# Patient Record
Sex: MALE | Race: WHITE | NOT HISPANIC OR LATINO | Employment: FULL TIME | URBAN - METROPOLITAN AREA
[De-identification: names, ages, dates, MRNs, and addresses within clinical notes are randomized per-mention and may not be internally consistent; named-entity substitution may affect disease eponyms.]

---

## 2021-08-05 ENCOUNTER — HOSPITAL ENCOUNTER (INPATIENT)
Facility: HOSPITAL | Age: 66
LOS: 1 days | Discharge: HOME | DRG: 177 | End: 2021-08-06
Attending: EMERGENCY MEDICINE | Admitting: INTERNAL MEDICINE
Payer: OTHER GOVERNMENT

## 2021-08-05 DIAGNOSIS — J12.82 PNEUMONIA DUE TO COVID-19 VIRUS: ICD-10-CM

## 2021-08-05 DIAGNOSIS — J96.01 ACUTE RESPIRATORY FAILURE WITH HYPOXIA: Primary | ICD-10-CM

## 2021-08-05 DIAGNOSIS — U07.1 PNEUMONIA DUE TO COVID-19 VIRUS: ICD-10-CM

## 2021-08-05 DIAGNOSIS — Z20.822 SUSPECTED COVID-19 VIRUS INFECTION: ICD-10-CM

## 2021-08-05 DIAGNOSIS — K92.2 GASTROINTESTINAL HEMORRHAGE, UNSPECIFIED GASTROINTESTINAL HEMORRHAGE TYPE: ICD-10-CM

## 2021-08-05 DIAGNOSIS — K52.9 COLITIS: ICD-10-CM

## 2021-08-05 PROBLEM — E66.9 OBESITY (BMI 30.0-34.9): Chronic | Status: ACTIVE | Noted: 2021-08-05

## 2021-08-05 PROBLEM — D64.9 ANEMIA: Status: ACTIVE | Noted: 2021-08-05

## 2021-08-05 LAB
25(OH)D3+25(OH)D2 SERPL-MCNC: 41 NG/ML (ref 30–96)
ALBUMIN SERPL BCP-MCNC: 2.6 G/DL (ref 3.5–5.2)
ALP SERPL-CCNC: 59 U/L (ref 55–135)
ALT SERPL W/O P-5'-P-CCNC: 94 U/L (ref 10–44)
ANION GAP SERPL CALC-SCNC: 13 MMOL/L (ref 8–16)
APTT BLDCRRT: 24.4 SEC (ref 21–32)
AST SERPL-CCNC: 112 U/L (ref 10–40)
BASOPHILS # BLD AUTO: 0.02 K/UL (ref 0–0.2)
BASOPHILS NFR BLD: 0.4 % (ref 0–1.9)
BILIRUB SERPL-MCNC: 0.5 MG/DL (ref 0.1–1)
BNP SERPL-MCNC: 159 PG/ML (ref 0–99)
BUN SERPL-MCNC: 11 MG/DL (ref 8–23)
CALCIUM SERPL-MCNC: 8.5 MG/DL (ref 8.7–10.5)
CHLORIDE SERPL-SCNC: 103 MMOL/L (ref 95–110)
CK SERPL-CCNC: 57 U/L (ref 20–200)
CO2 SERPL-SCNC: 22 MMOL/L (ref 23–29)
CREAT SERPL-MCNC: 0.7 MG/DL (ref 0.5–1.4)
CRP SERPL-MCNC: 118.4 MG/L (ref 0–8.2)
D DIMER PPP IA.FEU-MCNC: 1.32 MG/L FEU
DIFFERENTIAL METHOD: ABNORMAL
EOSINOPHIL # BLD AUTO: 0.1 K/UL (ref 0–0.5)
EOSINOPHIL NFR BLD: 1.4 % (ref 0–8)
ERYTHROCYTE [DISTWIDTH] IN BLOOD BY AUTOMATED COUNT: 17.6 % (ref 11.5–14.5)
ERYTHROCYTE [SEDIMENTATION RATE] IN BLOOD BY WESTERGREN METHOD: 58 MM/HR (ref 0–10)
EST. GFR  (AFRICAN AMERICAN): >60 ML/MIN/1.73 M^2
EST. GFR  (NON AFRICAN AMERICAN): >60 ML/MIN/1.73 M^2
FERRITIN SERPL-MCNC: 158 NG/ML (ref 20–300)
GLUCOSE SERPL-MCNC: 118 MG/DL (ref 70–110)
HCT VFR BLD AUTO: 36.4 % (ref 40–54)
HCT VFR BLD AUTO: 37.9 % (ref 40–54)
HCV AB SERPL QL IA: NEGATIVE
HEP C VIRUS HOLD SPECIMEN: NORMAL
HGB BLD-MCNC: 10.4 G/DL (ref 14–18)
HGB BLD-MCNC: 11 G/DL (ref 14–18)
IMM GRANULOCYTES # BLD AUTO: 0.03 K/UL (ref 0–0.04)
IMM GRANULOCYTES NFR BLD AUTO: 0.6 % (ref 0–0.5)
INR PPP: 0.9 (ref 0.8–1.2)
LACTATE SERPL-SCNC: 1.6 MMOL/L (ref 0.5–2.2)
LDH SERPL L TO P-CCNC: 355 U/L (ref 110–260)
LYMPHOCYTES # BLD AUTO: 0.8 K/UL (ref 1–4.8)
LYMPHOCYTES NFR BLD: 15.9 % (ref 18–48)
MCH RBC QN AUTO: 24.9 PG (ref 27–31)
MCHC RBC AUTO-ENTMCNC: 29 G/DL (ref 32–36)
MCV RBC AUTO: 86 FL (ref 82–98)
MONOCYTES # BLD AUTO: 0.3 K/UL (ref 0.3–1)
MONOCYTES NFR BLD: 5.6 % (ref 4–15)
NEUTROPHILS # BLD AUTO: 3.8 K/UL (ref 1.8–7.7)
NEUTROPHILS NFR BLD: 76.1 % (ref 38–73)
NRBC BLD-RTO: 0 /100 WBC
PLATELET # BLD AUTO: 259 K/UL (ref 150–450)
PMV BLD AUTO: 11 FL (ref 9.2–12.9)
POTASSIUM SERPL-SCNC: 4.1 MMOL/L (ref 3.5–5.1)
PROCALCITONIN SERPL IA-MCNC: 0.16 NG/ML
PROT SERPL-MCNC: 6.5 G/DL (ref 6–8.4)
PROTHROMBIN TIME: 10.6 SEC (ref 9–12.5)
RBC # BLD AUTO: 4.41 M/UL (ref 4.6–6.2)
SARS-COV-2 RDRP RESP QL NAA+PROBE: POSITIVE
SODIUM SERPL-SCNC: 138 MMOL/L (ref 136–145)
TROPONIN I SERPL DL<=0.01 NG/ML-MCNC: 0.03 NG/ML (ref 0–0.03)
TROPONIN I SERPL DL<=0.01 NG/ML-MCNC: 0.03 NG/ML (ref 0–0.03)
WBC # BLD AUTO: 4.98 K/UL (ref 3.9–12.7)

## 2021-08-05 PROCEDURE — 80053 COMPREHEN METABOLIC PANEL: CPT | Performed by: EMERGENCY MEDICINE

## 2021-08-05 PROCEDURE — 84145 PROCALCITONIN (PCT): CPT | Performed by: EMERGENCY MEDICINE

## 2021-08-05 PROCEDURE — 99900035 HC TECH TIME PER 15 MIN (STAT)

## 2021-08-05 PROCEDURE — 87209 SMEAR COMPLEX STAIN: CPT | Performed by: PHYSICIAN ASSISTANT

## 2021-08-05 PROCEDURE — 85018 HEMOGLOBIN: CPT | Performed by: PHYSICIAN ASSISTANT

## 2021-08-05 PROCEDURE — 94640 AIRWAY INHALATION TREATMENT: CPT

## 2021-08-05 PROCEDURE — 25000003 PHARM REV CODE 250: Performed by: PHYSICIAN ASSISTANT

## 2021-08-05 PROCEDURE — 21400001 HC TELEMETRY ROOM

## 2021-08-05 PROCEDURE — 93005 ELECTROCARDIOGRAM TRACING: CPT

## 2021-08-05 PROCEDURE — 96375 TX/PRO/DX INJ NEW DRUG ADDON: CPT

## 2021-08-05 PROCEDURE — 82728 ASSAY OF FERRITIN: CPT | Performed by: EMERGENCY MEDICINE

## 2021-08-05 PROCEDURE — 93010 ELECTROCARDIOGRAM REPORT: CPT | Mod: ,,, | Performed by: INTERNAL MEDICINE

## 2021-08-05 PROCEDURE — 36415 COLL VENOUS BLD VENIPUNCTURE: CPT | Performed by: PHYSICIAN ASSISTANT

## 2021-08-05 PROCEDURE — 87045 FECES CULTURE AEROBIC BACT: CPT | Performed by: PHYSICIAN ASSISTANT

## 2021-08-05 PROCEDURE — 25000242 PHARM REV CODE 250 ALT 637 W/ HCPCS: Performed by: PHYSICIAN ASSISTANT

## 2021-08-05 PROCEDURE — 85730 THROMBOPLASTIN TIME PARTIAL: CPT | Performed by: PHYSICIAN ASSISTANT

## 2021-08-05 PROCEDURE — 94761 N-INVAS EAR/PLS OXIMETRY MLT: CPT

## 2021-08-05 PROCEDURE — 83880 ASSAY OF NATRIURETIC PEPTIDE: CPT | Performed by: EMERGENCY MEDICINE

## 2021-08-05 PROCEDURE — 96361 HYDRATE IV INFUSION ADD-ON: CPT

## 2021-08-05 PROCEDURE — 63600175 PHARM REV CODE 636 W HCPCS: Performed by: PHYSICIAN ASSISTANT

## 2021-08-05 PROCEDURE — 63600175 PHARM REV CODE 636 W HCPCS: Performed by: EMERGENCY MEDICINE

## 2021-08-05 PROCEDURE — 83615 LACTATE (LD) (LDH) ENZYME: CPT | Performed by: EMERGENCY MEDICINE

## 2021-08-05 PROCEDURE — 85014 HEMATOCRIT: CPT | Performed by: PHYSICIAN ASSISTANT

## 2021-08-05 PROCEDURE — 93010 EKG 12-LEAD: ICD-10-PCS | Mod: ,,, | Performed by: INTERNAL MEDICINE

## 2021-08-05 PROCEDURE — 82550 ASSAY OF CK (CPK): CPT | Performed by: EMERGENCY MEDICINE

## 2021-08-05 PROCEDURE — 85025 COMPLETE CBC W/AUTO DIFF WBC: CPT | Performed by: EMERGENCY MEDICINE

## 2021-08-05 PROCEDURE — 85379 FIBRIN DEGRADATION QUANT: CPT | Performed by: PHYSICIAN ASSISTANT

## 2021-08-05 PROCEDURE — 87040 BLOOD CULTURE FOR BACTERIA: CPT | Performed by: EMERGENCY MEDICINE

## 2021-08-05 PROCEDURE — 84484 ASSAY OF TROPONIN QUANT: CPT | Performed by: PHYSICIAN ASSISTANT

## 2021-08-05 PROCEDURE — 87046 STOOL CULTR AEROBIC BACT EA: CPT | Performed by: PHYSICIAN ASSISTANT

## 2021-08-05 PROCEDURE — S0030 INJECTION, METRONIDAZOLE: HCPCS | Performed by: PHYSICIAN ASSISTANT

## 2021-08-05 PROCEDURE — 96365 THER/PROPH/DIAG IV INF INIT: CPT

## 2021-08-05 PROCEDURE — 87427 SHIGA-LIKE TOXIN AG IA: CPT | Mod: 59 | Performed by: PHYSICIAN ASSISTANT

## 2021-08-05 PROCEDURE — 82306 VITAMIN D 25 HYDROXY: CPT | Performed by: PHYSICIAN ASSISTANT

## 2021-08-05 PROCEDURE — 85651 RBC SED RATE NONAUTOMATED: CPT | Performed by: PHYSICIAN ASSISTANT

## 2021-08-05 PROCEDURE — 25000003 PHARM REV CODE 250: Performed by: EMERGENCY MEDICINE

## 2021-08-05 PROCEDURE — 85610 PROTHROMBIN TIME: CPT | Performed by: PHYSICIAN ASSISTANT

## 2021-08-05 PROCEDURE — 99291 CRITICAL CARE FIRST HOUR: CPT | Mod: 25

## 2021-08-05 PROCEDURE — 83605 ASSAY OF LACTIC ACID: CPT | Performed by: EMERGENCY MEDICINE

## 2021-08-05 PROCEDURE — 86803 HEPATITIS C AB TEST: CPT | Performed by: EMERGENCY MEDICINE

## 2021-08-05 PROCEDURE — 89055 LEUKOCYTE ASSESSMENT FECAL: CPT | Performed by: PHYSICIAN ASSISTANT

## 2021-08-05 PROCEDURE — U0002 COVID-19 LAB TEST NON-CDC: HCPCS | Performed by: EMERGENCY MEDICINE

## 2021-08-05 PROCEDURE — 86140 C-REACTIVE PROTEIN: CPT | Performed by: EMERGENCY MEDICINE

## 2021-08-05 PROCEDURE — 84484 ASSAY OF TROPONIN QUANT: CPT | Mod: 91 | Performed by: EMERGENCY MEDICINE

## 2021-08-05 RX ORDER — CIPROFLOXACIN 2 MG/ML
400 INJECTION, SOLUTION INTRAVENOUS
Status: DISCONTINUED | OUTPATIENT
Start: 2021-08-05 | End: 2021-08-06

## 2021-08-05 RX ORDER — IBUPROFEN 200 MG
16 TABLET ORAL
Status: DISCONTINUED | OUTPATIENT
Start: 2021-08-05 | End: 2021-08-06 | Stop reason: HOSPADM

## 2021-08-05 RX ORDER — ASCORBIC ACID 500 MG
500 TABLET ORAL 2 TIMES DAILY
Status: DISCONTINUED | OUTPATIENT
Start: 2021-08-05 | End: 2021-08-06

## 2021-08-05 RX ORDER — SODIUM CHLORIDE 0.9 % (FLUSH) 0.9 %
10 SYRINGE (ML) INJECTION
Status: DISCONTINUED | OUTPATIENT
Start: 2021-08-05 | End: 2021-08-06 | Stop reason: HOSPADM

## 2021-08-05 RX ORDER — ZINC SULFATE 50(220)MG
220 CAPSULE ORAL DAILY
Status: DISCONTINUED | OUTPATIENT
Start: 2021-08-05 | End: 2021-08-06

## 2021-08-05 RX ORDER — METRONIDAZOLE 500 MG/100ML
500 INJECTION, SOLUTION INTRAVENOUS
Status: DISCONTINUED | OUTPATIENT
Start: 2021-08-05 | End: 2021-08-06

## 2021-08-05 RX ORDER — ALBUTEROL SULFATE 90 UG/1
2 AEROSOL, METERED RESPIRATORY (INHALATION) EVERY 6 HOURS
Status: DISCONTINUED | OUTPATIENT
Start: 2021-08-05 | End: 2021-08-06 | Stop reason: HOSPADM

## 2021-08-05 RX ORDER — GLUCAGON 1 MG
1 KIT INJECTION
Status: DISCONTINUED | OUTPATIENT
Start: 2021-08-05 | End: 2021-08-06 | Stop reason: HOSPADM

## 2021-08-05 RX ORDER — IBUPROFEN 200 MG
24 TABLET ORAL
Status: DISCONTINUED | OUTPATIENT
Start: 2021-08-05 | End: 2021-08-06 | Stop reason: HOSPADM

## 2021-08-05 RX ADMIN — Medication 220 MG: at 02:08

## 2021-08-05 RX ADMIN — METRONIDAZOLE 500 MG: 500 INJECTION, SOLUTION INTRAVENOUS at 10:08

## 2021-08-05 RX ADMIN — PIPERACILLIN SODIUM AND TAZOBACTAM SODIUM 4.5 G: 4; .5 INJECTION, POWDER, FOR SOLUTION INTRAVENOUS at 12:08

## 2021-08-05 RX ADMIN — REMDESIVIR 200 MG: 100 INJECTION, POWDER, LYOPHILIZED, FOR SOLUTION INTRAVENOUS at 04:08

## 2021-08-05 RX ADMIN — THERA TABS 1 TABLET: TAB at 02:08

## 2021-08-05 RX ADMIN — METRONIDAZOLE 500 MG: 500 INJECTION, SOLUTION INTRAVENOUS at 02:08

## 2021-08-05 RX ADMIN — CIPROFLOXACIN 400 MG: 2 INJECTION, SOLUTION INTRAVENOUS at 02:08

## 2021-08-05 RX ADMIN — DEXAMETHASONE 6 MG: 4 TABLET ORAL at 02:08

## 2021-08-05 RX ADMIN — SODIUM CHLORIDE 500 ML: 0.9 INJECTION, SOLUTION INTRAVENOUS at 09:08

## 2021-08-05 RX ADMIN — OXYCODONE HYDROCHLORIDE AND ACETAMINOPHEN 500 MG: 500 TABLET ORAL at 10:08

## 2021-08-05 RX ADMIN — ALBUTEROL SULFATE 2 PUFF: 90 AEROSOL, METERED RESPIRATORY (INHALATION) at 09:08

## 2021-08-05 RX ADMIN — ALBUTEROL SULFATE 2 PUFF: 90 AEROSOL, METERED RESPIRATORY (INHALATION) at 01:08

## 2021-08-06 VITALS
RESPIRATION RATE: 22 BRPM | HEART RATE: 91 BPM | DIASTOLIC BLOOD PRESSURE: 79 MMHG | HEIGHT: 69 IN | OXYGEN SATURATION: 91 % | SYSTOLIC BLOOD PRESSURE: 158 MMHG | TEMPERATURE: 98 F | BODY MASS INDEX: 34.87 KG/M2 | WEIGHT: 235.44 LBS

## 2021-08-06 PROBLEM — R79.89 ELEVATED D-DIMER: Status: ACTIVE | Noted: 2021-08-06

## 2021-08-06 PROBLEM — R79.89 ELEVATED TROPONIN: Status: ACTIVE | Noted: 2021-08-06

## 2021-08-06 PROBLEM — R74.8 ELEVATED LIVER ENZYMES: Status: ACTIVE | Noted: 2021-08-06

## 2021-08-06 PROBLEM — E88.09 HYPOALBUMINEMIA: Status: ACTIVE | Noted: 2021-08-06

## 2021-08-06 LAB
ALBUMIN SERPL BCP-MCNC: 2.3 G/DL (ref 3.5–5.2)
ALP SERPL-CCNC: 78 U/L (ref 55–135)
ALT SERPL W/O P-5'-P-CCNC: 137 U/L (ref 10–44)
ANION GAP SERPL CALC-SCNC: 12 MMOL/L (ref 8–16)
ANISOCYTOSIS BLD QL SMEAR: SLIGHT
AST SERPL-CCNC: 159 U/L (ref 10–40)
BASOPHILS # BLD AUTO: 0 K/UL (ref 0–0.2)
BASOPHILS NFR BLD: 0 % (ref 0–1.9)
BILIRUB SERPL-MCNC: 0.4 MG/DL (ref 0.1–1)
BUN SERPL-MCNC: 15 MG/DL (ref 8–23)
BURR CELLS BLD QL SMEAR: ABNORMAL
CALCIUM SERPL-MCNC: 8.6 MG/DL (ref 8.7–10.5)
CHLORIDE SERPL-SCNC: 106 MMOL/L (ref 95–110)
CO2 SERPL-SCNC: 20 MMOL/L (ref 23–29)
CREAT SERPL-MCNC: 0.7 MG/DL (ref 0.5–1.4)
DACRYOCYTES BLD QL SMEAR: ABNORMAL
DIFFERENTIAL METHOD: ABNORMAL
EOSINOPHIL # BLD AUTO: 0 K/UL (ref 0–0.5)
EOSINOPHIL NFR BLD: 0 % (ref 0–8)
ERYTHROCYTE [DISTWIDTH] IN BLOOD BY AUTOMATED COUNT: 17.5 % (ref 11.5–14.5)
EST. GFR  (AFRICAN AMERICAN): >60 ML/MIN/1.73 M^2
EST. GFR  (NON AFRICAN AMERICAN): >60 ML/MIN/1.73 M^2
GLUCOSE SERPL-MCNC: 126 MG/DL (ref 70–110)
HCT VFR BLD AUTO: 34 % (ref 40–54)
HCT VFR BLD AUTO: 35.2 % (ref 40–54)
HCT VFR BLD AUTO: 35.2 % (ref 40–54)
HGB BLD-MCNC: 10.3 G/DL (ref 14–18)
HGB BLD-MCNC: 10.5 G/DL (ref 14–18)
HGB BLD-MCNC: 10.5 G/DL (ref 14–18)
HYPOCHROMIA BLD QL SMEAR: ABNORMAL
IMM GRANULOCYTES # BLD AUTO: 0.02 K/UL (ref 0–0.04)
IMM GRANULOCYTES NFR BLD AUTO: 0.5 % (ref 0–0.5)
LYMPHOCYTES # BLD AUTO: 0.6 K/UL (ref 1–4.8)
LYMPHOCYTES NFR BLD: 15 % (ref 18–48)
MAGNESIUM SERPL-MCNC: 2.1 MG/DL (ref 1.6–2.6)
MCH RBC QN AUTO: 25 PG (ref 27–31)
MCHC RBC AUTO-ENTMCNC: 29.8 G/DL (ref 32–36)
MCV RBC AUTO: 84 FL (ref 82–98)
MONOCYTES # BLD AUTO: 0.2 K/UL (ref 0.3–1)
MONOCYTES NFR BLD: 5.9 % (ref 4–15)
NEUTROPHILS # BLD AUTO: 2.9 K/UL (ref 1.8–7.7)
NEUTROPHILS NFR BLD: 78.6 % (ref 38–73)
NRBC BLD-RTO: 0 /100 WBC
OVALOCYTES BLD QL SMEAR: ABNORMAL
PHOSPHATE SERPL-MCNC: 4.1 MG/DL (ref 2.7–4.5)
PLATELET # BLD AUTO: 288 K/UL (ref 150–450)
PMV BLD AUTO: 10.8 FL (ref 9.2–12.9)
POTASSIUM SERPL-SCNC: 4.5 MMOL/L (ref 3.5–5.1)
PROT SERPL-MCNC: 6.2 G/DL (ref 6–8.4)
RBC # BLD AUTO: 4.2 M/UL (ref 4.6–6.2)
SCHISTOCYTES BLD QL SMEAR: PRESENT
SODIUM SERPL-SCNC: 138 MMOL/L (ref 136–145)
WBC # BLD AUTO: 3.74 K/UL (ref 3.9–12.7)
WBC #/AREA STL HPF: NORMAL /[HPF]

## 2021-08-06 PROCEDURE — 97110 THERAPEUTIC EXERCISES: CPT

## 2021-08-06 PROCEDURE — 85014 HEMATOCRIT: CPT | Performed by: PHYSICIAN ASSISTANT

## 2021-08-06 PROCEDURE — 83735 ASSAY OF MAGNESIUM: CPT | Performed by: PHYSICIAN ASSISTANT

## 2021-08-06 PROCEDURE — 80053 COMPREHEN METABOLIC PANEL: CPT | Performed by: PHYSICIAN ASSISTANT

## 2021-08-06 PROCEDURE — 36415 COLL VENOUS BLD VENIPUNCTURE: CPT | Performed by: NURSE PRACTITIONER

## 2021-08-06 PROCEDURE — 63600175 PHARM REV CODE 636 W HCPCS: Performed by: PHYSICIAN ASSISTANT

## 2021-08-06 PROCEDURE — 36415 COLL VENOUS BLD VENIPUNCTURE: CPT | Performed by: PHYSICIAN ASSISTANT

## 2021-08-06 PROCEDURE — 87040 BLOOD CULTURE FOR BACTERIA: CPT | Performed by: NURSE PRACTITIONER

## 2021-08-06 PROCEDURE — 97530 THERAPEUTIC ACTIVITIES: CPT

## 2021-08-06 PROCEDURE — 94761 N-INVAS EAR/PLS OXIMETRY MLT: CPT

## 2021-08-06 PROCEDURE — 97161 PT EVAL LOW COMPLEX 20 MIN: CPT

## 2021-08-06 PROCEDURE — 85018 HEMOGLOBIN: CPT | Performed by: PHYSICIAN ASSISTANT

## 2021-08-06 PROCEDURE — 94640 AIRWAY INHALATION TREATMENT: CPT

## 2021-08-06 PROCEDURE — 25000003 PHARM REV CODE 250: Performed by: INTERNAL MEDICINE

## 2021-08-06 PROCEDURE — 85025 COMPLETE CBC W/AUTO DIFF WBC: CPT | Performed by: PHYSICIAN ASSISTANT

## 2021-08-06 PROCEDURE — 84100 ASSAY OF PHOSPHORUS: CPT | Performed by: PHYSICIAN ASSISTANT

## 2021-08-06 PROCEDURE — 25000003 PHARM REV CODE 250: Performed by: PHYSICIAN ASSISTANT

## 2021-08-06 PROCEDURE — 25000003 PHARM REV CODE 250: Performed by: NURSE PRACTITIONER

## 2021-08-06 PROCEDURE — 99900035 HC TECH TIME PER 15 MIN (STAT)

## 2021-08-06 RX ORDER — ZINC SULFATE 50(220)MG
220 CAPSULE ORAL NIGHTLY
Status: DISCONTINUED | OUTPATIENT
Start: 2021-08-06 | End: 2021-08-06 | Stop reason: HOSPADM

## 2021-08-06 RX ORDER — CHOLECALCIFEROL (VITAMIN D3) 25 MCG
1000 TABLET ORAL DAILY
Start: 2021-08-07 | End: 2022-05-04

## 2021-08-06 RX ORDER — CIPROFLOXACIN 500 MG/1
500 TABLET ORAL EVERY 12 HOURS
Status: DISCONTINUED | OUTPATIENT
Start: 2021-08-06 | End: 2021-08-06 | Stop reason: DRUGHIGH

## 2021-08-06 RX ORDER — ZINC SULFATE 50(220)MG
220 CAPSULE ORAL NIGHTLY
Qty: 30 CAPSULE | Refills: 0
Start: 2021-08-06 | End: 2021-09-05

## 2021-08-06 RX ORDER — ASCORBIC ACID 500 MG
500 TABLET ORAL NIGHTLY
Qty: 30 TABLET | Refills: 0
Start: 2021-08-06 | End: 2021-09-05

## 2021-08-06 RX ORDER — METRONIDAZOLE 500 MG/1
500 TABLET ORAL EVERY 8 HOURS
Status: DISCONTINUED | OUTPATIENT
Start: 2021-08-06 | End: 2021-08-06 | Stop reason: HOSPADM

## 2021-08-06 RX ORDER — DEXAMETHASONE 6 MG/1
6 TABLET ORAL DAILY
Qty: 7 TABLET | Refills: 0 | Status: SHIPPED | OUTPATIENT
Start: 2021-08-07 | End: 2021-08-14

## 2021-08-06 RX ORDER — CHOLECALCIFEROL (VITAMIN D3) 25 MCG
1000 TABLET ORAL DAILY
Status: DISCONTINUED | OUTPATIENT
Start: 2021-08-06 | End: 2021-08-06 | Stop reason: HOSPADM

## 2021-08-06 RX ORDER — METRONIDAZOLE 500 MG/1
500 TABLET ORAL EVERY 8 HOURS
Qty: 21 TABLET | Refills: 0 | Status: SHIPPED | OUTPATIENT
Start: 2021-08-06 | End: 2021-08-13

## 2021-08-06 RX ORDER — CIPROFLOXACIN 750 MG/1
750 TABLET, FILM COATED ORAL EVERY 12 HOURS
Status: DISCONTINUED | OUTPATIENT
Start: 2021-08-06 | End: 2021-08-06 | Stop reason: HOSPADM

## 2021-08-06 RX ORDER — ASCORBIC ACID 500 MG
500 TABLET ORAL NIGHTLY
Status: DISCONTINUED | OUTPATIENT
Start: 2021-08-06 | End: 2021-08-06 | Stop reason: HOSPADM

## 2021-08-06 RX ORDER — PANTOPRAZOLE SODIUM 40 MG/1
40 TABLET, DELAYED RELEASE ORAL 2 TIMES DAILY
Status: DISCONTINUED | OUTPATIENT
Start: 2021-08-06 | End: 2021-08-06 | Stop reason: HOSPADM

## 2021-08-06 RX ORDER — CIPROFLOXACIN 750 MG/1
750 TABLET, FILM COATED ORAL EVERY 12 HOURS
Qty: 14 TABLET | Refills: 0 | Status: SHIPPED | OUTPATIENT
Start: 2021-08-06 | End: 2021-08-13

## 2021-08-06 RX ORDER — ALBUTEROL SULFATE 90 UG/1
2 AEROSOL, METERED RESPIRATORY (INHALATION) 4 TIMES DAILY
Qty: 18 G | Refills: 0 | Status: SHIPPED | OUTPATIENT
Start: 2021-08-06 | End: 2022-05-04

## 2021-08-06 RX ORDER — PANTOPRAZOLE SODIUM 40 MG/1
40 TABLET, DELAYED RELEASE ORAL 2 TIMES DAILY
Qty: 60 TABLET | Refills: 0 | Status: SHIPPED | OUTPATIENT
Start: 2021-08-06 | End: 2022-05-04

## 2021-08-06 RX ADMIN — Medication 1000 UNITS: at 11:08

## 2021-08-06 RX ADMIN — DEXAMETHASONE 6 MG: 4 TABLET ORAL at 08:08

## 2021-08-06 RX ADMIN — ALBUTEROL SULFATE 2 PUFF: 90 AEROSOL, METERED RESPIRATORY (INHALATION) at 01:08

## 2021-08-06 RX ADMIN — PANTOPRAZOLE SODIUM 40 MG: 40 TABLET, DELAYED RELEASE ORAL at 11:08

## 2021-08-06 RX ADMIN — THERA TABS 1 TABLET: TAB at 08:08

## 2021-08-06 RX ADMIN — ALBUTEROL SULFATE 2 PUFF: 90 AEROSOL, METERED RESPIRATORY (INHALATION) at 09:08

## 2021-08-06 RX ADMIN — CIPROFLOXACIN 750 MG: 750 TABLET, FILM COATED ORAL at 11:08

## 2021-08-06 RX ADMIN — OXYCODONE HYDROCHLORIDE AND ACETAMINOPHEN 500 MG: 500 TABLET ORAL at 08:08

## 2021-08-06 RX ADMIN — REMDESIVIR 100 MG: 100 INJECTION, POWDER, LYOPHILIZED, FOR SOLUTION INTRAVENOUS at 12:08

## 2021-08-06 RX ADMIN — CIPROFLOXACIN 400 MG: 2 INJECTION, SOLUTION INTRAVENOUS at 02:08

## 2021-08-06 RX ADMIN — Medication 220 MG: at 08:08

## 2021-08-06 RX ADMIN — METRONIDAZOLE 500 MG: 500 TABLET ORAL at 11:08

## 2021-08-07 ENCOUNTER — NURSE TRIAGE (OUTPATIENT)
Dept: ADMINISTRATIVE | Facility: CLINIC | Age: 66
End: 2021-08-07

## 2021-08-07 ENCOUNTER — TELEPHONE (OUTPATIENT)
Dept: ADMINISTRATIVE | Facility: CLINIC | Age: 66
End: 2021-08-07

## 2021-08-07 ENCOUNTER — PATIENT MESSAGE (OUTPATIENT)
Dept: ADMINISTRATIVE | Facility: OTHER | Age: 66
End: 2021-08-07

## 2021-08-07 ENCOUNTER — PATIENT MESSAGE (OUTPATIENT)
Dept: ADMINISTRATIVE | Facility: CLINIC | Age: 66
End: 2021-08-07

## 2021-08-08 ENCOUNTER — PATIENT MESSAGE (OUTPATIENT)
Dept: ADMINISTRATIVE | Facility: OTHER | Age: 66
End: 2021-08-08

## 2021-08-08 ENCOUNTER — NURSE TRIAGE (OUTPATIENT)
Dept: ADMINISTRATIVE | Facility: CLINIC | Age: 66
End: 2021-08-08

## 2021-08-08 ENCOUNTER — PATIENT MESSAGE (OUTPATIENT)
Dept: ADMINISTRATIVE | Facility: CLINIC | Age: 66
End: 2021-08-08

## 2021-08-08 ENCOUNTER — TELEPHONE (OUTPATIENT)
Dept: ADMINISTRATIVE | Facility: CLINIC | Age: 66
End: 2021-08-08

## 2021-08-08 LAB
BACTERIA BLD CULT: ABNORMAL
E COLI SXT1 STL QL IA: NEGATIVE
E COLI SXT2 STL QL IA: NEGATIVE

## 2021-08-09 ENCOUNTER — NURSE TRIAGE (OUTPATIENT)
Dept: ADMINISTRATIVE | Facility: CLINIC | Age: 66
End: 2021-08-09

## 2021-08-09 ENCOUNTER — PATIENT MESSAGE (OUTPATIENT)
Dept: ADMINISTRATIVE | Facility: OTHER | Age: 66
End: 2021-08-09

## 2021-08-09 LAB
BACTERIA STL CULT: NORMAL
O+P STL MICRO: NORMAL

## 2021-08-10 ENCOUNTER — PATIENT MESSAGE (OUTPATIENT)
Dept: ADMINISTRATIVE | Facility: OTHER | Age: 66
End: 2021-08-10

## 2021-08-10 ENCOUNTER — NURSE TRIAGE (OUTPATIENT)
Dept: ADMINISTRATIVE | Facility: CLINIC | Age: 66
End: 2021-08-10

## 2021-08-10 ENCOUNTER — TELEPHONE (OUTPATIENT)
Dept: ADMINISTRATIVE | Facility: CLINIC | Age: 66
End: 2021-08-10

## 2021-08-10 LAB — BACTERIA BLD CULT: NORMAL

## 2021-08-11 ENCOUNTER — PATIENT MESSAGE (OUTPATIENT)
Dept: ADMINISTRATIVE | Facility: OTHER | Age: 66
End: 2021-08-11

## 2021-08-11 ENCOUNTER — NURSE TRIAGE (OUTPATIENT)
Dept: ADMINISTRATIVE | Facility: CLINIC | Age: 66
End: 2021-08-11

## 2021-08-11 LAB
BACTERIA BLD CULT: NORMAL
BACTERIA BLD CULT: NORMAL

## 2021-08-12 ENCOUNTER — PATIENT MESSAGE (OUTPATIENT)
Dept: ADMINISTRATIVE | Facility: OTHER | Age: 66
End: 2021-08-12

## 2021-08-13 ENCOUNTER — PATIENT MESSAGE (OUTPATIENT)
Dept: ADMINISTRATIVE | Facility: OTHER | Age: 66
End: 2021-08-13

## 2021-08-14 ENCOUNTER — PATIENT MESSAGE (OUTPATIENT)
Dept: ADMINISTRATIVE | Facility: OTHER | Age: 66
End: 2021-08-14

## 2021-08-15 ENCOUNTER — PATIENT MESSAGE (OUTPATIENT)
Dept: ADMINISTRATIVE | Facility: OTHER | Age: 66
End: 2021-08-15

## 2021-08-16 ENCOUNTER — PATIENT MESSAGE (OUTPATIENT)
Dept: ADMINISTRATIVE | Facility: OTHER | Age: 66
End: 2021-08-16

## 2021-08-16 ENCOUNTER — NURSE TRIAGE (OUTPATIENT)
Dept: ADMINISTRATIVE | Facility: CLINIC | Age: 66
End: 2021-08-16

## 2021-08-17 ENCOUNTER — PATIENT MESSAGE (OUTPATIENT)
Dept: ADMINISTRATIVE | Facility: OTHER | Age: 66
End: 2021-08-17

## 2021-08-17 ENCOUNTER — NURSE TRIAGE (OUTPATIENT)
Dept: ADMINISTRATIVE | Facility: CLINIC | Age: 66
End: 2021-08-17

## 2021-08-17 ENCOUNTER — TELEPHONE (OUTPATIENT)
Dept: ADMINISTRATIVE | Facility: CLINIC | Age: 66
End: 2021-08-17

## 2021-08-18 ENCOUNTER — PATIENT MESSAGE (OUTPATIENT)
Dept: ADMINISTRATIVE | Facility: OTHER | Age: 66
End: 2021-08-18

## 2021-08-19 ENCOUNTER — NURSE TRIAGE (OUTPATIENT)
Dept: ADMINISTRATIVE | Facility: CLINIC | Age: 66
End: 2021-08-19

## 2021-08-19 ENCOUNTER — PATIENT MESSAGE (OUTPATIENT)
Dept: ADMINISTRATIVE | Facility: OTHER | Age: 66
End: 2021-08-19

## 2021-08-20 ENCOUNTER — PATIENT MESSAGE (OUTPATIENT)
Dept: ADMINISTRATIVE | Facility: CLINIC | Age: 66
End: 2021-08-20

## 2021-08-20 ENCOUNTER — PATIENT MESSAGE (OUTPATIENT)
Dept: ADMINISTRATIVE | Facility: OTHER | Age: 66
End: 2021-08-20

## 2022-02-25 ENCOUNTER — PATIENT MESSAGE (OUTPATIENT)
Dept: RESEARCH | Facility: HOSPITAL | Age: 67
End: 2022-02-25

## 2022-05-04 ENCOUNTER — HOSPITAL ENCOUNTER (INPATIENT)
Facility: HOSPITAL | Age: 67
LOS: 22 days | Discharge: HOME | DRG: 853 | End: 2022-05-26
Attending: EMERGENCY MEDICINE | Admitting: SURGERY
Payer: MEDICARE

## 2022-05-04 ENCOUNTER — ANESTHESIA EVENT (OUTPATIENT)
Dept: SURGERY | Facility: HOSPITAL | Age: 67
DRG: 853 | End: 2022-05-04
Payer: MEDICARE

## 2022-05-04 ENCOUNTER — ANESTHESIA (OUTPATIENT)
Dept: SURGERY | Facility: HOSPITAL | Age: 67
DRG: 853 | End: 2022-05-04
Payer: MEDICARE

## 2022-05-04 DIAGNOSIS — R65.21 SEPTIC SHOCK: Primary | ICD-10-CM

## 2022-05-04 DIAGNOSIS — R10.84 ABDOMINAL PAIN, ACUTE, GENERALIZED: ICD-10-CM

## 2022-05-04 DIAGNOSIS — K63.1 SMALL BOWEL PERFORATION: ICD-10-CM

## 2022-05-04 DIAGNOSIS — J96.01 ACUTE HYPOXEMIC RESPIRATORY FAILURE: ICD-10-CM

## 2022-05-04 DIAGNOSIS — D64.9 ACUTE ON CHRONIC ANEMIA: ICD-10-CM

## 2022-05-04 DIAGNOSIS — R57.9 SHOCK: ICD-10-CM

## 2022-05-04 DIAGNOSIS — A41.9 SEPTIC SHOCK: Primary | ICD-10-CM

## 2022-05-04 DIAGNOSIS — K63.89 MASS OF COLON: ICD-10-CM

## 2022-05-04 DIAGNOSIS — R65.20 SEVERE SEPSIS: ICD-10-CM

## 2022-05-04 DIAGNOSIS — R16.0 LIVER MASS: ICD-10-CM

## 2022-05-04 DIAGNOSIS — J18.9 PNEUMONIA OF LEFT LOWER LOBE DUE TO INFECTIOUS ORGANISM: ICD-10-CM

## 2022-05-04 DIAGNOSIS — R10.9 ABDOMINAL PAIN: ICD-10-CM

## 2022-05-04 DIAGNOSIS — I48.91 ATRIAL FIBRILLATION WITH RVR: ICD-10-CM

## 2022-05-04 DIAGNOSIS — E87.20 LACTIC ACIDOSIS: ICD-10-CM

## 2022-05-04 DIAGNOSIS — R11.2 INTRACTABLE VOMITING WITH NAUSEA, UNSPECIFIED VOMITING TYPE: ICD-10-CM

## 2022-05-04 DIAGNOSIS — A41.9 SEPTIC SHOCK: ICD-10-CM

## 2022-05-04 DIAGNOSIS — K63.1 BOWEL PERFORATION: ICD-10-CM

## 2022-05-04 DIAGNOSIS — A41.9 SEVERE SEPSIS: ICD-10-CM

## 2022-05-04 DIAGNOSIS — R65.21 SEPTIC SHOCK: ICD-10-CM

## 2022-05-04 PROBLEM — R73.9 HYPERGLYCEMIA, UNSPECIFIED: Status: ACTIVE | Noted: 2022-05-04

## 2022-05-04 LAB
ABO + RH BLD: NORMAL
ACANTHOCYTES BLD QL SMEAR: PRESENT
ACANTHOCYTES BLD QL SMEAR: PRESENT
ALBUMIN SERPL BCP-MCNC: 3.2 G/DL (ref 3.5–5.2)
ALLENS TEST: ABNORMAL
ALP SERPL-CCNC: 56 U/L (ref 55–135)
ALT SERPL W/O P-5'-P-CCNC: 15 U/L (ref 10–44)
ANION GAP SERPL CALC-SCNC: 12 MMOL/L (ref 8–16)
ANION GAP SERPL CALC-SCNC: 12 MMOL/L (ref 8–16)
ANION GAP SERPL CALC-SCNC: 16 MMOL/L (ref 8–16)
ANION GAP SERPL CALC-SCNC: 8 MMOL/L (ref 8–16)
ANISOCYTOSIS BLD QL SMEAR: SLIGHT
ANISOCYTOSIS BLD QL SMEAR: SLIGHT
APTT BLDCRRT: 33.2 SEC (ref 21–32)
AST SERPL-CCNC: 24 U/L (ref 10–40)
BASOPHILS # BLD AUTO: 0.02 K/UL (ref 0–0.2)
BASOPHILS NFR BLD: 0 % (ref 0–1.9)
BASOPHILS NFR BLD: 0.8 % (ref 0–1.9)
BILIRUB SERPL-MCNC: 0.7 MG/DL (ref 0.1–1)
BLD GP AB SCN CELLS X3 SERPL QL: NORMAL
BLD PROD TYP BPU: NORMAL
BLOOD UNIT EXPIRATION DATE: NORMAL
BLOOD UNIT TYPE CODE: 6200
BLOOD UNIT TYPE CODE: 6200
BLOOD UNIT TYPE CODE: 9500
BLOOD UNIT TYPE CODE: 9500
BLOOD UNIT TYPE: NORMAL
BUN SERPL-MCNC: 20 MG/DL (ref 8–23)
BUN SERPL-MCNC: 23 MG/DL (ref 8–23)
BUN SERPL-MCNC: 24 MG/DL (ref 8–23)
BUN SERPL-MCNC: 25 MG/DL (ref 8–23)
BURR CELLS BLD QL SMEAR: ABNORMAL
BURR CELLS BLD QL SMEAR: ABNORMAL
CALCIUM SERPL-MCNC: 6.5 MG/DL (ref 8.7–10.5)
CALCIUM SERPL-MCNC: 6.5 MG/DL (ref 8.7–10.5)
CALCIUM SERPL-MCNC: 6.7 MG/DL (ref 8.7–10.5)
CALCIUM SERPL-MCNC: 7.8 MG/DL (ref 8.7–10.5)
CHLORIDE SERPL-SCNC: 107 MMOL/L (ref 95–110)
CHLORIDE SERPL-SCNC: 112 MMOL/L (ref 95–110)
CHLORIDE SERPL-SCNC: 114 MMOL/L (ref 95–110)
CHLORIDE SERPL-SCNC: 115 MMOL/L (ref 95–110)
CO2 SERPL-SCNC: 15 MMOL/L (ref 23–29)
CO2 SERPL-SCNC: 17 MMOL/L (ref 23–29)
CO2 SERPL-SCNC: 18 MMOL/L (ref 23–29)
CO2 SERPL-SCNC: 19 MMOL/L (ref 23–29)
CODING SYSTEM: NORMAL
CREAT SERPL-MCNC: 0.9 MG/DL (ref 0.5–1.4)
CREAT SERPL-MCNC: 0.9 MG/DL (ref 0.5–1.4)
CREAT SERPL-MCNC: 1.2 MG/DL (ref 0.5–1.4)
CREAT SERPL-MCNC: 1.4 MG/DL (ref 0.5–1.4)
CTP QC/QA: YES
DACRYOCYTES BLD QL SMEAR: ABNORMAL
DACRYOCYTES BLD QL SMEAR: ABNORMAL
DELSYS: ABNORMAL
DIFFERENTIAL METHOD: ABNORMAL
DIFFERENTIAL METHOD: ABNORMAL
DISPENSE STATUS: NORMAL
EOSINOPHIL # BLD AUTO: 0 K/UL (ref 0–0.5)
EOSINOPHIL NFR BLD: 0 % (ref 0–8)
EOSINOPHIL NFR BLD: 0.8 % (ref 0–8)
ERYTHROCYTE [DISTWIDTH] IN BLOOD BY AUTOMATED COUNT: 20.9 % (ref 11.5–14.5)
ERYTHROCYTE [DISTWIDTH] IN BLOOD BY AUTOMATED COUNT: 22.3 % (ref 11.5–14.5)
ERYTHROCYTE [SEDIMENTATION RATE] IN BLOOD BY WESTERGREN METHOD: 26 MM/H
ERYTHROCYTE [SEDIMENTATION RATE] IN BLOOD BY WESTERGREN METHOD: 30 MM/H
ERYTHROCYTE [SEDIMENTATION RATE] IN BLOOD BY WESTERGREN METHOD: 30 MM/H
EST. GFR  (AFRICAN AMERICAN): 60 ML/MIN/1.73 M^2
EST. GFR  (AFRICAN AMERICAN): >60 ML/MIN/1.73 M^2
EST. GFR  (NON AFRICAN AMERICAN): 52 ML/MIN/1.73 M^2
EST. GFR  (NON AFRICAN AMERICAN): >60 ML/MIN/1.73 M^2
FIO2: 100
FIO2: 80
FIO2: 85
GLUCOSE SERPL-MCNC: 163 MG/DL (ref 70–110)
GLUCOSE SERPL-MCNC: 165 MG/DL (ref 70–110)
GLUCOSE SERPL-MCNC: 166 MG/DL (ref 70–110)
GLUCOSE SERPL-MCNC: 167 MG/DL (ref 70–110)
GLUCOSE SERPL-MCNC: 169 MG/DL (ref 70–110)
GLUCOSE SERPL-MCNC: 180 MG/DL (ref 70–110)
GLUCOSE SERPL-MCNC: 184 MG/DL (ref 70–110)
HCO3 UR-SCNC: 16.7 MMOL/L (ref 24–28)
HCO3 UR-SCNC: 19.1 MMOL/L (ref 24–28)
HCO3 UR-SCNC: 19.4 MMOL/L (ref 24–28)
HCT VFR BLD AUTO: 28.9 % (ref 40–54)
HCT VFR BLD AUTO: 36.6 % (ref 40–54)
HCT VFR BLD CALC: 32 %PCV (ref 36–54)
HCT VFR BLD CALC: 33 %PCV (ref 36–54)
HCT VFR BLD CALC: 34 %PCV (ref 36–54)
HGB BLD-MCNC: 10.3 G/DL (ref 14–18)
HGB BLD-MCNC: 7.2 G/DL (ref 14–18)
HYPOCHROMIA BLD QL SMEAR: ABNORMAL
IMM GRANULOCYTES # BLD AUTO: 0.02 K/UL (ref 0–0.04)
IMM GRANULOCYTES # BLD AUTO: ABNORMAL K/UL (ref 0–0.04)
IMM GRANULOCYTES NFR BLD AUTO: 0.8 % (ref 0–0.5)
IMM GRANULOCYTES NFR BLD AUTO: ABNORMAL % (ref 0–0.5)
INR PPP: 1.6 (ref 0.8–1.2)
LACTATE SERPL-SCNC: 3.1 MMOL/L (ref 0.5–2.2)
LACTATE SERPL-SCNC: 8 MMOL/L (ref 0.5–2.2)
LIPASE SERPL-CCNC: 116 U/L (ref 4–60)
LYMPHOCYTES # BLD AUTO: 0.8 K/UL (ref 1–4.8)
LYMPHOCYTES NFR BLD: 30.2 % (ref 18–48)
LYMPHOCYTES NFR BLD: 42 % (ref 18–48)
MAGNESIUM SERPL-MCNC: 1.5 MG/DL (ref 1.6–2.6)
MAGNESIUM SERPL-MCNC: 1.7 MG/DL (ref 1.6–2.6)
MCH RBC QN AUTO: 18.2 PG (ref 27–31)
MCH RBC QN AUTO: 21.8 PG (ref 27–31)
MCHC RBC AUTO-ENTMCNC: 24.9 G/DL (ref 32–36)
MCHC RBC AUTO-ENTMCNC: 28.1 G/DL (ref 32–36)
MCV RBC AUTO: 73 FL (ref 82–98)
MCV RBC AUTO: 78 FL (ref 82–98)
MODE: ABNORMAL
MONOCYTES # BLD AUTO: 0 K/UL (ref 0.3–1)
MONOCYTES NFR BLD: 1.6 % (ref 4–15)
MONOCYTES NFR BLD: 12 % (ref 4–15)
NEUTROPHILS # BLD AUTO: 1.6 K/UL (ref 1.8–7.7)
NEUTROPHILS NFR BLD: 46 % (ref 38–73)
NEUTROPHILS NFR BLD: 65.8 % (ref 38–73)
NRBC BLD-RTO: 0 /100 WBC
NRBC BLD-RTO: 2 /100 WBC
NUM UNITS TRANS PACKED RBC: NORMAL
OVALOCYTES BLD QL SMEAR: ABNORMAL
OVALOCYTES BLD QL SMEAR: ABNORMAL
PCO2 BLDA: 31.9 MMHG (ref 35–45)
PCO2 BLDA: 39.1 MMHG (ref 35–45)
PCO2 BLDA: 55.5 MMHG (ref 35–45)
PEEP: 12
PEEP: 12
PEEP: 15
PH SMN: 7.15 [PH] (ref 7.35–7.45)
PH SMN: 7.24 [PH] (ref 7.35–7.45)
PH SMN: 7.39 [PH] (ref 7.35–7.45)
PLATELET # BLD AUTO: 191 K/UL (ref 150–450)
PLATELET # BLD AUTO: 385 K/UL (ref 150–450)
PLATELET BLD QL SMEAR: ABNORMAL
PLATELET BLD QL SMEAR: ABNORMAL
PMV BLD AUTO: 9.5 FL (ref 9.2–12.9)
PMV BLD AUTO: ABNORMAL FL (ref 9.2–12.9)
PO2 BLDA: 125 MMHG (ref 80–100)
PO2 BLDA: 180 MMHG (ref 80–100)
PO2 BLDA: 186 MMHG (ref 80–100)
POC BE: -10 MMOL/L
POC BE: -11 MMOL/L
POC BE: -6 MMOL/L
POC IONIZED CALCIUM: 1 MMOL/L (ref 1.06–1.42)
POC IONIZED CALCIUM: 1 MMOL/L (ref 1.06–1.42)
POC IONIZED CALCIUM: 1.06 MMOL/L (ref 1.06–1.42)
POC SATURATED O2: 100 % (ref 95–100)
POC SATURATED O2: 98 % (ref 95–100)
POC SATURATED O2: 99 % (ref 95–100)
POCT GLUCOSE: 153 MG/DL (ref 70–110)
POCT GLUCOSE: 162 MG/DL (ref 70–110)
POIKILOCYTOSIS BLD QL SMEAR: SLIGHT
POIKILOCYTOSIS BLD QL SMEAR: SLIGHT
POLYCHROMASIA BLD QL SMEAR: ABNORMAL
POLYCHROMASIA BLD QL SMEAR: ABNORMAL
POTASSIUM BLD-SCNC: 3.7 MMOL/L (ref 3.5–5.1)
POTASSIUM BLD-SCNC: 3.9 MMOL/L (ref 3.5–5.1)
POTASSIUM BLD-SCNC: 4.2 MMOL/L (ref 3.5–5.1)
POTASSIUM SERPL-SCNC: 3.7 MMOL/L (ref 3.5–5.1)
POTASSIUM SERPL-SCNC: 3.9 MMOL/L (ref 3.5–5.1)
POTASSIUM SERPL-SCNC: 4.2 MMOL/L (ref 3.5–5.1)
POTASSIUM SERPL-SCNC: 4.4 MMOL/L (ref 3.5–5.1)
PROT SERPL-MCNC: 5.4 G/DL (ref 6–8.4)
PROTHROMBIN TIME: 16.5 SEC (ref 9–12.5)
RBC # BLD AUTO: 3.95 M/UL (ref 4.6–6.2)
RBC # BLD AUTO: 4.72 M/UL (ref 4.6–6.2)
SAMPLE: ABNORMAL
SARS-COV-2 RDRP RESP QL NAA+PROBE: NEGATIVE
SCHISTOCYTES BLD QL SMEAR: PRESENT
SCHISTOCYTES BLD QL SMEAR: PRESENT
SICKLE CELLS BLD QL SMEAR: ABNORMAL
SICKLE CELLS BLD QL SMEAR: ABNORMAL
SITE: ABNORMAL
SODIUM BLD-SCNC: 141 MMOL/L (ref 136–145)
SODIUM BLD-SCNC: 143 MMOL/L (ref 136–145)
SODIUM BLD-SCNC: 144 MMOL/L (ref 136–145)
SODIUM SERPL-SCNC: 140 MMOL/L (ref 136–145)
SODIUM SERPL-SCNC: 141 MMOL/L (ref 136–145)
SODIUM SERPL-SCNC: 142 MMOL/L (ref 136–145)
SODIUM SERPL-SCNC: 142 MMOL/L (ref 136–145)
VT: 400
VT: 450
VT: 450
WBC # BLD AUTO: 1.05 K/UL (ref 3.9–12.7)
WBC # BLD AUTO: 2.48 K/UL (ref 3.9–12.7)

## 2022-05-04 PROCEDURE — 85025 COMPLETE CBC W/AUTO DIFF WBC: CPT | Performed by: EMERGENCY MEDICINE

## 2022-05-04 PROCEDURE — 36000708 HC OR TIME LEV III 1ST 15 MIN: Performed by: SURGERY

## 2022-05-04 PROCEDURE — 97605 NEG PRS WND THER DME<=50SQCM: CPT | Mod: ,,, | Performed by: SURGERY

## 2022-05-04 PROCEDURE — 63600175 PHARM REV CODE 636 W HCPCS: Performed by: INTERNAL MEDICINE

## 2022-05-04 PROCEDURE — 25000003 PHARM REV CODE 250: Performed by: NURSE PRACTITIONER

## 2022-05-04 PROCEDURE — 82803 BLOOD GASES ANY COMBINATION: CPT

## 2022-05-04 PROCEDURE — 82330 ASSAY OF CALCIUM: CPT

## 2022-05-04 PROCEDURE — 44120 REMOVAL OF SMALL INTESTINE: CPT | Mod: 80,52,, | Performed by: SURGERY

## 2022-05-04 PROCEDURE — 27201423 OPTIME MED/SURG SUP & DEVICES STERILE SUPPLY: Performed by: SURGERY

## 2022-05-04 PROCEDURE — 93005 ELECTROCARDIOGRAM TRACING: CPT

## 2022-05-04 PROCEDURE — 99900026 HC AIRWAY MAINTENANCE (STAT)

## 2022-05-04 PROCEDURE — 37000009 HC ANESTHESIA EA ADD 15 MINS: Performed by: SURGERY

## 2022-05-04 PROCEDURE — 99900035 HC TECH TIME PER 15 MIN (STAT)

## 2022-05-04 PROCEDURE — 84132 ASSAY OF SERUM POTASSIUM: CPT

## 2022-05-04 PROCEDURE — 25000003 PHARM REV CODE 250: Performed by: ANESTHESIOLOGY

## 2022-05-04 PROCEDURE — 85027 COMPLETE CBC AUTOMATED: CPT | Performed by: NURSE PRACTITIONER

## 2022-05-04 PROCEDURE — 86920 COMPATIBILITY TEST SPIN: CPT | Performed by: EMERGENCY MEDICINE

## 2022-05-04 PROCEDURE — 83735 ASSAY OF MAGNESIUM: CPT | Mod: 91 | Performed by: NURSE PRACTITIONER

## 2022-05-04 PROCEDURE — 27000221 HC OXYGEN, UP TO 24 HOURS

## 2022-05-04 PROCEDURE — 85007 BL SMEAR W/DIFF WBC COUNT: CPT | Performed by: NURSE PRACTITIONER

## 2022-05-04 PROCEDURE — 63600175 PHARM REV CODE 636 W HCPCS: Performed by: SURGERY

## 2022-05-04 PROCEDURE — 96367 TX/PROPH/DG ADDL SEQ IV INF: CPT

## 2022-05-04 PROCEDURE — 99291 CRITICAL CARE FIRST HOUR: CPT | Mod: 25

## 2022-05-04 PROCEDURE — 97605 PR NEG PRESS WOUND THERAPY (NPWT) W/NON-DISPOSABLE WOUND VAC DEVICE (DME), <=50 CM: ICD-10-PCS | Mod: ,,, | Performed by: SURGERY

## 2022-05-04 PROCEDURE — P9016 RBC LEUKOCYTES REDUCED: HCPCS | Performed by: SURGERY

## 2022-05-04 PROCEDURE — 88307 TISSUE EXAM BY PATHOLOGIST: CPT | Mod: 26,,, | Performed by: PATHOLOGY

## 2022-05-04 PROCEDURE — 93010 ELECTROCARDIOGRAM REPORT: CPT | Mod: ,,, | Performed by: STUDENT IN AN ORGANIZED HEALTH CARE EDUCATION/TRAINING PROGRAM

## 2022-05-04 PROCEDURE — 93010 EKG 12-LEAD: ICD-10-PCS | Mod: ,,, | Performed by: STUDENT IN AN ORGANIZED HEALTH CARE EDUCATION/TRAINING PROGRAM

## 2022-05-04 PROCEDURE — 99291 PR CRITICAL CARE, E/M 30-74 MINUTES: ICD-10-PCS | Mod: ,,, | Performed by: NURSE PRACTITIONER

## 2022-05-04 PROCEDURE — 99223 1ST HOSP IP/OBS HIGH 75: CPT | Mod: NSCH,,, | Performed by: INTERNAL MEDICINE

## 2022-05-04 PROCEDURE — 83690 ASSAY OF LIPASE: CPT | Performed by: EMERGENCY MEDICINE

## 2022-05-04 PROCEDURE — 80053 COMPREHEN METABOLIC PANEL: CPT | Performed by: EMERGENCY MEDICINE

## 2022-05-04 PROCEDURE — U0002 COVID-19 LAB TEST NON-CDC: HCPCS | Performed by: EMERGENCY MEDICINE

## 2022-05-04 PROCEDURE — 82565 ASSAY OF CREATININE: CPT

## 2022-05-04 PROCEDURE — 85014 HEMATOCRIT: CPT

## 2022-05-04 PROCEDURE — 36000709 HC OR TIME LEV III EA ADD 15 MIN: Performed by: SURGERY

## 2022-05-04 PROCEDURE — 25000003 PHARM REV CODE 250: Performed by: EMERGENCY MEDICINE

## 2022-05-04 PROCEDURE — 83605 ASSAY OF LACTIC ACID: CPT | Mod: 91 | Performed by: NURSE PRACTITIONER

## 2022-05-04 PROCEDURE — 82800 BLOOD PH: CPT

## 2022-05-04 PROCEDURE — 63600175 PHARM REV CODE 636 W HCPCS: Performed by: ANESTHESIOLOGY

## 2022-05-04 PROCEDURE — 37000008 HC ANESTHESIA 1ST 15 MINUTES: Performed by: SURGERY

## 2022-05-04 PROCEDURE — 83605 ASSAY OF LACTIC ACID: CPT | Performed by: EMERGENCY MEDICINE

## 2022-05-04 PROCEDURE — 85610 PROTHROMBIN TIME: CPT | Performed by: EMERGENCY MEDICINE

## 2022-05-04 PROCEDURE — 36430 TRANSFUSION BLD/BLD COMPNT: CPT

## 2022-05-04 PROCEDURE — 87040 BLOOD CULTURE FOR BACTERIA: CPT | Performed by: NURSE PRACTITIONER

## 2022-05-04 PROCEDURE — 80048 BASIC METABOLIC PNL TOTAL CA: CPT | Mod: 91,XB | Performed by: INTERNAL MEDICINE

## 2022-05-04 PROCEDURE — 84295 ASSAY OF SERUM SODIUM: CPT

## 2022-05-04 PROCEDURE — 87205 SMEAR GRAM STAIN: CPT | Performed by: NURSE PRACTITIONER

## 2022-05-04 PROCEDURE — 44120 PR RESECT SMALL INTEST,SINGL RESEC/ANAS: ICD-10-PCS | Mod: 80,52,, | Performed by: SURGERY

## 2022-05-04 PROCEDURE — 88307 PR  SURG PATH,LEVEL V: ICD-10-PCS | Mod: 26,,, | Performed by: PATHOLOGY

## 2022-05-04 PROCEDURE — 85730 THROMBOPLASTIN TIME PARTIAL: CPT | Performed by: EMERGENCY MEDICINE

## 2022-05-04 PROCEDURE — 25000003 PHARM REV CODE 250: Performed by: INTERNAL MEDICINE

## 2022-05-04 PROCEDURE — 80048 BASIC METABOLIC PNL TOTAL CA: CPT | Mod: XB | Performed by: NURSE PRACTITIONER

## 2022-05-04 PROCEDURE — 44120 REMOVAL OF SMALL INTESTINE: CPT | Mod: 52,,, | Performed by: SURGERY

## 2022-05-04 PROCEDURE — 96361 HYDRATE IV INFUSION ADD-ON: CPT

## 2022-05-04 PROCEDURE — 63600175 PHARM REV CODE 636 W HCPCS: Performed by: EMERGENCY MEDICINE

## 2022-05-04 PROCEDURE — 20000000 HC ICU ROOM

## 2022-05-04 PROCEDURE — 25000003 PHARM REV CODE 250: Performed by: SURGERY

## 2022-05-04 PROCEDURE — 63600175 PHARM REV CODE 636 W HCPCS: Mod: JG | Performed by: INTERNAL MEDICINE

## 2022-05-04 PROCEDURE — 96365 THER/PROPH/DIAG IV INF INIT: CPT

## 2022-05-04 PROCEDURE — 86920 COMPATIBILITY TEST SPIN: CPT | Performed by: SURGERY

## 2022-05-04 PROCEDURE — 99223 PR INITIAL HOSPITAL CARE,LEVL III: ICD-10-PCS | Mod: NSCH,,, | Performed by: INTERNAL MEDICINE

## 2022-05-04 PROCEDURE — 86850 RBC ANTIBODY SCREEN: CPT | Performed by: EMERGENCY MEDICINE

## 2022-05-04 PROCEDURE — 36415 COLL VENOUS BLD VENIPUNCTURE: CPT | Performed by: EMERGENCY MEDICINE

## 2022-05-04 PROCEDURE — 87070 CULTURE OTHR SPECIMN AEROBIC: CPT | Performed by: NURSE PRACTITIONER

## 2022-05-04 PROCEDURE — 63600175 PHARM REV CODE 636 W HCPCS: Performed by: NURSE PRACTITIONER

## 2022-05-04 PROCEDURE — 88307 TISSUE EXAM BY PATHOLOGIST: CPT | Performed by: PATHOLOGY

## 2022-05-04 PROCEDURE — 94761 N-INVAS EAR/PLS OXIMETRY MLT: CPT

## 2022-05-04 PROCEDURE — 99291 CRITICAL CARE FIRST HOUR: CPT | Mod: ,,, | Performed by: NURSE PRACTITIONER

## 2022-05-04 PROCEDURE — 37799 UNLISTED PX VASCULAR SURGERY: CPT

## 2022-05-04 PROCEDURE — 94002 VENT MGMT INPAT INIT DAY: CPT

## 2022-05-04 PROCEDURE — P9016 RBC LEUKOCYTES REDUCED: HCPCS | Performed by: EMERGENCY MEDICINE

## 2022-05-04 PROCEDURE — 96375 TX/PRO/DX INJ NEW DRUG ADDON: CPT

## 2022-05-04 PROCEDURE — 44120 PR RESECT SMALL INTEST,SINGL RESEC/ANAS: ICD-10-PCS | Mod: 52,,, | Performed by: SURGERY

## 2022-05-04 RX ORDER — INSULIN ASPART 100 [IU]/ML
1-10 INJECTION, SOLUTION INTRAVENOUS; SUBCUTANEOUS EVERY 6 HOURS PRN
Status: DISCONTINUED | OUTPATIENT
Start: 2022-05-04 | End: 2022-05-10

## 2022-05-04 RX ORDER — ACETAMINOPHEN 650 MG/1
650 SUPPOSITORY RECTAL EVERY 6 HOURS PRN
Status: DISCONTINUED | OUTPATIENT
Start: 2022-05-04 | End: 2022-05-24

## 2022-05-04 RX ORDER — MUPIROCIN 20 MG/G
OINTMENT TOPICAL 2 TIMES DAILY
Status: COMPLETED | OUTPATIENT
Start: 2022-05-04 | End: 2022-05-08

## 2022-05-04 RX ORDER — MIDAZOLAM HYDROCHLORIDE 1 MG/ML
INJECTION INTRAMUSCULAR; INTRAVENOUS
Status: DISCONTINUED | OUTPATIENT
Start: 2022-05-04 | End: 2022-05-04

## 2022-05-04 RX ORDER — HYDROMORPHONE HYDROCHLORIDE 2 MG/ML
1 INJECTION, SOLUTION INTRAMUSCULAR; INTRAVENOUS; SUBCUTANEOUS
Status: COMPLETED | OUTPATIENT
Start: 2022-05-04 | End: 2022-05-04

## 2022-05-04 RX ORDER — NOREPINEPHRINE BITARTRATE/D5W 4MG/250ML
0-3 PLASTIC BAG, INJECTION (ML) INTRAVENOUS CONTINUOUS
Status: DISCONTINUED | OUTPATIENT
Start: 2022-05-04 | End: 2022-05-04

## 2022-05-04 RX ORDER — INDOMETHACIN 25 MG/1
100 CAPSULE ORAL ONCE
Status: COMPLETED | OUTPATIENT
Start: 2022-05-04 | End: 2022-05-04

## 2022-05-04 RX ORDER — LIDOCAINE HYDROCHLORIDE 10 MG/ML
INJECTION, SOLUTION EPIDURAL; INFILTRATION; INTRACAUDAL; PERINEURAL
Status: DISCONTINUED | OUTPATIENT
Start: 2022-05-04 | End: 2022-05-04

## 2022-05-04 RX ORDER — SODIUM CHLORIDE, SODIUM LACTATE, POTASSIUM CHLORIDE, CALCIUM CHLORIDE 600; 310; 30; 20 MG/100ML; MG/100ML; MG/100ML; MG/100ML
INJECTION, SOLUTION INTRAVENOUS CONTINUOUS PRN
Status: DISCONTINUED | OUTPATIENT
Start: 2022-05-04 | End: 2022-05-04

## 2022-05-04 RX ORDER — PROPOFOL 10 MG/ML
VIAL (ML) INTRAVENOUS
Status: DISCONTINUED | OUTPATIENT
Start: 2022-05-04 | End: 2022-05-04

## 2022-05-04 RX ORDER — FAMOTIDINE 10 MG/ML
20 INJECTION INTRAVENOUS 2 TIMES DAILY
Status: DISCONTINUED | OUTPATIENT
Start: 2022-05-04 | End: 2022-05-05

## 2022-05-04 RX ORDER — FENTANYL CITRATE-0.9 % NACL/PF 10 MCG/ML
0-250 PLASTIC BAG, INJECTION (ML) INTRAVENOUS CONTINUOUS
Status: DISCONTINUED | OUTPATIENT
Start: 2022-05-04 | End: 2022-05-12

## 2022-05-04 RX ORDER — SUCCINYLCHOLINE CHLORIDE 20 MG/ML
INJECTION INTRAMUSCULAR; INTRAVENOUS
Status: DISCONTINUED | OUTPATIENT
Start: 2022-05-04 | End: 2022-05-04

## 2022-05-04 RX ORDER — SODIUM CHLORIDE 9 MG/ML
INJECTION, SOLUTION INTRAVENOUS CONTINUOUS
Status: DISCONTINUED | OUTPATIENT
Start: 2022-05-04 | End: 2022-05-04

## 2022-05-04 RX ORDER — ONDANSETRON 2 MG/ML
4 INJECTION INTRAMUSCULAR; INTRAVENOUS
Status: COMPLETED | OUTPATIENT
Start: 2022-05-04 | End: 2022-05-04

## 2022-05-04 RX ORDER — ROCURONIUM BROMIDE 10 MG/ML
INJECTION, SOLUTION INTRAVENOUS
Status: DISCONTINUED | OUTPATIENT
Start: 2022-05-04 | End: 2022-05-04

## 2022-05-04 RX ORDER — MORPHINE SULFATE 4 MG/ML
4 INJECTION, SOLUTION INTRAMUSCULAR; INTRAVENOUS
Status: COMPLETED | OUTPATIENT
Start: 2022-05-04 | End: 2022-05-04

## 2022-05-04 RX ORDER — HEPARIN SODIUM 5000 [USP'U]/ML
5000 INJECTION, SOLUTION INTRAVENOUS; SUBCUTANEOUS EVERY 8 HOURS
Status: DISCONTINUED | OUTPATIENT
Start: 2022-05-05 | End: 2022-05-12

## 2022-05-04 RX ORDER — CALCIUM GLUCONATE 20 MG/ML
1 INJECTION, SOLUTION INTRAVENOUS ONCE
Status: COMPLETED | OUTPATIENT
Start: 2022-05-04 | End: 2022-05-04

## 2022-05-04 RX ORDER — ONDANSETRON 2 MG/ML
4 INJECTION INTRAMUSCULAR; INTRAVENOUS EVERY 8 HOURS PRN
Status: DISCONTINUED | OUTPATIENT
Start: 2022-05-04 | End: 2022-05-26 | Stop reason: HOSPADM

## 2022-05-04 RX ORDER — FENTANYL CITRATE 50 UG/ML
100 INJECTION, SOLUTION INTRAMUSCULAR; INTRAVENOUS
Status: COMPLETED | OUTPATIENT
Start: 2022-05-04 | End: 2022-05-04

## 2022-05-04 RX ORDER — VASOPRESSIN IN DEXTROSE 5 % 25/250 ML
0.04 PLASTIC BAG, INJECTION (ML) INTRAVENOUS CONTINUOUS
Status: DISCONTINUED | OUTPATIENT
Start: 2022-05-04 | End: 2022-05-10

## 2022-05-04 RX ORDER — CHLORHEXIDINE GLUCONATE ORAL RINSE 1.2 MG/ML
15 SOLUTION DENTAL 2 TIMES DAILY
Status: DISCONTINUED | OUTPATIENT
Start: 2022-05-04 | End: 2022-05-18

## 2022-05-04 RX ORDER — HYDROCODONE BITARTRATE AND ACETAMINOPHEN 500; 5 MG/1; MG/1
TABLET ORAL
Status: DISCONTINUED | OUTPATIENT
Start: 2022-05-04 | End: 2022-05-16

## 2022-05-04 RX ORDER — CALCIUM GLUCONATE 98 MG/ML
1 INJECTION, SOLUTION INTRAVENOUS ONCE
Status: COMPLETED | OUTPATIENT
Start: 2022-05-04 | End: 2022-05-04

## 2022-05-04 RX ORDER — GLUCAGON 1 MG
1 KIT INJECTION
Status: DISCONTINUED | OUTPATIENT
Start: 2022-05-04 | End: 2022-05-10

## 2022-05-04 RX ORDER — FENTANYL CITRATE 50 UG/ML
50 INJECTION, SOLUTION INTRAMUSCULAR; INTRAVENOUS
Status: DISCONTINUED | OUTPATIENT
Start: 2022-05-04 | End: 2022-05-04

## 2022-05-04 RX ORDER — LORAZEPAM 2 MG/ML
2 INJECTION INTRAMUSCULAR EVERY 4 HOURS PRN
Status: DISCONTINUED | OUTPATIENT
Start: 2022-05-04 | End: 2022-05-17

## 2022-05-04 RX ORDER — HEPARIN SODIUM 5000 [USP'U]/ML
5000 INJECTION, SOLUTION INTRAVENOUS; SUBCUTANEOUS EVERY 8 HOURS
Status: DISCONTINUED | OUTPATIENT
Start: 2022-05-04 | End: 2022-05-04

## 2022-05-04 RX ADMIN — MUPIROCIN: 20 OINTMENT TOPICAL at 01:05

## 2022-05-04 RX ADMIN — VASOPRESSIN 0.08 UNITS/MIN: 20 INJECTION INTRAVENOUS at 11:05

## 2022-05-04 RX ADMIN — HYDROMORPHONE HYDROCHLORIDE 1 MG: 2 INJECTION INTRAMUSCULAR; INTRAVENOUS; SUBCUTANEOUS at 09:05

## 2022-05-04 RX ADMIN — MICAFUNGIN SODIUM 100 MG: 100 INJECTION, POWDER, LYOPHILIZED, FOR SOLUTION INTRAVENOUS at 02:05

## 2022-05-04 RX ADMIN — PIPERACILLIN SODIUM AND TAZOBACTAM SODIUM 4.5 G: 4; .5 INJECTION, POWDER, FOR SOLUTION INTRAVENOUS at 11:05

## 2022-05-04 RX ADMIN — NOREPINEPHRINE BITARTRATE 0.9 MCG/KG/MIN: 1 INJECTION, SOLUTION, CONCENTRATE INTRAVENOUS at 11:05

## 2022-05-04 RX ADMIN — ONDANSETRON 4 MG: 2 INJECTION INTRAMUSCULAR; INTRAVENOUS at 05:05

## 2022-05-04 RX ADMIN — FAMOTIDINE 20 MG: 10 INJECTION INTRAVENOUS at 09:05

## 2022-05-04 RX ADMIN — SODIUM CHLORIDE 500 ML: 0.9 INJECTION, SOLUTION INTRAVENOUS at 04:05

## 2022-05-04 RX ADMIN — PROMETHAZINE HYDROCHLORIDE 25 MG: 25 INJECTION INTRAMUSCULAR; INTRAVENOUS at 07:05

## 2022-05-04 RX ADMIN — Medication 100 MCG/HR: at 01:05

## 2022-05-04 RX ADMIN — MORPHINE SULFATE 4 MG: 4 INJECTION INTRAVENOUS at 05:05

## 2022-05-04 RX ADMIN — DEXTROSE 0.2 MCG/KG/MIN: 5 SOLUTION INTRAVENOUS at 11:05

## 2022-05-04 RX ADMIN — MIDAZOLAM HYDROCHLORIDE 2 MG: 1 INJECTION, SOLUTION INTRAMUSCULAR; INTRAVENOUS at 10:05

## 2022-05-04 RX ADMIN — ROCURONIUM BROMIDE 45 MG: 10 INJECTION, SOLUTION INTRAVENOUS at 10:05

## 2022-05-04 RX ADMIN — CALCIUM GLUCONATE 1 G: 20 INJECTION, SOLUTION INTRAVENOUS at 07:05

## 2022-05-04 RX ADMIN — SODIUM CHLORIDE: 0.9 INJECTION, SOLUTION INTRAVENOUS at 02:05

## 2022-05-04 RX ADMIN — MUPIROCIN: 20 OINTMENT TOPICAL at 09:05

## 2022-05-04 RX ADMIN — CALCIUM GLUCONATE 1 G: 20 INJECTION, SOLUTION INTRAVENOUS at 03:05

## 2022-05-04 RX ADMIN — Medication 0.04 UNITS/MIN: at 11:05

## 2022-05-04 RX ADMIN — PROPOFOL 50 MG: 10 INJECTION, EMULSION INTRAVENOUS at 10:05

## 2022-05-04 RX ADMIN — ROCURONIUM BROMIDE 5 MG: 10 INJECTION, SOLUTION INTRAVENOUS at 10:05

## 2022-05-04 RX ADMIN — SODIUM BICARBONATE: 84 INJECTION, SOLUTION INTRAVENOUS at 05:05

## 2022-05-04 RX ADMIN — PIPERACILLIN SODIUM AND TAZOBACTAM SODIUM 4.5 G: 4; .5 INJECTION, POWDER, FOR SOLUTION INTRAVENOUS at 04:05

## 2022-05-04 RX ADMIN — SODIUM CHLORIDE 1000 ML: 0.9 INJECTION, SOLUTION INTRAVENOUS at 05:05

## 2022-05-04 RX ADMIN — FENTANYL CITRATE 50 MCG: 50 INJECTION, SOLUTION INTRAMUSCULAR; INTRAVENOUS at 10:05

## 2022-05-04 RX ADMIN — Medication 0.02 MCG/KG/MIN: at 11:05

## 2022-05-04 RX ADMIN — SODIUM CHLORIDE: 9 INJECTION, SOLUTION INTRAVENOUS at 10:05

## 2022-05-04 RX ADMIN — LIDOCAINE HYDROCHLORIDE 100 MG: 10 INJECTION, SOLUTION EPIDURAL; INFILTRATION; INTRACAUDAL; PERINEURAL at 10:05

## 2022-05-04 RX ADMIN — SODIUM CHLORIDE 1000 ML: 0.9 INJECTION, SOLUTION INTRAVENOUS at 08:05

## 2022-05-04 RX ADMIN — SUCCINYLCHOLINE CHLORIDE 100 MG: 20 INJECTION, SOLUTION INTRAMUSCULAR; INTRAVENOUS at 10:05

## 2022-05-04 RX ADMIN — MIDAZOLAM HYDROCHLORIDE 2 MG: 1 INJECTION, SOLUTION INTRAMUSCULAR; INTRAVENOUS at 11:05

## 2022-05-04 RX ADMIN — HYDROMORPHONE HYDROCHLORIDE 1 MG: 2 INJECTION INTRAMUSCULAR; INTRAVENOUS; SUBCUTANEOUS at 07:05

## 2022-05-04 RX ADMIN — SODIUM CHLORIDE, SODIUM LACTATE, POTASSIUM CHLORIDE, AND CALCIUM CHLORIDE: .6; .31; .03; .02 INJECTION, SOLUTION INTRAVENOUS at 10:05

## 2022-05-04 RX ADMIN — SODIUM CHLORIDE: 0.9 INJECTION, SOLUTION INTRAVENOUS at 01:05

## 2022-05-04 RX ADMIN — SODIUM BICARBONATE 100 MEQ: 84 INJECTION, SOLUTION INTRAVENOUS at 05:05

## 2022-05-04 RX ADMIN — CALCIUM GLUCONATE 1 G: 98 INJECTION, SOLUTION INTRAVENOUS at 09:05

## 2022-05-04 RX ADMIN — Medication 0.04 UNITS/MIN: at 09:05

## 2022-05-04 RX ADMIN — SODIUM CHLORIDE, SODIUM LACTATE, POTASSIUM CHLORIDE, AND CALCIUM CHLORIDE 1000 ML: .6; .31; .03; .02 INJECTION, SOLUTION INTRAVENOUS at 06:05

## 2022-05-04 RX ADMIN — PIPERACILLIN SODIUM AND TAZOBACTAM SODIUM 4.5 G: 4; .5 INJECTION, POWDER, LYOPHILIZED, FOR SOLUTION INTRAVENOUS at 07:05

## 2022-05-04 RX ADMIN — SODIUM CHLORIDE 500 ML: 0.9 INJECTION, SOLUTION INTRAVENOUS at 01:05

## 2022-05-04 RX ADMIN — CHLORHEXIDINE GLUCONATE 0.12% ORAL RINSE 15 ML: 1.2 LIQUID ORAL at 09:05

## 2022-05-04 RX ADMIN — ROCURONIUM BROMIDE 50 MG: 10 INJECTION, SOLUTION INTRAVENOUS at 11:05

## 2022-05-04 RX ADMIN — LORAZEPAM 2 MG: 2 INJECTION INTRAMUSCULAR; INTRAVENOUS at 08:05

## 2022-05-04 RX ADMIN — Medication 250 MCG/HR: at 11:05

## 2022-05-04 RX ADMIN — NOREPINEPHRINE BITARTRATE 0.32 MCG/KG/MIN: 1 INJECTION, SOLUTION, CONCENTRATE INTRAVENOUS at 05:05

## 2022-05-04 NOTE — PHARMACY MED REC
"Admission Medication History     The home medication history was taken by Gilbert Haile.    You may go to "Admission" then "Reconcile Home Medications" tabs to review and/or act upon these items.      The home medication list has been updated by the Pharmacy department.    Please read ALL comments highlighted in yellow.    Please address this information as you see fit.     Feel free to contact us if you have any questions or require assistance.    Patient is not currently taking any prescription or over-the-counter medications.    The medications listed below were removed from the home medication list. Please reorder if appropriate:  Patient reports no longer taking the following medication(s):   ALBUTEROL HFA 90 MCG/ACTUATION INHALER   PANTOPRAZOLE 40 MG TABLET   PULSE OXIMETER DEVICE    Medications listed below were obtained from: Patient/family, Analytic software- [employer] and Medical records  (Not in a hospital admission)      Potential issues to be addressed PRIOR TO DISCHARGE: NONE      Gilbert Haile CPhT  Spectralgct [phone]      Current Outpatient Medications on File Prior to Encounter   Medication Sig Dispense Refill Last Dose    [DISCONTINUED] albuterol (PROVENTIL/VENTOLIN HFA) 90 mcg/actuation inhaler Inhale 2 puffs into the lungs 4 (four) times daily. 18 g 0     [DISCONTINUED] pantoprazole (PROTONIX) 40 MG tablet Take 1 tablet (40 mg total) by mouth 2 (two) times daily. 60 tablet 0     [DISCONTINUED] pulse oximeter (PULSE OXIMETER) device by Apply Externally route 2 (two) times a day. Use twice daily at 8 AM and 3 PM and record the value in [employer] as directed. 1 each 0     [DISCONTINUED] vitamin D (VITAMIN D3) 1000 units Tab Take 1 tablet (1,000 Units total) by mouth once daily.                                .          "

## 2022-05-04 NOTE — ASSESSMENT & PLAN NOTE
Patient with Hypoxic Respiratory failure which is Acute.  he is not on home oxygen. Supplemental oxygen was provided and noted- Vent Mode: A/C  Oxygen Concentration (%):  [] 80  Resp Rate Total:  [20 br/min-30 br/min] 30 br/min  Vt Set:  [400 mL-450 mL] 450 mL  PEEP/CPAP:  [5 cmH20-15 cmH20] 12 cmH20  Pressure Support:  [0 cmH20] 0 cmH20  Mean Airway Pressure:  [10 bfH43-32 cmH20] 17 cmH20.   Signs/symptoms of respiratory failure include- tachypnea. Contributing diagnoses includes - Obesity Hypoventilation Labs and images were reviewed. Patient Has recent ABG, which has been reviewed. Will treat underlying causes and adjust management of respiratory failure as indicated. Pulmonary CC on board

## 2022-05-04 NOTE — CONSULTS
[facility] - Intensive Care ([facility] Medicine  Consult Note    Patient Name: Franky aMsters  MRN: [id number]  Admission Date: 5/4/2022  Hospital Length of Stay: 0 days  Attending Physician: Elvie Parker DO   Primary Care Provider: HOLLY ROSEN           Patient information was obtained from past medical records and primary team.     Consults  Subjective:     Principal Problem: Septic shock    Chief Complaint:   Chief Complaint   Patient presents with    Abdominal Pain     Severe  abdominal pain w/ n+v         HPI: Mr Masters is a 65 yo male POD#0 s/p SB perforation with surgical intervention demonstrating a large cecal mass and 3l feculant fluid in the abdominal cavity. Additional findings of a perforated ileum and a liver mass. Patient tolerated the Exlap with Washout and small bowel excision. Patient had a wound vac placed, is currently intubated, sedated and recovering in the ICU. Patient received 4 units PRBC and remains on pressor support. Patient is a DNR and HM consulted with assistance with medical management. Daughter at bedside. Patient discussed with care team.          Past Medical History:   Diagnosis Date    Diverticulitis     Supplemental oxygen dependent        History reviewed. No pertinent surgical history.    Review of patient's allergies indicates:  No Known Allergies    No current facility-administered medications on file prior to encounter.     Current Outpatient Medications on File Prior to Encounter   Medication Sig    [DISCONTINUED] albuterol (PROVENTIL/VENTOLIN HFA) 90 mcg/actuation inhaler Inhale 2 puffs into the lungs 4 (four) times daily.    [DISCONTINUED] pantoprazole (PROTONIX) 40 MG tablet Take 1 tablet (40 mg total) by mouth 2 (two) times daily.    [DISCONTINUED] pulse oximeter (PULSE OXIMETER) device by Apply Externally route 2 (two) times a day. Use twice daily at 8 AM and 3 PM and record the value in [employer] as directed.    [DISCONTINUED] vitamin D (VITAMIN  D3) 1000 units Tab Take 1 tablet (1,000 Units total) by mouth once daily.     Family History    None       Tobacco Use    Smoking status: Never Smoker    Smokeless tobacco: Never Used   Substance and Sexual Activity    Alcohol use: Not Currently    Drug use: Never    Sexual activity: Not on file     Review of Systems   Unable to perform ROS: Intubated   Objective:     Vital Signs (Most Recent):  Temp: 98 °F (36.7 °C) (05/04/22 0945)  Pulse: 81 (05/04/22 1640)  Resp: (!) 30 (05/04/22 1640)  BP: (!) 118/59 (05/04/22 0945)  SpO2: 100 % (05/04/22 1640)   Vital Signs (24h Range):  Temp:  [97.7 °F (36.5 °C)-98 °F (36.7 °C)] 98 °F (36.7 °C)  Pulse:  [] 81  Resp:  [16-30] 30  SpO2:  [84 %-100 %] 100 %  BP: ()/(46-74) 118/59     Weight: 107.1 kg (236 lb 3.2 oz)  Body mass index is 34.88 kg/m².    Physical Exam  Vitals and nursing note reviewed.   Constitutional:       General: He is not in acute distress.     Appearance: He is well-developed. He is obese. He is ill-appearing. He is not toxic-appearing or diaphoretic.      Interventions: He is sedated, intubated and restrained.   HENT:      Head: Normocephalic and atraumatic.      Nose:      Left Nostril: Epistaxis (post NG attempt) present.      Mouth/Throat:      Mouth: Mucous membranes are moist.   Eyes:      General: Lids are normal.      Pupils: Pupils are equal, round, and reactive to light.   Neck:      Trachea: Trachea normal.     Cardiovascular:      Rate and Rhythm: Normal rate and regular rhythm. Extrasystoles are present.     Pulses: Normal pulses.           Radial pulses are 2+ on the right side and 2+ on the left side.        Dorsalis pedis pulses are 2+ on the right side and 2+ on the left side.      Heart sounds: Heart sounds are distant.   Pulmonary:      Effort: Pulmonary effort is normal. No tachypnea, accessory muscle usage or respiratory distress. He is intubated.      Breath sounds: Examination of the left-lower field reveals decreased  breath sounds. Decreased breath sounds and rales (coarse) present.   Chest:      Chest wall: No deformity or tenderness.   Abdominal:      General: Bowel sounds are absent. There is no distension.      Palpations: Abdomen is soft.       Genitourinary:     Penis: Normal and uncircumcised.       Comments: Doherty in place  Musculoskeletal:         General: Normal range of motion.      Cervical back: Neck supple.      Right lower leg: No edema.      Left lower leg: No edema.      Right foot: No deformity.      Left foot: No deformity.   Lymphadenopathy:      Cervical: No cervical adenopathy.   Skin:     General: Skin is warm and dry.      Capillary Refill: Capillary refill takes 2 to 3 seconds.      Findings: No rash.          Neurological:      Comments: Sedated post op       Significant Labs: All pertinent labs within the past 24 hours have been reviewed.  ABGs:   Recent Labs   Lab 05/04/22  1442 05/04/22  1637   PH 7.146* 7.239*   PCO2 55.5* 39.1   HCO3 19.1* 16.7*   POCSATURATED 99 98   BE -10 -11   PO2 186* 125*     Blood Culture: No results for input(s): LABBLOO in the last 48 hours.  BMP:   Recent Labs   Lab 05/04/22  1330   *      K 3.9   *   CO2 15*   BUN 23   CREATININE 0.9   CALCIUM 6.5*   MG 1.7     CBC:   Recent Labs   Lab 05/04/22  0657 05/04/22  1330 05/04/22  1442 05/04/22  1637   WBC 2.48* 1.05*  --   --    HGB 7.2* 10.3*  --   --    HCT 28.9* 36.6* 33* 32*    385  --   --      CMP:   Recent Labs   Lab 05/04/22  0657 05/04/22  1330    141   K 3.7 3.9    114*   CO2 19* 15*   * 167*   BUN 20 23   CREATININE 0.9 0.9   CALCIUM 7.8* 6.5*   PROT 5.4*  --    ALBUMIN 3.2*  --    BILITOT 0.7  --    ALKPHOS 56  --    AST 24  --    ALT 15  --    ANIONGAP 16 12   EGFRNONAA >60 >60     Coagulation:   Recent Labs   Lab 05/04/22  0805   INR 1.6*   APTT 33.2*     Lactic Acid:   Recent Labs   Lab 05/04/22  0657 05/04/22  1330   LACTATE 8.0* 3.1*     Lipase:   Recent Labs   Lab  05/04/22  0657   LIPASE 116*     Imaging Results              X-Ray Chest AP Portable (Final result)  Result time 05/04/22 08:20:53      Final result by Philippe Horton MD (05/04/22 08:20:53)                   Impression:      Moderate free air seen beneath the right hemidiaphragm which was reported on prior CT abdomen.      Electronically signed by: Philippe Horton MD  Date:    05/04/2022  Time:    08:20               Narrative:    EXAMINATION:  XR CHEST AP PORTABLE    CLINICAL HISTORY:  Unspecified abdominal pain    FINDINGS:  Single view of the chest.  Comparison CT 05/04/2022.    Cardiac silhouette is enlarged.  Aorta demonstrates atherosclerotic disease.  Patchy infiltrate suspected left lower lobe.  Trace left pleural effusion..  No pneumothorax.  Bones appear intact.  Moderate free air seen beneath the right hemidiaphragm which was reported on prior CT abdomen.                                       CT Abdomen Pelvis  Without Contrast (Edited Result - FINAL)  Result time 05/04/22 09:40:28      Addendum 1 of 1 by Philippe Horton MD (05/04/22 09:40:28)      Appendix appears thickened measuring 12 mm which may be secondary inflamed.  This was discussed with Dr. Mayen at 940 am on 5/4/22      Electronically signed by: Philippe Horton MD  Date:    05/04/2022  Time:    09:40                   Final result by Philippe Horton MD (05/04/22 07:12:07)                   Impression:      Moderate ascites and large amount of free air consistent with perforated viscus.  Exact site of perforation not identified however bowel thickening most conspicuous within the distal small bowel.  Recommend surgery consult.  Findings were read at 700 hours and reported directly by myself to Dr. Watkins at 708 hours on 05/04/2022    Diffuse infectious or inflammatory enterocolitis.  Extensive diverticulosis.    All CT scans at this facility use dose modulation, iterative reconstruction, and/or weight based dosing when appropriate to  reduce radiation dose to as low as reasonable achievable.      Electronically signed by: Philippe Horton MD  Date:    05/04/2022  Time:    07:12               Narrative:    EXAMINATION:  CT ABDOMEN PELVIS WITHOUT CONTRAST    CLINICAL HISTORY:  Abdominal pain, acute, nonlocalized;    TECHNIQUE:  Low dose axial images, sagittal and coronal reformations were obtained from the lung bases to the pubic symphysis.  Oral contrast was not administered.    COMPARISON:  08/05/2021    FINDINGS:  Heart: Cardiomegaly.  No effusion.  Moderate coronary disease.    Lung Bases: Patchy infiltrate left lower lobe.    Liver: Normal size and attenuation.  Indeterminate 3.2 cm lesion within segment 8 of the right hepatic lobe indeterminate 2.6 cm lesion within segment 6 right hepatic lobe.  Indeterminate 2.9 cm lesion segment 4 adjacent gallbladder.  Findings are concerning for secondary malignancy.    Gallbladder: No calcified gallstones.    Bile Ducts: No dilatation.    Pancreas: No obvious mass. No peripancreatic fat stranding.    Spleen: Normal.    Adrenals: Normal.    Kidneys/Ureters: No mass, hydroureteronephrosis, or nephroureterolithiasis.    Bladder: No wall thickening.    Reproductive organs: Mild prostatomegaly.    GI Tract/Mesentery: Small hiatal hernia.  No evidence of bowel obstruction.  Thickened small bowel loops within the mid abdomen concerning for infectious or inflammatory enteritis.  Colonic mucosal thickening seen within the ascending and transverse colon.  Extensive diverticulosis throughout the large bowel greatest within the descending sigmoid colon.  Shotty nodes are seen throughout the mesentery.  No definite colonic mass although evaluation is severely limited due to lack of bowel prep.    Peritoneal Space: Moderate ascites and large amount of free air consistent with perforated viscus.    Retroperitoneum: Shotty adenopathy.    Abdominal wall: Small fat containing umbilical hernia.  Small right inguinal hernia.   Shotty inguinal adenopathy.    Vasculature: No aneurysm.  Moderate atherosclerotic disease.    Bones: No acute fracture. No suspicious lytic or sclerotic lesions.                                       X-Ray Abdomen Flat And Erect (Final result)  Result time 05/04/22 07:45:02      Final result by Philippe Horton MD (05/04/22 07:45:02)                   Impression:      Moderate amount of free air seen within the upper abdomen.  Recommend correlation with CT abdomen same date of service.  Findings were reported as part of CT abdomen at 708 hours on 05/04/2022 to Dr. Watkins.      Electronically signed by: Philippe Horton MD  Date:    05/04/2022  Time:    07:45               Narrative:    EXAMINATION:  XR ABDOMEN FLAT AND ERECT    CLINICAL HISTORY:  Abdominal Pain;    COMPARISON:  05/04/2022    FINDINGS:  Moderate amount of free air seen within the upper abdomen.  Prominent mucosal thickening within small bowel loops within the mid abdomen.  Moderate constipation left colon.  Nonobstructive bowel gas pattern is noted. No radiopaque kidney calculus is identified.  No evidence of organomegaly.  The bones demonstrate moderate degenerative changes within the lower lumbar region.  The bones are otherwise intact.  Lung bases demonstrate mild edema and dependent changes.                                        Significant Imaging: I have reviewed all pertinent imaging results/findings within the past 24 hours.    Assessment/Plan:     * Septic shock  Pressors  Abx - Zosyn / Micafungin  ID on consult      Pneumonia of left lower lobe due to infectious organism  Abx  ID on Consult      Mass of colon  Based on Surgery  Potential interventions  Goals of care  Biopsy as indicated  Likely Oncologic process with mets  Heme Onc as indicated      Hyperglycemia, unspecified  NISS  Accuchecks  Monitor      Small bowel perforation with large Cecal mass   Patient stable s/p sx POD#0  Plan for washout, etc per primary service  Wound  vac  Goals of care assessment  DNR      Obesity (BMI 30.0-34.9)  Diet  Nutrition on recovery      Acute on chronic anemia  Hgb 10.3 s/p Transfusion 4 units Prbc  Transfuse for Hgb <7  Monitor      Acute hypoxemic respiratory failure  Patient with Hypoxic Respiratory failure which is Acute.  he is not on home oxygen. Supplemental oxygen was provided and noted- Vent Mode: A/C  Oxygen Concentration (%):  [] 80  Resp Rate Total:  [20 br/min-30 br/min] 30 br/min  Vt Set:  [400 mL-450 mL] 450 mL  PEEP/CPAP:  [5 cmH20-15 cmH20] 12 cmH20  Pressure Support:  [0 cmH20] 0 cmH20  Mean Airway Pressure:  [10 rrL20-05 cmH20] 17 cmH20.   Signs/symptoms of respiratory failure include- tachypnea. Contributing diagnoses includes - Obesity Hypoventilation Labs and images were reviewed. Patient Has recent ABG, which has been reviewed. Will treat underlying causes and adjust management of respiratory failure as indicated. Pulmonary CC on board      VTE Risk Mitigation (From admission, onward)         Ordered     heparin (porcine) injection 5,000 Units  Every 8 hours         05/04/22 1255     IP VTE HIGH RISK PATIENT  Once         05/04/22 1253     Place sequential compression device  Until discontinued         05/04/22 1253                Critical care time spent on the evaluation and treatment of severe organ dysfunction, review of pertinent labs and imaging studies, discussions with consulting providers and discussions with patient/family: 37 minutes.    Thank you for your consult. I will follow-up with patient. Please contact us if you have any additional questions.    Paras Hutchinson MD  Department of Hospital Medicine   [facility] - Intensive Care ([facility])

## 2022-05-04 NOTE — CONSULTS
[facility] - Intensive Care ([facility])  Critical Care Medicine  Consult Note    Patient Name: Franky Masters  MRN: [id number]  Admission Date: 5/4/2022  Hospital Length of Stay: 0 days  Code Status: DNR  Attending Physician: Alicia Mayen MD   Primary Care Provider: HOLLY ROSEN   Principal Problem: Septic shock    [unfilled]  Subjective:     HPI:  Ms Masters is a 67 yo obese male with a PMH of diverticulosis and hx of hospitalization in Aug 2021 with COVID PNA and Hypoxic Resp Failure but did not require ICU or intubation.  She presented early this AM to [facility] ED about 0515 hr via EMS with complaint of abd pain X 2 hours that awakened her from sleep and had associated N/V/D.  In ED BP 86/46, RA SAT 92%, LA 8, Hgb 7.2 and CT Abd with suspected bowel perforation and free air.  General Surgery consulted and taken to OR this AM revealing SB perf with 3 L feculent fluid and food in cavity and large obstructing cecal mass with perf ileum and palpable liver mass.  Had Expl Lap with washout and excision of SB with wound vac placement admitted post op to ICU intubated on mech ventilation.  Received 2 units PRBCs in ED and another 2 units in OR also on Levophed and Vasopressin infusions.  Before surgery patient was insistent he be DNR post op but consented to surgery and invasive mech ventilation but no ACLS post op in event of cardiac arrest and no prolonged mech ventilation. Reportedly patient does not routinely follow with practitioner as outpt.       Hospital/ICU Course:  5/4 - Admitted to ICU sedated and intubated on Levophed and Vasopressin infusions in no distress      Past Medical History:   Diagnosis Date    Diverticulitis     Supplemental oxygen dependent        History reviewed. No pertinent surgical history.    Review of patient's allergies indicates:  No Known Allergies    Family History    None       Tobacco Use    Smoking status: Never Smoker    Smokeless tobacco: Never Used   Substance and Sexual  Activity    Alcohol use: Not Currently    Drug use: Never    Sexual activity: Not on file         Review of Systems   Unable to perform ROS: Intubated   Objective:     Vital Signs (Most Recent):  Temp: 98 °F (36.7 °C) (05/04/22 0945)  Pulse: 91 (05/04/22 1308)  Resp: (!) 26 (05/04/22 1308)  BP: (!) 118/59 (05/04/22 0945)  SpO2: (!) 94 % (05/04/22 1308)   Vital Signs (24h Range):  Temp:  [97.7 °F (36.5 °C)-98 °F (36.7 °C)] 98 °F (36.7 °C)  Pulse:  [] 91  Resp:  [16-26] 26  SpO2:  [84 %-94 %] 94 %  BP: ()/(46-74) 118/59     Weight: 107.1 kg (236 lb 3.2 oz)  Body mass index is 34.88 kg/m².      Intake/Output Summary (Last 24 hours) at 5/4/2022 1322  Last data filed at 5/4/2022 1250  Gross per 24 hour   Intake 5085 ml   Output --   Net 5085 ml       Physical Exam  Vitals and nursing note reviewed.   Constitutional:       General: He is not in acute distress.     Appearance: He is well-developed. He is obese. He is ill-appearing. He is not toxic-appearing or diaphoretic.      Interventions: He is sedated, intubated and restrained.   HENT:      Head: Normocephalic and atraumatic.      Nose:      Left Nostril: Epistaxis (post NG attempt) present.      Mouth/Throat:      Mouth: Mucous membranes are moist.   Eyes:      General: Lids are normal.      Pupils: Pupils are equal, round, and reactive to light.   Neck:      Trachea: Trachea normal.     Cardiovascular:      Rate and Rhythm: Normal rate and regular rhythm. Extrasystoles are present.     Pulses: Normal pulses.           Radial pulses are 2+ on the right side and 2+ on the left side.        Dorsalis pedis pulses are 2+ on the right side and 2+ on the left side.      Heart sounds: Heart sounds are distant.   Pulmonary:      Effort: Pulmonary effort is normal. No tachypnea, accessory muscle usage or respiratory distress. He is intubated.      Breath sounds: Examination of the left-lower field reveals decreased breath sounds. Decreased breath sounds and  rales (coarse) present.   Chest:      Chest wall: No deformity or tenderness.   Abdominal:      General: Bowel sounds are absent. There is no distension.      Palpations: Abdomen is soft.       Genitourinary:     Penis: Normal and uncircumcised.       Comments: Doherty in place  Musculoskeletal:         General: Normal range of motion.      Cervical back: Neck supple.      Right lower leg: No edema.      Left lower leg: No edema.      Right foot: No deformity.      Left foot: No deformity.   Lymphadenopathy:      Cervical: No cervical adenopathy.   Skin:     General: Skin is warm and dry.      Capillary Refill: Capillary refill takes 2 to 3 seconds.      Findings: No rash.          Neurological:      Comments: Sedated post op       Vents:  Vent Mode: A/C (05/04/22 1308)  Ventilator Initiated: Yes (05/04/22 1258)  Set Rate: 24 BPM (05/04/22 1308)  Vt Set: 400 mL (05/04/22 1308)  Pressure Support: 0 cmH20 (05/04/22 1308)  PEEP/CPAP: 5 cmH20 (05/04/22 1308)  Oxygen Concentration (%): 100 (05/04/22 1308)  Peak Airway Pressure: 19 cmH2O (05/04/22 1308)  Plateau Pressure: 0 cmH20 (05/04/22 1308)  Total Ve: 9.79 mL (05/04/22 1308)  F/VT Ratio<105 (RSBI): (!) 69.71 (05/04/22 1308)    Lines/Drains/Airways       Central Venous Catheter Line  Duration             Percutaneous Central Line Insertion/Assessment - Triple Lumen  05/04/22 1200 right internal jugular <1 day              Drain  Duration                  Urethral Catheter 05/04/22 1105 Straight-tip;Silicone 16 Fr. <1 day              Airway  Duration                  Airway - Non-Surgical 05/04/22 1051 Endotracheal Tube <1 day              Arterial Line  Duration             Arterial Line 05/04/22 1050 Right Radial <1 day              Peripheral Intravenous Line  Duration                  Peripheral IV - Single Lumen 05/04/22 0454 20 G Right Hand <1 day         Peripheral IV - Single Lumen 05/04/22 0839 20 G Left Forearm <1 day                    Significant  Labs:    CBC/Anemia Profile:  Recent Labs   Lab 05/04/22  0657   WBC 2.48*   HGB 7.2*   HCT 28.9*      MCV 73*   RDW 20.9*        Chemistries:  Recent Labs   Lab 05/04/22  0657      K 3.7      CO2 19*   BUN 20   CREATININE 0.9   CALCIUM 7.8*   ALBUMIN 3.2*   PROT 5.4*   BILITOT 0.7   ALKPHOS 56   ALT 15   AST 24       Coagulation:   Recent Labs   Lab 05/04/22  0805   INR 1.6*   APTT 33.2*     Lactic Acid:   Recent Labs   Lab 05/04/22  0657   LACTATE 8.0*     All pertinent labs within the past 24 hours have been reviewed.    Significant Imaging:   I have reviewed all pertinent imaging results/findings within the past 24 hours.  CT: I have reviewed all pertinent results/findings within the past 24 hours and my personal findings are:  Abd: Mod ascites with large amount FA suspect perf viscous  CXR: I have reviewed all pertinent results/findings within the past 24 hours and my personal findings are:  LLL infiltrate with mod FA under right HD      Assessment/Plan:     Pulmonary  Pneumonia of left lower lobe due to infectious organism  Blood and sputum cultures ordered and pending  Cont Zosyn  OET suctioning PRN  Repeat LA pending  CXR in AM    Acute hypoxemic respiratory failure  Patient with Hypoxic Respiratory failure which is Acute.  he is not on home oxygen. Supplemental oxygen was provided and noted- Vent Mode: A/C  Oxygen Concentration (%):  [100] 100  Resp Rate Total:  [20 br/min-24 br/min] 24 br/min  Vt Set:  [400 mL] 400 mL  PEEP/CPAP:  [5 cmH20] 5 cmH20  Pressure Support:  [0 cmH20] 0 cmH20  Mean Airway Pressure:  [10 cmH20] 10 cmH20.   Signs/symptoms of respiratory failure include- tachypnea and increased work of breathing. Contributing diagnoses includes - Aspiration, Pneumonia and severe sepsis Labs and images were reviewed. Patient Has not had a recent ABG. Will treat underlying causes and adjust management of respiratory failure as follows-     Vent settings reviewed and adjusted  VAP  prophylaxis  Follow up ABG then daily  SAT/SBT post return to surgery    ID  * Septic shock  Secondary to Peritonitis from perf SB  Obtain blood cultures as well as sputum cultures  Add Zosyn and Micafungin  IVF bolus and maintenance IVFs  Cont Vasopressin and titrate Levophed infusion for MAP > 65  ICU hemodynamic monitoring  Repeat LA    Oncology  Acute on chronic anemia  Received 2 units PRBCs in ED and 2 more in OR  CBC pending  Monitor wound vac output and CBC  Conservative transfusion protocol    Endocrine  Obesity (BMI 30.0-34.9)  Will encourage weight loss once/if survives and extubated and fully awake/alert    Hyperglycemia, unspecified  Add SSI  Suspect sepsis induced    GI  Mass of colon  Plan bx vs excision of cecal mass on return to OR in 48 hours and send for pathology  Has palpable liver mass  High concern for malignancy    Small bowel perforation with large Cecal mass   POD # 0 S/P Expl Lap and SB excision with washout and wound vac application  Has hx Diverticulosis  Plan return to OR in 48 hours per Surgery following  IVAB  Blood cultures pending  NPO and IVFs with NG to suction         Preventive Measures and Monitoring:   Stress Ulcer: Pepcid  Nutrition: NPO  Glucose control: SSI  Bowel prophylaxis: S/P bowel surgery  DVT prophylaxis: SQ Hep/SCDs  Hx CAD on B-Blocker: no hx CAD  Head of Bed/Reposition: Elevate HOB and turn Q1-2 hours   Early Mobility: bed rest  SAT/SBT: post return to OR  RASS goal: -3  Vent Day: #1  OG Day: #1  Central Line Right IJ Day: #1  Left Radial Arterial line Day: #1  Doherty Day: #1  IVAB Day: #1  Code Status: DNR    Counseling/Consultation:I have discussed the care of this patient in detail with the bedside nursing staff and Dr. Huynh and Dr. Mayen    Patient assessed.  Soft bilat wrist restraints ordered due to risk of pulling lines, tubes and/or climbing OOB.    Critical Care Time: 58 minutes  Critical secondary to Patient has a condition that poses threat to life  and bodily function: Septic Shock and intubated on [facility] ventilation post op  Patient is currently on drug therapy requiring intensive monitoring for toxicity: Vasopressin and Levophed infusions  Patient is currently receiving parenteral controlled substances: Fentanyl infusion     Critical care was time spent personally by me on the following activities: development of treatment plan with patient or surrogate and bedside caregivers, discussions with consultants, evaluation of patient's response to treatment, examination of patient, ordering and performing treatments and interventions, ordering and review of laboratory studies, ordering and review of radiographic studies, pulse oximetry, re-evaluation of patient's condition. This critical care time did not overlap with that of any other provider or involve time for any procedures.    Thank you for your consult. I will follow-up with patient. Please contact us if you have any additional questions.     Rai Hernandez NP  Critical Care Medicine  [facility] - Intensive Care ([facility])

## 2022-05-04 NOTE — ANESTHESIA PROCEDURE NOTES
Arterial    Diagnosis: OR    Patient location during procedure: done in OR  Procedure start time: 5/4/2022 11:01 AM  Timeout: 5/4/2022 11:00 AM  Procedure end time: 5/4/2022 11:07 AM    Staffing  Authorizing Provider: Jordy Douglass II, MD  Performing Provider: Jordy Douglass II, MD    Anesthesiologist was present at the time of the procedure.    Preanesthetic Checklist  Completed: patient identified, IV checked, site marked, risks and benefits discussed, surgical consent, monitors and equipment checked, pre-op evaluation, timeout performed and anesthesia consent givenArterial  Skin Prep: alcohol swabs  Local Infiltration: none  Orientation: left  Location: radial    Catheter Size: 20 G Insertion Attempts: 1  Assessment  Dressing: secured with tape and tegaderm  Patient: Tolerated well

## 2022-05-04 NOTE — HPI
Mr Masters is a 65 yo male POD#0 s/p SB perforation with surgical intervention demonstrating a large cecal mass and 3l feculant fluid in the abdominal cavity. Additional findings of a perforated ileum and a liver mass. Patient tolerated the Exlap with Washout and small bowel excision. Patient had a wound vac placed, is currently intubated, sedated and recovering in the ICU. Patient received 4 units PRBC and remains on pressor support. Patient is a DNR and HM consulted with assistance with medical management. Daughter at bedside. Patient discussed with care team.

## 2022-05-04 NOTE — ASSESSMENT & PLAN NOTE
POD # 0 S/P Expl Lap and SB excision with washout and wound vac application  Has hx Diverticulosis  Plan return to OR in 48 hours per Surgery following  IVAB  Blood cultures pending  NPO and IVFs with NG to suction

## 2022-05-04 NOTE — BRIEF OP NOTE
[facility] - Surgery (Hospital)  Brief Operative Note    SUMMARY     Surgery Date: 5/4/2022     Surgeon(s) and Role:     * Elvie Parker,  - Primary     * Mitzi Palmer MD - Assisting        Pre-op Diagnosis:  Bowel perforation [K63.1]    Post-op Diagnosis:  Post-Op Diagnosis Codes:     * Small bowel perforation [K63.1]     * Colonic mass [K63.89]     * Septic shock [A41.9, R65.21]  Liver mass    Procedure(s) (LRB):  LAPAROTOMY, EXPLORATORY (N/A)  WASHOUT (N/A)  EXCISION, SMALL INTESTINE (N/A)  APPLICATION, WOUND VAC (N/A)    Anesthesia: General    Operative Findings: Over 3 liters of feculent fluid mixed with food/vegetation freely in the peritoneal cavity. Large, obstructing cecal mass with dilated proximal small bowel. About 30 cm proximally to the mass was a perforation in the ileum. Palpable liver mass above gallbladder.    Segment of small bowel with the perforation resected and patient left in discontinuity due to hemodynamic instability. Abthera placed. Plan to return to OR in 48 hours if more stable.    Estimated Blood Loss: 100 mL         Specimens:   Specimen (24h ago, onward)             Start     Ordered    05/04/22 1229  Specimen to Pathology, Surgery General Surgery  Once        Comments: Pre-op Diagnosis: Bowel perforation [K63.1]Procedure(s):LAPAROTOMY, EXPLORATORYWASHOUTEXCISION, SMALL INTESTINEAPPLICATION, WOUND VAC Number of specimens: 1Name of specimens: 1) portion of small bowel PERM     References:    Click here for ordering Quick Tip   Question Answer Comment   Procedure Type: General Surgery    Specimen Class: Routine/Screening    Which provider would you like to cc? MITZI PALMER    Which provider would you like to cc? ELVIE PARKER    Release to patient Immediate        05/04/22 1224                [id number]

## 2022-05-04 NOTE — ANESTHESIA PROCEDURE NOTES
Central Line    Diagnosis: ex lap bowel perforation  Patient location during procedure: done in OR  Timeout: 5/4/2022 10:53 AM  Procedure end time: 5/4/2022 11:03 AM    Staffing  Authorizing Provider: Jordy Douglass II, MD  Performing Provider: Jordy Douglass II, MD    Staffing  Performed: anesthesiologist   Anesthesiologist: Jordy Douglass II, MD  Anesthesiologist was present at the time of the procedure.  Preanesthetic Checklist  Completed: patient identified, IV checked, site marked, risks and benefits discussed, surgical consent, monitors and equipment checked, pre-op evaluation, timeout performed and anesthesia consent given  Indication   Indication: vascular access, med administration     Anesthesia   general anesthesia    Central Line   Skin Prep: skin prepped with Betadine, skin prep agent completely dried prior to procedure  Sterile Barriers Followed: Yes    All five maximal barriers used- gloves, gown, cap, mask, and large sterile sheet    hand hygiene performed prior to central venous catheter insertion  Location: right internal jugular.   Catheter type: triple lumen  Catheter Size: 7 Fr  Inserted Catheter Length: 16 cm  Ultrasound: vascular probe with ultrasound   Vessel Caliber: small, patent  Needle advanced into vessel with real time Ultrasound guidance.  Guidewire confirmed in vessel.  Image recorded and saved.  sterile gel and probe cover used in ultrasound-guided central venous catheter insertion  Manometry: none  Insertion Attempts: 1   Securement:line sutured, chlorhexidine patch, sterile dressing applied and blood return through all ports    Post-Procedure    Adverse Events:none      Guidewire

## 2022-05-04 NOTE — ANESTHESIA PREPROCEDURE EVALUATION
05/04/2022  Franky Masters is a 66 y.o., male.    Patient Active Problem List   Diagnosis    Acute respiratory failure with hypoxia Secondary to covid pneumonia    Colitis    Pneumonia due to COVID-19 virus    Anemia    Obesity (BMI 30.0-34.9)    Elevated d-dimer    Hypoalbuminemia    Elevated liver enzymes    Elevated troponin     No past surgical history on file.    Pre-op Assessment    I have reviewed the Patient Summary Reports.     I have reviewed the Nursing Notes. I have reviewed the NPO Status.   I have reviewed the Medications.     Review of Systems  Anesthesia Hx:  No problems with previous Anesthesia    Social:  Non-Smoker    Hematology/Oncology:         -- Anemia:   Cardiovascular:  Cardiovascular Normal     Pulmonary:  Pulmonary Normal    Renal/:  Renal/ Normal     Hepatic/GI:  Hepatic/GI Normal Bowel perforation   Neurological:  Neurology Normal    Endocrine:  Endocrine Normal  Obesity / BMI > 30      Physical Exam  General: Well nourished    Airway:  Mallampati: II   Mouth Opening: Normal  TM Distance: Normal  Neck ROM: Normal ROM    Dental:  Intact, Dentures        Anesthesia Plan  Type of Anesthesia, risks & benefits discussed:    Anesthesia Type: Gen ETT  Intra-op Monitoring Plan: Standard ASA Monitors  Post Op Pain Control Plan: multimodal analgesia  Induction:  IV  Airway Plan: , Post-Induction  Informed Consent: Informed consent signed with the Patient and all parties understand the risks and agree with anesthesia plan.  All questions answered.   ASA Score: 3 Emergent    Ready For Surgery From Anesthesia Perspective.     .      Chemistry        Component Value Date/Time     05/04/2022 0657    K 3.7 05/04/2022 0657     05/04/2022 0657    CO2 19 (L) 05/04/2022 0657    BUN 20 05/04/2022 0657    CREATININE 0.9 05/04/2022 0657     (H) 05/04/2022 0657         Component Value Date/Time    CALCIUM 7.8 (L) 05/04/2022 0657    ALKPHOS 56 05/04/2022 0657    AST 24 05/04/2022 0657    ALT 15 05/04/2022 0657    BILITOT 0.7 05/04/2022 0657    ESTGFRAFRICA >60 05/04/2022 0657    EGFRNONAA >60 05/04/2022 0657        Lab Results   Component Value Date    WBC 2.48 (L) 05/04/2022    HGB 7.2 (L) 05/04/2022    HCT 28.9 (L) 05/04/2022    MCV 73 (L) 05/04/2022     05/04/2022     Sinus rhythm with Premature atrial complexes with Aberrant conduction   Left axis deviation   Low voltage QRS   Cannot rule out Anteroseptal infarct ,age undetermined   T wave abnormality, consider lateral ischemia   Abnormal ECG   No previous ECGs available   Confirmed by DAJUAN KHANNA, ALEJANDRO WRIGHT ([id number]) on 8/7/2021 4:46:13 AM

## 2022-05-04 NOTE — ASSESSMENT & PLAN NOTE
Based on Surgery  Potential interventions  Goals of care  Biopsy as indicated  Likely Oncologic process with mets  Heme Onc as indicated

## 2022-05-04 NOTE — HPI
Ms Masters is a 67 yo obese male with a PMH of diverticulosis and hx of hospitalization in Aug 2021 with COVID PNA and Hypoxic Resp Failure but did not require ICU or intubation.  She presented early this AM to [facility] ED about 0515 hr via EMS with complaint of abd pain X 2 hours that awakened her from sleep and had associated N/V/D.  In ED BP 86/46, RA SAT 92%, LA 8, Hgb 7.2 and CT Abd with suspected bowel perforation and free air.  General Surgery consulted and taken to OR this AM revealing SB perf with 3 L feculent fluid and food in cavity and large obstructing cecal mass with perf ileum and palpable liver mass.  Had Expl Lap with washout and excision of SB with wound vac placement admitted post op to ICU intubated on mech ventilation.  Received 2 units PRBCs in ED and another 2 units in OR also on Levophed and Vasopressin infusions.  Before surgery patient was insistent he be DNR post op but consented to surgery and invasive mech ventilation but no ACLS post op in event of cardiac arrest and no prolonged mech ventilation. Reportedly patient does not routinely follow with practitioner as outpt.

## 2022-05-04 NOTE — ASSESSMENT & PLAN NOTE
Patient with Hypoxic Respiratory failure which is Acute.  he is not on home oxygen. Supplemental oxygen was provided and noted- Vent Mode: A/C  Oxygen Concentration (%):  [100] 100  Resp Rate Total:  [20 br/min-24 br/min] 24 br/min  Vt Set:  [400 mL] 400 mL  PEEP/CPAP:  [5 cmH20] 5 cmH20  Pressure Support:  [0 cmH20] 0 cmH20  Mean Airway Pressure:  [10 cmH20] 10 cmH20.   Signs/symptoms of respiratory failure include- tachypnea and increased work of breathing. Contributing diagnoses includes - Aspiration, Pneumonia and severe sepsis Labs and images were reviewed. Patient Has not had a recent ABG. Will treat underlying causes and adjust management of respiratory failure as follows-     Vent settings reviewed and adjusted  VAP prophylaxis  Follow up ABG then daily  SAT/SBT post return to surgery

## 2022-05-04 NOTE — ASSESSMENT & PLAN NOTE
Secondary to Peritonitis from perf SB  Obtain blood cultures as well as sputum cultures  Add Zosyn and Micafungin  IVF bolus and maintenance IVFs  Cont Vasopressin and titrate Levophed infusion for MAP > 65  ICU hemodynamic monitoring  Repeat LA

## 2022-05-04 NOTE — ANESTHESIA PROCEDURE NOTES
Arterial    Diagnosis: ex lap    Patient location during procedure: done in OR  Procedure start time: 5/4/2022 10:50 AM  Timeout: 5/4/2022 10:50 AM  Procedure end time: 5/4/2022 10:58 AM    Staffing  Authorizing Provider: Jordy Douglass II, MD  Performing Provider: Jordy Douglass II, MD    Staffing  Other anesthesia staff: Jay Morse CRNA  Anesthesiologist was present at the time of the procedure.    Preanesthetic Checklist  Completed: patient identified, IV checked, site marked, risks and benefits discussed, surgical consent, monitors and equipment checked, pre-op evaluation, timeout performed and anesthesia consent givenArterial  Skin Prep: povidone-iodine 7.5% surgical scrub  Local Infiltration: none  Orientation: right  Location: radial   Insertion Attempts: 2  Assessment  Dressing: secured with tape and tegaderm

## 2022-05-04 NOTE — TRANSFER OF CARE
Anesthesia Transfer of Care Note    Patient: Franky Masters    Procedure(s) Performed: Procedure(s) (LRB):  LAPAROTOMY, EXPLORATORY (N/A)  WASHOUT (N/A)  EXCISION, SMALL INTESTINE (N/A)  APPLICATION, WOUND VAC (N/A)    Patient location: ICU    Anesthesia Type: general    Transport from OR: Transported from OR on room air with adequate spontaneous ventilation. Upon arrival to PACU/ICU, patient attached to ventilator and auscultated to confirm bilateral breath sounds and adequate TV. Transported from OR intubated on 100% O2 by AMBU with adequate controlled ventilation. Continuous ECG monitoring in transport. Continuous SpO2 monitoring in transport. Continuos invasive BP monitoring in transport    Post pain: adequate analgesia    Post assessment: no apparent anesthetic complications    Post vital signs: stable    Level of consciousness: sedated    Nausea/Vomiting: no nausea/vomiting    Complications: none    Transfer of care protocol was followed      Last vitals:   Visit Vitals  BP (!) 118/59   Pulse 95   Temp 36.7 °C (98 °F) (Oral)   Resp 20   Wt 107.1 kg (236 lb 3.2 oz)   SpO2 (!) 94%   BMI 34.88 kg/m²

## 2022-05-04 NOTE — ASSESSMENT & PLAN NOTE
Patient stable s/p sx POD#0  Plan for washout, etc per primary service  Wound vac  Goals of care assessment  DNR

## 2022-05-04 NOTE — H&P
Chief Complaint   Patient presents with    Abdominal Pain       Severe  abdominal pain w/ n+v       Review of patient's allergies indicates:  No Known Allergies       History of Present Illness      HPI       History obtained from the wife and patient                  History of Present Illness: Franky Masters is a 66 y.o. male patient who presents to the Emergency Department for evaluation of abdominal pain which onset suddenly 2 hours ago. The pain woke the patient up this morning. Symptoms are constant and moderate in severity. No mitigating or exacerbating factors reported. Associated sxs include N/V/D. Patient denies any fever, chills, CP, SOB, back pain, and all other sxs at this time. No further complaints or concerns at this time.         Arrival mode: Ambulance Service     PCP: HOLLY ROSEN         Past Medical History:       Past Medical History:   Diagnosis Date    Diverticulitis      Supplemental oxygen dependent           Past Surgical History:  History reviewed. No pertinent surgical history.       Family History:  History reviewed. No pertinent family history.     Social History:  Social History           Tobacco Use    Smoking status: Never Smoker    Smokeless tobacco: Never Used   Substance and Sexual Activity    Alcohol use: Not Currently    Drug use: Never    Sexual activity: Not on file          Review of Systems      Review of Systems   Constitutional: Negative for chills and fever.   HENT: Negative for sore throat.    Respiratory: Negative for shortness of breath.    Cardiovascular: Negative for chest pain.   Gastrointestinal: Positive for abdominal pain, diarrhea, nausea and vomiting.   Genitourinary: Negative for dysuria.   Musculoskeletal: Negative for back pain.   Skin: Negative for rash.   Neurological: Negative for weakness.   Hematological: Does not bruise/bleed easily.   All other systems reviewed and are negative.         Physical Exam             Initial Vitals   BP Pulse  Resp Temp SpO2   05/04/22 0457 05/04/22 0457 05/04/22 0457 05/04/22 0521 05/04/22 0457   (!) 86/46 90 18 97.7 °F (36.5 °C) (!) 92 %       MAP           --                          Physical Exam  Nursing Notes and Vital Signs Reviewed.  Constitutional: Patient is in moderate distress. Well-developed and well-nourished.  Head: Atraumatic. Normocephalic.  Eyes: PERRL. EOM intact. Conjunctivae are not pale. No scleral icterus.  ENT: Mucous membranes are moist. Oropharynx is clear and symmetric.    Neck: Supple. Full ROM. No lymphadenopathy.  Cardiovascular: Regular rate. Regular rhythm. No murmurs, rubs, or gallops. Distal pulses are 2+ and symmetric.  Pulmonary/Chest: No respiratory distress. Clear to auscultation bilaterally. No wheezing or rales.  Abdominal: Soft. Somewhat distended.Generalized tenderness.  No rebound, guarding, or rigidity. Did not hear any bowel sounds.   Musculoskeletal: Moves all extremities. No obvious deformities. No edema.   Skin: Warm and dry.  Neurological:  Alert, awake, and appropriate.  Normal speech.  No acute focal neurological deficits are appreciated.  Psychiatric: Normal affect. Good eye contact. Appropriate in content.      ED Course   Critical Care     Date/Time: 5/4/2022 8:15 AM  Performed by: Toya Watkins MD  Authorized by: Toya Watkins MD   Direct patient critical care time: 32 minutes  Additional history critical care time: 14 minutes  Ordering / reviewing critical care time: 4 minutes  Documentation critical care time: 5 minutes  Consulting other physicians critical care time: 4 minutes  Consult with family critical care time: 3 minutes  Total critical care time (exclusive of procedural time) : 62 minutes  Critical care time was exclusive of separately billable procedures and treating other patients and teaching time.  Critical care was necessary to treat or prevent imminent or life-threatening deterioration of the following conditions: bowel perforation and  symptomatic anemia   Critical care was time spent personally by me on the following activities: blood draw for specimens, development of treatment plan with patient or surrogate, discussions with consultants, evaluation of patient's response to treatment, interpretation of cardiac output measurements, examination of patient, obtaining history from patient or surrogate, ordering and performing treatments and interventions, ordering and review of laboratory studies, ordering and review of radiographic studies, pulse oximetry, re-evaluation of patient's condition and review of old charts.          ED Vital Signs:         Vitals:     05/04/22 0457 05/04/22 0515 05/04/22 0518 05/04/22 0521   BP: (!) 86/46 (!) 105/53 (!) 122/54     Pulse: 90 84 69     Resp: 18         Temp:       97.7 °F (36.5 °C)   TempSrc:       Oral   SpO2: (!) 92% (!) 84% (!) 88%     Weight:             05/04/22 0532 05/04/22 0621 05/04/22 0626 05/04/22 0701   BP:           Pulse:   72       Resp: 19     18   Temp:           TempSrc:           SpO2:           Weight:     107.1 kg (236 lb 3.2 oz)       05/04/22 0747 05/04/22 0759 05/04/22 0815   BP: 114/74 (!) 106/58 111/61   Pulse: 103 99 101   Resp: 20 20 20   Temp: 98 °F (36.7 °C) 98 °F (36.7 °C) 98 °F (36.7 °C)   TempSrc: Oral Oral Oral   SpO2: (!) 92% (!) 91% (!) 90%   Weight:               Abnormal Lab Results:        Labs Reviewed   CBC W/ AUTO DIFFERENTIAL - Abnormal; Notable for the following components:       Result Value      WBC 2.48 (*)       RBC 3.95 (*)       Hemoglobin 7.2 (*)       Hematocrit 28.9 (*)       MCV 73 (*)       MCH 18.2 (*)       MCHC 24.9 (*)       RDW 20.9 (*)       Immature Granulocytes 0.8 (*)       Gran # (ANC) 1.6 (*)       Lymph # 0.8 (*)       Mono # 0.0 (*)       Mono % 1.6 (*)       Sickle Cells Occasional (*)       All other components within normal limits   COMPREHENSIVE METABOLIC PANEL - Abnormal; Notable for the following components:     CO2 19 (*)        Glucose 184 (*)       Calcium 7.8 (*)       Total Protein 5.4 (*)       Albumin 3.2 (*)       All other components within normal limits   LIPASE - Abnormal; Notable for the following components:     Lipase 116 (*)       All other components within normal limits   LACTIC ACID, PLASMA - Abnormal; Notable for the following components:     Lactate (Lactic Acid) 8.0 (*)       All other components within normal limits     Narrative:       LA result(s) called and verbal readback obtained from MARLEN HENRY RN   0734. CW. by DONNA 05/04/2022 07:34   CBC W/ AUTO DIFFERENTIAL   COMPREHENSIVE METABOLIC PANEL   LIPASE   URINALYSIS, REFLEX TO URINE CULTURE   LACTIC ACID, PLASMA   PROTIME-INR   APTT   LACTIC ACID, PLASMA   SARS-COV-2 RDRP GENE     Narrative:      This test utilizes isothermal nucleic acid amplification   technology to detect the SARS-CoV-2 RdRp nucleic acid segment.   The analytical sensitivity (limit of detection) is 125 genome   equivalents/mL.   A POSITIVE result implies infection with the SARS-CoV-2 virus;   the patient is presumed to be contagious.     A NEGATIVE result means that SARS-CoV-2 nucleic acids are not   present above the limit of detection. A NEGATIVE result should be   treated as presumptive. It does not rule out the possibility of   COVID-19 and should not be the sole basis for treatment decisions.   If COVID-19 is strongly suspected based on clinical and exposure   history, re-testing using an alternate molecular assay should be   considered.   This test is only for use under the Food and Drug   Administration s Emergency Use Authorization (EUA).   Commercial kits are provided by [employer].   Performance characteristics of the EUA have been independently   verified by [facility] Department of   Pathology and Laboratory Medicine.   _________________________________________________________________   The authorized Fact Sheet for Healthcare Providers and the authorized Fact    Sheet for Patients of the ID NOW COVID-19 are available on the FDA   website:      https://www.fda.gov/media/081433/download  https://www.fda.gov/media/088185/download               TYPE & SCREEN   PREPARE RBC SOFT         All Lab Results:        Results for orders placed or performed during the hospital encounter of 05/04/22   CBC auto differential   Result Value Ref Range     WBC 2.48 (L) 3.90 - 12.70 K/uL     RBC 3.95 (L) 4.60 - 6.20 M/uL     Hemoglobin 7.2 (L) 14.0 - 18.0 g/dL     Hematocrit 28.9 (L) 40.0 - 54.0 %     MCV 73 (L) 82 - 98 fL     MCH 18.2 (L) 27.0 - 31.0 pg     MCHC 24.9 (L) 32.0 - 36.0 g/dL     RDW 20.9 (H) 11.5 - 14.5 %     Platelets 191 150 - 450 K/uL     MPV SEE COMMENT 9.2 - 12.9 fL     Immature Granulocytes 0.8 (H) 0.0 - 0.5 %     Gran # (ANC) 1.6 (L) 1.8 - 7.7 K/uL     Immature Grans (Abs) 0.02 0.00 - 0.04 K/uL     Lymph # 0.8 (L) 1.0 - 4.8 K/uL     Mono # 0.0 (L) 0.3 - 1.0 K/uL     Eos # 0.0 0.0 - 0.5 K/uL     Baso # 0.02 0.00 - 0.20 K/uL     nRBC 0 0 /100 WBC     Gran % 65.8 38.0 - 73.0 %     Lymph % 30.2 18.0 - 48.0 %     Mono % 1.6 (L) 4.0 - 15.0 %     Eosinophil % 0.8 0.0 - 8.0 %     Basophil % 0.8 0.0 - 1.9 %     Platelet Estimate Appears normal       Aniso Slight       Poik Slight       Poly Occasional       Hypo Occasional       Ovalocytes Occasional       Tear Drop Cells Occasional       Spivey Cells Occasional       Acanthocytes Present       Schistocytes Present       Sickle Cells Occasional (A)       Differential Method Automated     Comprehensive metabolic panel   Result Value Ref Range     Sodium 142 136 - 145 mmol/L     Potassium 3.7 3.5 - 5.1 mmol/L     Chloride 107 95 - 110 mmol/L     CO2 19 (L) 23 - 29 mmol/L     Glucose 184 (H) 70 - 110 mg/dL     BUN 20 8 - 23 mg/dL     Creatinine 0.9 0.5 - 1.4 mg/dL     Calcium 7.8 (L) 8.7 - 10.5 mg/dL     Total Protein 5.4 (L) 6.0 - 8.4 g/dL     Albumin 3.2 (L) 3.5 - 5.2 g/dL     Total Bilirubin 0.7 0.1 - 1.0 mg/dL     Alkaline  Phosphatase 56 55 - 135 U/L     AST 24 10 - 40 U/L     ALT 15 10 - 44 U/L     Anion Gap 16 8 - 16 mmol/L     eGFR if African American >60 >60 mL/min/1.73 m^2     eGFR if non African American >60 >60 mL/min/1.73 m^2   Lipase   Result Value Ref Range     Lipase 116 (H) 4 - 60 U/L   Lactic acid, plasma   Result Value Ref Range     Lactate (Lactic Acid) 8.0 (HH) 0.5 - 2.2 mmol/L   POCT COVID-19 Rapid Screening   Result Value Ref Range     POC Rapid COVID Negative Negative      Acceptable Yes     Type & Screen   Result Value Ref Range     Group & Rh A POS       Indirect Gini NEG     Prepare RBC 2 Units; Emergency   Result Value Ref Range     UNIT NUMBER [id number]       Product Code L4897C23       DISPENSE STATUS ISSUED       CODING SYSTEM [id number]       Unit Blood Type Code 9500       Unit Blood Type O NEG       Unit Expiration [id number]       UNIT NUMBER [id number]       Product Code L6665G89       DISPENSE STATUS ISSUED       CODING SYSTEM [id number]       Unit Blood Type Code 9500       Unit Blood Type O NEG       Unit Expiration [id number]           Imaging Results:      Imaging Results                   X-Ray Chest AP Portable (Final result)  Result time 05/04/22 08:20:53                Final result by Philippe Horton MD (05/04/22 08:20:53)                               Impression:        Moderate free air seen beneath the right hemidiaphragm which was reported on prior CT abdomen.        Electronically signed by:     Philippe Horton MD  Date:                                            05/04/2022  Time:                                            08:20                         Narrative:     EXAMINATION:  XR CHEST AP PORTABLE     CLINICAL HISTORY:  Unspecified abdominal pain     FINDINGS:  Single view of the chest.  Comparison CT 05/04/2022.     Cardiac silhouette is enlarged.  Aorta demonstrates atherosclerotic disease.  Patchy infiltrate suspected left lower lobe.  Trace left pleural  effusion..  No pneumothorax.  Bones appear intact.  Moderate free air seen beneath the right hemidiaphragm which was reported on prior CT abdomen.                                                  CT Abdomen Pelvis  Without Contrast (Final result)  Result time 05/04/22 07:12:07                Final result by Philippe Horton MD (05/04/22 07:12:07)                               Impression:        Moderate ascites and large amount of free air consistent with perforated viscus.  Exact site of perforation not identified however bowel thickening most conspicuous within the distal small bowel.  Recommend surgery consult.  Findings were read at 700 hours and reported directly by myself to Dr. Watkins at 708 hours on 05/04/2022     Diffuse infectious or inflammatory enterocolitis.  Extensive diverticulosis.     All CT scans at this facility use dose modulation, iterative reconstruction, and/or weight based dosing when appropriate to reduce radiation dose to as low as reasonable achievable.        Electronically signed by:     Philippe Horton MD  Date:                                            05/04/2022  Time:                                            07:12                         Narrative:     EXAMINATION:  CT ABDOMEN PELVIS WITHOUT CONTRAST     CLINICAL HISTORY:  Abdominal pain, acute, nonlocalized;     TECHNIQUE:  Low dose axial images, sagittal and coronal reformations were obtained from the lung bases to the pubic symphysis.  Oral contrast was not administered.     COMPARISON:  08/05/2021     FINDINGS:  Heart: Cardiomegaly.  No effusion.  Moderate coronary disease.     Lung Bases: Patchy infiltrate left lower lobe.     Liver: Normal size and attenuation.  Indeterminate 3.2 cm lesion within segment 8 of the right hepatic lobe indeterminate 2.6 cm lesion within segment 6 right hepatic lobe.  Indeterminate 2.9 cm lesion segment 4 adjacent gallbladder.  Findings are concerning for secondary malignancy.     Gallbladder: No  calcified gallstones.     Bile Ducts: No dilatation.     Pancreas: No obvious mass. No peripancreatic fat stranding.     Spleen: Normal.     Adrenals: Normal.     Kidneys/Ureters: No mass, hydroureteronephrosis, or nephroureterolithiasis.     Bladder: No wall thickening.     Reproductive organs: Mild prostatomegaly.     GI Tract/Mesentery: Small hiatal hernia.  No evidence of bowel obstruction.  Thickened small bowel loops within the mid abdomen concerning for infectious or inflammatory enteritis.  Colonic mucosal thickening seen within the ascending and transverse colon.  Extensive diverticulosis throughout the large bowel greatest within the descending sigmoid colon.  Shotty nodes are seen throughout the mesentery.  No definite colonic mass although evaluation is severely limited due to lack of bowel prep.     Peritoneal Space: Moderate ascites and large amount of free air consistent with perforated viscus.     Retroperitoneum: Shotty adenopathy.     Abdominal wall: Small fat containing umbilical hernia.  Small right inguinal hernia.  Shotty inguinal adenopathy.     Vasculature: No aneurysm.  Moderate atherosclerotic disease.     Bones: No acute fracture. No suspicious lytic or sclerotic lesions.                                                  X-Ray Abdomen Flat And Erect (Final result)  Result time 05/04/22 07:45:02                Final result by Philippe Horton MD (05/04/22 07:45:02)                               Impression:        Moderate amount of free air seen within the upper abdomen.  Recommend correlation with CT abdomen same date of service.  Findings were reported as part of CT abdomen at 708 hours on 05/04/2022 to Dr. Watkins.        Electronically signed by:     Philippe Horton MD  Date:                                            05/04/2022  Time:                                            07:45                         Narrative:     EXAMINATION:  XR ABDOMEN FLAT AND ERECT     CLINICAL  HISTORY:  Abdominal Pain;     COMPARISON:  05/04/2022     FINDINGS:  Moderate amount of free air seen within the upper abdomen.  Prominent mucosal thickening within small bowel loops within the mid abdomen.  Moderate constipation left colon.  Nonobstructive bowel gas pattern is noted. No radiopaque kidney calculus is identified.  No evidence of organomegaly.  The bones demonstrate moderate degenerative changes within the lower lumbar region.  The bones are otherwise intact.  Lung bases demonstrate mild edema and dependent changes.                                             The EKG was ordered, reviewed, and independently interpreted by the ED provider.  Interpretation time: 5:29  Rate: 92 BPM  Rhythm: Sinus rhythm with premature atrial complexes with aberrant conduction.   Interpretation: Low voltage QRS. Possible anterolateral infarct, age undetermined. No STEMI.      Assessment/plan:  Patient is a 66-year-old white male who presents to the emergency room with his daughter.  He states that he does not see doctors.  He was in the hospital for 1 day last year with COVID pneumonia.  He has been having anemia for at least the past year.  He has also had nausea vomiting and chronic diarrhea with abdominal pain on and off for at least a year but most likely longer.  He denies any surgeries or colonoscopies.  Does have some GERD symptoms.  Has not been on any aspirin or NSAIDs or steroids.  Does not like to take medications.  No tobacco or alcohol use.  No history of colon cancer in the family.  Anemia does run in the family.  Denies any bright red blood per rectum or melena.  This episode has been ongoing for a few days.  Signs and symptoms are consistent with a perforated viscus.  There is questionable masses within the liver.  He also has thickened colon as well as small bowel.  Source and etiology of the perforation is unclear at this time.  Patient has been aggressively resuscitated in the emergency room by the  emergency room physician receiving multiple fluid boluses, packed red blood cells and pain control.  All of his findings were discussed with him and his daughter.  I had a lengthy discussion with patient.  He wants to be DNR even during the procedure.  He has agreed to be intubated and leave the tube in after surgery if needed.  I explained to him that most likely he would keep the ETT with ventilation postop.  He stated though if his heart were to stop or he had a rhythm that required chest compressions he does not want to be revived.  He was very adamant about this.  He expressed similar request when he was here last year with COVID.  Risk and benefits of exploratory laparotomy were discussed extensively with him and his daughter.  These included but were not limited to general anesthetic risk:  MI, stroke, death, aspiration pneumonia, deep vein thrombosis, pulmonary embolus, apnea, arrhythmia, need for prolonged intubation and ICU care, multiple IV lines both arterial and venous.  Other risks included bleeding, hematoma, seroma, abscess, wound infection, wound dehiscence, need for further procedures such as second-look surgeries, need for AB Thera wound VAC if patient cannot be closed at this time for what ever reason, if central lines were placed there is the risk pneumothorax and need for chest tube, biopsies will be obtained as needed.  Patient may need bowel resections/ colon resections.  May need ostomies.  They understand the etiology is unknown.  This may be the result of a carcinoma within his abdomen.  This may not be able to be completely resected.  Patient may need further procedures or treatment for what is found today.  He stated he understood the risk and benefits.  He had no further questions at this time and agrees to proceed with exploratory laparotomy.  They understand I am a locum surgeon covering for today.  His care will be turned over to 1 of the local surgeons in a.m..

## 2022-05-04 NOTE — ASSESSMENT & PLAN NOTE
Plan bx vs excision of cecal mass on return to OR in 48 hours and send for pathology  Has palpable liver mass  High concern for malignancy

## 2022-05-04 NOTE — SUBJECTIVE & OBJECTIVE
Past Medical History:   Diagnosis Date    Diverticulitis     Supplemental oxygen dependent        History reviewed. No pertinent surgical history.    Review of patient's allergies indicates:  No Known Allergies    No current facility-administered medications on file prior to encounter.     Current Outpatient Medications on File Prior to Encounter   Medication Sig    [DISCONTINUED] albuterol (PROVENTIL/VENTOLIN HFA) 90 mcg/actuation inhaler Inhale 2 puffs into the lungs 4 (four) times daily.    [DISCONTINUED] pantoprazole (PROTONIX) 40 MG tablet Take 1 tablet (40 mg total) by mouth 2 (two) times daily.    [DISCONTINUED] pulse oximeter (PULSE OXIMETER) device by Apply Externally route 2 (two) times a day. Use twice daily at 8 AM and 3 PM and record the value in MyChart as directed.    [DISCONTINUED] vitamin D (VITAMIN D3) 1000 units Tab Take 1 tablet (1,000 Units total) by mouth once daily.     Family History    None       Tobacco Use    Smoking status: Never Smoker    Smokeless tobacco: Never Used   Substance and Sexual Activity    Alcohol use: Not Currently    Drug use: Never    Sexual activity: Not on file     Review of Systems   Unable to perform ROS: Intubated   Objective:     Vital Signs (Most Recent):  Temp: 98 °F (36.7 °C) (05/04/22 0945)  Pulse: 81 (05/04/22 1640)  Resp: (!) 30 (05/04/22 1640)  BP: (!) 118/59 (05/04/22 0945)  SpO2: 100 % (05/04/22 1640)   Vital Signs (24h Range):  Temp:  [97.7 °F (36.5 °C)-98 °F (36.7 °C)] 98 °F (36.7 °C)  Pulse:  [] 81  Resp:  [16-30] 30  SpO2:  [84 %-100 %] 100 %  BP: ()/(46-74) 118/59     Weight: 107.1 kg (236 lb 3.2 oz)  Body mass index is 34.88 kg/m².    Physical Exam  Vitals and nursing note reviewed.   Constitutional:       General: He is not in acute distress.     Appearance: He is well-developed. He is obese. He is ill-appearing. He is not toxic-appearing or diaphoretic.      Interventions: He is sedated, intubated and restrained.   HENT:      Head:  Normocephalic and atraumatic.      Nose:      Left Nostril: Epistaxis (post NG attempt) present.      Mouth/Throat:      Mouth: Mucous membranes are moist.   Eyes:      General: Lids are normal.      Pupils: Pupils are equal, round, and reactive to light.   Neck:      Trachea: Trachea normal.     Cardiovascular:      Rate and Rhythm: Normal rate and regular rhythm. Extrasystoles are present.     Pulses: Normal pulses.           Radial pulses are 2+ on the right side and 2+ on the left side.        Dorsalis pedis pulses are 2+ on the right side and 2+ on the left side.      Heart sounds: Heart sounds are distant.   Pulmonary:      Effort: Pulmonary effort is normal. No tachypnea, accessory muscle usage or respiratory distress. He is intubated.      Breath sounds: Examination of the left-lower field reveals decreased breath sounds. Decreased breath sounds and rales (coarse) present.   Chest:      Chest wall: No deformity or tenderness.   Abdominal:      General: Bowel sounds are absent. There is no distension.      Palpations: Abdomen is soft.       Genitourinary:     Penis: Normal and uncircumcised.       Comments: Doherty in place  Musculoskeletal:         General: Normal range of motion.      Cervical back: Neck supple.      Right lower leg: No edema.      Left lower leg: No edema.      Right foot: No deformity.      Left foot: No deformity.   Lymphadenopathy:      Cervical: No cervical adenopathy.   Skin:     General: Skin is warm and dry.      Capillary Refill: Capillary refill takes 2 to 3 seconds.      Findings: No rash.          Neurological:      Comments: Sedated post op       Significant Labs: All pertinent labs within the past 24 hours have been reviewed.  ABGs:   Recent Labs   Lab 05/04/22  1442 05/04/22  1637   PH 7.146* 7.239*   PCO2 55.5* 39.1   HCO3 19.1* 16.7*   POCSATURATED 99 98   BE -10 -11   PO2 186* 125*     Blood Culture: No results for input(s): LABBLOO in the last 48 hours.  BMP:   Recent Labs    Lab 05/04/22  1330   *      K 3.9   *   CO2 15*   BUN 23   CREATININE 0.9   CALCIUM 6.5*   MG 1.7     CBC:   Recent Labs   Lab 05/04/22  0657 05/04/22  1330 05/04/22  1442 05/04/22  1637   WBC 2.48* 1.05*  --   --    HGB 7.2* 10.3*  --   --    HCT 28.9* 36.6* 33* 32*    385  --   --      CMP:   Recent Labs   Lab 05/04/22  0657 05/04/22  1330    141   K 3.7 3.9    114*   CO2 19* 15*   * 167*   BUN 20 23   CREATININE 0.9 0.9   CALCIUM 7.8* 6.5*   PROT 5.4*  --    ALBUMIN 3.2*  --    BILITOT 0.7  --    ALKPHOS 56  --    AST 24  --    ALT 15  --    ANIONGAP 16 12   EGFRNONAA >60 >60     Coagulation:   Recent Labs   Lab 05/04/22  0805   INR 1.6*   APTT 33.2*     Lactic Acid:   Recent Labs   Lab 05/04/22  0657 05/04/22  1330   LACTATE 8.0* 3.1*     Lipase:   Recent Labs   Lab 05/04/22  0657   LIPASE 116*     Imaging Results              X-Ray Chest AP Portable (Final result)  Result time 05/04/22 08:20:53      Final result by Philippe Horton MD (05/04/22 08:20:53)                   Impression:      Moderate free air seen beneath the right hemidiaphragm which was reported on prior CT abdomen.      Electronically signed by: Philippe Horton MD  Date:    05/04/2022  Time:    08:20               Narrative:    EXAMINATION:  XR CHEST AP PORTABLE    CLINICAL HISTORY:  Unspecified abdominal pain    FINDINGS:  Single view of the chest.  Comparison CT 05/04/2022.    Cardiac silhouette is enlarged.  Aorta demonstrates atherosclerotic disease.  Patchy infiltrate suspected left lower lobe.  Trace left pleural effusion..  No pneumothorax.  Bones appear intact.  Moderate free air seen beneath the right hemidiaphragm which was reported on prior CT abdomen.                                       CT Abdomen Pelvis  Without Contrast (Edited Result - FINAL)  Result time 05/04/22 09:40:28      Addendum 1 of 1 by Philippe Horton MD (05/04/22 09:40:28)      Appendix appears thickened measuring 12 mm  which may be secondary inflamed.  This was discussed with Dr. Mayen at 940 am on 5/4/22      Electronically signed by: Philippe Horton MD  Date:    05/04/2022  Time:    09:40                   Final result by Philippe Horton MD (05/04/22 07:12:07)                   Impression:      Moderate ascites and large amount of free air consistent with perforated viscus.  Exact site of perforation not identified however bowel thickening most conspicuous within the distal small bowel.  Recommend surgery consult.  Findings were read at 700 hours and reported directly by myself to Dr. Watkins at 708 hours on 05/04/2022    Diffuse infectious or inflammatory enterocolitis.  Extensive diverticulosis.    All CT scans at this facility use dose modulation, iterative reconstruction, and/or weight based dosing when appropriate to reduce radiation dose to as low as reasonable achievable.      Electronically signed by: Philippe Horton MD  Date:    05/04/2022  Time:    07:12               Narrative:    EXAMINATION:  CT ABDOMEN PELVIS WITHOUT CONTRAST    CLINICAL HISTORY:  Abdominal pain, acute, nonlocalized;    TECHNIQUE:  Low dose axial images, sagittal and coronal reformations were obtained from the lung bases to the pubic symphysis.  Oral contrast was not administered.    COMPARISON:  08/05/2021    FINDINGS:  Heart: Cardiomegaly.  No effusion.  Moderate coronary disease.    Lung Bases: Patchy infiltrate left lower lobe.    Liver: Normal size and attenuation.  Indeterminate 3.2 cm lesion within segment 8 of the right hepatic lobe indeterminate 2.6 cm lesion within segment 6 right hepatic lobe.  Indeterminate 2.9 cm lesion segment 4 adjacent gallbladder.  Findings are concerning for secondary malignancy.    Gallbladder: No calcified gallstones.    Bile Ducts: No dilatation.    Pancreas: No obvious mass. No peripancreatic fat stranding.    Spleen: Normal.    Adrenals: Normal.    Kidneys/Ureters: No mass, hydroureteronephrosis, or  nephroureterolithiasis.    Bladder: No wall thickening.    Reproductive organs: Mild prostatomegaly.    GI Tract/Mesentery: Small hiatal hernia.  No evidence of bowel obstruction.  Thickened small bowel loops within the mid abdomen concerning for infectious or inflammatory enteritis.  Colonic mucosal thickening seen within the ascending and transverse colon.  Extensive diverticulosis throughout the large bowel greatest within the descending sigmoid colon.  Shotty nodes are seen throughout the mesentery.  No definite colonic mass although evaluation is severely limited due to lack of bowel prep.    Peritoneal Space: Moderate ascites and large amount of free air consistent with perforated viscus.    Retroperitoneum: Shotty adenopathy.    Abdominal wall: Small fat containing umbilical hernia.  Small right inguinal hernia.  Shotty inguinal adenopathy.    Vasculature: No aneurysm.  Moderate atherosclerotic disease.    Bones: No acute fracture. No suspicious lytic or sclerotic lesions.                                       X-Ray Abdomen Flat And Erect (Final result)  Result time 05/04/22 07:45:02      Final result by Philippe Horton MD (05/04/22 07:45:02)                   Impression:      Moderate amount of free air seen within the upper abdomen.  Recommend correlation with CT abdomen same date of service.  Findings were reported as part of CT abdomen at 708 hours on 05/04/2022 to Dr. Watkins.      Electronically signed by: Philippe Horton MD  Date:    05/04/2022  Time:    07:45               Narrative:    EXAMINATION:  XR ABDOMEN FLAT AND ERECT    CLINICAL HISTORY:  Abdominal Pain;    COMPARISON:  05/04/2022    FINDINGS:  Moderate amount of free air seen within the upper abdomen.  Prominent mucosal thickening within small bowel loops within the mid abdomen.  Moderate constipation left colon.  Nonobstructive bowel gas pattern is noted. No radiopaque kidney calculus is identified.  No evidence of organomegaly.  The bones  demonstrate moderate degenerative changes within the lower lumbar region.  The bones are otherwise intact.  Lung bases demonstrate mild edema and dependent changes.                                        Significant Imaging: I have reviewed all pertinent imaging results/findings within the past 24 hours.

## 2022-05-04 NOTE — HOSPITAL COURSE
5/4 - Admitted to ICU sedated and intubated on Levophed and Vasopressin infusions in no distress  5/5 - remains intubated and sedated on mechanical vent and pressor support; scant urine output overnight and creatinine rise to 2.2 with fluid balance +8.9L  5/6 - remains intubated and sedated on mechanical vent with decreasing pressor demand; still oliguric with creatinine up to 3.6, K 5.2; fluid balance +13L; now with bigeminy and cardiac rhythm changes, K stable on abg but iCa 0.78  5/7 - remains intubated and sedated with open abdomen to abthera wound vac and pressor support; overnight atrial fib with RVR, now on amio infusion with SR 68 on monitor; plan to OR for washout and vac replacement today  5/8 - remains intubated, sedated with ab thera wound vac to open abdomen, mechanical ventilation, and 2 pressor support. SR on monitor, on amiodarone infusion. Tmax 100.2, wbc down at 12.3k, creatinine rising 4.6,urine output scant, K 5.1  5/9 - remains intubated and sedated with ab thera wound vac to open abdomen, mech vent, and 2 pressor support. Remains SR on monitor with amio infusion. Tmax 98.9, wbc down 10.6k, creatinine holding at 4.7, K 4.6 after lasix trial with 1.2L urinary output  5/10 - remains intubated and sedated with ab thera wound vac to open abdomen, mech vent, on pressor support. Atrial fib on monitor, rate 90s with occasional non sustained elevation. Urine output adequate since lasix trial but creatinine, K, BUN unchanged and remains 22L positive fluid status since admit  5/11 - return from OR late this evening after ABD WASHOUT, RT HEMICOLECTOMY, ILEOSTOMY, LIVER BIOPSY, AND ABD WALL CLOSURE. Remains atrial fib 80-100s on monitor on levophed support.  5/12 - semi erect in bed intubated on mech ventilation in no distress still in A-fib rate 113 bpm sedated heavily still on Fentanyl infusion.  Also still on Amiodarone and Levophed infusions.  Receiving TPN.  S/P abd washout and closure yesterday.     5/13 - semi erect in bed intubated on mech ventilation and sedated in no distress on Precedex, Heparin, Amiodarone, Fentanyl and Levophed infusions.  Also on TPN.  BP labile on pressor.  Still in A-Fib rate 113 bpm.  5/14 - semi erect intubated on mech ventilation in no distress sedated on Precedex infusion.  Also still on Levophed, Amiodarone and Heparin infusions with TPN.  BP still labile and still in A-Fib w/ rate controlled.  Pulm edema pink froth today copious suctioned from OET.   5/15 - still lethargic off sedation and intubated on mech ventilation in no distress tolerating SBT but very weak unable to lift head off pillow.  Had N/V TF overnight and NG output to suction 320 ml overnight green bile.  Weaned off Levophed infusion at 0300 hr this AM.  Still on TPN as well as Amiodarone and Heparin infusions.   5/16 - Supine in bed off sedation awakens with stimuli and follows simple commands but slowly and still lethargic, intubated on mech ventilation.  Still on TPN, Heparin infusion and Amiodarone infusion.  Hgb drop to 6.9 with PRBC transfusion pending.  Tolerated press support ventilation overnight.  Had conversion to SB at 50 bpm overnight and Lopressor stopped now back in A-fib at 108 bpm.  350 ml NG output overnight and 1 liter bile dumped in colostomy overnight.   5/17 - Sleepy this AM on vent post receiving Fentanyl IVP overnight still tolerating SBT.  Still on Amiodarone, Heparin and TPN infusions.    5/18 - Extubated yesterday and tolerating fairly well. Still very weak but slightly more alert today also has very weak cough with poor secretion mobility.  Still on Amiodarone, Heparin and TPN infusions.  125 ml NG output overnight.  IR placed drain to Abd abscess yesterday with initial 480 ml removed and 30 ml output overnight.    5/19 - remains extubated on NC oxygen support. Cough very weak but per bedside RN, is improved from yesterday. Very weak phonation.  5/20 - awake and alert this am,  phonation and cough improving. Remains 2L NC oxygen support

## 2022-05-04 NOTE — ASSESSMENT & PLAN NOTE
Blood and sputum cultures ordered and pending  Cont Zosyn  OET suctioning PRN  Repeat LA pending  CXR in AM

## 2022-05-04 NOTE — ASSESSMENT & PLAN NOTE
Received 2 units PRBCs in ED and 2 more in OR  CBC pending  Monitor wound vac output and CBC  Conservative transfusion protocol

## 2022-05-04 NOTE — SUBJECTIVE & OBJECTIVE
Past Medical History:   Diagnosis Date    Diverticulitis     Supplemental oxygen dependent        History reviewed. No pertinent surgical history.    Review of patient's allergies indicates:  No Known Allergies    Family History    None       Tobacco Use    Smoking status: Never Smoker    Smokeless tobacco: Never Used   Substance and Sexual Activity    Alcohol use: Not Currently    Drug use: Never    Sexual activity: Not on file         Review of Systems   Unable to perform ROS: Intubated   Objective:     Vital Signs (Most Recent):  Temp: 98 °F (36.7 °C) (05/04/22 0945)  Pulse: 91 (05/04/22 1308)  Resp: (!) 26 (05/04/22 1308)  BP: (!) 118/59 (05/04/22 0945)  SpO2: (!) 94 % (05/04/22 1308)   Vital Signs (24h Range):  Temp:  [97.7 °F (36.5 °C)-98 °F (36.7 °C)] 98 °F (36.7 °C)  Pulse:  [] 91  Resp:  [16-26] 26  SpO2:  [84 %-94 %] 94 %  BP: ()/(46-74) 118/59     Weight: 107.1 kg (236 lb 3.2 oz)  Body mass index is 34.88 kg/m².      Intake/Output Summary (Last 24 hours) at 5/4/2022 1322  Last data filed at 5/4/2022 1250  Gross per 24 hour   Intake 5085 ml   Output --   Net 5085 ml       Physical Exam  Vitals and nursing note reviewed.   Constitutional:       General: He is not in acute distress.     Appearance: He is well-developed. He is obese. He is ill-appearing. He is not toxic-appearing or diaphoretic.      Interventions: He is sedated, intubated and restrained.   HENT:      Head: Normocephalic and atraumatic.      Nose:      Left Nostril: Epistaxis (post NG attempt) present.      Mouth/Throat:      Mouth: Mucous membranes are moist.   Eyes:      General: Lids are normal.      Pupils: Pupils are equal, round, and reactive to light.   Neck:      Trachea: Trachea normal.     Cardiovascular:      Rate and Rhythm: Normal rate and regular rhythm. Extrasystoles are present.     Pulses: Normal pulses.           Radial pulses are 2+ on the right side and 2+ on the left side.        Dorsalis pedis pulses are 2+  on the right side and 2+ on the left side.      Heart sounds: Heart sounds are distant.   Pulmonary:      Effort: Pulmonary effort is normal. No tachypnea, accessory muscle usage or respiratory distress. He is intubated.      Breath sounds: Examination of the left-lower field reveals decreased breath sounds. Decreased breath sounds and rales (coarse) present.   Chest:      Chest wall: No deformity or tenderness.   Abdominal:      General: Bowel sounds are absent. There is no distension.      Palpations: Abdomen is soft.       Genitourinary:     Penis: Normal and uncircumcised.       Comments: Doherty in place  Musculoskeletal:         General: Normal range of motion.      Cervical back: Neck supple.      Right lower leg: No edema.      Left lower leg: No edema.      Right foot: No deformity.      Left foot: No deformity.   Lymphadenopathy:      Cervical: No cervical adenopathy.   Skin:     General: Skin is warm and dry.      Capillary Refill: Capillary refill takes 2 to 3 seconds.      Findings: No rash.          Neurological:      Comments: Sedated post op       Vents:  Vent Mode: A/C (05/04/22 1308)  Ventilator Initiated: Yes (05/04/22 1258)  Set Rate: 24 BPM (05/04/22 1308)  Vt Set: 400 mL (05/04/22 1308)  Pressure Support: 0 cmH20 (05/04/22 1308)  PEEP/CPAP: 5 cmH20 (05/04/22 1308)  Oxygen Concentration (%): 100 (05/04/22 1308)  Peak Airway Pressure: 19 cmH2O (05/04/22 1308)  Plateau Pressure: 0 cmH20 (05/04/22 1308)  Total Ve: 9.79 mL (05/04/22 1308)  F/VT Ratio<105 (RSBI): (!) 69.71 (05/04/22 1308)    Lines/Drains/Airways       Central Venous Catheter Line  Duration             Percutaneous Central Line Insertion/Assessment - Triple Lumen  05/04/22 1200 right internal jugular <1 day              Drain  Duration                  Urethral Catheter 05/04/22 1105 Straight-tip;Silicone 16 Fr. <1 day              Airway  Duration                  Airway - Non-Surgical 05/04/22 1051 Endotracheal Tube <1 day               Arterial Line  Duration             Arterial Line 05/04/22 1050 Right Radial <1 day              Peripheral Intravenous Line  Duration                  Peripheral IV - Single Lumen 05/04/22 0454 20 G Right Hand <1 day         Peripheral IV - Single Lumen 05/04/22 0839 20 G Left Forearm <1 day                    Significant Labs:    CBC/Anemia Profile:  Recent Labs   Lab 05/04/22  0657   WBC 2.48*   HGB 7.2*   HCT 28.9*      MCV 73*   RDW 20.9*        Chemistries:  Recent Labs   Lab 05/04/22  0657      K 3.7      CO2 19*   BUN 20   CREATININE 0.9   CALCIUM 7.8*   ALBUMIN 3.2*   PROT 5.4*   BILITOT 0.7   ALKPHOS 56   ALT 15   AST 24       Coagulation:   Recent Labs   Lab 05/04/22  0805   INR 1.6*   APTT 33.2*     Lactic Acid:   Recent Labs   Lab 05/04/22  0657   LACTATE 8.0*     All pertinent labs within the past 24 hours have been reviewed.    Significant Imaging:   I have reviewed all pertinent imaging results/findings within the past 24 hours.  CT: I have reviewed all pertinent results/findings within the past 24 hours and my personal findings are:  Abd: Mod ascites with large amount FA suspect perf viscous  CXR: I have reviewed all pertinent results/findings within the past 24 hours and my personal findings are:  LLL infiltrate with mod FA under right HD

## 2022-05-04 NOTE — ED PROVIDER NOTES
SCRIBE #1 NOTE: I, Imer Barajas, am scribing for, and in the presence of, Mady Nunes MD. I have scribed the HPI, ROS, and PEx.     SCRIBE #2 NOTE: I, Teagan Del Real, am scribing for, and in the presence of,  Toya Watkins MD. I have scribed the remaining portions of the note not scribed by Scribe #1.      History     Chief Complaint   Patient presents with    Abdominal Pain     Severe  abdominal pain w/ n+v      Review of patient's allergies indicates:  No Known Allergies      History of Present Illness     HPI    5/4/2022, 5:16 AM  History obtained from the wife and patient      History of Present Illness: Franky Masters is a 66 y.o. male patient who presents to the Emergency Department for evaluation of abdominal pain which onset suddenly 2 hours ago. The pain woke the patient up this morning. Symptoms are constant and moderate in severity. No mitigating or exacerbating factors reported. Associated sxs include N/V/D. Patient denies any fever, chills, CP, SOB, back pain, and all other sxs at this time. No further complaints or concerns at this time.       Arrival mode: Ambulance Service    PCP: HOLLY ROSEN        Past Medical History:  Past Medical History:   Diagnosis Date    Diverticulitis     Supplemental oxygen dependent        Past Surgical History:  History reviewed. No pertinent surgical history.      Family History:  History reviewed. No pertinent family history.    Social History:  Social History     Tobacco Use    Smoking status: Never Smoker    Smokeless tobacco: Never Used   Substance and Sexual Activity    Alcohol use: Not Currently    Drug use: Never    Sexual activity: Not on file        Review of Systems     Review of Systems   Constitutional: Negative for chills and fever.   HENT: Negative for sore throat.    Respiratory: Negative for shortness of breath.    Cardiovascular: Negative for chest pain.   Gastrointestinal: Positive for abdominal pain, diarrhea, nausea and vomiting.    Genitourinary: Negative for dysuria.   Musculoskeletal: Negative for back pain.   Skin: Negative for rash.   Neurological: Negative for weakness.   Hematological: Does not bruise/bleed easily.   All other systems reviewed and are negative.       Physical Exam     Initial Vitals   BP Pulse Resp Temp SpO2   05/04/22 0457 05/04/22 0457 05/04/22 0457 05/04/22 0521 05/04/22 0457   (!) 86/46 90 18 97.7 °F (36.5 °C) (!) 92 %      MAP       --                 Physical Exam  Nursing Notes and Vital Signs Reviewed.  Constitutional: Patient is in moderate distress. Well-developed and well-nourished.  Head: Atraumatic. Normocephalic.  Eyes: PERRL. EOM intact. Conjunctivae are not pale. No scleral icterus.  ENT: Mucous membranes are moist. Oropharynx is clear and symmetric.    Neck: Supple. Full ROM. No lymphadenopathy.  Cardiovascular: Regular rate. Regular rhythm. No murmurs, rubs, or gallops. Distal pulses are 2+ and symmetric.  Pulmonary/Chest: No respiratory distress. Clear to auscultation bilaterally. No wheezing or rales.  Abdominal: Soft. Somewhat distended.Generalized tenderness.  No rebound, guarding, or rigidity. Did not hear any bowel sounds.   Musculoskeletal: Moves all extremities. No obvious deformities. No edema.   Skin: Warm and dry.  Neurological:  Alert, awake, and appropriate.  Normal speech.  No acute focal neurological deficits are appreciated.  Psychiatric: Normal affect. Good eye contact. Appropriate in content.     ED Course   Critical Care    Date/Time: 5/4/2022 8:15 AM  Performed by: Toya Watkins MD  Authorized by: Toya Watkins MD   Direct patient critical care time: 32 minutes  Additional history critical care time: 14 minutes  Ordering / reviewing critical care time: 4 minutes  Documentation critical care time: 5 minutes  Consulting other physicians critical care time: 4 minutes  Consult with family critical care time: 3 minutes  Total critical care time (exclusive of procedural time) : 62  minutes  Critical care time was exclusive of separately billable procedures and treating other patients and teaching time.  Critical care was necessary to treat or prevent imminent or life-threatening deterioration of the following conditions: bowel perforation and symptomatic anemia   Critical care was time spent personally by me on the following activities: blood draw for specimens, development of treatment plan with patient or surrogate, discussions with consultants, evaluation of patient's response to treatment, interpretation of cardiac output measurements, examination of patient, obtaining history from patient or surrogate, ordering and performing treatments and interventions, ordering and review of laboratory studies, ordering and review of radiographic studies, pulse oximetry, re-evaluation of patient's condition and review of old charts.        ED Vital Signs:  Vitals:    05/04/22 0457 05/04/22 0515 05/04/22 0518 05/04/22 0521   BP: (!) 86/46 (!) 105/53 (!) 122/54    Pulse: 90 84 69    Resp: 18      Temp:    97.7 °F (36.5 °C)   TempSrc:    Oral   SpO2: (!) 92% (!) 84% (!) 88%    Weight:        05/04/22 0532 05/04/22 0621 05/04/22 0626 05/04/22 0701   BP:       Pulse:  72     Resp: 19   18   Temp:       TempSrc:       SpO2:       Weight:   107.1 kg (236 lb 3.2 oz)     05/04/22 0747 05/04/22 0759 05/04/22 0815   BP: 114/74 (!) 106/58 111/61   Pulse: 103 99 101   Resp: 20 20 20   Temp: 98 °F (36.7 °C) 98 °F (36.7 °C) 98 °F (36.7 °C)   TempSrc: Oral Oral Oral   SpO2: (!) 92% (!) 91% (!) 90%   Weight:          Abnormal Lab Results:  Labs Reviewed   CBC W/ AUTO DIFFERENTIAL - Abnormal; Notable for the following components:       Result Value    WBC 2.48 (*)     RBC 3.95 (*)     Hemoglobin 7.2 (*)     Hematocrit 28.9 (*)     MCV 73 (*)     MCH 18.2 (*)     MCHC 24.9 (*)     RDW 20.9 (*)     Immature Granulocytes 0.8 (*)     Gran # (ANC) 1.6 (*)     Lymph # 0.8 (*)     Mono # 0.0 (*)     Mono % 1.6 (*)     Sickle  Cells Occasional (*)     All other components within normal limits   COMPREHENSIVE METABOLIC PANEL - Abnormal; Notable for the following components:    CO2 19 (*)     Glucose 184 (*)     Calcium 7.8 (*)     Total Protein 5.4 (*)     Albumin 3.2 (*)     All other components within normal limits   LIPASE - Abnormal; Notable for the following components:    Lipase 116 (*)     All other components within normal limits   LACTIC ACID, PLASMA - Abnormal; Notable for the following components:    Lactate (Lactic Acid) 8.0 (*)     All other components within normal limits    Narrative:      LA result(s) called and verbal readback obtained from MARLEN [facility] RN   0734. RALPHM. by DONNA 05/04/2022 07:34   CBC W/ AUTO DIFFERENTIAL   COMPREHENSIVE METABOLIC PANEL   LIPASE   URINALYSIS, REFLEX TO URINE CULTURE   LACTIC ACID, PLASMA   PROTIME-INR   APTT   LACTIC ACID, PLASMA   SARS-COV-2 RDRP GENE    Narrative:     This test utilizes isothermal nucleic acid amplification   technology to detect the SARS-CoV-2 RdRp nucleic acid segment.   The analytical sensitivity (limit of detection) is 125 genome   equivalents/mL.   A POSITIVE result implies infection with the SARS-CoV-2 virus;   the patient is presumed to be contagious.     A NEGATIVE result means that SARS-CoV-2 nucleic acids are not   present above the limit of detection. A NEGATIVE result should be   treated as presumptive. It does not rule out the possibility of   COVID-19 and should not be the sole basis for treatment decisions.   If COVID-19 is strongly suspected based on clinical and exposure   history, re-testing using an alternate molecular assay should be   considered.   This test is only for use under the Food and Drug   Administration s Emergency Use Authorization (EUA).   Commercial kits are provided by [employer].   Performance characteristics of the EUA have been independently   verified by [facility] Department of   Pathology and Laboratory Medicine.    _________________________________________________________________   The authorized Fact Sheet for Healthcare Providers and the authorized Fact   Sheet for Patients of the ID NOW COVID-19 are available on the FDA   website:     https://www.fda.gov/media/509369/download  https://www.fda.gov/media/260947/download           TYPE & SCREEN   PREPARE RBC SOFT        All Lab Results:  Results for orders placed or performed during the hospital encounter of 05/04/22   CBC auto differential   Result Value Ref Range    WBC 2.48 (L) 3.90 - 12.70 K/uL    RBC 3.95 (L) 4.60 - 6.20 M/uL    Hemoglobin 7.2 (L) 14.0 - 18.0 g/dL    Hematocrit 28.9 (L) 40.0 - 54.0 %    MCV 73 (L) 82 - 98 fL    MCH 18.2 (L) 27.0 - 31.0 pg    MCHC 24.9 (L) 32.0 - 36.0 g/dL    RDW 20.9 (H) 11.5 - 14.5 %    Platelets 191 150 - 450 K/uL    MPV SEE COMMENT 9.2 - 12.9 fL    Immature Granulocytes 0.8 (H) 0.0 - 0.5 %    Gran # (ANC) 1.6 (L) 1.8 - 7.7 K/uL    Immature Grans (Abs) 0.02 0.00 - 0.04 K/uL    Lymph # 0.8 (L) 1.0 - 4.8 K/uL    Mono # 0.0 (L) 0.3 - 1.0 K/uL    Eos # 0.0 0.0 - 0.5 K/uL    Baso # 0.02 0.00 - 0.20 K/uL    nRBC 0 0 /100 WBC    Gran % 65.8 38.0 - 73.0 %    Lymph % 30.2 18.0 - 48.0 %    Mono % 1.6 (L) 4.0 - 15.0 %    Eosinophil % 0.8 0.0 - 8.0 %    Basophil % 0.8 0.0 - 1.9 %    Platelet Estimate Appears normal     Aniso Slight     Poik Slight     Poly Occasional     Hypo Occasional     Ovalocytes Occasional     Tear Drop Cells Occasional     Trimble Cells Occasional     Acanthocytes Present     Schistocytes Present     Sickle Cells Occasional (A)     Differential Method Automated    Comprehensive metabolic panel   Result Value Ref Range    Sodium 142 136 - 145 mmol/L    Potassium 3.7 3.5 - 5.1 mmol/L    Chloride 107 95 - 110 mmol/L    CO2 19 (L) 23 - 29 mmol/L    Glucose 184 (H) 70 - 110 mg/dL    BUN 20 8 - 23 mg/dL    Creatinine 0.9 0.5 - 1.4 mg/dL    Calcium 7.8 (L) 8.7 - 10.5 mg/dL    Total Protein 5.4 (L) 6.0 - 8.4 g/dL    Albumin 3.2 (L)  3.5 - 5.2 g/dL    Total Bilirubin 0.7 0.1 - 1.0 mg/dL    Alkaline Phosphatase 56 55 - 135 U/L    AST 24 10 - 40 U/L    ALT 15 10 - 44 U/L    Anion Gap 16 8 - 16 mmol/L    eGFR if African American >60 >60 mL/min/1.73 m^2    eGFR if non African American >60 >60 mL/min/1.73 m^2   Lipase   Result Value Ref Range    Lipase 116 (H) 4 - 60 U/L   Lactic acid, plasma   Result Value Ref Range    Lactate (Lactic Acid) 8.0 (HH) 0.5 - 2.2 mmol/L   POCT COVID-19 Rapid Screening   Result Value Ref Range    POC Rapid COVID Negative Negative     Acceptable Yes    Type & Screen   Result Value Ref Range    Group & Rh A POS     Indirect Gini NEG    Prepare RBC 2 Units; Emergency   Result Value Ref Range    UNIT NUMBER [id number]     Product Code I3962V63     DISPENSE STATUS ISSUED     CODING SYSTEM [id number]     Unit Blood Type Code 9500     Unit Blood Type O NEG     Unit Expiration [id number]     UNIT NUMBER [id number]     Product Code V8237Y53     DISPENSE STATUS ISSUED     CODING SYSTEM MXIN609     Unit Blood Type Code 9500     Unit Blood Type O NEG     Unit Expiration [id number]        Imaging Results:  Imaging Results          X-Ray Chest AP Portable (Final result)  Result time 05/04/22 08:20:53    Final result by Philippe Horton MD (05/04/22 08:20:53)                 Impression:      Moderate free air seen beneath the right hemidiaphragm which was reported on prior CT abdomen.      Electronically signed by: Philippe Horton MD  Date:    05/04/2022  Time:    08:20             Narrative:    EXAMINATION:  XR CHEST AP PORTABLE    CLINICAL HISTORY:  Unspecified abdominal pain    FINDINGS:  Single view of the chest.  Comparison CT 05/04/2022.    Cardiac silhouette is enlarged.  Aorta demonstrates atherosclerotic disease.  Patchy infiltrate suspected left lower lobe.  Trace left pleural effusion..  No pneumothorax.  Bones appear intact.  Moderate free air seen beneath the right hemidiaphragm which was reported on  prior CT abdomen.                               CT Abdomen Pelvis  Without Contrast (Final result)  Result time 05/04/22 07:12:07    Final result by Philippe Horton MD (05/04/22 07:12:07)                 Impression:      Moderate ascites and large amount of free air consistent with perforated viscus.  Exact site of perforation not identified however bowel thickening most conspicuous within the distal small bowel.  Recommend surgery consult.  Findings were read at 700 hours and reported directly by myself to Dr. Watkins at 708 hours on 05/04/2022    Diffuse infectious or inflammatory enterocolitis.  Extensive diverticulosis.    All CT scans at this facility use dose modulation, iterative reconstruction, and/or weight based dosing when appropriate to reduce radiation dose to as low as reasonable achievable.      Electronically signed by: Philippe Horton MD  Date:    05/04/2022  Time:    07:12             Narrative:    EXAMINATION:  CT ABDOMEN PELVIS WITHOUT CONTRAST    CLINICAL HISTORY:  Abdominal pain, acute, nonlocalized;    TECHNIQUE:  Low dose axial images, sagittal and coronal reformations were obtained from the lung bases to the pubic symphysis.  Oral contrast was not administered.    COMPARISON:  08/05/2021    FINDINGS:  Heart: Cardiomegaly.  No effusion.  Moderate coronary disease.    Lung Bases: Patchy infiltrate left lower lobe.    Liver: Normal size and attenuation.  Indeterminate 3.2 cm lesion within segment 8 of the right hepatic lobe indeterminate 2.6 cm lesion within segment 6 right hepatic lobe.  Indeterminate 2.9 cm lesion segment 4 adjacent gallbladder.  Findings are concerning for secondary malignancy.    Gallbladder: No calcified gallstones.    Bile Ducts: No dilatation.    Pancreas: No obvious mass. No peripancreatic fat stranding.    Spleen: Normal.    Adrenals: Normal.    Kidneys/Ureters: No mass, hydroureteronephrosis, or nephroureterolithiasis.    Bladder: No wall thickening.    Reproductive  organs: Mild prostatomegaly.    GI Tract/Mesentery: Small hiatal hernia.  No evidence of bowel obstruction.  Thickened small bowel loops within the mid abdomen concerning for infectious or inflammatory enteritis.  Colonic mucosal thickening seen within the ascending and transverse colon.  Extensive diverticulosis throughout the large bowel greatest within the descending sigmoid colon.  Shotty nodes are seen throughout the mesentery.  No definite colonic mass although evaluation is severely limited due to lack of bowel prep.    Peritoneal Space: Moderate ascites and large amount of free air consistent with perforated viscus.    Retroperitoneum: Shotty adenopathy.    Abdominal wall: Small fat containing umbilical hernia.  Small right inguinal hernia.  Shotty inguinal adenopathy.    Vasculature: No aneurysm.  Moderate atherosclerotic disease.    Bones: No acute fracture. No suspicious lytic or sclerotic lesions.                               X-Ray Abdomen Flat And Erect (Final result)  Result time 05/04/22 07:45:02    Final result by Philippe Horton MD (05/04/22 07:45:02)                 Impression:      Moderate amount of free air seen within the upper abdomen.  Recommend correlation with CT abdomen same date of service.  Findings were reported as part of CT abdomen at 708 hours on 05/04/2022 to Dr. Watkins.      Electronically signed by: Philippe Horton MD  Date:    05/04/2022  Time:    07:45             Narrative:    EXAMINATION:  XR ABDOMEN FLAT AND ERECT    CLINICAL HISTORY:  Abdominal Pain;    COMPARISON:  05/04/2022    FINDINGS:  Moderate amount of free air seen within the upper abdomen.  Prominent mucosal thickening within small bowel loops within the mid abdomen.  Moderate constipation left colon.  Nonobstructive bowel gas pattern is noted. No radiopaque kidney calculus is identified.  No evidence of organomegaly.  The bones demonstrate moderate degenerative changes within the lower lumbar region.  The bones  "are otherwise intact.  Lung bases demonstrate mild edema and dependent changes.                                 The EKG was ordered, reviewed, and independently interpreted by the ED provider.  Interpretation time: 5:29  Rate: 92 BPM  Rhythm: Sinus rhythm with premature atrial complexes with aberrant conduction.   Interpretation: Low voltage QRS. Possible anterolateral infarct, age undetermined. No STEMI.             The Emergency Provider reviewed the vital signs and test results, which are outlined above.     ED Discussion     6:00 AM: Dr. Nunes transfers care of patient to Dr. Watkins pending lab and imaging results.    7:09 AM: Discussed pt's case with Dr. Horton (Radiology) who recommends emergent surgery consult.    7:12 AM: Re-evaluated patient. Patient is experiencing diffused tenderness and guarding.  Discussed CT findings with patient. Pt's family was at bedside. Informed pt that emergent surgery consult is needed.     7:26 AM: Discussed pt's case with Dr. Gonzalez (General surgery) who stated  "Let me know when all his labs are back" and suggest giving pt fluid as well as the blood.     8:19 AM: Discussed case with Alicia Mayen (General surgery). Dr. Mayen agrees with current care and management of pt and accepts admission.   Admitting Service: General surgery  Admitting Physician: Dr. Mayen  Admit to: ICU    8:19 AM: Re-evaluated pt. I have discussed test results, shared treatment plan, and the need for admission with patient and family at bedside. Pt and family express understanding at this time and agree with all information. All questions answered. Pt and family have no further questions or concerns at this time. Pt is ready for admit.    MDM:  Patient being taken emergently to the OR for diagnostic lap for bowel perforation of unclear etiology.  He has received 2 liters of fluids, emergent PRBCs, Zosyn at this time. He has remained otherwise hemodynamically stable.        Medical Decision " Making:   Clinical Tests:   Lab Tests: Ordered and Reviewed  Radiological Study: Ordered and Reviewed  Medical Tests: Ordered and Reviewed           ED Medication(s):  Medications   0.9%  NaCl infusion (for blood administration) (has no administration in time range)   lactated ringers bolus 1,000 mL (has no administration in time range)   sodium chloride 0.9% bolus 1,000 mL (1,000 mLs Intravenous New Bag 5/4/22 0533)   ondansetron injection 4 mg (4 mg Intravenous Given 5/4/22 0533)   morphine injection 4 mg (4 mg Intravenous Given 5/4/22 0532)   lactated ringers bolus 1,000 mL (0 mLs Intravenous Stopped 5/4/22 0817)   HYDROmorphone (PF) injection 1 mg (1 mg Intravenous Given 5/4/22 0701)   promethazine (PHENERGAN) 25 mg in dextrose 5 % 50 mL IVPB (0 mg Intravenous Stopped 5/4/22 0745)   piperacillin-tazobactam 4.5 g in dextrose 5 % 100 mL IVPB (ready to mix system) (4.5 g Intravenous New Bag 5/4/22 0742)       New Prescriptions    No medications on file               Scribe Attestation:   Scribe #1: I performed the above scribed service and the documentation accurately describes the services I performed. I attest to the accuracy of the note.     Attending:   Physician Attestation Statement for Scribe #1: I, Mady Nunes MD, personally performed the services described in this documentation, as scribed by Imer Barajas, in my presence, and it is both accurate and complete.       Scribe Attestation:   Scribe #2: I performed the above scribed service and the documentation accurately describes the services I performed. I attest to the accuracy of the note.    Attending Attestation:           Physician Attestation for Scribe:    Physician Attestation Statement for Scribe #2: I, Toya Watkins MD, reviewed documentation, as scribed by Teagan Del Real in my presence, and it is both accurate and complete. I also acknowledge and confirm the content of the note done by Scribe #1.           Clinical Impression       ICD-10-CM  ICD-9-CM   1. Bowel perforation  K63.1 569.83   2. Abdominal pain, acute, generalized  R10.84 789.07     338.19   3. Abdominal pain  R10.9 789.00   4. Lactic acidosis  E87.2 276.2   5. Acute on chronic anemia  D64.9 285.9   6. Intractable vomiting with nausea, unspecified vomiting type  R11.2 536.2       Disposition:   Disposition: Admitted  Condition: Serious         Toya Watkins MD  05/04/22 0832

## 2022-05-05 PROBLEM — Z99.11 ON MECHANICALLY ASSISTED VENTILATION: Status: ACTIVE | Noted: 2022-05-05

## 2022-05-05 PROBLEM — N17.9 AKI (ACUTE KIDNEY INJURY): Status: ACTIVE | Noted: 2022-05-05

## 2022-05-05 LAB
ACANTHOCYTES BLD QL SMEAR: PRESENT
ALBUMIN SERPL BCP-MCNC: 2.1 G/DL (ref 3.5–5.2)
ALLENS TEST: ABNORMAL
ALP SERPL-CCNC: 48 U/L (ref 55–135)
ALT SERPL W/O P-5'-P-CCNC: 46 U/L (ref 10–44)
ANION GAP SERPL CALC-SCNC: 15 MMOL/L (ref 8–16)
ANION GAP SERPL CALC-SCNC: 17 MMOL/L (ref 8–16)
ANISOCYTOSIS BLD QL SMEAR: SLIGHT
AORTIC ROOT ANNULUS: 3.16 CM
ASCENDING AORTA: 3.22 CM
AST SERPL-CCNC: 101 U/L (ref 10–40)
AV INDEX (PROSTH): 0.54
AV MEAN GRADIENT: 5 MMHG
AV PEAK GRADIENT: 8 MMHG
AV VALVE AREA: 2.08 CM2
AV VELOCITY RATIO: 0.69
BASOPHILS # BLD AUTO: 0.02 K/UL (ref 0–0.2)
BASOPHILS NFR BLD: 0.3 % (ref 0–1.9)
BILIRUB SERPL-MCNC: 0.8 MG/DL (ref 0.1–1)
BSA FOR ECHO PROCEDURE: 2.33 M2
BUN SERPL-MCNC: 28 MG/DL (ref 8–23)
BUN SERPL-MCNC: 31 MG/DL (ref 8–23)
BURR CELLS BLD QL SMEAR: ABNORMAL
CALCIUM SERPL-MCNC: 7 MG/DL (ref 8.7–10.5)
CALCIUM SERPL-MCNC: 7.1 MG/DL (ref 8.7–10.5)
CEA SERPL-MCNC: 17.5 NG/ML (ref 0–5)
CHLORIDE SERPL-SCNC: 109 MMOL/L (ref 95–110)
CHLORIDE SERPL-SCNC: 110 MMOL/L (ref 95–110)
CHLORIDE UR-SCNC: 23 MMOL/L (ref 25–200)
CO2 SERPL-SCNC: 14 MMOL/L (ref 23–29)
CO2 SERPL-SCNC: 15 MMOL/L (ref 23–29)
CREAT SERPL-MCNC: 1.8 MG/DL (ref 0.5–1.4)
CREAT SERPL-MCNC: 2.2 MG/DL (ref 0.5–1.4)
CREAT UR-MCNC: 132.1 MG/DL (ref 23–375)
CV ECHO LV RWT: 0.61 CM
DACRYOCYTES BLD QL SMEAR: ABNORMAL
DELSYS: ABNORMAL
DIFFERENTIAL METHOD: ABNORMAL
DOP CALC AO PEAK VEL: 1.4 M/S
DOP CALC AO VTI: 24.3 CM
DOP CALC LVOT AREA: 3.8 CM2
DOP CALC LVOT DIAMETER: 2.21 CM
DOP CALC LVOT PEAK VEL: 0.96 M/S
DOP CALC LVOT STROKE VOLUME: 50.61 CM3
DOP CALC RVOT PEAK VEL: 0.6 M/S
DOP CALC RVOT VTI: 9 CM
DOP CALCLVOT PEAK VEL VTI: 13.2 CM
E WAVE DECELERATION TIME: 345.04 MSEC
E/A RATIO: 0.72
E/E' RATIO: 10.44 M/S
ECHO EF ESTIMATED: 32 %
ECHO LV POSTERIOR WALL: 1.52 CM (ref 0.6–1.1)
EJECTION FRACTION: 35 %
EOSINOPHIL # BLD AUTO: 0 K/UL (ref 0–0.5)
EOSINOPHIL NFR BLD: 0 % (ref 0–8)
ERYTHROCYTE [DISTWIDTH] IN BLOOD BY AUTOMATED COUNT: 22.1 % (ref 11.5–14.5)
ERYTHROCYTE [SEDIMENTATION RATE] IN BLOOD BY WESTERGREN METHOD: 30 MM/H
EST. GFR  (AFRICAN AMERICAN): 35 ML/MIN/1.73 M^2
EST. GFR  (AFRICAN AMERICAN): 44 ML/MIN/1.73 M^2
EST. GFR  (NON AFRICAN AMERICAN): 30 ML/MIN/1.73 M^2
EST. GFR  (NON AFRICAN AMERICAN): 38 ML/MIN/1.73 M^2
FIO2: 70
FRACTIONAL SHORTENING: 15 % (ref 28–44)
GLUCOSE SERPL-MCNC: 125 MG/DL (ref 70–110)
GLUCOSE SERPL-MCNC: 144 MG/DL (ref 70–110)
HCO3 UR-SCNC: 19.8 MMOL/L (ref 24–28)
HCT VFR BLD AUTO: 39.6 % (ref 40–54)
HGB BLD-MCNC: 11.4 G/DL (ref 14–18)
HYPOCHROMIA BLD QL SMEAR: ABNORMAL
IMM GRANULOCYTES # BLD AUTO: 0.09 K/UL (ref 0–0.04)
IMM GRANULOCYTES NFR BLD AUTO: 1.5 % (ref 0–0.5)
INR PPP: 1.1 (ref 0.8–1.2)
INTERVENTRICULAR SEPTUM: 1.54 CM (ref 0.6–1.1)
IVC DIAMETER: 1.81 CM
IVRT: 65.65 MSEC
LA MAJOR: 4.91 CM
LA MINOR: 2.75 CM
LACTATE SERPL-SCNC: 5 MMOL/L (ref 0.5–2.2)
LEFT ATRIUM SIZE: 3.5 CM
LEFT INTERNAL DIMENSION IN SYSTOLE: 4.23 CM (ref 2.1–4)
LEFT VENTRICLE DIASTOLIC VOLUME INDEX: 52.54 ML/M2
LEFT VENTRICLE DIASTOLIC VOLUME: 118.21 ML
LEFT VENTRICLE MASS INDEX: 148 G/M2
LEFT VENTRICLE SYSTOLIC VOLUME INDEX: 35.5 ML/M2
LEFT VENTRICLE SYSTOLIC VOLUME: 79.8 ML
LEFT VENTRICULAR INTERNAL DIMENSION IN DIASTOLE: 5 CM (ref 3.5–6)
LEFT VENTRICULAR MASS: 332.24 G
LV LATERAL E/E' RATIO: 9.4 M/S
LV SEPTAL E/E' RATIO: 11.75 M/S
LVOT MG: 2.15 MMHG
LVOT MV: 0.7 CM/S
LYMPHOCYTES # BLD AUTO: 0.6 K/UL (ref 1–4.8)
LYMPHOCYTES NFR BLD: 9.3 % (ref 18–48)
MAGNESIUM SERPL-MCNC: 1.4 MG/DL (ref 1.6–2.6)
MCH RBC QN AUTO: 22 PG (ref 27–31)
MCHC RBC AUTO-ENTMCNC: 28.8 G/DL (ref 32–36)
MCV RBC AUTO: 76 FL (ref 82–98)
MODE: ABNORMAL
MONOCYTES # BLD AUTO: 0.4 K/UL (ref 0.3–1)
MONOCYTES NFR BLD: 6.5 % (ref 4–15)
MV PEAK A VEL: 0.65 M/S
MV PEAK E VEL: 0.47 M/S
MV STENOSIS PRESSURE HALF TIME: 100.06 MS
MV VALVE AREA P 1/2 METHOD: 2.2 CM2
NEUTROPHILS # BLD AUTO: 5.1 K/UL (ref 1.8–7.7)
NEUTROPHILS NFR BLD: 82.4 % (ref 38–73)
NRBC BLD-RTO: 2 /100 WBC
OVALOCYTES BLD QL SMEAR: ABNORMAL
PCO2 BLDA: 38.5 MMHG (ref 35–45)
PEEP: 12
PH SMN: 7.32 [PH] (ref 7.35–7.45)
PISA MRMAX VEL: 5.17 M/S
PLATELET # BLD AUTO: 351 K/UL (ref 150–450)
PLATELET BLD QL SMEAR: ABNORMAL
PMV BLD AUTO: 10.2 FL (ref 9.2–12.9)
PO2 BLDA: 214 MMHG (ref 80–100)
POC BE: -6 MMOL/L
POC SATURATED O2: 100 % (ref 95–100)
POCT GLUCOSE: 100 MG/DL (ref 70–110)
POCT GLUCOSE: 109 MG/DL (ref 70–110)
POCT GLUCOSE: 111 MG/DL (ref 70–110)
POCT GLUCOSE: 127 MG/DL (ref 70–110)
POCT GLUCOSE: 133 MG/DL (ref 70–110)
POCT GLUCOSE: 90 MG/DL (ref 70–110)
POIKILOCYTOSIS BLD QL SMEAR: SLIGHT
POLYCHROMASIA BLD QL SMEAR: ABNORMAL
POTASSIUM SERPL-SCNC: 4.2 MMOL/L (ref 3.5–5.1)
POTASSIUM SERPL-SCNC: 4.4 MMOL/L (ref 3.5–5.1)
PROT SERPL-MCNC: 4.5 G/DL (ref 6–8.4)
PROTHROMBIN TIME: 12 SEC (ref 9–12.5)
PV MEAN GRADIENT: 0.95 MMHG
RA MAJOR: 3.95 CM
RA PRESSURE: 8 MMHG
RA WIDTH: 2.48 CM
RBC # BLD AUTO: 5.19 M/UL (ref 4.6–6.2)
SAMPLE: ABNORMAL
SCHISTOCYTES BLD QL SMEAR: PRESENT
SICKLE CELLS BLD QL SMEAR: ABNORMAL
SINUS: 3.19 CM
SITE: ABNORMAL
SODIUM SERPL-SCNC: 140 MMOL/L (ref 136–145)
SODIUM SERPL-SCNC: 140 MMOL/L (ref 136–145)
SODIUM UR-SCNC: 22 MMOL/L (ref 20–250)
STJ: 3.2 CM
TDI LATERAL: 0.05 M/S
TDI SEPTAL: 0.04 M/S
TDI: 0.05 M/S
TRICUSPID ANNULAR PLANE SYSTOLIC EXCURSION: 2.16 CM
UUN UR-MCNC: 89 MG/DL (ref 140–1050)
VT: 450
WBC # BLD AUTO: 6.16 K/UL (ref 3.9–12.7)

## 2022-05-05 PROCEDURE — 99291 CRITICAL CARE FIRST HOUR: CPT | Mod: ,,, | Performed by: NURSE PRACTITIONER

## 2022-05-05 PROCEDURE — 25000003 PHARM REV CODE 250: Performed by: NURSE PRACTITIONER

## 2022-05-05 PROCEDURE — 63600175 PHARM REV CODE 636 W HCPCS: Performed by: SURGERY

## 2022-05-05 PROCEDURE — 25000003 PHARM REV CODE 250: Performed by: SURGERY

## 2022-05-05 PROCEDURE — 84540 ASSAY OF URINE/UREA-N: CPT | Performed by: INTERNAL MEDICINE

## 2022-05-05 PROCEDURE — 99291 PR CRITICAL CARE, E/M 30-74 MINUTES: ICD-10-PCS | Mod: ,,, | Performed by: NURSE PRACTITIONER

## 2022-05-05 PROCEDURE — 80053 COMPREHEN METABOLIC PANEL: CPT | Performed by: NURSE PRACTITIONER

## 2022-05-05 PROCEDURE — 85025 COMPLETE CBC W/AUTO DIFF WBC: CPT | Performed by: NURSE PRACTITIONER

## 2022-05-05 PROCEDURE — 99900035 HC TECH TIME PER 15 MIN (STAT)

## 2022-05-05 PROCEDURE — 85610 PROTHROMBIN TIME: CPT | Performed by: NURSE PRACTITIONER

## 2022-05-05 PROCEDURE — 20000000 HC ICU ROOM

## 2022-05-05 PROCEDURE — 63600175 PHARM REV CODE 636 W HCPCS: Performed by: NURSE PRACTITIONER

## 2022-05-05 PROCEDURE — 83735 ASSAY OF MAGNESIUM: CPT | Performed by: NURSE PRACTITIONER

## 2022-05-05 PROCEDURE — 63600175 PHARM REV CODE 636 W HCPCS: Performed by: INTERNAL MEDICINE

## 2022-05-05 PROCEDURE — 25000003 PHARM REV CODE 250: Performed by: INTERNAL MEDICINE

## 2022-05-05 PROCEDURE — 63600175 PHARM REV CODE 636 W HCPCS: Mod: JG | Performed by: NURSE PRACTITIONER

## 2022-05-05 PROCEDURE — 94761 N-INVAS EAR/PLS OXIMETRY MLT: CPT

## 2022-05-05 PROCEDURE — 82803 BLOOD GASES ANY COMBINATION: CPT

## 2022-05-05 PROCEDURE — 99233 PR SUBSEQUENT HOSPITAL CARE,LEVL III: ICD-10-PCS | Mod: NSCH,,, | Performed by: INTERNAL MEDICINE

## 2022-05-05 PROCEDURE — 84300 ASSAY OF URINE SODIUM: CPT | Performed by: INTERNAL MEDICINE

## 2022-05-05 PROCEDURE — 82436 ASSAY OF URINE CHLORIDE: CPT | Performed by: INTERNAL MEDICINE

## 2022-05-05 PROCEDURE — 99233 SBSQ HOSP IP/OBS HIGH 50: CPT | Mod: NSCH,,, | Performed by: INTERNAL MEDICINE

## 2022-05-05 PROCEDURE — 99900026 HC AIRWAY MAINTENANCE (STAT)

## 2022-05-05 PROCEDURE — 27200966 HC CLOSED SUCTION SYSTEM

## 2022-05-05 PROCEDURE — 94003 VENT MGMT INPAT SUBQ DAY: CPT

## 2022-05-05 PROCEDURE — 83605 ASSAY OF LACTIC ACID: CPT | Performed by: NURSE PRACTITIONER

## 2022-05-05 PROCEDURE — 37799 UNLISTED PX VASCULAR SURGERY: CPT

## 2022-05-05 PROCEDURE — 82570 ASSAY OF URINE CREATININE: CPT | Performed by: INTERNAL MEDICINE

## 2022-05-05 PROCEDURE — 82378 CARCINOEMBRYONIC ANTIGEN: CPT | Performed by: SURGERY

## 2022-05-05 RX ORDER — FAMOTIDINE 10 MG/ML
20 INJECTION INTRAVENOUS DAILY
Status: DISCONTINUED | OUTPATIENT
Start: 2022-05-05 | End: 2022-05-19

## 2022-05-05 RX ORDER — LINEZOLID 2 MG/ML
600 INJECTION, SOLUTION INTRAVENOUS
Status: COMPLETED | OUTPATIENT
Start: 2022-05-05 | End: 2022-05-11

## 2022-05-05 RX ORDER — MAGNESIUM SULFATE HEPTAHYDRATE 40 MG/ML
2 INJECTION, SOLUTION INTRAVENOUS ONCE
Status: COMPLETED | OUTPATIENT
Start: 2022-05-05 | End: 2022-05-05

## 2022-05-05 RX ADMIN — CHLORHEXIDINE GLUCONATE 0.12% ORAL RINSE 15 ML: 1.2 LIQUID ORAL at 08:05

## 2022-05-05 RX ADMIN — LORAZEPAM 2 MG: 2 INJECTION INTRAMUSCULAR; INTRAVENOUS at 05:05

## 2022-05-05 RX ADMIN — NOREPINEPHRINE BITARTRATE 0.56 MCG/KG/MIN: 1 INJECTION, SOLUTION, CONCENTRATE INTRAVENOUS at 07:05

## 2022-05-05 RX ADMIN — Medication 0.04 UNITS/MIN: at 09:05

## 2022-05-05 RX ADMIN — SODIUM BICARBONATE: 84 INJECTION, SOLUTION INTRAVENOUS at 02:05

## 2022-05-05 RX ADMIN — SODIUM BICARBONATE: 84 INJECTION, SOLUTION INTRAVENOUS at 11:05

## 2022-05-05 RX ADMIN — Medication 250 MCG/HR: at 08:05

## 2022-05-05 RX ADMIN — SODIUM BICARBONATE: 84 INJECTION, SOLUTION INTRAVENOUS at 09:05

## 2022-05-05 RX ADMIN — PIPERACILLIN SODIUM AND TAZOBACTAM SODIUM 4.5 G: 4; .5 INJECTION, POWDER, FOR SOLUTION INTRAVENOUS at 08:05

## 2022-05-05 RX ADMIN — HEPARIN SODIUM 5000 UNITS: 5000 INJECTION INTRAVENOUS; SUBCUTANEOUS at 09:05

## 2022-05-05 RX ADMIN — ACETAMINOPHEN 650 MG: 650 SUPPOSITORY RECTAL at 03:05

## 2022-05-05 RX ADMIN — HEPARIN SODIUM 5000 UNITS: 5000 INJECTION INTRAVENOUS; SUBCUTANEOUS at 05:05

## 2022-05-05 RX ADMIN — MUPIROCIN: 20 OINTMENT TOPICAL at 09:05

## 2022-05-05 RX ADMIN — MAGNESIUM SULFATE HEPTAHYDRATE 2 G: 40 INJECTION, SOLUTION INTRAVENOUS at 11:05

## 2022-05-05 RX ADMIN — FAMOTIDINE 20 MG: 10 INJECTION INTRAVENOUS at 08:05

## 2022-05-05 RX ADMIN — Medication 0.04 UNITS/MIN: at 05:05

## 2022-05-05 RX ADMIN — Medication 0.04 UNITS/MIN: at 01:05

## 2022-05-05 RX ADMIN — MICAFUNGIN SODIUM 100 MG: 100 INJECTION, POWDER, LYOPHILIZED, FOR SOLUTION INTRAVENOUS at 05:05

## 2022-05-05 RX ADMIN — NOREPINEPHRINE BITARTRATE 0.82 MCG/KG/MIN: 1 INJECTION, SOLUTION, CONCENTRATE INTRAVENOUS at 11:05

## 2022-05-05 RX ADMIN — HEPARIN SODIUM 5000 UNITS: 5000 INJECTION INTRAVENOUS; SUBCUTANEOUS at 03:05

## 2022-05-05 RX ADMIN — CHLORHEXIDINE GLUCONATE 0.12% ORAL RINSE 15 ML: 1.2 LIQUID ORAL at 09:05

## 2022-05-05 RX ADMIN — LINEZOLID 600 MG: 600 INJECTION, SOLUTION INTRAVENOUS at 11:05

## 2022-05-05 RX ADMIN — NOREPINEPHRINE BITARTRATE 0.7 MCG/KG/MIN: 1 INJECTION, SOLUTION, CONCENTRATE INTRAVENOUS at 05:05

## 2022-05-05 RX ADMIN — ERTAPENEM 1 G: 1 INJECTION INTRAMUSCULAR; INTRAVENOUS at 09:05

## 2022-05-05 RX ADMIN — MUPIROCIN: 20 OINTMENT TOPICAL at 08:05

## 2022-05-05 RX ADMIN — PIPERACILLIN SODIUM AND TAZOBACTAM SODIUM 4.5 G: 4; .5 INJECTION, POWDER, FOR SOLUTION INTRAVENOUS at 03:05

## 2022-05-05 NOTE — PROGRESS NOTES
Pharmacist Renal Dose Adjustment Note    Franky Masters is a 66 y.o. male being treated with the medication Famotidine    Patient Data:    Vital Signs (Most Recent):  Temp: (!) 100.94 °F (38.3 °C) (05/05/22 0732)  Pulse: 100 (05/05/22 0732)  Resp: (!) 30 (05/05/22 0732)  BP: 109/70 (05/05/22 0600)  SpO2: 100 % (05/05/22 0732)   Vital Signs (72h Range):  Temp:  [97.6 °F (36.4 °C)-102.2 °F (39 °C)]   Pulse:  []   Resp:  [15-38]   BP: ()/(46-81)   SpO2:  [84 %-100 %]   Arterial Line BP: ()/()      Recent Labs   Lab 05/04/22 1955 05/04/22 2327 05/05/22 0325   CREATININE 1.4 1.8* 2.2*     Serum creatinine: 2.2 mg/dL (H) 05/05/22 0325  Estimated creatinine clearance: 40.1 mL/min (A)    Medication:Famotidine dose: 20mg frequency bid will be changed to medication:Famotidine dose:20mg frequency:daily    Pharmacist's Name: Emily Camacho  Pharmacist's Extension: [phone]

## 2022-05-05 NOTE — ASSESSMENT & PLAN NOTE
Patient with Hypoxic Respiratory failure which is Acute.  he is not on home oxygen. Supplemental oxygen was provided and noted- Vent Mode: A/C  Oxygen Concentration (%):  [] 60  Resp Rate Total:  [20 br/min-38 br/min] 30 br/min  Vt Set:  [400 mL-450 mL] 450 mL  PEEP/CPAP:  [5 cmH20-15 cmH20] 12 cmH20  Pressure Support:  [0 cmH20] 0 cmH20  Mean Airway Pressure:  [10 qzQ26-80 cmH20] 18 cmH20.   Signs/symptoms of respiratory failure include- tachypnea. Contributing diagnoses includes - Obesity Hypoventilation Labs and images were reviewed. Patient Has recent ABG, which has been reviewed. Will treat underlying causes and adjust management of respiratory failure as indicated. Pulmonary CC on board

## 2022-05-05 NOTE — PLAN OF CARE
Pt sedated to RASS -3 with fentanyl gtt. BSWR applied and remain. Follows commands upon arousal. No interval changes to abdomen since arrival to unit. Wound vac remains in place. Serousanguious drainage. Oliguric. Approx 5-20 mL/hr since arrival to unit. Required significant increase in levophed gtt, vasopressin gtt remains. Bicarb gtt initiated. Afebrile. Claudia, daughter of pt, updated to current condition.

## 2022-05-05 NOTE — PROGRESS NOTES
[facility] ([facility])  Critical Care Medicine  Progress Note    Patient Name: Franky Masters  MRN: [id number]  Admission Date: 5/4/2022  Hospital Length of Stay: 1 days  Code Status: DNR  Attending Provider: Elvie Parker DO  Primary Care Provider: HOLLY ROSEN   Principal Problem: Septic shock    Subjective:     HPI:  Ms Masters is a 67 yo obese male with a PMH of diverticulosis and hx of hospitalization in Aug 2021 with COVID PNA and Hypoxic Resp Failure but did not require ICU or intubation.  She presented early this AM to [facility] ED about 0515 hr via EMS with complaint of abd pain X 2 hours that awakened her from sleep and had associated N/V/D.  In ED BP 86/46, RA SAT 92%, LA 8, Hgb 7.2 and CT Abd with suspected bowel perforation and free air.  General Surgery consulted and taken to OR this AM revealing SB perf with 3 L feculent fluid and food in cavity and large obstructing cecal mass with perf ileum and palpable liver mass.  Had Expl Lap with washout and excision of SB with wound vac placement admitted post op to ICU intubated on mech ventilation.  Received 2 units PRBCs in ED and another 2 units in OR also on Levophed and Vasopressin infusions.  Before surgery patient was insistent he be DNR post op but consented to surgery and invasive mech ventilation but no ACLS post op in event of cardiac arrest and no prolonged mech ventilation. Reportedly patient does not routinely follow with practitioner as outpt.       Hospital/ICU Course:  5/4 - Admitted to ICU sedated and intubated on Levophed and Vasopressin infusions in no distress  5/5 - remains intubated and sedated on mechanical vent and pressor support; scant urine output overnight and creatinine rise to 2.2 with fluid balance +8.9L      Review of Systems   Unable to perform ROS: Intubated       Objective:     Vital Signs (Most Recent):  Temp: (!) 100.94 °F (38.3 °C) (05/05/22 0732)  Pulse: 100 (05/05/22 0732)  Resp: (!) 30 (05/05/22  0732)  BP: 109/70 (05/05/22 0600)  SpO2: 100 % (05/05/22 0732)   Vital Signs (24h Range):  Temp:  [97.6 °F (36.4 °C)-102.2 °F (39 °C)] 100.94 °F (38.3 °C)  Pulse:  [] 100  Resp:  [15-38] 30  SpO2:  [90 %-100 %] 100 %  BP: ()/(49-81) 109/70  Arterial Line BP: ()/() 104/65     Weight: 111.4 kg (245 lb 9.5 oz)  Body mass index is 36.27 kg/m².      Intake/Output Summary (Last 24 hours) at 5/5/2022 0828  Last data filed at 5/5/2022 0600  Gross per 24 hour   Intake 8340.55 ml   Output 1353 ml   Net 6987.55 ml      fentanyl 250 mcg/hr (05/05/22 0600)    NORepinephrine bitartrate-D5W 0.8 mcg/kg/min (05/05/22 0600)    sodium bicarbonate drip 125 mL/hr at 05/05/22 0600    vasopressin 0.04 Units/min (05/05/22 0600)       Physical Exam  Vitals and nursing note reviewed.   Constitutional:       General: He is not in acute distress.     Appearance: He is obese. He is ill-appearing.      Interventions: He is sedated, intubated and restrained.   HENT:      Head: Atraumatic.      Nose:      Comments: Small gauze from left nare without visible bleeding or drainage  Eyes:      General: No scleral icterus.     Conjunctiva/sclera: Conjunctivae normal.   Neck:      Vascular: No JVD.   Cardiovascular:      Rate and Rhythm: Normal rate and regular rhythm.      Pulses:           Radial pulses are 1+ on the right side and 1+ on the left side.        Dorsalis pedis pulses are 1+ on the right side and 1+ on the left side.      Comments: Generalized, non pitting  Pulmonary:      Effort: He is intubated.      Breath sounds: Decreased breath sounds present. No wheezing or rhonchi.   Abdominal:      General: Bowel sounds are absent.       Skin:     General: Skin is cool.      Capillary Refill: Capillary refill takes more than 3 seconds.       Vents:  Vent Mode: A/C (05/05/22 0732)  Ventilator Initiated: Yes (05/04/22 1258)  Set Rate: 30 BPM (05/05/22 0732)  Vt Set: 450 mL (05/05/22 0732)  Pressure Support: 0 cmH20  (05/05/22 0732)  PEEP/CPAP: 12 cmH20 (05/05/22 0732)  Oxygen Concentration (%): 60 (05/05/22 0732)  Peak Airway Pressure: 30 cmH2O (05/05/22 0732)  Plateau Pressure: 0 cmH20 (05/05/22 0732)  Total Ve: 14.1 mL (05/05/22 0732)  F/VT Ratio<105 (RSBI): (!) 63.03 (05/05/22 0732)    Lines/Drains/Airways       Central Venous Catheter Line  Duration             Percutaneous Central Line Insertion/Assessment - Triple Lumen  05/04/22 1200 right internal jugular <1 day              Drain  Duration                  NG/OG Tube 05/04/22 1600 <1 day         Urethral Catheter 05/04/22 1105 Straight-tip;Silicone 16 Fr. <1 day              Airway  Duration                  Airway - Non-Surgical 05/04/22 1051 Endotracheal Tube <1 day              Arterial Line  Duration             Arterial Line 05/04/22 1050 Right Radial <1 day              Peripheral Intravenous Line  Duration                  Peripheral IV - Single Lumen 05/04/22 0454 20 G Right Hand 1 day         Peripheral IV - Single Lumen 05/04/22 0839 20 G Left Forearm <1 day                    Significant Labs:    CBC/Anemia Profile:  Recent Labs   Lab 05/04/22  0657 05/04/22  1330 05/04/22  1442 05/04/22  1637 05/04/22  1950 05/05/22  0325   WBC 2.48* 1.05*  --   --   --  6.16   HGB 7.2* 10.3*  --   --   --  11.4*   HCT 28.9* 36.6*   < > 32* 34* 39.6*    385  --   --   --  351   MCV 73* 78*  --   --   --  76*   RDW 20.9* 22.3*  --   --   --  22.1*    < > = values in this interval not displayed.        Chemistries:  Recent Labs   Lab 05/04/22  0657 05/04/22  1330 05/04/22  1743 05/04/22  1955 05/04/22  2327 05/05/22  0325    141 140 142 140 140   K 3.7 3.9 4.2 4.4 4.2 4.4    114* 115* 112* 110 109   CO2 19* 15* 17* 18* 15* 14*   BUN 20 23 24* 25* 28* 31*   CREATININE 0.9 0.9 1.2 1.4 1.8* 2.2*   CALCIUM 7.8* 6.5* 6.5* 6.7* 7.1* 7.0*   ALBUMIN 3.2*  --   --   --   --  2.1*   PROT 5.4*  --   --   --   --  4.5*   BILITOT 0.7  --   --   --   --  0.8   ALKPHOS 56   --   --   --   --  48*   ALT 15  --   --   --   --  46*   AST 24  --   --   --   --  101*   MG  --  1.7 1.5*  --   --  1.4*       All pertinent labs within the past 24 hours have been reviewed.    Significant Imaging:  I have reviewed all pertinent imaging results/findings within the past 24 hours.      ABG  Recent Labs   Lab 05/05/22  0328   PH 7.319*   PO2 214*   PCO2 38.5   HCO3 19.8*   BE -6     Assessment/Plan:     Pulmonary  Pneumonia of left lower lobe due to infectious organism  Blood and sputum cultures ordered and pending  Cont Zosyn  OET suctioning PRN    Acute hypoxemic respiratory failure on mechanical vent  Vent settings reviewed and adjusted to optimize gas exchange  VAP prophylaxis  ABG daily and prn to assess response to therapy  SAT/SBT once gi tract in continuity    Renal/  JOSE (acute kidney injury)  S/t septic shock  Adequate volume resuscitation and abx in place  Avoid nephrotoxins, consider change zosyn to merrem  Strict I/O  IV bicarb infusion for acidosis  No acute indication for dialysis but high potential for continued decline, monitor chemistry    ID  * Septic shock  Secondary to Peritonitis from bowel perforation with major fecal contamination  Blood and sputum cultures pending  Continue Zosyn and Micafungin  Continue IV bicarb infusion  Cont Vasopressin and titrate Levophed infusion for MAP > 65  ICU hemodynamic monitoring  Repeat LA and check echo    Oncology  Acute on chronic anemia  Received 2 units PRBCs in ED and 2 more in OR on 5/4  CBC stable   Monitor wound vac output and daily CBC with Conservative transfusion protocol    Endocrine  Hyperglycemia, unspecified  SSI prn with monitoring for glucose control and prevention of insulin toxicity    Obesity (BMI 30.0-34.9)  Will encourage weight loss once/if survives and extubated and fully awake/alert    GI  Mass of colon  Found on exploration along with palpable liver mass  Plan bx vs excision of cecal mass on return to OR 5/6 to  obtain pathology  High concern for metastatic malignancy    Small bowel perforation with large Cecal mass   5/4 Expl Lap and SB excision with washout and AB thera wound vac for bowel left in discontinuity  hx Diverticulosis but cecal mass and suspected metastatic lesions found in exploration  Plan return to OR 5/6 per Surgery following  IVAB for peritonitis/sepsis  NPO and IVFs with NG to suction    Palliative Care  Pt is DNR, discussed at length with surgeon prior to surgery and he consented to intubation for surgery with understanding of likely need for prolonged vent support post op until bowel/abdomen closed. He was clear that he would not desire long term vent support past that necessary for immediate post op recovery.   Daughter Claudia is SDM and is aware and supportive of his wishes. IF at any point his survival chances become poor or prolonged life support is anticipated she would honor his wish and transition to comfort focused care.  Daily updates and discussions with daughter to optimize her ability to make care decisions as his surrogate.       Critical Care Daily Checklist:    A: Awake: RASS Goal/Actual Goal: RASS Goal: -3-->moderate sedation  Actual: German Agitation Sedation Scale (RASS): Drowsy   B: Spontaneous Breathing Trial Performed?     C: SAT & SBT Coordinated?  Not candidate today                      D: Delirium: CAM-ICU Overall CAM-ICU: Positive   E: Early Mobility Performed? Yes   F: Feeding Goal:    Status:     Current Diet Order   Procedures    Diet NPO      AS: Analgesia/Sedation Fentanyl infusion   T: Thromboembolic Prophylaxis heparin   H: HOB > 300 Yes   U: Stress Ulcer Prophylaxis (if needed) pepcid   G: Glucose Control As above   B: Bowel Function     I: Indwelling Catheter (Lines & Doherty) Necessity reviewed   D: De-escalation of Antimicrobials/Pharmacotherapies reviewed    Plan for the day/ETD Supportive care as above    Code Status:  Family/Goals of Care: DNR  Pending hospital  course; see palliative discussion above   I have discussed case and plan of care in detail with Dr Huynh and Dr Hutchinson; Status and plan of care were discussed with team on multidisciplinary rounds.    Critical Care Time: 70 minutes  Critical secondary to septic shock, JOSE, mechanical vent and pressor support  Critical care was time spent personally by me on the following activities: development of treatment plan with patient or surrogate and bedside caregivers, discussions with consultants, evaluation of patient's response to treatment, examination of patient, ordering and performing treatments and interventions, ordering and review of laboratory studies, ordering and review of radiographic studies, pulse oximetry, re-evaluation of patient's condition. This critical care time did not overlap with that of any other provider or involve time for any procedures.     KATHARINE Miranda-BC  Critical Care Medicine  [facility] ([facility])

## 2022-05-05 NOTE — SUBJECTIVE & OBJECTIVE
Interval History: Patient continues to require pressors, remains intubated, sedated. Patient urine o/p declining and concerning. Discussed POC in detail at bedside with daughter POA. She expressed her fathers wish to not undergo treatments  like perm HD, where he has a diminished quality of life. We spoke frankly about issues and concerns and she will be communicating with the family as his case evolves. Comfort care was discussed and the goals of care will develop consistent with the patients expressed wishes. Potential for another surgery likely Saturday with a washout and possible biopsy vs resection are on the horizon. This possible intervention will be covered in detail by surgery sharing the risks and benefits of that approach. Case discussed with surgery, and critical care team.    Review of Systems   Unable to perform ROS: Intubated   Objective:     Vital Signs (Most Recent):  Temp: (!) 100.58 °F (38.1 °C) (05/05/22 0924)  Pulse: 100 (05/05/22 0924)  Resp: (!) 30 (05/05/22 0924)  BP: 109/70 (05/05/22 0600)  SpO2: 100 % (05/05/22 0924)   Vital Signs (24h Range):  Temp:  [97.6 °F (36.4 °C)-102.2 °F (39 °C)] 100.58 °F (38.1 °C)  Pulse:  [] 100  Resp:  [15-38] 30  SpO2:  [90 %-100 %] 100 %  BP: ()/(49-81) 109/70  Arterial Line BP: ()/() 104/65     Weight: 111.1 kg (245 lb)  Body mass index is 36.18 kg/m².    Intake/Output Summary (Last 24 hours) at 5/5/2022 1121  Last data filed at 5/5/2022 0600  Gross per 24 hour   Intake 7041.55 ml   Output 1353 ml   Net 5688.55 ml      Physical Exam  Vitals and nursing note reviewed.   Constitutional:       General: He is not in acute distress.     Appearance: He is well-developed. He is obese. He is ill-appearing. He is not toxic-appearing or diaphoretic.      Interventions: He is sedated, intubated and restrained.   HENT:      Head: Normocephalic and atraumatic.      Nose:      Left Nostril: Epistaxis (post NG attempt) present.      Mouth/Throat:       Mouth: Mucous membranes are moist.   Eyes:      General: Lids are normal.      Pupils: Pupils are equal, round, and reactive to light.   Neck:      Trachea: Trachea normal.     Cardiovascular:      Rate and Rhythm: Normal rate and regular rhythm. Extrasystoles are present.     Pulses: Normal pulses.           Radial pulses are 2+ on the right side and 2+ on the left side.        Dorsalis pedis pulses are 2+ on the right side and 2+ on the left side.      Heart sounds: Heart sounds are distant.   Pulmonary:      Effort: Pulmonary effort is normal. No tachypnea, accessory muscle usage or respiratory distress. He is intubated.      Breath sounds: Examination of the left-lower field reveals decreased breath sounds. Decreased breath sounds and rales (coarse) present.   Chest:      Chest wall: No deformity or tenderness.   Abdominal:      General: Bowel sounds are absent. There is no distension.      Palpations: Abdomen is soft.       Genitourinary:     Penis: Normal and uncircumcised.       Comments: Doherty in place  Musculoskeletal:         General: Normal range of motion.      Cervical back: Neck supple.      Right lower leg: No edema.      Left lower leg: No edema.      Right foot: No deformity.      Left foot: No deformity.   Lymphadenopathy:      Cervical: No cervical adenopathy.   Skin:     General: Skin is warm and dry.      Capillary Refill: Capillary refill takes 2 to 3 seconds.      Findings: No rash.          Neurological:      Comments: Sedated post op       Significant Labs: All pertinent labs within the past 24 hours have been reviewed.  BMP:   Recent Labs   Lab 05/05/22  0325   *      K 4.4      CO2 14*   BUN 31*   CREATININE 2.2*   CALCIUM 7.0*   MG 1.4*     CBC:   Recent Labs   Lab 05/04/22  0657 05/04/22  1330 05/04/22  1442 05/04/22  1637 05/04/22  1950 05/05/22  0325   WBC 2.48* 1.05*  --   --   --  6.16   HGB 7.2* 10.3*  --   --   --  11.4*   HCT 28.9* 36.6*   < > 32* 34* 39.6*     385  --   --   --  351    < > = values in this interval not displayed.     CMP:   Recent Labs   Lab 05/04/22  0657 05/04/22  1330 05/04/22  1955 05/04/22  2327 05/05/22  0325      < > 142 140 140   K 3.7   < > 4.4 4.2 4.4      < > 112* 110 109   CO2 19*   < > 18* 15* 14*   *   < > 165* 144* 125*   BUN 20   < > 25* 28* 31*   CREATININE 0.9   < > 1.4 1.8* 2.2*   CALCIUM 7.8*   < > 6.7* 7.1* 7.0*   PROT 5.4*  --   --   --  4.5*   ALBUMIN 3.2*  --   --   --  2.1*   BILITOT 0.7  --   --   --  0.8   ALKPHOS 56  --   --   --  48*   AST 24  --   --   --  101*   ALT 15  --   --   --  46*   ANIONGAP 16   < > 12 15 17*   EGFRNONAA >60   < > 52* 38* 30*    < > = values in this interval not displayed.     Lactic Acid:   Recent Labs   Lab 05/04/22  0657 05/04/22  1330   LACTATE 8.0* 3.1*     Lipase:   Recent Labs   Lab 05/04/22  0657   LIPASE 116*     POCT Glucose:   Recent Labs   Lab 05/05/22  0258 05/05/22  0532 05/05/22  1107   POCTGLUCOSE 127* 111* 109         Significant Imaging: I have reviewed all pertinent imaging results/findings within the past 24 hours.

## 2022-05-05 NOTE — ASSESSMENT & PLAN NOTE
Patient stable s/p sx POD#1  Plan for washout, etc per primary service  Wound vac  Goals of care assessment  DNR

## 2022-05-05 NOTE — SUBJECTIVE & OBJECTIVE
Past Medical History:   Diagnosis Date    Diverticulitis     Supplemental oxygen dependent        History reviewed. No pertinent surgical history.    Review of patient's allergies indicates:  No Known Allergies    Medications:  No medications prior to admission.     Antibiotics (From admission, onward)                Start     Stop Route Frequency Ordered    05/04/22 1530  piperacillin-tazobactam 4.5 g in dextrose 5 % 100 mL IVPB (ready to mix system)         -- IV Every 8 hours (non-standard times) 05/04/22 1514    05/04/22 1300  mupirocin 2 % ointment  (DECOLONIZATION PROTOCOL ORDERS)         05/09 0859 Nasl 2 times daily 05/04/22 1253          Antifungals (From admission, onward)                Start     Stop Route Frequency Ordered    05/04/22 1430  micafungin 100 mg in sodium chloride 0.9 % 100 mL IVPB (ready to mix system)         05/09 1429 IV Every 24 hours (non-standard times) 05/04/22 1318          Antivirals (From admission, onward)      None               There is no immunization history on file for this patient.    Family History    None       Social History     Socioeconomic History    Marital status:    Tobacco Use    Smoking status: Never Smoker    Smokeless tobacco: Never Used   Substance and Sexual Activity    Alcohol use: Not Currently    Drug use: Never     Review of Systems   Unable to perform ROS: Intubated   Objective:     Vital Signs (Most Recent):  Temp: 97.6 °F (36.4 °C) (05/04/22 1900)  Pulse: 94 (05/04/22 2120)  Resp: (!) 32 (05/04/22 2120)  BP: 121/76 (05/04/22 2000)  SpO2: 99 % (05/04/22 2120)   Vital Signs (24h Range):  Temp:  [97.6 °F (36.4 °C)-99 °F (37.2 °C)] 97.6 °F (36.4 °C)  Pulse:  [] 94  Resp:  [15-38] 32  SpO2:  [84 %-100 %] 99 %  BP: ()/(46-76) 121/76  Arterial Line BP: ()/() 113/69     Weight: 109.1 kg (240 lb 8.4 oz)  Body mass index is 35.52 kg/m².    Estimated Creatinine Clearance: 62.4 mL/min (based on SCr of 1.4 mg/dL).    Physical  Exam  Vitals and nursing note reviewed.   Constitutional:       Appearance: He is well-developed.   HENT:      Head: Normocephalic and atraumatic.      Nose: Nose normal.   Eyes:      Pupils: Pupils are equal, round, and reactive to light.   Cardiovascular:      Rate and Rhythm: Normal rate and regular rhythm.      Heart sounds: Normal heart sounds.   Pulmonary:      Effort: Pulmonary effort is normal. No respiratory distress.      Breath sounds: Normal breath sounds. No wheezing or rales.   Abdominal:      General: There is no distension.      Tenderness: There is no abdominal tenderness.      Comments: Wound vac noted    Musculoskeletal:         General: Normal range of motion.      Cervical back: Normal range of motion and neck supple.   Skin:     General: Skin is dry.   Neurological:      Comments: Intubated,sedated   Psychiatric:         Mood and Affect: Mood normal.       Significant Labs: CBC:   Recent Labs   Lab 05/04/22  0657 05/04/22  1330 05/04/22  1442 05/04/22  1637 05/04/22  1950   WBC 2.48* 1.05*  --   --   --    HGB 7.2* 10.3*  --   --   --    HCT 28.9* 36.6* 33* 32* 34*    385  --   --   --      CMP:   Recent Labs   Lab 05/04/22  0657 05/04/22  1330 05/04/22  1743 05/04/22  1955    141 140 142   K 3.7 3.9 4.2 4.4    114* 115* 112*   CO2 19* 15* 17* 18*   * 167* 180* 165*   BUN 20 23 24* 25*   CREATININE 0.9 0.9 1.2 1.4   CALCIUM 7.8* 6.5* 6.5* 6.7*   PROT 5.4*  --   --   --    ALBUMIN 3.2*  --   --   --    BILITOT 0.7  --   --   --    ALKPHOS 56  --   --   --    AST 24  --   --   --    ALT 15  --   --   --    ANIONGAP 16 12 8 12   EGFRNONAA >60 >60 >60 52*       Significant Imaging: I have reviewed all pertinent imaging results/findings within the past 24 hours.

## 2022-05-05 NOTE — PROGRESS NOTES
Notified EICU Dr. Pineda of derceased urine output. Pt has been anuric/oliguric for last 3 hours. Cr 1.8 BUN 28. No new orders received, will need nephrology consult.

## 2022-05-05 NOTE — EICU
EICU FOLLOWUP NOTE:    Called for:  Calcium 6.7.    Telemetry was reviewed. Medical records including notes, labs and imaging were reviewed.      DISCUSSED with bedside nurse     ASSESSMENT AND PLAN:    Hypocalcemia.  Calcium was 6.5 earlier.  He has received 2 g of calcium gluconate already.  Albumin is 3.2.  Corrected calcium is 7.3.  I will order an additional 1 g of calcium gluconate.  I will continue to monitor his electrolytes closely.    Thank You for allowing EICU to participate in the care of the patient. Please call as needed    Karl Pineda MD  [facility]  [phone]    Addendum:   Called by the bedside nurse for no urine output with only 20-30 cc of urine output in the last 2-3 hours.  Currently patient is in septic shock and is on 2 pressors.  He does have acute kidney injury with worsening creatinine of 1.8.  He has received about 7-8 L of fluids already today.  He may likely need Nephrology consult for significant oliguria/acute kidney injury.  I will order ultrasound of the kidney ureter bladder to rule out any obstructive pathology.  I will also order urine lytes.

## 2022-05-05 NOTE — PLAN OF CARE
[facility] (Hospital)  Initial Discharge Assessment       Primary Care Provider: HOLLY ROSEN    Admission Diagnosis: Lactic acidosis [E87.2]  Bowel perforation [K63.1]  Abdominal pain [R10.9]  Abdominal pain, acute, generalized [R10.84]  Intractable vomiting with nausea, unspecified vomiting type [R11.2]  Acute on chronic anemia [D64.9]    Admission Date: 5/4/2022  Expected Discharge Date:     Discharge Barriers Identified: None    Payor: MEDICARE / Plan: MEDICARE A ONLY / Product Type: Government /     Extended Emergency Contact Information  Primary Emergency Contact: Holly Manley  Mobile Phone: [phone]  Relation: Daughter  Secondary Emergency Contact: KayaJose AntonioAnel  Mobile Phone: [phone]  Relation: Daughter    Discharge Plan A: Comfort care/withdrawal         [id number]  [place], [place] - [address] AT [facility] [address]  [address] [facility] [address]  Phone: [phone] Fax: [phone]      Initial Assessment (most recent)     Adult Discharge Assessment - 05/05/22 1021        Discharge Assessment    Assessment Type Discharge Planning Assessment     Confirmed/corrected address, phone number and insurance Yes     Confirmed Demographics Correct on Facesheet     Source of Information health record     When was your last doctors appointment? --   unknown    Reason For Admission septic shock     Lives With alone     Facility Arrived From: home     Do you expect to return to your current living situation? Yes     Do you have help at home or someone to help you manage your care at home? Yes     Who are your caregiver(s) and their phone number(s)? Holly Manley (Daughter)     Prior to hospitilization cognitive status: Alert/Oriented     Current cognitive status: Coma/Sedated/Intubated     Walking or Climbing Stairs Difficulty none     Dressing/Bathing Difficulty none     Home Accessibility wheelchair accessible     Home Layout Able  to live on 1st floor     Equipment Currently Used at Home none     Readmission within 30 days? No     Patient currently being followed by outpatient case management? No     Do you currently have service(s) that help you manage your care at home? No     Do you take prescription medications? Yes     Do you have prescription coverage? Yes     Do you have any problems affording any of your prescribed medications? No     Is the patient taking medications as prescribed? yes     How do you get to doctors appointments? car, drives self     Are you on dialysis? No     Do you take coumadin? No     Discharge Plan A Comfort care/withdrawal     DME Needed Upon Discharge  none     Discharge Barriers Identified None

## 2022-05-05 NOTE — HPI
65 yo male POD#0 s/p SB perforation with surgical intervention demonstrating a large cecal mass and 3l feculant fluid in the abdominal cavity.    He is presently intubated . Operative note reviewed- perforated ileum and a liver mass.  Labs and imaging test reviewed.  Component      Latest Ref Rng & Units 5/4/2022 5/4/2022           1:30 PM  6:57 AM   WBC      3.90 - 12.70 K/uL 1.05 (LL) 2.48 (L)

## 2022-05-05 NOTE — ANESTHESIA POSTPROCEDURE EVALUATION
Anesthesia Post Evaluation    Patient: Franky Masters    Procedure(s) Performed: Procedure(s) (LRB):  LAPAROTOMY, EXPLORATORY (N/A)  WASHOUT (N/A)  EXCISION, SMALL INTESTINE (N/A)  APPLICATION, WOUND VAC (N/A)    Final Anesthesia Type: general      Patient location during evaluation: ICU  Patient participation: No - Unable to Participate, Intubation  Level of consciousness: sedated  Post-procedure vital signs: reviewed and stable  Pain management: adequate  Airway patency: patent    PONV status at discharge: No PONV  Anesthetic complications: no      Cardiovascular status: blood pressure returned to baseline (on pressors as expected)  Respiratory status: intubated and ventilator  Hydration status: euvolemic  Follow-up not needed.          Vitals Value Taken Time   /70 05/05/22 0901   Temp 38.1 °C (100.58 °F) 05/05/22 0909   Pulse 100 05/05/22 0909   Resp 30 05/05/22 0909   SpO2 100 % 05/05/22 0909   Vitals shown include unvalidated device data.      No case tracking events are documented in the log.      Pain/Stefani Score: Pain Rating Prior to Med Admin: 0 (5/5/2022  8:55 AM)

## 2022-05-05 NOTE — PLAN OF CARE
Pt intubated and sedated on fentanyl @ 250mcg, x1 dose of ativan given early in shift d/t coughing. GCS 9. Levophed gtt @ 0.8mcg, Vasopressin @ 0.04, bicarb gtt @125.  Tmax 102.0, gave prn tylenol suppository, apply ice packs. Restrained BUE. Abdominal wound vac intact, canister changed x1 during shift with serosang drainage noted. Pt urine output decrease significantly over night with only 45cc out for 12 hours. Pt did get US doppler of kidneys to r/o obstruction pathology per EICU Dr. Pineda. Cr and BUN trending up.     Problem: Adult Inpatient Plan of Care  Goal: Plan of Care Review  Outcome: Ongoing, Progressing  Goal: Patient-Specific Goal (Individualized)  Outcome: Ongoing, Progressing  Goal: Absence of Hospital-Acquired Illness or Injury  Outcome: Ongoing, Progressing  Goal: Optimal Comfort and Wellbeing  Outcome: Ongoing, Progressing  Goal: Readiness for Transition of Care  Outcome: Ongoing, Progressing     Problem: Infection  Goal: Absence of Infection Signs and Symptoms  Outcome: Ongoing, Progressing     Problem: Adjustment to Illness (Sepsis/Septic Shock)  Goal: Optimal Coping  Outcome: Ongoing, Progressing     Problem: Bleeding (Sepsis/Septic Shock)  Goal: Absence of Bleeding  Outcome: Ongoing, Progressing     Problem: Glycemic Control Impaired (Sepsis/Septic Shock)  Goal: Blood Glucose Level Within Desired Range  Outcome: Ongoing, Progressing     Problem: Infection Progression (Sepsis/Septic Shock)  Goal: Absence of Infection Signs and Symptoms  Outcome: Ongoing, Progressing     Problem: Nutrition Impaired (Sepsis/Septic Shock)  Goal: Optimal Nutrition Intake  Outcome: Ongoing, Progressing     Problem: Fall Injury Risk  Goal: Absence of Fall and Fall-Related Injury  Outcome: Ongoing, Progressing     Problem: Restraint, Nonbehavioral (Nonviolent)  Goal: Absence of Harm or Injury  Outcome: Ongoing, Progressing     Problem: Communication Impairment (Mechanical Ventilation, Invasive)  Goal: Effective  Communication  Outcome: Ongoing, Progressing     Problem: Device-Related Complication Risk (Mechanical Ventilation, Invasive)  Goal: Optimal Device Function  Outcome: Ongoing, Progressing     Problem: Inability to Wean (Mechanical Ventilation, Invasive)  Goal: Mechanical Ventilation Liberation  Outcome: Ongoing, Progressing     Problem: Nutrition Impairment (Mechanical Ventilation, Invasive)  Goal: Optimal Nutrition Delivery  Outcome: Ongoing, Progressing     Problem: Skin and Tissue Injury (Mechanical Ventilation, Invasive)  Goal: Absence of Device-Related Skin and Tissue Injury  Outcome: Ongoing, Progressing     Problem: Ventilator-Induced Lung Injury (Mechanical Ventilation, Invasive)  Goal: Absence of Ventilator-Induced Lung Injury  Outcome: Ongoing, Progressing     Problem: Communication Impairment (Artificial Airway)  Goal: Effective Communication  Outcome: Ongoing, Progressing     Problem: Device-Related Complication Risk (Artificial Airway)  Goal: Optimal Device Function  Outcome: Ongoing, Progressing     Problem: Skin and Tissue Injury (Artificial Airway)  Goal: Absence of Device-Related Skin or Tissue Injury  Outcome: Ongoing, Progressing     Problem: Noninvasive Ventilation Acute  Goal: Effective Unassisted Ventilation and Oxygenation  Outcome: Ongoing, Progressing     Problem: Fluid Imbalance (Pneumonia)  Goal: Fluid Balance  Outcome: Ongoing, Progressing     Problem: Infection (Pneumonia)  Goal: Resolution of Infection Signs and Symptoms  Outcome: Ongoing, Progressing     Problem: Respiratory Compromise (Pneumonia)  Goal: Effective Oxygenation and Ventilation  Outcome: Ongoing, Progressing     Problem: Skin Injury Risk Increased  Goal: Skin Health and Integrity  Outcome: Ongoing, Progressing

## 2022-05-05 NOTE — PROGRESS NOTES
[facility] - Intensive Care ([facility] Medicine  Progress Note    Patient Name: Franky Masters  MRN: [id number]  Patient Class: IP- Inpatient   Admission Date: 5/4/2022  Length of Stay: 1 days  Attending Physician: Elvie Parker DO  Primary Care Provider: HOLLY ROSEN        Subjective:     Principal Problem:Septic shock        HPI:  Mr Masters is a 65 yo male POD#0 s/p SB perforation with surgical intervention demonstrating a large cecal mass and 3l feculant fluid in the abdominal cavity. Additional findings of a perforated ileum and a liver mass. Patient tolerated the Exlap with Washout and small bowel excision. Patient had a wound vac placed, is currently intubated, sedated and recovering in the ICU. Patient received 4 units PRBC and remains on pressor support. Patient is a DNR and HM consulted with assistance with medical management. Daughter at bedside. Patient discussed with care team.          Overview/Hospital Course:  Patient continues to require pressors, remains intubated, sedated. Patient urine o/p declining and concerning. Discussed POC in detail at bedside with daughter POA. She expressed her fathers wish to not undergo treatments  like perm HD, where he has a diminished quality of life. We spoke frankly about issues and concerns and she will be communicating with the family as his case evolves. Comfort care was discussed and the goals of care will develop consistent with the patients expressed wishes. Potential for another surgery likely Saturday with a washout and possible biopsy vs resection are on the horizon. This possible intervention will be covered in detail by surgery sharing the risks and benefits of that approach. Case discussed with the primary service - surgery, and critical care team.      Interval History: Patient continues to require pressors, remains intubated, sedated. Patient urine o/p declining and concerning. Discussed POC in detail at bedside with daughter POA. She  expressed her fathers wish to not undergo treatments  like perm HD, where he has a diminished quality of life. We spoke frankly about issues and concerns and she will be communicating with the family as his case evolves. Comfort care was discussed and the goals of care will develop consistent with the patients expressed wishes. Potential for another surgery likely Saturday with a washout and possible biopsy vs resection are on the horizon. This possible intervention will be covered in detail by surgery sharing the risks and benefits of that approach. Case discussed with surgery, and critical care team.    Review of Systems   Unable to perform ROS: Intubated   Objective:     Vital Signs (Most Recent):  Temp: (!) 100.58 °F (38.1 °C) (05/05/22 0924)  Pulse: 100 (05/05/22 0924)  Resp: (!) 30 (05/05/22 0924)  BP: 109/70 (05/05/22 0600)  SpO2: 100 % (05/05/22 0924)   Vital Signs (24h Range):  Temp:  [97.6 °F (36.4 °C)-102.2 °F (39 °C)] 100.58 °F (38.1 °C)  Pulse:  [] 100  Resp:  [15-38] 30  SpO2:  [90 %-100 %] 100 %  BP: ()/(49-81) 109/70  Arterial Line BP: ()/() 104/65     Weight: 111.1 kg (245 lb)  Body mass index is 36.18 kg/m².    Intake/Output Summary (Last 24 hours) at 5/5/2022 1121  Last data filed at 5/5/2022 0600  Gross per 24 hour   Intake 7041.55 ml   Output 1353 ml   Net 5688.55 ml      Physical Exam  Vitals and nursing note reviewed.   Constitutional:       General: He is not in acute distress.     Appearance: He is well-developed. He is obese. He is ill-appearing. He is not toxic-appearing or diaphoretic.      Interventions: He is sedated, intubated and restrained.   HENT:      Head: Normocephalic and atraumatic.      Nose:      Left Nostril: Epistaxis (post NG attempt) present.      Mouth/Throat:      Mouth: Mucous membranes are moist.   Eyes:      General: Lids are normal.      Pupils: Pupils are equal, round, and reactive to light.   Neck:      Trachea: Trachea normal.      Cardiovascular:      Rate and Rhythm: Normal rate and regular rhythm. Extrasystoles are present.     Pulses: Normal pulses.           Radial pulses are 2+ on the right side and 2+ on the left side.        Dorsalis pedis pulses are 2+ on the right side and 2+ on the left side.      Heart sounds: Heart sounds are distant.   Pulmonary:      Effort: Pulmonary effort is normal. No tachypnea, accessory muscle usage or respiratory distress. He is intubated.      Breath sounds: Examination of the left-lower field reveals decreased breath sounds. Decreased breath sounds and rales (coarse) present.   Chest:      Chest wall: No deformity or tenderness.   Abdominal:      General: Bowel sounds are absent. There is no distension.      Palpations: Abdomen is soft.       Genitourinary:     Penis: Normal and uncircumcised.       Comments: Doherty in place  Musculoskeletal:         General: Normal range of motion.      Cervical back: Neck supple.      Right lower leg: No edema.      Left lower leg: No edema.      Right foot: No deformity.      Left foot: No deformity.   Lymphadenopathy:      Cervical: No cervical adenopathy.   Skin:     General: Skin is warm and dry.      Capillary Refill: Capillary refill takes 2 to 3 seconds.      Findings: No rash.          Neurological:      Comments: Sedated post op       Significant Labs: All pertinent labs within the past 24 hours have been reviewed.  BMP:   Recent Labs   Lab 05/05/22  0325   *      K 4.4      CO2 14*   BUN 31*   CREATININE 2.2*   CALCIUM 7.0*   MG 1.4*     CBC:   Recent Labs   Lab 05/04/22  0657 05/04/22  1330 05/04/22  1442 05/04/22  1637 05/04/22  1950 05/05/22  0325   WBC 2.48* 1.05*  --   --   --  6.16   HGB 7.2* 10.3*  --   --   --  11.4*   HCT 28.9* 36.6*   < > 32* 34* 39.6*    385  --   --   --  351    < > = values in this interval not displayed.     CMP:   Recent Labs   Lab 05/04/22  0657 05/04/22  1330 05/04/22  1955 05/04/22  2327  05/05/22  0325      < > 142 140 140   K 3.7   < > 4.4 4.2 4.4      < > 112* 110 109   CO2 19*   < > 18* 15* 14*   *   < > 165* 144* 125*   BUN 20   < > 25* 28* 31*   CREATININE 0.9   < > 1.4 1.8* 2.2*   CALCIUM 7.8*   < > 6.7* 7.1* 7.0*   PROT 5.4*  --   --   --  4.5*   ALBUMIN 3.2*  --   --   --  2.1*   BILITOT 0.7  --   --   --  0.8   ALKPHOS 56  --   --   --  48*   AST 24  --   --   --  101*   ALT 15  --   --   --  46*   ANIONGAP 16   < > 12 15 17*   EGFRNONAA >60   < > 52* 38* 30*    < > = values in this interval not displayed.     Lactic Acid:   Recent Labs   Lab 05/04/22  0657 05/04/22  1330   LACTATE 8.0* 3.1*     Lipase:   Recent Labs   Lab 05/04/22  0657   LIPASE 116*     POCT Glucose:   Recent Labs   Lab 05/05/22  0258 05/05/22  0532 05/05/22  1107   POCTGLUCOSE 127* 111* 109         Significant Imaging: I have reviewed all pertinent imaging results/findings within the past 24 hours.      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  he is not on home oxygen. Supplemental oxygen was provided and noted- Vent Mode: A/C  Oxygen Concentration (%):  [] 60  Resp Rate Total:  [20 br/min-38 br/min] 30 br/min  Vt Set:  [400 mL-450 mL] 450 mL  PEEP/CPAP:  [5 cmH20-15 cmH20] 12 cmH20  Pressure Support:  [0 cmH20] 0 cmH20  Mean Airway Pressure:  [10 xpN06-23 cmH20] 18 cmH20.   Signs/symptoms of respiratory failure include- tachypnea. Contributing diagnoses includes - Obesity Hypoventilation Labs and images were reviewed. Patient Has recent ABG, which has been reviewed. Will treat underlying causes and adjust management of respiratory failure as indicated. Pulmonary CC on board      VTE Risk Mitigation (From admission, onward)         Ordered     heparin (porcine) injection 5,000 Units  Every 8 hours         05/04/22 1255     IP VTE HIGH RISK PATIENT  Once         05/04/22 1253     Place sequential compression device  Until discontinued         05/04/22 1253                Discharge Planning   ELLIOT:      Code Status: DNR   Is the patient medically ready for discharge?:     Reason for patient still in hospital (select all that apply): Patient trending condition, Treatment, Consult recommendations and Pending disposition  Discharge Plan A: Comfort care/withdrawal            Critical care time spent on the evaluation and treatment of severe organ dysfunction, review of pertinent labs and imaging studies, discussions with consulting providers and discussions with patient/family: 36 minutes.      Paras Hutchinson MD  Department of Hospital Medicine   [facility] - Intensive Care ([facility])

## 2022-05-05 NOTE — ASSESSMENT & PLAN NOTE
S/t septic shock  Adequate volume resuscitation and abx in place  Avoid nephrotoxins, consider change zosyn to merrem  Strict I/O  IV bicarb infusion for acidosis  No acute indication for dialysis but high potential for continued decline, monitor chemistry

## 2022-05-05 NOTE — ASSESSMENT & PLAN NOTE
Hgb 10.3 s/p Transfusion 4 units Prbc  Transfuse for Hgb <7  Monitor    5/5  Improved  Hgb 11.4 today  Transfuse as indicated

## 2022-05-05 NOTE — SUBJECTIVE & OBJECTIVE
Review of Systems   Unable to perform ROS: Intubated       Objective:     Vital Signs (Most Recent):  Temp: (!) 100.94 °F (38.3 °C) (05/05/22 0732)  Pulse: 100 (05/05/22 0732)  Resp: (!) 30 (05/05/22 0732)  BP: 109/70 (05/05/22 0600)  SpO2: 100 % (05/05/22 0732)   Vital Signs (24h Range):  Temp:  [97.6 °F (36.4 °C)-102.2 °F (39 °C)] 100.94 °F (38.3 °C)  Pulse:  [] 100  Resp:  [15-38] 30  SpO2:  [90 %-100 %] 100 %  BP: ()/(49-81) 109/70  Arterial Line BP: ()/() 104/65     Weight: 111.4 kg (245 lb 9.5 oz)  Body mass index is 36.27 kg/m².      Intake/Output Summary (Last 24 hours) at 5/5/2022 0828  Last data filed at 5/5/2022 0600  Gross per 24 hour   Intake 8340.55 ml   Output 1353 ml   Net 6987.55 ml      fentanyl 250 mcg/hr (05/05/22 0600)    NORepinephrine bitartrate-D5W 0.8 mcg/kg/min (05/05/22 0600)    sodium bicarbonate drip 125 mL/hr at 05/05/22 0600    vasopressin 0.04 Units/min (05/05/22 0600)       Physical Exam  Vitals and nursing note reviewed.   Constitutional:       General: He is not in acute distress.     Appearance: He is obese. He is ill-appearing.      Interventions: He is sedated, intubated and restrained.   HENT:      Head: Atraumatic.      Nose:      Comments: Small gauze from left nare without visible bleeding or drainage  Eyes:      General: No scleral icterus.     Conjunctiva/sclera: Conjunctivae normal.   Neck:      Vascular: No JVD.   Cardiovascular:      Rate and Rhythm: Normal rate and regular rhythm.      Pulses:           Radial pulses are 1+ on the right side and 1+ on the left side.        Dorsalis pedis pulses are 1+ on the right side and 1+ on the left side.      Comments: Generalized, non pitting  Pulmonary:      Effort: He is intubated.      Breath sounds: Decreased breath sounds present. No wheezing or rhonchi.   Abdominal:      General: Bowel sounds are absent.       Skin:     General: Skin is cool.      Capillary Refill: Capillary refill takes more than 3  seconds.       Vents:  Vent Mode: A/C (05/05/22 0732)  Ventilator Initiated: Yes (05/04/22 1258)  Set Rate: 30 BPM (05/05/22 0732)  Vt Set: 450 mL (05/05/22 0732)  Pressure Support: 0 cmH20 (05/05/22 0732)  PEEP/CPAP: 12 cmH20 (05/05/22 0732)  Oxygen Concentration (%): 60 (05/05/22 0732)  Peak Airway Pressure: 30 cmH2O (05/05/22 0732)  Plateau Pressure: 0 cmH20 (05/05/22 0732)  Total Ve: 14.1 mL (05/05/22 0732)  F/VT Ratio<105 (RSBI): (!) 63.03 (05/05/22 0732)    Lines/Drains/Airways       Central Venous Catheter Line  Duration             Percutaneous Central Line Insertion/Assessment - Triple Lumen  05/04/22 1200 right internal jugular <1 day              Drain  Duration                  NG/OG Tube 05/04/22 1600 <1 day         Urethral Catheter 05/04/22 1105 Straight-tip;Silicone 16 Fr. <1 day              Airway  Duration                  Airway - Non-Surgical 05/04/22 1051 Endotracheal Tube <1 day              Arterial Line  Duration             Arterial Line 05/04/22 1050 Right Radial <1 day              Peripheral Intravenous Line  Duration                  Peripheral IV - Single Lumen 05/04/22 0454 20 G Right Hand 1 day         Peripheral IV - Single Lumen 05/04/22 0839 20 G Left Forearm <1 day                    Significant Labs:    CBC/Anemia Profile:  Recent Labs   Lab 05/04/22  0657 05/04/22  1330 05/04/22  1442 05/04/22  1637 05/04/22  1950 05/05/22  0325   WBC 2.48* 1.05*  --   --   --  6.16   HGB 7.2* 10.3*  --   --   --  11.4*   HCT 28.9* 36.6*   < > 32* 34* 39.6*    385  --   --   --  351   MCV 73* 78*  --   --   --  76*   RDW 20.9* 22.3*  --   --   --  22.1*    < > = values in this interval not displayed.        Chemistries:  Recent Labs   Lab 05/04/22  0657 05/04/22  1330 05/04/22  1743 05/04/22  1955 05/04/22  2327 05/05/22  0325    141 140 142 140 140   K 3.7 3.9 4.2 4.4 4.2 4.4    114* 115* 112* 110 109   CO2 19* 15* 17* 18* 15* 14*   BUN 20 23 24* 25* 28* 31*   CREATININE 0.9  0.9 1.2 1.4 1.8* 2.2*   CALCIUM 7.8* 6.5* 6.5* 6.7* 7.1* 7.0*   ALBUMIN 3.2*  --   --   --   --  2.1*   PROT 5.4*  --   --   --   --  4.5*   BILITOT 0.7  --   --   --   --  0.8   ALKPHOS 56  --   --   --   --  48*   ALT 15  --   --   --   --  46*   AST 24  --   --   --   --  101*   MG  --  1.7 1.5*  --   --  1.4*       All pertinent labs within the past 24 hours have been reviewed.    Significant Imaging:  I have reviewed all pertinent imaging results/findings within the past 24 hours.

## 2022-05-05 NOTE — ASSESSMENT & PLAN NOTE
S/p exploratory laparotomy, abdominal washout, segmental small bowel resection (left in discontinuity), placement of Abthera vac 5/4/22    - Continue NG tube to LIS. NO meds or feeds (patient is in bowel discontinuity)  - Continue ICU management. Currently still requiring vasopressor support.  - Strict I/Os  - Medical and ICU management per hospital/ICU team    Plan to return to the OR on Saturday 5/7 if patient is more stable for right hemicolectomy, possible ileostomy, liver biopsy, abdominal wall closure.  Discussed with daughter at bedside.

## 2022-05-05 NOTE — ASSESSMENT & PLAN NOTE
Found on exploration along with palpable liver mass  Plan bx vs excision of cecal mass on return to OR 5/6 to obtain pathology  High concern for metastatic malignancy

## 2022-05-05 NOTE — PROGRESS NOTES
[facility] - Intensive Care ([facility])  General Surgery  Progress Note    Subjective:     History of Present Illness:  No notes on file    Post-Op Info:  Procedure(s) (LRB):  LAPAROTOMY, EXPLORATORY (N/A)  WASHOUT (N/A)  EXCISION, SMALL INTESTINE (N/A)  APPLICATION, WOUND VAC (N/A)   1 Day Post-Op     Interval History: Currently intubated in the ICU. Daughter at bedside.    Medications:  Continuous Infusions:   fentanyl 125 mcg/hr (05/05/22 1400)    NORepinephrine bitartrate-D5W 0.54 mcg/kg/min (05/05/22 1400)    sodium bicarbonate drip 125 mL/hr at 05/05/22 1400    vasopressin 0.04 Units/min (05/05/22 1400)     Scheduled Meds:   chlorhexidine  15 mL Mouth/Throat BID    famotidine (PF)  20 mg Intravenous Daily    heparin (porcine)  5,000 Units Subcutaneous Q8H    micafungin (MYCAMINE) IVPB  100 mg Intravenous Q24H    mupirocin   Nasal BID    piperacillin-tazobactam 4.5 g in dextrose 5 % 100 mL IVPB (ready to mix system)  4.5 g Intravenous Q8H     PRN Meds:sodium chloride, acetaminophen, dextrose 10%, dextrose 10%, glucagon (human recombinant), insulin aspart U-100, lorazepam, ondansetron     Review of patient's allergies indicates:  No Known Allergies  Objective:     Vital Signs (Most Recent):  Temp: 100.04 °F (37.8 °C) (05/05/22 1308)  Pulse: 94 (05/05/22 1308)  Resp: (!) 30 (05/05/22 1308)  BP: 106/64 (05/05/22 1300)  SpO2: 100 % (05/05/22 1308)   Vital Signs (24h Range):  Temp:  [97.6 °F (36.4 °C)-102.2 °F (39 °C)] 100.04 °F (37.8 °C)  Pulse:  [] 94  Resp:  [15-38] 30  SpO2:  [98 %-100 %] 100 %  BP: ()/(49-81) 106/64  Arterial Line BP: ()/() 122/68     Weight: 111.1 kg (245 lb)  Body mass index is 36.18 kg/m².    Intake/Output - Last 3 Shifts         05/03 0700  05/04 0659 05/04 0700  05/05 0659 05/05 0700  05/06 0659    I.V. (mL/kg)  4721.5 (42.5) 1626.4 (14.6)    Blood  1236     IV Piggyback  4269.1 104    Total Intake(mL/kg)  00104.6 (92) 1730.3 (15.6)    Urine (mL/kg/hr)  343  (0.1) 75 (0.1)    Drains  100     Other  910     Total Output  1353 75    Net  +8873.6 +1655.3                   Physical Exam  Vitals and nursing note reviewed.   Constitutional:       Appearance: He is obese. He is ill-appearing and toxic-appearing.   Cardiovascular:      Rate and Rhythm: Tachycardia present.   Pulmonary:      Comments: Mechanical breath sounds  Abdominal:      Palpations: Abdomen is soft.      Comments: Abthera vac in place holding suction, serosanguineous output   Genitourinary:     Comments: Doherty with dark michele urine  Musculoskeletal:      Right lower leg: Edema present.      Left lower leg: Edema present.   Neurological:      Comments: Sedated       Significant Labs:  I have reviewed all pertinent lab results within the past 24 hours.  CBC:   Recent Labs   Lab 05/05/22  0325   WBC 6.16   RBC 5.19   HGB 11.4*   HCT 39.6*      MCV 76*   MCH 22.0*   MCHC 28.8*     CMP:   Recent Labs   Lab 05/05/22  0325   *   CALCIUM 7.0*   ALBUMIN 2.1*   PROT 4.5*      K 4.4   CO2 14*      BUN 31*   CREATININE 2.2*   ALKPHOS 48*   ALT 46*   *   BILITOT 0.8     ABGs:   Recent Labs   Lab 05/05/22  0328   PH 7.319*   PCO2 38.5   PO2 214*   HCO3 19.8*   POCSATURATED 100   BE -6       Significant Diagnostics:  I have reviewed all pertinent imaging results/findings within the past 24 hours.    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 NO meds or feeds (patient is in bowel discontinuity)  - Continue ICU management. Currently still requiring vasopressor support.  - Strict I/Os  - Medical and ICU management per hospital/ICU team    Plan to return to the OR on Saturday 5/7 if patient is more stable for right hemicolectomy, possible ileostomy, liver biopsy, abdominal wall closure.  Discussed with  daughter at bedside.        Elvie Parker, DO  General Surgery  '[facility] - Intensive Care ([facility])

## 2022-05-05 NOTE — ASSESSMENT & PLAN NOTE
Vent settings reviewed and adjusted to optimize gas exchange  VAP prophylaxis  ABG daily and prn to assess response to therapy  SAT/SBT once gi tract in continuity

## 2022-05-05 NOTE — ASSESSMENT & PLAN NOTE
Pt is DNR, discussed at length with surgeon prior to surgery and he consented to intubation for surgery with understanding of likely need for prolonged vent support post op until bowel/abdomen closed. He was clear that he would not desire long term vent support past that necessary for immediate post op recovery.   Daughter Claudia is SDM and is aware and supportive of his wishes. IF at any point his survival chances become poor or prolonged life support is anticipated she would honor his wish and transition to comfort focused care.  Daily updates and discussions with daughter to optimize her ability to make care decisions as his surrogate.

## 2022-05-05 NOTE — ASSESSMENT & PLAN NOTE
5/4 Expl Lap and SB excision with washout and AB thera wound vac for bowel left in discontinuity  hx Diverticulosis but cecal mass and suspected metastatic lesions found in exploration  Plan return to OR 5/6 per Surgery following  IVAB for peritonitis/sepsis  NPO and IVFs with NG to suction

## 2022-05-05 NOTE — CONSULTS
[facility] - Intensive Care ([facility])  Infectious Disease  Consult Note    Patient Name: Franky Masters  MRN: [id number]  Admission Date: 5/4/2022  Hospital Length of Stay: 0 days  Attending Physician: Elvie Parker DO  Primary Care Provider: HOLLY ROSEN     Isolation Status: No active isolations    Patient information was obtained from past medical records and ER records.      Consults  Assessment/Plan:     * Septic shock  Vasopressor support as tolerated    Pneumonia of left lower lobe due to infectious organism  Continue Zosyn .    Mass of colon  Follow primary team/Oncology     Small bowel perforation with large Cecal mass   Will continue Zosyn , continue close monitoring     Acute on chronic anemia  Will transfuse as needed .  Closely monitor hemoglobin    Acute hypoxemic respiratory failure  Critical care follow up         Thank you for your consult. I will follow-up with patient. Please contact us if you have any additional questions.    Rai Hidalgo MD  Infectious Disease  [facility] - [facility] ([facility])    Subjective:     Principal Problem: Septic shock    HPI:   65 yo male POD#0 s/p SB perforation with surgical intervention demonstrating a large cecal mass and 3l feculant fluid in the abdominal cavity.    He is presently intubated . Operative note reviewed- perforated ileum and a liver mass.  Labs and imaging test reviewed.  Component      Latest Ref Rng & Units 5/4/2022 5/4/2022           1:30 PM  6:57 AM   WBC      3.90 - 12.70 K/uL 1.05 (LL) 2.48 (L)       Past Medical History:   Diagnosis Date    Diverticulitis     Supplemental oxygen dependent        History reviewed. No pertinent surgical history.    Review of patient's allergies indicates:  No Known Allergies    Medications:  No medications prior to admission.     Antibiotics (From admission, onward)                Start     Stop Route Frequency Ordered    05/04/22 1530  piperacillin-tazobactam 4.5 g in dextrose 5 % 100 mL IVPB (ready to  mix system)         -- IV Every 8 hours (non-standard times) 05/04/22 1514    05/04/22 1300  mupirocin 2 % ointment  (DECOLONIZATION PROTOCOL ORDERS)         05/09 0859 Nasl 2 times daily 05/04/22 1253          Antifungals (From admission, onward)                Start     Stop Route Frequency Ordered    05/04/22 1430  micafungin 100 mg in sodium chloride 0.9 % 100 mL IVPB (ready to mix system)         05/09 1429 IV Every 24 hours (non-standard times) 05/04/22 1318          Antivirals (From admission, onward)      None               There is no immunization history on file for this patient.    Family History    None       Social History     Socioeconomic History    Marital status:    Tobacco Use    Smoking status: Never Smoker    Smokeless tobacco: Never Used   Substance and Sexual Activity    Alcohol use: Not Currently    Drug use: Never     Review of Systems   Unable to perform ROS: Intubated   Objective:     Vital Signs (Most Recent):  Temp: 97.6 °F (36.4 °C) (05/04/22 1900)  Pulse: 94 (05/04/22 2120)  Resp: (!) 32 (05/04/22 2120)  BP: 121/76 (05/04/22 2000)  SpO2: 99 % (05/04/22 2120)   Vital Signs (24h Range):  Temp:  [97.6 °F (36.4 °C)-99 °F (37.2 °C)] 97.6 °F (36.4 °C)  Pulse:  [] 94  Resp:  [15-38] 32  SpO2:  [84 %-100 %] 99 %  BP: ()/(46-76) 121/76  Arterial Line BP: ()/() 113/69     Weight: 109.1 kg (240 lb 8.4 oz)  Body mass index is 35.52 kg/m².    Estimated Creatinine Clearance: 62.4 mL/min (based on SCr of 1.4 mg/dL).    Physical Exam  Vitals and nursing note reviewed.   Constitutional:       Appearance: He is well-developed.   HENT:      Head: Normocephalic and atraumatic.      Nose: Nose normal.   Eyes:      Pupils: Pupils are equal, round, and reactive to light.   Cardiovascular:      Rate and Rhythm: Normal rate and regular rhythm.      Heart sounds: Normal heart sounds.   Pulmonary:      Effort: Pulmonary effort is normal. No respiratory distress.      Breath  sounds: Normal breath sounds. No wheezing or rales.   Abdominal:      General: There is no distension.      Tenderness: There is no abdominal tenderness.      Comments: Wound vac noted    Musculoskeletal:         General: Normal range of motion.      Cervical back: Normal range of motion and neck supple.   Skin:     General: Skin is dry.   Neurological:      Comments: Intubated,sedated   Psychiatric:         Mood and Affect: Mood normal.       Significant Labs: CBC:   Recent Labs   Lab 05/04/22  0657 05/04/22  1330 05/04/22  1442 05/04/22  1637 05/04/22  1950   WBC 2.48* 1.05*  --   --   --    HGB 7.2* 10.3*  --   --   --    HCT 28.9* 36.6* 33* 32* 34*    385  --   --   --      CMP:   Recent Labs   Lab 05/04/22  0657 05/04/22  1330 05/04/22  1743 05/04/22  1955    141 140 142   K 3.7 3.9 4.2 4.4    114* 115* 112*   CO2 19* 15* 17* 18*   * 167* 180* 165*   BUN 20 23 24* 25*   CREATININE 0.9 0.9 1.2 1.4   CALCIUM 7.8* 6.5* 6.5* 6.7*   PROT 5.4*  --   --   --    ALBUMIN 3.2*  --   --   --    BILITOT 0.7  --   --   --    ALKPHOS 56  --   --   --    AST 24  --   --   --    ALT 15  --   --   --    ANIONGAP 16 12 8 12   EGFRNONAA >60 >60 >60 52*       Significant Imaging: I have reviewed all pertinent imaging results/findings within the past 24 hours.

## 2022-05-05 NOTE — ASSESSMENT & PLAN NOTE
Secondary to Peritonitis from bowel perforation with major fecal contamination  Blood and sputum cultures pending  Continue Zosyn and Micafungin  Continue IV bicarb infusion  Cont Vasopressin and titrate Levophed infusion for MAP > 65  ICU hemodynamic monitoring  Repeat LA and check echo

## 2022-05-05 NOTE — HOSPITAL COURSE
Patient continues to require pressors, remains intubated, sedated. Patient urine o/p declining and concerning. Discussed POC in detail at bedside with daughter POA. She expressed her fathers wish to not undergo treatments  like perm HD, where he has a diminished quality of life. We spoke frankly about issues and concerns and she will be communicating with the family as his case evolves. Comfort care was discussed and the goals of care will develop consistent with the patients expressed wishes. Potential for another surgery likely Saturday with a washout and possible biopsy vs resection are on the horizon. This possible intervention will be covered in detail by surgery sharing the risks and benefits of that approach. Case discussed with the primary service - surgery, and critical care team.    5/6- Pt seen and examined in the ICU plus discussed with ICU team and the pt's daughter/ POA: Remains critically ill, intubated, sedated on Vent, on Pressors- Levo plus Vaso- JOSE with oliguria getting worse- Cr rising to 3.6, becoming severely acidotic- requiring Ertapenem, Zyvox, Micafungin as well as on Bicarb and Fentanyl gtt. He has massive fluid overload and generalized anasarca.  Possible return to OR tomorrow 5/7 if patient is more stable for right hemicolectomy, possible ileostomy, liver biopsy, abdominal wall closure. Dw Pt's daughter.       5/7- remains Intubated, sedated on Vent- Went into Afib w RVR- hence added Amio to Levo and Vasopressin- back in NSR. Getting Invanz and Zyvox plus Micafungin. Dr. Parker took him back to OR for for abdominal washout ( 3-3.5 L feculent fluid with food particles suctioned out in the OR, small bowel appeared better) and replaced Abthera- still has bowel discontinuity. His WBC, Lactate and Cr have all increased. Family updated about his critical condition and poor prognosis and JOSE/ATN- they are considering Comfort measures.     5/8- Day 5 on Vent- family at bedside, remains  intubated on Vent with 2 Pressors- still on Amiodarone gtt for Afib, Still has Abthera wound vac to open Abdomen with small bowel discontinuity. Remains oliguric with generalized anasarca- almost 24 L fluid positive. Cr increased to 4.6. WBC down to 12, family does not want any HD/CRRT, so getting an IV Lasix trial to improve the urine output.     5/9- Day 6 on vent- remains the same, remains intubated, on vent with Abthera Wound vac and bowel discontinuity. Put out about 3 L urine since yesterday with IV Lasix, family still does not want any HD, WBC down to 10.2, H/H 7.8/24, still on 2 Pressors and Amio gtt. Family still deciding about comfort care.     5/10- Appreciate all- Day 7- remains same in septic shock on 2 vasopressors, intubated and sedated, still in afib. JOSE has remained stable at 4.7 but urine output improved with IV lasix. Abthera wound vac in place. No surgery today- put out about 2.5 L yesterday, given lasix again today.    5/11- Seen Pre op- looks better, less swollen, remains intubated, sedated on Vent, on 1 Pressor- on Levophed. Going for OR today for Laparotomy and washout and abd wound closure. Good response to Lasix. Still Afib on Amiodarone gtt. Bun/Cr 98/4.4, WBC down to 17, H/H 8.6/26.     5/12- Day 8 on Vent- looks much better, remains on Vent with Levophed and Amio gtts. Had extensive surgery yesterday and did very well post op- Reopening of recent Laparotomy, Abdominal washout, R Hemicolectomy with Liver Bx, TAP block, Abdominal wall closure, Creation, end Ileostomy. POD 1- looks better, still on Levophed and Amio gtt for Afib, started on TPN, ID stopped Zyvox and continued Merrem/Mycafungin.     5/17 - Successfully extubated.  Additional left side drain placed by Interventional Radiology.    5/18 - Purulent drainage from left side drains.  Remained stable.  5/20 - POD 9 s/p R hemicolectomy and Ileostomy- extubated 5/18 and also underwent large Abdominal abscess by IR on 5/18 with over  500 cc pus drained- all Cx remain NGTD. No fever or chills, no leukocytosis, AAOx2, remains on RA, very weak, malnourished. Getting TPN, Albumin 1.4. Hemodynamically stable- hence transferred out of ICU to floor, getting PT/OT.   5/21- looks lot better, more alert and responsive, following commands, moving all 4 ext well, generalized anasarca improving. Bun/Cr further down to 46/1.4, H/h stable at 7.7/23- ordered IV Venofer plis inj B12 IM. Tolerating TF well. Will continue PT/OT.   5/22- looks better and getting stronger, more responsive and moving all 4 ext well, tolerating TF well at 30 ml/hr via NGT. Wife and daughter at bedside. BUE swelling also coning down. Continue PT/OT.   5/23- looks better, getting stronger, got the Doherty out today, was able to urinate on his own. Also got up with PT today. Ostomy care performed. Will get MBSS again- continue TF and PT. Pt and family considering LTAC vs SNF.   5/24- looks much better, NGT out, tolerated oral feeding well, did some PT/OT as well, easily gets exhausted.. 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 Await Placement to Rehab vs LTAC. H/H still 7.5/22- he is big and tall and may give him 1-2 units blood tomorrow.   5/25- looks much better, stronger, sitting up in bed, daughter feeding him pasta which he eating and swallowing well. Got 2 units of blood yesterday. Still very weak but getting better with PT/OT. Will give more B12 plus Venofer and Procrit.   5/26- looks and feels much better, getting stronger, now beginning to feed himself too. No swallow issues, colostomy working, ADDIS drain also working well, Surgical midline incision also healing well. Daughter Claudia has again declined HH, stating that he has a rehab unit under his apartment and they can and will take good care of him. Discharge instructions given and wound care/colostomy supplies provided. Pt already cleared by Surgery, he was seen and examined and  deemed stable for discharge home today.

## 2022-05-05 NOTE — SUBJECTIVE & OBJECTIVE
Interval History: Currently intubated in the ICU. Daughter at bedside.    Medications:  Continuous Infusions:   fentanyl 125 mcg/hr (05/05/22 1400)    NORepinephrine bitartrate-D5W 0.54 mcg/kg/min (05/05/22 1400)    sodium bicarbonate drip 125 mL/hr at 05/05/22 1400    vasopressin 0.04 Units/min (05/05/22 1400)     Scheduled Meds:   chlorhexidine  15 mL Mouth/Throat BID    famotidine (PF)  20 mg Intravenous Daily    heparin (porcine)  5,000 Units Subcutaneous Q8H    micafungin (MYCAMINE) IVPB  100 mg Intravenous Q24H    mupirocin   Nasal BID    piperacillin-tazobactam 4.5 g in dextrose 5 % 100 mL IVPB (ready to mix system)  4.5 g Intravenous Q8H     PRN Meds:sodium chloride, acetaminophen, dextrose 10%, dextrose 10%, glucagon (human recombinant), insulin aspart U-100, lorazepam, ondansetron     Review of patient's allergies indicates:  No Known Allergies  Objective:     Vital Signs (Most Recent):  Temp: 100.04 °F (37.8 °C) (05/05/22 1308)  Pulse: 94 (05/05/22 1308)  Resp: (!) 30 (05/05/22 1308)  BP: 106/64 (05/05/22 1300)  SpO2: 100 % (05/05/22 1308)   Vital Signs (24h Range):  Temp:  [97.6 °F (36.4 °C)-102.2 °F (39 °C)] 100.04 °F (37.8 °C)  Pulse:  [] 94  Resp:  [15-38] 30  SpO2:  [98 %-100 %] 100 %  BP: ()/(49-81) 106/64  Arterial Line BP: ()/() 122/68     Weight: 111.1 kg (245 lb)  Body mass index is 36.18 kg/m².    Intake/Output - Last 3 Shifts         05/03 0700  05/04 0659 05/04 0700  05/05 0659 05/05 0700  05/06 0659    I.V. (mL/kg)  4721.5 (42.5) 1626.4 (14.6)    Blood  1236     IV Piggyback  4269.1 104    Total Intake(mL/kg)  67334.6 (92) 1730.3 (15.6)    Urine (mL/kg/hr)  343 (0.1) 75 (0.1)    Drains  100     Other  910     Total Output  1353 75    Net  +8873.6 +1655.3                   Physical Exam  Vitals and nursing note reviewed.   Constitutional:       Appearance: He is obese. He is ill-appearing and toxic-appearing.   Cardiovascular:      Rate and Rhythm: Tachycardia present.    Pulmonary:      Comments: Mechanical breath sounds  Abdominal:      Palpations: Abdomen is soft.      Comments: Abthera vac in place holding suction, serosanguineous output   Genitourinary:     Comments: Doherty with dark michele urine  Musculoskeletal:      Right lower leg: Edema present.      Left lower leg: Edema present.   Neurological:      Comments: Sedated       Significant Labs:  I have reviewed all pertinent lab results within the past 24 hours.  CBC:   Recent Labs   Lab 05/05/22 0325   WBC 6.16   RBC 5.19   HGB 11.4*   HCT 39.6*      MCV 76*   MCH 22.0*   MCHC 28.8*     CMP:   Recent Labs   Lab 05/05/22 0325   *   CALCIUM 7.0*   ALBUMIN 2.1*   PROT 4.5*      K 4.4   CO2 14*      BUN 31*   CREATININE 2.2*   ALKPHOS 48*   ALT 46*   *   BILITOT 0.8     ABGs:   Recent Labs   Lab 05/05/22 0328   PH 7.319*   PCO2 38.5   PO2 214*   HCO3 19.8*   POCSATURATED 100   BE -6       Significant Diagnostics:  I have reviewed all pertinent imaging results/findings within the past 24 hours.

## 2022-05-05 NOTE — ASSESSMENT & PLAN NOTE
Received 2 units PRBCs in ED and 2 more in OR on 5/4  CBC stable   Monitor wound vac output and daily CBC with Conservative transfusion protocol

## 2022-05-06 PROBLEM — I48.91 ATRIAL FIBRILLATION WITH RVR: Status: ACTIVE | Noted: 2022-05-06

## 2022-05-06 LAB
ALBUMIN SERPL BCP-MCNC: 1.7 G/DL (ref 3.5–5.2)
ALLENS TEST: ABNORMAL
ALP SERPL-CCNC: 62 U/L (ref 55–135)
ALT SERPL W/O P-5'-P-CCNC: 65 U/L (ref 10–44)
ANION GAP SERPL CALC-SCNC: 18 MMOL/L (ref 8–16)
ANION GAP SERPL CALC-SCNC: 23 MMOL/L (ref 8–16)
ANISOCYTOSIS BLD QL SMEAR: SLIGHT
AST SERPL-CCNC: 95 U/L (ref 10–40)
BASOPHILS NFR BLD: 0 % (ref 0–1.9)
BILIRUB SERPL-MCNC: 0.9 MG/DL (ref 0.1–1)
BUN SERPL-MCNC: 49 MG/DL (ref 8–23)
BUN SERPL-MCNC: 54 MG/DL (ref 8–23)
BURR CELLS BLD QL SMEAR: ABNORMAL
CALCIUM SERPL-MCNC: 5.9 MG/DL (ref 8.7–10.5)
CALCIUM SERPL-MCNC: 6.7 MG/DL (ref 8.7–10.5)
CHLORIDE SERPL-SCNC: 94 MMOL/L (ref 95–110)
CHLORIDE SERPL-SCNC: 98 MMOL/L (ref 95–110)
CO2 SERPL-SCNC: 17 MMOL/L (ref 23–29)
CO2 SERPL-SCNC: 25 MMOL/L (ref 23–29)
CREAT SERPL-MCNC: 3.6 MG/DL (ref 0.5–1.4)
CREAT SERPL-MCNC: 3.8 MG/DL (ref 0.5–1.4)
DACRYOCYTES BLD QL SMEAR: ABNORMAL
DELSYS: ABNORMAL
DIFFERENTIAL METHOD: ABNORMAL
EOSINOPHIL NFR BLD: 0 % (ref 0–8)
ERYTHROCYTE [DISTWIDTH] IN BLOOD BY AUTOMATED COUNT: 23 % (ref 11.5–14.5)
ERYTHROCYTE [SEDIMENTATION RATE] IN BLOOD BY WESTERGREN METHOD: 30 MM/H
EST. GFR  (AFRICAN AMERICAN): 18 ML/MIN/1.73 M^2
EST. GFR  (AFRICAN AMERICAN): 19 ML/MIN/1.73 M^2
EST. GFR  (NON AFRICAN AMERICAN): 15 ML/MIN/1.73 M^2
EST. GFR  (NON AFRICAN AMERICAN): 16 ML/MIN/1.73 M^2
FIO2: 50
FIO2: 50
FIO2: 60
FIO2: 60
GLUCOSE SERPL-MCNC: 103 MG/DL (ref 70–110)
GLUCOSE SERPL-MCNC: 105 MG/DL (ref 70–110)
GLUCOSE SERPL-MCNC: 108 MG/DL (ref 70–110)
GLUCOSE SERPL-MCNC: 109 MG/DL (ref 70–110)
GLUCOSE SERPL-MCNC: 98 MG/DL (ref 70–110)
HCO3 UR-SCNC: 23.8 MMOL/L (ref 24–28)
HCO3 UR-SCNC: 24.6 MMOL/L (ref 24–28)
HCO3 UR-SCNC: 25.9 MMOL/L (ref 24–28)
HCO3 UR-SCNC: 26.8 MMOL/L (ref 24–28)
HCT VFR BLD AUTO: 34.9 % (ref 40–54)
HCT VFR BLD CALC: 30 %PCV (ref 36–54)
HCT VFR BLD CALC: 30 %PCV (ref 36–54)
HCT VFR BLD CALC: 32 %PCV (ref 36–54)
HGB BLD-MCNC: 10.3 G/DL (ref 14–18)
HYPOCHROMIA BLD QL SMEAR: ABNORMAL
IMM GRANULOCYTES # BLD AUTO: ABNORMAL K/UL (ref 0–0.04)
IMM GRANULOCYTES NFR BLD AUTO: ABNORMAL % (ref 0–0.5)
LYMPHOCYTES NFR BLD: 5 % (ref 18–48)
MAGNESIUM SERPL-MCNC: 1.8 MG/DL (ref 1.6–2.6)
MCH RBC QN AUTO: 22.1 PG (ref 27–31)
MCHC RBC AUTO-ENTMCNC: 29.5 G/DL (ref 32–36)
MCV RBC AUTO: 75 FL (ref 82–98)
MODE: ABNORMAL
MONOCYTES NFR BLD: 3 % (ref 4–15)
NEUTROPHILS NFR BLD: 80 % (ref 38–73)
NEUTS BAND NFR BLD MANUAL: 12 %
NRBC BLD-RTO: 1 /100 WBC
OVALOCYTES BLD QL SMEAR: ABNORMAL
PCO2 BLDA: 39.2 MMHG (ref 35–45)
PCO2 BLDA: 40.3 MMHG (ref 35–45)
PCO2 BLDA: 40.6 MMHG (ref 35–45)
PCO2 BLDA: 42.4 MMHG (ref 35–45)
PEEP: 10
PEEP: 12
PH SMN: 7.38 [PH] (ref 7.35–7.45)
PH SMN: 7.39 [PH] (ref 7.35–7.45)
PH SMN: 7.41 [PH] (ref 7.35–7.45)
PH SMN: 7.43 [PH] (ref 7.35–7.45)
PLATELET # BLD AUTO: 201 K/UL (ref 150–450)
PLATELET BLD QL SMEAR: ABNORMAL
PMV BLD AUTO: 10.4 FL (ref 9.2–12.9)
PO2 BLDA: 123 MMHG (ref 80–100)
PO2 BLDA: 125 MMHG (ref 80–100)
PO2 BLDA: 129 MMHG (ref 80–100)
PO2 BLDA: 137 MMHG (ref 80–100)
POC BE: -1 MMOL/L
POC BE: 0 MMOL/L
POC BE: 2 MMOL/L
POC BE: 2 MMOL/L
POC IONIZED CALCIUM: 0.72 MMOL/L (ref 1.06–1.42)
POC IONIZED CALCIUM: 0.78 MMOL/L (ref 1.06–1.42)
POC IONIZED CALCIUM: 0.96 MMOL/L (ref 1.06–1.42)
POC SATURATED O2: 99 % (ref 95–100)
POCT GLUCOSE: 102 MG/DL (ref 70–110)
POCT GLUCOSE: 108 MG/DL (ref 70–110)
POCT GLUCOSE: 111 MG/DL (ref 70–110)
POCT GLUCOSE: 122 MG/DL (ref 70–110)
POCT GLUCOSE: 77 MG/DL (ref 70–110)
POCT GLUCOSE: 96 MG/DL (ref 70–110)
POIKILOCYTOSIS BLD QL SMEAR: ABNORMAL
POLYCHROMASIA BLD QL SMEAR: ABNORMAL
POTASSIUM BLD-SCNC: 4.6 MMOL/L (ref 3.5–5.1)
POTASSIUM BLD-SCNC: 4.7 MMOL/L (ref 3.5–5.1)
POTASSIUM BLD-SCNC: 4.9 MMOL/L (ref 3.5–5.1)
POTASSIUM SERPL-SCNC: 4.8 MMOL/L (ref 3.5–5.1)
POTASSIUM SERPL-SCNC: 5.2 MMOL/L (ref 3.5–5.1)
PROT SERPL-MCNC: 4.4 G/DL (ref 6–8.4)
RBC # BLD AUTO: 4.67 M/UL (ref 4.6–6.2)
SAMPLE: ABNORMAL
SITE: ABNORMAL
SODIUM BLD-SCNC: 134 MMOL/L (ref 136–145)
SODIUM BLD-SCNC: 134 MMOL/L (ref 136–145)
SODIUM BLD-SCNC: 135 MMOL/L (ref 136–145)
SODIUM SERPL-SCNC: 137 MMOL/L (ref 136–145)
SODIUM SERPL-SCNC: 138 MMOL/L (ref 136–145)
TARGETS BLD QL SMEAR: ABNORMAL
VT: 450
WBC # BLD AUTO: 18.7 K/UL (ref 3.9–12.7)

## 2022-05-06 PROCEDURE — 63600175 PHARM REV CODE 636 W HCPCS: Performed by: INTERNAL MEDICINE

## 2022-05-06 PROCEDURE — 84132 ASSAY OF SERUM POTASSIUM: CPT

## 2022-05-06 PROCEDURE — 80053 COMPREHEN METABOLIC PANEL: CPT | Performed by: NURSE PRACTITIONER

## 2022-05-06 PROCEDURE — 82330 ASSAY OF CALCIUM: CPT

## 2022-05-06 PROCEDURE — 93005 ELECTROCARDIOGRAM TRACING: CPT

## 2022-05-06 PROCEDURE — 80048 BASIC METABOLIC PNL TOTAL CA: CPT | Mod: XB | Performed by: NURSE PRACTITIONER

## 2022-05-06 PROCEDURE — 83735 ASSAY OF MAGNESIUM: CPT | Performed by: NURSE PRACTITIONER

## 2022-05-06 PROCEDURE — 25000003 PHARM REV CODE 250: Performed by: NURSE PRACTITIONER

## 2022-05-06 PROCEDURE — 25000003 PHARM REV CODE 250: Performed by: SURGERY

## 2022-05-06 PROCEDURE — 94761 N-INVAS EAR/PLS OXIMETRY MLT: CPT

## 2022-05-06 PROCEDURE — 94003 VENT MGMT INPAT SUBQ DAY: CPT

## 2022-05-06 PROCEDURE — 93010 ELECTROCARDIOGRAM REPORT: CPT | Mod: ,,, | Performed by: STUDENT IN AN ORGANIZED HEALTH CARE EDUCATION/TRAINING PROGRAM

## 2022-05-06 PROCEDURE — 85014 HEMATOCRIT: CPT

## 2022-05-06 PROCEDURE — 27000221 HC OXYGEN, UP TO 24 HOURS

## 2022-05-06 PROCEDURE — 93010 EKG 12-LEAD: ICD-10-PCS | Mod: ,,, | Performed by: STUDENT IN AN ORGANIZED HEALTH CARE EDUCATION/TRAINING PROGRAM

## 2022-05-06 PROCEDURE — 63600175 PHARM REV CODE 636 W HCPCS: Performed by: SURGERY

## 2022-05-06 PROCEDURE — 63600175 PHARM REV CODE 636 W HCPCS: Mod: JG | Performed by: NURSE PRACTITIONER

## 2022-05-06 PROCEDURE — 99233 PR SUBSEQUENT HOSPITAL CARE,LEVL III: ICD-10-PCS | Mod: NSCH,,, | Performed by: INTERNAL MEDICINE

## 2022-05-06 PROCEDURE — 99233 SBSQ HOSP IP/OBS HIGH 50: CPT | Mod: NSCH,,, | Performed by: INTERNAL MEDICINE

## 2022-05-06 PROCEDURE — 63600175 PHARM REV CODE 636 W HCPCS: Performed by: NURSE PRACTITIONER

## 2022-05-06 PROCEDURE — 85007 BL SMEAR W/DIFF WBC COUNT: CPT | Performed by: NURSE PRACTITIONER

## 2022-05-06 PROCEDURE — 82803 BLOOD GASES ANY COMBINATION: CPT

## 2022-05-06 PROCEDURE — 99291 PR CRITICAL CARE, E/M 30-74 MINUTES: ICD-10-PCS | Mod: ,,, | Performed by: NURSE PRACTITIONER

## 2022-05-06 PROCEDURE — 99900035 HC TECH TIME PER 15 MIN (STAT)

## 2022-05-06 PROCEDURE — 99291 CRITICAL CARE FIRST HOUR: CPT | Mod: ,,, | Performed by: NURSE PRACTITIONER

## 2022-05-06 PROCEDURE — 84295 ASSAY OF SERUM SODIUM: CPT

## 2022-05-06 PROCEDURE — 99292 PR CRITICAL CARE, ADDL 30 MIN: ICD-10-PCS | Mod: ,,, | Performed by: NURSE PRACTITIONER

## 2022-05-06 PROCEDURE — 85027 COMPLETE CBC AUTOMATED: CPT | Performed by: NURSE PRACTITIONER

## 2022-05-06 PROCEDURE — 20000000 HC ICU ROOM

## 2022-05-06 PROCEDURE — 37799 UNLISTED PX VASCULAR SURGERY: CPT

## 2022-05-06 PROCEDURE — 99292 CRITICAL CARE ADDL 30 MIN: CPT | Mod: ,,, | Performed by: NURSE PRACTITIONER

## 2022-05-06 RX ORDER — PHENYLEPHRINE HCL IN 0.9% NACL 20MG/250ML
0-5 PLASTIC BAG, INJECTION (ML) INTRAVENOUS CONTINUOUS
Status: DISCONTINUED | OUTPATIENT
Start: 2022-05-06 | End: 2022-05-06

## 2022-05-06 RX ORDER — SODIUM CHLORIDE 9 MG/ML
INJECTION, SOLUTION INTRAVENOUS
Status: DISCONTINUED | OUTPATIENT
Start: 2022-05-06 | End: 2022-05-26 | Stop reason: HOSPADM

## 2022-05-06 RX ORDER — CALCIUM GLUCONATE 98 MG/ML
1 INJECTION, SOLUTION INTRAVENOUS ONCE
Status: DISCONTINUED | OUTPATIENT
Start: 2022-05-06 | End: 2022-05-06

## 2022-05-06 RX ORDER — CALCIUM GLUCONATE 20 MG/ML
1 INJECTION, SOLUTION INTRAVENOUS ONCE
Status: COMPLETED | OUTPATIENT
Start: 2022-05-06 | End: 2022-05-06

## 2022-05-06 RX ORDER — SODIUM CHLORIDE 9 MG/ML
INJECTION, SOLUTION INTRAVENOUS CONTINUOUS
Status: DISCONTINUED | OUTPATIENT
Start: 2022-05-06 | End: 2022-05-07

## 2022-05-06 RX ADMIN — HEPARIN SODIUM 5000 UNITS: 5000 INJECTION INTRAVENOUS; SUBCUTANEOUS at 09:05

## 2022-05-06 RX ADMIN — Medication 175 MCG/HR: at 04:05

## 2022-05-06 RX ADMIN — Medication 1 MCG/KG/MIN: at 07:05

## 2022-05-06 RX ADMIN — Medication 0.04 UNITS/MIN: at 11:05

## 2022-05-06 RX ADMIN — LORAZEPAM 2 MG: 2 INJECTION INTRAMUSCULAR; INTRAVENOUS at 06:05

## 2022-05-06 RX ADMIN — HEPARIN SODIUM 5000 UNITS: 5000 INJECTION INTRAVENOUS; SUBCUTANEOUS at 06:05

## 2022-05-06 RX ADMIN — SODIUM CHLORIDE: 0.9 INJECTION, SOLUTION INTRAVENOUS at 11:05

## 2022-05-06 RX ADMIN — ONDANSETRON 2 G: 2 INJECTION INTRAMUSCULAR; INTRAVENOUS at 10:05

## 2022-05-06 RX ADMIN — SODIUM CHLORIDE: 0.9 INJECTION, SOLUTION INTRAVENOUS at 09:05

## 2022-05-06 RX ADMIN — AMIODARONE HYDROCHLORIDE 1 MG/MIN: 1.8 INJECTION, SOLUTION INTRAVENOUS at 07:05

## 2022-05-06 RX ADMIN — SODIUM BICARBONATE: 84 INJECTION, SOLUTION INTRAVENOUS at 06:05

## 2022-05-06 RX ADMIN — SODIUM CHLORIDE: 0.9 INJECTION, SOLUTION INTRAVENOUS at 07:05

## 2022-05-06 RX ADMIN — FAMOTIDINE 20 MG: 10 INJECTION INTRAVENOUS at 10:05

## 2022-05-06 RX ADMIN — Medication 0.04 UNITS/MIN: at 03:05

## 2022-05-06 RX ADMIN — Medication 150 MCG/HR: at 01:05

## 2022-05-06 RX ADMIN — MINERAL OIL, PETROLATUM: 425; 573 OINTMENT OPHTHALMIC at 09:05

## 2022-05-06 RX ADMIN — CALCIUM GLUCONATE 1 G: 20 INJECTION, SOLUTION INTRAVENOUS at 06:05

## 2022-05-06 RX ADMIN — ONDANSETRON 2 G: 2 INJECTION INTRAMUSCULAR; INTRAVENOUS at 05:05

## 2022-05-06 RX ADMIN — HEPARIN SODIUM 5000 UNITS: 5000 INJECTION INTRAVENOUS; SUBCUTANEOUS at 03:05

## 2022-05-06 RX ADMIN — MUPIROCIN: 20 OINTMENT TOPICAL at 09:05

## 2022-05-06 RX ADMIN — MUPIROCIN: 20 OINTMENT TOPICAL at 10:05

## 2022-05-06 RX ADMIN — LINEZOLID 600 MG: 600 INJECTION, SOLUTION INTRAVENOUS at 10:05

## 2022-05-06 RX ADMIN — Medication 0.04 UNITS/MIN: at 06:05

## 2022-05-06 RX ADMIN — CHLORHEXIDINE GLUCONATE 0.12% ORAL RINSE 15 ML: 1.2 LIQUID ORAL at 09:05

## 2022-05-06 RX ADMIN — ERTAPENEM 500 MG: 1 INJECTION INTRAMUSCULAR; INTRAVENOUS at 09:05

## 2022-05-06 RX ADMIN — MICAFUNGIN SODIUM 100 MG: 100 INJECTION, POWDER, LYOPHILIZED, FOR SOLUTION INTRAVENOUS at 04:05

## 2022-05-06 RX ADMIN — Medication 1 MCG/KG/MIN: at 10:05

## 2022-05-06 RX ADMIN — AMIODARONE HYDROCHLORIDE 150 MG: 1.5 INJECTION, SOLUTION INTRAVENOUS at 06:05

## 2022-05-06 RX ADMIN — CHLORHEXIDINE GLUCONATE 0.12% ORAL RINSE 15 ML: 1.2 LIQUID ORAL at 10:05

## 2022-05-06 RX ADMIN — SODIUM BICARBONATE: 84 INJECTION, SOLUTION INTRAVENOUS at 05:05

## 2022-05-06 NOTE — ASSESSMENT & PLAN NOTE
S/p exploratory laparotomy, abdominal washout, segmental small bowel resection (left in discontinuity), placement of Abthera vac 5/4/22    - Continue NG tube to LIS. NO meds or feeds (patient is in bowel discontinuity)  - Continue ICU management. Currently still requiring vasopressor support.  - Strict I/Os  - Medical and ICU management per hospital/ICU team    Tentative plan to return to the OR on Saturday 5/7 if more stable. Ideally, would perform right hemicolectomy, end ileostomy, liver biopsy, abdominal wall closure, however patient may only be able to tolerate a repeat abdominal washout with Abthera replacement.  Discussed with daughter at bedside.

## 2022-05-06 NOTE — SIGNIFICANT EVENT
1825 acute rhythm change to atrial fib with RVR 180s with some concurrent drop in arterial BP  Eyes open, some coughing; instructed bedside RN to escalate sedation  STAT abg shows  ABG  Recent Labs   Lab 05/06/22  1838   PH 7.408   PO2 129*   PCO2 42.4   HCO3 26.8   BE 2   with Na   134    K     4.6    iCa   0.96    Glucose   109    Hct     30    12 LEAD EKG a fib with RVR  Amiodarone bolus and infusion panel ordered  Remained at bedside to evaluate response to therapy  Worsening hypotension post amio bolus; added phenylephrine to avoid increasing levophed infusion rate  D/c bicarb infusion given abg findings. NS maintenance IVF @ 125    Additional 40 minute critical care time on the following activities: development of treatment plan with patient or surrogate and bedside caregivers, discussions with consultants, evaluation of patient's response to treatment, examination of patient, ordering and performing treatments and interventions, ordering and review of laboratory studies, ordering and review of radiographic studies, pulse oximetry, re-evaluation of patient's condition.  This critical care time did not overlap with that of any other provider.     Saba Crouch, MANINDERP-BC [facility]

## 2022-05-06 NOTE — PROGRESS NOTES
Formerly [facility], later [facility] - Intensive [facility] ([facility])  Infectious Disease  Progress Note    Patient Name: Franky Masters  MRN: [id number]  Admission Date: 5/4/2022  Length of Stay: 1 days  Attending Physician: Elvie Parker DO  Primary Care Provider: HOLLY ROSEN    Isolation Status: No active isolations  Assessment/Plan:      * Septic shock  Vasopressor support as tolerated  On Ertapenem, zyvox/micafungin  Follow blood culture    On mechanically assisted ventilation  Follow critical care team     Mass of colon  Follow primary team/Oncology     Small bowel perforation with large Cecal mass   Will continue Zosyn , continue close monitoring     05/05/22- due to persistent fever , will switch to Micafungin, Ertapenem and zyvox   Follow cultures     Acute hypoxemic respiratory failure on mechanical vent  Critical care follow up         Anticipated Disposition:     Thank you for your consult. I will follow-up with patient. Please contact us if you have any additional questions.    Rai Hidalgo MD  Infectious Disease  Formerly [facility], later [facility] - [facility] ([facility])    Subjective:     Principal Problem:Septic shock    HPI:   65 yo male POD#0 s/p SB perforation with surgical intervention demonstrating a large cecal mass and 3l feculant fluid in the abdominal cavity.    He is presently intubated . Operative note reviewed- perforated ileum and a liver mass.  Labs and imaging test reviewed.  Component      Latest Ref Rng & Units 5/4/2022 5/4/2022           1:30 PM  6:57 AM   WBC      3.90 - 12.70 K/uL 1.05 (LL) 2.48 (L)     Interval History:   66 year old man s/p I and D for  perforation demonstrating a large cecal mass and 3l feculant fluid in the abdominal cavity.  He is now febrile  T max 102.   Review of Systems   Unable to perform ROS: Intubated   Objective:     Vital Signs (Most Recent):  Temp: (!) 100.76 °F (38.2 °C) (05/05/22 1924)  Pulse: 90 (05/05/22 1924)  Resp: (!) 30 (05/05/22 1924)  BP: 116/63 (05/05/22 1900)  SpO2: 100 % (05/05/22  1924)   Vital Signs (24h Range):  Temp:  [98 °F (36.7 °C)-102.2 °F (39 °C)] 100.76 °F (38.2 °C)  Pulse:  [] 90  Resp:  [16-32] 30  SpO2:  [98 %-100 %] 100 %  BP: (105-141)/(60-81) 116/63  Arterial Line BP: (104-142)/(61-76) 132/65     Weight: 111.1 kg (245 lb)  Body mass index is 36.18 kg/m².    Estimated Creatinine Clearance: 40 mL/min (A) (based on SCr of 2.2 mg/dL (H)).    Physical Exam  Vitals and nursing note reviewed.   Constitutional:       Appearance: He is well-developed.   HENT:      Head: Normocephalic and atraumatic.      Nose: Nose normal.   Eyes:      Pupils: Pupils are equal, round, and reactive to light.   Cardiovascular:      Rate and Rhythm: Normal rate and regular rhythm.      Heart sounds: Normal heart sounds.   Pulmonary:      Effort: Pulmonary effort is normal. No respiratory distress.      Breath sounds: Normal breath sounds. No wheezing or rales.   Abdominal:      General: There is no distension.      Tenderness: There is no abdominal tenderness.      Comments: Wound vac noted    Musculoskeletal:         General: Normal range of motion.      Cervical back: Normal range of motion and neck supple.   Skin:     General: Skin is dry.   Neurological:      Comments: Intubated,sedated   Psychiatric:         Mood and Affect: Mood normal.       Significant Labs: Blood Culture:   Recent Labs   Lab 05/04/22  1428 05/04/22  1429   LABBLOO No Growth to date No Growth to date     BMP:   Recent Labs   Lab 05/05/22  0325   *      K 4.4      CO2 14*   BUN 31*   CREATININE 2.2*   CALCIUM 7.0*   MG 1.4*     CBC:   Recent Labs   Lab 05/04/22  0657 05/04/22  1330 05/04/22  1442 05/04/22  1637 05/04/22  1950 05/05/22  0325   WBC 2.48* 1.05*  --   --   --  6.16   HGB 7.2* 10.3*  --   --   --  11.4*   HCT 28.9* 36.6*   < > 32* 34* 39.6*    385  --   --   --  351    < > = values in this interval not displayed.     CMP:   Recent Labs   Lab 05/04/22  0657 05/04/22  1330 05/04/22 1955  05/04/22  2327 05/05/22  0325      < > 142 140 140   K 3.7   < > 4.4 4.2 4.4      < > 112* 110 109   CO2 19*   < > 18* 15* 14*   *   < > 165* 144* 125*   BUN 20   < > 25* 28* 31*   CREATININE 0.9   < > 1.4 1.8* 2.2*   CALCIUM 7.8*   < > 6.7* 7.1* 7.0*   PROT 5.4*  --   --   --  4.5*   ALBUMIN 3.2*  --   --   --  2.1*   BILITOT 0.7  --   --   --  0.8   ALKPHOS 56  --   --   --  48*   AST 24  --   --   --  101*   ALT 15  --   --   --  46*   ANIONGAP 16   < > 12 15 17*   EGFRNONAA >60   < > 52* 38* 30*    < > = values in this interval not displayed.       Significant Imaging: I have reviewed all pertinent imaging results/findings within the past 24 hours.

## 2022-05-06 NOTE — SUBJECTIVE & OBJECTIVE
Interval History:  Pt seen and examined in the ICU plus discussed with ICU team and the pt's daughter/ POA: Remains critically ill, intubated, sedated on Vent, on Pressors- Levo plus Vaso- JOSE with oliguria. She has massive fluid overload and generalized anasarca.Ertapenem, Zyvox, Micafungin as well as on Bicarb and Fentanyl gtt. Possible return to OR tomorrow 5/7 if patient is more stable for right hemicolectomy, possible ileostomy, liver biopsy, abdominal wall closure.Dw Pt's daughter.       Review of Systems   Unable to perform ROS: Intubated   Objective:     Vital Signs (Most Recent):  Temp: (!) 100.94 °F (38.3 °C) (05/06/22 1529)  Pulse: 83 (05/06/22 1529)  Resp: (!) 30 (05/06/22 1529)  BP: (!) 113/58 (05/06/22 1000)  SpO2: 100 % (05/06/22 1529)   Vital Signs (24h Range):  Temp:  [100.04 °F (37.8 °C)-101.3 °F (38.5 °C)] 100.94 °F (38.3 °C)  Pulse:  [47-97] 83  Resp:  [27-30] 30  SpO2:  [100 %] 100 %  BP: (104-123)/(57-71) 113/58  Arterial Line BP: (117-149)/(51-69) 127/51     Weight: 111.1 kg (245 lb)  Body mass index is 36.18 kg/m².    Intake/Output Summary (Last 24 hours) at 5/6/2022 1548  Last data filed at 5/6/2022 1500  Gross per 24 hour   Intake 3754.3 ml   Output 1065 ml   Net 2689.3 ml      Physical Exam  Vitals and nursing note reviewed.   Constitutional:       General: He is not in acute distress.     Appearance: He is obese. He is ill-appearing.      Interventions: He is sedated, intubated and restrained.   HENT:      Head: Atraumatic.      Nose:      Comments: Small gauze from left nare without visible bleeding or drainage  Eyes:      General: No scleral icterus.     Conjunctiva/sclera: Conjunctivae normal.   Neck:      Vascular: No JVD.   Cardiovascular:      Rate and Rhythm: Normal rate and regular rhythm.      Pulses:           Radial pulses are 1+ on the right side and 1+ on the left side.        Dorsalis pedis pulses are 1+ on the right side and 1+ on the left side.      Comments: Generalized,  non pitting  Pulmonary:      Effort: He is intubated.      Breath sounds: Decreased breath sounds present. No wheezing or rhonchi.   Abdominal:      General: Bowel sounds are absent.       Musculoskeletal:      Right lower le+ Pitting Edema present.      Left lower le+ Pitting Edema present.   Skin:     General: Skin is cool.      Capillary Refill: Capillary refill takes more than 3 seconds.     Flow (L/min): 4  Vent Mode: A/C  Oxygen Concentration (%):  [50-60] 50  Resp Rate Total:  [29 br/min-31 br/min] 30 br/min  Vt Set:  [450 mL] 450 mL  PEEP/CPAP:  [10 uvE57-96 cmH20] 10 cmH20  Pressure Support:  [0 cmH20] 0 cmH20  Mean Airway Pressure:  [16 ouC33-03 cmH20] 16 cmH20  Temp:  [100.04 °F (37.8 °C)-101.3 °F (38.5 °C)] 100.94 °F (38.3 °C)  Pulse:  [47-97] 83  Resp:  [27-30] 30  SpO2:  [100 %] 100 %  BP: (104-123)/(57-71) 113/58  Arterial Line BP: (117-149)/(51-69) 127/51   Art pH/pCO2/pO2/HCO3:  7.394/40.3/137/24.6 ( 1010)  Lab Results   Component Value Date    [id number] Negative 2022    [id number] Positive (A) 2021      Recent Labs   Lab 22  0657 22  0657 22  1330 22  1442 22  1743 22  1950 22  0325 22  1150 22  0410 22  1010 22  1305   LACTATE 8.0*  --  3.1*  --   --   --   --  5.0*  --   --   --      --  141  --  140   < > 140  --  138  --  137   WBC 2.48*  --  1.05*  --   --   --  6.16  --  18.70*  --   --    GRAN 65.8  1.6*  --  46.0  --   --   --  82.4*  5.1  --  80.0*  --   --    LYMPH 30.2  0.8*  --  42.0  --   --   --  9.3*  0.6*  --  5.0*  --   --    HGB 7.2*  --  10.3*  --   --   --  11.4*  --  10.3*  --   --    HCT 28.9*  --  36.6*   < >  --    < > 39.6*  --  34.9* 32*  --    BUN 20  --  23  --  24*   < > 31*  --  49*  --  54*   CREATININE 0.9  --  0.9  --  1.2   < > 2.2*  --  3.6*  --  3.8*   ESTGFRAFRICA >60  --  >60  --  >60   < > 35*  --  19*  --  18*   EGFRNONAA >60  --  >60  --  >60   < > 30*   --  16*  --  15*   K 3.7  --  3.9  --  4.2   < > 4.4  --  5.2*  --  4.8     --  114*  --  115*   < > 109  --  98  --  94*   CO2 19*  --  15*  --  17*   < > 14*  --  17*  --  25   MG  --    < > 1.7  --  1.5*  --  1.4*  --  1.8  --   --     < > = values in this interval not displayed.     Microbiology Results (last 7 days)       Procedure Component Value Units Date/Time    Culture, Respiratory with Gram Stain [[id number]] Collected: 05/04/22 1253    Order Status: Completed Specimen: Respiratory from Endotracheal Aspirate Updated: 05/06/22 0909     Respiratory Culture No Growth     Gram Stain (Respiratory) Few WBC's     Gram Stain (Respiratory) Rare Gram positive rods    Blood culture [[id number]] Collected: 05/04/22 1428    Order Status: Completed Specimen: Blood from Line, Arterial, Left Updated: 05/06/22 0613     Blood Culture, Routine No Growth to date      No Growth to date    Blood culture [[id number]] Collected: 05/04/22 1429    Order Status: Completed Specimen: Blood from Line, Jugular, Internal Right Updated: 05/06/22 0613     Blood Culture, Routine No Growth to date      No Growth to date          Antibiotics (From admission, onward)                Start     Stop Route Frequency Ordered    05/06/22 2100  ertapenem (INVANZ) 500 mg in sodium chloride 0.9% 100 mL IVPB         05/09 2059 IV Every 24 hours (non-standard times) 05/06/22 0742    05/05/22 2115  linezolid 600 mg/300 mL IVPB 600 mg         05/11 2114 IV Every 12 hours (non-standard times) 05/05/22 2000    05/04/22 1300  mupirocin 2 % ointment  (DECOLONIZATION PROTOCOL ORDERS)         05/09 0859 Nasl 2 times daily 05/04/22 1253          Anticoagulants       Ordered     Route Frequency Start Stop    05/04/22 1255  heparin (porcine)  (VTE Prophylaxis Orders - High Risk)         SubQ Every 8 hours 05/05/22 0600 --          Echo Saline Bubble? No  · The left ventricle is normal in size with concentric hypertrophy and   moderately decreased systolic  function.  · Grade I left ventricular diastolic dysfunction.  · Normal right ventricular size with low normal right ventricular systolic   function.  · The estimated ejection fraction is 35-40%.  · There is mild left ventricular global hypokinesis.  · Moderate mitral regurgitation.     US Retroperitoneal Complete  Narrative: EXAMINATION:  US RETROPERITONEAL COMPLETE    CLINICAL HISTORY:  JOSE with oliguria. r/o obstructive uropathy;    TECHNIQUE:  Ultrasound of the kidneys and urinary bladder was performed including color flow and Doppler evaluation of the kidneys.    COMPARISON:  None.    FINDINGS:  Right kidney: The right kidney measures 10.9 cm. No cortical thinning. No loss of corticomedullary distinction.  Normal perfusion.   No mass. No renal stone. No hydronephrosis.    Left kidney: The left kidney measures 13 cm. No cortical thinning. No loss of corticomedullary distinction.  Normal perfusion. No mass. No renal stone. No hydronephrosis.    The bladder is not distended limited evaluation with Doherty catheter noted  Impression: No significant abnormality.    Electronically signed by: Philippe Horton MD  Date:    05/05/2022  Time:    08:28  X-Ray Chest AP Portable  Narrative: EXAMINATION:  XR CHEST AP PORTABLE    CLINICAL HISTORY:  resp failure;    TECHNIQUE:  Single frontal view of the chest was performed.    COMPARISON:  05/04/2022.    FINDINGS:  Tubes and lines are satisfactory.  No pneumothorax. Patchy infiltrate suspected left lower lobe.  Cannot exclude right infrahilar infiltrate with prominence of right hilum; consider follow-up chest CT..  Small left pleural effusion. In comparison to the prior study, there is no adverse interval changes  Impression: In comparison to the prior study, there is no adverse interval changes    Electronically signed by: Philippe Horton MD  Date:    05/05/2022  Time:    08:21   Significant Labs: All pertinent labs within the past 24 hours have been reviewed.    Significant  Imaging: I have reviewed all pertinent imaging results/findings within the past 24 hours.

## 2022-05-06 NOTE — PROGRESS NOTES
Formerly [facility] - Intensive Care ([facility])  General Surgery  Progress Note    Subjective:     History of Present Illness:  No notes on file    Post-Op Info:  Procedure(s) (LRB):  LAPAROTOMY, EXPLORATORY (N/A)  WASHOUT (N/A)  EXCISION, SMALL INTESTINE (N/A)  APPLICATION, WOUND VAC (N/A)   2 Days Post-Op     Interval History: Remains intubated and sedated in the ICU. Daughter is at bedside.    Medications:  Continuous Infusions:   fentanyl 175 mcg/hr (05/06/22 1616)    NORepinephrine bitartrate-D5W 0.23 mcg/kg/min (05/06/22 0900)    sodium bicarbonate drip 125 mL/hr at 05/06/22 1743    vasopressin 0.04 Units/min (05/06/22 1506)     Scheduled Meds:   calcium chloride IVPB  2 g Intravenous Once    chlorhexidine  15 mL Mouth/Throat BID    ertapenem (INVANZ) IVPB  500 mg Intravenous Q24H    famotidine (PF)  20 mg Intravenous Daily    heparin (porcine)  5,000 Units Subcutaneous Q8H    linezolid  600 mg Intravenous Q12H    micafungin (MYCAMINE) IVPB  100 mg Intravenous Q24H    mupirocin   Nasal BID     PRN Meds:sodium chloride, acetaminophen, dextrose 10%, dextrose 10%, glucagon (human recombinant), insulin aspart U-100, lorazepam, ondansetron     Review of patient's allergies indicates:  No Known Allergies  Objective:     Vital Signs (Most Recent):  Temp: (!) 100.76 °F (38.2 °C) (05/06/22 1750)  Pulse: 84 (05/06/22 1750)  Resp: (!) 30 (05/06/22 1750)  BP: (!) 113/58 (05/06/22 1000)  SpO2: 100 % (05/06/22 1750)   Vital Signs (24h Range):  Temp:  [100.04 °F (37.8 °C)-101.3 °F (38.5 °C)] 100.76 °F (38.2 °C)  Pulse:  [47-97] 84  Resp:  [30] 30  SpO2:  [100 %] 100 %  BP: (104-123)/(57-71) 113/58  Arterial Line BP: (118-149)/(51-69) 127/51     Weight: 111.1 kg (245 lb)  Body mass index is 36.18 kg/m².    Intake/Output - Last 3 Shifts         05/04 0700 05/05 0659 05/05 0700 05/06 0659 05/06 0700 05/07 0659    I.V. (mL/kg) 4721.5 (42.5) 4424.3 (39.8) 487.5 (4.4)    Blood 1236      IV Piggyback 4269.1 701.5 49.4    Total  Intake(mL/kg) 54055.6 (92) 5125.8 (46.1) 536.9 (4.8)    Urine (mL/kg/hr) 343 (0.1) 325 (0.1) 170 (0.1)    Drains 100 100     Other 910 500 100    Total Output 1353 925 270    Net +8873.6 +4200.8 +266.9                   Physical Exam  Vitals and nursing note reviewed.   Constitutional:       Appearance: He is obese. He is ill-appearing and toxic-appearing.   Cardiovascular:      Rate and Rhythm: Tachycardia present.   Pulmonary:      Comments: Mechanical breath sounds  Abdominal:      Palpations: Abdomen is soft.      Comments: Abthera vac in place holding suction, serous output   Genitourinary:     Comments: Doherty with dark michele urine  Musculoskeletal:      Right lower leg: Edema present.      Left lower leg: Edema present.   Neurological:      Comments: Sedated       Significant Labs:  I have reviewed all pertinent lab results within the past 24 hours.  CBC:   Recent Labs   Lab 05/06/22  0410 05/06/22  1010 05/06/22  1547   WBC 18.70*  --   --    RBC 4.67  --   --    HGB 10.3*  --   --    HCT 34.9*   < > 30*     --   --    MCV 75*  --   --    MCH 22.1*  --   --    MCHC 29.5*  --   --     < > = values in this interval not displayed.     CMP:   Recent Labs   Lab 05/06/22  0410 05/06/22  1305    98   CALCIUM 5.9* 6.7*   ALBUMIN 1.7*  --    PROT 4.4*  --     137   K 5.2* 4.8   CO2 17* 25   CL 98 94*   BUN 49* 54*   CREATININE 3.6* 3.8*   ALKPHOS 62  --    ALT 65*  --    AST 95*  --    BILITOT 0.9  --        Significant Diagnostics:  I have reviewed all pertinent imaging results/findings within the past 24 hours.    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 NO meds or feeds (patient is in bowel discontinuity)  - Continue ICU management. Currently still requiring vasopressor support.  - Strict I/Os  - Medical  and ICU management per hospital/ICU team    Tentative plan to return to the OR on Saturday 5/7 if more stable. Ideally, would perform right hemicolectomy, end ileostomy, liver biopsy, abdominal wall closure, however patient may only be able to tolerate a repeat abdominal washout with Abthera replacement.  Discussed with daughter at bedside.        Elvie Parker, DO  General Surgery  [facility] - [facility]ensive [facility] ([facility])

## 2022-05-06 NOTE — ASSESSMENT & PLAN NOTE
S/t septic shock  Adequate volume resuscitation   Avoid nephrotoxins  Strict I/O  IV bicarb infusion for acidosis  No acute indication for dialysis but high potential for continued decline, monitor chemistry

## 2022-05-06 NOTE — ASSESSMENT & PLAN NOTE
Worsening  Trend  Cr 2.2 today    Cr now 3.8- Oliguric- heading towards Anuria and ATN/ May need HD vs CRRT- she has massive fluid Overload.

## 2022-05-06 NOTE — PROGRESS NOTES
Pharmacist Renal Dose Adjustment Note    Franky Masters is a 66 y.o. male being treated with the medication Ertapenem    Patient Data:    Vital Signs (Most Recent):  Temp: (!) 100.58 °F (38.1 °C) (05/06/22 0600)  Pulse: 85 (05/06/22 0600)  Resp: (!) 30 (05/06/22 0600)  BP: 113/62 (05/06/22 0600)  SpO2: 100 % (05/06/22 0600)   Vital Signs (72h Range):  Temp:  [97.6 °F (36.4 °C)-102.2 °F (39 °C)]   Pulse:  []   Resp:  [15-38]   BP: ()/(46-81)   SpO2:  [84 %-100 %]   Arterial Line BP: ()/()      Recent Labs   Lab 05/04/22  2327 05/05/22  0325 05/06/22  0410   CREATININE 1.8* 2.2* 3.6*     Serum creatinine: 3.6 mg/dL (H) 05/06/22 0410  Estimated creatinine clearance: 24.5 mL/min (A)    Medication:Ertapenem dose: 1g frequency q24h will be changed to medication:Ertapenem dose:500mg frequency:q24h    Pharmacist's Name: Emily Camacho  Pharmacist's Extension: [phone]

## 2022-05-06 NOTE — ASSESSMENT & PLAN NOTE
5/4 Expl Lap and SB excision with washout and AB thera wound vac for bowel left in discontinuity  hx Diverticulosis but cecal mass and suspected metastatic lesions found in exploration  Plan return to OR 5/7 per Surgery following  IVAB for peritonitis/sepsis  NPO and IVFs with NG to suction

## 2022-05-06 NOTE — ASSESSMENT & PLAN NOTE
Pressors  Abx - Zosyn / Micafungin  ID on consult    Remains in severe Septic Shock complicated by JOSE/ATN- Anuria and Resp Failure on Vent  ICU team has d/w daughter about HD if and when needed- she does not appear to be in favor of HD at present  Prognosis poor   90

## 2022-05-06 NOTE — PROGRESS NOTES
[facility] - Intensive Care ([facility] Medicine  Progress Note    Patient Name: Franky Masters  MRN: [id number]  Patient Class: IP- Inpatient   Admission Date: 5/4/2022  Length of Stay: 2 days  Attending Physician: Elvie Parker DO  Primary Care Provider: HOLLY ROSEN        Subjective:     Principal Problem:Septic shock        HPI:  Mr Masters is a 67 yo male POD#0 s/p SB perforation with surgical intervention demonstrating a large cecal mass and 3l feculant fluid in the abdominal cavity. Additional findings of a perforated ileum and a liver mass. Patient tolerated the Exlap with Washout and small bowel excision. Patient had a wound vac placed, is currently intubated, sedated and recovering in the ICU. Patient received 4 units PRBC and remains on pressor support. Patient is a DNR and HM consulted with assistance with medical management. Daughter at bedside. Patient discussed with care team.          Overview/Hospital Course:  Patient continues to require pressors, remains intubated, sedated. Patient urine o/p declining and concerning. Discussed POC in detail at bedside with daughter POA. She expressed her fathers wish to not undergo treatments  like perm HD, where he has a diminished quality of life. We spoke frankly about issues and concerns and she will be communicating with the family as his case evolves. Comfort care was discussed and the goals of care will develop consistent with the patients expressed wishes. Potential for another surgery likely Saturday with a washout and possible biopsy vs resection are on the horizon. This possible intervention will be covered in detail by surgery sharing the risks and benefits of that approach. Case discussed with the primary service - surgery, and critical care team.    5/7- Pt seen and examined in the ICU plus discussed with ICU team and the pt's daughter/ POA: Remains critically ill, intubated, sedated on Vent, on Pressors- Levo plus Vaso- JOSE with oliguria  getting worse- Cr rising to 3.6, becoming severely acidotic- requiring Ertapenem, Zyvox, Micafungin as well as on Bicarb and Fentanyl gtt. She has massive fluid overload and generalized anasarca.  Possible return to OR tomorrow 5/7 if patient is more stable for right hemicolectomy, possible ileostomy, liver biopsy, abdominal wall closure.Dw Pt's daughter.         Interval History:  Pt seen and examined in the ICU plus discussed with ICU team and the pt's daughter/ POA: Remains critically ill, intubated, sedated on Vent, on Pressors- Levo plus Vaso- JOSE with oliguria. She has massive fluid overload and generalized anasarca.Ertapenem, Zyvox, Micafungin as well as on Bicarb and Fentanyl gtt. Possible return to OR tomorrow 5/7 if patient is more stable for right hemicolectomy, possible ileostomy, liver biopsy, abdominal wall closure.Dw Pt's daughter.       Review of Systems   Unable to perform ROS: Intubated   Objective:     Vital Signs (Most Recent):  Temp: (!) 100.94 °F (38.3 °C) (05/06/22 1529)  Pulse: 83 (05/06/22 1529)  Resp: (!) 30 (05/06/22 1529)  BP: (!) 113/58 (05/06/22 1000)  SpO2: 100 % (05/06/22 1529)   Vital Signs (24h Range):  Temp:  [100.04 °F (37.8 °C)-101.3 °F (38.5 °C)] 100.94 °F (38.3 °C)  Pulse:  [47-97] 83  Resp:  [27-30] 30  SpO2:  [100 %] 100 %  BP: (104-123)/(57-71) 113/58  Arterial Line BP: (117-149)/(51-69) 127/51     Weight: 111.1 kg (245 lb)  Body mass index is 36.18 kg/m².    Intake/Output Summary (Last 24 hours) at 5/6/2022 1548  Last data filed at 5/6/2022 1500  Gross per 24 hour   Intake 3754.3 ml   Output 1065 ml   Net 2689.3 ml      Physical Exam  Vitals and nursing note reviewed.   Constitutional:       General: He is not in acute distress.     Appearance: He is obese. He is ill-appearing.      Interventions: He is sedated, intubated and restrained.   HENT:      Head: Atraumatic.      Nose:      Comments: Small gauze from left nare without visible bleeding or drainage  Eyes:       General: No scleral icterus.     Conjunctiva/sclera: Conjunctivae normal.   Neck:      Vascular: No JVD.   Cardiovascular:      Rate and Rhythm: Normal rate and regular rhythm.      Pulses:           Radial pulses are 1+ on the right side and 1+ on the left side.        Dorsalis pedis pulses are 1+ on the right side and 1+ on the left side.      Comments: Generalized, non pitting  Pulmonary:      Effort: He is intubated.      Breath sounds: Decreased breath sounds present. No wheezing or rhonchi.   Abdominal:      General: Bowel sounds are absent.       Musculoskeletal:      Right lower le+ Pitting Edema present.      Left lower le+ Pitting Edema present.   Skin:     General: Skin is cool.      Capillary Refill: Capillary refill takes more than 3 seconds.     Flow (L/min): 4  Vent Mode: A/C  Oxygen Concentration (%):  [50-60] 50  Resp Rate Total:  [29 br/min-31 br/min] 30 br/min  Vt Set:  [450 mL] 450 mL  PEEP/CPAP:  [10 fgE40-94 cmH20] 10 cmH20  Pressure Support:  [0 cmH20] 0 cmH20  Mean Airway Pressure:  [16 vfZ24-65 cmH20] 16 cmH20  Temp:  [100.04 °F (37.8 °C)-101.3 °F (38.5 °C)] 100.94 °F (38.3 °C)  Pulse:  [47-97] 83  Resp:  [27-30] 30  SpO2:  [100 %] 100 %  BP: (104-123)/(57-71) 113/58  Arterial Line BP: (117-149)/(51-69) 127/51   Art pH/pCO2/pO2/HCO3:  7.394/40.3/137/24.6 ( 1010)  Lab Results   Component Value Date    NQM96GKZFUID Negative 2022    [id number] Positive (A) 2021      Recent Labs   Lab 22  0657 22  0657 22  1330 22  1442 22  1743 22  1950 22  0325 22  1150 22  0410 22  1010 05/06/22  1305   LACTATE 8.0*  --  3.1*  --   --   --   --  5.0*  --   --   --      --  141  --  140   < > 140  --  138  --  137   WBC 2.48*  --  1.05*  --   --   --  6.16  --  18.70*  --   --    GRAN 65.8  1.6*  --  46.0  --   --   --  82.4*  5.1  --  80.0*  --   --    LYMPH 30.2  0.8*  --  42.0  --   --   --  9.3*  0.6*  --  5.0*   --   --    HGB 7.2*  --  10.3*  --   --   --  11.4*  --  10.3*  --   --    HCT 28.9*  --  36.6*   < >  --    < > 39.6*  --  34.9* 32*  --    BUN 20  --  23  --  24*   < > 31*  --  49*  --  54*   CREATININE 0.9  --  0.9  --  1.2   < > 2.2*  --  3.6*  --  3.8*   ESTGFRAFRICA >60  --  >60  --  >60   < > 35*  --  19*  --  18*   EGFRNONAA >60  --  >60  --  >60   < > 30*  --  16*  --  15*   K 3.7  --  3.9  --  4.2   < > 4.4  --  5.2*  --  4.8     --  114*  --  115*   < > 109  --  98  --  94*   CO2 19*  --  15*  --  17*   < > 14*  --  17*  --  25   MG  --    < > 1.7  --  1.5*  --  1.4*  --  1.8  --   --     < > = values in this interval not displayed.     Microbiology Results (last 7 days)       Procedure Component Value Units Date/Time    Culture, Respiratory with Gram Stain [[id number]] Collected: 05/04/22 1253    Order Status: Completed Specimen: Respiratory from Endotracheal Aspirate Updated: 05/06/22 0909     Respiratory Culture No Growth     Gram Stain (Respiratory) Few WBC's     Gram Stain (Respiratory) Rare Gram positive rods    Blood culture [[id number]] Collected: 05/04/22 1428    Order Status: Completed Specimen: Blood from Line, Arterial, Left Updated: 05/06/22 0613     Blood Culture, Routine No Growth to date      No Growth to date    Blood culture [[id number]] Collected: 05/04/22 1429    Order Status: Completed Specimen: Blood from Line, Jugular, Internal Right Updated: 05/06/22 0613     Blood Culture, Routine No Growth to date      No Growth to date          Antibiotics (From admission, onward)                Start     Stop Route Frequency Ordered    05/06/22 2100  ertapenem (INVANZ) 500 mg in sodium chloride 0.9% 100 mL IVPB         05/09 2059 IV Every 24 hours (non-standard times) 05/06/22 0742    05/05/22 2115  linezolid 600 mg/300 mL IVPB 600 mg         05/11 2114 IV Every 12 hours (non-standard times) 05/05/22 2000 05/04/22 1300  mupirocin 2 % ointment  (DECOLONIZATION PROTOCOL ORDERS)          05/09 0859 Nasl 2 times daily 05/04/22 1253          Anticoagulants       Ordered     Route Frequency Start Stop    05/04/22 1255  heparin (porcine)  (VTE Prophylaxis Orders - High Risk)         SubQ Every 8 hours 05/05/22 0600 --          Echo Saline Bubble? No  · The left ventricle is normal in size with concentric hypertrophy and   moderately decreased systolic function.  · Grade I left ventricular diastolic dysfunction.  · Normal right ventricular size with low normal right ventricular systolic   function.  · The estimated ejection fraction is 35-40%.  · There is mild left ventricular global hypokinesis.  · Moderate mitral regurgitation.     US Retroperitoneal Complete  Narrative: EXAMINATION:  US RETROPERITONEAL COMPLETE    CLINICAL HISTORY:  JOSE with oliguria. r/o obstructive uropathy;    TECHNIQUE:  Ultrasound of the kidneys and urinary bladder was performed including color flow and Doppler evaluation of the kidneys.    COMPARISON:  None.    FINDINGS:  Right kidney: The right kidney measures 10.9 cm. No cortical thinning. No loss of corticomedullary distinction.  Normal perfusion.   No mass. No renal stone. No hydronephrosis.    Left kidney: The left kidney measures 13 cm. No cortical thinning. No loss of corticomedullary distinction.  Normal perfusion. No mass. No renal stone. No hydronephrosis.    The bladder is not distended limited evaluation with Doherty catheter noted  Impression: No significant abnormality.    Electronically signed by: Philippe Horton MD  Date:    05/05/2022  Time:    08:28  X-Ray Chest AP Portable  Narrative: EXAMINATION:  XR CHEST AP PORTABLE    CLINICAL HISTORY:  resp failure;    TECHNIQUE:  Single frontal view of the chest was performed.    COMPARISON:  05/04/2022.    FINDINGS:  Tubes and lines are satisfactory.  No pneumothorax. Patchy infiltrate suspected left lower lobe.  Cannot exclude right infrahilar infiltrate with prominence of right hilum; consider follow-up chest CT..  Small  left pleural effusion. In comparison to the prior study, there is no adverse interval changes  Impression: In comparison to the prior study, there is no adverse interval changes    Electronically signed by: Philippe Horton MD  Date:    05/05/2022  Time:    08:21   Significant Labs: All pertinent labs within the past 24 hours have been reviewed.    Significant Imaging: I have reviewed all pertinent imaging results/findings within the past 24 hours.      Assessment/Plan:      * Septic shock  Pressors  Abx - Zosyn / Micafungin  ID on consult    Remains in severe Septic Shock complicated by JOSE/ATN- Anuria and Resp Failure on Vent  ICU team has d/w daughter about HD if and when needed- she does not appear to be in favor of HD at present  Prognosis poor    Acute hypoxemic respiratory failure on mechanical vent  Patient with Hypoxic Respiratory failure which is Acute.  he is not on home oxygen. Supplemental oxygen was provided and noted- Vent Mode: A/C  Oxygen Concentration (%):  [50-60] 50  Resp Rate Total:  [29 br/min-31 br/min] 30 br/min  Vt Set:  [450 mL] 450 mL  PEEP/CPAP:  [10 bnL36-42 cmH20] 10 cmH20  Pressure Support:  [0 cmH20] 0 cmH20  Mean Airway Pressure:  [16 hnM54-38 cmH20] 16 cmH20.   Signs/symptoms of respiratory failure include- tachypnea. Contributing diagnoses includes - Obesity Hypoventilation Labs and images were reviewed. Patient Has recent ABG, which has been reviewed. Will treat underlying causes and adjust management of respiratory failure as indicated. 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   Patient stable s/p sx POD#1  Plan for washout, etc per primary service  Wound vac  Goals of care assessment  DNR    S/p SB resection- may go to surgery again tomorrow    Mass of colon  Based on Surgery  Potential interventions  Goals of care  Biopsy as indicated  Likely Oncologic process with mets  Heme Onc as  indicated    See above      Pneumonia of left lower lobe due to infectious organism  Abx  ID on Consult    ON IV Abx      On mechanically assisted ventilation  Wean as tolerated  CC Team  Pulmonary      JOSE (acute kidney injury)  Worsening  Trend  Cr 2.2 today    Cr now 3.8- Oliguric- heading towards Anuria and ATN/ May need HD vs CRRT- she has massive fluid Overload.     Hyperglycemia, unspecified  NISS  Accuchecks  Monitor  Controlled      Obesity (BMI 30.0-34.9)  Diet  Nutrition on recovery      Acute on chronic anemia  Hgb 10.3 s/p Transfusion 4 units Prbc  Transfuse for Hgb <7  Monitor    5/5  Improved  Hgb 11.4 today  Transfuse as indicated        VTE Risk Mitigation (From admission, onward)         Ordered     heparin (porcine) injection 5,000 Units  Every 8 hours         05/04/22 1255     IP VTE HIGH RISK PATIENT  Once         05/04/22 1253     Place sequential compression device  Until discontinued         05/04/22 1253                Discharge Planning   ELLIOT:      Code Status: DNR   Is the patient medically ready for discharge?:     Reason for patient still in hospital (select all that apply): Patient unstable, Patient new problem, Patient trending condition, Laboratory test, Treatment, Imaging and Consult recommendations  Discharge Plan A: Comfort care/withdrawal        Seen and discussed with Dr. Huynh and the ICU team  Condition: Critical  Prognosis: Guarded to poor      Critical care time spent on the evaluation and treatment of severe organ dysfunction, review of pertinent labs and imaging studies, discussions with consulting providers and discussions with patient/family: 45 minutes.      Megha Mejia MD  Department of Hospital Medicine   [facility] - Intensive Care ([facility])

## 2022-05-06 NOTE — PROGRESS NOTES
[facility] ([facility])  Critical Care Medicine  Progress Note    Patient Name: Franky Masters  MRN: [id number]  Admission Date: 5/4/2022  Hospital Length of Stay: 2 days  Code Status: DNR  Attending Provider: Elvie Parker DO  Primary Care Provider: HOLLY ROSEN   Principal Problem: Septic shock    Subjective:     HPI:  Ms Masters is a 67 yo obese male with a PMH of diverticulosis and hx of hospitalization in Aug 2021 with COVID PNA and Hypoxic Resp Failure but did not require ICU or intubation.  She presented early this AM to [facility] about 0515 hr via EMS with complaint of abd pain X 2 hours that awakened her from sleep and had associated N/V/D.  In ED BP 86/46, RA SAT 92%, LA 8, Hgb 7.2 and CT Abd with suspected bowel perforation and free air.  General Surgery consulted and taken to OR this AM revealing SB perf with 3 L feculent fluid and food in cavity and large obstructing cecal mass with perf ileum and palpable liver mass.  Had Expl Lap with washout and excision of SB with wound vac placement admitted post op to ICU intubated on mech ventilation.  Received 2 units PRBCs in ED and another 2 units in OR also on Levophed and Vasopressin infusions.  Before surgery patient was insistent he be DNR post op but consented to surgery and invasive mech ventilation but no ACLS post op in event of cardiac arrest and no prolonged mech ventilation. Reportedly patient does not routinely follow with practitioner as outpt.       Hospital/ICU Course:  5/4 - Admitted to ICU sedated and intubated on Levophed and Vasopressin infusions in no distress  5/5 - remains intubated and sedated on mechanical vent and pressor support; scant urine output overnight and creatinine rise to 2.2 with fluid balance +8.9L  5/6 - remains intubated and sedated on mechanical vent with decreasing pressor demand; still oliguric with creatinine up to 3.6, K 5.2; fluid balance +13L; now with bigeminy and cardiac rhythm changes, K  stable on abg but iCa 0.78      Review of Systems   Unable to perform ROS: Intubated     Objective:     Vital Signs (Most Recent):  Temp: (!) 100.58 °F (38.1 °C) (22)  Pulse: 85 (22)  Resp: (!) 30 (22)  BP: 113/62 (22)  SpO2: 100 % (22)   Vital Signs (24h Range):  Temp:  [100.04 °F (37.8 °C)-100.94 °F (38.3 °C)] 100.58 °F (38.1 °C)  Pulse:  [] 85  Resp:  [27-30] 30  SpO2:  [100 %] 100 %  BP: (104-123)/(57-75) 113/62  Arterial Line BP: (107-149)/(60-74) 127/63     Weight: 111.1 kg (245 lb)  Body mass index is 36.18 kg/m².      Intake/Output Summary (Last 24 hours) at 2022 0752  Last data filed at 2022 0624  Gross per 24 hour   Intake 4916.97 ml   Output 925 ml   Net 3991.97 ml        fentanyl 150 mcg/hr (22)    NORepinephrine bitartrate-D5W 0.25 mcg/kg/min (22)    sodium bicarbonate drip 125 mL/hr at 22    vasopressin 0.04 Units/min (22)       Physical Exam  Vitals and nursing note reviewed.   Constitutional:       General: He is not in acute distress.     Appearance: He is obese. He is ill-appearing.      Interventions: He is sedated, intubated and restrained.   HENT:      Head: Atraumatic.      Nose:      Comments: Small gauze from left nare without visible bleeding or drainage  Eyes:      General: No scleral icterus.     Conjunctiva/sclera: Conjunctivae normal.   Neck:      Vascular: No JVD.   Cardiovascular:      Rate and Rhythm: Normal rate and regular rhythm.      Pulses:           Radial pulses are 1+ on the right side and 1+ on the left side.        Dorsalis pedis pulses are 1+ on the right side and 1+ on the left side.      Comments: Generalized, non pitting  Pulmonary:      Effort: He is intubated.      Breath sounds: Decreased breath sounds present. No wheezing or rhonchi.   Abdominal:      General: Bowel sounds are absent.       Musculoskeletal:      Right lower le+ Pitting Edema  present.      Left lower le+ Pitting Edema present.   Skin:     General: Skin is cool.      Capillary Refill: Capillary refill takes more than 3 seconds.       Vents:  Vent Mode: A/C (22)  Ventilator Initiated: Yes (22 1258)  Set Rate: 30 BPM (22)  Vt Set: 450 mL (22)  Pressure Support: 0 cmH20 (22)  PEEP/CPAP: 12 cmH20 (22)  Oxygen Concentration (%): 60 (22)  Peak Airway Pressure: 30 cmH2O (22)  Plateau Pressure: 0 cmH20 (22)  Total Ve: 13.2 mL (22)  F/VT Ratio<105 (RSBI): (!) 69.77 (22)    Lines/Drains/Airways       Central Venous Catheter Line  Duration             Percutaneous Central Line Insertion/Assessment - Triple Lumen  22 1200 right internal jugular 1 day              Drain  Duration                  NG/OG Tube 22 1600 1 day         Urethral Catheter 22 1105 Straight-tip;Silicone 16 Fr. 1 day              Airway  Duration                  Airway - Non-Surgical 22 1051 Endotracheal Tube 1 day              Arterial Line  Duration             Arterial Line 22 1050 Right Radial 1 day              Peripheral Intravenous Line  Duration                  Peripheral IV - Single Lumen 22 0454 20 G Right Hand 2 days         Peripheral IV - Single Lumen 22 0839 20 G Left Forearm 1 day                    Significant Labs:    CBC/Anemia Profile:  Recent Labs   Lab 22  1330 22  1442 22  1950 22  0325 22  0410   WBC 1.05*  --   --  6.16 18.70*   HGB 10.3*  --   --  11.4* 10.3*   HCT 36.6*   < > 34* 39.6* 34.9*     --   --  351 201   MCV 78*  --   --  76* 75*   RDW 22.3*  --   --  22.1* 23.0*    < > = values in this interval not displayed.          Chemistries:  Recent Labs   Lab 22  1743 22  1955 22  2327 22  0325 22  0410      < > 140 140 138   K 4.2   < > 4.2 4.4 5.2*   *   < > 110  109 98   CO2 17*   < > 15* 14* 17*   BUN 24*   < > 28* 31* 49*   CREATININE 1.2   < > 1.8* 2.2* 3.6*   CALCIUM 6.5*   < > 7.1* 7.0* 5.9*   ALBUMIN  --   --   --  2.1* 1.7*   PROT  --   --   --  4.5* 4.4*   BILITOT  --   --   --  0.8 0.9   ALKPHOS  --   --   --  48* 62   ALT  --   --   --  46* 65*   AST  --   --   --  101* 95*   MG 1.5*  --   --  1.4* 1.8    < > = values in this interval not displayed.         All pertinent labs within the past 24 hours have been reviewed.    Significant Imaging:  I have reviewed all pertinent imaging results/findings within the past 24 hours.      ABG  Recent Labs   Lab 05/06/22  1010   PH 7.394   PO2 137*   PCO2 40.3   HCO3 24.6   BE 0     Assessment/Plan:     Pulmonary  Pneumonia of left lower lobe due to infectious organism  Blood and sputum cultures ordered and pending  abx adjusted by ID, now invanz, zyvox, mycafunging  OET suctioning PRN    Acute hypoxemic respiratory failure on mechanical vent  Vent settings reviewed and adjusted to optimize gas exchange  VAP prophylaxis  ABG daily and prn to assess response to therapy  SAT/SBT once gi tract in continuity    Renal/  JOSE (acute kidney injury)  S/t septic shock  Adequate volume resuscitation   Avoid nephrotoxins  Strict I/O  IV bicarb infusion for acidosis  No acute indication for dialysis but high potential for continued decline, monitor chemistry    ID  * Septic shock  Secondary to Peritonitis from bowel perforation with major fecal contamination  Blood and sputum cultures pending  Continue Zosyn and Micafungin  Continue IV bicarb infusion  Cont Vasopressin and titrate Levophed infusion for MAP > 65  ICU hemodynamic monitoring  Repeat LA and check echo    Oncology  Acute on chronic anemia  Received 2 units PRBCs in ED and 2 more in OR on 5/4  CBC stable   Monitor wound vac output and daily CBC with Conservative transfusion protocol    Endocrine  Hyperglycemia, unspecified  SSI prn with monitoring for glucose control and  prevention of insulin toxicity    Obesity (BMI 30.0-34.9)  Will encourage weight loss once/if survives and extubated and fully awake/alert    GI  Mass of colon  Found on exploration along with palpable liver mass  Plan bx vs excision of cecal mass on return to OR 5/7 to obtain pathology  High concern for metastatic malignancy    Small bowel perforation with large Cecal mass   5/4 Expl Lap and SB excision with washout and AB thera wound vac for bowel left in discontinuity  hx Diverticulosis but cecal mass and suspected metastatic lesions found in exploration  Plan return to OR 5/7 per Surgery following  IVAB for peritonitis/sepsis  NPO and IVFs with NG to suction    Palliative Care  Pt is DNR, discussed at length with surgeon prior to surgery and he consented to intubation for surgery with understanding of likely need for prolonged vent support post op until bowel/abdomen closed. He was clear that he would not desire long term vent support past that necessary for immediate post op recovery.   Daughter Claudia is SDM and is aware and supportive of his wishes. IF at any point his survival chances become poor or prolonged life support is anticipated she would honor his wish and transition to comfort focused care.  5/6 - discussed status with daughter. She expressed again understanding of her father's wishes for very limited life support and further stated today that after time to reflect on current status, in the event of continued decline she would not want to pursue potential dialysis but if kidneys fail and indications for RRT exist she would at that time desire transition to a full comfort care focus. She would hope that he could be extubated to comfort focus and have opportunity to interact with family but verbalizes understanding that this may not be possible given open abdomen and comfort goal.  Continue daily and prn updates     Critical Care Daily Checklist:    A: Awake: RASS Goal/Actual Goal: RASS Goal:  -3-->moderate sedation  Actual: German Agitation Sedation Scale (RASS): Drowsy   B: Spontaneous Breathing Trial Performed?     C: SAT & SBT Coordinated?  Not candidate today                      D: Delirium: CAM-ICU Overall CAM-ICU: Positive   E: Early Mobility Performed? Yes   F: Feeding Goal:    Status:     Current Diet Order   Procedures    Diet NPO      AS: Analgesia/Sedation Fentanyl infusion   T: Thromboembolic Prophylaxis heparin   H: HOB > 300 Yes   U: Stress Ulcer Prophylaxis (if needed) pepcid   G: Glucose Control As above   B: Bowel Function     I: Indwelling Catheter (Lines & Doherty) Necessity reviewed   D: De-escalation of Antimicrobials/Pharmacotherapies reviewed    Plan for the day/ETD Supportive care as above    Code Status:  Family/Goals of Care: DNR  Pending hospital course; see palliative discussion above   I have discussed case and plan of care in detail with Dr Huynh; Status and plan of care were discussed with team on multidisciplinary rounds.    Critical Care Time: 73 minutes  Critical secondary to septic shock, JOSE, hypocalcemia, mechanical vent and pressor support  Critical care was time spent personally by me on the following activities: development of treatment plan with patient or surrogate and bedside caregivers, discussions with consultants, evaluation of patient's response to treatment, examination of patient, ordering and performing treatments and interventions, ordering and review of laboratory studies, ordering and review of radiographic studies, pulse oximetry, re-evaluation of patient's condition. This critical care time did not overlap with that of any other provider or involve time for any procedures.     KATHARINE Miranda-BC  Critical Care Medicine  [facility] ([facility])

## 2022-05-06 NOTE — SUBJECTIVE & OBJECTIVE
Interval History:   66 year old man s/p I and D for  perforation demonstrating a large cecal mass and 3l feculant fluid in the abdominal cavity.  He is now febrile  T max 102.   Review of Systems   Unable to perform ROS: Intubated   Objective:     Vital Signs (Most Recent):  Temp: (!) 100.76 °F (38.2 °C) (05/05/22 1924)  Pulse: 90 (05/05/22 1924)  Resp: (!) 30 (05/05/22 1924)  BP: 116/63 (05/05/22 1900)  SpO2: 100 % (05/05/22 1924)   Vital Signs (24h Range):  Temp:  [98 °F (36.7 °C)-102.2 °F (39 °C)] 100.76 °F (38.2 °C)  Pulse:  [] 90  Resp:  [16-32] 30  SpO2:  [98 %-100 %] 100 %  BP: (105-141)/(60-81) 116/63  Arterial Line BP: (104-142)/(61-76) 132/65     Weight: 111.1 kg (245 lb)  Body mass index is 36.18 kg/m².    Estimated Creatinine Clearance: 40 mL/min (A) (based on SCr of 2.2 mg/dL (H)).    Physical Exam  Vitals and nursing note reviewed.   Constitutional:       Appearance: He is well-developed.   HENT:      Head: Normocephalic and atraumatic.      Nose: Nose normal.   Eyes:      Pupils: Pupils are equal, round, and reactive to light.   Cardiovascular:      Rate and Rhythm: Normal rate and regular rhythm.      Heart sounds: Normal heart sounds.   Pulmonary:      Effort: Pulmonary effort is normal. No respiratory distress.      Breath sounds: Normal breath sounds. No wheezing or rales.   Abdominal:      General: There is no distension.      Tenderness: There is no abdominal tenderness.      Comments: Wound vac noted    Musculoskeletal:         General: Normal range of motion.      Cervical back: Normal range of motion and neck supple.   Skin:     General: Skin is dry.   Neurological:      Comments: Intubated,sedated   Psychiatric:         Mood and Affect: Mood normal.       Significant Labs: Blood Culture:   Recent Labs   Lab 05/04/22  1428 05/04/22  1429   LABBLOO No Growth to date No Growth to date     BMP:   Recent Labs   Lab 05/05/22  0325   *      K 4.4      CO2 14*   BUN 31*    CREATININE 2.2*   CALCIUM 7.0*   MG 1.4*     CBC:   Recent Labs   Lab 05/04/22  0657 05/04/22  1330 05/04/22  1442 05/04/22  1637 05/04/22  1950 05/05/22  0325   WBC 2.48* 1.05*  --   --   --  6.16   HGB 7.2* 10.3*  --   --   --  11.4*   HCT 28.9* 36.6*   < > 32* 34* 39.6*    385  --   --   --  351    < > = values in this interval not displayed.     CMP:   Recent Labs   Lab 05/04/22  0657 05/04/22  1330 05/04/22  1955 05/04/22  2327 05/05/22  0325      < > 142 140 140   K 3.7   < > 4.4 4.2 4.4      < > 112* 110 109   CO2 19*   < > 18* 15* 14*   *   < > 165* 144* 125*   BUN 20   < > 25* 28* 31*   CREATININE 0.9   < > 1.4 1.8* 2.2*   CALCIUM 7.8*   < > 6.7* 7.1* 7.0*   PROT 5.4*  --   --   --  4.5*   ALBUMIN 3.2*  --   --   --  2.1*   BILITOT 0.7  --   --   --  0.8   ALKPHOS 56  --   --   --  48*   AST 24  --   --   --  101*   ALT 15  --   --   --  46*   ANIONGAP 16   < > 12 15 17*   EGFRNONAA >60   < > 52* 38* 30*    < > = values in this interval not displayed.       Significant Imaging: I have reviewed all pertinent imaging results/findings within the past 24 hours.

## 2022-05-06 NOTE — SUBJECTIVE & OBJECTIVE
Interval History: Remains intubated and sedated in the ICU. Daughter is at bedside.    Medications:  Continuous Infusions:   fentanyl 175 mcg/hr (05/06/22 1616)    NORepinephrine bitartrate-D5W 0.23 mcg/kg/min (05/06/22 0900)    sodium bicarbonate drip 125 mL/hr at 05/06/22 1743    vasopressin 0.04 Units/min (05/06/22 1506)     Scheduled Meds:   calcium chloride IVPB  2 g Intravenous Once    chlorhexidine  15 mL Mouth/Throat BID    ertapenem (INVANZ) IVPB  500 mg Intravenous Q24H    famotidine (PF)  20 mg Intravenous Daily    heparin (porcine)  5,000 Units Subcutaneous Q8H    linezolid  600 mg Intravenous Q12H    micafungin (MYCAMINE) IVPB  100 mg Intravenous Q24H    mupirocin   Nasal BID     PRN Meds:sodium chloride, acetaminophen, dextrose 10%, dextrose 10%, glucagon (human recombinant), insulin aspart U-100, lorazepam, ondansetron     Review of patient's allergies indicates:  No Known Allergies  Objective:     Vital Signs (Most Recent):  Temp: (!) 100.76 °F (38.2 °C) (05/06/22 1750)  Pulse: 84 (05/06/22 1750)  Resp: (!) 30 (05/06/22 1750)  BP: (!) 113/58 (05/06/22 1000)  SpO2: 100 % (05/06/22 1750)   Vital Signs (24h Range):  Temp:  [100.04 °F (37.8 °C)-101.3 °F (38.5 °C)] 100.76 °F (38.2 °C)  Pulse:  [47-97] 84  Resp:  [30] 30  SpO2:  [100 %] 100 %  BP: (104-123)/(57-71) 113/58  Arterial Line BP: (118-149)/(51-69) 127/51     Weight: 111.1 kg (245 lb)  Body mass index is 36.18 kg/m².    Intake/Output - Last 3 Shifts         05/04 0700  05/05 0659 05/05 0700  05/06 0659 05/06 0700  05/07 0659    I.V. (mL/kg) 4721.5 (42.5) 4424.3 (39.8) 487.5 (4.4)    Blood 1236      IV Piggyback 4269.1 701.5 49.4    Total Intake(mL/kg) 22632.6 (92) 5125.8 (46.1) 536.9 (4.8)    Urine (mL/kg/hr) 343 (0.1) 325 (0.1) 170 (0.1)    Drains 100 100     Other 910 500 100    Total Output 1353 925 270    Net +8873.6 +4200.8 +266.9                   Physical Exam  Vitals and nursing note reviewed.   Constitutional:       Appearance: He is  obese. He is ill-appearing and toxic-appearing.   Cardiovascular:      Rate and Rhythm: Tachycardia present.   Pulmonary:      Comments: Mechanical breath sounds  Abdominal:      Palpations: Abdomen is soft.      Comments: Abthera vac in place holding suction, serous output   Genitourinary:     Comments: Doherty with dark michele urine  Musculoskeletal:      Right lower leg: Edema present.      Left lower leg: Edema present.   Neurological:      Comments: Sedated       Significant Labs:  I have reviewed all pertinent lab results within the past 24 hours.  CBC:   Recent Labs   Lab 05/06/22  0410 05/06/22  1010 05/06/22  1547   WBC 18.70*  --   --    RBC 4.67  --   --    HGB 10.3*  --   --    HCT 34.9*   < > 30*     --   --    MCV 75*  --   --    MCH 22.1*  --   --    MCHC 29.5*  --   --     < > = values in this interval not displayed.     CMP:   Recent Labs   Lab 05/06/22  0410 05/06/22  1305    98   CALCIUM 5.9* 6.7*   ALBUMIN 1.7*  --    PROT 4.4*  --     137   K 5.2* 4.8   CO2 17* 25   CL 98 94*   BUN 49* 54*   CREATININE 3.6* 3.8*   ALKPHOS 62  --    ALT 65*  --    AST 95*  --    BILITOT 0.9  --        Significant Diagnostics:  I have reviewed all pertinent imaging results/findings within the past 24 hours.

## 2022-05-06 NOTE — OP NOTE
Franky Masters  : 1955, MRN: [id number]  Date of procedure: 22      Procedure: Exploratory laparotomy, abdominal washout, segmental small bowel resection, placement of Abthera abdominal vac    Pre-procedure diagnosis: Bowel perforation, pneumoperitoneum, septic shock  Post-procedure diagnosis: Small bowel perforation, SBO, mass of the cecum, liver mass, septic shock, feculent peritonitis  Surgeons: Elvie Parker DO; Alicia Mayen MD  Anesthesia: General  EBL: 100 mL  Implants/Drains: Abthera vac  Specimen: Small bowel  Complications: None apparent    Significant findings: Over 3 liters of feculent fluid mixed with food/vegetation freely in the peritoneal cavity. Large, obstructing cecal mass with dilated proximal small bowel. About 30 cm proximally to the mass was a perforation in the ileum. Palpable liver mass above gallbladder.     Segment of small bowel with the perforation resected and patient left in discontinuity due to hemodynamic instability. Abthera placed. Plan to return to OR in 48-72 hours if more stable.    Indications for procedure: The patient presents with bowel perforation with pneumoperitoneum on CT scan and hemodynamic instability. After explaining the risks, benefits, and alternatives, the patient and his family verbalized understanding and informed written consent was obtained. All questions were answered to their satisfaction.    Description of procedure: The patient was brought to the OR and placed in the supine position. SCDs were applied. After general anesthesia was induced by the Anesthesia Department, the abdomen was prepped and draped in usual sterile fashion. A time out was taken to identify the correct patient, correct procedure, and correct anatomical site; all parties were in agreement.  A midline laparotomy incision was made and carried down to the fascia using electrocautery. The fascia was carefully incised and the peritoneal cavity was safely entered. Immediately,  a massive amount of feculent fluid was encountered with bits of food and vegetation. Over 3 liters of this fluid was suctioned. The small bowel was ran from the ligament of Treitz to the ileocecal valve. Approximately 30 cm proximal to the cecum was a large perforation in the ileum. There was no obvious mass or ischemia of this area. In the cecum was a palpable large mass, completely obstructing and causing a bowel obstruction. The remainder of the colon felt soft. There was some mild diverticulosis of the sigmoid. The anterior stomach appeared normal. The gallbladder was mildly distended but otherwise normal. Above the gallbladder was an obvious approx 3 cm liver mass. Due to the patient's hemodynamic instability, the decision was made to resect the segment of perforated small bowel, leave the patient in discontinuity, apply an Abthera, and return to the OR in 48-72 hours if the patient's condition improved.  A window was made in the mesentery on either side of the perforation in the small bowel and a blue 75 mm EMILY stapler was used to transect this segment of small bowel.  The mesentery was ligated with the LigaSure device and the specimen was removed from the field.  The abdomen was then copiously irrigated with at least 6 L of warm saline.  Good hemostasis was observed.  The ABThera intra-abdominal sponge was inserted into the abdominal cavity and placed over top of the intra-abdominal contents.  The overlying elliptical blue sponge was then applied on top and covered with the adhesive.  A small hole was made in the adhesive in the suction tubing was applied on top.  The tubing was connected to the ABThera device in place to -75 mm Hg.  Good seal was noted.    All sponge and instrument counts were deemed correct. The patient was transported to the ICU.    Elvie Parker DO  General Surgery  [facility]  5/6/2022

## 2022-05-06 NOTE — ASSESSMENT & PLAN NOTE
Patient with Hypoxic Respiratory failure which is Acute.  he is not on home oxygen. Supplemental oxygen was provided and noted- Vent Mode: A/C  Oxygen Concentration (%):  [50-60] 50  Resp Rate Total:  [29 br/min-31 br/min] 30 br/min  Vt Set:  [450 mL] 450 mL  PEEP/CPAP:  [10 bnH44-14 cmH20] 10 cmH20  Pressure Support:  [0 cmH20] 0 cmH20  Mean Airway Pressure:  [16 alI37-41 cmH20] 16 cmH20.   Signs/symptoms of respiratory failure include- tachypnea. Contributing diagnoses includes - Obesity Hypoventilation Labs and images were reviewed. Patient Has recent ABG, which has been reviewed. Will treat underlying causes and adjust management of respiratory failure as indicated. Pulmonary CC on board  Day 2 on Vent- remains intubated on vent  Cont vent support

## 2022-05-06 NOTE — ASSESSMENT & PLAN NOTE
Blood and sputum cultures ordered and pending  abx adjusted by ID, now invanz, zyvox, mycafunging  OET suctioning PRN

## 2022-05-06 NOTE — SUBJECTIVE & OBJECTIVE
Review of Systems   Unable to perform ROS: Intubated     Objective:     Vital Signs (Most Recent):  Temp: (!) 100.58 °F (38.1 °C) (22)  Pulse: 85 (22)  Resp: (!) 30 (22)  BP: 113/62 (22)  SpO2: 100 % (22)   Vital Signs (24h Range):  Temp:  [100.04 °F (37.8 °C)-100.94 °F (38.3 °C)] 100.58 °F (38.1 °C)  Pulse:  [] 85  Resp:  [27-30] 30  SpO2:  [100 %] 100 %  BP: (104-123)/(57-75) 113/62  Arterial Line BP: (107-149)/(60-74) 127/63     Weight: 111.1 kg (245 lb)  Body mass index is 36.18 kg/m².      Intake/Output Summary (Last 24 hours) at 2022 0752  Last data filed at 2022 0624  Gross per 24 hour   Intake 4916.97 ml   Output 925 ml   Net 3991.97 ml        fentanyl 150 mcg/hr (22)    NORepinephrine bitartrate-D5W 0.25 mcg/kg/min (22)    sodium bicarbonate drip 125 mL/hr at 22 0617    vasopressin 0.04 Units/min (22)       Physical Exam  Vitals and nursing note reviewed.   Constitutional:       General: He is not in acute distress.     Appearance: He is obese. He is ill-appearing.      Interventions: He is sedated, intubated and restrained.   HENT:      Head: Atraumatic.      Nose:      Comments: Small gauze from left nare without visible bleeding or drainage  Eyes:      General: No scleral icterus.     Conjunctiva/sclera: Conjunctivae normal.   Neck:      Vascular: No JVD.   Cardiovascular:      Rate and Rhythm: Normal rate and regular rhythm.      Pulses:           Radial pulses are 1+ on the right side and 1+ on the left side.        Dorsalis pedis pulses are 1+ on the right side and 1+ on the left side.      Comments: Generalized, non pitting  Pulmonary:      Effort: He is intubated.      Breath sounds: Decreased breath sounds present. No wheezing or rhonchi.   Abdominal:      General: Bowel sounds are absent.       Musculoskeletal:      Right lower le+ Pitting Edema present.      Left lower le+ Pitting  Edema present.   Skin:     General: Skin is cool.      Capillary Refill: Capillary refill takes more than 3 seconds.       Vents:  Vent Mode: A/C (05/06/22 0530)  Ventilator Initiated: Yes (05/04/22 1258)  Set Rate: 30 BPM (05/06/22 0530)  Vt Set: 450 mL (05/06/22 0530)  Pressure Support: 0 cmH20 (05/06/22 0530)  PEEP/CPAP: 12 cmH20 (05/06/22 0530)  Oxygen Concentration (%): 60 (05/06/22 0600)  Peak Airway Pressure: 30 cmH2O (05/06/22 0530)  Plateau Pressure: 0 cmH20 (05/06/22 0530)  Total Ve: 13.2 mL (05/06/22 0530)  F/VT Ratio<105 (RSBI): (!) 69.77 (05/06/22 0530)    Lines/Drains/Airways       Central Venous Catheter Line  Duration             Percutaneous Central Line Insertion/Assessment - Triple Lumen  05/04/22 1200 right internal jugular 1 day              Drain  Duration                  NG/OG Tube 05/04/22 1600 1 day         Urethral Catheter 05/04/22 1105 Straight-tip;Silicone 16 Fr. 1 day              Airway  Duration                  Airway - Non-Surgical 05/04/22 1051 Endotracheal Tube 1 day              Arterial Line  Duration             Arterial Line 05/04/22 1050 Right Radial 1 day              Peripheral Intravenous Line  Duration                  Peripheral IV - Single Lumen 05/04/22 0454 20 G Right Hand 2 days         Peripheral IV - Single Lumen 05/04/22 0839 20 G Left Forearm 1 day                    Significant Labs:    CBC/Anemia Profile:  Recent Labs   Lab 05/04/22  1330 05/04/22  1442 05/04/22  1950 05/05/22  0325 05/06/22  0410   WBC 1.05*  --   --  6.16 18.70*   HGB 10.3*  --   --  11.4* 10.3*   HCT 36.6*   < > 34* 39.6* 34.9*     --   --  351 201   MCV 78*  --   --  76* 75*   RDW 22.3*  --   --  22.1* 23.0*    < > = values in this interval not displayed.          Chemistries:  Recent Labs   Lab 05/04/22  1743 05/04/22 1955 05/04/22  2327 05/05/22 0325 05/06/22  0410      < > 140 140 138   K 4.2   < > 4.2 4.4 5.2*   *   < > 110 109 98   CO2 17*   < > 15* 14* 17*   BUN  24*   < > 28* 31* 49*   CREATININE 1.2   < > 1.8* 2.2* 3.6*   CALCIUM 6.5*   < > 7.1* 7.0* 5.9*   ALBUMIN  --   --   --  2.1* 1.7*   PROT  --   --   --  4.5* 4.4*   BILITOT  --   --   --  0.8 0.9   ALKPHOS  --   --   --  48* 62   ALT  --   --   --  46* 65*   AST  --   --   --  101* 95*   MG 1.5*  --   --  1.4* 1.8    < > = values in this interval not displayed.         All pertinent labs within the past 24 hours have been reviewed.    Significant Imaging:  I have reviewed all pertinent imaging results/findings within the past 24 hours.

## 2022-05-06 NOTE — ASSESSMENT & PLAN NOTE
Patient stable s/p sx POD#1  Plan for washout, etc per primary service  Wound vac  Goals of care assessment  DNR    S/p SB resection- may go to surgery again tomorrow

## 2022-05-06 NOTE — HOSPITAL COURSE
Patient presented in septic shock with acute onset of abdominal pain and pneumoperitoneum on CT scan. S/p exploratory laparotomy, abdominal washout, segmental small bowel resection (left in discontinuity), placement of Abthera vac 5/4/22 5/5/22 In the ICU intubated. Still requiring vasopressor support. Renal function declining. Daughter expressed that patient would not want hemodialysis or life-prolonging measures.    5/6/22 Remains in the ICU intubated. Still requiring vasopressor support. Renal function worsening.    5/7/22 Underwent re-opening of laparotomy, abdominal washout, and replacement of Abthera.    5/8/22 Remains in the ICU intubated and requiring vasopressor support. Renal function continues to worsen. Family does not think patient would want hemodialysis.    5/9/22 Remains intubated in ICU, still on vasopressor support. Urine output improved with diuresis.    5/10/22 Urine output and edema improving with diuresis. Afib.    5/11/22 Underwent reopening of laparotomy, right hemicolectomy, liver biopsy, and end ileostomy.    5/12/22 Remains intubated in ICU. Blood pressure labile. On levophed. Heparin gtt started for afib. Drains are serous and ileostomy is becoming productive. Spontaneously breathing on ventilator.    5/13/22 Remains intubated on levo, amio, and heparin. Drains are serous and ileostomy is productive. Spontaneously breathing on ventilator. Weaning per crit care. Begin trickle tube feeds today.    5/14/22 - 5/16/22 Remained intubated. On amio and heparin gtts. Left-sided drain became murky brown--CT scan demonstrated abscess collection. IR consulted for additional drain placement. Tube feeds held due to vomiting. NG tube placed to LIS.    5/17/22 Extubated. Off vasopressor support. Still on heparin and amio gtt. NG tube currently to LIS. Left-sided drain placed by IR today.    5/18/22 Remains extubated. Much more awake and alert.    5/19/22 Trickle tube feeds started.    5/20/22  Tolerating tube feeds. Transferred out of ICU.    5/21/2022 patient evaluated by speech therapy.  Recommend continuing NPO for now.  Patient tolerating tube feeds.  Liquid stool in bag.  Drains in place.    5/22/2022 patient much more alert today.  Able to produce a better cough and suction himself.  Interacting with his daughter.  Speech therapy still recommends NPO for now.  Continue tube feeds.  Patient with increased ileostomy output.  Will add Imodium 3 times a day for now.  Drains in place.    5/23/22 Sitting up in bed awake and alert. Doherty removed. Has been able to urinate on own. Denies pain. Seen by oncologist today. Speech recommends MBSS.    5/23/22 OK for dysphagia diet with honey thickened liquids per MBSS. NG tube removed. Ileostomy output slowing down with fiber and imodium. Worked with PT. Seen by oncology and palliative care.

## 2022-05-06 NOTE — ASSESSMENT & PLAN NOTE
Will continue Zosyn , continue close monitoring     05/05/22- due to persistent fever , will switch to Micafungin, Ertapenem and zyvox   Follow cultures

## 2022-05-06 NOTE — ASSESSMENT & PLAN NOTE
Pt is DNR, discussed at length with surgeon prior to surgery and he consented to intubation for surgery with understanding of likely need for prolonged vent support post op until bowel/abdomen closed. He was clear that he would not desire long term vent support past that necessary for immediate post op recovery.   Daughter Claudia is SDM and is aware and supportive of his wishes. IF at any point his survival chances become poor or prolonged life support is anticipated she would honor his wish and transition to comfort focused care.  5/6 - discussed status with daughter. She expressed again understanding of her father's wishes for very limited life support and further stated today that after time to reflect on current status, in the event of continued decline she would not want to pursue potential dialysis but if kidneys fail and indications for RRT exist she would at that time desire transition to a full comfort care focus. She would hope that he could be extubated to comfort focus and have opportunity to interact with family but verbalizes understanding that this may not be possible given open abdomen and comfort goal.  Continue daily and prn updates

## 2022-05-06 NOTE — ASSESSMENT & PLAN NOTE
Based on Surgery  Potential interventions  Goals of care  Biopsy as indicated  Likely Oncologic process with mets  Heme Onc as indicated    See above

## 2022-05-07 ENCOUNTER — ANESTHESIA EVENT (OUTPATIENT)
Dept: SURGERY | Facility: HOSPITAL | Age: 67
DRG: 853 | End: 2022-05-07
Payer: MEDICARE

## 2022-05-07 ENCOUNTER — ANESTHESIA (OUTPATIENT)
Dept: SURGERY | Facility: HOSPITAL | Age: 67
DRG: 853 | End: 2022-05-07
Payer: MEDICARE

## 2022-05-07 LAB
ACANTHOCYTES BLD QL SMEAR: PRESENT
ALBUMIN SERPL BCP-MCNC: 1.6 G/DL (ref 3.5–5.2)
ALLENS TEST: ABNORMAL
ALLENS TEST: ABNORMAL
ALP SERPL-CCNC: 106 U/L (ref 55–135)
ALT SERPL W/O P-5'-P-CCNC: 173 U/L (ref 10–44)
ANION GAP SERPL CALC-SCNC: 23 MMOL/L (ref 8–16)
ANISOCYTOSIS BLD QL SMEAR: SLIGHT
AST SERPL-CCNC: 304 U/L (ref 10–40)
BACTERIA SPEC AEROBE CULT: NO GROWTH
BASOPHILS # BLD AUTO: 0.01 K/UL (ref 0–0.2)
BASOPHILS NFR BLD: 0 % (ref 0–1.9)
BILIRUB SERPL-MCNC: 1.3 MG/DL (ref 0.1–1)
BUN SERPL-MCNC: 59 MG/DL (ref 8–23)
BURR CELLS BLD QL SMEAR: ABNORMAL
CALCIUM SERPL-MCNC: 6.4 MG/DL (ref 8.7–10.5)
CHLORIDE SERPL-SCNC: 97 MMOL/L (ref 95–110)
CO2 SERPL-SCNC: 16 MMOL/L (ref 23–29)
CREAT SERPL-MCNC: 4.5 MG/DL (ref 0.5–1.4)
DELSYS: ABNORMAL
DELSYS: ABNORMAL
DIFFERENTIAL METHOD: ABNORMAL
EOSINOPHIL # BLD AUTO: 0 K/UL (ref 0–0.5)
EOSINOPHIL NFR BLD: 0.1 % (ref 0–8)
ERYTHROCYTE [DISTWIDTH] IN BLOOD BY AUTOMATED COUNT: 23.1 % (ref 11.5–14.5)
ERYTHROCYTE [SEDIMENTATION RATE] IN BLOOD BY WESTERGREN METHOD: 30 MM/H
ERYTHROCYTE [SEDIMENTATION RATE] IN BLOOD BY WESTERGREN METHOD: 30 MM/H
EST. GFR  (AFRICAN AMERICAN): 15 ML/MIN/1.73 M^2
EST. GFR  (NON AFRICAN AMERICAN): 13 ML/MIN/1.73 M^2
FIO2: 50
FIO2: 50
GLUCOSE SERPL-MCNC: 77 MG/DL (ref 70–110)
GLUCOSE SERPL-MCNC: 78 MG/DL (ref 70–110)
GRAM STN SPEC: NORMAL
GRAM STN SPEC: NORMAL
HCO3 UR-SCNC: 17.5 MMOL/L (ref 24–28)
HCO3 UR-SCNC: 18.9 MMOL/L (ref 24–28)
HCT VFR BLD AUTO: 33.7 % (ref 40–54)
HCT VFR BLD CALC: 31 %PCV (ref 36–54)
HCV AB SERPL QL IA: NEGATIVE
HGB BLD-MCNC: 9.6 G/DL (ref 14–18)
HIV1+2 IGG SERPL QL IA.RAPID: NORMAL
HYPOCHROMIA BLD QL SMEAR: ABNORMAL
IMM GRANULOCYTES # BLD AUTO: 0.57 K/UL (ref 0–0.04)
IMM GRANULOCYTES NFR BLD AUTO: 2.4 % (ref 0–0.5)
LACTATE SERPL-SCNC: 6.7 MMOL/L (ref 0.5–2.2)
LYMPHOCYTES # BLD AUTO: 0.4 K/UL (ref 1–4.8)
LYMPHOCYTES NFR BLD: 1.7 % (ref 18–48)
MAGNESIUM SERPL-MCNC: 2 MG/DL (ref 1.6–2.6)
MCH RBC QN AUTO: 21.9 PG (ref 27–31)
MCHC RBC AUTO-ENTMCNC: 28.5 G/DL (ref 32–36)
MCV RBC AUTO: 77 FL (ref 82–98)
MODE: ABNORMAL
MODE: ABNORMAL
MONOCYTES # BLD AUTO: 0.8 K/UL (ref 0.3–1)
MONOCYTES NFR BLD: 3.3 % (ref 4–15)
NEUTROPHILS # BLD AUTO: 21.8 K/UL (ref 1.8–7.7)
NEUTROPHILS NFR BLD: 92.5 % (ref 38–73)
NRBC BLD-RTO: 0 /100 WBC
OVALOCYTES BLD QL SMEAR: ABNORMAL
PCO2 BLDA: 38.2 MMHG (ref 35–45)
PCO2 BLDA: 38.7 MMHG (ref 35–45)
PEEP: 10
PEEP: 10
PH SMN: 7.27 [PH] (ref 7.35–7.45)
PH SMN: 7.3 [PH] (ref 7.35–7.45)
PLATELET # BLD AUTO: 148 K/UL (ref 150–450)
PLATELET BLD QL SMEAR: ABNORMAL
PMV BLD AUTO: ABNORMAL FL (ref 9.2–12.9)
PO2 BLDA: 119 MMHG (ref 80–100)
PO2 BLDA: 213 MMHG (ref 80–100)
POC BE: -8 MMOL/L
POC BE: -9 MMOL/L
POC IONIZED CALCIUM: 0.76 MMOL/L (ref 1.06–1.42)
POC SATURATED O2: 100 % (ref 95–100)
POC SATURATED O2: 98 % (ref 95–100)
POCT GLUCOSE: 113 MG/DL (ref 70–110)
POCT GLUCOSE: 113 MG/DL (ref 70–110)
POCT GLUCOSE: 115 MG/DL (ref 70–110)
POCT GLUCOSE: 153 MG/DL (ref 70–110)
POCT GLUCOSE: 75 MG/DL (ref 70–110)
POCT GLUCOSE: 81 MG/DL (ref 70–110)
POIKILOCYTOSIS BLD QL SMEAR: ABNORMAL
POLYCHROMASIA BLD QL SMEAR: ABNORMAL
POTASSIUM BLD-SCNC: 4.8 MMOL/L (ref 3.5–5.1)
POTASSIUM SERPL-SCNC: 5 MMOL/L (ref 3.5–5.1)
PROT SERPL-MCNC: 3.7 G/DL (ref 6–8.4)
RBC # BLD AUTO: 4.38 M/UL (ref 4.6–6.2)
SAMPLE: ABNORMAL
SAMPLE: ABNORMAL
SCHISTOCYTES BLD QL SMEAR: PRESENT
SITE: ABNORMAL
SITE: ABNORMAL
SODIUM BLD-SCNC: 133 MMOL/L (ref 136–145)
SODIUM SERPL-SCNC: 136 MMOL/L (ref 136–145)
VT: 450
VT: 450
WBC # BLD AUTO: 23.6 K/UL (ref 3.9–12.7)

## 2022-05-07 PROCEDURE — 82800 BLOOD PH: CPT

## 2022-05-07 PROCEDURE — 80053 COMPREHEN METABOLIC PANEL: CPT | Performed by: NURSE PRACTITIONER

## 2022-05-07 PROCEDURE — 87340 HEPATITIS B SURFACE AG IA: CPT | Performed by: SURGERY

## 2022-05-07 PROCEDURE — 99233 PR SUBSEQUENT HOSPITAL CARE,LEVL III: ICD-10-PCS | Mod: NSCH,,, | Performed by: INTERNAL MEDICINE

## 2022-05-07 PROCEDURE — 85025 COMPLETE CBC W/AUTO DIFF WBC: CPT | Performed by: NURSE PRACTITIONER

## 2022-05-07 PROCEDURE — 83735 ASSAY OF MAGNESIUM: CPT | Performed by: NURSE PRACTITIONER

## 2022-05-07 PROCEDURE — 37000008 HC ANESTHESIA 1ST 15 MINUTES: Performed by: SURGERY

## 2022-05-07 PROCEDURE — 25000003 PHARM REV CODE 250: Performed by: SURGERY

## 2022-05-07 PROCEDURE — 63600175 PHARM REV CODE 636 W HCPCS: Performed by: SURGERY

## 2022-05-07 PROCEDURE — 63600175 PHARM REV CODE 636 W HCPCS: Performed by: NURSE PRACTITIONER

## 2022-05-07 PROCEDURE — 94003 VENT MGMT INPAT SUBQ DAY: CPT

## 2022-05-07 PROCEDURE — 86703 HIV-1/HIV-2 1 RESULT ANTBDY: CPT | Performed by: SURGERY

## 2022-05-07 PROCEDURE — 20000000 HC ICU ROOM

## 2022-05-07 PROCEDURE — 36620 INSERTION CATHETER ARTERY: CPT

## 2022-05-07 PROCEDURE — 99900026 HC AIRWAY MAINTENANCE (STAT)

## 2022-05-07 PROCEDURE — 83605 ASSAY OF LACTIC ACID: CPT | Performed by: NURSE PRACTITIONER

## 2022-05-07 PROCEDURE — 86803 HEPATITIS C AB TEST: CPT | Performed by: SURGERY

## 2022-05-07 PROCEDURE — 36000707: Performed by: SURGERY

## 2022-05-07 PROCEDURE — 25000003 PHARM REV CODE 250: Performed by: NURSE ANESTHETIST, CERTIFIED REGISTERED

## 2022-05-07 PROCEDURE — 49002 REOPENING OF ABDOMEN: CPT | Mod: 58,,, | Performed by: SURGERY

## 2022-05-07 PROCEDURE — 82803 BLOOD GASES ANY COMBINATION: CPT

## 2022-05-07 PROCEDURE — 97605 PR NEG PRESS WOUND THERAPY (NPWT) W/NON-DISPOSABLE WOUND VAC DEVICE (DME), <=50 CM: ICD-10-PCS | Mod: ,,, | Performed by: SURGERY

## 2022-05-07 PROCEDURE — 37799 UNLISTED PX VASCULAR SURGERY: CPT

## 2022-05-07 PROCEDURE — 27000221 HC OXYGEN, UP TO 24 HOURS

## 2022-05-07 PROCEDURE — 63600175 PHARM REV CODE 636 W HCPCS: Performed by: INTERNAL MEDICINE

## 2022-05-07 PROCEDURE — 99291 PR CRITICAL CARE, E/M 30-74 MINUTES: ICD-10-PCS | Mod: 25,,, | Performed by: NURSE PRACTITIONER

## 2022-05-07 PROCEDURE — 36000706: Performed by: SURGERY

## 2022-05-07 PROCEDURE — 99900035 HC TECH TIME PER 15 MIN (STAT)

## 2022-05-07 PROCEDURE — 36620 INSERTION CATHETER ARTERY: CPT | Mod: ,,, | Performed by: NURSE PRACTITIONER

## 2022-05-07 PROCEDURE — 63600175 PHARM REV CODE 636 W HCPCS: Mod: JG | Performed by: NURSE PRACTITIONER

## 2022-05-07 PROCEDURE — 25000003 PHARM REV CODE 250: Performed by: NURSE PRACTITIONER

## 2022-05-07 PROCEDURE — 36620 PR INSERT CATH,ART,PERCUT,SHORTTERM: ICD-10-PCS | Mod: ,,, | Performed by: NURSE PRACTITIONER

## 2022-05-07 PROCEDURE — 94761 N-INVAS EAR/PLS OXIMETRY MLT: CPT

## 2022-05-07 PROCEDURE — 37000009 HC ANESTHESIA EA ADD 15 MINS: Performed by: SURGERY

## 2022-05-07 PROCEDURE — 97605 NEG PRS WND THER DME<=50SQCM: CPT | Mod: ,,, | Performed by: SURGERY

## 2022-05-07 PROCEDURE — 99233 SBSQ HOSP IP/OBS HIGH 50: CPT | Mod: NSCH,,, | Performed by: INTERNAL MEDICINE

## 2022-05-07 PROCEDURE — 49002 PR REOPEN RECENT ABD EXPLORATORY: ICD-10-PCS | Mod: 58,,, | Performed by: SURGERY

## 2022-05-07 PROCEDURE — 99291 CRITICAL CARE FIRST HOUR: CPT | Mod: 25,,, | Performed by: NURSE PRACTITIONER

## 2022-05-07 RX ORDER — OXYCODONE AND ACETAMINOPHEN 5; 325 MG/1; MG/1
1 TABLET ORAL
Status: DISCONTINUED | OUTPATIENT
Start: 2022-05-07 | End: 2022-05-07

## 2022-05-07 RX ORDER — ONDANSETRON 2 MG/ML
4 INJECTION INTRAMUSCULAR; INTRAVENOUS DAILY PRN
Status: DISCONTINUED | OUTPATIENT
Start: 2022-05-07 | End: 2022-05-11 | Stop reason: HOSPADM

## 2022-05-07 RX ORDER — KETOROLAC TROMETHAMINE 30 MG/ML
15 INJECTION, SOLUTION INTRAMUSCULAR; INTRAVENOUS EVERY 8 HOURS PRN
Status: DISCONTINUED | OUTPATIENT
Start: 2022-05-07 | End: 2022-05-09

## 2022-05-07 RX ORDER — NOREPINEPHRINE BITARTRATE/D5W 4MG/250ML
0-3 PLASTIC BAG, INJECTION (ML) INTRAVENOUS CONTINUOUS
Status: DISCONTINUED | OUTPATIENT
Start: 2022-05-07 | End: 2022-05-07

## 2022-05-07 RX ORDER — HYDROMORPHONE HYDROCHLORIDE 2 MG/ML
0.2 INJECTION, SOLUTION INTRAMUSCULAR; INTRAVENOUS; SUBCUTANEOUS EVERY 5 MIN PRN
Status: DISCONTINUED | OUTPATIENT
Start: 2022-05-07 | End: 2022-05-11 | Stop reason: HOSPADM

## 2022-05-07 RX ORDER — ROCURONIUM BROMIDE 10 MG/ML
INJECTION, SOLUTION INTRAVENOUS
Status: DISCONTINUED | OUTPATIENT
Start: 2022-05-07 | End: 2022-05-07

## 2022-05-07 RX ADMIN — PHENYLEPHRINE HYDROCHLORIDE 3 MCG/KG/MIN: 10 INJECTION INTRAVENOUS at 04:05

## 2022-05-07 RX ADMIN — LORAZEPAM 2 MG: 2 INJECTION INTRAMUSCULAR; INTRAVENOUS at 01:05

## 2022-05-07 RX ADMIN — AMIODARONE HYDROCHLORIDE 0.5 MG/MIN: 1.8 INJECTION, SOLUTION INTRAVENOUS at 10:05

## 2022-05-07 RX ADMIN — PHENYLEPHRINE HYDROCHLORIDE 2 MCG/KG/MIN: 10 INJECTION INTRAVENOUS at 12:05

## 2022-05-07 RX ADMIN — ERTAPENEM 500 MG: 1 INJECTION INTRAMUSCULAR; INTRAVENOUS at 09:05

## 2022-05-07 RX ADMIN — Medication 250 MCG/HR: at 03:05

## 2022-05-07 RX ADMIN — AMIODARONE HYDROCHLORIDE 1 MG/MIN: 1.8 INJECTION, SOLUTION INTRAVENOUS at 12:05

## 2022-05-07 RX ADMIN — ONDANSETRON 2 G: 2 INJECTION INTRAMUSCULAR; INTRAVENOUS at 08:05

## 2022-05-07 RX ADMIN — NOREPINEPHRINE BITARTRATE 0.08 MCG/KG/MIN: 1 INJECTION, SOLUTION, CONCENTRATE INTRAVENOUS at 09:05

## 2022-05-07 RX ADMIN — FAMOTIDINE 20 MG: 10 INJECTION INTRAVENOUS at 09:05

## 2022-05-07 RX ADMIN — CHLORHEXIDINE GLUCONATE 0.12% ORAL RINSE 15 ML: 1.2 LIQUID ORAL at 09:05

## 2022-05-07 RX ADMIN — Medication 0.04 UNITS/MIN: at 08:05

## 2022-05-07 RX ADMIN — Medication 0.04 UNITS/MIN: at 02:05

## 2022-05-07 RX ADMIN — Medication 0.04 UNITS/MIN: at 11:05

## 2022-05-07 RX ADMIN — MUPIROCIN: 20 OINTMENT TOPICAL at 09:05

## 2022-05-07 RX ADMIN — HEPARIN SODIUM 5000 UNITS: 5000 INJECTION INTRAVENOUS; SUBCUTANEOUS at 09:05

## 2022-05-07 RX ADMIN — MINERAL OIL, PETROLATUM: 425; 573 OINTMENT OPHTHALMIC at 09:05

## 2022-05-07 RX ADMIN — LINEZOLID 600 MG: 600 INJECTION, SOLUTION INTRAVENOUS at 09:05

## 2022-05-07 RX ADMIN — NOREPINEPHRINE BITARTRATE 0.14 MCG/KG/MIN: 1 INJECTION, SOLUTION, CONCENTRATE INTRAVENOUS at 11:05

## 2022-05-07 RX ADMIN — Medication 250 MCG/HR: at 11:05

## 2022-05-07 RX ADMIN — MICAFUNGIN SODIUM 100 MG: 100 INJECTION, POWDER, LYOPHILIZED, FOR SOLUTION INTRAVENOUS at 02:05

## 2022-05-07 RX ADMIN — HEPARIN SODIUM 5000 UNITS: 5000 INJECTION INTRAVENOUS; SUBCUTANEOUS at 01:05

## 2022-05-07 RX ADMIN — LINEZOLID 600 MG: 600 INJECTION, SOLUTION INTRAVENOUS at 10:05

## 2022-05-07 RX ADMIN — SODIUM BICARBONATE: 84 INJECTION, SOLUTION INTRAVENOUS at 06:05

## 2022-05-07 RX ADMIN — HEPARIN SODIUM 5000 UNITS: 5000 INJECTION INTRAVENOUS; SUBCUTANEOUS at 06:05

## 2022-05-07 RX ADMIN — ROCURONIUM BROMIDE 30 MG: 10 INJECTION, SOLUTION INTRAVENOUS at 11:05

## 2022-05-07 RX ADMIN — AMIODARONE HYDROCHLORIDE 0.5 MG/MIN: 1.8 INJECTION, SOLUTION INTRAVENOUS at 09:05

## 2022-05-07 RX ADMIN — NOREPINEPHRINE BITARTRATE 0.09 MCG/KG/MIN: 1 INJECTION, SOLUTION, CONCENTRATE INTRAVENOUS at 04:05

## 2022-05-07 RX ADMIN — INSULIN ASPART 1 UNITS: 100 INJECTION, SOLUTION INTRAVENOUS; SUBCUTANEOUS at 12:05

## 2022-05-07 RX ADMIN — LORAZEPAM 2 MG: 2 INJECTION INTRAMUSCULAR; INTRAVENOUS at 07:05

## 2022-05-07 RX ADMIN — Medication 250 MCG/HR: at 09:05

## 2022-05-07 RX ADMIN — SODIUM BICARBONATE: 84 INJECTION, SOLUTION INTRAVENOUS at 08:05

## 2022-05-07 NOTE — PROGRESS NOTES
[facility] - Intensive Care ([facility])  General Surgery  Progress Note    Subjective:     History of Present Illness:  No notes on file    Post-Op Info:  Procedure(s) (LRB):  LAPAROTOMY, EXPLORATORY (N/A)  WASHOUT (N/A)  EXCISION, SMALL INTESTINE (N/A)  APPLICATION, WOUND VAC (N/A)   3 Days Post-Op     Interval History: Intubated and sedated in the ICU. Daughter at bedside. Went into afib RVR last night. Urine output worsening.    Medications:  Continuous Infusions:   amiodarone in dextrose 5% 0.5 mg/min (05/07/22 0947)    fentanyl 250 mcg/hr (05/07/22 0600)    NORepinephrine bitartrate-D5W 0.08 mcg/kg/min (05/07/22 0925)    phenylephrine 3.5 mcg/kg/min (05/07/22 0600)    sodium bicarbonate drip 100 mL/hr at 05/07/22 0819    vasopressin 0.04 Units/min (05/07/22 0835)     Scheduled Meds:   chlorhexidine  15 mL Mouth/Throat BID    ertapenem (INVANZ) IVPB  500 mg Intravenous Q24H    famotidine (PF)  20 mg Intravenous Daily    heparin (porcine)  5,000 Units Subcutaneous Q8H    linezolid  600 mg Intravenous Q12H    micafungin (MYCAMINE) IVPB  100 mg Intravenous Q24H    mupirocin   Nasal BID    white petrolatum-mineral oil 57.3-42.5%   Both Eyes QHS     PRN Meds:sodium chloride, sodium chloride 0.9%, acetaminophen, dextrose 10%, dextrose 10%, glucagon (human recombinant), insulin aspart U-100, lorazepam, ondansetron     Review of patient's allergies indicates:  No Known Allergies  Objective:     Vital Signs (Most Recent):  Temp: 99.32 °F (37.4 °C) (05/07/22 1111)  Pulse: 62 (05/07/22 1111)  Resp: (!) 30 (05/07/22 1111)  BP: 125/71 (05/07/22 0500)  SpO2: 100 % (05/07/22 1111)   Vital Signs (24h Range):  Temp:  [98.24 °F (36.8 °C)-101.12 °F (38.4 °C)] 99.32 °F (37.4 °C)  Pulse:  [] 62  Resp:  [27-34] 30  SpO2:  [95 %-100 %] 100 %  BP: ()/(54-86) 125/71  Arterial Line BP: ()/(53-81) 145/73     Weight: 111.1 kg (245 lb)  Body mass index is 36.18 kg/m².    Intake/Output - Last 3 Shifts         05/05  0700  05/06 0659 05/06 0700  05/07 0659 05/07 0700  05/08 0659    I.V. (mL/kg) 4424.3 (39.8) 5094 (45.9)     Blood       IV Piggyback 701.5 1126.5     Total Intake(mL/kg) 5125.8 (46.1) 6220.5 (56)     Urine (mL/kg/hr) 325 (0.1) 330 (0.1)     Drains 100 300     Other 500 800     Total Output 925 1430     Net +4200.8 +4790.5                    Physical Exam  Vitals and nursing note reviewed.   Constitutional:       Appearance: He is obese. He is ill-appearing and toxic-appearing.   Cardiovascular:      Rate and Rhythm: Regular rhythm.   Pulmonary:      Comments: Mechanical breath sounds  Abdominal:      Palpations: Abdomen is soft.      Comments: Abthera vac in place holding suction, serous output   Genitourinary:     Comments: Doherty with dark michele urine  Musculoskeletal:      Right lower leg: Edema present.      Left lower leg: Edema present.   Neurological:      Comments: Sedated       Significant Labs:  I have reviewed all pertinent lab results within the past 24 hours.  CBC:   Recent Labs   Lab 05/07/22 0416 05/07/22 0719   WBC 23.60*  --    RBC 4.38*  --    HGB 9.6*  --    HCT 33.7* 31*   *  --    MCV 77*  --    MCH 21.9*  --    MCHC 28.5*  --      CMP:   Recent Labs   Lab 05/07/22 0416   GLU 77   CALCIUM 6.4*   ALBUMIN 1.6*   PROT 3.7*      K 5.0   CO2 16*   CL 97   BUN 59*   CREATININE 4.5*   ALKPHOS 106   *   *   BILITOT 1.3*     Coagulation:   Recent Labs   Lab 05/04/22  0805 05/05/22  0325   LABPROT 16.5* 12.0   INR 1.6* 1.1   APTT 33.2*  --      ABGs:   Recent Labs   Lab 05/07/22 0719   PH 7.296*   PCO2 38.7   PO2 213*   HCO3 18.9*   POCSATURATED 100   BE -8       Significant Diagnostics:  I have reviewed all pertinent imaging results/findings within the past 24 hours.    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 NO meds or feeds (patient is in bowel discontinuity)  - Continue ICU management. Currently still requiring vasopressor support.  - Strict I/Os  - Medical and ICU management per hospital/ICU team    Too unstable for right hemicolectomy, end ileostomy, liver biopsy today. Will plan for abdominal washout in the OR with Abthera replacement today. If necrotic bowel is found, will resect. Will remain in bowel discontinuity until able to return to OR again when more stable.  Discussed with daughter at bedside.        Elvie Parker, DO  General Surgery  [facility] - Intensive Care ([facility])

## 2022-05-07 NOTE — ASSESSMENT & PLAN NOTE
S/t septic shock  Adequate volume resuscitation   Avoid nephrotoxins  Strict I/O  IV bicarb infusion for acidosis; attempted to discontinue last night but resumed this am  No acute indication for dialysis but high potential for continued decline, monitor chemistry; daughter will not pursue dialysis if decline per her father's wishes

## 2022-05-07 NOTE — ASSESSMENT & PLAN NOTE
Secondary to Peritonitis from bowel perforation with major fecal contamination  Blood and sputum cultures ngtd  Wbc rising  Continue zyvox, invanz and Micafungin  Continue IV bicarb infusion  Cont Vasopressin and titrate pressor for MAP > 75  ICU hemodynamic monitoring  Echo reviewed

## 2022-05-07 NOTE — ASSESSMENT & PLAN NOTE
Pressors  Abx - Zosyn / Micafungin  ID on consult    Remains in severe Septic Shock complicated by JOES/ATN- Anuria and Resp Failure on Vent  ICU team has d/w daughter about HD if and when needed- she does not appear to be in favor of HD at present  Prognosis poor    Day 4 in Septic Shock and on vent  Continue Levo and Vaso plus IV Abx  Prognosis poor

## 2022-05-07 NOTE — TRANSFER OF CARE
"Anesthesia Transfer of Care Note    Patient: Franky Masters    Procedure(s) Performed: Procedure(s) (LRB):  LAPAROTOMY (N/A)  LAVAGE, PERITONEAL, THERAPEUTIC    Patient location: PACU    Anesthesia Type: general    Transport from OR: Transported from OR on room air with adequate spontaneous ventilation    Post pain: adequate analgesia    Post assessment: no apparent anesthetic complications    Post vital signs: stable    Level of consciousness: sedated    Nausea/Vomiting: no nausea/vomiting    Complications: none    Transfer of care protocol was followed      Last vitals:   Visit Vitals  /61   Pulse 62   Temp 37.4 °C (99.32 °F)   Resp (!) 30   Ht 5' 9" (1.753 m)   Wt 111.1 kg (245 lb)   SpO2 100%   BMI 36.18 kg/m²     "

## 2022-05-07 NOTE — PROGRESS NOTES
[facility] ([facility]  Progress Note    Patient Name: Franky Masters  MRN: [id number]  Patient Class: IP- Inpatient   Admission Date: 5/4/2022  Length of Stay: 3 days  Attending Physician: Elvie Parker DO  Primary Care Provider: HOLLY ROSEN        Subjective:     Principal Problem:Septic shock        HPI:  Mr Masters is a 67 yo male POD#0 s/p SB perforation with surgical intervention demonstrating a large cecal mass and 3l feculant fluid in the abdominal cavity. Additional findings of a perforated ileum and a liver mass. Patient tolerated the Exlap with Washout and small bowel excision. Patient had a wound vac placed, is currently intubated, sedated and recovering in the ICU. Patient received 4 units PRBC and remains on pressor support. Patient is a DNR and HM consulted with assistance with medical management. Daughter at bedside. Patient discussed with care team.          Overview/Hospital Course:  Patient continues to require pressors, remains intubated, sedated. Patient urine o/p declining and concerning. Discussed POC in detail at bedside with daughter POA. She expressed her fathers wish to not undergo treatments  like perm HD, where he has a diminished quality of life. We spoke frankly about issues and concerns and she will be communicating with the family as his case evolves. Comfort care was discussed and the goals of care will develop consistent with the patients expressed wishes. Potential for another surgery likely Saturday with a washout and possible biopsy vs resection are on the horizon. This possible intervention will be covered in detail by surgery sharing the risks and benefits of that approach. Case discussed with the primary service - surgery, and critical care team.    5/6- Pt seen and examined in the ICU plus discussed with ICU team and the pt's daughter/ POA: Remains critically ill, intubated, sedated on Vent, on Pressors- Levo plus Vaso- JOSE with oliguria  getting worse- Cr rising to 3.6, becoming severely acidotic- requiring Ertapenem, Zyvox, Micafungin as well as on Bicarb and Fentanyl gtt. He has massive fluid overload and generalized anasarca.  Possible return to OR tomorrow 5/7 if patient is more stable for right hemicolectomy, possible ileostomy, liver biopsy, abdominal wall closure. Dw Pt's daughter.       5/7- remains Intubated, sedated on Vent- Went into Afib w RVR- hence added Amio to Levo and Vasopressin- back in NSR. Getting Invanz and Zyvox plus Micafungin. Dr. Parker took him back to OR for for abdominal washout ( 3-3.5 L feculent fluid with food particles suctioned out in the OR, small bowel appeared better) and replaced Abthera- still has bowel discontinuity. His WBC, Lactate and Cr have all increased. Family updated about his critical condition and poor prognosis and JOSE/ATN- they are considering Comfort measures.           Interval History: remains Intubated, sedated on Vent- Went into Afib w RVR- hence added Amio to Levo and Vasopressin- back in NSR. Getting Invanz and Zyvox plus Micafungin. Dr. Parker took him back to OR for for abdominal washout ( 3-3.5 L feculent fluid with food particles suctioned out in the OR, small bowel appeared better) and replaced Abthera- still has bowel discontinuity. His WBC, Lactate and Cr have all increased. Family updated about his critical condition and poor prognosis and JOSE/ATN- they are considering Comfort measures.     Review of Systems   Unable to perform ROS: Intubated   Objective:     Vital Signs (Most Recent):  Temp: 98.42 °F (36.9 °C) (05/07/22 1600)  Pulse: 64 (05/07/22 1600)  Resp: (!) 30 (05/07/22 1600)  BP: 133/63 (05/07/22 1600)  SpO2: 100 % (05/07/22 1600)   Vital Signs (24h Range):  Temp:  [98.06 °F (36.7 °C)-100.76 °F (38.2 °C)] 98.42 °F (36.9 °C)  Pulse:  [] 64  Resp:  [27-34] 30  SpO2:  [95 %-100 %] 100 %  BP: ()/(57-86) 133/63  Arterial Line BP: ()/(-43-81) 136/65     Weight:  111.1 kg (245 lb)  Body mass index is 36.18 kg/m².    Intake/Output Summary (Last 24 hours) at 2022 1706  Last data filed at 2022 1500  Gross per 24 hour   Intake 3908 ml   Output 1250 ml   Net 2658 ml      Physical Exam  Vitals and nursing note reviewed.   Constitutional:       General: He is not in acute distress.     Appearance: He is obese. He is ill-appearing.      Interventions: He is sedated, intubated and restrained.   HENT:      Head: Atraumatic.   Eyes:      General: No scleral icterus.     Conjunctiva/sclera: Conjunctivae normal.   Neck:      Vascular: No JVD.   Cardiovascular:      Rate and Rhythm: Normal rate and regular rhythm.      Pulses:           Radial pulses are 1+ on the right side and 1+ on the left side.        Dorsalis pedis pulses are 1+ on the right side and 1+ on the left side.   Pulmonary:      Effort: He is intubated.      Breath sounds: Decreased breath sounds present. No wheezing or rhonchi.   Abdominal:      General: Bowel sounds are absent.       Musculoskeletal:      Right lower le+ Pitting Edema present.      Left lower le+ Pitting Edema present.   Skin:     General: Skin is cool.      Capillary Refill: Capillary refill takes more than 3 seconds.     Flow (L/min): 4  Vent Mode: A/C  Oxygen Concentration (%):  [40-50] 40  Resp Rate Total:  [30 br/min-34 br/min] 30 br/min  Vt Set:  [450 mL] 450 mL  PEEP/CPAP:  [8 cmH20-10 cmH20] 8 cmH20  Pressure Support:  [0 cmH20] 0 cmH20  Mean Airway Pressure:  [14 bmQ54-63 cmH20] 15 cmH20  Temp:  [98.06 °F (36.7 °C)-100.76 °F (38.2 °C)] 98.42 °F (36.9 °C)  Pulse:  [] 64  Resp:  [27-34] 30  SpO2:  [95 %-100 %] 100 %  BP: ()/(57-86) 133/63  Arterial Line BP: ()/(-43-81) 136/65   Art pH/pCO2/pO2/HCO3:  7.296/38.7/213/18.9 (719)  Lab Results   Component Value Date    [id number] Negative 2022    ICE43KNWQWKQ Positive (A) 2021      Recent Labs   Lab 22  1330 22  1442 22  0325  05/05/22  1150 05/06/22  0410 05/06/22  1010 05/06/22  1305 05/06/22  1547 05/07/22  0416 05/07/22  0719 05/07/22  1345   LACTATE 3.1*  --   --  5.0*  --   --   --   --   --   --  6.7*      < > 140  --  138  --  137  --  136  --   --    WBC 1.05*  --  6.16  --  18.70*  --   --   --  23.60*  --   --    GRAN 46.0  --  82.4*  5.1  --  80.0*  --   --   --  92.5*  21.8*  --   --    LYMPH 42.0  --  9.3*  0.6*  --  5.0*  --   --   --  1.7*  0.4*  --   --    HGB 10.3*  --  11.4*  --  10.3*  --   --   --  9.6*  --   --    HCT 36.6*   < > 39.6*  --  34.9*   < >  --    < > 33.7* 31*  --    BUN 23   < > 31*  --  49*  --  54*  --  59*  --   --    CREATININE 0.9   < > 2.2*  --  3.6*  --  3.8*  --  4.5*  --   --    ESTGFRAFRICA >60   < > 35*  --  19*  --  18*  --  15*  --   --    EGFRNONAA >60   < > 30*  --  16*  --  15*  --  13*  --   --    K 3.9   < > 4.4  --  5.2*  --  4.8  --  5.0  --   --    *   < > 109  --  98  --  94*  --  97  --   --    CO2 15*   < > 14*  --  17*  --  25  --  16*  --   --    MG 1.7   < > 1.4*  --  1.8  --   --   --  2.0  --   --     < > = values in this interval not displayed.     Microbiology Results (last 7 days)       Procedure Component Value Units Date/Time    Culture, Respiratory with Gram Stain [[id number]] Collected: 05/04/22 1253    Order Status: Completed Specimen: Respiratory from Endotracheal Aspirate Updated: 05/07/22 0921     Respiratory Culture No growth     Gram Stain (Respiratory) Few WBC's     Gram Stain (Respiratory) Rare Gram positive rods    Blood culture [[id number]] Collected: 05/04/22 1428    Order Status: Completed Specimen: Blood from Line, Arterial, Left Updated: 05/07/22 0613     Blood Culture, Routine No Growth to date      No Growth to date      No Growth to date    Blood culture [[id number]] Collected: 05/04/22 1429    Order Status: Completed Specimen: Blood from Line, Jugular, Internal Right Updated: 05/07/22 0613     Blood Culture, Routine No Growth to date       No Growth to date      No Growth to date          Antibiotics (From admission, onward)                Start     Stop Route Frequency Ordered    05/06/22 2100  ertapenem (INVANZ) 500 mg in sodium chloride 0.9% 100 mL IVPB         05/09 2059 IV Every 24 hours (non-standard times) 05/06/22 0742    05/05/22 2115  linezolid 600 mg/300 mL IVPB 600 mg         05/11 2114 IV Every 12 hours (non-standard times) 05/05/22 2000 05/04/22 1300  mupirocin 2 % ointment  (DECOLONIZATION PROTOCOL ORDERS)         05/09 0859 Nasl 2 times daily 05/04/22 1253          Anticoagulants       Ordered     Route Frequency Start Stop    05/04/22 1255  heparin (porcine)  (VTE Prophylaxis Orders - High Risk)         SubQ Every 8 hours 05/05/22 0600 --          Echo Saline Bubble? No  · The left ventricle is normal in size with concentric hypertrophy and   moderately decreased systolic function.  · Grade I left ventricular diastolic dysfunction.  · Normal right ventricular size with low normal right ventricular systolic   function.  · The estimated ejection fraction is 35-40%.  · There is mild left ventricular global hypokinesis.  · Moderate mitral regurgitation.     US Retroperitoneal Complete  Narrative: EXAMINATION:  US RETROPERITONEAL COMPLETE    CLINICAL HISTORY:  JOSE with oliguria. r/o obstructive uropathy;    TECHNIQUE:  Ultrasound of the kidneys and urinary bladder was performed including color flow and Doppler evaluation of the kidneys.    COMPARISON:  None.    FINDINGS:  Right kidney: The right kidney measures 10.9 cm. No cortical thinning. No loss of corticomedullary distinction.  Normal perfusion.   No mass. No renal stone. No hydronephrosis.    Left kidney: The left kidney measures 13 cm. No cortical thinning. No loss of corticomedullary distinction.  Normal perfusion. No mass. No renal stone. No hydronephrosis.    The bladder is not distended limited evaluation with Doherty catheter noted  Impression: No significant  abnormality.    Electronically signed by: Philippe Horton MD  Date:    05/05/2022  Time:    08:28  X-Ray Chest AP Portable  Narrative: EXAMINATION:  XR CHEST AP PORTABLE    CLINICAL HISTORY:  resp failure;    TECHNIQUE:  Single frontal view of the chest was performed.    COMPARISON:  05/04/2022.    FINDINGS:  Tubes and lines are satisfactory.  No pneumothorax. Patchy infiltrate suspected left lower lobe.  Cannot exclude right infrahilar infiltrate with prominence of right hilum; consider follow-up chest CT..  Small left pleural effusion. In comparison to the prior study, there is no adverse interval changes  Impression: In comparison to the prior study, there is no adverse interval changes    Electronically signed by: Philippe Horton MD  Date:    05/05/2022  Time:    08:21   Significant Labs: All pertinent labs within the past 24 hours have been reviewed.    Significant Imaging: I have reviewed all pertinent imaging results/findings within the past 24 hours.      Assessment/Plan:      * Septic shock  Pressors  Abx - Zosyn / Micafungin  ID on consult    Remains in severe Septic Shock complicated by JOSE/ATN- Anuria and Resp Failure on Vent  ICU team has d/w daughter about HD if and when needed- she does not appear to be in favor of HD at present  Prognosis poor    Day 4 in Septic Shock and on vent  Continue Levo and Vaso plus IV Abx  Prognosis poor    Acute hypoxemic respiratory failure on mechanical vent  Patient with Hypoxic Respiratory failure which is Acute.  he is not on home oxygen. Supplemental oxygen was provided and noted- Vent Mode: A/C  Oxygen Concentration (%):  [40-50] 40  Resp Rate Total:  [30 br/min-34 br/min] 30 br/min  Vt Set:  [450 mL] 450 mL  PEEP/CPAP:  [8 cmH20-10 cmH20] 8 cmH20  Pressure Support:  [0 cmH20] 0 cmH20  Mean Airway Pressure:  [14 zpM69-10 cmH20] 15 cmH20.   Signs/symptoms of respiratory failure include- tachypnea. Contributing diagnoses includes - Obesity Hypoventilation Labs and images  were reviewed. Patient Has recent ABG, which has been reviewed. Will treat underlying causes and adjust management of respiratory failure as indicated. 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 Parker- pt too unstable for right hemicolectomy, end ileostomy, liver biopsy today s/p abdominal washout with Abthera replacement today.        Mass of colon  Based on Surgery  Potential interventions  Goals of care  Biopsy as indicated  Likely Oncologic process with mets  Heme Onc as indicated    See above      Pneumonia of left lower lobe due to infectious organism  Abx  ID on Consult    ON IV Abx      On mechanically assisted ventilation  Wean as tolerated  CC Team  Pulmonary    Cont vent support      JOSE (acute kidney injury)  Worsening  Trend  Cr 2.2 today    Cr now 3.8- Oliguric- heading towards Anuria and ATN/ May need HD vs CRRT- she has massive fluid Overload.     Cr now 4.5-  Remains anuric  POA/ Family not in favor of HD    Atrial fibrillation with RVR  Converted to NSR with Amio gtt      Hyperglycemia, unspecified  NISS  Accuchecks  Monitor  Controlled      Obesity (BMI 30.0-34.9)  Diet  Nutrition on recovery      Acute on chronic anemia  Hgb 10.3 s/p Transfusion 4 units Prbc  Transfuse for Hgb <7  Monitor    5/5  Improved  Hgb 11.4 today  Transfuse as indicated        VTE Risk Mitigation (From admission, onward)         Ordered     heparin (porcine) injection 5,000 Units  Every 8 hours         05/04/22 1255     IP VTE HIGH RISK PATIENT  Once         05/04/22 1253     Place sequential compression device  Until discontinued         05/04/22 1253                Discharge Planning   ELLIOT:      Code Status: DNR   Is the patient  medically ready for discharge?:     Reason for patient still in hospital (select all that apply): Patient unstable, Patient trending condition, Laboratory test, Treatment, Imaging and Consult recommendations  Discharge Plan A: Comfort care/withdrawal      Seen and discussed with Dr. Huynh and the ICU team  Condition: Critical  Prognosis: Guarded to poor        Critical care time spent on the evaluation and treatment of severe organ dysfunction, review of pertinent labs and imaging studies, discussions with consulting providers and discussions with patient/family: 46 minutes.      Megha Mejia MD  Department of Hospital Medicine   [facility] - Intensive Care [facility])

## 2022-05-07 NOTE — SUBJECTIVE & OBJECTIVE
Review of Systems   Unable to perform ROS: Intubated     Objective:     Vital Signs (Most Recent):  Temp: 98.6 °F (37 °C) (22)  Pulse: 69 (22)  Resp: (!) 30 (22)  BP: 125/71 (22 0500)  SpO2: 98 % (22)   Vital Signs (24h Range):  Temp:  [98.24 °F (36.8 °C)-101.3 °F (38.5 °C)] 98.6 °F (37 °C)  Pulse:  [] 69  Resp:  [27-34] 30  SpO2:  [95 %-100 %] 98 %  BP: ()/(54-86) 125/71  Arterial Line BP: ()/(51-81) 145/73     Weight: 111.1 kg (245 lb)  Body mass index is 36.18 kg/m².      Intake/Output Summary (Last 24 hours) at 2022 0753  Last data filed at 2022 0600  Gross per 24 hour   Intake 6022.48 ml   Output 1410 ml   Net 4612.48 ml        amiodarone in dextrose 5% 0.5 mg/min (22)    fentanyl 250 mcg/hr (22)    phenylephrine 3.5 mcg/kg/min (22)    sodium bicarbonate drip      vasopressin 0.04 Units/min (22)       Physical Exam  Vitals and nursing note reviewed.   Constitutional:       General: He is not in acute distress.     Appearance: He is obese. He is ill-appearing.      Interventions: He is sedated, intubated and restrained.   HENT:      Head: Atraumatic.   Eyes:      General: No scleral icterus.     Conjunctiva/sclera: Conjunctivae normal.   Neck:      Vascular: No JVD.   Cardiovascular:      Rate and Rhythm: Normal rate and regular rhythm.      Pulses:           Radial pulses are 1+ on the right side and 1+ on the left side.        Dorsalis pedis pulses are 1+ on the right side and 1+ on the left side.   Pulmonary:      Effort: He is intubated.      Breath sounds: Decreased breath sounds present. No wheezing or rhonchi.   Abdominal:      General: Bowel sounds are absent.       Musculoskeletal:      Right lower le+ Pitting Edema present.      Left lower le+ Pitting Edema present.   Skin:     General: Skin is cool.      Capillary Refill: Capillary refill takes more than 3 seconds.        Vents:  Vent Mode: A/C (05/07/22 0726)  Ventilator Initiated: Yes (05/04/22 1258)  Set Rate: 30 BPM (05/07/22 0726)  Vt Set: 450 mL (05/07/22 0726)  Pressure Support: 0 cmH20 (05/07/22 0726)  PEEP/CPAP: (S) 8 cmH20 (05/07/22 0726)  Oxygen Concentration (%): (S) 40 (05/07/22 0726)  Peak Airway Pressure: 24 cmH2O (05/07/22 0726)  Plateau Pressure: 21 cmH20 (05/07/22 0726)  Total Ve: 14.2 mL (05/07/22 0726)  F/VT Ratio<105 (RSBI): (!) 50 (05/07/22 0726)    Lines/Drains/Airways       Central Venous Catheter Line  Duration             Percutaneous Central Line Insertion/Assessment - Triple Lumen  05/04/22 1200 right internal jugular 2 days              Drain  Duration                  NG/OG Tube 05/04/22 1600 2 days         Urethral Catheter 05/04/22 1105 Straight-tip;Silicone 16 Fr. 2 days              Airway  Duration                  Airway - Non-Surgical 05/04/22 1051 Endotracheal Tube 2 days              Arterial Line  Duration             Arterial Line 05/04/22 1050 Right Radial 2 days                    Significant Labs:    CBC/Anemia Profile:  Recent Labs   Lab 05/06/22  0410 05/06/22  1010 05/06/22  1838 05/07/22  0416 05/07/22  0719   WBC 18.70*  --   --  23.60*  --    HGB 10.3*  --   --  9.6*  --    HCT 34.9*   < > 30* 33.7* 31*     --   --  148*  --    MCV 75*  --   --  77*  --    RDW 23.0*  --   --  23.1*  --     < > = values in this interval not displayed.          Chemistries:  Recent Labs   Lab 05/06/22  0410 05/06/22  1305 05/07/22  0416    137 136   K 5.2* 4.8 5.0   CL 98 94* 97   CO2 17* 25 16*   BUN 49* 54* 59*   CREATININE 3.6* 3.8* 4.5*   CALCIUM 5.9* 6.7* 6.4*   ALBUMIN 1.7*  --  1.6*   PROT 4.4*  --  3.7*   BILITOT 0.9  --  1.3*   ALKPHOS 62  --  106   ALT 65*  --  173*   AST 95*  --  304*   MG 1.8  --  2.0         All pertinent labs within the past 24 hours have been reviewed.    Significant Imaging:  I have reviewed all pertinent imaging results/findings within the past 24  hours.

## 2022-05-07 NOTE — ASSESSMENT & PLAN NOTE
New onset 5/6  Converted to SR on amiodarone infusion; maintain while bowel in discontinuity  Continue cardiac monitoring

## 2022-05-07 NOTE — ASSESSMENT & PLAN NOTE
Worsening  Trend  Cr 2.2 today    Cr now 3.8- Oliguric- heading towards Anuria and ATN/ May need HD vs CRRT- she has massive fluid Overload.     Cr now 4.5-  Remains anuric  POA/ Family not in favor of HD

## 2022-05-07 NOTE — ASSESSMENT & PLAN NOTE
Blood and sputum cultures ordered and pending  abx adjusted by ID, now invanz, zyvox, mycafungin  OET suctioning PRN

## 2022-05-07 NOTE — ASSESSMENT & PLAN NOTE
Will continue Zosyn , continue close monitoring     05/05/22- due to persistent fever , will switch to Micafungin, Ertapenem and zyvox   Follow cultures   05/06/22- continue micafungin , zyvox/ertapenem, follow surgery for washout in AM

## 2022-05-07 NOTE — ASSESSMENT & PLAN NOTE
Patient with Hypoxic Respiratory failure which is Acute.  he is not on home oxygen. Supplemental oxygen was provided and noted- Vent Mode: A/C  Oxygen Concentration (%):  [40-50] 40  Resp Rate Total:  [30 br/min-34 br/min] 30 br/min  Vt Set:  [450 mL] 450 mL  PEEP/CPAP:  [8 cmH20-10 cmH20] 8 cmH20  Pressure Support:  [0 cmH20] 0 cmH20  Mean Airway Pressure:  [14 rsO85-09 cmH20] 15 cmH20.   Signs/symptoms of respiratory failure include- tachypnea. Contributing diagnoses includes - Obesity Hypoventilation Labs and images were reviewed. Patient Has recent ABG, which has been reviewed. Will treat underlying causes and adjust management of respiratory failure as indicated. Pulmonary CC on board  Day 2 on Vent- remains intubated on vent  Cont vent support    Day 3 on Vent

## 2022-05-07 NOTE — PROCEDURES
"Franky Masters is a 66 y.o. male patient.    Temp: 98.24 °F (36.8 °C) (05/07/22 1330)  Pulse: 61 (05/07/22 1330)  Resp: (!) 31 (05/07/22 1330)  BP: 118/61 (05/07/22 1100)  SpO2: 100 % (05/07/22 1330)  Weight: 111.1 kg (245 lb) (05/05/22 0600)  Height: 5' 9" (175.3 cm) (05/05/22 0924)       Arterial Line    Date/Time: 5/7/2022 1:30 PM  Location procedure was performed: [facility] INTENSIVE CARE UNIT  Performed by: KIM Miranda  Authorized by: KIM Miranda   Pre-op Diagnosis: shock  Post-operative diagnosis: shock  Consent Done: Not Needed  Preparation: Patient was prepped and draped in the usual sterile fashion.  Indications: hemodynamic monitoring  Location: right radial    Patient sedated: see MAR for vent sedation.  Miko's test normal: no  Needle gauge: 20  Seldinger technique: Seldinger technique used (with US guidance)  Number of attempts: 2  Complications: No  Specimens: No  Implants: No  Post-procedure: line sutured (biopatch and clear occlusive dressing applied)  Post-procedure CMS: unchanged  Patient tolerance: Patient tolerated the procedure well with no immediate complications          5/7/2022  "

## 2022-05-07 NOTE — ANESTHESIA POSTPROCEDURE EVALUATION
Anesthesia Post Evaluation    Patient: Franky Masters    Procedure(s) Performed: Procedure(s) (LRB):  LAPAROTOMY (N/A)  LAVAGE, PERITONEAL, THERAPEUTIC    Final Anesthesia Type: general      Patient location during evaluation: ICU  Patient participation: No - Unable to Participate, Intubation  Level of consciousness: obtunded/minimal responses  Post-procedure vital signs: reviewed and stable  Pain management: adequate  Airway patency: patent    PONV status at discharge: No PONV  Anesthetic complications: no      Cardiovascular status: blood pressure returned to baseline, stable and hemodynamically stable  Respiratory status: intubated  Hydration status: euvolemic  Follow-up not needed.          Vitals Value Taken Time   /58 05/07/22 1253   Temp 36.7 °C (98.06 °F) 05/07/22 1257   Pulse 65 05/07/22 1257   Resp 30 05/07/22 1257   SpO2 97 % 05/07/22 1257   Vitals shown include unvalidated device data.      No case tracking events are documented in the log.      Pain/Stefani Score: Pain Rating Prior to Med Admin: 0 (5/7/2022 11:18 AM)  Pain Rating Post Med Admin: 0 (5/7/2022  7:01 AM)

## 2022-05-07 NOTE — PROGRESS NOTES
[facility] ([facility])  Critical Care Medicine  Progress Note    Patient Name: Franky Masters  MRN: [id number]  Admission Date: 5/4/2022  Hospital Length of Stay: 3 days  Code Status: DNR  Attending Provider: Elvie Parker DO  Primary Care Provider: HOLLY ROSEN   Principal Problem: Septic shock    Subjective:     HPI:  Ms Masters is a 65 yo obese male with a PMH of diverticulosis and hx of hospitalization in Aug 2021 with COVID PNA and Hypoxic Resp Failure but did not require ICU or intubation.  She presented early this AM to [facility] ED about 0515 hr via EMS with complaint of abd pain X 2 hours that awakened her from sleep and had associated N/V/D.  In ED BP 86/46, RA SAT 92%, LA 8, Hgb 7.2 and CT Abd with suspected bowel perforation and free air.  General Surgery consulted and taken to OR this AM revealing SB perf with 3 L feculent fluid and food in cavity and large obstructing cecal mass with perf ileum and palpable liver mass.  Had Expl Lap with washout and excision of SB with wound vac placement admitted post op to ICU intubated on mech ventilation.  Received 2 units PRBCs in ED and another 2 units in OR also on Levophed and Vasopressin infusions.  Before surgery patient was insistent he be DNR post op but consented to surgery and invasive mech ventilation but no ACLS post op in event of cardiac arrest and no prolonged mech ventilation. Reportedly patient does not routinely follow with practitioner as outpt.       Hospital/ICU Course:  5/4 - Admitted to ICU sedated and intubated on Levophed and Vasopressin infusions in no distress  5/5 - remains intubated and sedated on mechanical vent and pressor support; scant urine output overnight and creatinine rise to 2.2 with fluid balance +8.9L  5/6 - remains intubated and sedated on mechanical vent with decreasing pressor demand; still oliguric with creatinine up to 3.6, K 5.2; fluid balance +13L; now with bigeminy and cardiac rhythm changes, K  stable on abg but iCa 0.78  5/7 - remains intubated and sedated with open abdomen to abthera wound vac and pressor support; overnight atrial fib with RVR, now on amio infusion with SR 68 on monitor; plan to OR for washout and vac replacement today      Review of Systems   Unable to perform ROS: Intubated     Objective:     Vital Signs (Most Recent):  Temp: 98.6 °F (37 °C) (05/07/22 0726)  Pulse: 69 (05/07/22 0726)  Resp: (!) 30 (05/07/22 0726)  BP: 125/71 (05/07/22 0500)  SpO2: 98 % (05/07/22 0726)   Vital Signs (24h Range):  Temp:  [98.24 °F (36.8 °C)-101.3 °F (38.5 °C)] 98.6 °F (37 °C)  Pulse:  [] 69  Resp:  [27-34] 30  SpO2:  [95 %-100 %] 98 %  BP: ()/(54-86) 125/71  Arterial Line BP: ()/(51-81) 145/73     Weight: 111.1 kg (245 lb)  Body mass index is 36.18 kg/m².      Intake/Output Summary (Last 24 hours) at 5/7/2022 0753  Last data filed at 5/7/2022 0600  Gross per 24 hour   Intake 6022.48 ml   Output 1410 ml   Net 4612.48 ml        amiodarone in dextrose 5% 0.5 mg/min (05/07/22 0600)    fentanyl 250 mcg/hr (05/07/22 0600)    phenylephrine 3.5 mcg/kg/min (05/07/22 0600)    sodium bicarbonate drip      vasopressin 0.04 Units/min (05/07/22 0600)       Physical Exam  Vitals and nursing note reviewed.   Constitutional:       General: He is not in acute distress.     Appearance: He is obese. He is ill-appearing.      Interventions: He is sedated, intubated and restrained.   HENT:      Head: Atraumatic.   Eyes:      General: No scleral icterus.     Conjunctiva/sclera: Conjunctivae normal.   Neck:      Vascular: No JVD.   Cardiovascular:      Rate and Rhythm: Normal rate and regular rhythm.      Pulses:           Radial pulses are 1+ on the right side and 1+ on the left side.        Dorsalis pedis pulses are 1+ on the right side and 1+ on the left side.   Pulmonary:      Effort: He is intubated.      Breath sounds: Decreased breath sounds present. No wheezing or rhonchi.   Abdominal:       General: Bowel sounds are absent.       Musculoskeletal:      Right lower le+ Pitting Edema present.      Left lower le+ Pitting Edema present.   Skin:     General: Skin is cool.      Capillary Refill: Capillary refill takes more than 3 seconds.       Vents:  Vent Mode: A/C (22)  Ventilator Initiated: Yes (22 1258)  Set Rate: 30 BPM (22)  Vt Set: 450 mL (22)  Pressure Support: 0 cmH20 (22)  PEEP/CPAP: (S) 8 cmH20 (22)  Oxygen Concentration (%): (S) 40 (22)  Peak Airway Pressure: 24 cmH2O (22)  Plateau Pressure: 21 cmH20 (22)  Total Ve: 14.2 mL (22)  F/VT Ratio<105 (RSBI): (!) 50 (22)    Lines/Drains/Airways       Central Venous Catheter Line  Duration             Percutaneous Central Line Insertion/Assessment - Triple Lumen  22 1200 right internal jugular 2 days              Drain  Duration                  NG/OG Tube 22 1600 2 days         Urethral Catheter 22 1105 Straight-tip;Silicone 16 Fr. 2 days              Airway  Duration                  Airway - Non-Surgical 22 1051 Endotracheal Tube 2 days              Arterial Line  Duration             Arterial Line 22 1050 Right Radial 2 days                    Significant Labs:    CBC/Anemia Profile:  Recent Labs   Lab 22  0410 22  1010 22  1838 22  0416 22  0719   WBC 18.70*  --   --  23.60*  --    HGB 10.3*  --   --  9.6*  --    HCT 34.9*   < > 30* 33.7* 31*     --   --  148*  --    MCV 75*  --   --  77*  --    RDW 23.0*  --   --  23.1*  --     < > = values in this interval not displayed.          Chemistries:  Recent Labs   Lab 22  0410 22  1305 22  0416    137 136   K 5.2* 4.8 5.0   CL 98 94* 97   CO2 17* 25 16*   BUN 49* 54* 59*   CREATININE 3.6* 3.8* 4.5*   CALCIUM 5.9* 6.7* 6.4*   ALBUMIN 1.7*  --  1.6*   PROT 4.4*  --  3.7*   BILITOT 0.9  --  1.3*    ALKPHOS 62  --  106   ALT 65*  --  173*   AST 95*  --  304*   MG 1.8  --  2.0         All pertinent labs within the past 24 hours have been reviewed.    Significant Imaging:  I have reviewed all pertinent imaging results/findings within the past 24 hours.      ABG  Recent Labs   Lab 05/07/22  0719   PH 7.296*   PO2 213*   PCO2 38.7   HCO3 18.9*   BE -8     Assessment/Plan:     Pulmonary  Pneumonia of left lower lobe due to infectious organism  Blood and sputum cultures ordered and pending  abx adjusted by ID, now invanz, zyvox, mycafungin  OET suctioning PRN    Acute hypoxemic respiratory failure on mechanical vent  Vent settings reviewed and adjusted to optimize gas exchange  VAP prophylaxis  ABG daily and prn to assess response to therapy  SAT/SBT once gi tract in continuity    Cardiac/Vascular  Atrial fibrillation with RVR  New onset 5/6  Converted to SR on amiodarone infusion; maintain while bowel in discontinuity  Continue cardiac monitoring    Renal/  JOSE (acute kidney injury)  S/t septic shock  Adequate volume resuscitation   Avoid nephrotoxins  Strict I/O  IV bicarb infusion for acidosis; attempted to discontinue last night but resumed this am  No acute indication for dialysis but high potential for continued decline, monitor chemistry; daughter will not pursue dialysis if decline per her father's wishes    ID  * Septic shock  Secondary to Peritonitis from bowel perforation with major fecal contamination  Blood and sputum cultures ngtd  Wbc rising  Continue zyvox, invanz and Micafungin  Continue IV bicarb infusion  Cont Vasopressin and titrate pressor for MAP > 75  ICU hemodynamic monitoring  Echo reviewed    Oncology  Acute on chronic anemia  Received 2 units PRBCs in ED and 2 more in OR on 5/4  CBC stable   Monitor wound vac output and daily CBC with Conservative transfusion protocol    Endocrine  Hyperglycemia, unspecified  SSI prn with monitoring for glucose control and prevention of insulin  toxicity    Obesity (BMI 30.0-34.9)  Will encourage weight loss once/if survives and extubated and fully awake/alert    GI  Mass of colon  Found on exploration along with palpable liver mass  Plan bx vs excision of cecal mass to obtain pathology at time of ostomy and bowel re-anastomosis  High concern for metastatic malignancy    Small bowel perforation with large Cecal mass   5/4 Expl Lap and SB excision with washout and AB thera wound vac for bowel left in discontinuity  hx Diverticulosis but cecal mass and suspected metastatic lesions found in exploration  Plan return to OR today for washout and wound vac replacement  IVAB for peritonitis/sepsis  NPO and IVFs with NG to suction    Palliative Care  Pt is DNR, discussed at length with surgeon prior to surgery and he consented to intubation for surgery with understanding of likely need for prolonged vent support post op until bowel/abdomen closed. He was clear that he would not desire long term vent support past that necessary for immediate post op recovery.   Daughter Claudia is SDM and is aware and supportive of his wishes. IF at any point his survival chances become poor or prolonged life support is anticipated she would honor his wish and transition to comfort focused care.  5/6 - discussed status with daughter. She expressed again understanding of her father's wishes for very limited life support and further stated today that after time to reflect on current status, in the event of continued decline she would not want to pursue potential dialysis but if kidneys fail and indications for RRT exist she would at that time desire transition to a full comfort care focus. She would hope that he could be extubated to comfort focus and have opportunity to interact with family but verbalizes understanding that this may not be possible given open abdomen and comfort goal.  Continue daily and prn updates     Critical Care Daily Checklist:    A: Awake: RASS Goal/Actual  Goal: RASS Goal: -3-->moderate sedation  Actual: German Agitation Sedation Scale (RASS): Moderate sedation   B: Spontaneous Breathing Trial Performed?     C: SAT & SBT Coordinated?  Not candidate today                      D: Delirium: CAM-ICU Overall CAM-ICU: Positive   E: Early Mobility Performed? Yes   F: Feeding Goal:    Status:     Current Diet Order   Procedures    Diet NPO      AS: Analgesia/Sedation Fentanyl infusion   T: Thromboembolic Prophylaxis heparin   H: HOB > 300 Yes   U: Stress Ulcer Prophylaxis (if needed) pepcid   G: Glucose Control As above   B: Bowel Function     I: Indwelling Catheter (Lines & Doherty) Necessity reviewed   D: De-escalation of Antimicrobials/Pharmacotherapies reviewed    Plan for the day/ETD Supportive care as above    Code Status:  Family/Goals of Care: DNR  Pending hospital course; see palliative discussion above   I have discussed case and plan of care in detail with Dr Huynh and Dr Parker; Status and plan of care were discussed with team on multidisciplinary rounds.    Critical Care Time: 71 minutes  Critical secondary to septic shock, JOSE, hypocalcemia, mechanical vent and pressor support  Critical care was time spent personally by me on the following activities: development of treatment plan with patient or surrogate and bedside caregivers, discussions with consultants, evaluation of patient's response to treatment, examination of patient, ordering and performing treatments and interventions, ordering and review of laboratory studies, ordering and review of radiographic studies, pulse oximetry, re-evaluation of patient's condition. This critical care time did not overlap with that of any other provider or involve time for any procedures.     KATHARINE Miranda-BC  Critical Care Medicine  [facility] ([facility])

## 2022-05-07 NOTE — ASSESSMENT & PLAN NOTE
S/p exploratory laparotomy, abdominal washout, segmental small bowel resection (left in discontinuity), placement of Abthera vac 5/4/22    - Continue NG tube to LIS. NO meds or feeds (patient is in bowel discontinuity)  - Continue ICU management. Currently still requiring vasopressor support.  - Strict I/Os  - Medical and ICU management per hospital/ICU team    Too unstable for right hemicolectomy, end ileostomy, liver biopsy today. Will plan for abdominal washout in the OR with Abthera replacement today. If necrotic bowel is found, will resect. Will remain in bowel discontinuity until able to return to OR again when more stable.  Discussed with daughter at bedside.

## 2022-05-07 NOTE — ASSESSMENT & PLAN NOTE
Found on exploration along with palpable liver mass  Plan bx vs excision of cecal mass to obtain pathology at time of ostomy and bowel re-anastomosis  High concern for metastatic malignancy

## 2022-05-07 NOTE — PLAN OF CARE
Converted to NSR at ~ 2345. Amio gtt per order. Titration of pressors per order, see MAR. Tolerated vent settings. Abdomen wound vac dressing intact with serous output. Oliguric output to norwood catheter, providers aware. Light bed clothing to maintain normothermia. BSWR intact per order. Tolerated turning q2h with wedge. POC reviewed with patient. Safety and fall precautions maintained.

## 2022-05-07 NOTE — ANESTHESIA PREPROCEDURE EVALUATION
05/07/2022  Franky Masters is a 66 y.o., male.    Patient Active Problem List   Diagnosis    Acute hypoxemic respiratory failure on mechanical vent    Colitis    Pneumonia due to COVID-19 virus    Acute on chronic anemia    Obesity (BMI 30.0-34.9)    Elevated d-dimer    Hypoalbuminemia    Elevated liver enzymes    Elevated troponin    Small bowel perforation with large Cecal mass     Septic shock    Hyperglycemia, unspecified    Mass of colon    Pneumonia of left lower lobe due to infectious organism    On mechanically assisted ventilation    JOSE (acute kidney injury)    Atrial fibrillation with RVR     Past Surgical History:   Procedure Laterality Date    APPLICATION OF WOUND VACUUM-ASSISTED CLOSURE DEVICE N/A 5/4/2022    Procedure: APPLICATION, WOUND VAC;  Surgeon: Elvie Parker DO;  Location: [facility];  Service: General;  Laterality: N/A;       Pre-op Assessment    I have reviewed the Patient Summary Reports.     I have reviewed the Nursing Notes. I have reviewed the NPO Status.   I have reviewed the Medications.     Review of Systems  Anesthesia Hx:  No problems with previous Anesthesia Previous RSI with Grade 1 view with Muro 2. History of prior surgery of interest to airway management or planning: Previous anesthesia: General   Social:  Non-Smoker    Hematology/Oncology:         -- Anemia:   Cardiovascular:  Cardiovascular Normal  Acute Afib with RVR and further hypotension, treated with amio and tay.   Pulmonary:  Pulmonary Normal Acute hypoxic resp failure, intubated,sedated full vent support.   Renal/:   Chronic Renal Disease, ARF    Hepatic/GI:  Hepatic/GI Normal Small bowel perf due to large cecal mass with prob liver mets.  S/P resection ,   Wound vac.  Septic shock, on levo and bicarb.   Neurological:  Neurology Normal    Endocrine:  Endocrine Normal  Obesity / BMI >  "30      Physical Exam  General: Unconscious    Airway:  Mallampati: II   Mouth Opening: Normal  TM Distance: Normal  Tongue: Normal  Neck ROM: Normal ROM  Pre-Existing Airway: Oral Endotracheal tube    Dental:  Dentures    Chest/Lungs:  Rhonchi    Heart:  Rhythm: Irregularly Irregular    Abdomen:Wound vac      Anesthesia Plan  Type of Anesthesia, risks & benefits discussed:    Anesthesia Type: Gen ETT  Intra-op Monitoring Plan: Standard ASA Monitors  Post Op Pain Control Plan: multimodal analgesia  Induction:  IV  Informed Consent: Informed consent signed with the Patient representative and all parties understand the risks and agree with anesthesia plan.  All questions answered.   ASA Score: 4 Emergent  Day of Surgery Review of History & Physical: H&P Update referred to the surgeon/provider.  Anesthesia Plan Notes: Spoke with pt's daughter at length about treatment in OR.  No CPR or "shocks".      Ready For Surgery From Anesthesia Perspective.     .        Chemistry        Component Value Date/Time     05/07/2022 0416    K 5.0 05/07/2022 0416    CL 97 05/07/2022 0416    CO2 16 (L) 05/07/2022 0416    BUN 59 (H) 05/07/2022 0416    CREATININE 4.5 (H) 05/07/2022 0416    GLU 77 05/07/2022 0416        Component Value Date/Time    CALCIUM 6.4 (LL) 05/07/2022 0416    ALKPHOS 106 05/07/2022 0416     (H) 05/07/2022 0416     (H) 05/07/2022 0416    BILITOT 1.3 (H) 05/07/2022 0416    ESTGFRAFRICA 15 (A) 05/07/2022 0416    EGFRNONAA 13 (A) 05/07/2022 0416        Lab Results   Component Value Date    WBC 23.60 (H) 05/07/2022    HGB 9.6 (L) 05/07/2022    HCT 31 (L) 05/07/2022    MCV 77 (L) 05/07/2022     (L) 05/07/2022     Sinus rhythm with Premature atrial complexes with Aberrant conduction   Left axis deviation   Low voltage QRS   Cannot rule out Anteroseptal infarct ,age undetermined   T wave abnormality, consider lateral ischemia   Abnormal ECG   No previous ECGs available   Confirmed by DAJUAN KHANNA, " ALEJANDRO WRIGHT ([id number]) on 8/7/2021 4:46:13 AM

## 2022-05-07 NOTE — SUBJECTIVE & OBJECTIVE
Interval History: remains Intubated, sedated on Vent- Went into Afib w RVR- hence added Amio to Levo and Vasopressin- back in NSR. Getting Invanz and Zyvox plus Micafungin. Dr. Parker took him back to OR for for abdominal washout ( 3-3.5 L feculent fluid with food particles suctioned out in the OR, small bowel appeared better) and replaced Abthera- still has bowel discontinuity. His WBC, Lactate and Cr have all increased. Family updated about his critical condition and poor prognosis and JOSE/ATN- they are considering Comfort measures.     Review of Systems   Unable to perform ROS: Intubated   Objective:     Vital Signs (Most Recent):  Temp: 98.42 °F (36.9 °C) (05/07/22 1600)  Pulse: 64 (05/07/22 1600)  Resp: (!) 30 (05/07/22 1600)  BP: 133/63 (05/07/22 1600)  SpO2: 100 % (05/07/22 1600)   Vital Signs (24h Range):  Temp:  [98.06 °F (36.7 °C)-100.76 °F (38.2 °C)] 98.42 °F (36.9 °C)  Pulse:  [] 64  Resp:  [27-34] 30  SpO2:  [95 %-100 %] 100 %  BP: ()/(57-86) 133/63  Arterial Line BP: ()/(-43-81) 136/65     Weight: 111.1 kg (245 lb)  Body mass index is 36.18 kg/m².    Intake/Output Summary (Last 24 hours) at 5/7/2022 1706  Last data filed at 5/7/2022 1500  Gross per 24 hour   Intake 3908 ml   Output 1250 ml   Net 2658 ml      Physical Exam  Vitals and nursing note reviewed.   Constitutional:       General: He is not in acute distress.     Appearance: He is obese. He is ill-appearing.      Interventions: He is sedated, intubated and restrained.   HENT:      Head: Atraumatic.   Eyes:      General: No scleral icterus.     Conjunctiva/sclera: Conjunctivae normal.   Neck:      Vascular: No JVD.   Cardiovascular:      Rate and Rhythm: Normal rate and regular rhythm.      Pulses:           Radial pulses are 1+ on the right side and 1+ on the left side.        Dorsalis pedis pulses are 1+ on the right side and 1+ on the left side.   Pulmonary:      Effort: He is intubated.      Breath sounds: Decreased breath  sounds present. No wheezing or rhonchi.   Abdominal:      General: Bowel sounds are absent.       Musculoskeletal:      Right lower le+ Pitting Edema present.      Left lower le+ Pitting Edema present.   Skin:     General: Skin is cool.      Capillary Refill: Capillary refill takes more than 3 seconds.     Flow (L/min): 4  Vent Mode: A/C  Oxygen Concentration (%):  [40-50] 40  Resp Rate Total:  [30 br/min-34 br/min] 30 br/min  Vt Set:  [450 mL] 450 mL  PEEP/CPAP:  [8 cmH20-10 cmH20] 8 cmH20  Pressure Support:  [0 cmH20] 0 cmH20  Mean Airway Pressure:  [14 gsR03-93 cmH20] 15 cmH20  Temp:  [98.06 °F (36.7 °C)-100.76 °F (38.2 °C)] 98.42 °F (36.9 °C)  Pulse:  [] 64  Resp:  [27-34] 30  SpO2:  [95 %-100 %] 100 %  BP: ()/(57-86) 133/63  Arterial Line BP: ()/(-43-81) 136/65   Art pH/pCO2/pO2/HCO3:  7.296/38.7/213/18.9 (719)  Lab Results   Component Value Date    [id number] Negative 2022    [id number] Positive (A) 2021      Recent Labs   Lab 22  1330 22  1442 22  0325 22  1150 22  0410 22  1010 22  1305 22  1547 22  0416 22  0719 22  1345   LACTATE 3.1*  --   --  5.0*  --   --   --   --   --   --  6.7*      < > 140  --  138  --  137  --  136  --   --    WBC 1.05*  --  6.16  --  18.70*  --   --   --  23.60*  --   --    GRAN 46.0  --  82.4*  5.1  --  80.0*  --   --   --  92.5*  21.8*  --   --    LYMPH 42.0  --  9.3*  0.6*  --  5.0*  --   --   --  1.7*  0.4*  --   --    HGB 10.3*  --  11.4*  --  10.3*  --   --   --  9.6*  --   --    HCT 36.6*   < > 39.6*  --  34.9*   < >  --    < > 33.7* 31*  --    BUN 23   < > 31*  --  49*  --  54*  --  59*  --   --    CREATININE 0.9   < > 2.2*  --  3.6*  --  3.8*  --  4.5*  --   --    ESTGFRAFRICA >60   < > 35*  --  19*  --  18*  --  15*  --   --    EGFRNONAA >60   < > 30*  --  16*  --  15*  --  13*  --   --    K 3.9   < > 4.4  --  5.2*  --  4.8  --  5.0  --   --    CL  114*   < > 109  --  98  --  94*  --  97  --   --    CO2 15*   < > 14*  --  17*  --  25  --  16*  --   --    MG 1.7   < > 1.4*  --  1.8  --   --   --  2.0  --   --     < > = values in this interval not displayed.     Microbiology Results (last 7 days)       Procedure Component Value Units Date/Time    Culture, Respiratory with Gram Stain [[id number]] Collected: 05/04/22 1253    Order Status: Completed Specimen: Respiratory from Endotracheal Aspirate Updated: 05/07/22 0921     Respiratory Culture No growth     Gram Stain (Respiratory) Few WBC's     Gram Stain (Respiratory) Rare Gram positive rods    Blood culture [[id number]] Collected: 05/04/22 1428    Order Status: Completed Specimen: Blood from Line, Arterial, Left Updated: 05/07/22 0613     Blood Culture, Routine No Growth to date      No Growth to date      No Growth to date    Blood culture [[id number]] Collected: 05/04/22 1429    Order Status: Completed Specimen: Blood from Line, Jugular, Internal Right Updated: 05/07/22 0613     Blood Culture, Routine No Growth to date      No Growth to date      No Growth to date          Antibiotics (From admission, onward)                Start     Stop Route Frequency Ordered    05/06/22 2100  ertapenem (INVANZ) 500 mg in sodium chloride 0.9% 100 mL IVPB         05/09 2059 IV Every 24 hours (non-standard times) 05/06/22 0742    05/05/22 2115  linezolid 600 mg/300 mL IVPB 600 mg         05/11 2114 IV Every 12 hours (non-standard times) 05/05/22 2000    05/04/22 1300  mupirocin 2 % ointment  (DECOLONIZATION PROTOCOL ORDERS)         05/09 0859 Nasl 2 times daily 05/04/22 1253          Anticoagulants       Ordered     Route Frequency Start Stop    05/04/22 1255  heparin (porcine)  (VTE Prophylaxis Orders - High Risk)         SubQ Every 8 hours 05/05/22 0600 --          Echo Saline Bubble? No  · The left ventricle is normal in size with concentric hypertrophy and   moderately decreased systolic function.  · Grade I left ventricular  diastolic dysfunction.  · Normal right ventricular size with low normal right ventricular systolic   function.  · The estimated ejection fraction is 35-40%.  · There is mild left ventricular global hypokinesis.  · Moderate mitral regurgitation.     US Retroperitoneal Complete  Narrative: EXAMINATION:  US RETROPERITONEAL COMPLETE    CLINICAL HISTORY:  JOSE with oliguria. r/o obstructive uropathy;    TECHNIQUE:  Ultrasound of the kidneys and urinary bladder was performed including color flow and Doppler evaluation of the kidneys.    COMPARISON:  None.    FINDINGS:  Right kidney: The right kidney measures 10.9 cm. No cortical thinning. No loss of corticomedullary distinction.  Normal perfusion.   No mass. No renal stone. No hydronephrosis.    Left kidney: The left kidney measures 13 cm. No cortical thinning. No loss of corticomedullary distinction.  Normal perfusion. No mass. No renal stone. No hydronephrosis.    The bladder is not distended limited evaluation with Doherty catheter noted  Impression: No significant abnormality.    Electronically signed by: Philippe Horton MD  Date:    05/05/2022  Time:    08:28  X-Ray Chest AP Portable  Narrative: EXAMINATION:  XR CHEST AP PORTABLE    CLINICAL HISTORY:  resp failure;    TECHNIQUE:  Single frontal view of the chest was performed.    COMPARISON:  05/04/2022.    FINDINGS:  Tubes and lines are satisfactory.  No pneumothorax. Patchy infiltrate suspected left lower lobe.  Cannot exclude right infrahilar infiltrate with prominence of right hilum; consider follow-up chest CT..  Small left pleural effusion. In comparison to the prior study, there is no adverse interval changes  Impression: In comparison to the prior study, there is no adverse interval changes    Electronically signed by: Philippe Horton MD  Date:    05/05/2022  Time:    08:21   Significant Labs: All pertinent labs within the past 24 hours have been reviewed.    Significant Imaging: I have reviewed all pertinent  imaging results/findings within the past 24 hours.

## 2022-05-07 NOTE — SUBJECTIVE & OBJECTIVE
Interval History:   66 year old man s/p I and D for  perforation demonstrating a large cecal mass and 3l feculant fluid in the abdominal cavity.  He is now febrile  T max 102.   05/06- remains intubated , has fever   Blood cultures -negative till date.  Review of Systems   Unable to perform ROS: Intubated   Objective:     Vital Signs (Most Recent):  Temp: 98.96 °F (37.2 °C) (05/07/22 0513)  Pulse: 72 (05/07/22 0513)  Resp: (!) 34 (05/07/22 0513)  BP: 116/63 (05/07/22 0300)  SpO2: 98 % (05/07/22 0513)   Vital Signs (24h Range):  Temp:  [98.24 °F (36.8 °C)-101.3 °F (38.5 °C)] 98.96 °F (37.2 °C)  Pulse:  [] 72  Resp:  [27-34] 34  SpO2:  [95 %-100 %] 98 %  BP: ()/(54-86) 116/63  Arterial Line BP: ()/(51-81) 123/61     Weight: 111.1 kg (245 lb)  Body mass index is 36.18 kg/m².    Estimated Creatinine Clearance: 19.6 mL/min (A) (based on SCr of 4.5 mg/dL (H)).    Physical Exam  Vitals and nursing note reviewed.   Constitutional:       Appearance: He is well-developed.   HENT:      Head: Normocephalic and atraumatic.      Nose: Nose normal.   Eyes:      Pupils: Pupils are equal, round, and reactive to light.   Cardiovascular:      Rate and Rhythm: Normal rate and regular rhythm.      Heart sounds: Normal heart sounds.   Pulmonary:      Effort: Pulmonary effort is normal. No respiratory distress.      Breath sounds: Normal breath sounds. No wheezing or rales.   Abdominal:      General: There is no distension.      Tenderness: There is no abdominal tenderness.      Comments: Wound vac noted    Musculoskeletal:         General: Normal range of motion.      Cervical back: Normal range of motion and neck supple.   Skin:     General: Skin is dry.   Neurological:      Comments: Intubated,sedated       Significant Labs: Blood Culture:   Recent Labs   Lab 05/04/22  1428 05/04/22  1429   LABBLOO No Growth to date  No Growth to date No Growth to date  No Growth to date       BMP:   Recent Labs   Lab 05/07/22  0410    GLU 77      K 5.0   CL 97   CO2 16*   BUN 59*   CREATININE 4.5*   CALCIUM 6.4*   MG 2.0       CBC:   Recent Labs   Lab 05/06/22  0410 05/06/22  1010 05/06/22  1547 05/06/22  1838 05/07/22  0416   WBC 18.70*  --   --   --  23.60*   HGB 10.3*  --   --   --  9.6*   HCT 34.9*   < > 30* 30* 33.7*     --   --   --  148*    < > = values in this interval not displayed.       CMP:   Recent Labs   Lab 05/06/22  0410 05/06/22  1305 05/07/22  0416    137 136   K 5.2* 4.8 5.0   CL 98 94* 97   CO2 17* 25 16*    98 77   BUN 49* 54* 59*   CREATININE 3.6* 3.8* 4.5*   CALCIUM 5.9* 6.7* 6.4*   PROT 4.4*  --  3.7*   ALBUMIN 1.7*  --  1.6*   BILITOT 0.9  --  1.3*   ALKPHOS 62  --  106   AST 95*  --  304*   ALT 65*  --  173*   ANIONGAP 23* 18* 23*   EGFRNONAA 16* 15* 13*         Significant Imaging: I have reviewed all pertinent imaging results/findings within the past 24 hours.

## 2022-05-07 NOTE — ASSESSMENT & PLAN NOTE
Patient stable s/p sx POD#1  Plan for washout, etc per primary service  Wound vac  Goals of care assessment  DNR    S/p SB resection- may go to surgery again tomorrow    5/7- Per Dr. Parker- pt too unstable for right hemicolectomy, end ileostomy, liver biopsy today s/p abdominal washout with Abthera replacement today.

## 2022-05-07 NOTE — ASSESSMENT & PLAN NOTE
5/4 Expl Lap and SB excision with washout and AB thera wound vac for bowel left in discontinuity  hx Diverticulosis but cecal mass and suspected metastatic lesions found in exploration  Plan return to OR today for washout and wound vac replacement  IVAB for peritonitis/sepsis  NPO and IVFs with NG to suction

## 2022-05-07 NOTE — BRIEF OP NOTE
[facility] - Intensive Care ([facility])  Brief Operative Note    SUMMARY     Surgery Date: 5/7/2022     Surgeon(s) and Role:     * Elvie Parker, DO - Primary    Assisting Surgeon: None    Pre-op Diagnosis:  Septic shock [A41.9, R65.21]  Liver mass [R16.0]  Small bowel perforation [K63.1]    Post-op Diagnosis:  Post-Op Diagnosis Codes:     * Septic shock [A41.9, R65.21]     * Liver mass [R16.0]     * Small bowel perforation [K63.1]    Procedure(s) (LRB):  LAPAROTOMY (N/A)  LAVAGE, PERITONEAL, THERAPEUTIC  Replacement of Abthera vac    Anesthesia: General    Operative Findings: Numerous bits of food particles. Pink, viable appearing small bowel. Staple line intact.    Estimated Blood Loss: 10 mL         Specimens:   Specimen (24h ago, onward)            None          [id number]

## 2022-05-07 NOTE — PROGRESS NOTES
[facility] - Intensive [facility])  Infectious Disease  Progress Note    Patient Name: Franky Masters  MRN: [id number]  Admission Date: 5/4/2022  Length of Stay: 3 days  Attending Physician: Elvie Parker DO  Primary Care Provider: HOLLY ROSEN    Isolation Status: No active isolations  Assessment/Plan:      * Septic shock  Vasopressor support as tolerated  On Ertapenem, zyvox/micafungin  Follow blood culture    JOSE (acute kidney injury)  Nephrology follow up, avoid nephrotoxic meds    Pneumonia of left lower lobe due to infectious organism  Continue Zosyn .    05/06- now on zyvox/ertapenem  Follow resp cultures    Small bowel perforation with large Cecal mass   Will continue Zosyn , continue close monitoring     05/05/22- due to persistent fever , will switch to Micafungin, Ertapenem and zyvox   Follow cultures   05/06/22- continue micafungin , zyvox/ertapenem, follow surgery for washout in AM    Acute on chronic anemia  Will transfuse as needed .  Closely monitor hemoglobin    Acute hypoxemic respiratory failure on mechanical vent  Critical care follow up         Anticipated Disposition:     Thank you for your consult. I will follow-up with patient. Please contact us if you have any additional questions.    Rai Hidalgo MD  Infectious Disease  [facility] - [facility] ([facility])    Subjective:     Principal Problem:Septic shock    HPI:   67 yo male POD#0 s/p SB perforation with surgical intervention demonstrating a large cecal mass and 3l feculant fluid in the abdominal cavity.    He is presently intubated . Operative note reviewed- perforated ileum and a liver mass.  Labs and imaging test reviewed.  Component      Latest Ref Rng & Units 5/4/2022 5/4/2022           1:30 PM  6:57 AM   WBC      3.90 - 12.70 K/uL 1.05 (LL) 2.48 (L)     Interval History:   66 year old man s/p I and D for  perforation demonstrating a large cecal mass and 3l feculant fluid in the abdominal cavity.  He is now febrile  T max 102.    05/06- remains intubated , has fever   Blood cultures -negative till date.  Review of Systems   Unable to perform ROS: Intubated   Objective:     Vital Signs (Most Recent):  Temp: 98.96 °F (37.2 °C) (05/07/22 0513)  Pulse: 72 (05/07/22 0513)  Resp: (!) 34 (05/07/22 0513)  BP: 116/63 (05/07/22 0300)  SpO2: 98 % (05/07/22 0513)   Vital Signs (24h Range):  Temp:  [98.24 °F (36.8 °C)-101.3 °F (38.5 °C)] 98.96 °F (37.2 °C)  Pulse:  [] 72  Resp:  [27-34] 34  SpO2:  [95 %-100 %] 98 %  BP: ()/(54-86) 116/63  Arterial Line BP: ()/(51-81) 123/61     Weight: 111.1 kg (245 lb)  Body mass index is 36.18 kg/m².    Estimated Creatinine Clearance: 19.6 mL/min (A) (based on SCr of 4.5 mg/dL (H)).    Physical Exam  Vitals and nursing note reviewed.   Constitutional:       Appearance: He is well-developed.   HENT:      Head: Normocephalic and atraumatic.      Nose: Nose normal.   Eyes:      Pupils: Pupils are equal, round, and reactive to light.   Cardiovascular:      Rate and Rhythm: Normal rate and regular rhythm.      Heart sounds: Normal heart sounds.   Pulmonary:      Effort: Pulmonary effort is normal. No respiratory distress.      Breath sounds: Normal breath sounds. No wheezing or rales.   Abdominal:      General: There is no distension.      Tenderness: There is no abdominal tenderness.      Comments: Wound vac noted    Musculoskeletal:         General: Normal range of motion.      Cervical back: Normal range of motion and neck supple.   Skin:     General: Skin is dry.   Neurological:      Comments: Intubated,sedated       Significant Labs: Blood Culture:   Recent Labs   Lab 05/04/22  1428 05/04/22  1429   LABBLOO No Growth to date  No Growth to date No Growth to date  No Growth to date       BMP:   Recent Labs   Lab 05/07/22  0416   GLU 77      K 5.0   CL 97   CO2 16*   BUN 59*   CREATININE 4.5*   CALCIUM 6.4*   MG 2.0       CBC:   Recent Labs   Lab 05/06/22  0410 05/06/22  1010 05/06/22  3822  05/06/22  1838 05/07/22  0416   WBC 18.70*  --   --   --  23.60*   HGB 10.3*  --   --   --  9.6*   HCT 34.9*   < > 30* 30* 33.7*     --   --   --  148*    < > = values in this interval not displayed.       CMP:   Recent Labs   Lab 05/06/22  0410 05/06/22  1305 05/07/22  0416    137 136   K 5.2* 4.8 5.0   CL 98 94* 97   CO2 17* 25 16*    98 77   BUN 49* 54* 59*   CREATININE 3.6* 3.8* 4.5*   CALCIUM 5.9* 6.7* 6.4*   PROT 4.4*  --  3.7*   ALBUMIN 1.7*  --  1.6*   BILITOT 0.9  --  1.3*   ALKPHOS 62  --  106   AST 95*  --  304*   ALT 65*  --  173*   ANIONGAP 23* 18* 23*   EGFRNONAA 16* 15* 13*         Significant Imaging: I have reviewed all pertinent imaging results/findings within the past 24 hours.

## 2022-05-07 NOTE — SUBJECTIVE & OBJECTIVE
Interval History: Intubated and sedated in the ICU. Daughter at bedside. Went into afib RVR last night. Urine output worsening.    Medications:  Continuous Infusions:   amiodarone in dextrose 5% 0.5 mg/min (05/07/22 0947)    fentanyl 250 mcg/hr (05/07/22 0600)    NORepinephrine bitartrate-D5W 0.08 mcg/kg/min (05/07/22 0925)    phenylephrine 3.5 mcg/kg/min (05/07/22 0600)    sodium bicarbonate drip 100 mL/hr at 05/07/22 0819    vasopressin 0.04 Units/min (05/07/22 0835)     Scheduled Meds:   chlorhexidine  15 mL Mouth/Throat BID    ertapenem (INVANZ) IVPB  500 mg Intravenous Q24H    famotidine (PF)  20 mg Intravenous Daily    heparin (porcine)  5,000 Units Subcutaneous Q8H    linezolid  600 mg Intravenous Q12H    micafungin (MYCAMINE) IVPB  100 mg Intravenous Q24H    mupirocin   Nasal BID    white petrolatum-mineral oil 57.3-42.5%   Both Eyes QHS     PRN Meds:sodium chloride, sodium chloride 0.9%, acetaminophen, dextrose 10%, dextrose 10%, glucagon (human recombinant), insulin aspart U-100, lorazepam, ondansetron     Review of patient's allergies indicates:  No Known Allergies  Objective:     Vital Signs (Most Recent):  Temp: 99.32 °F (37.4 °C) (05/07/22 1111)  Pulse: 62 (05/07/22 1111)  Resp: (!) 30 (05/07/22 1111)  BP: 125/71 (05/07/22 0500)  SpO2: 100 % (05/07/22 1111)   Vital Signs (24h Range):  Temp:  [98.24 °F (36.8 °C)-101.12 °F (38.4 °C)] 99.32 °F (37.4 °C)  Pulse:  [] 62  Resp:  [27-34] 30  SpO2:  [95 %-100 %] 100 %  BP: ()/(54-86) 125/71  Arterial Line BP: ()/(53-81) 145/73     Weight: 111.1 kg (245 lb)  Body mass index is 36.18 kg/m².    Intake/Output - Last 3 Shifts         05/05 0700  05/06 0659 05/06 0700  05/07 0659 05/07 0700  05/08 0659    I.V. (mL/kg) 4424.3 (39.8) 5094 (45.9)     Blood       IV Piggyback 701.5 1126.5     Total Intake(mL/kg) 5125.8 (46.1) 6220.5 (56)     Urine (mL/kg/hr) 325 (0.1) 330 (0.1)     Drains 100 300     Other 500 800     Total Output 925 1430     Net  +4200.8 +4790.5                    Physical Exam  Vitals and nursing note reviewed.   Constitutional:       Appearance: He is obese. He is ill-appearing and toxic-appearing.   Cardiovascular:      Rate and Rhythm: Regular rhythm.   Pulmonary:      Comments: Mechanical breath sounds  Abdominal:      Palpations: Abdomen is soft.      Comments: Abthera vac in place holding suction, serous output   Genitourinary:     Comments: Doherty with dark michele urine  Musculoskeletal:      Right lower leg: Edema present.      Left lower leg: Edema present.   Neurological:      Comments: Sedated       Significant Labs:  I have reviewed all pertinent lab results within the past 24 hours.  CBC:   Recent Labs   Lab 05/07/22 0416 05/07/22 0719   WBC 23.60*  --    RBC 4.38*  --    HGB 9.6*  --    HCT 33.7* 31*   *  --    MCV 77*  --    MCH 21.9*  --    MCHC 28.5*  --      CMP:   Recent Labs   Lab 05/07/22 0416   GLU 77   CALCIUM 6.4*   ALBUMIN 1.6*   PROT 3.7*      K 5.0   CO2 16*   CL 97   BUN 59*   CREATININE 4.5*   ALKPHOS 106   *   *   BILITOT 1.3*     Coagulation:   Recent Labs   Lab 05/04/22  0805 05/05/22  0325   LABPROT 16.5* 12.0   INR 1.6* 1.1   APTT 33.2*  --      ABGs:   Recent Labs   Lab 05/07/22 0719   PH 7.296*   PCO2 38.7   PO2 213*   HCO3 18.9*   POCSATURATED 100   BE -8       Significant Diagnostics:  I have reviewed all pertinent imaging results/findings within the past 24 hours.

## 2022-05-08 LAB
ALBUMIN SERPL BCP-MCNC: 1.3 G/DL (ref 3.5–5.2)
ALLENS TEST: ABNORMAL
ALP SERPL-CCNC: 114 U/L (ref 55–135)
ALT SERPL W/O P-5'-P-CCNC: 563 U/L (ref 10–44)
ANION GAP SERPL CALC-SCNC: 18 MMOL/L (ref 8–16)
ANION GAP SERPL CALC-SCNC: 22 MMOL/L (ref 8–16)
AST SERPL-CCNC: 900 U/L (ref 10–40)
BASOPHILS # BLD AUTO: 0.11 K/UL (ref 0–0.2)
BASOPHILS NFR BLD: 0.9 % (ref 0–1.9)
BILIRUB SERPL-MCNC: 0.9 MG/DL (ref 0.1–1)
BUN SERPL-MCNC: 69 MG/DL (ref 8–23)
BUN SERPL-MCNC: 75 MG/DL (ref 8–23)
CALCIUM SERPL-MCNC: 6 MG/DL (ref 8.7–10.5)
CALCIUM SERPL-MCNC: 6 MG/DL (ref 8.7–10.5)
CHLORIDE SERPL-SCNC: 92 MMOL/L (ref 95–110)
CHLORIDE SERPL-SCNC: 93 MMOL/L (ref 95–110)
CO2 SERPL-SCNC: 18 MMOL/L (ref 23–29)
CO2 SERPL-SCNC: 23 MMOL/L (ref 23–29)
CREAT SERPL-MCNC: 4.6 MG/DL (ref 0.5–1.4)
CREAT SERPL-MCNC: 4.7 MG/DL (ref 0.5–1.4)
DELSYS: ABNORMAL
DIFFERENTIAL METHOD: ABNORMAL
EOSINOPHIL # BLD AUTO: 0.1 K/UL (ref 0–0.5)
EOSINOPHIL NFR BLD: 0.6 % (ref 0–8)
ERYTHROCYTE [DISTWIDTH] IN BLOOD BY AUTOMATED COUNT: 23.9 % (ref 11.5–14.5)
ERYTHROCYTE [SEDIMENTATION RATE] IN BLOOD BY WESTERGREN METHOD: 30 MM/H
EST. GFR  (AFRICAN AMERICAN): 14 ML/MIN/1.73 M^2
EST. GFR  (AFRICAN AMERICAN): 14 ML/MIN/1.73 M^2
EST. GFR  (NON AFRICAN AMERICAN): 12 ML/MIN/1.73 M^2
EST. GFR  (NON AFRICAN AMERICAN): 12 ML/MIN/1.73 M^2
FIO2: 40
GLUCOSE SERPL-MCNC: 116 MG/DL (ref 70–110)
GLUCOSE SERPL-MCNC: 95 MG/DL (ref 70–110)
HCO3 UR-SCNC: 24.4 MMOL/L (ref 24–28)
HCT VFR BLD AUTO: 28 % (ref 40–54)
HGB BLD-MCNC: 8.5 G/DL (ref 14–18)
IMM GRANULOCYTES # BLD AUTO: 0.18 K/UL (ref 0–0.04)
IMM GRANULOCYTES NFR BLD AUTO: 1.5 % (ref 0–0.5)
LYMPHOCYTES # BLD AUTO: 0.9 K/UL (ref 1–4.8)
LYMPHOCYTES NFR BLD: 7.3 % (ref 18–48)
MAGNESIUM SERPL-MCNC: 1.7 MG/DL (ref 1.6–2.6)
MAGNESIUM SERPL-MCNC: 1.8 MG/DL (ref 1.6–2.6)
MCH RBC QN AUTO: 21.6 PG (ref 27–31)
MCHC RBC AUTO-ENTMCNC: 30.4 G/DL (ref 32–36)
MCV RBC AUTO: 71 FL (ref 82–98)
MODE: ABNORMAL
MONOCYTES # BLD AUTO: 0.4 K/UL (ref 0.3–1)
MONOCYTES NFR BLD: 3.3 % (ref 4–15)
NEUTROPHILS # BLD AUTO: 10.6 K/UL (ref 1.8–7.7)
NEUTROPHILS NFR BLD: 86.4 % (ref 38–73)
NRBC BLD-RTO: 1 /100 WBC
PCO2 BLDA: 31.1 MMHG (ref 35–45)
PEEP: 8
PH SMN: 7.5 [PH] (ref 7.35–7.45)
PLATELET # BLD AUTO: 96 K/UL (ref 150–450)
PLATELET BLD QL SMEAR: ABNORMAL
PMV BLD AUTO: ABNORMAL FL (ref 9.2–12.9)
PO2 BLDA: 125 MMHG (ref 80–100)
POC BE: 1 MMOL/L
POC SATURATED O2: 99 % (ref 95–100)
POCT GLUCOSE: 127 MG/DL (ref 70–110)
POCT GLUCOSE: 129 MG/DL (ref 70–110)
POCT GLUCOSE: 130 MG/DL (ref 70–110)
POCT GLUCOSE: 89 MG/DL (ref 70–110)
POCT GLUCOSE: 93 MG/DL (ref 70–110)
POCT GLUCOSE: 94 MG/DL (ref 70–110)
POCT GLUCOSE: 96 MG/DL (ref 70–110)
POTASSIUM SERPL-SCNC: 4.9 MMOL/L (ref 3.5–5.1)
POTASSIUM SERPL-SCNC: 5.1 MMOL/L (ref 3.5–5.1)
PROT SERPL-MCNC: 3.9 G/DL (ref 6–8.4)
RBC # BLD AUTO: 3.93 M/UL (ref 4.6–6.2)
SAMPLE: ABNORMAL
SITE: ABNORMAL
SODIUM SERPL-SCNC: 132 MMOL/L (ref 136–145)
SODIUM SERPL-SCNC: 134 MMOL/L (ref 136–145)
VT: 450
WBC # BLD AUTO: 12.29 K/UL (ref 3.9–12.7)

## 2022-05-08 PROCEDURE — 99291 PR CRITICAL CARE, E/M 30-74 MINUTES: ICD-10-PCS | Mod: ,,, | Performed by: NURSE PRACTITIONER

## 2022-05-08 PROCEDURE — 63600175 PHARM REV CODE 636 W HCPCS: Performed by: SURGERY

## 2022-05-08 PROCEDURE — 25000003 PHARM REV CODE 250: Performed by: SURGERY

## 2022-05-08 PROCEDURE — 80053 COMPREHEN METABOLIC PANEL: CPT | Performed by: SURGERY

## 2022-05-08 PROCEDURE — 94761 N-INVAS EAR/PLS OXIMETRY MLT: CPT

## 2022-05-08 PROCEDURE — 82803 BLOOD GASES ANY COMBINATION: CPT

## 2022-05-08 PROCEDURE — 99900035 HC TECH TIME PER 15 MIN (STAT)

## 2022-05-08 PROCEDURE — 20000000 HC ICU ROOM

## 2022-05-08 PROCEDURE — 99291 CRITICAL CARE FIRST HOUR: CPT | Mod: ,,, | Performed by: NURSE PRACTITIONER

## 2022-05-08 PROCEDURE — 83735 ASSAY OF MAGNESIUM: CPT | Performed by: SURGERY

## 2022-05-08 PROCEDURE — 94003 VENT MGMT INPAT SUBQ DAY: CPT

## 2022-05-08 PROCEDURE — 63600175 PHARM REV CODE 636 W HCPCS: Mod: JG | Performed by: SURGERY

## 2022-05-08 PROCEDURE — 80048 BASIC METABOLIC PNL TOTAL CA: CPT | Mod: XB | Performed by: NURSE PRACTITIONER

## 2022-05-08 PROCEDURE — 63600175 PHARM REV CODE 636 W HCPCS: Performed by: NURSE PRACTITIONER

## 2022-05-08 PROCEDURE — 85025 COMPLETE CBC W/AUTO DIFF WBC: CPT | Performed by: SURGERY

## 2022-05-08 RX ORDER — FUROSEMIDE 10 MG/ML
80 INJECTION INTRAMUSCULAR; INTRAVENOUS ONCE
Status: COMPLETED | OUTPATIENT
Start: 2022-05-08 | End: 2022-05-08

## 2022-05-08 RX ORDER — MAGNESIUM SULFATE HEPTAHYDRATE 40 MG/ML
2 INJECTION, SOLUTION INTRAVENOUS ONCE
Status: COMPLETED | OUTPATIENT
Start: 2022-05-08 | End: 2022-05-08

## 2022-05-08 RX ADMIN — Medication 0.04 UNITS/MIN: at 08:05

## 2022-05-08 RX ADMIN — LINEZOLID 600 MG: 600 INJECTION, SOLUTION INTRAVENOUS at 10:05

## 2022-05-08 RX ADMIN — LINEZOLID 600 MG: 600 INJECTION, SOLUTION INTRAVENOUS at 08:05

## 2022-05-08 RX ADMIN — AMIODARONE HYDROCHLORIDE 0.5 MG/MIN: 1.8 INJECTION, SOLUTION INTRAVENOUS at 11:05

## 2022-05-08 RX ADMIN — MUPIROCIN: 20 OINTMENT TOPICAL at 09:05

## 2022-05-08 RX ADMIN — CHLORHEXIDINE GLUCONATE 0.12% ORAL RINSE 15 ML: 1.2 LIQUID ORAL at 08:05

## 2022-05-08 RX ADMIN — MAGNESIUM SULFATE 2 G: 2 INJECTION INTRAVENOUS at 04:05

## 2022-05-08 RX ADMIN — HEPARIN SODIUM 5000 UNITS: 5000 INJECTION INTRAVENOUS; SUBCUTANEOUS at 09:05

## 2022-05-08 RX ADMIN — MICAFUNGIN SODIUM 100 MG: 100 INJECTION, POWDER, LYOPHILIZED, FOR SOLUTION INTRAVENOUS at 02:05

## 2022-05-08 RX ADMIN — HEPARIN SODIUM 5000 UNITS: 5000 INJECTION INTRAVENOUS; SUBCUTANEOUS at 02:05

## 2022-05-08 RX ADMIN — LORAZEPAM 2 MG: 2 INJECTION INTRAMUSCULAR; INTRAVENOUS at 08:05

## 2022-05-08 RX ADMIN — ERTAPENEM 500 MG: 1 INJECTION INTRAMUSCULAR; INTRAVENOUS at 09:05

## 2022-05-08 RX ADMIN — NOREPINEPHRINE BITARTRATE 0.08 MCG/KG/MIN: 1 INJECTION, SOLUTION, CONCENTRATE INTRAVENOUS at 09:05

## 2022-05-08 RX ADMIN — Medication 250 MCG/HR: at 05:05

## 2022-05-08 RX ADMIN — HEPARIN SODIUM 5000 UNITS: 5000 INJECTION INTRAVENOUS; SUBCUTANEOUS at 06:05

## 2022-05-08 RX ADMIN — Medication 0.04 UNITS/MIN: at 04:05

## 2022-05-08 RX ADMIN — FAMOTIDINE 20 MG: 10 INJECTION INTRAVENOUS at 08:05

## 2022-05-08 RX ADMIN — MINERAL OIL, PETROLATUM: 425; 573 OINTMENT OPHTHALMIC at 09:05

## 2022-05-08 RX ADMIN — CHLORHEXIDINE GLUCONATE 0.12% ORAL RINSE 15 ML: 1.2 LIQUID ORAL at 09:05

## 2022-05-08 RX ADMIN — Medication 250 MCG/HR: at 07:05

## 2022-05-08 RX ADMIN — NOREPINEPHRINE BITARTRATE 0.08 MCG/KG/MIN: 1 INJECTION, SOLUTION, CONCENTRATE INTRAVENOUS at 10:05

## 2022-05-08 RX ADMIN — FUROSEMIDE 80 MG: 10 INJECTION, SOLUTION INTRAMUSCULAR; INTRAVENOUS at 12:05

## 2022-05-08 NOTE — PROGRESS NOTES
Care assumed. Pt supine . ABD incision with wound vac secured. Vent settings confirmed. Fluids managed and confirmed. Safety maintained. POC with daughter.

## 2022-05-08 NOTE — ASSESSMENT & PLAN NOTE
Worsening  Trend  Cr 2.2 today    Cr now 3.8- Oliguric- heading towards Anuria and ATN/ May need HD vs CRRT- she has massive fluid Overload.     Cr now 4.5-  Remains anuric  POA/ Family not in favor of HD    Remains Oliguric due to Septic Shock on 2 Pressors

## 2022-05-08 NOTE — PROGRESS NOTES
[facility] ([facility])  Critical Care Medicine  Progress Note    Patient Name: Franky Masters  MRN: [id number]  Admission Date: 5/4/2022  Hospital Length of Stay: 4 days  Code Status: DNR  Attending Provider: Elvie Parker DO  Primary Care Provider: HOLLY ROSEN   Principal Problem: Septic shock    Subjective:     HPI:  Ms Masters is a 65 yo obese male with a PMH of diverticulosis and hx of hospitalization in Aug 2021 with COVID PNA and Hypoxic Resp Failure but did not require ICU or intubation.  She presented early this AM to [facility] ED about 0515 hr via EMS with complaint of abd pain X 2 hours that awakened her from sleep and had associated N/V/D.  In ED BP 86/46, RA SAT 92%, LA 8, Hgb 7.2 and CT Abd with suspected bowel perforation and free air.  General Surgery consulted and taken to OR this AM revealing SB perf with 3 L feculent fluid and food in cavity and large obstructing cecal mass with perf ileum and palpable liver mass.  Had Expl Lap with washout and excision of SB with wound vac placement admitted post op to ICU intubated on mech ventilation.  Received 2 units PRBCs in ED and another 2 units in OR also on Levophed and Vasopressin infusions.  Before surgery patient was insistent he be DNR post op but consented to surgery and invasive mech ventilation but no ACLS post op in event of cardiac arrest and no prolonged mech ventilation. Reportedly patient does not routinely follow with practitioner as outpt.       Hospital/ICU Course:  5/4 - Admitted to ICU sedated and intubated on Levophed and Vasopressin infusions in no distress  5/5 - remains intubated and sedated on mechanical vent and pressor support; scant urine output overnight and creatinine rise to 2.2 with fluid balance +8.9L  5/6 - remains intubated and sedated on mechanical vent with decreasing pressor demand; still oliguric with creatinine up to 3.6, K 5.2; fluid balance +13L; now with bigeminy and cardiac rhythm changes, K  stable on abg but iCa 0.78  5/7 - remains intubated and sedated with open abdomen to abthera wound vac and pressor support; overnight atrial fib with RVR, now on amio infusion with SR 68 on monitor; plan to OR for washout and vac replacement today  5/8 - remains intubated, sedated with ab thera wound vac to open abdomen, mechanical ventilation, and 2 pressor support. SR on monitor, on amiodarone infusion. Tmax 100.2, wbc down at 12.3k, creatinine rising 4.6,urine output scant, K 5.1      Review of Systems   Unable to perform ROS: Intubated     Objective:     Vital Signs (Most Recent):  Temp: 99.5 °F (37.5 °C) (05/08/22 0727)  Pulse: 62 (05/08/22 0727)  Resp: (!) 30 (05/08/22 0727)  BP: 111/63 (05/08/22 0600)  SpO2: 100 % (05/08/22 0727)   Vital Signs (24h Range):  Temp:  [97.52 °F (36.4 °C)-100.22 °F (37.9 °C)] 99.5 °F (37.5 °C)  Pulse:  [59-70] 62  Resp:  [18-31] 30  SpO2:  [89 %-100 %] 100 %  BP: ()/(52-76) 111/63  Arterial Line BP: (101-145)/(-43-72) 121/71     Weight: 117 kg (257 lb 15 oz)  Body mass index is 38.09 kg/m².      Intake/Output Summary (Last 24 hours) at 5/8/2022 0757  Last data filed at 5/8/2022 0600  Gross per 24 hour   Intake 5264.42 ml   Output 1320 ml   Net 3944.42 ml        amiodarone in dextrose 5% 0.5 mg/min (05/08/22 0600)    fentanyl 250 mcg/hr (05/08/22 0708)    NORepinephrine bitartrate-D5W 0.1 mcg/kg/min (05/08/22 0600)    phenylephrine Stopped (05/07/22 1006)    vasopressin 0.04 Units/min (05/08/22 0600)       Physical Exam  Vitals and nursing note reviewed.   Constitutional:       General: He is not in acute distress.     Appearance: He is obese. He is ill-appearing.      Interventions: He is sedated, intubated and restrained.   HENT:      Head: Atraumatic.   Eyes:      General: No scleral icterus.     Conjunctiva/sclera: Conjunctivae normal.   Neck:      Vascular: No JVD.   Cardiovascular:      Rate and Rhythm: Normal rate and regular rhythm.      Pulses:           Radial  pulses are 1+ on the right side and 1+ on the left side.        Dorsalis pedis pulses are 1+ on the right side and 1+ on the left side.   Pulmonary:      Effort: He is intubated.      Breath sounds: Decreased breath sounds present. No wheezing or rhonchi.   Abdominal:      General: Bowel sounds are absent.       Musculoskeletal:      Right lower le+ Pitting Edema present.      Left lower le+ Pitting Edema present.   Skin:     General: Skin is cool.      Capillary Refill: Capillary refill takes more than 3 seconds.       Vents:  Vent Mode: A/C (22)  Ventilator Initiated: Yes (22 1258)  Set Rate: 30 BPM (22)  Vt Set: 450 mL (22)  Pressure Support: 0 cmH20 (22)  PEEP/CPAP: 8 cmH20 (22)  Oxygen Concentration (%): 40 (22)  Peak Airway Pressure: 29 cmH2O (22)  Plateau Pressure: 23 cmH20 (22)  Total Ve: 14.3 mL (22)  F/VT Ratio<105 (RSBI): (!) 60.61 (22)    Lines/Drains/Airways       Central Venous Catheter Line  Duration             Percutaneous Central Line Insertion/Assessment - Triple Lumen  22 1200 right internal jugular 3 days              Drain  Duration                  NG/OG Tube 22 1600 3 days         Urethral Catheter 22 1105 Straight-tip;Silicone 16 Fr. 3 days              Airway  Duration                  Airway - Non-Surgical 22 1051 Endotracheal Tube 3 days              Arterial Line  Duration             Arterial Line 22 1330 Right Radial <1 day                    Significant Labs:    CBC/Anemia Profile:  Recent Labs   Lab 22  0416 22  0719 22  0421   WBC 23.60*  --  12.29   HGB 9.6*  --  8.5*   HCT 33.7* 31* 28.0*   *  --  96*   MCV 77*  --  71*   RDW 23.1*  --  23.9*          Chemistries:  Recent Labs   Lab 22  1305 22  0416 22  0421    136 134*   K 4.8 5.0 5.1   CL 94* 97 93*   CO2 25 16* 23   BUN 54* 59*  69*   CREATININE 3.8* 4.5* 4.6*   CALCIUM 6.7* 6.4* 6.0*   ALBUMIN  --  1.6* 1.3*   PROT  --  3.7* 3.9*   BILITOT  --  1.3* 0.9   ALKPHOS  --  106 114   ALT  --  173* 563*   AST  --  304* 900*   MG  --  2.0 1.8         All pertinent labs within the past 24 hours have been reviewed.    Significant Imaging:  I have reviewed all pertinent imaging results/findings within the past 24 hours.      ABG  Recent Labs   Lab 05/08/22  0421   PH 7.504*   PO2 125*   PCO2 31.1*   HCO3 24.4   BE 1     Assessment/Plan:     Pulmonary  Pneumonia of left lower lobe due to infectious organism  Blood and sputum cultures still no growth  abx adjusted by ID, now invanz, zyvox, mycafungin  OET suctioning PRN    Acute hypoxemic respiratory failure on mechanical vent  Vent settings reviewed and adjusted to optimize gas exchange  VAP prophylaxis  ABG daily and prn to assess response to therapy  SAT/SBT once gi tract in continuity    Cardiac/Vascular  Atrial fibrillation with RVR  New onset 5/6  Converted to SR on amiodarone infusion; maintain while bowel in discontinuity  Continue cardiac monitoring    Renal/  JOSE (acute kidney injury)  S/t septic shock  Adequate volume resuscitation now +22L  Avoid nephrotoxins  Strict I/O  No acute indication for dialysis but high potential for continued decline, daughter([email or web address]) will not pursue dialysis if decline per her father's wishes  Will trial diuresis, monitor bmp    ID  * Septic shock  Secondary to Peritonitis from bowel perforation with major fecal contamination  Blood and sputum cultures ngtd  Wbc down after washout 5/7  Continue zyvox, invanz and Micafungin  Cont Vasopressin and titrate pressor for MAP > 75  ICU hemodynamic monitoring  Echo reviewed    Oncology  Acute on chronic anemia  Received 2 units PRBCs in ED and 2 more in OR on 5/4  CBC stable   Monitor wound vac output and daily CBC with Conservative transfusion protocol    Endocrine  Hyperglycemia, unspecified  SSI prn with monitoring  for glucose control and prevention of insulin toxicity    Obesity (BMI 30.0-34.9)  Will encourage weight loss once/if survives and extubated and fully awake/alert    GI  Mass of colon  Found on exploration along with palpable liver mass  Plan bx vs excision of cecal mass to obtain pathology at time of ostomy and bowel re-anastomosis  High concern for metastatic malignancy    Small bowel perforation with large Cecal mass   5/4 Expl Lap and SB excision with washout and AB thera wound vac for bowel left in discontinuity  5/7 washout and wound vac replacement  hx Diverticulosis but cecal mass and suspected metastatic lesions found in exploration    IVAB for peritonitis/sepsis  NPO and IVFs with NG to suction    Palliative Care  Pt is DNR, discussed at length with surgeon prior to surgery and he consented to intubation for surgery with understanding of likely need for prolonged vent support post op until bowel/abdomen closed. He was clear that he would not desire long term vent support past that necessary for immediate post op recovery.   Daughter Claudia is SDM and is aware and supportive of his wishes. IF at any point his survival chances become poor or prolonged life support is anticipated she would honor his wish and transition to comfort focused care.  5/6 - discussed status with daughter. She expressed again understanding of her father's wishes for very limited life support and further stated today that after time to reflect on current status, in the event of continued decline she would not want to pursue potential dialysis but if kidneys fail and indications for RRT exist she would at that time desire transition to a full comfort care focus. She would hope that he could be extubated to comfort focus and have opportunity to interact with family but verbalizes understanding that this may not be possible given open abdomen and comfort goal.  Continue daily and prn updates     Critical Care Daily Checklist:    A:  Awake: RASS Goal/Actual Goal: RASS Goal: -3-->moderate sedation  Actual: German Agitation Sedation Scale (RASS): Moderate sedation   B: Spontaneous Breathing Trial Performed?     C: SAT & SBT Coordinated?  Not candidate today                      D: Delirium: CAM-ICU Overall CAM-ICU: Positive   E: Early Mobility Performed? Yes   F: Feeding Goal:    Status:     Current Diet Order   Procedures    Diet NPO      AS: Analgesia/Sedation Fentanyl infusion   T: Thromboembolic Prophylaxis heparin   H: HOB > 300 Yes   U: Stress Ulcer Prophylaxis (if needed) pepcid   G: Glucose Control As above   B: Bowel Function     I: Indwelling Catheter (Lines & Doherty) Necessity reviewed   D: De-escalation of Antimicrobials/Pharmacotherapies reviewed    Plan for the day/ETD Supportive care as above    Code Status:  Family/Goals of Care: DNR  Pending hospital course; see palliative discussion above   I have discussed case and plan of care in detail with Dr Huynh and Dr Parker; Status and plan of care were discussed with team on multidisciplinary rounds.    Critical Care Time: 66 minutes  Critical secondary to septic shock, JOSE, mechanical vent and pressor support  Critical care was time spent personally by me on the following activities: development of treatment plan with patient or surrogate and bedside caregivers, discussions with consultants, evaluation of patient's response to treatment, examination of patient, ordering and performing treatments and interventions, ordering and review of laboratory studies, ordering and review of radiographic studies, pulse oximetry, re-evaluation of patient's condition. This critical care time did not overlap with that of any other provider or involve time for any procedures.     KATHARINE Miranda-BC  Critical Care Medicine  [facility] ([facility])

## 2022-05-08 NOTE — OP NOTE
Franky Masters  : 1955, MRN: [id number]  Date of procedure: 22      Procedure: Reopening of recent laparotomy, abdominal washout, replacement of Abthera vac      Pre-procedure diagnosis: Bowel perforation, pneumoperitoneum, septic shock  Post-procedure diagnosis: Small bowel perforation, SBO, mass of the cecum, liver mass, septic shock, feculent peritonitis  Surgeon: Elvie Parker DO  Assistant: None  Anesthesia: General  EBL: 10 mL  Implants/Drains: Abthera vac  Specimen: None  Complications: None apparent    Significant findings:  Numerous bits of food particles. Pink, viable appearing small bowel. Staple line intact.    Indications for procedure: The patient presented with a small bowel perforation and a large, obstructing cecal mass. He went to emergent surgery for ex lap, abdominal washout, segmental small bowel resection, placement of Abthera abdominal vac. He was left in discontinuity and an open abdomen due to his profound septic shock. Plan to return to OR for washout and replacement of Abthera due to his continued profound septic shock and hemodynamic instability. After explaining the risks, benefits, and alternatives, the patient's daughter verbalized understanding and informed written consent was obtained. All questions were answered to their satisfaction.    Description of procedure: The patient was brought to the OR and placed in the supine position. SCDs were applied. After general anesthesia was induced by the Anesthesia Department, the previous abthera was removed and the abdomen was prepped and draped in usual sterile fashion. A time out was taken to identify the correct patient, correct procedure, and correct anatomical site; all parties were in agreement.  There was ascites which was suctioned. There were numerous pieces and bits of food debris and vegetation which I tried my best to clean out. The small bowel appeared pink and viable. The staple line from the small bowel resection  was found to be intact. The colon appeared viable. The gallbladder was soft and did not appear inflamed. The NG tube was repositioned in the stomach. The abdomen was copiously irrigated with 6 liters of warm saline. The abthera was replaced. The ABThera intra-abdominal sponge was inserted into the abdominal cavity and placed over top of the intra-abdominal contents. The fascia was loosely pulled together but not reapproximated with a running 0 prolene suture to help prevent fasical retraction. The overlying elliptical blue sponge was then applied on top and covered with the adhesive.  A small hole was made in the adhesive in the suction tubing was applied on top.  The tubing was connected to the ABThera device in place to -75 mm Hg.  Good seal was noted.     All sponge and instrument counts were deemed correct. The patient was transported to the ICU.    Elvie Parker, DO  General Surgery  [facility]

## 2022-05-08 NOTE — PROGRESS NOTES
[facility] - Intensive Care ([facility] Medicine  Progress Note    Patient Name: Franky Masters  MRN: [id number]  Patient Class: IP- Inpatient   Admission Date: 5/4/2022  Length of Stay: 4 days  Attending Physician: Elvie Parker DO  Primary Care Provider: HOLLY ROSEN        Subjective:     Principal Problem:Septic shock        HPI:  Mr Masters is a 65 yo male POD#0 s/p SB perforation with surgical intervention demonstrating a large cecal mass and 3l feculant fluid in the abdominal cavity. Additional findings of a perforated ileum and a liver mass. Patient tolerated the Exlap with Washout and small bowel excision. Patient had a wound vac placed, is currently intubated, sedated and recovering in the ICU. Patient received 4 units PRBC and remains on pressor support. Patient is a DNR and HM consulted with assistance with medical management. Daughter at bedside. Patient discussed with care team.          Overview/Hospital Course:  Patient continues to require pressors, remains intubated, sedated. Patient urine o/p declining and concerning. Discussed POC in detail at bedside with daughter POA. She expressed her fathers wish to not undergo treatments  like perm HD, where he has a diminished quality of life. We spoke frankly about issues and concerns and she will be communicating with the family as his case evolves. Comfort care was discussed and the goals of care will develop consistent with the patients expressed wishes. Potential for another surgery likely Saturday with a washout and possible biopsy vs resection are on the horizon. This possible intervention will be covered in detail by surgery sharing the risks and benefits of that approach. Case discussed with the primary service - surgery, and critical care team.    5/6- Pt seen and examined in the ICU plus discussed with ICU team and the pt's daughter/ POA: Remains critically ill, intubated, sedated on Vent, on Pressors- Levo plus Vaso- JOSE with oliguria  getting worse- Cr rising to 3.6, becoming severely acidotic- requiring Ertapenem, Zyvox, Micafungin as well as on Bicarb and Fentanyl gtt. He has massive fluid overload and generalized anasarca.  Possible return to OR tomorrow 5/7 if patient is more stable for right hemicolectomy, possible ileostomy, liver biopsy, abdominal wall closure. Dw Pt's daughter.       5/7- remains Intubated, sedated on Vent- Went into Afib w RVR- hence added Amio to Levo and Vasopressin- back in NSR. Getting Invanz and Zyvox plus Micafungin. Dr. Parker took him back to OR for for abdominal washout ( 3-3.5 L feculent fluid with food particles suctioned out in the OR, small bowel appeared better) and replaced Abthera- still has bowel discontinuity. His WBC, Lactate and Cr have all increased. Family updated about his critical condition and poor prognosis and JOSE/ATN- they are considering Comfort measures.     5/8- Day 5 on Vent- family at bedside, remains intubated on Vent with 2 Pressors- still on Amiodarone gtt for Afib, Still has Abthera wound vac to open Abdomen with small bowel discontinuity. Remains oliguric with generalized anasarca- almost 24 L fluid positive. Cr increased to 4.6. WBC down to 12, family does not want any HD/CRRT, so getting an IV Lasix trial to improve the urine output.       Interval History: Day 5 on Vent- family at bedside, remains intubated on Vent with 2 Pressors- still on Amiodarone gtt for Afib, Still has Abthera wound vac to open Abdomen with small bowel discontinuity. Remains oliguric with generalized anasarca- almost 24 L fluid positive. Cr increased to 4.6. WBC down to 12, family does not want any HD/CRRT, so getting an IV Lasix trial to improve the urine output.     Review of Systems   Unable to perform ROS: Intubated   Objective:     Vital Signs (Most Recent):  Temp: 99.5 °F (37.5 °C) (05/08/22 1523)  Pulse: 66 (05/08/22 1523)  Resp: (!) 32 (05/08/22 1523)  BP: 96/70 (05/08/22 1300)  SpO2: 100 %  (22 1523)   Vital Signs (24h Range):  Temp:  [97.52 °F (36.4 °C)-100.22 °F (37.9 °C)] 99.5 °F (37.5 °C)  Pulse:  [60-69] 66  Resp:  [18-32] 32  SpO2:  [89 %-100 %] 100 %  BP: ()/(52-76) 96/70  Arterial Line BP: (102-145)/(57-72) 113/66     Weight: 117 kg (257 lb 15 oz)  Body mass index is 38.09 kg/m².    Intake/Output Summary (Last 24 hours) at 2022 1651  Last data filed at 2022 1400  Gross per 24 hour   Intake 3174.34 ml   Output 1595 ml   Net 1579.34 ml      Physical Exam  Vitals and nursing note reviewed.   Constitutional:       General: He is not in acute distress.     Appearance: He is obese. He is ill-appearing.      Interventions: He is sedated, intubated and restrained.   HENT:      Head: Atraumatic.   Eyes:      General: No scleral icterus.     Conjunctiva/sclera: Conjunctivae normal.   Neck:      Vascular: No JVD.   Cardiovascular:      Rate and Rhythm: Normal rate and regular rhythm.      Pulses:           Radial pulses are 1+ on the right side and 1+ on the left side.        Dorsalis pedis pulses are 1+ on the right side and 1+ on the left side.   Pulmonary:      Effort: He is intubated.      Breath sounds: Decreased breath sounds present. No wheezing or rhonchi.   Abdominal:      General: Bowel sounds are absent.       Musculoskeletal:      Right lower le+ Pitting Edema present.      Left lower le+ Pitting Edema present.   Skin:     General: Skin is cool.      Capillary Refill: Capillary refill takes more than 3 seconds.     Flow (L/min): 4  Vent Mode: A/C  Oxygen Concentration (%):  [40] 40  Resp Rate Total:  [30 br/min-32 br/min] 30 br/min  Vt Set:  [450 mL] 450 mL  PEEP/CPAP:  [5 cmH20-8 cmH20] 5 cmH20  Pressure Support:  [0 cmH20] 0 cmH20  Mean Airway Pressure:  [12 qnY01-75 cmH20] 12 cmH20  Temp:  [97.52 °F (36.4 °C)-100.22 °F (37.9 °C)] 99.5 °F (37.5 °C)  Pulse:  [60-69] 66  Resp:  [18-32] 32  SpO2:  [89 %-100 %] 100 %  BP: ()/(52-76) 96/70  Arterial Line BP:  (102-145)/(57-72) 113/66   Art pH/pCO2/pO2/HCO3:  7.504/31.1/125/24.4 (05/08 0421)  Lab Results   Component Value Date    [id number] Negative 05/04/2022    [id number] Positive (A) 08/05/2021      Recent Labs   Lab 05/04/22  1330 05/04/22  1442 05/05/22  1150 05/06/22  0410 05/06/22  1010 05/06/22  1305 05/06/22  1547 05/07/22  0416 05/07/22  0719 05/07/22  1345 05/08/22  0421 05/08/22  1603   LACTATE 3.1*  --  5.0*  --   --   --   --   --   --  6.7*  --   --       < >  --  138  --  137  --  136  --   --  134*  --    WBC 1.05*   < >  --  18.70*  --   --   --  23.60*  --   --  12.29  --    GRAN 46.0   < >  --  80.0*  --   --   --  92.5*  21.8*  --   --  86.4*  10.6*  --    LYMPH 42.0   < >  --  5.0*  --   --   --  1.7*  0.4*  --   --  7.3*  0.9*  --    HGB 10.3*   < >  --  10.3*  --   --   --  9.6*  --   --  8.5*  --    HCT 36.6*   < >  --  34.9*   < >  --    < > 33.7* 31*  --  28.0*  --    BUN 23   < >  --  49*  --  54*  --  59*  --   --  69*  --    CREATININE 0.9   < >  --  3.6*  --  3.8*  --  4.5*  --   --  4.6*  --    ESTGFRAFRICA >60   < >  --  19*  --  18*  --  15*  --   --  14*  --    EGFRNONAA >60   < >  --  16*  --  15*  --  13*  --   --  12*  --    K 3.9   < >  --  5.2*  --  4.8  --  5.0  --   --  5.1  --    *   < >  --  98  --  94*  --  97  --   --  93*  --    CO2 15*   < >  --  17*  --  25  --  16*  --   --  23  --    MG 1.7   < >  --  1.8  --   --   --  2.0  --   --  1.8 1.7    < > = values in this interval not displayed.     Microbiology Results (last 7 days)       Procedure Component Value Units Date/Time    Blood culture [[id number]] Collected: 05/04/22 1428    Order Status: Completed Specimen: Blood from Line, Arterial, Left Updated: 05/08/22 0612     Blood Culture, Routine No Growth to date      No Growth to date      No Growth to date      No Growth to date    Blood culture [[id number]] Collected: 05/04/22 1429    Order Status: Completed Specimen: Blood from Line, Jugular, Internal  Right Updated: 05/08/22 0612     Blood Culture, Routine No Growth to date      No Growth to date      No Growth to date      No Growth to date    Culture, Respiratory with Gram Stain [[id number]] Collected: 05/04/22 1253    Order Status: Completed Specimen: Respiratory from Endotracheal Aspirate Updated: 05/07/22 0921     Respiratory Culture No growth     Gram Stain (Respiratory) Few WBC's     Gram Stain (Respiratory) Rare Gram positive rods          Antibiotics (From admission, onward)                Start     Stop Route Frequency Ordered    05/06/22 2100  ertapenem (INVANZ) 500 mg in sodium chloride 0.9% 100 mL IVPB         05/09 2059 IV Every 24 hours (non-standard times) 05/06/22 0742    05/05/22 2115  linezolid 600 mg/300 mL IVPB 600 mg         05/11 2114 IV Every 12 hours (non-standard times) 05/05/22 2000    05/04/22 1300  mupirocin 2 % ointment  (DECOLONIZATION PROTOCOL ORDERS)         05/09 0859 Nasl 2 times daily 05/04/22 1253          Anticoagulants       Ordered     Route Frequency Start Stop    05/04/22 1255  heparin (porcine)  (VTE Prophylaxis Orders - High Risk)         SubQ Every 8 hours 05/05/22 0600 --          Echo Saline Bubble? No  · The left ventricle is normal in size with concentric hypertrophy and   moderately decreased systolic function.  · Grade I left ventricular diastolic dysfunction.  · Normal right ventricular size with low normal right ventricular systolic   function.  · The estimated ejection fraction is 35-40%.  · There is mild left ventricular global hypokinesis.  · Moderate mitral regurgitation.     US Retroperitoneal Complete  Narrative: EXAMINATION:  US RETROPERITONEAL COMPLETE    CLINICAL HISTORY:  JOSE with oliguria. r/o obstructive uropathy;    TECHNIQUE:  Ultrasound of the kidneys and urinary bladder was performed including color flow and Doppler evaluation of the kidneys.    COMPARISON:  None.    FINDINGS:  Right kidney: The right kidney measures 10.9 cm. No cortical  thinning. No loss of corticomedullary distinction.  Normal perfusion.   No mass. No renal stone. No hydronephrosis.    Left kidney: The left kidney measures 13 cm. No cortical thinning. No loss of corticomedullary distinction.  Normal perfusion. No mass. No renal stone. No hydronephrosis.    The bladder is not distended limited evaluation with Doherty catheter noted  Impression: No significant abnormality.    Electronically signed by: Philippe Horton MD  Date:    05/05/2022  Time:    08:28  X-Ray Chest AP Portable  Narrative: EXAMINATION:  XR CHEST AP PORTABLE    CLINICAL HISTORY:  resp failure;    TECHNIQUE:  Single frontal view of the chest was performed.    COMPARISON:  05/04/2022.    FINDINGS:  Tubes and lines are satisfactory.  No pneumothorax. Patchy infiltrate suspected left lower lobe.  Cannot exclude right infrahilar infiltrate with prominence of right hilum; consider follow-up chest CT..  Small left pleural effusion. In comparison to the prior study, there is no adverse interval changes  Impression: In comparison to the prior study, there is no adverse interval changes    Electronically signed by: Philippe Horton MD  Date:    05/05/2022  Time:    08:21   Significant Labs: All pertinent labs within the past 24 hours have been reviewed.    Significant Imaging: I have reviewed all pertinent imaging results/findings within the past 24 hours.      Assessment/Plan:      * Septic shock sec to Peritonitis from SB perforation  Pressors  Abx - Zosyn / Micafungin  ID on consult    Remains in severe Septic Shock complicated by JOSE/ATN- Anuria and Resp Failure on Vent  ICU team has d/w daughter about HD if and when needed- she does not appear to be in favor of HD at present  Prognosis poor    Day 4 in Septic Shock and on vent  Continue Levo and Vaso plus IV Abx  Prognosis poor    Day 5- Continues same on Vent, WBC better but remain in remains shut down due to shock plus 2 Pressors    Acute hypoxemic respiratory failure on  mechanical vent  Patient with Hypoxic Respiratory failure which is Acute.  he is not on home oxygen. Supplemental oxygen was provided and noted- Vent Mode: A/C  Oxygen Concentration (%):  [40] 40  Resp Rate Total:  [30 br/min-32 br/min] 30 br/min  Vt Set:  [450 mL] 450 mL  PEEP/CPAP:  [5 cmH20-8 cmH20] 5 cmH20  Pressure Support:  [0 cmH20] 0 cmH20  Mean Airway Pressure:  [12 csB58-65 cmH20] 12 cmH20.   Signs/symptoms of respiratory failure include- tachypnea. Contributing diagnoses includes - Obesity Hypoventilation Labs and images were reviewed. Patient Has recent ABG, which has been reviewed. Will treat underlying causes and adjust management of respiratory failure as indicated. 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 Parker- pt too unstable for right hemicolectomy, end ileostomy, liver biopsy today s/p abdominal washout with Abthera replacement today.  5/8- POD 3 with s/p Laparotomy and bowel resection and subsequent laparoscopic washout- persistent bowel discontinuity and abthera wound vac closure       Mass of colon  Based on Surgery  Potential interventions  Goals of care  Biopsy as indicated  Likely Oncologic process with mets  Heme Onc as indicated    See above      Pneumonia of left lower lobe due to infectious organism  Abx  ID on Consult    ON IV Abx      On mechanically assisted ventilation  Wean as tolerated  CC Team  Pulmonary    Cont vent support      JOSE (acute kidney injury)  Worsening  Trend  Cr 2.2 today    Cr now 3.8- Oliguric- heading towards Anuria and ATN/ May need HD vs CRRT- she has massive fluid Overload.     Cr now 4.5-  Remains anuric  POA/ Family not in favor  of HD    Remains Oliguric due to Septic Shock on 2 Pressors    Atrial fibrillation with RVR  Converted to NSR with Amio gtt      Hyperglycemia, unspecified  NISS  Accuchecks  Monitor  Controlled      Obesity (BMI 30.0-34.9)  Diet  Nutrition on recovery      Acute on chronic anemia  Hgb 10.3 s/p Transfusion 4 units Prbc  Transfuse for Hgb <7  Monitor    5/5  Improved  Hgb 11.4 today  Transfuse as indicated        VTE Risk Mitigation (From admission, onward)         Ordered     heparin (porcine) injection 5,000 Units  Every 8 hours         05/04/22 1255     IP VTE HIGH RISK PATIENT  Once         05/04/22 1253     Place sequential compression device  Until discontinued         05/04/22 1253                Discharge Planning   ELLIOT:      Code Status: DNR   Is the patient medically ready for discharge?:     Reason for patient still in hospital (select all that apply): Patient unstable, Patient new problem, Patient trending condition, Laboratory test, Treatment, Imaging and Consult recommendations  Discharge Plan A: Comfort care/withdrawal        Seen and discussed with Dr. Huynh and the ICU team  Condition: Critical  Prognosis: Guarded to poor      Critical care time spent on the evaluation and treatment of severe organ dysfunction, review of pertinent labs and imaging studies, discussions with consulting providers and discussions with patient/family: 45 minutes.      Megha Mejia MD  Department of Hospital Medicine   [place] - Intensive Care ([facility])

## 2022-05-08 NOTE — EICU
EICU Physician Brief Note - Overnight Events    Called for critical Ca 6.0 Albumin 1.3.  Corrected Ca 8.16, no replacement needed.  Eicu is available should acute issues arise.

## 2022-05-08 NOTE — ASSESSMENT & PLAN NOTE
S/t septic shock  Adequate volume resuscitation now +22L  Avoid nephrotoxins  Strict I/O  No acute indication for dialysis but high potential for continued decline, daughter(HCPOA) will not pursue dialysis if decline per her father's wishes  Will trial diuresis, monitor bmp

## 2022-05-08 NOTE — PROGRESS NOTES
[facility] - Intensive Care ([facility])  General Surgery  Progress Note    Subjective:     History of Present Illness:  No notes on file    Post-Op Info:  Procedure(s) (LRB):  LAPAROTOMY (N/A)  LAVAGE, PERITONEAL, THERAPEUTIC   1 Day Post-Op     Interval History: Remains in the ICU intubated and sedated. Family at bedside.    Medications:  Continuous Infusions:   amiodarone in dextrose 5% 0.5 mg/min (05/08/22 1400)    fentanyl 250 mcg/hr (05/08/22 1400)    NORepinephrine bitartrate-D5W 0.082 mcg/kg/min (05/08/22 1400)    phenylephrine Stopped (05/07/22 1006)    vasopressin 0.04 Units/min (05/08/22 1643)     Scheduled Meds:   chlorhexidine  15 mL Mouth/Throat BID    ertapenem (INVANZ) IVPB  500 mg Intravenous Q24H    famotidine (PF)  20 mg Intravenous Daily    heparin (porcine)  5,000 Units Subcutaneous Q8H    linezolid  600 mg Intravenous Q12H    magnesium sulfate IVPB  2 g Intravenous Once    mupirocin   Nasal BID    white petrolatum-mineral oil 57.3-42.5%   Both Eyes QHS     PRN Meds:sodium chloride, sodium chloride 0.9%, acetaminophen, dextrose 10%, dextrose 10%, glucagon (human recombinant), HYDROmorphone, insulin aspart U-100, ketorolac, lorazepam, ondansetron, ondansetron     Review of patient's allergies indicates:  No Known Allergies  Objective:     Vital Signs (Most Recent):  Temp: 99.68 °F (37.6 °C) (05/08/22 1654)  Pulse: 68 (05/08/22 1654)  Resp: (!) 30 (05/08/22 1654)  BP: 96/70 (05/08/22 1300)  SpO2: 100 % (05/08/22 1654)   Vital Signs (24h Range):  Temp:  [97.52 °F (36.4 °C)-100.22 °F (37.9 °C)] 99.68 °F (37.6 °C)  Pulse:  [60-69] 68  Resp:  [18-32] 30  SpO2:  [89 %-100 %] 100 %  BP: ()/(52-76) 96/70  Arterial Line BP: (102-145)/(57-72) 113/66     Weight: 117 kg (257 lb 15 oz)  Body mass index is 38.09 kg/m².    Intake/Output - Last 3 Shifts         05/06 0700  05/07 0659 05/07 0700  05/08 0659 05/08 0700  05/09 0659    I.V. (mL/kg) 5094 (45.9) 4619.9 (39.5)     IV Piggyback 1126.5 892.2      Total Intake(mL/kg) 6220.5 (56) 5512.1 (47.1)     Urine (mL/kg/hr) 330 (0.1) 320 (0.1) 415 (0.4)    Drains 300 0 150    Other 800 1000 50    Total Output 1430 1320 615    Net +4790.5 +4192.1 -615                   Physical Exam  Vitals and nursing note reviewed.   Constitutional:       Appearance: He is obese. He is ill-appearing and toxic-appearing.   Cardiovascular:      Rate and Rhythm: Regular rhythm.   Pulmonary:      Comments: Mechanical breath sounds  Abdominal:      Palpations: Abdomen is soft.      Comments: Abthera vac in place holding suction, serous output   Genitourinary:     Comments: Doherty in place  Musculoskeletal:      Right lower leg: Edema present.      Left lower leg: Edema present.   Neurological:      Comments: Sedated       Significant Labs:  I have reviewed all pertinent lab results within the past 24 hours.  CBC:   Recent Labs   Lab 05/08/22 0421   WBC 12.29   RBC 3.93*   HGB 8.5*   HCT 28.0*   PLT 96*   MCV 71*   MCH 21.6*   MCHC 30.4*     CMP:   Recent Labs   Lab 05/08/22 0421   *   CALCIUM 6.0*   ALBUMIN 1.3*   PROT 3.9*   *   K 5.1   CO2 23   CL 93*   BUN 69*   CREATININE 4.6*   ALKPHOS 114   *   *   BILITOT 0.9     ABGs:   Recent Labs   Lab 05/08/22 0421   PH 7.504*   PCO2 31.1*   PO2 125*   HCO3 24.4   POCSATURATED 99   BE 1       Significant Diagnostics:  I have reviewed all pertinent imaging results/findings within the past 24 hours.    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 NO meds or feeds (patient is in bowel discontinuity)  - Continue ICU management. Currently still requiring vasopressor support.  - Strict I/Os  - Medical and ICU management per hospital/ICU  team    Depending on patient's condition and goals of care decided by family, tentatively plan to return to the OR later this week for possible right hemicolectomy, liver biopsy, end ileostomy, and abdominal wall closure.  Discussed with daughter at bedside.    Appreciate medical and critical care management by hospital team/ICU team.    Elvie Parker,   General Surgery  [facility] - Intensive Care ([facility])

## 2022-05-08 NOTE — ASSESSMENT & PLAN NOTE
Will continue Zosyn , continue close monitoring     05/05/22- due to persistent fever , will switch to Micafungin, Ertapenem and zyvox   Follow cultures   05/06/22- continue micafungin , zyvox/ertapenem, follow surgery for washout in AM  05/107- follow Gen surgery for wash out -will treat for 2 weeks .  Will follow and monitor clinical course .  Continue Zyvox/ertapenem /micafungin for now

## 2022-05-08 NOTE — ASSESSMENT & PLAN NOTE
S/p exploratory laparotomy, abdominal washout, segmental small bowel resection (left in discontinuity), placement of Abthera vac 5/4/22  S/p reopening of recent laparotomy, abdominal washout, replacement of Abthera vac 5/7/22    - Continue NG tube to LIS. NO meds or feeds (patient is in bowel discontinuity)  - Continue ICU management. Currently still requiring vasopressor support.  - Strict I/Os  - Medical and ICU management per hospital/ICU team    Depending on patient's condition and goals of care decided by family, tentatively plan to return to the OR later this week for possible right hemicolectomy, liver biopsy, end ileostomy, and abdominal wall closure.  Discussed with daughter at bedside.

## 2022-05-08 NOTE — PLAN OF CARE
No acute events overnight. Titration of critical gtts per order. PRN ativan x1 during linen change, see MAR. Abdominal incision with wound vac intact with serous drainage to cannister. OG to LIS. Improving output to norwood catheter overnight. Tmax 100.2; treated with light bed clothing and cool aerosol temperature. BSWR intact per order. POC reviewed with patient's family. Family would like to take Sunday to discuss any progression in patient's prognosis before coming to any further decisions for POC. Safety and fall precautions maintained.

## 2022-05-08 NOTE — ASSESSMENT & PLAN NOTE
Patient with Hypoxic Respiratory failure which is Acute.  he is not on home oxygen. Supplemental oxygen was provided and noted- Vent Mode: A/C  Oxygen Concentration (%):  [40] 40  Resp Rate Total:  [30 br/min-32 br/min] 30 br/min  Vt Set:  [450 mL] 450 mL  PEEP/CPAP:  [5 cmH20-8 cmH20] 5 cmH20  Pressure Support:  [0 cmH20] 0 cmH20  Mean Airway Pressure:  [12 nrI02-35 cmH20] 12 cmH20.   Signs/symptoms of respiratory failure include- tachypnea. Contributing diagnoses includes - Obesity Hypoventilation Labs and images were reviewed. Patient Has recent ABG, which has been reviewed. Will treat underlying causes and adjust management of respiratory failure as indicated. Pulmonary CC on board  Day 2 on Vent- remains intubated on vent  Cont vent support    Day 4 on Vent    Day 5 on vent- adequate O2, sats

## 2022-05-08 NOTE — ASSESSMENT & PLAN NOTE
Secondary to Peritonitis from bowel perforation with major fecal contamination  Blood and sputum cultures ngtd  Wbc down after washout 5/7  Continue zyvox, invanz and Micafungin  Cont Vasopressin and titrate pressor for MAP > 75  ICU hemodynamic monitoring  Echo reviewed

## 2022-05-08 NOTE — ASSESSMENT & PLAN NOTE
5/4 Expl Lap and SB excision with washout and AB thera wound vac for bowel left in discontinuity  5/7 washout and wound vac replacement  hx Diverticulosis but cecal mass and suspected metastatic lesions found in exploration    IVAB for peritonitis/sepsis  NPO and IVFs with NG to suction

## 2022-05-08 NOTE — ASSESSMENT & PLAN NOTE
Blood and sputum cultures still no growth  abx adjusted by ID, now invanz, zyvox, mycafungin  OET suctioning PRN

## 2022-05-08 NOTE — SUBJECTIVE & OBJECTIVE
Review of Systems   Unable to perform ROS: Intubated     Objective:     Vital Signs (Most Recent):  Temp: 99.5 °F (37.5 °C) (22)  Pulse: 62 (22)  Resp: (!) 30 (22)  BP: 111/63 (22)  SpO2: 100 % (22)   Vital Signs (24h Range):  Temp:  [97.52 °F (36.4 °C)-100.22 °F (37.9 °C)] 99.5 °F (37.5 °C)  Pulse:  [59-70] 62  Resp:  [18-31] 30  SpO2:  [89 %-100 %] 100 %  BP: ()/(52-76) 111/63  Arterial Line BP: (101-145)/(-43-72) 121/71     Weight: 117 kg (257 lb 15 oz)  Body mass index is 38.09 kg/m².      Intake/Output Summary (Last 24 hours) at 2022 0757  Last data filed at 2022 0600  Gross per 24 hour   Intake 5264.42 ml   Output 1320 ml   Net 3944.42 ml        amiodarone in dextrose 5% 0.5 mg/min (22)    fentanyl 250 mcg/hr (22)    NORepinephrine bitartrate-D5W 0.1 mcg/kg/min (22)    phenylephrine Stopped (22 1006)    vasopressin 0.04 Units/min (22)       Physical Exam  Vitals and nursing note reviewed.   Constitutional:       General: He is not in acute distress.     Appearance: He is obese. He is ill-appearing.      Interventions: He is sedated, intubated and restrained.   HENT:      Head: Atraumatic.   Eyes:      General: No scleral icterus.     Conjunctiva/sclera: Conjunctivae normal.   Neck:      Vascular: No JVD.   Cardiovascular:      Rate and Rhythm: Normal rate and regular rhythm.      Pulses:           Radial pulses are 1+ on the right side and 1+ on the left side.        Dorsalis pedis pulses are 1+ on the right side and 1+ on the left side.   Pulmonary:      Effort: He is intubated.      Breath sounds: Decreased breath sounds present. No wheezing or rhonchi.   Abdominal:      General: Bowel sounds are absent.       Musculoskeletal:      Right lower le+ Pitting Edema present.      Left lower le+ Pitting Edema present.   Skin:     General: Skin is cool.      Capillary Refill: Capillary  refill takes more than 3 seconds.       Vents:  Vent Mode: A/C (05/08/22 0727)  Ventilator Initiated: Yes (05/04/22 1258)  Set Rate: 30 BPM (05/08/22 0727)  Vt Set: 450 mL (05/08/22 0727)  Pressure Support: 0 cmH20 (05/08/22 0727)  PEEP/CPAP: 8 cmH20 (05/08/22 0727)  Oxygen Concentration (%): 40 (05/08/22 0727)  Peak Airway Pressure: 29 cmH2O (05/08/22 0727)  Plateau Pressure: 23 cmH20 (05/08/22 0727)  Total Ve: 14.3 mL (05/08/22 0727)  F/VT Ratio<105 (RSBI): (!) 60.61 (05/08/22 0727)    Lines/Drains/Airways       Central Venous Catheter Line  Duration             Percutaneous Central Line Insertion/Assessment - Triple Lumen  05/04/22 1200 right internal jugular 3 days              Drain  Duration                  NG/OG Tube 05/04/22 1600 3 days         Urethral Catheter 05/04/22 1105 Straight-tip;Silicone 16 Fr. 3 days              Airway  Duration                  Airway - Non-Surgical 05/04/22 1051 Endotracheal Tube 3 days              Arterial Line  Duration             Arterial Line 05/07/22 1330 Right Radial <1 day                    Significant Labs:    CBC/Anemia Profile:  Recent Labs   Lab 05/07/22  0416 05/07/22  0719 05/08/22  0421   WBC 23.60*  --  12.29   HGB 9.6*  --  8.5*   HCT 33.7* 31* 28.0*   *  --  96*   MCV 77*  --  71*   RDW 23.1*  --  23.9*          Chemistries:  Recent Labs   Lab 05/06/22  1305 05/07/22  0416 05/08/22  0421    136 134*   K 4.8 5.0 5.1   CL 94* 97 93*   CO2 25 16* 23   BUN 54* 59* 69*   CREATININE 3.8* 4.5* 4.6*   CALCIUM 6.7* 6.4* 6.0*   ALBUMIN  --  1.6* 1.3*   PROT  --  3.7* 3.9*   BILITOT  --  1.3* 0.9   ALKPHOS  --  106 114   ALT  --  173* 563*   AST  --  304* 900*   MG  --  2.0 1.8         All pertinent labs within the past 24 hours have been reviewed.    Significant Imaging:  I have reviewed all pertinent imaging results/findings within the past 24 hours.

## 2022-05-08 NOTE — ASSESSMENT & PLAN NOTE
Pressors  Abx - Zosyn / Micafungin  ID on consult    Remains in severe Septic Shock complicated by JOSE/ATN- Anuria and Resp Failure on Vent  ICU team has d/w daughter about HD if and when needed- she does not appear to be in favor of HD at present  Prognosis poor    Day 4 in Septic Shock and on vent  Continue Levo and Vaso plus IV Abx  Prognosis poor    Day 5- Continues same on Vent, WBC better but remain in remains shut down due to shock plus 2 Pressors

## 2022-05-08 NOTE — PROGRESS NOTES
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  Will follow and monitor clinical course .  Continue Zyvox/ertapenem /micafungin for now    Acute hypoxemic respiratory failure on mechanical vent  Critical care follow up         Anticipated Disposition:     Thank you for your consult. I will follow-up with patient. Please contact us if you have any additional questions.    Rai Hidalgo MD  Infectious Disease  [facility] - Intensive Care ([facility])    Subjective:     Principal Problem:Septic shock    HPI:   67 yo male POD#0 s/p SB perforation with surgical intervention demonstrating a large cecal mass and 3l feculant fluid in the abdominal cavity.    He is presently intubated . Operative note reviewed- perforated ileum and a liver mass.  Labs and imaging test reviewed.  Component      Latest Ref Rng & Units 5/4/2022 5/4/2022           1:30 PM  6:57 AM   WBC      3.90 - 12.70 K/uL 1.05 (LL) 2.48 (L)     Interval History:   66 year old man s/p I and D for  perforation demonstrating a large cecal mass and 3l feculant fluid in the abdominal cavity.  He is now febrile  T max 102.   05/06- remains intubated , has fever   Blood cultures -negative till date.  05/07- will be taken back to OR today .  Remains intubated.  Review of Systems   Unable  to perform ROS: Intubated   Objective:     Vital Signs (Most Recent):  Temp: 99.86 °F (37.7 °C) (05/08/22 0515)  Pulse: 62 (05/08/22 0515)  Resp: (!) 30 (05/08/22 0515)  BP: 115/66 (05/08/22 0400)  SpO2: 100 % (05/08/22 0515)   Vital Signs (24h Range):  Temp:  [97.52 °F (36.4 °C)-100.22 °F (37.9 °C)] 99.86 °F (37.7 °C)  Pulse:  [59-70] 62  Resp:  [18-31] 30  SpO2:  [89 %-100 %] 100 %  BP: ()/(52-76) 115/66  Arterial Line BP: (101-145)/(-43-73) 114/67     Weight: 111.1 kg (245 lb)  Body mass index is 36.18 kg/m².    Estimated Creatinine Clearance: 19.2 mL/min (A) (based on SCr of 4.6 mg/dL (H)).    Physical Exam  Vitals and nursing note reviewed.   Constitutional:       Appearance: He is well-developed.   HENT:      Head: Normocephalic and atraumatic.      Nose: Nose normal.   Eyes:      Pupils: Pupils are equal, round, and reactive to light.   Cardiovascular:      Rate and Rhythm: Normal rate and regular rhythm.      Heart sounds: Normal heart sounds.   Pulmonary:      Effort: Pulmonary effort is normal. No respiratory distress.      Breath sounds: Normal breath sounds. No wheezing or rales.   Abdominal:      General: There is distension.      Tenderness: There is no abdominal tenderness.      Comments: Wound vac noted    Musculoskeletal:      Cervical back: Normal range of motion and neck supple.   Skin:     General: Skin is dry.   Neurological:      Comments: Intubated,sedated       Significant Labs: Blood Culture:   Recent Labs   Lab 05/04/22  1428 05/04/22  1429   LABBLOO No Growth to date  No Growth to date  No Growth to date  No Growth to date No Growth to date  No Growth to date  No Growth to date  No Growth to date       BMP:   Recent Labs   Lab 05/08/22  0421   *   *   K 5.1   CL 93*   CO2 23   BUN 69*   CREATININE 4.6*   CALCIUM 6.0*   MG 1.8       CBC:   Recent Labs   Lab 05/07/22  0416 05/07/22  0719 05/08/22  0421   WBC 23.60*  --  12.29   HGB 9.6*  --  8.5*   HCT 33.7* 31*  28.0*   *  --  96*       CMP:   Recent Labs   Lab 05/06/22  1305 05/07/22  0416 05/08/22  0421    136 134*   K 4.8 5.0 5.1   CL 94* 97 93*   CO2 25 16* 23   GLU 98 77 116*   BUN 54* 59* 69*   CREATININE 3.8* 4.5* 4.6*   CALCIUM 6.7* 6.4* 6.0*   PROT  --  3.7* 3.9*   ALBUMIN  --  1.6* 1.3*   BILITOT  --  1.3* 0.9   ALKPHOS  --  106 114   AST  --  304* 900*   ALT  --  173* 563*   ANIONGAP 18* 23* 18*   EGFRNONAA 15* 13* 12*         Significant Imaging: I have reviewed all pertinent imaging results/findings within the past 24 hours.

## 2022-05-08 NOTE — SUBJECTIVE & OBJECTIVE
Interval History: Remains in the ICU intubated and sedated. Family at bedside.    Medications:  Continuous Infusions:   amiodarone in dextrose 5% 0.5 mg/min (05/08/22 1400)    fentanyl 250 mcg/hr (05/08/22 1400)    NORepinephrine bitartrate-D5W 0.082 mcg/kg/min (05/08/22 1400)    phenylephrine Stopped (05/07/22 1006)    vasopressin 0.04 Units/min (05/08/22 1643)     Scheduled Meds:   chlorhexidine  15 mL Mouth/Throat BID    ertapenem (INVANZ) IVPB  500 mg Intravenous Q24H    famotidine (PF)  20 mg Intravenous Daily    heparin (porcine)  5,000 Units Subcutaneous Q8H    linezolid  600 mg Intravenous Q12H    magnesium sulfate IVPB  2 g Intravenous Once    mupirocin   Nasal BID    white petrolatum-mineral oil 57.3-42.5%   Both Eyes QHS     PRN Meds:sodium chloride, sodium chloride 0.9%, acetaminophen, dextrose 10%, dextrose 10%, glucagon (human recombinant), HYDROmorphone, insulin aspart U-100, ketorolac, lorazepam, ondansetron, ondansetron     Review of patient's allergies indicates:  No Known Allergies  Objective:     Vital Signs (Most Recent):  Temp: 99.68 °F (37.6 °C) (05/08/22 1654)  Pulse: 68 (05/08/22 1654)  Resp: (!) 30 (05/08/22 1654)  BP: 96/70 (05/08/22 1300)  SpO2: 100 % (05/08/22 1654)   Vital Signs (24h Range):  Temp:  [97.52 °F (36.4 °C)-100.22 °F (37.9 °C)] 99.68 °F (37.6 °C)  Pulse:  [60-69] 68  Resp:  [18-32] 30  SpO2:  [89 %-100 %] 100 %  BP: ()/(52-76) 96/70  Arterial Line BP: (102-145)/(57-72) 113/66     Weight: 117 kg (257 lb 15 oz)  Body mass index is 38.09 kg/m².    Intake/Output - Last 3 Shifts         05/06 0700  05/07 0659 05/07 0700 05/08 0659 05/08 0700 05/09 0659    I.V. (mL/kg) 5094 (45.9) 4619.9 (39.5)     IV Piggyback 1126.5 892.2     Total Intake(mL/kg) 6220.5 (56) 5512.1 (47.1)     Urine (mL/kg/hr) 330 (0.1) 320 (0.1) 415 (0.4)    Drains 300 0 150    Other 800 1000 50    Total Output 1430 1320 615    Net +4790.5 +4192.1 -615                   Physical Exam  Vitals and nursing  note reviewed.   Constitutional:       Appearance: He is obese. He is ill-appearing and toxic-appearing.   Cardiovascular:      Rate and Rhythm: Regular rhythm.   Pulmonary:      Comments: Mechanical breath sounds  Abdominal:      Palpations: Abdomen is soft.      Comments: Abthera vac in place holding suction, serous output   Genitourinary:     Comments: Doherty in place  Musculoskeletal:      Right lower leg: Edema present.      Left lower leg: Edema present.   Neurological:      Comments: Sedated       Significant Labs:  I have reviewed all pertinent lab results within the past 24 hours.  CBC:   Recent Labs   Lab 05/08/22 0421   WBC 12.29   RBC 3.93*   HGB 8.5*   HCT 28.0*   PLT 96*   MCV 71*   MCH 21.6*   MCHC 30.4*     CMP:   Recent Labs   Lab 05/08/22 0421   *   CALCIUM 6.0*   ALBUMIN 1.3*   PROT 3.9*   *   K 5.1   CO2 23   CL 93*   BUN 69*   CREATININE 4.6*   ALKPHOS 114   *   *   BILITOT 0.9     ABGs:   Recent Labs   Lab 05/08/22 0421   PH 7.504*   PCO2 31.1*   PO2 125*   HCO3 24.4   POCSATURATED 99   BE 1       Significant Diagnostics:  I have reviewed all pertinent imaging results/findings within the past 24 hours.

## 2022-05-08 NOTE — PLAN OF CARE
POC with family. Lasix given. Pressors continued. Sedation given. Doherty. Vent settings confirmed. Turned q 2hrs.

## 2022-05-08 NOTE — SUBJECTIVE & OBJECTIVE
Interval History:   66 year old man s/p I and D for  perforation demonstrating a large cecal mass and 3l feculant fluid in the abdominal cavity.  He is now febrile  T max 102.   05/06- remains intubated , has fever   Blood cultures -negative till date.  05/07- will be taken back to OR today .  Remains intubated.  Review of Systems   Unable to perform ROS: Intubated   Objective:     Vital Signs (Most Recent):  Temp: 99.86 °F (37.7 °C) (05/08/22 0515)  Pulse: 62 (05/08/22 0515)  Resp: (!) 30 (05/08/22 0515)  BP: 115/66 (05/08/22 0400)  SpO2: 100 % (05/08/22 0515)   Vital Signs (24h Range):  Temp:  [97.52 °F (36.4 °C)-100.22 °F (37.9 °C)] 99.86 °F (37.7 °C)  Pulse:  [59-70] 62  Resp:  [18-31] 30  SpO2:  [89 %-100 %] 100 %  BP: ()/(52-76) 115/66  Arterial Line BP: (101-145)/(-43-73) 114/67     Weight: 111.1 kg (245 lb)  Body mass index is 36.18 kg/m².    Estimated Creatinine Clearance: 19.2 mL/min (A) (based on SCr of 4.6 mg/dL (H)).    Physical Exam  Vitals and nursing note reviewed.   Constitutional:       Appearance: He is well-developed.   HENT:      Head: Normocephalic and atraumatic.      Nose: Nose normal.   Eyes:      Pupils: Pupils are equal, round, and reactive to light.   Cardiovascular:      Rate and Rhythm: Normal rate and regular rhythm.      Heart sounds: Normal heart sounds.   Pulmonary:      Effort: Pulmonary effort is normal. No respiratory distress.      Breath sounds: Normal breath sounds. No wheezing or rales.   Abdominal:      General: There is distension.      Tenderness: There is no abdominal tenderness.      Comments: Wound vac noted    Musculoskeletal:      Cervical back: Normal range of motion and neck supple.   Skin:     General: Skin is dry.   Neurological:      Comments: Intubated,sedated       Significant Labs: Blood Culture:   Recent Labs   Lab 05/04/22  1428 05/04/22  1429   LABBLOO No Growth to date  No Growth to date  No Growth to date  No Growth to date No Growth to date  No  Growth to date  No Growth to date  No Growth to date       BMP:   Recent Labs   Lab 05/08/22  0421   *   *   K 5.1   CL 93*   CO2 23   BUN 69*   CREATININE 4.6*   CALCIUM 6.0*   MG 1.8       CBC:   Recent Labs   Lab 05/07/22  0416 05/07/22  0719 05/08/22  0421   WBC 23.60*  --  12.29   HGB 9.6*  --  8.5*   HCT 33.7* 31* 28.0*   *  --  96*       CMP:   Recent Labs   Lab 05/06/22  1305 05/07/22  0416 05/08/22  0421    136 134*   K 4.8 5.0 5.1   CL 94* 97 93*   CO2 25 16* 23   GLU 98 77 116*   BUN 54* 59* 69*   CREATININE 3.8* 4.5* 4.6*   CALCIUM 6.7* 6.4* 6.0*   PROT  --  3.7* 3.9*   ALBUMIN  --  1.6* 1.3*   BILITOT  --  1.3* 0.9   ALKPHOS  --  106 114   AST  --  304* 900*   ALT  --  173* 563*   ANIONGAP 18* 23* 18*   EGFRNONAA 15* 13* 12*         Significant Imaging: I have reviewed all pertinent imaging results/findings within the past 24 hours.

## 2022-05-08 NOTE — ASSESSMENT & PLAN NOTE
Patient stable s/p sx POD#1  Plan for washout, etc per primary service  Wound vac  Goals of care assessment  DNR    S/p SB resection- may go to surgery again tomorrow    5/7- Per Dr. Parker- pt too unstable for right hemicolectomy, end ileostomy, liver biopsy today s/p abdominal washout with Abthera replacement today.  5/8- POD 3 with s/p Laparotomy and bowel resection and subsequent laparoscopic washout- persistent bowel discontinuity and abthera wound vac closure

## 2022-05-08 NOTE — SUBJECTIVE & OBJECTIVE
Interval History: Day 5 on Vent- family at bedside, remains intubated on Vent with 2 Pressors- still on Amiodarone gtt for Afib, Still has Abthera wound vac to open Abdomen with small bowel discontinuity. Remains oliguric with generalized anasarca- almost 24 L fluid positive. Cr increased to 4.6. WBC down to 12, family does not want any HD/CRRT, so getting an IV Lasix trial to improve the urine output.     Review of Systems   Unable to perform ROS: Intubated   Objective:     Vital Signs (Most Recent):  Temp: 99.5 °F (37.5 °C) (05/08/22 1523)  Pulse: 66 (05/08/22 1523)  Resp: (!) 32 (05/08/22 1523)  BP: 96/70 (05/08/22 1300)  SpO2: 100 % (05/08/22 1523)   Vital Signs (24h Range):  Temp:  [97.52 °F (36.4 °C)-100.22 °F (37.9 °C)] 99.5 °F (37.5 °C)  Pulse:  [60-69] 66  Resp:  [18-32] 32  SpO2:  [89 %-100 %] 100 %  BP: ()/(52-76) 96/70  Arterial Line BP: (102-145)/(57-72) 113/66     Weight: 117 kg (257 lb 15 oz)  Body mass index is 38.09 kg/m².    Intake/Output Summary (Last 24 hours) at 5/8/2022 1651  Last data filed at 5/8/2022 1400  Gross per 24 hour   Intake 3174.34 ml   Output 1595 ml   Net 1579.34 ml      Physical Exam  Vitals and nursing note reviewed.   Constitutional:       General: He is not in acute distress.     Appearance: He is obese. He is ill-appearing.      Interventions: He is sedated, intubated and restrained.   HENT:      Head: Atraumatic.   Eyes:      General: No scleral icterus.     Conjunctiva/sclera: Conjunctivae normal.   Neck:      Vascular: No JVD.   Cardiovascular:      Rate and Rhythm: Normal rate and regular rhythm.      Pulses:           Radial pulses are 1+ on the right side and 1+ on the left side.        Dorsalis pedis pulses are 1+ on the right side and 1+ on the left side.   Pulmonary:      Effort: He is intubated.      Breath sounds: Decreased breath sounds present. No wheezing or rhonchi.   Abdominal:      General: Bowel sounds are absent.       Musculoskeletal:      Right  lower le+ Pitting Edema present.      Left lower le+ Pitting Edema present.   Skin:     General: Skin is cool.      Capillary Refill: Capillary refill takes more than 3 seconds.     Flow (L/min): 4  Vent Mode: A/C  Oxygen Concentration (%):  [40] 40  Resp Rate Total:  [30 br/min-32 br/min] 30 br/min  Vt Set:  [450 mL] 450 mL  PEEP/CPAP:  [5 cmH20-8 cmH20] 5 cmH20  Pressure Support:  [0 cmH20] 0 cmH20  Mean Airway Pressure:  [12 cxI55-35 cmH20] 12 cmH20  Temp:  [97.52 °F (36.4 °C)-100.22 °F (37.9 °C)] 99.5 °F (37.5 °C)  Pulse:  [60-69] 66  Resp:  [18-32] 32  SpO2:  [89 %-100 %] 100 %  BP: ()/(52-76) 96/70  Arterial Line BP: (102-145)/(57-72) 113/66   Art pH/pCO2/pO2/HCO3:  7.504/31.1/125/24.4 (421)  Lab Results   Component Value Date    [id number] Negative 2022    [id number] Positive (A) 2021      Recent Labs   Lab 22  1330 22  1442 22  1150 22  0410 22  1010 22  1305 22  1547 22  0416 22  0719 22  1345 22  0421 22  1603   LACTATE 3.1*  --  5.0*  --   --   --   --   --   --  6.7*  --   --       < >  --  138  --  137  --  136  --   --  134*  --    WBC 1.05*   < >  --  18.70*  --   --   --  23.60*  --   --  12.29  --    GRAN 46.0   < >  --  80.0*  --   --   --  92.5*  21.8*  --   --  86.4*  10.6*  --    LYMPH 42.0   < >  --  5.0*  --   --   --  1.7*  0.4*  --   --  7.3*  0.9*  --    HGB 10.3*   < >  --  10.3*  --   --   --  9.6*  --   --  8.5*  --    HCT 36.6*   < >  --  34.9*   < >  --    < > 33.7* 31*  --  28.0*  --    BUN 23   < >  --  49*  --  54*  --  59*  --   --  69*  --    CREATININE 0.9   < >  --  3.6*  --  3.8*  --  4.5*  --   --  4.6*  --    ESTGFRAFRICA >60   < >  --  19*  --  18*  --  15*  --   --  14*  --    EGFRNONAA >60   < >  --  16*  --  15*  --  13*  --   --  12*  --    K 3.9   < >  --  5.2*  --  4.8  --  5.0  --   --  5.1  --    *   < >  --  98  --  94*  --  97  --   --  93*   --    CO2 15*   < >  --  17*  --  25  --  16*  --   --  23  --    MG 1.7   < >  --  1.8  --   --   --  2.0  --   --  1.8 1.7    < > = values in this interval not displayed.     Microbiology Results (last 7 days)       Procedure Component Value Units Date/Time    Blood culture [[id number]] Collected: 05/04/22 1428    Order Status: Completed Specimen: Blood from Line, Arterial, Left Updated: 05/08/22 0612     Blood Culture, Routine No Growth to date      No Growth to date      No Growth to date      No Growth to date    Blood culture [[id number]] Collected: 05/04/22 1429    Order Status: Completed Specimen: Blood from Line, Jugular, Internal Right Updated: 05/08/22 0612     Blood Culture, Routine No Growth to date      No Growth to date      No Growth to date      No Growth to date    Culture, Respiratory with Gram Stain [[id number]] Collected: 05/04/22 1253    Order Status: Completed Specimen: Respiratory from Endotracheal Aspirate Updated: 05/07/22 0921     Respiratory Culture No growth     Gram Stain (Respiratory) Few WBC's     Gram Stain (Respiratory) Rare Gram positive rods          Antibiotics (From admission, onward)                Start     Stop Route Frequency Ordered    05/06/22 2100  ertapenem (INVANZ) 500 mg in sodium chloride 0.9% 100 mL IVPB         05/09 2059 IV Every 24 hours (non-standard times) 05/06/22 0742    05/05/22 2115  linezolid 600 mg/300 mL IVPB 600 mg         05/11 2114 IV Every 12 hours (non-standard times) 05/05/22 2000    05/04/22 1300  mupirocin 2 % ointment  (DECOLONIZATION PROTOCOL ORDERS)         05/09 0859 Nasl 2 times daily 05/04/22 1253          Anticoagulants       Ordered     Route Frequency Start Stop    05/04/22 1255  heparin (porcine)  (VTE Prophylaxis Orders - High Risk)         SubQ Every 8 hours 05/05/22 0600 --          Echo Saline Bubble? No  · The left ventricle is normal in size with concentric hypertrophy and   moderately decreased systolic function.  · Grade I left  ventricular diastolic dysfunction.  · Normal right ventricular size with low normal right ventricular systolic   function.  · The estimated ejection fraction is 35-40%.  · There is mild left ventricular global hypokinesis.  · Moderate mitral regurgitation.     US Retroperitoneal Complete  Narrative: EXAMINATION:  US RETROPERITONEAL COMPLETE    CLINICAL HISTORY:  JOSE with oliguria. r/o obstructive uropathy;    TECHNIQUE:  Ultrasound of the kidneys and urinary bladder was performed including color flow and Doppler evaluation of the kidneys.    COMPARISON:  None.    FINDINGS:  Right kidney: The right kidney measures 10.9 cm. No cortical thinning. No loss of corticomedullary distinction.  Normal perfusion.   No mass. No renal stone. No hydronephrosis.    Left kidney: The left kidney measures 13 cm. No cortical thinning. No loss of corticomedullary distinction.  Normal perfusion. No mass. No renal stone. No hydronephrosis.    The bladder is not distended limited evaluation with Doherty catheter noted  Impression: No significant abnormality.    Electronically signed by: Philippe Horton MD  Date:    05/05/2022  Time:    08:28  X-Ray Chest AP Portable  Narrative: EXAMINATION:  XR CHEST AP PORTABLE    CLINICAL HISTORY:  resp failure;    TECHNIQUE:  Single frontal view of the chest was performed.    COMPARISON:  05/04/2022.    FINDINGS:  Tubes and lines are satisfactory.  No pneumothorax. Patchy infiltrate suspected left lower lobe.  Cannot exclude right infrahilar infiltrate with prominence of right hilum; consider follow-up chest CT..  Small left pleural effusion. In comparison to the prior study, there is no adverse interval changes  Impression: In comparison to the prior study, there is no adverse interval changes    Electronically signed by: Philippe Horton MD  Date:    05/05/2022  Time:    08:21   Significant Labs: All pertinent labs within the past 24 hours have been reviewed.    Significant Imaging: I have reviewed all  pertinent imaging results/findings within the past 24 hours.

## 2022-05-09 LAB
ACANTHOCYTES BLD QL SMEAR: PRESENT
ALBUMIN SERPL BCP-MCNC: 1.2 G/DL (ref 3.5–5.2)
ALLENS TEST: ABNORMAL
ALP SERPL-CCNC: 102 U/L (ref 55–135)
ALT SERPL W/O P-5'-P-CCNC: 495 U/L (ref 10–44)
ANION GAP SERPL CALC-SCNC: 19 MMOL/L (ref 8–16)
ANISOCYTOSIS BLD QL SMEAR: SLIGHT
AST SERPL-CCNC: 514 U/L (ref 10–40)
BASOPHILS # BLD AUTO: 0.04 K/UL (ref 0–0.2)
BASOPHILS NFR BLD: 0.4 % (ref 0–1.9)
BILIRUB SERPL-MCNC: 1.1 MG/DL (ref 0.1–1)
BUN SERPL-MCNC: 81 MG/DL (ref 8–23)
BURR CELLS BLD QL SMEAR: ABNORMAL
CALCIUM SERPL-MCNC: 6.7 MG/DL (ref 8.7–10.5)
CHLORIDE SERPL-SCNC: 91 MMOL/L (ref 95–110)
CO2 SERPL-SCNC: 22 MMOL/L (ref 23–29)
CREAT SERPL-MCNC: 4.7 MG/DL (ref 0.5–1.4)
DACRYOCYTES BLD QL SMEAR: ABNORMAL
DELSYS: ABNORMAL
DIFFERENTIAL METHOD: ABNORMAL
EOSINOPHIL # BLD AUTO: 0.2 K/UL (ref 0–0.5)
EOSINOPHIL NFR BLD: 1.5 % (ref 0–8)
ERYTHROCYTE [DISTWIDTH] IN BLOOD BY AUTOMATED COUNT: 24 % (ref 11.5–14.5)
ERYTHROCYTE [SEDIMENTATION RATE] IN BLOOD BY WESTERGREN METHOD: 30 MM/H
EST. GFR  (AFRICAN AMERICAN): 14 ML/MIN/1.73 M^2
EST. GFR  (NON AFRICAN AMERICAN): 12 ML/MIN/1.73 M^2
FIO2: 40
GLUCOSE SERPL-MCNC: 107 MG/DL (ref 70–110)
HCO3 UR-SCNC: 25.2 MMOL/L (ref 24–28)
HCT VFR BLD AUTO: 24.9 % (ref 40–54)
HGB BLD-MCNC: 7.8 G/DL (ref 14–18)
HYPOCHROMIA BLD QL SMEAR: ABNORMAL
IMM GRANULOCYTES # BLD AUTO: 0.08 K/UL (ref 0–0.04)
IMM GRANULOCYTES NFR BLD AUTO: 0.8 % (ref 0–0.5)
LYMPHOCYTES # BLD AUTO: 0.7 K/UL (ref 1–4.8)
LYMPHOCYTES NFR BLD: 6.8 % (ref 18–48)
MAGNESIUM SERPL-MCNC: 2 MG/DL (ref 1.6–2.6)
MCH RBC QN AUTO: 22.1 PG (ref 27–31)
MCHC RBC AUTO-ENTMCNC: 31.3 G/DL (ref 32–36)
MCV RBC AUTO: 71 FL (ref 82–98)
MODE: ABNORMAL
MONOCYTES # BLD AUTO: 0.6 K/UL (ref 0.3–1)
MONOCYTES NFR BLD: 5.8 % (ref 4–15)
NEUTROPHILS # BLD AUTO: 9 K/UL (ref 1.8–7.7)
NEUTROPHILS NFR BLD: 84.7 % (ref 38–73)
NRBC BLD-RTO: 0 /100 WBC
OVALOCYTES BLD QL SMEAR: ABNORMAL
PCO2 BLDA: 29.5 MMHG (ref 35–45)
PEEP: 5
PH SMN: 7.54 [PH] (ref 7.35–7.45)
PLATELET # BLD AUTO: 90 K/UL (ref 150–450)
PLATELET BLD QL SMEAR: ABNORMAL
PMV BLD AUTO: ABNORMAL FL (ref 9.2–12.9)
PO2 BLDA: 129 MMHG (ref 80–100)
POC BE: 3 MMOL/L
POC SATURATED O2: 99 % (ref 95–100)
POCT GLUCOSE: 106 MG/DL (ref 70–110)
POCT GLUCOSE: 111 MG/DL (ref 70–110)
POCT GLUCOSE: 85 MG/DL (ref 70–110)
POCT GLUCOSE: 92 MG/DL (ref 70–110)
POCT GLUCOSE: 99 MG/DL (ref 70–110)
POIKILOCYTOSIS BLD QL SMEAR: ABNORMAL
POLYCHROMASIA BLD QL SMEAR: ABNORMAL
POTASSIUM SERPL-SCNC: 4.6 MMOL/L (ref 3.5–5.1)
PROT SERPL-MCNC: 3.9 G/DL (ref 6–8.4)
RBC # BLD AUTO: 3.53 M/UL (ref 4.6–6.2)
SAMPLE: ABNORMAL
SCHISTOCYTES BLD QL SMEAR: PRESENT
SICKLE CELLS BLD QL SMEAR: ABNORMAL
SITE: ABNORMAL
SODIUM SERPL-SCNC: 132 MMOL/L (ref 136–145)
TARGETS BLD QL SMEAR: ABNORMAL
VT: 450
WBC # BLD AUTO: 10.6 K/UL (ref 3.9–12.7)

## 2022-05-09 PROCEDURE — 82800 BLOOD PH: CPT

## 2022-05-09 PROCEDURE — 83735 ASSAY OF MAGNESIUM: CPT | Performed by: SURGERY

## 2022-05-09 PROCEDURE — 63600175 PHARM REV CODE 636 W HCPCS: Performed by: SURGERY

## 2022-05-09 PROCEDURE — 37799 UNLISTED PX VASCULAR SURGERY: CPT

## 2022-05-09 PROCEDURE — 25000003 PHARM REV CODE 250: Performed by: SURGERY

## 2022-05-09 PROCEDURE — 25000003 PHARM REV CODE 250: Performed by: NURSE PRACTITIONER

## 2022-05-09 PROCEDURE — 94003 VENT MGMT INPAT SUBQ DAY: CPT

## 2022-05-09 PROCEDURE — 82803 BLOOD GASES ANY COMBINATION: CPT

## 2022-05-09 PROCEDURE — 20000000 HC ICU ROOM

## 2022-05-09 PROCEDURE — 94761 N-INVAS EAR/PLS OXIMETRY MLT: CPT

## 2022-05-09 PROCEDURE — 99223 1ST HOSP IP/OBS HIGH 75: CPT | Mod: ,,, | Performed by: INTERNAL MEDICINE

## 2022-05-09 PROCEDURE — 99223 PR INITIAL HOSPITAL CARE,LEVL III: ICD-10-PCS | Mod: ,,, | Performed by: INTERNAL MEDICINE

## 2022-05-09 PROCEDURE — 99900035 HC TECH TIME PER 15 MIN (STAT)

## 2022-05-09 PROCEDURE — 99900026 HC AIRWAY MAINTENANCE (STAT)

## 2022-05-09 PROCEDURE — 85025 COMPLETE CBC W/AUTO DIFF WBC: CPT | Performed by: SURGERY

## 2022-05-09 PROCEDURE — 80053 COMPREHEN METABOLIC PANEL: CPT | Performed by: SURGERY

## 2022-05-09 PROCEDURE — 63600175 PHARM REV CODE 636 W HCPCS: Mod: JG | Performed by: NURSE PRACTITIONER

## 2022-05-09 PROCEDURE — 99291 PR CRITICAL CARE, E/M 30-74 MINUTES: ICD-10-PCS | Mod: ,,, | Performed by: NURSE PRACTITIONER

## 2022-05-09 PROCEDURE — 99291 CRITICAL CARE FIRST HOUR: CPT | Mod: ,,, | Performed by: NURSE PRACTITIONER

## 2022-05-09 RX ORDER — FUROSEMIDE 10 MG/ML
60 INJECTION INTRAMUSCULAR; INTRAVENOUS ONCE
Status: COMPLETED | OUTPATIENT
Start: 2022-05-09 | End: 2022-05-09

## 2022-05-09 RX ADMIN — Medication 0.04 UNITS/MIN: at 09:05

## 2022-05-09 RX ADMIN — CHLORHEXIDINE GLUCONATE 0.12% ORAL RINSE 15 ML: 1.2 LIQUID ORAL at 09:05

## 2022-05-09 RX ADMIN — Medication 250 MCG/HR: at 03:05

## 2022-05-09 RX ADMIN — MINERAL OIL, PETROLATUM: 425; 573 OINTMENT OPHTHALMIC at 10:05

## 2022-05-09 RX ADMIN — FAMOTIDINE 20 MG: 10 INJECTION INTRAVENOUS at 08:05

## 2022-05-09 RX ADMIN — MICAFUNGIN SODIUM 100 MG: 100 INJECTION, POWDER, LYOPHILIZED, FOR SOLUTION INTRAVENOUS at 02:05

## 2022-05-09 RX ADMIN — HEPARIN SODIUM 5000 UNITS: 5000 INJECTION INTRAVENOUS; SUBCUTANEOUS at 01:05

## 2022-05-09 RX ADMIN — LINEZOLID 600 MG: 600 INJECTION, SOLUTION INTRAVENOUS at 09:05

## 2022-05-09 RX ADMIN — NOREPINEPHRINE BITARTRATE 0.08 MCG/KG/MIN: 1 INJECTION, SOLUTION, CONCENTRATE INTRAVENOUS at 08:05

## 2022-05-09 RX ADMIN — LORAZEPAM 2 MG: 2 INJECTION INTRAMUSCULAR; INTRAVENOUS at 03:05

## 2022-05-09 RX ADMIN — HEPARIN SODIUM 5000 UNITS: 5000 INJECTION INTRAVENOUS; SUBCUTANEOUS at 09:05

## 2022-05-09 RX ADMIN — Medication 0.04 UNITS/MIN: at 01:05

## 2022-05-09 RX ADMIN — Medication 250 MCG/HR: at 01:05

## 2022-05-09 RX ADMIN — LINEZOLID 600 MG: 600 INJECTION, SOLUTION INTRAVENOUS at 08:05

## 2022-05-09 RX ADMIN — Medication 0.04 UNITS/MIN: at 05:05

## 2022-05-09 RX ADMIN — ERTAPENEM 500 MG: 1 INJECTION INTRAMUSCULAR; INTRAVENOUS at 10:05

## 2022-05-09 RX ADMIN — HEPARIN SODIUM 5000 UNITS: 5000 INJECTION INTRAVENOUS; SUBCUTANEOUS at 05:05

## 2022-05-09 RX ADMIN — AMIODARONE HYDROCHLORIDE 0.5 MG/MIN: 1.8 INJECTION, SOLUTION INTRAVENOUS at 01:05

## 2022-05-09 RX ADMIN — FUROSEMIDE 60 MG: 10 INJECTION, SOLUTION INTRAMUSCULAR; INTRAVENOUS at 08:05

## 2022-05-09 RX ADMIN — CHLORHEXIDINE GLUCONATE 0.12% ORAL RINSE 15 ML: 1.2 LIQUID ORAL at 08:05

## 2022-05-09 NOTE — CONSULTS
[facility] ([facility])  Nephrology  Consult Note    Patient Name: Franky Masters  MRN: [id number]  Admission Date: 5/4/2022  Hospital Length of Stay: 5 days  Attending Provider: Elvie Parker DO   Primary Care Physician: HOLLY ROSEN  Principal Problem:Septic shock   Reason for Consult: Acute Kidney Injury     Inpatient consult to Nephrology  Consult performed by: Avery Persaud MD  Consult ordered by: KATHARINE Miranda-BC  Reason for consult: Acute Kidney Injury         Subjective:     HPI:   History obtained per Chart Review  66 yo male with no routine medical care by patient's choice admitted 5 days prior for perforated bowel, found to have cecal mass intraoperatively with liver nodules, he is post op Day #5. He underwent lavage 5/7. He continues to be in septic shock on 2 vasopressors, intubated and sedated. His post operative course was complicated by afib as well.     Admission creatinine was 0.9mg/dL and initial JOSE was oliguric, but has increased UOP in the past 24 hours, albeit s/p Lasix IVP. Hid creatinine of 4.6-4.7mg/dl has remained stable since Saturday 5/7.     Past Medical History:   Diagnosis Date    Diverticulitis     Supplemental oxygen dependent        Past Surgical History:   Procedure Laterality Date    ABDOMINAL WASHOUT  5/7/2022    Procedure: LAVAGE, PERITONEAL, THERAPEUTIC;  Surgeon: Elvie Parker DO;  Location: [facility] OR;  Service: General;;    ABDOMINAL WASHOUT  5/7/2022    Procedure: ;  Surgeon: Elvie Parker DO;  Location: [facility] OR;  Service: General;;    APPLICATION OF WOUND VACUUM-ASSISTED CLOSURE DEVICE N/A 5/4/2022    Procedure: APPLICATION, WOUND VAC;  Surgeon: Elvie Parker DO;  Location: [facility] OR;  Service: General;  Laterality: N/A;    LAPAROTOMY N/A 5/7/2022    Procedure: LAPAROTOMY;  Surgeon: Elvie Parker DO;  Location: [facility] OR;  Service: General;  Laterality: N/A;       Review of patient's allergies indicates:  No Known  Allergies  Current Facility-Administered Medications   Medication Frequency    0.9%  NaCl infusion (for blood administration) Q24H PRN    0.9%  NaCl infusion PRN    acetaminophen suppository 650 mg Q6H PRN    amiodarone 360 mg/200 mL (1.8 mg/mL) infusion Continuous    chlorhexidine 0.12 % solution 15 mL BID    dextrose 10% bolus 125 mL PRN    dextrose 10% bolus 250 mL PRN    famotidine (PF) injection 20 mg Daily    fentaNYL 2500 mcg in 0.9% sodium chloride 250 mL infusion premix (titrating) Continuous    glucagon (human recombinant) injection 1 mg PRN    heparin (porcine) injection 5,000 Units Q8H    HYDROmorphone (PF) injection 0.2 mg Q5 Min PRN    insulin aspart U-100 pen 1-10 Units Q6H PRN    ketorolac injection 15 mg Q8H PRN    linezolid 600 mg/300 mL IVPB 600 mg Q12H    lorazepam injection 2 mg Q4H PRN    micafungin 100 mg in sodium chloride 0.9 % 100 mL IVPB (ready to mix system) Q24H    NORepinephrine 8 mg in dextrose 5 % 250 mL infusion Continuous    ondansetron injection 4 mg Daily PRN    ondansetron injection 4 mg Q8H PRN    phenylephrine (ABELARDO-SYNEPHRINE) 100 mg in sodium chloride 0.9% 250 mL infusion Continuous    vasopressin (PITRESSIN) 0.2 Units/mL in dextrose 5% 100 mL infusion Continuous    white petrolatum-mineral oil 57.3-42.5% ophthalmic ointment QHS     Family History    None       Tobacco Use    Smoking status: Never Smoker    Smokeless tobacco: Never Used   Substance and Sexual Activity    Alcohol use: Not Currently    Drug use: Never    Sexual activity: Not on file     Review of Systems   Unable to perform ROS: Intubated   Constitutional: Negative for fever.   Genitourinary: Positive for decreased urine volume (UOP improved).   Skin: Positive for wound (incisional).   Allergic/Immunologic: Positive for immunocompromised state.     Objective:     Vital Signs (Most Recent):  Temp: 98.42 °F (36.9 °C) (05/09/22 1000)  Pulse: 60 (05/09/22 1000)  Resp: (!) 27 (05/09/22  1000)  BP: 101/66 (05/09/22 1000)  SpO2: 100 % (05/09/22 1000)  O2 Device (Oxygen Therapy): ventilator (05/09/22 0947) Vital Signs (24h Range):  Temp:  [96.26 °F (35.7 °C)-100.04 °F (37.8 °C)] 98.42 °F (36.9 °C)  Pulse:  [60-70] 60  Resp:  [27-33] 27  SpO2:  [94 %-100 %] 100 %  BP: ()/(52-70) 101/66  Arterial Line BP: (102-123)/(59-70) 119/68     Weight: 116.9 kg (257 lb 11.5 oz) (05/09/22 0500)  Body mass index is 38.06 kg/m².  Body surface area is 2.39 meters squared.    I/O last 3 completed shifts:  In: 5063.3 [I.V.:4167.5; IV Piggyback:895.8]  Out: 2555 [Urine:1355; Drains:200; Other:1000]    Physical Exam  Vitals and nursing note reviewed.   Constitutional:       General: He is not in acute distress.     Appearance: He is obese. He is ill-appearing.      Interventions: He is sedated and intubated.   HENT:      Head: Atraumatic.   Cardiovascular:      Rate and Rhythm: Normal rate.      Heart sounds:     No friction rub (no rubs appreciated).      Comments: Normal to slight acacia   Pulmonary:      Effort: He is intubated.      Breath sounds: Normal breath sounds.   Abdominal:      General: There is distension.   Genitourinary:     Comments: Doherty with yellow urine draining  Musculoskeletal:         General: Swelling present.      Right lower leg: Edema present.      Left lower leg: Edema present.   Skin:     General: Skin is warm and dry.         Significant Labs: reviewed    Significant Imaging:  CXR ordered     Assessment/Plan:     Active Diagnoses:    Diagnosis Date Noted POA    PRINCIPAL PROBLEM:  Septic shock sec to Peritonitis from SB perforation [A41.9, R65.21] 05/04/2022 Yes    Atrial fibrillation with RVR [I48.91] 05/06/2022 No    On mechanically assisted ventilation [Z99.11] 05/05/2022 Not Applicable    JOSE (acute kidney injury) [N17.9] 05/05/2022 Yes    Small bowel perforation with large Cecal mass  [K63.1] 05/04/2022 Yes    Hyperglycemia, unspecified [R73.9] 05/04/2022 Yes    Mass of  colon [K63.89] 05/04/2022 Yes    Pneumonia of left lower lobe due to infectious organism [J18.9] 05/04/2022 Yes    Acute on chronic anemia [D64.9] 08/05/2021 Yes    Acute hypoxemic respiratory failure on mechanical vent [J96.01] 08/05/2021 Yes    Obesity (BMI 30.0-34.9) [E66.9] 08/05/2021 Yes     Chronic      Problems Resolved During this Admission:       JOSE with baseline creatinine 0.9mg/dL in setting of septic shock  - JOSE due to ATN and hemodynamic changes  - initially oliguric but now with some improved UOP  - no acute indications for RRT, but in chart review it appears that dialysis will not be considered due to patient's wishes. This will be reviewed with daughter  - stop prn NSAID     Septic Shock from perforated viscus/Peritonitis   - per CCM    Perforated Bowel  - surgery notes reviewed  - cecal mass strongly suspicious for cancer with likely liver mets    Respiratory Failure, intubated  Hemodynamic Failure   Shock Liver  Afib with RVR, new onset  Severe hypoalbuminemia   Anemia acute on chronic and Multifactorial   Thrombocytopenia  Hyponatremia   Respiratory Alkalosis     Short term survival is poor. Long term prognosis, given strong suspicion for cancer with mets, is poor as well.       Thank you for your consult. I will follow-up with patient. Please contact us if you have any additional questions.    Avery Persaud MD  Nephrology  [facility] ([facility])

## 2022-05-09 NOTE — SUBJECTIVE & OBJECTIVE
Interval History: Remains intubated in ICU, still on vasopressor support. Urine output improved with diuresis.    Medications:  Continuous Infusions:   amiodarone in dextrose 5% 0.5 mg/min (05/09/22 1500)    fentanyl 200 mcg/hr (05/09/22 1500)    NORepinephrine bitartrate-D5W 0.06 mcg/kg/min (05/09/22 1500)    phenylephrine Stopped (05/07/22 1006)    vasopressin 0.04 Units/min (05/09/22 1500)     Scheduled Meds:   chlorhexidine  15 mL Mouth/Throat BID    ertapenem (INVANZ) IVPB  500 mg Intravenous Q24H    famotidine (PF)  20 mg Intravenous Daily    heparin (porcine)  5,000 Units Subcutaneous Q8H    linezolid  600 mg Intravenous Q12H    micafungin (MYCAMINE) IVPB  100 mg Intravenous Q24H    white petrolatum-mineral oil 57.3-42.5%   Both Eyes QHS     PRN Meds:sodium chloride, sodium chloride 0.9%, acetaminophen, dextrose 10%, dextrose 10%, glucagon (human recombinant), HYDROmorphone, insulin aspart U-100, lorazepam, ondansetron, ondansetron     Review of patient's allergies indicates:  No Known Allergies  Objective:     Vital Signs (Most Recent):  Temp: 98.6 °F (37 °C) (05/09/22 1500)  Pulse: 91 (05/09/22 1500)  Resp: (!) 26 (05/09/22 1500)  BP: 103/66 (05/09/22 1500)  SpO2: 100 % (05/09/22 1500)   Vital Signs (24h Range):  Temp:  [98.24 °F (36.8 °C)-100.04 °F (37.8 °C)] 98.6 °F (37 °C)  Pulse:  [60-91] 91  Resp:  [25-33] 26  SpO2:  [94 %-100 %] 100 %  BP: ()/(52-71) 103/66  Arterial Line BP: (102-126)/(57-70) 103/61     Weight: 116.9 kg (257 lb 11.5 oz)  Body mass index is 38.06 kg/m².    Intake/Output - Last 3 Shifts         05/07 0700 05/08 0659 05/08 0700 05/09 0659 05/09 0700  05/10 0659    I.V. (mL/kg) 4619.9 (39.5) 2024.2 (17.3) 611.5 (5.2)    IV Piggyback 892.2 497.2 357    Total Intake(mL/kg) 5512.1 (47.1) 2521.4 (21.6) 968.5 (8.3)    Urine (mL/kg/hr) 320 (0.1) 1170 (0.4) 805 (0.7)    Drains 0 200     Other 1000 600 150    Total Output 1320 1970 955    Net +4192.1 +551.4 +13.5                    Physical Exam  Vitals and nursing note reviewed.   Constitutional:       Appearance: He is obese. He is ill-appearing and toxic-appearing.   Cardiovascular:      Rate and Rhythm: Regular rhythm.   Pulmonary:      Comments: Mechanical breath sounds  Abdominal:      Palpations: Abdomen is soft.      Comments: Abthera vac in place holding suction, serous output   Genitourinary:     Comments: Doherty in place  Musculoskeletal:      Right lower leg: Edema present.      Left lower leg: Edema present.   Neurological:      Comments: Sedated       Significant Labs:  I have reviewed all pertinent lab results within the past 24 hours.  CBC:   Recent Labs   Lab 05/09/22  0452   WBC 10.60   RBC 3.53*   HGB 7.8*   HCT 24.9*   PLT 90*   MCV 71*   MCH 22.1*   MCHC 31.3*     CMP:   Recent Labs   Lab 05/09/22 0452      CALCIUM 6.7*   ALBUMIN 1.2*   PROT 3.9*   *   K 4.6   CO2 22*   CL 91*   BUN 81*   CREATININE 4.7*   ALKPHOS 102   *   *   BILITOT 1.1*     Coagulation:   Recent Labs   Lab 05/04/22  0805 05/05/22  0325   LABPROT 16.5* 12.0   INR 1.6* 1.1   APTT 33.2*  --      ABGs:   Recent Labs   Lab 05/09/22  0406   PH 7.540*   PCO2 29.5*   PO2 129*   HCO3 25.2   POCSATURATED 99   BE 3       Significant Diagnostics:  I have reviewed all pertinent imaging results/findings within the past 24 hours.

## 2022-05-09 NOTE — H&P (VIEW-ONLY)
[facility] ([facility])  Nephrology  Consult Note    Patient Name: Franky Masters  MRN: [id number]  Admission Date: 5/4/2022  Hospital Length of Stay: 5 days  Attending Provider: Elvie Parker DO   Primary Care Physician: HOLLY ROSEN  Principal Problem:Septic shock   Reason for Consult: Acute Kidney Injury     Inpatient consult to Nephrology  Consult performed by: Avery Persaud MD  Consult ordered by: KATHARINE Miranda-BC  Reason for consult: Acute Kidney Injury         Subjective:     HPI:   History obtained per Chart Review  68 yo male with no routine medical care by patient's choice admitted 5 days prior for perforated bowel, found to have cecal mass intraoperatively with liver nodules, he is post op Day #5. He underwent lavage 5/7. He continues to be in septic shock on 2 vasopressors, intubated and sedated. His post operative course was complicated by afib as well.     Admission creatinine was 0.9mg/dL and initial JOSE was oliguric, but has increased UOP in the past 24 hours, albeit s/p Lasix IVP. Hid creatinine of 4.6-4.7mg/dl has remained stable since Saturday 5/7.     Past Medical History:   Diagnosis Date    Diverticulitis     Supplemental oxygen dependent        Past Surgical History:   Procedure Laterality Date    ABDOMINAL WASHOUT  5/7/2022    Procedure: LAVAGE, PERITONEAL, THERAPEUTIC;  Surgeon: Elvie Parker DO;  Location: [facility] OR;  Service: General;;    ABDOMINAL WASHOUT  5/7/2022    Procedure: ;  Surgeon: Elvie Parker DO;  Location: [facility] OR;  Service: General;;    APPLICATION OF WOUND VACUUM-ASSISTED CLOSURE DEVICE N/A 5/4/2022    Procedure: APPLICATION, WOUND VAC;  Surgeon: Elvie Parker DO;  Location: [facility] OR;  Service: General;  Laterality: N/A;    LAPAROTOMY N/A 5/7/2022    Procedure: LAPAROTOMY;  Surgeon: Elvie Parker DO;  Location: [facility] OR;  Service: General;  Laterality: N/A;       Review of patient's allergies indicates:  No Known  Allergies  Current Facility-Administered Medications   Medication Frequency    0.9%  NaCl infusion (for blood administration) Q24H PRN    0.9%  NaCl infusion PRN    acetaminophen suppository 650 mg Q6H PRN    amiodarone 360 mg/200 mL (1.8 mg/mL) infusion Continuous    chlorhexidine 0.12 % solution 15 mL BID    dextrose 10% bolus 125 mL PRN    dextrose 10% bolus 250 mL PRN    famotidine (PF) injection 20 mg Daily    fentaNYL 2500 mcg in 0.9% sodium chloride 250 mL infusion premix (titrating) Continuous    glucagon (human recombinant) injection 1 mg PRN    heparin (porcine) injection 5,000 Units Q8H    HYDROmorphone (PF) injection 0.2 mg Q5 Min PRN    insulin aspart U-100 pen 1-10 Units Q6H PRN    ketorolac injection 15 mg Q8H PRN    linezolid 600 mg/300 mL IVPB 600 mg Q12H    lorazepam injection 2 mg Q4H PRN    micafungin 100 mg in sodium chloride 0.9 % 100 mL IVPB (ready to mix system) Q24H    NORepinephrine 8 mg in dextrose 5 % 250 mL infusion Continuous    ondansetron injection 4 mg Daily PRN    ondansetron injection 4 mg Q8H PRN    phenylephrine (ABELARDO-SYNEPHRINE) 100 mg in sodium chloride 0.9% 250 mL infusion Continuous    vasopressin (PITRESSIN) 0.2 Units/mL in dextrose 5% 100 mL infusion Continuous    white petrolatum-mineral oil 57.3-42.5% ophthalmic ointment QHS     Family History    None       Tobacco Use    Smoking status: Never Smoker    Smokeless tobacco: Never Used   Substance and Sexual Activity    Alcohol use: Not Currently    Drug use: Never    Sexual activity: Not on file     Review of Systems   Unable to perform ROS: Intubated   Constitutional: Negative for fever.   Genitourinary: Positive for decreased urine volume (UOP improved).   Skin: Positive for wound (incisional).   Allergic/Immunologic: Positive for immunocompromised state.     Objective:     Vital Signs (Most Recent):  Temp: 98.42 °F (36.9 °C) (05/09/22 1000)  Pulse: 60 (05/09/22 1000)  Resp: (!) 27 (05/09/22  1000)  BP: 101/66 (05/09/22 1000)  SpO2: 100 % (05/09/22 1000)  O2 Device (Oxygen Therapy): ventilator (05/09/22 0947) Vital Signs (24h Range):  Temp:  [96.26 °F (35.7 °C)-100.04 °F (37.8 °C)] 98.42 °F (36.9 °C)  Pulse:  [60-70] 60  Resp:  [27-33] 27  SpO2:  [94 %-100 %] 100 %  BP: ()/(52-70) 101/66  Arterial Line BP: (102-123)/(59-70) 119/68     Weight: 116.9 kg (257 lb 11.5 oz) (05/09/22 0500)  Body mass index is 38.06 kg/m².  Body surface area is 2.39 meters squared.    I/O last 3 completed shifts:  In: 5063.3 [I.V.:4167.5; IV Piggyback:895.8]  Out: 2555 [Urine:1355; Drains:200; Other:1000]    Physical Exam  Vitals and nursing note reviewed.   Constitutional:       General: He is not in acute distress.     Appearance: He is obese. He is ill-appearing.      Interventions: He is sedated and intubated.   HENT:      Head: Atraumatic.   Cardiovascular:      Rate and Rhythm: Normal rate.      Heart sounds:     No friction rub (no rubs appreciated).      Comments: Normal to slight acacia   Pulmonary:      Effort: He is intubated.      Breath sounds: Normal breath sounds.   Abdominal:      General: There is distension.   Genitourinary:     Comments: Doherty with yellow urine draining  Musculoskeletal:         General: Swelling present.      Right lower leg: Edema present.      Left lower leg: Edema present.   Skin:     General: Skin is warm and dry.         Significant Labs: reviewed    Significant Imaging:  CXR ordered     Assessment/Plan:     Active Diagnoses:    Diagnosis Date Noted POA    PRINCIPAL PROBLEM:  Septic shock sec to Peritonitis from SB perforation [A41.9, R65.21] 05/04/2022 Yes    Atrial fibrillation with RVR [I48.91] 05/06/2022 No    On mechanically assisted ventilation [Z99.11] 05/05/2022 Not Applicable    JOSE (acute kidney injury) [N17.9] 05/05/2022 Yes    Small bowel perforation with large Cecal mass  [K63.1] 05/04/2022 Yes    Hyperglycemia, unspecified [R73.9] 05/04/2022 Yes    Mass of  colon [K63.89] 05/04/2022 Yes    Pneumonia of left lower lobe due to infectious organism [J18.9] 05/04/2022 Yes    Acute on chronic anemia [D64.9] 08/05/2021 Yes    Acute hypoxemic respiratory failure on mechanical vent [J96.01] 08/05/2021 Yes    Obesity (BMI 30.0-34.9) [E66.9] 08/05/2021 Yes     Chronic      Problems Resolved During this Admission:       JOSE with baseline creatinine 0.9mg/dL in setting of septic shock  - JOSE due to ATN and hemodynamic changes  - initially oliguric but now with some improved UOP  - no acute indications for RRT, but in chart review it appears that dialysis will not be considered due to patient's wishes. This will be reviewed with daughter  - stop prn NSAID     Septic Shock from perforated viscus/Peritonitis   - per CCM    Perforated Bowel  - surgery notes reviewed  - cecal mass strongly suspicious for cancer with likely liver mets    Respiratory Failure, intubated  Hemodynamic Failure   Shock Liver  Afib with RVR, new onset  Severe hypoalbuminemia   Anemia acute on chronic and Multifactorial   Thrombocytopenia  Hyponatremia   Respiratory Alkalosis     Short term survival is poor. Long term prognosis, given strong suspicion for cancer with mets, is poor as well.       Thank you for your consult. I will follow-up with patient. Please contact us if you have any additional questions.    Avery Persaud MD  Nephrology  [facility] ([facility])

## 2022-05-09 NOTE — PLAN OF CARE
Pt intubated on ventilator. Normal sinus/afib on cardiac monitor. Afebrile. Blood pressure stable on pressors. See MAR. Doherty in place with satisfactory urine output during shift. Wound vac in place. Emergency equipment at bedside. All alarms active and audible. Family updated at bedside. Will continue to monitor.   Problem: Adult Inpatient Plan of Care  Goal: Plan of Care Review  Outcome: Ongoing, Progressing     Problem: Adult Inpatient Plan of Care  Goal: Patient-Specific Goal (Individualized)  Outcome: Ongoing, Progressing     Problem: Infection  Goal: Absence of Infection Signs and Symptoms  Outcome: Ongoing, Progressing     Problem: Device-Related Complication Risk (Mechanical Ventilation, Invasive)  Goal: Optimal Device Function  Outcome: Ongoing, Progressing

## 2022-05-09 NOTE — ASSESSMENT & PLAN NOTE
Patient with Hypoxic Respiratory failure which is Acute.  he is not on home oxygen. Supplemental oxygen was provided and noted- Vent Mode: A/C  Oxygen Concentration (%):  [40] 40  Resp Rate Total:  [25 br/min-37 br/min] 28 br/min  Vt Set:  [450 mL] 450 mL  PEEP/CPAP:  [5 cmH20] 5 cmH20  Pressure Support:  [0 cmH20] 0 cmH20  Mean Airway Pressure:  [12 tuI89-33 cmH20] 12 cmH20.   Signs/symptoms of respiratory failure include- tachypnea. Contributing diagnoses includes - Obesity Hypoventilation Labs and images were reviewed. Patient Has recent ABG, which has been reviewed. Will treat underlying causes and adjust management of respiratory failure as indicated. Pulmonary CC on board  Day 2 on Vent- remains intubated on vent  Cont vent support    Day 4 on Vent    Day 5 on vent- adequate O2, sats    Day 6- continue supportive care, await family's decision about comfort care

## 2022-05-09 NOTE — ASSESSMENT & PLAN NOTE
Worsening  Trend  Cr 2.2 today    Cr now 3.8- Oliguric- heading towards Anuria and ATN/ May need HD vs CRRT- she has massive fluid Overload.     Cr now 4.5-  Remains anuric  POA/ Family not in favor of HD    Remains Oliguric due to Septic Shock on 2 Pressors  Some urine output with lasix but no Polyuria

## 2022-05-09 NOTE — ASSESSMENT & PLAN NOTE
S/t septic shock  Adequate volume resuscitation +22L  Avoid nephrotoxins  Strict I/O  No acute indication for dialysis but high potential for continued decline, daughter(HCPOA) will not pursue dialysis if decline per her father's wishes  Continue trial of diuresis  Nephrology consulted to optimize medical management

## 2022-05-09 NOTE — ASSESSMENT & PLAN NOTE
Pressors  Abx - Zosyn / Micafungin  ID on consult    Remains in severe Septic Shock complicated by JOSE/ATN- Anuria and Resp Failure on Vent  ICU team has d/w daughter about HD if and when needed- she does not appear to be in favor of HD at present  Prognosis poor    Day 4 in Septic Shock and on vent  Continue Levo and Vaso plus IV Abx  Prognosis poor    Day 5- Continues same on Vent, WBC better but remain in remains shut down due to shock plus 2 Pressors    Day 6- Continue present supportive care

## 2022-05-09 NOTE — CHAPLAIN
Initial visit with patient and his family.  Visited with pt and his family to assess for spiritual and emotional needs.  Pt's family were doing very well.  They mentioned that their frankie is helping them a lot at this time and asked for prayer.  I took time to do this for them before leaving and spiritual care remains available as needed.    Chaplain Jude Oshea M.Div., [facility]

## 2022-05-09 NOTE — ASSESSMENT & PLAN NOTE
Blood and sputum cultures still no growth  abx adjusted by ID, continue invanz, zyvox, mycafungin  OET suctioning PRN

## 2022-05-09 NOTE — PLAN OF CARE
No acute events overnight. VSS. Titration of critical gtts per order. Tolerated vent settings. Improvement to urine output to norwood catheter. Temp .2. OG to LIS. Wound vac to abdomen intact. BSWR per order. POC reviewed with patient. Safety and fall precautions maintained.

## 2022-05-09 NOTE — PROGRESS NOTES
[facility] ([facility])  Critical Care Medicine  Progress Note    Patient Name: Franky Masters  MRN: [id number]  Admission Date: 5/4/2022  Hospital Length of Stay: 5 days  Code Status: DNR  Attending Provider: Elvie Parker DO  Primary Care Provider: HOLLY ROSEN   Principal Problem: Septic shock    Subjective:     HPI:  Ms Masters is a 65 yo obese male with a PMH of diverticulosis and hx of hospitalization in Aug 2021 with COVID PNA and Hypoxic Resp Failure but did not require ICU or intubation.  She presented early this AM to [facility] ED about 0515 hr via EMS with complaint of abd pain X 2 hours that awakened her from sleep and had associated N/V/D.  In ED BP 86/46, RA SAT 92%, LA 8, Hgb 7.2 and CT Abd with suspected bowel perforation and free air.  General Surgery consulted and taken to OR this AM revealing SB perf with 3 L feculent fluid and food in cavity and large obstructing cecal mass with perf ileum and palpable liver mass.  Had Expl Lap with washout and excision of SB with wound vac placement admitted post op to ICU intubated on mech ventilation.  Received 2 units PRBCs in ED and another 2 units in OR also on Levophed and Vasopressin infusions.  Before surgery patient was insistent he be DNR post op but consented to surgery and invasive mech ventilation but no ACLS post op in event of cardiac arrest and no prolonged mech ventilation. Reportedly patient does not routinely follow with practitioner as outpt.       Hospital/ICU Course:  5/4 - Admitted to ICU sedated and intubated on Levophed and Vasopressin infusions in no distress  5/5 - remains intubated and sedated on mechanical vent and pressor support; scant urine output overnight and creatinine rise to 2.2 with fluid balance +8.9L  5/6 - remains intubated and sedated on mechanical vent with decreasing pressor demand; still oliguric with creatinine up to 3.6, K 5.2; fluid balance +13L; now with bigeminy and cardiac rhythm changes, K  stable on abg but iCa 0.78  5/7 - remains intubated and sedated with open abdomen to abthera wound vac and pressor support; overnight atrial fib with RVR, now on amio infusion with SR 68 on monitor; plan to OR for washout and vac replacement today  5/8 - remains intubated, sedated with ab thera wound vac to open abdomen, mechanical ventilation, and 2 pressor support. SR on monitor, on amiodarone infusion. Tmax 100.2, wbc down at 12.3k, creatinine rising 4.6,urine output scant, K 5.1  5/9 - remains intubated and sedated with ab thera wound vac to open abdomen, mech vent, and 2 pressor support. Remains SR on monitor with amio infusion. Tmax 98.9, wbc down 10.6k, creatinine holding at 4.7, K 4.6 after lasix trial with 1.2L urinary output      Review of Systems   Unable to perform ROS: Intubated     Objective:     Vital Signs (Most Recent):  Temp: 98.96 °F (37.2 °C) (05/09/22 0700)  Pulse: 61 (05/09/22 0700)  Resp: (!) 30 (05/09/22 0700)  BP: 103/64 (05/09/22 0700)  SpO2: 100 % (05/09/22 0700)   Vital Signs (24h Range):  Temp:  [96.26 °F (35.7 °C)-100.04 °F (37.8 °C)] 98.96 °F (37.2 °C)  Pulse:  [60-70] 61  Resp:  [27-33] 30  SpO2:  [94 %-100 %] 100 %  BP: ()/(52-70) 103/64  Arterial Line BP: (102-123)/(59-70) 108/61     Weight: 116.9 kg (257 lb 11.5 oz)  Body mass index is 38.06 kg/m².      Intake/Output Summary (Last 24 hours) at 5/9/2022 0801  Last data filed at 5/9/2022 0700  Gross per 24 hour   Intake 2592.5 ml   Output 1870 ml   Net 722.5 ml        amiodarone in dextrose 5% 0.5 mg/min (05/09/22 0700)    fentanyl 250 mcg/hr (05/09/22 0700)    NORepinephrine bitartrate-D5W 0.06 mcg/kg/min (05/09/22 0700)    phenylephrine Stopped (05/07/22 1006)    vasopressin 0.04 Units/min (05/09/22 0700)       Physical Exam  Vitals and nursing note reviewed.   Constitutional:       General: He is not in acute distress.     Appearance: He is obese. He is ill-appearing.      Interventions: He is sedated, intubated and  restrained.   HENT:      Head: Atraumatic.   Eyes:      General: No scleral icterus.     Conjunctiva/sclera: Conjunctivae normal.   Neck:      Vascular: No JVD.   Cardiovascular:      Rate and Rhythm: Normal rate and regular rhythm.      Pulses:           Radial pulses are 1+ on the right side and 1+ on the left side.        Dorsalis pedis pulses are 1+ on the right side and 1+ on the left side.   Pulmonary:      Effort: He is intubated.      Breath sounds: Decreased breath sounds present. No wheezing or rhonchi.   Abdominal:      General: Bowel sounds are absent.       Musculoskeletal:      Right lower le+ Pitting Edema present.      Left lower le+ Pitting Edema present.   Skin:     General: Skin is cool.      Capillary Refill: Capillary refill takes more than 3 seconds.       Vents:  Vent Mode: A/C (22 044)  Ventilator Initiated: Yes (22 1258)  Set Rate: 30 BPM (22)  Vt Set: 450 mL (220)  Pressure Support: 0 cmH20 (22 044)  PEEP/CPAP: 5 cmH20 (22 0440)  Oxygen Concentration (%): 40 (22 0705)  Peak Airway Pressure: 30 cmH2O (22 044)  Plateau Pressure: 18 cmH20 (22 0440)  Total Ve: 14.1 mL (22 0440)  F/VT Ratio<105 (RSBI): (!) 64.79 (22 0440)    Lines/Drains/Airways       Central Venous Catheter Line  Duration             Percutaneous Central Line Insertion/Assessment - Triple Lumen  22 1200 right internal jugular 4 days              Drain  Duration                  NG/OG Tube 22 1600 4 days         Urethral Catheter 22 1105 Straight-tip;Silicone 16 Fr. 4 days              Airway  Duration                  Airway - Non-Surgical 22 1051 Endotracheal Tube 4 days              Arterial Line  Duration             Arterial Line 22 1330 Right Radial 1 day                    Significant Labs:    CBC/Anemia Profile:  Recent Labs   Lab 22  0421 22  0452   WBC 12.29 10.60   HGB 8.5* 7.8*   HCT 28.0*  24.9*   PLT 96* 90*   MCV 71* 71*   RDW 23.9* 24.0*          Chemistries:  Recent Labs   Lab 05/08/22  0421 05/08/22  1603 05/09/22  0452   * 132* 132*   K 5.1 4.9 4.6   CL 93* 92* 91*   CO2 23 18* 22*   BUN 69* 75* 81*   CREATININE 4.6* 4.7* 4.7*   CALCIUM 6.0* 6.0* 6.7*   ALBUMIN 1.3*  --  1.2*   PROT 3.9*  --  3.9*   BILITOT 0.9  --  1.1*   ALKPHOS 114  --  102   *  --  495*   *  --  514*   MG 1.8 1.7 2.0         All pertinent labs within the past 24 hours have been reviewed.    Significant Imaging:  I have reviewed all pertinent imaging results/findings within the past 24 hours.      ABG  Recent Labs   Lab 05/09/22  0406   PH 7.540*   PO2 129*   PCO2 29.5*   HCO3 25.2   BE 3     Assessment/Plan:     Pulmonary  Pneumonia of left lower lobe due to infectious organism  Blood and sputum cultures still no growth  abx adjusted by ID, continue invanz, zyvox, mycafungin  OET suctioning PRN    Acute hypoxemic respiratory failure on mechanical vent  Vent settings reviewed and adjusted to optimize gas exchange  VAP prophylaxis  ABG daily and prn to assess response to therapy  SAT/SBT once gi tract in continuity    Cardiac/Vascular  Atrial fibrillation with RVR  New onset 5/6  Converted to SR on amiodarone infusion; maintain while bowel in discontinuity  Continue cardiac monitoring    Renal/  JOSE (acute kidney injury)  S/t septic shock  Adequate volume resuscitation +22L  Avoid nephrotoxins  Strict I/O  No acute indication for dialysis but high potential for continued decline, daughter(HCPOA) will not pursue dialysis if decline per her father's wishes  Continue trial of diuresis  Nephrology consulted to optimize medical management    ID  * Septic shock sec to Peritonitis from SB perforation  Secondary to Peritonitis from bowel perforation with major fecal contamination  Blood and sputum cultures ngtd  Wbc down after washout 5/7  Continue zyvox, invanz and Micafungin  Cont Vasopressin and titrate  pressor for MAP > 75  ICU hemodynamic monitoring  Echo reviewed    Oncology  Acute on chronic anemia  Received 2 units PRBCs in ED and 2 more in OR on 5/4  CBC stable   Monitor wound vac output and daily CBC with Conservative transfusion protocol    Endocrine  Hyperglycemia, unspecified  SSI prn with monitoring for glucose control and prevention of insulin toxicity    Obesity (BMI 30.0-34.9)  Will encourage weight loss once/if survives and extubated and fully awake/alert    GI  Mass of colon  Found on exploration along with palpable liver mass  Plan bx vs excision of cecal mass to obtain pathology at time of ostomy and bowel re-anastomosis  High concern for metastatic malignancy    Small bowel perforation with large Cecal mass   5/4 Expl Lap and SB excision with washout and AB thera wound vac for bowel left in discontinuity  5/7 washout and wound vac replacement  hx Diverticulosis but cecal mass and suspected metastatic lesions found in exploration    IVAB for peritonitis/sepsis  NPO and IVFs with NG to suction    Palliative Care  Pt is DNR, discussed at length with surgeon prior to surgery and he consented to intubation for surgery with understanding of likely need for prolonged vent support post op until bowel/abdomen closed. He was clear that he would not desire long term vent support past that necessary for immediate post op recovery.   Daughter Claudia is SDM and is aware and supportive of his wishes. IF at any point his survival chances become poor or prolonged life support is anticipated she would honor his wish and transition to comfort focused care.  5/6 - discussed status with daughter. She expressed again understanding of her father's wishes for very limited life support and further stated today that after time to reflect on current status, in the event of continued decline she would not want to pursue potential dialysis but if kidneys fail and indications for RRT exist she would at that time desire  transition to a full comfort care focus. She would hope that he could be extubated to comfort focus and have opportunity to interact with family but verbalizes understanding that this may not be possible given open abdomen and comfort goal.  Continue daily and prn updates     Critical Care Daily Checklist:    A: Awake: RASS Goal/Actual Goal: RASS Goal: -3-->moderate sedation  Actual: German Agitation Sedation Scale (RASS): Moderate sedation   B: Spontaneous Breathing Trial Performed?     C: SAT & SBT Coordinated?  Not candidate today                      D: Delirium: CAM-ICU Overall CAM-ICU: Positive   E: Early Mobility Performed? Yes   F: Feeding Goal:    Status:     Current Diet Order   Procedures    Diet NPO      AS: Analgesia/Sedation Fentanyl infusion   T: Thromboembolic Prophylaxis heparin   H: HOB > 300 Yes   U: Stress Ulcer Prophylaxis (if needed) pepcid   G: Glucose Control As above   B: Bowel Function     I: Indwelling Catheter (Lines & Doherty) Necessity reviewed   D: De-escalation of Antimicrobials/Pharmacotherapies reviewed    Plan for the day/ETD Supportive care as above    Code Status:  Family/Goals of Care: DNR  Pending hospital course; see palliative discussion above   I have discussed case and plan of care in detail with Dr Whelan and Dr Perez; Status and plan of care were discussed with team on multidisciplinary rounds.    Critical Care Time: 56 minutes  Critical secondary to septic shock, JOSE, mechanical vent and pressor support  Critical care was time spent personally by me on the following activities: development of treatment plan with patient or surrogate and bedside caregivers, discussions with consultants, evaluation of patient's response to treatment, examination of patient, ordering and performing treatments and interventions, ordering and review of laboratory studies, ordering and review of radiographic studies, pulse oximetry, re-evaluation of patient's condition. This critical care  time did not overlap with that of any other provider or involve time for any procedures.     KATHARINE Miranda-BC  Critical Care Medicine  [facility] ([facility])

## 2022-05-09 NOTE — SUBJECTIVE & OBJECTIVE
Interval History: Day 6 on vent- remains the same, remains intubated, on vent with Abthera Wound vac and bowel discontinuity. Put out about 3 L urine since yesterday with IV Lasix, family still does not want any HD, WBC down to 10.2, H/H 7.8/24, still on 2 Pressors and Amio gtt. Family still deciding about comfort care.     Review of Systems   Unable to perform ROS: Intubated   Objective:     Vital Signs (Most Recent):  Temp: 98.06 °F (36.7 °C) (22 1800)  Pulse: 71 (22 1800)  Resp: (!) 29 (22 1800)  BP: 91/64 (22 1800)  SpO2: 98 % (22 1800)   Vital Signs (24h Range):  Temp:  [98.06 °F (36.7 °C)-100.04 °F (37.8 °C)] 98.06 °F (36.7 °C)  Pulse:  [60-94] 71  Resp:  [25-33] 29  SpO2:  [94 %-100 %] 98 %  BP: ()/(52-76) 91/64  Arterial Line BP: ()/(52-77) 89/52     Weight: 116.9 kg (257 lb 11.5 oz)  Body mass index is 38.06 kg/m².    Intake/Output Summary (Last 24 hours) at 2022 1845  Last data filed at 2022 1800  Gross per 24 hour   Intake 2931.4 ml   Output 2395 ml   Net 536.4 ml      Physical Exam  Vitals and nursing note reviewed.   Constitutional:       General: He is not in acute distress.     Appearance: He is obese. He is ill-appearing.      Interventions: He is sedated, intubated and restrained.   HENT:      Head: Atraumatic.   Eyes:      General: No scleral icterus.     Conjunctiva/sclera: Conjunctivae normal.   Neck:      Vascular: No JVD.   Cardiovascular:      Rate and Rhythm: Normal rate and regular rhythm.      Pulses:           Radial pulses are 1+ on the right side and 1+ on the left side.        Dorsalis pedis pulses are 1+ on the right side and 1+ on the left side.   Pulmonary:      Effort: He is intubated.      Breath sounds: Decreased breath sounds present. No wheezing or rhonchi.   Abdominal:      General: Bowel sounds are absent.       Musculoskeletal:      Right lower le+ Pitting Edema present.      Left lower le+ Pitting Edema present.    Skin:     General: Skin is cool.      Capillary Refill: Capillary refill takes more than 3 seconds.     Flow (L/min): 4  Vent Mode: A/C  Oxygen Concentration (%):  [40] 40  Resp Rate Total:  [25 br/min-37 br/min] 28 br/min  Vt Set:  [450 mL] 450 mL  PEEP/CPAP:  [5 cmH20] 5 cmH20  Pressure Support:  [0 cmH20] 0 cmH20  Mean Airway Pressure:  [12 crJ37-32 cmH20] 12 cmH20  Temp:  [98.06 °F (36.7 °C)-100.04 °F (37.8 °C)] 98.06 °F (36.7 °C)  Pulse:  [60-94] 71  Resp:  [25-33] 29  SpO2:  [94 %-100 %] 98 %  BP: ()/(52-76) 91/64  Arterial Line BP: ()/(52-77) 89/52   Art pH/pCO2/pO2/HCO3:  7.540/29.5/129/25.2 (05/09 0406)  Lab Results   Component Value Date    [id number] Negative 05/04/2022    [id number] Positive (A) 08/05/2021      Recent Labs   Lab 05/04/22  1330 05/04/22  1442 05/05/22  1150 05/06/22  0410 05/07/22  0416 05/07/22  0719 05/07/22  1345 05/08/22  0421 05/08/22  1603 05/09/22  0452   LACTATE 3.1*  --  5.0*  --   --   --  6.7*  --   --   --       < >  --    < > 136  --   --  134* 132* 132*   WBC 1.05*   < >  --    < > 23.60*  --   --  12.29  --  10.60   GRAN 46.0   < >  --    < > 92.5*  21.8*  --   --  86.4*  10.6*  --  84.7*  9.0*   LYMPH 42.0   < >  --    < > 1.7*  0.4*  --   --  7.3*  0.9*  --  6.8*  0.7*   HGB 10.3*   < >  --    < > 9.6*  --   --  8.5*  --  7.8*   HCT 36.6*   < >  --    < > 33.7*   < >  --  28.0*  --  24.9*   BUN 23   < >  --    < > 59*  --   --  69* 75* 81*   CREATININE 0.9   < >  --    < > 4.5*  --   --  4.6* 4.7* 4.7*   ESTGFRAFRICA >60   < >  --    < > 15*  --   --  14* 14* 14*   EGFRNONAA >60   < >  --    < > 13*  --   --  12* 12* 12*   K 3.9   < >  --    < > 5.0  --   --  5.1 4.9 4.6   *   < >  --    < > 97  --   --  93* 92* 91*   CO2 15*   < >  --    < > 16*  --   --  23 18* 22*   MG 1.7   < >  --    < > 2.0  --   --  1.8 1.7 2.0    < > = values in this interval not displayed.     Microbiology Results (last 7 days)       Procedure Component Value  Units Date/Time    Blood culture [[id number]] Collected: 05/04/22 1428    Order Status: Completed Specimen: Blood from Line, Arterial, Left Updated: 05/09/22 0612     Blood Culture, Routine No Growth to date      No Growth to date      No Growth to date      No Growth to date      No Growth to date    Blood culture [[id number]] Collected: 05/04/22 1429    Order Status: Completed Specimen: Blood from Line, Jugular, Internal Right Updated: 05/09/22 0612     Blood Culture, Routine No Growth to date      No Growth to date      No Growth to date      No Growth to date      No Growth to date    Culture, Respiratory with Gram Stain [[id number]] Collected: 05/04/22 1253    Order Status: Completed Specimen: Respiratory from Endotracheal Aspirate Updated: 05/07/22 0921     Respiratory Culture No growth     Gram Stain (Respiratory) Few WBC's     Gram Stain (Respiratory) Rare Gram positive rods          Antibiotics (From admission, onward)                Start     Stop Route Frequency Ordered    05/09/22 2200  ertapenem (INVANZ) 500 mg in sodium chloride 0.9% 100 mL IVPB         05/14 2159 IV Every 24 hours (non-standard times) 05/09/22 1624    05/05/22 2115  linezolid 600 mg/300 mL IVPB 600 mg         05/11 2114 IV Every 12 hours (non-standard times) 05/05/22 2000          Anticoagulants       Ordered     Route Frequency Start Stop    05/04/22 1255  heparin (porcine)  (VTE Prophylaxis Orders - High Risk)         SubQ Every 8 hours 05/05/22 0600 --          X-Ray Chest 1 View  Narrative: EXAMINATION:  XR CHEST 1 VIEW    CLINICAL HISTORY:  respiratory failure; Pneumonia, unspecified organism    TECHNIQUE:  Single frontal view of the chest was performed.    COMPARISON:  05/05/2022    FINDINGS:  Tubes and lines are stable.   In comparison to the prior study, there is no adverse interval changes.  Impression: In comparison to the prior study, there is no adverse interval changes    Electronically signed by: Philippe Horton  MD  Date:    05/09/2022  Time:    12:04   Significant Labs: All pertinent labs within the past 24 hours have been reviewed.    Significant Imaging: I have reviewed all pertinent imaging results/findings within the past 24 hours.

## 2022-05-09 NOTE — PROGRESS NOTES
[facility] - Intensive Care ([facility] Medicine  Progress Note    Patient Name: Franky Masters  MRN: [id number]  Patient Class: IP- Inpatient   Admission Date: 5/4/2022  Length of Stay: 5 days  Attending Physician: Elvie Parker DO  Primary Care Provider: HOLLY ROSEN        Subjective:     Principal Problem:Septic shock        HPI:  Mr Masters is a 67 yo male POD#0 s/p SB perforation with surgical intervention demonstrating a large cecal mass and 3l feculant fluid in the abdominal cavity. Additional findings of a perforated ileum and a liver mass. Patient tolerated the Exlap with Washout and small bowel excision. Patient had a wound vac placed, is currently intubated, sedated and recovering in the ICU. Patient received 4 units PRBC and remains on pressor support. Patient is a DNR and HM consulted with assistance with medical management. Daughter at bedside. Patient discussed with care team.          Overview/Hospital Course:  Patient continues to require pressors, remains intubated, sedated. Patient urine o/p declining and concerning. Discussed POC in detail at bedside with daughter POA. She expressed her fathers wish to not undergo treatments  like perm HD, where he has a diminished quality of life. We spoke frankly about issues and concerns and she will be communicating with the family as his case evolves. Comfort care was discussed and the goals of care will develop consistent with the patients expressed wishes. Potential for another surgery likely Saturday with a washout and possible biopsy vs resection are on the horizon. This possible intervention will be covered in detail by surgery sharing the risks and benefits of that approach. Case discussed with the primary service - surgery, and critical care team.    5/6- Pt seen and examined in the ICU plus discussed with ICU team and the pt's daughter/ POA: Remains critically ill, intubated, sedated on Vent, on Pressors- Levo plus Vaso- JOSE with oliguria  getting worse- Cr rising to 3.6, becoming severely acidotic- requiring Ertapenem, Zyvox, Micafungin as well as on Bicarb and Fentanyl gtt. He has massive fluid overload and generalized anasarca.  Possible return to OR tomorrow 5/7 if patient is more stable for right hemicolectomy, possible ileostomy, liver biopsy, abdominal wall closure. Dw Pt's daughter.       5/7- remains Intubated, sedated on Vent- Went into Afib w RVR- hence added Amio to Levo and Vasopressin- back in NSR. Getting Invanz and Zyvox plus Micafungin. Dr. Parker took him back to OR for for abdominal washout ( 3-3.5 L feculent fluid with food particles suctioned out in the OR, small bowel appeared better) and replaced Abthera- still has bowel discontinuity. His WBC, Lactate and Cr have all increased. Family updated about his critical condition and poor prognosis and JOSE/ATN- they are considering Comfort measures.     5/8- Day 5 on Vent- family at bedside, remains intubated on Vent with 2 Pressors- still on Amiodarone gtt for Afib, Still has Abthera wound vac to open Abdomen with small bowel discontinuity. Remains oliguric with generalized anasarca- almost 24 L fluid positive. Cr increased to 4.6. WBC down to 12, family does not want any HD/CRRT, so getting an IV Lasix trial to improve the urine output.     5/9- Day 6 on vent- remains the same, remains intubated, on vent with Abthera Wound vac and bowel discontinuity. Put out about 3 L urine since yesterday with IV Lasix, family still does not want any HD, WBC down to 10.2, H/H 7.8/24, still on 2 Pressors and Amio gtt. Family still deciding about comfort care.       Interval History: Day 6 on vent- remains the same, remains intubated, on vent with Abthera Wound vac and bowel discontinuity. Put out about 3 L urine since yesterday with IV Lasix, family still does not want any HD, WBC down to 10.2, H/H 7.8/24, still on 2 Pressors and Amio gtt. Family still deciding about comfort care.     Review of  Systems   Unable to perform ROS: Intubated   Objective:     Vital Signs (Most Recent):  Temp: 98.06 °F (36.7 °C) (22 1800)  Pulse: 71 (22 1800)  Resp: (!) 29 (22 1800)  BP: 91/64 (22 1800)  SpO2: 98 % (22 1800)   Vital Signs (24h Range):  Temp:  [98.06 °F (36.7 °C)-100.04 °F (37.8 °C)] 98.06 °F (36.7 °C)  Pulse:  [60-94] 71  Resp:  [25-33] 29  SpO2:  [94 %-100 %] 98 %  BP: ()/(52-76) 91/64  Arterial Line BP: ()/(52-77) 52     Weight: 116.9 kg (257 lb 11.5 oz)  Body mass index is 38.06 kg/m².    Intake/Output Summary (Last 24 hours) at 2022 1845  Last data filed at 2022 1800  Gross per 24 hour   Intake 2931.4 ml   Output 2395 ml   Net 536.4 ml      Physical Exam  Vitals and nursing note reviewed.   Constitutional:       General: He is not in acute distress.     Appearance: He is obese. He is ill-appearing.      Interventions: He is sedated, intubated and restrained.   HENT:      Head: Atraumatic.   Eyes:      General: No scleral icterus.     Conjunctiva/sclera: Conjunctivae normal.   Neck:      Vascular: No JVD.   Cardiovascular:      Rate and Rhythm: Normal rate and regular rhythm.      Pulses:           Radial pulses are 1+ on the right side and 1+ on the left side.        Dorsalis pedis pulses are 1+ on the right side and 1+ on the left side.   Pulmonary:      Effort: He is intubated.      Breath sounds: Decreased breath sounds present. No wheezing or rhonchi.   Abdominal:      General: Bowel sounds are absent.       Musculoskeletal:      Right lower le+ Pitting Edema present.      Left lower le+ Pitting Edema present.   Skin:     General: Skin is cool.      Capillary Refill: Capillary refill takes more than 3 seconds.     Flow (L/min): 4  Vent Mode: A/C  Oxygen Concentration (%):  [40] 40  Resp Rate Total:  [25 br/min-37 br/min] 28 br/min  Vt Set:  [450 mL] 450 mL  PEEP/CPAP:  [5 cmH20] 5 cmH20  Pressure Support:  [0 cmH20] 0 cmH20  Mean Airway  Pressure:  [12 zmA39-87 cmH20] 12 cmH20  Temp:  [98.06 °F (36.7 °C)-100.04 °F (37.8 °C)] 98.06 °F (36.7 °C)  Pulse:  [60-94] 71  Resp:  [25-33] 29  SpO2:  [94 %-100 %] 98 %  BP: ()/(52-76) 91/64  Arterial Line BP: ()/(52-77) 89/52   Art pH/pCO2/pO2/HCO3:  7.540/29.5/129/25.2 (05/09 0406)  Lab Results   Component Value Date    [id number] Negative 05/04/2022    [id number] Positive (A) 08/05/2021      Recent Labs   Lab 05/04/22  1330 05/04/22  1442 05/05/22  1150 05/06/22  0410 05/07/22  0416 05/07/22  0719 05/07/22  1345 05/08/22  0421 05/08/22  1603 05/09/22  0452   LACTATE 3.1*  --  5.0*  --   --   --  6.7*  --   --   --       < >  --    < > 136  --   --  134* 132* 132*   WBC 1.05*   < >  --    < > 23.60*  --   --  12.29  --  10.60   GRAN 46.0   < >  --    < > 92.5*  21.8*  --   --  86.4*  10.6*  --  84.7*  9.0*   LYMPH 42.0   < >  --    < > 1.7*  0.4*  --   --  7.3*  0.9*  --  6.8*  0.7*   HGB 10.3*   < >  --    < > 9.6*  --   --  8.5*  --  7.8*   HCT 36.6*   < >  --    < > 33.7*   < >  --  28.0*  --  24.9*   BUN 23   < >  --    < > 59*  --   --  69* 75* 81*   CREATININE 0.9   < >  --    < > 4.5*  --   --  4.6* 4.7* 4.7*   ESTGFRAFRICA >60   < >  --    < > 15*  --   --  14* 14* 14*   EGFRNONAA >60   < >  --    < > 13*  --   --  12* 12* 12*   K 3.9   < >  --    < > 5.0  --   --  5.1 4.9 4.6   *   < >  --    < > 97  --   --  93* 92* 91*   CO2 15*   < >  --    < > 16*  --   --  23 18* 22*   MG 1.7   < >  --    < > 2.0  --   --  1.8 1.7 2.0    < > = values in this interval not displayed.     Microbiology Results (last 7 days)       Procedure Component Value Units Date/Time    Blood culture [[id number]] Collected: 05/04/22 1428    Order Status: Completed Specimen: Blood from Line, Arterial, Left Updated: 05/09/22 0612     Blood Culture, Routine No Growth to date      No Growth to date      No Growth to date      No Growth to date      No Growth to date    Blood culture [[id number]]  Collected: 05/04/22 1429    Order Status: Completed Specimen: Blood from Line, Jugular, Internal Right Updated: 05/09/22 0612     Blood Culture, Routine No Growth to date      No Growth to date      No Growth to date      No Growth to date      No Growth to date    Culture, Respiratory with Gram Stain [[id number]] Collected: 05/04/22 1253    Order Status: Completed Specimen: Respiratory from Endotracheal Aspirate Updated: 05/07/22 0921     Respiratory Culture No growth     Gram Stain (Respiratory) Few WBC's     Gram Stain (Respiratory) Rare Gram positive rods          Antibiotics (From admission, onward)                Start     Stop Route Frequency Ordered    05/09/22 2200  ertapenem (INVANZ) 500 mg in sodium chloride 0.9% 100 mL IVPB         05/14 2159 IV Every 24 hours (non-standard times) 05/09/22 1624    05/05/22 2115  linezolid 600 mg/300 mL IVPB 600 mg         05/11 2114 IV Every 12 hours (non-standard times) 05/05/22 2000          Anticoagulants       Ordered     Route Frequency Start Stop    05/04/22 1255  heparin (porcine)  (VTE Prophylaxis Orders - High Risk)         SubQ Every 8 hours 05/05/22 0600 --          X-Ray Chest 1 View  Narrative: EXAMINATION:  XR CHEST 1 VIEW    CLINICAL HISTORY:  respiratory failure; Pneumonia, unspecified organism    TECHNIQUE:  Single frontal view of the chest was performed.    COMPARISON:  05/05/2022    FINDINGS:  Tubes and lines are stable.   In comparison to the prior study, there is no adverse interval changes.  Impression: In comparison to the prior study, there is no adverse interval changes    Electronically signed by: Philippe Horton MD  Date:    05/09/2022  Time:    12:04   Significant Labs: All pertinent labs within the past 24 hours have been reviewed.    Significant Imaging: I have reviewed all pertinent imaging results/findings within the past 24 hours.      Assessment/Plan:      * Septic shock sec to Peritonitis from SB perforation  Pressors  Abx - Zosyn /  Micafungin  ID on consult    Remains in severe Septic Shock complicated by JOSE/ATN- Anuria and Resp Failure on Vent  ICU team has d/w daughter about HD if and when needed- she does not appear to be in favor of HD at present  Prognosis poor    Day 4 in Septic Shock and on vent  Continue Levo and Vaso plus IV Abx  Prognosis poor    Day 5- Continues same on Vent, WBC better but remain in remains shut down due to shock plus 2 Pressors    Day 6- Continue present supportive care    Acute hypoxemic respiratory failure on mechanical vent  Patient with Hypoxic Respiratory failure which is Acute.  he is not on home oxygen. Supplemental oxygen was provided and noted- Vent Mode: A/C  Oxygen Concentration (%):  [40] 40  Resp Rate Total:  [25 br/min-37 br/min] 28 br/min  Vt Set:  [450 mL] 450 mL  PEEP/CPAP:  [5 cmH20] 5 cmH20  Pressure Support:  [0 cmH20] 0 cmH20  Mean Airway Pressure:  [12 fmA28-45 cmH20] 12 cmH20.   Signs/symptoms of respiratory failure include- tachypnea. Contributing diagnoses includes - Obesity Hypoventilation Labs and images were reviewed. Patient Has recent ABG, which has been reviewed. Will treat underlying causes and adjust management of respiratory failure as indicated. 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 Parker- pt too unstable for right hemicolectomy, end ileostomy, liver biopsy today s/p abdominal washout with Abthera replacement today.  5/8- POD 3 with s/p Laparotomy and bowel resection and subsequent laparoscopic washout- persistent bowel discontinuity and abthera wound vac closure   5/9-  persistent bowel discontinuity- await further decision by family    Mass of colon  Based on Surgery  Potential interventions  Goals of care  Biopsy as indicated  Likely Oncologic process with mets  Heme Onc as indicated    See above      Pneumonia of left lower lobe due to infectious organism  Abx  ID on Consult    ON IV Abx      On mechanically assisted ventilation  Wean as tolerated  CC Team  Pulmonary    Cont vent support      JOSE (acute kidney injury)  Worsening  Trend  Cr 2.2 today    Cr now 3.8- Oliguric- heading towards Anuria and ATN/ May need HD vs CRRT- she has massive fluid Overload.     Cr now 4.5-  Remains anuric  POA/ Family not in favor of HD    Remains Oliguric due to Septic Shock on 2 Pressors  Some urine output with lasix but no Polyuria     Atrial fibrillation with RVR  Converted to NSR with Amio gtt      Hyperglycemia, unspecified  NISS  Accuchecks  Monitor  Controlled      Obesity (BMI 30.0-34.9)  Diet  Nutrition on recovery      Acute on chronic anemia  Hgb 10.3 s/p Transfusion 4 units Prbc  Transfuse for Hgb <7  Monitor    5/5  Improved  Hgb 11.4 today  Transfuse as indicated        VTE Risk Mitigation (From admission, onward)         Ordered     heparin (porcine) injection 5,000 Units  Every 8 hours         05/04/22 1255     IP VTE HIGH RISK PATIENT  Once         05/04/22 1253     Place sequential compression device  Until discontinued         05/04/22 1253                Discharge Planning   ELLIOT:      Code Status: DNR   Is the patient medically ready for discharge?:     Reason for patient still in hospital (select all that apply): Patient unstable, Patient trending condition, Laboratory test, Treatment, Imaging and Consult recommendations  Discharge Plan A: Comfort care/withdrawal        Seen and discussed with Dr. Whelan/ Cori and the ICU team  Condition: Critical  Prognosis: Guarded to poor      Critical care time spent on the evaluation and treatment of severe organ dysfunction,  review of pertinent labs and imaging studies, discussions with consulting providers and discussions with patient/family: 41 minutes.      Megha Mejia MD  Department of Hospital Medicine   [facility] - Intensive Care ([facility])

## 2022-05-09 NOTE — PROGRESS NOTES
[facility] - Intensive Care ([facility])  General Surgery  Progress Note    Subjective:     History of Present Illness:  No notes on file    Post-Op Info:  Procedure(s) (LRB):  LAPAROTOMY (N/A)  LAVAGE, PERITONEAL, THERAPEUTIC   2 Days Post-Op     Interval History: Remains intubated in ICU, still on vasopressor support. Urine output improved with diuresis.    Medications:  Continuous Infusions:   amiodarone in dextrose 5% 0.5 mg/min (05/09/22 1500)    fentanyl 200 mcg/hr (05/09/22 1500)    NORepinephrine bitartrate-D5W 0.06 mcg/kg/min (05/09/22 1500)    phenylephrine Stopped (05/07/22 1006)    vasopressin 0.04 Units/min (05/09/22 1500)     Scheduled Meds:   chlorhexidine  15 mL Mouth/Throat BID    ertapenem (INVANZ) IVPB  500 mg Intravenous Q24H    famotidine (PF)  20 mg Intravenous Daily    heparin (porcine)  5,000 Units Subcutaneous Q8H    linezolid  600 mg Intravenous Q12H    micafungin (MYCAMINE) IVPB  100 mg Intravenous Q24H    white petrolatum-mineral oil 57.3-42.5%   Both Eyes QHS     PRN Meds:sodium chloride, sodium chloride 0.9%, acetaminophen, dextrose 10%, dextrose 10%, glucagon (human recombinant), HYDROmorphone, insulin aspart U-100, lorazepam, ondansetron, ondansetron     Review of patient's allergies indicates:  No Known Allergies  Objective:     Vital Signs (Most Recent):  Temp: 98.6 °F (37 °C) (05/09/22 1500)  Pulse: 91 (05/09/22 1500)  Resp: (!) 26 (05/09/22 1500)  BP: 103/66 (05/09/22 1500)  SpO2: 100 % (05/09/22 1500)   Vital Signs (24h Range):  Temp:  [98.24 °F (36.8 °C)-100.04 °F (37.8 °C)] 98.6 °F (37 °C)  Pulse:  [60-91] 91  Resp:  [25-33] 26  SpO2:  [94 %-100 %] 100 %  BP: ()/(52-71) 103/66  Arterial Line BP: (102-126)/(57-70) 103/61     Weight: 116.9 kg (257 lb 11.5 oz)  Body mass index is 38.06 kg/m².    Intake/Output - Last 3 Shifts         05/07 0700 05/08 0659 05/08 0700 05/09 0659 05/09 0700  05/10 0659    I.V. (mL/kg) 4619.9 (39.5) 2024.2 (17.3) 611.5 (5.2)    IV  Piggyback 892.2 497.2 357    Total Intake(mL/kg) 5512.1 (47.1) 2521.4 (21.6) 968.5 (8.3)    Urine (mL/kg/hr) 320 (0.1) 1170 (0.4) 805 (0.7)    Drains 0 200     Other 1000 600 150    Total Output 1320 1970 955    Net +4192.1 +551.4 +13.5                   Physical Exam  Vitals and nursing note reviewed.   Constitutional:       Appearance: He is obese. He is ill-appearing and toxic-appearing.   Cardiovascular:      Rate and Rhythm: Regular rhythm.   Pulmonary:      Comments: Mechanical breath sounds  Abdominal:      Palpations: Abdomen is soft.      Comments: Abthera vac in place holding suction, serous output   Genitourinary:     Comments: Doherty in place  Musculoskeletal:      Right lower leg: Edema present.      Left lower leg: Edema present.   Neurological:      Comments: Sedated       Significant Labs:  I have reviewed all pertinent lab results within the past 24 hours.  CBC:   Recent Labs   Lab 05/09/22  0452   WBC 10.60   RBC 3.53*   HGB 7.8*   HCT 24.9*   PLT 90*   MCV 71*   MCH 22.1*   MCHC 31.3*     CMP:   Recent Labs   Lab 05/09/22  0452      CALCIUM 6.7*   ALBUMIN 1.2*   PROT 3.9*   *   K 4.6   CO2 22*   CL 91*   BUN 81*   CREATININE 4.7*   ALKPHOS 102   *   *   BILITOT 1.1*     Coagulation:   Recent Labs   Lab 05/04/22  0805 05/05/22  0325   LABPROT 16.5* 12.0   INR 1.6* 1.1   APTT 33.2*  --      ABGs:   Recent Labs   Lab 05/09/22  0406   PH 7.540*   PCO2 29.5*   PO2 129*   HCO3 25.2   POCSATURATED 99   BE 3       Significant Diagnostics:  I have reviewed all pertinent imaging results/findings within the past 24 hours.    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 NO meds or feeds (patient is in bowel discontinuity)  - Continue ICU management. Currently still requiring vasopressor support.  - Strict I/Os  - Medical and ICU management per hospital/ICU team    Depending on patient's condition and goals of care decided by family, tentatively plan to return to the OR later this week for possible right hemicolectomy, liver biopsy, end ileostomy, and abdominal wall closure.    Acute on chronic anemia  Management per medicine/critical care    Acute hypoxemic respiratory failure on mechanical vent  Management per medicine/critical care        Elvie Parker, DO  General Surgery  [facility] - Intensive Care (Hospital)

## 2022-05-09 NOTE — SUBJECTIVE & OBJECTIVE
Review of Systems   Unable to perform ROS: Intubated     Objective:     Vital Signs (Most Recent):  Temp: 98.96 °F (37.2 °C) (22)  Pulse: 61 (22)  Resp: (!) 30 (22)  BP: 103/64 (22)  SpO2: 100 % (22)   Vital Signs (24h Range):  Temp:  [96.26 °F (35.7 °C)-100.04 °F (37.8 °C)] 98.96 °F (37.2 °C)  Pulse:  [60-70] 61  Resp:  [27-33] 30  SpO2:  [94 %-100 %] 100 %  BP: ()/(52-70) 103/64  Arterial Line BP: (102-123)/(59-70) 108/61     Weight: 116.9 kg (257 lb 11.5 oz)  Body mass index is 38.06 kg/m².      Intake/Output Summary (Last 24 hours) at 2022 08  Last data filed at 2022 07  Gross per 24 hour   Intake 2592.5 ml   Output 1870 ml   Net 722.5 ml        amiodarone in dextrose 5% 0.5 mg/min (22)    fentanyl 250 mcg/hr (22)    NORepinephrine bitartrate-D5W 0.06 mcg/kg/min (22)    phenylephrine Stopped (22 1006)    vasopressin 0.04 Units/min (22)       Physical Exam  Vitals and nursing note reviewed.   Constitutional:       General: He is not in acute distress.     Appearance: He is obese. He is ill-appearing.      Interventions: He is sedated, intubated and restrained.   HENT:      Head: Atraumatic.   Eyes:      General: No scleral icterus.     Conjunctiva/sclera: Conjunctivae normal.   Neck:      Vascular: No JVD.   Cardiovascular:      Rate and Rhythm: Normal rate and regular rhythm.      Pulses:           Radial pulses are 1+ on the right side and 1+ on the left side.        Dorsalis pedis pulses are 1+ on the right side and 1+ on the left side.   Pulmonary:      Effort: He is intubated.      Breath sounds: Decreased breath sounds present. No wheezing or rhonchi.   Abdominal:      General: Bowel sounds are absent.       Musculoskeletal:      Right lower le+ Pitting Edema present.      Left lower le+ Pitting Edema present.   Skin:     General: Skin is cool.      Capillary Refill: Capillary  refill takes more than 3 seconds.       Vents:  Vent Mode: A/C (05/09/22 0440)  Ventilator Initiated: Yes (05/04/22 1258)  Set Rate: 30 BPM (05/09/22 0440)  Vt Set: 450 mL (05/09/22 0440)  Pressure Support: 0 cmH20 (05/09/22 0440)  PEEP/CPAP: 5 cmH20 (05/09/22 0440)  Oxygen Concentration (%): 40 (05/09/22 0705)  Peak Airway Pressure: 30 cmH2O (05/09/22 0440)  Plateau Pressure: 18 cmH20 (05/09/22 0440)  Total Ve: 14.1 mL (05/09/22 0440)  F/VT Ratio<105 (RSBI): (!) 64.79 (05/09/22 0440)    Lines/Drains/Airways       Central Venous Catheter Line  Duration             Percutaneous Central Line Insertion/Assessment - Triple Lumen  05/04/22 1200 right internal jugular 4 days              Drain  Duration                  NG/OG Tube 05/04/22 1600 4 days         Urethral Catheter 05/04/22 1105 Straight-tip;Silicone 16 Fr. 4 days              Airway  Duration                  Airway - Non-Surgical 05/04/22 1051 Endotracheal Tube 4 days              Arterial Line  Duration             Arterial Line 05/07/22 1330 Right Radial 1 day                    Significant Labs:    CBC/Anemia Profile:  Recent Labs   Lab 05/08/22  0421 05/09/22  0452   WBC 12.29 10.60   HGB 8.5* 7.8*   HCT 28.0* 24.9*   PLT 96* 90*   MCV 71* 71*   RDW 23.9* 24.0*          Chemistries:  Recent Labs   Lab 05/08/22  0421 05/08/22  1603 05/09/22  0452   * 132* 132*   K 5.1 4.9 4.6   CL 93* 92* 91*   CO2 23 18* 22*   BUN 69* 75* 81*   CREATININE 4.6* 4.7* 4.7*   CALCIUM 6.0* 6.0* 6.7*   ALBUMIN 1.3*  --  1.2*   PROT 3.9*  --  3.9*   BILITOT 0.9  --  1.1*   ALKPHOS 114  --  102   *  --  495*   *  --  514*   MG 1.8 1.7 2.0         All pertinent labs within the past 24 hours have been reviewed.    Significant Imaging:  I have reviewed all pertinent imaging results/findings within the past 24 hours.

## 2022-05-09 NOTE — ASSESSMENT & PLAN NOTE
S/p exploratory laparotomy, abdominal washout, segmental small bowel resection (left in discontinuity), placement of Abthera vac 5/4/22  S/p reopening of recent laparotomy, abdominal washout, replacement of Abthera vac 5/7/22    - Continue NG tube to LIS. NO meds or feeds (patient is in bowel discontinuity)  - Continue ICU management. Currently still requiring vasopressor support.  - Strict I/Os  - Medical and ICU management per hospital/ICU team    Depending on patient's condition and goals of care decided by family, tentatively plan to return to the OR later this week for possible right hemicolectomy, liver biopsy, end ileostomy, and abdominal wall closure.

## 2022-05-10 PROBLEM — J18.9 PNEUMONIA OF LEFT LOWER LOBE DUE TO INFECTIOUS ORGANISM: Status: RESOLVED | Noted: 2022-05-04 | Resolved: 2022-05-10

## 2022-05-10 LAB
ABO + RH BLD: NORMAL
ALBUMIN SERPL BCP-MCNC: 1.3 G/DL (ref 3.5–5.2)
ALLENS TEST: ABNORMAL
ALP SERPL-CCNC: 120 U/L (ref 55–135)
ALT SERPL W/O P-5'-P-CCNC: 438 U/L (ref 10–44)
ANION GAP SERPL CALC-SCNC: 23 MMOL/L (ref 8–16)
ANISOCYTOSIS BLD QL SMEAR: SLIGHT
AST SERPL-CCNC: 362 U/L (ref 10–40)
BACTERIA BLD CULT: NORMAL
BACTERIA BLD CULT: NORMAL
BASOPHILS # BLD AUTO: 0.06 K/UL (ref 0–0.2)
BASOPHILS NFR BLD: 0.4 % (ref 0–1.9)
BILIRUB SERPL-MCNC: 0.9 MG/DL (ref 0.1–1)
BLD GP AB SCN CELLS X3 SERPL QL: NORMAL
BUN SERPL-MCNC: 87 MG/DL (ref 8–23)
CALCIUM SERPL-MCNC: 7.1 MG/DL (ref 8.7–10.5)
CHLORIDE SERPL-SCNC: 90 MMOL/L (ref 95–110)
CO2 SERPL-SCNC: 19 MMOL/L (ref 23–29)
CREAT SERPL-MCNC: 4.8 MG/DL (ref 0.5–1.4)
DACRYOCYTES BLD QL SMEAR: ABNORMAL
DELSYS: ABNORMAL
DIFFERENTIAL METHOD: ABNORMAL
EOSINOPHIL # BLD AUTO: 0.2 K/UL (ref 0–0.5)
EOSINOPHIL NFR BLD: 1.1 % (ref 0–8)
ERYTHROCYTE [DISTWIDTH] IN BLOOD BY AUTOMATED COUNT: 24.7 % (ref 11.5–14.5)
ERYTHROCYTE [SEDIMENTATION RATE] IN BLOOD BY WESTERGREN METHOD: 26 MM/H
EST. GFR  (AFRICAN AMERICAN): 13 ML/MIN/1.73 M^2
EST. GFR  (NON AFRICAN AMERICAN): 12 ML/MIN/1.73 M^2
FIO2: 40
GLUCOSE SERPL-MCNC: 85 MG/DL (ref 70–110)
HBV SURFACE AG SERPL QL IA: NEGATIVE
HCO3 UR-SCNC: 24.9 MMOL/L (ref 24–28)
HCT VFR BLD AUTO: 28 % (ref 40–54)
HGB BLD-MCNC: 8.6 G/DL (ref 14–18)
HYPOCHROMIA BLD QL SMEAR: ABNORMAL
IMM GRANULOCYTES # BLD AUTO: 0.18 K/UL (ref 0–0.04)
IMM GRANULOCYTES NFR BLD AUTO: 1.3 % (ref 0–0.5)
LACTATE SERPL-SCNC: 3.5 MMOL/L (ref 0.5–2.2)
LYMPHOCYTES # BLD AUTO: 0.8 K/UL (ref 1–4.8)
LYMPHOCYTES NFR BLD: 6.3 % (ref 18–48)
MAGNESIUM SERPL-MCNC: 2 MG/DL (ref 1.6–2.6)
MCH RBC QN AUTO: 21.7 PG (ref 27–31)
MCHC RBC AUTO-ENTMCNC: 30.7 G/DL (ref 32–36)
MCV RBC AUTO: 71 FL (ref 82–98)
MODE: ABNORMAL
MONOCYTES # BLD AUTO: 0.4 K/UL (ref 0.3–1)
MONOCYTES NFR BLD: 3.1 % (ref 4–15)
NEUTROPHILS # BLD AUTO: 11.7 K/UL (ref 1.8–7.7)
NEUTROPHILS NFR BLD: 87.8 % (ref 38–73)
NRBC BLD-RTO: 0 /100 WBC
OVALOCYTES BLD QL SMEAR: ABNORMAL
PCO2 BLDA: 34.9 MMHG (ref 35–45)
PEEP: 5
PH SMN: 7.46 [PH] (ref 7.35–7.45)
PLATELET # BLD AUTO: 100 K/UL (ref 150–450)
PLATELET BLD QL SMEAR: ABNORMAL
PMV BLD AUTO: ABNORMAL FL (ref 9.2–12.9)
PO2 BLDA: 105 MMHG (ref 80–100)
POC BE: 1 MMOL/L
POC SATURATED O2: 98 % (ref 95–100)
POCT GLUCOSE: 79 MG/DL (ref 70–110)
POCT GLUCOSE: 81 MG/DL (ref 70–110)
POCT GLUCOSE: 82 MG/DL (ref 70–110)
POCT GLUCOSE: 83 MG/DL (ref 70–110)
POCT GLUCOSE: 85 MG/DL (ref 70–110)
POCT GLUCOSE: 88 MG/DL (ref 70–110)
POIKILOCYTOSIS BLD QL SMEAR: ABNORMAL
POTASSIUM SERPL-SCNC: 5 MMOL/L (ref 3.5–5.1)
PROT SERPL-MCNC: 4.4 G/DL (ref 6–8.4)
RBC # BLD AUTO: 3.97 M/UL (ref 4.6–6.2)
SAMPLE: ABNORMAL
SCHISTOCYTES BLD QL SMEAR: PRESENT
SITE: ABNORMAL
SODIUM SERPL-SCNC: 132 MMOL/L (ref 136–145)
TARGETS BLD QL SMEAR: ABNORMAL
VT: 450
WBC # BLD AUTO: 13.35 K/UL (ref 3.9–12.7)

## 2022-05-10 PROCEDURE — 63600175 PHARM REV CODE 636 W HCPCS: Mod: JG | Performed by: NURSE PRACTITIONER

## 2022-05-10 PROCEDURE — 86850 RBC ANTIBODY SCREEN: CPT | Performed by: NURSE PRACTITIONER

## 2022-05-10 PROCEDURE — 99233 SBSQ HOSP IP/OBS HIGH 50: CPT | Mod: ,,, | Performed by: INTERNAL MEDICINE

## 2022-05-10 PROCEDURE — 94761 N-INVAS EAR/PLS OXIMETRY MLT: CPT

## 2022-05-10 PROCEDURE — 99900035 HC TECH TIME PER 15 MIN (STAT)

## 2022-05-10 PROCEDURE — 83735 ASSAY OF MAGNESIUM: CPT | Performed by: SURGERY

## 2022-05-10 PROCEDURE — 63600175 PHARM REV CODE 636 W HCPCS: Performed by: SURGERY

## 2022-05-10 PROCEDURE — 25000003 PHARM REV CODE 250: Performed by: NURSE PRACTITIONER

## 2022-05-10 PROCEDURE — 25000003 PHARM REV CODE 250: Performed by: SURGERY

## 2022-05-10 PROCEDURE — 94003 VENT MGMT INPAT SUBQ DAY: CPT

## 2022-05-10 PROCEDURE — 85025 COMPLETE CBC W/AUTO DIFF WBC: CPT | Performed by: SURGERY

## 2022-05-10 PROCEDURE — 63600175 PHARM REV CODE 636 W HCPCS: Performed by: EMERGENCY MEDICINE

## 2022-05-10 PROCEDURE — 99291 CRITICAL CARE FIRST HOUR: CPT | Mod: ,,, | Performed by: NURSE PRACTITIONER

## 2022-05-10 PROCEDURE — 82803 BLOOD GASES ANY COMBINATION: CPT

## 2022-05-10 PROCEDURE — 27000221 HC OXYGEN, UP TO 24 HOURS

## 2022-05-10 PROCEDURE — A4217 STERILE WATER/SALINE, 500 ML: HCPCS | Performed by: NURSE PRACTITIONER

## 2022-05-10 PROCEDURE — 99291 PR CRITICAL CARE, E/M 30-74 MINUTES: ICD-10-PCS | Mod: ,,, | Performed by: NURSE PRACTITIONER

## 2022-05-10 PROCEDURE — 80053 COMPREHEN METABOLIC PANEL: CPT | Performed by: SURGERY

## 2022-05-10 PROCEDURE — 63600175 PHARM REV CODE 636 W HCPCS: Performed by: INTERNAL MEDICINE

## 2022-05-10 PROCEDURE — 83605 ASSAY OF LACTIC ACID: CPT | Performed by: NURSE PRACTITIONER

## 2022-05-10 PROCEDURE — 37799 UNLISTED PX VASCULAR SURGERY: CPT

## 2022-05-10 PROCEDURE — 99900026 HC AIRWAY MAINTENANCE (STAT)

## 2022-05-10 PROCEDURE — 99233 PR SUBSEQUENT HOSPITAL CARE,LEVL III: ICD-10-PCS | Mod: ,,, | Performed by: INTERNAL MEDICINE

## 2022-05-10 PROCEDURE — 20000000 HC ICU ROOM

## 2022-05-10 RX ORDER — INSULIN ASPART 100 [IU]/ML
1-10 INJECTION, SOLUTION INTRAVENOUS; SUBCUTANEOUS EVERY 4 HOURS PRN
Status: DISCONTINUED | OUTPATIENT
Start: 2022-05-10 | End: 2022-05-26 | Stop reason: HOSPADM

## 2022-05-10 RX ORDER — FUROSEMIDE 10 MG/ML
60 INJECTION INTRAMUSCULAR; INTRAVENOUS ONCE
Status: COMPLETED | OUTPATIENT
Start: 2022-05-10 | End: 2022-05-10

## 2022-05-10 RX ORDER — FUROSEMIDE 10 MG/ML
40 INJECTION INTRAMUSCULAR; INTRAVENOUS ONCE
Status: COMPLETED | OUTPATIENT
Start: 2022-05-10 | End: 2022-05-10

## 2022-05-10 RX ORDER — GLUCAGON 1 MG
1 KIT INJECTION
Status: DISCONTINUED | OUTPATIENT
Start: 2022-05-10 | End: 2022-05-26 | Stop reason: HOSPADM

## 2022-05-10 RX ORDER — INSULIN ASPART 100 [IU]/ML
0-5 INJECTION, SOLUTION INTRAVENOUS; SUBCUTANEOUS EVERY 4 HOURS PRN
Status: DISCONTINUED | OUTPATIENT
Start: 2022-05-10 | End: 2022-05-10

## 2022-05-10 RX ORDER — DEXTROSE MONOHYDRATE 100 MG/ML
INJECTION, SOLUTION INTRAVENOUS CONTINUOUS PRN
Status: DISCONTINUED | OUTPATIENT
Start: 2022-05-10 | End: 2022-05-12

## 2022-05-10 RX ADMIN — LINEZOLID 600 MG: 600 INJECTION, SOLUTION INTRAVENOUS at 08:05

## 2022-05-10 RX ADMIN — MINERAL OIL, PETROLATUM: 425; 573 OINTMENT OPHTHALMIC at 09:05

## 2022-05-10 RX ADMIN — MICAFUNGIN SODIUM 100 MG: 100 INJECTION, POWDER, LYOPHILIZED, FOR SOLUTION INTRAVENOUS at 02:05

## 2022-05-10 RX ADMIN — FAMOTIDINE 20 MG: 10 INJECTION INTRAVENOUS at 08:05

## 2022-05-10 RX ADMIN — CHLORHEXIDINE GLUCONATE 0.12% ORAL RINSE 15 ML: 1.2 LIQUID ORAL at 08:05

## 2022-05-10 RX ADMIN — Medication 250 MCG/HR: at 09:05

## 2022-05-10 RX ADMIN — FUROSEMIDE 40 MG: 10 INJECTION, SOLUTION INTRAMUSCULAR; INTRAVENOUS at 07:05

## 2022-05-10 RX ADMIN — FUROSEMIDE 60 MG: 10 INJECTION, SOLUTION INTRAMUSCULAR; INTRAVENOUS at 01:05

## 2022-05-10 RX ADMIN — Medication 250 MCG/HR: at 12:05

## 2022-05-10 RX ADMIN — SODIUM CHLORIDE: 234 INJECTION, SOLUTION INTRAVENOUS at 03:05

## 2022-05-10 RX ADMIN — CHLORHEXIDINE GLUCONATE 0.12% ORAL RINSE 15 ML: 1.2 LIQUID ORAL at 09:05

## 2022-05-10 RX ADMIN — AMIODARONE HYDROCHLORIDE 0.5 MG/MIN: 1.8 INJECTION, SOLUTION INTRAVENOUS at 01:05

## 2022-05-10 RX ADMIN — HEPARIN SODIUM 5000 UNITS: 5000 INJECTION INTRAVENOUS; SUBCUTANEOUS at 05:05

## 2022-05-10 RX ADMIN — NOREPINEPHRINE BITARTRATE 0.1 MCG/KG/MIN: 1 INJECTION, SOLUTION, CONCENTRATE INTRAVENOUS at 11:05

## 2022-05-10 RX ADMIN — ERTAPENEM 500 MG: 1 INJECTION INTRAMUSCULAR; INTRAVENOUS at 10:05

## 2022-05-10 RX ADMIN — HEPARIN SODIUM 5000 UNITS: 5000 INJECTION INTRAVENOUS; SUBCUTANEOUS at 09:05

## 2022-05-10 RX ADMIN — LORAZEPAM 2 MG: 2 INJECTION INTRAMUSCULAR; INTRAVENOUS at 03:05

## 2022-05-10 RX ADMIN — LINEZOLID 600 MG: 600 INJECTION, SOLUTION INTRAVENOUS at 09:05

## 2022-05-10 RX ADMIN — Medication 250 MCG/HR: at 07:05

## 2022-05-10 RX ADMIN — NOREPINEPHRINE BITARTRATE 0.08 MCG/KG/MIN: 1 INJECTION, SOLUTION, CONCENTRATE INTRAVENOUS at 11:05

## 2022-05-10 RX ADMIN — HEPARIN SODIUM 5000 UNITS: 5000 INJECTION INTRAVENOUS; SUBCUTANEOUS at 01:05

## 2022-05-10 RX ADMIN — Medication 0.04 UNITS/MIN: at 02:05

## 2022-05-10 NOTE — PROGRESS NOTES
[facility] ([facility])  Critical Care Medicine  Progress Note    Patient Name: Franky Masters  MRN: [id number]  Admission Date: 5/4/2022  Hospital Length of Stay: 6 days  Code Status: DNR  Attending Provider: Elvie Parker DO  Primary Care Provider: HOLLY ROSEN   Principal Problem: Septic shock    Subjective:     HPI:  Ms Masters is a 65 yo obese male with a PMH of diverticulosis and hx of hospitalization in Aug 2021 with COVID PNA and Hypoxic Resp Failure but did not require ICU or intubation.  She presented early this AM to [facility] ED about 0515 hr via EMS with complaint of abd pain X 2 hours that awakened her from sleep and had associated N/V/D.  In ED BP 86/46, RA SAT 92%, LA 8, Hgb 7.2 and CT Abd with suspected bowel perforation and free air.  General Surgery consulted and taken to OR this AM revealing SB perf with 3 L feculent fluid and food in cavity and large obstructing cecal mass with perf ileum and palpable liver mass.  Had Expl Lap with washout and excision of SB with wound vac placement admitted post op to ICU intubated on mech ventilation.  Received 2 units PRBCs in ED and another 2 units in OR also on Levophed and Vasopressin infusions.  Before surgery patient was insistent he be DNR post op but consented to surgery and invasive mech ventilation but no ACLS post op in event of cardiac arrest and no prolonged mech ventilation. Reportedly patient does not routinely follow with practitioner as outpt.       Hospital/ICU Course:  5/4 - Admitted to ICU sedated and intubated on Levophed and Vasopressin infusions in no distress  5/5 - remains intubated and sedated on mechanical vent and pressor support; scant urine output overnight and creatinine rise to 2.2 with fluid balance +8.9L  5/6 - remains intubated and sedated on mechanical vent with decreasing pressor demand; still oliguric with creatinine up to 3.6, K 5.2; fluid balance +13L; now with bigeminy and cardiac rhythm changes, K  stable on abg but iCa 0.78  5/7 - remains intubated and sedated with open abdomen to abthera wound vac and pressor support; overnight atrial fib with RVR, now on amio infusion with SR 68 on monitor; plan to OR for washout and vac replacement today  5/8 - remains intubated, sedated with ab thera wound vac to open abdomen, mechanical ventilation, and 2 pressor support. SR on monitor, on amiodarone infusion. Tmax 100.2, wbc down at 12.3k, creatinine rising 4.6,urine output scant, K 5.1  5/9 - remains intubated and sedated with ab thera wound vac to open abdomen, mech vent, and 2 pressor support. Remains SR on monitor with amio infusion. Tmax 98.9, wbc down 10.6k, creatinine holding at 4.7, K 4.6 after lasix trial with 1.2L urinary output  5/10 - remains intubated and sedated with ab thera wound vac to open abdomen, mech vent, on pressor support. Atrial fib on monitor, rate 90s with occasional non sustained elevation. Urine output adequate since lasix trial but creatinine, K, BUN unchanged and remains 22L positive fluid status since admit      Review of Systems   Unable to perform ROS: Intubated     Objective:     Vital Signs (Most Recent):  Temp: 99.68 °F (37.6 °C) (05/10/22 0800)  Pulse: 76 (05/10/22 0800)  Resp: (!) 28 (05/10/22 0800)  BP: 107/73 (05/10/22 0800)  SpO2: 100 % (05/10/22 0800)   Vital Signs (24h Range):  Temp:  [98.06 °F (36.7 °C)-100.58 °F (38.1 °C)] 99.68 °F (37.6 °C)  Pulse:  [] 76  Resp:  [23-31] 28  SpO2:  [84 %-100 %] 100 %  BP: ()/(54-81) 107/73  Arterial Line BP: ()/(52-77) 100/61     Weight: 116.5 kg (256 lb 13.4 oz)  Body mass index is 37.93 kg/m².      Intake/Output Summary (Last 24 hours) at 5/10/2022 0832  Last data filed at 5/10/2022 0800  Gross per 24 hour   Intake 2493.07 ml   Output 2400 ml   Net 93.07 ml        amiodarone in dextrose 5% 0.5 mg/min (05/10/22 0800)    fentanyl 250 mcg/hr (05/10/22 0800)    NORepinephrine bitartrate-D5W 0.06 mcg/kg/min (05/10/22 0800)        Physical Exam  Vitals and nursing note reviewed.   Constitutional:       General: He is not in acute distress.     Appearance: He is obese. He is ill-appearing.      Interventions: He is sedated, intubated and restrained.   HENT:      Head: Atraumatic.   Eyes:      General: No scleral icterus.     Conjunctiva/sclera: Conjunctivae normal.   Neck:      Vascular: No JVD.   Cardiovascular:      Rate and Rhythm: Normal rate and regular rhythm.      Pulses:           Radial pulses are 1+ on the right side and 1+ on the left side.        Dorsalis pedis pulses are 1+ on the right side and 1+ on the left side.   Pulmonary:      Effort: He is intubated.      Breath sounds: Decreased breath sounds present. No wheezing or rhonchi.   Abdominal:      General: Bowel sounds are absent.       Musculoskeletal:      Right lower le+ Pitting Edema present.      Left lower le+ Pitting Edema present.   Skin:     General: Skin is cool.      Capillary Refill: Capillary refill takes more than 3 seconds.       Vents:  Vent Mode: A/C (05/10/22 0755)  Ventilator Initiated: Yes (22 1258)  Set Rate: 25 BPM (05/10/22 0755)  Vt Set: 450 mL (05/10/22 0755)  Pressure Support: 0 cmH20 (05/10/22 0755)  PEEP/CPAP: 5 cmH20 (05/10/22 0755)  Oxygen Concentration (%): 40 (05/10/22 0800)  Peak Airway Pressure: 25 cmH2O (05/10/22 0755)  Plateau Pressure: 18 cmH20 (05/10/22 0755)  Total Ve: 12.1 mL (05/10/22 0755)  F/VT Ratio<105 (RSBI): (!) 61.7 (05/10/22 0755)    Lines/Drains/Airways       Central Venous Catheter Line  Duration             Percutaneous Central Line Insertion/Assessment - Triple Lumen  22 1200 right internal jugular 5 days              Drain  Duration                  NG/OG Tube 22 1600 5 days         Urethral Catheter 22 1105 Straight-tip;Silicone 16 Fr. 5 days              Airway  Duration                  Airway - Non-Surgical 22 1051 Endotracheal Tube 5 days              Arterial Line  Duration              Arterial Line 05/07/22 1330 Right Radial 2 days                    Significant Labs:    CBC/Anemia Profile:  Recent Labs   Lab 05/09/22  0452 05/10/22  0406   WBC 10.60 13.35*   HGB 7.8* 8.6*   HCT 24.9* 28.0*   PLT 90* 100*   MCV 71* 71*   RDW 24.0* 24.7*          Chemistries:  Recent Labs   Lab 05/08/22  1603 05/09/22  0452 05/10/22  0406   * 132* 132*   K 4.9 4.6 5.0   CL 92* 91* 90*   CO2 18* 22* 19*   BUN 75* 81* 87*   CREATININE 4.7* 4.7* 4.8*   CALCIUM 6.0* 6.7* 7.1*   ALBUMIN  --  1.2* 1.3*   PROT  --  3.9* 4.4*   BILITOT  --  1.1* 0.9   ALKPHOS  --  102 120   ALT  --  495* 438*   AST  --  514* 362*   MG 1.7 2.0 2.0         All pertinent labs within the past 24 hours have been reviewed.    Significant Imaging:  I have reviewed all pertinent imaging results/findings within the past 24 hours.      ABG  Recent Labs   Lab 05/10/22  0412   PH 7.462*   PO2 105*   PCO2 34.9*   HCO3 24.9   BE 1     Assessment/Plan:     Pulmonary  Acute hypoxemic respiratory failure on mechanical vent  Vent settings reviewed and adjusted to optimize gas exchange  VAP prophylaxis  ABG daily and prn to assess response to therapy  SAT/SBT once gi tract in continuity    Cardiac/Vascular  Atrial fibrillation with RVR  New onset 5/6  on amiodarone infusion; maintain while bowel in discontinuity  Continue cardiac monitoring    Renal/  JOSE (acute kidney injury)  S/t septic shock  Adequate volume resuscitation +22L  Avoid nephrotoxins  Strict I/O  No acute indication for dialysis but high potential for continued decline, daughter(HCPOA) will not pursue dialysis if decline per her father's wishes  Nephrology consulted to optimize medical management  Adequate urine post diuretic trial but no improvement in BUN/Cr/K    ID  * Septic shock sec to Peritonitis from SB perforation  Secondary to Peritonitis from bowel perforation with major fecal contamination  Blood and sputum cultures ngtd  Wbc down after washout 5/7 but again  rising  Continue zyvox, invanz and Micafungin  Stop Vasopressin and continue to titrate pressor for MAP > 75  ICU hemodynamic monitoring  Echo reviewed    Oncology  Acute on chronic anemia  Received 2 units PRBCs in ED and 2 more in OR on 5/4  CBC stable   Monitor wound vac output and daily CBC with Conservative transfusion protocol    Endocrine  Hyperglycemia, unspecified  SSI prn with monitoring for glucose control and prevention of insulin toxicity    Obesity (BMI 30.0-34.9)  Will encourage weight loss once/if survives and extubated and fully awake/alert    GI  Mass of colon  Found on exploration along with palpable liver mass  Plan bx vs excision of cecal mass to obtain pathology at time of ostomy and bowel re-anastomosis  High concern for metastatic malignancy    Small bowel perforation with large Cecal mass   5/4 Expl Lap and SB excision with washout and AB thera wound vac for bowel left in discontinuity  5/7 washout and wound vac replacement  hx Diverticulosis but cecal mass and suspected metastatic lesions found in exploration    IVAB for peritonitis/sepsis  NPO and IVFs with NG to suction  Day #7 without nutrition, pharmacy and RD to give TPN recs for goal minimal fluid volume. Risks associated with parenteral nutrition discussed with daughter.     Palliative Care  Pt is DNR, discussed at length with surgeon prior to surgery and he consented to intubation for surgery with understanding of likely need for prolonged vent support post op until bowel/abdomen closed. He was clear that he would not desire long term vent support past that necessary for immediate post op recovery.   Daughter Claudia is SDM and is aware and supportive of his wishes. IF at any point his survival chances become poor or prolonged life support is anticipated she would honor his wish and transition to comfort focused care.  5/6 - discussed status with daughter. She expressed again understanding of her father's wishes for very limited  life support and further stated today that after time to reflect on current status, in the event of continued decline she would not want to pursue potential dialysis but if kidneys fail and indications for RRT exist she would at that time desire transition to a full comfort care focus. She would hope that he could be extubated to comfort focus and have opportunity to interact with family but verbalizes understanding that this may not be possible given open abdomen and comfort goal.  Continue daily and prn updates     Critical Care Daily Checklist:    A: Awake: RASS Goal/Actual Goal: RASS Goal: -3-->moderate sedation  Actual: German Agitation Sedation Scale (RASS): Moderate sedation   B: Spontaneous Breathing Trial Performed?     C: SAT & SBT Coordinated?  Not candidate today                      D: Delirium: CAM-ICU Overall CAM-ICU: Positive   E: Early Mobility Performed? Yes   F: Feeding Goal: Goals: 1. Parenteral Nutrition Initiation within 24 hours.  Status: Nutrition Goal Status: new   Current Diet Order   Procedures    Diet NPO      AS: Analgesia/Sedation Fentanyl infusion   T: Thromboembolic Prophylaxis heparin   H: HOB > 300 Yes   U: Stress Ulcer Prophylaxis (if needed) pepcid   G: Glucose Control As above   B: Bowel Function     I: Indwelling Catheter (Lines & Doherty) Necessity reviewed   D: De-escalation of Antimicrobials/Pharmacotherapies reviewed    Plan for the day/ETD Supportive care as above    Code Status:  Family/Goals of Care: DNR  Pending hospital course; see palliative discussion above   I have discussed case and plan of care in detail with Dr Whelan and Dr Perez; Status and plan of care were discussed with team on multidisciplinary rounds.    Critical Care Time: 66 minutes  Critical secondary to septic shock, JOSE, mechanical vent and pressor support  Critical care was time spent personally by me on the following activities: development of treatment plan with patient or surrogate and bedside  caregivers, discussions with consultants, evaluation of patient's response to treatment, examination of patient, ordering and performing treatments and interventions, ordering and review of laboratory studies, ordering and review of radiographic studies, pulse oximetry, re-evaluation of patient's condition. This critical care time did not overlap with that of any other provider or involve time for any procedures.     KATHARINE Miranda-BC  Critical Care Medicine  [facility] ([facility])

## 2022-05-10 NOTE — CONSULTS
[facility] ([facility]  Adult Nutrition  Consult Note    SUMMARY     Recommendations    Recommendation/Intervention:   1. When medically appropriate, Initiate Custom TPN:   -Custom Macronutrients AA- 72g; CHO- 200g.   -GIR: 1.19.   -Standard lipids pending triglycerides.   -Check Mg, Na, Phos, and Glucose before and during initiation;Correct as indicated.     2. Weekly weights per RD follow up.    Goals:   1. Parenteral Nutrition Initiation within 24 hours.    Nutrition Goal Status: new    Assessment and Plan    Nutrition Problem  Inadequate oral intake     Related to (etiology):   Decreased ability to consume sufficient energy     Signs and Symptoms (as evidenced by):   NPO, intubated     Interventions/Recommendations (treatment strategy):  TPN initiation   Collaboration with Medical Providers   Weekly Weights     Nutrition Diagnosis Status:   New        Malnutrition Assessment    Pt does not meet at least 2 ASPEN criteria for malnutrition at this time.  Will continue to monitor.       Reason for Assessment    Reason For Assessment: consult  Diagnosis: infection/sepsis  Relevant Medical History: diverticulitis  Interdisciplinary Rounds: did not attend  General Information Comments:     5/10:RD consulted for TPN rec's. Patient is currently NPO and intubated. Patient had surgery on 5/4. Patient had a laparotomy, washout, wound vac, and small intestine excision. Patient has 3+ moderate edema to feet, scrotum. Patient has 4+ severe edema to hands. Patient does not want dialysis per patient wishes. Patients last BM 5/3. NFPE not appropriate at this time. Will continue to monitor.    Nutrition Discharge Planning: pending medical course    Nutrition Risk Screen    Nutrition Risk Screen: tube feeding or parenteral nutrition    Nutrition/Diet History    Patient Reported Diet/Restrictions/Preferences:  (unknown)  Spiritual, Cultural Beliefs, Cheondoism Practices, Values that Affect Care: no  Food Allergies:  "NKFA  Factors Affecting Nutritional Intake: NPO, on mechanical ventilation    Anthropometrics    Temp: 98.96 °F (37.2 °C)  Height: 5' 9" (175.3 cm)  Height (inches): 69 in  Weight Method: Bed Scale  Weight: 116.5 kg (256 lb 13.4 oz)  Weight (lb): 256.84 lb  Ideal Body Weight (IBW), Male: 160 lb  % Ideal Body Weight, Male (lb): 160.53 %  BMI (Calculated): 37.9  BMI Grade: 35 - 39.9 - obesity - grade II       Lab/Procedures/Meds  BMP  Lab Results   Component Value Date     (L) 05/10/2022    K 5.0 05/10/2022    CL 90 (L) 05/10/2022    CO2 19 (L) 05/10/2022    BUN 87 (H) 05/10/2022    CREATININE 4.8 (H) 05/10/2022    CALCIUM 7.1 (L) 05/10/2022    ANIONGAP 23 (H) 05/10/2022    ESTGFRAFRICA 13 (A) 05/10/2022    EGFRNONAA 12 (A) 05/10/2022     Lab Results   Component Value Date     (L) 05/10/2022    K 5.0 05/10/2022    CL 90 (L) 05/10/2022    CO2 19 (L) 05/10/2022     Lab Results   Component Value Date    LABPROT 12.0 05/05/2022    ALBUMIN 1.3 (L) 05/10/2022     Lab Results   Component Value Date    CALCIUM 7.1 (L) 05/10/2022    PHOS 4.1 08/06/2021     Pertinent Labs Reviewed: reviewed  Pertinent Medications Reviewed: reviewed  Scheduled Meds:   chlorhexidine  15 mL Mouth/Throat BID    ertapenem (INVANZ) IVPB  500 mg Intravenous Q24H    famotidine (PF)  20 mg Intravenous Daily    heparin (porcine)  5,000 Units Subcutaneous Q8H    linezolid  600 mg Intravenous Q12H    micafungin (MYCAMINE) IVPB  100 mg Intravenous Q24H    white petrolatum-mineral oil 57.3-42.5%   Both Eyes QHS     Continuous Infusions:   amiodarone in dextrose 5% 0.5 mg/min (05/10/22 1000)    fentanyl 250 mcg/hr (05/10/22 1000)    NORepinephrine bitartrate-D5W 0.1 mcg/kg/min (05/10/22 1000)     PRN Meds:.sodium chloride, sodium chloride 0.9%, acetaminophen, dextrose 10%, dextrose 10%, glucagon (human recombinant), HYDROmorphone, insulin aspart U-100, lorazepam, ondansetron, ondansetron    Physical Findings/Assessment     "     Estimated/Assessed Needs    Weight Used For Calorie Calculations: 116.5 kg (256 lb 13.4 oz)  Energy Calorie Requirements (kcal): 6233-6125 (2417-2959)  Energy Need Method: Kcal/kg  Protein Requirements: 58-72 (0.8-1.0g/kg, renal labs)  Weight Used For Protein Calculations: 72.7 kg (160 lb 4.4 oz) (IBW)  Fluid Requirements (mL): 7440-0744  Estimated Fluid Requirement Method: RDA Method  RDA Method (mL): 1281  CHO Requirement: 160-203      Nutrition Prescription Ordered    Current Diet Order: NPO    Evaluation of Received Nutrient/Fluid Intake    % Kcal Needs: 0%  % Protein Needs: 0%  Energy Calories Required: not meeting needs  Protein Required: not meeting needs  Fluid Required: not meeting needs  Tolerance:  (Patient NPO)  % Intake of Estimated Energy Needs: 0 - 25 %  % Meal Intake: NPO    Nutrition Risk    Level of Risk/Frequency of Follow-up: moderate - high       Monitor and Evaluation    Food and Nutrient Intake: energy intake, parenteral nutrition intake  Food and Nutrient Adminstration: enteral and parenteral nutrition administration  Anthropometric Measurements: weight, weight change, body mass index  Biochemical Data, Medical Tests and Procedures: electrolyte and renal panel, gastrointestinal profile, inflammatory profile, glucose/endocrine profile, lipid profile  Nutrition-Focused Physical Findings: overall appearance       Nutrition Follow-Up    RD Follow-up?: Yes   Nanda Holman RD,ELLENN

## 2022-05-10 NOTE — PROGRESS NOTES
[facility] ([facility])  Nephrology  Progress Note    Patient Name: Franky Masters  MRN: [id number]  Admission Date: 5/4/2022  Hospital Length of Stay: 6 days  Attending Provider: Elvie Parker DO   Primary Care Physician: HOLLY ROSEN  Principal Problem:Septic shock   Reason for Consult: Acute Kidney Injury       Subjective:     HPI:   History obtained per Chart Review  68 yo male with no routine medical care by patient's choice admitted 5 days prior for perforated bowel, found to have cecal mass intraoperatively with liver nodules, he is post op Day #5. He underwent lavage 5/7. He continues to be in septic shock on 2 vasopressors, intubated and sedated. His post operative course was complicated by afib as well.   Admission creatinine was 0.9mg/dL and initial JOSE was oliguric, but has increased UOP in the past 24 hours, albeit s/p Lasix IVP. Hid creatinine of 4.6-4.7mg/dl has remained stable since Saturday 5/7.     5/10  - UOP maintaining, 1.7L previous 24hr       Review of patient's allergies indicates:  No Known Allergies  Current Facility-Administered Medications   Medication Frequency    0.9%  NaCl infusion (for blood administration) Q24H PRN    0.9%  NaCl infusion PRN    acetaminophen suppository 650 mg Q6H PRN    amiodarone 360 mg/200 mL (1.8 mg/mL) infusion Continuous    chlorhexidine 0.12 % solution 15 mL BID    dextrose 10 % infusion Continuous PRN    dextrose 10% bolus 125 mL PRN    ertapenem (INVANZ) 500 mg in sodium chloride 0.9% 100 mL IVPB Q24H    famotidine (PF) injection 20 mg Daily    fentaNYL 2500 mcg in 0.9% sodium chloride 250 mL infusion premix (titrating) Continuous    glucagon (human recombinant) injection 1 mg PRN    heparin (porcine) injection 5,000 Units Q8H    HYDROmorphone (PF) injection 0.2 mg Q5 Min PRN    insulin aspart U-100 pen 0-5 Units Q4H PRN    linezolid 600 mg/300 mL IVPB 600 mg Q12H    lorazepam injection 2 mg Q4H PRN    micafungin 100 mg in  sodium chloride 0.9 % 100 mL IVPB (ready to mix system) Q24H    NORepinephrine 8 mg in dextrose 5 % 250 mL infusion Continuous    ondansetron injection 4 mg Daily PRN    ondansetron injection 4 mg Q8H PRN    TPN ADULT CENTRAL LINE CUSTOM Continuous    white petrolatum-mineral oil 57.3-42.5% ophthalmic ointment QHS         Review of Systems   Unable to perform ROS: Intubated   Constitutional: Positive for fever.   Genitourinary: Negative for decreased urine volume.   Skin: Positive for wound (incisional).   Allergic/Immunologic: Positive for immunocompromised state.     Objective:     Vital Signs (Most Recent):  Temp: 98.96 °F (37.2 °C) (05/10/22 1115)  Pulse: 88 (05/10/22 1115)  Resp: (!) 30 (05/10/22 1115)  BP: 118/68 (05/10/22 1100)  SpO2: 100 % (05/10/22 1115)  O2 Device (Oxygen Therapy): ventilator (05/10/22 1115) Vital Signs (24h Range):  Temp:  [98.06 °F (36.7 °C)-100.58 °F (38.1 °C)] 98.96 °F (37.2 °C)  Pulse:  [] 88  Resp:  [23-30] 30  SpO2:  [84 %-100 %] 100 %  BP: ()/(54-81) 118/68  Arterial Line BP: ()/(52-77) 122/68         Physical Exam  Vitals and nursing note reviewed.   Constitutional:       General: He is not in acute distress.     Appearance: He is obese. He is ill-appearing.      Interventions: He is sedated and intubated.   HENT:      Head: Atraumatic.   Cardiovascular:      Rate and Rhythm: Normal rate.      Heart sounds:     No friction rub (no rubs appreciated).      Comments: Normal to slight acacia   Pulmonary:      Effort: He is intubated.      Breath sounds: Normal breath sounds.   Abdominal:      General: There is distension.   Genitourinary:     Comments: Doherty with yellow urine draining  Musculoskeletal:         General: Swelling present.      Right lower leg: Edema present.      Left lower leg: Edema present.   Skin:     General: Skin is warm and dry.         Significant Labs: reviewed    Significant Imaging:  CXR ordered     Assessment/Plan:     Active Diagnoses:     Diagnosis Date Noted POA    PRINCIPAL PROBLEM:  Septic shock sec to Peritonitis from SB perforation [A41.9, R65.21] 05/04/2022 Yes    Atrial fibrillation with RVR [I48.91] 05/06/2022 No    On mechanically assisted ventilation [Z99.11] 05/05/2022 Not Applicable    JOSE (acute kidney injury) [N17.9] 05/05/2022 Yes    Small bowel perforation with large Cecal mass  [K63.1] 05/04/2022 Yes    Hyperglycemia, unspecified [R73.9] 05/04/2022 Yes    Mass of colon [K63.89] 05/04/2022 Yes    Acute on chronic anemia [D64.9] 08/05/2021 Yes    Acute hypoxemic respiratory failure on mechanical vent [J96.01] 08/05/2021 Yes    Obesity (BMI 30.0-34.9) [E66.9] 08/05/2021 Yes     Chronic      Problems Resolved During this Admission:    Diagnosis Date Noted Date Resolved POA    Pneumonia of left lower lobe due to infectious organism [J18.9] 05/04/2022 05/10/2022 Yes       JOSE with baseline creatinine 0.9mg/dL in setting of septic shock  - JOSE due to ATN and hemodynamic changes  - initially oliguric but now maintaining UOP  - no acute indications for RRT but in speaking with daughter, Claudia Manley medical POA, and today discussion again, she feels her father would not desire dialysis    Septic Shock from perforated viscus/Peritonitis   - per CCM  - 23L positive since admission   - dose LAsix today and when TPN starts will attempt to keep matched I/Os    Perforated Bowel  - surgery notes reviewed  - cecal mass strongly suspicious for cancer with likely liver mets    Respiratory Failure, intubated  Hemodynamic Failure   Shock Liver  Afib with RVR, new onset  Severe hypoalbuminemia   Anemia acute on chronic and Multifactorial   Thrombocytopenia  Hyponatremia   Respiratory Alkalosis       Avery Persaud MD  Nephrology  [facility] ([facility])

## 2022-05-10 NOTE — PLAN OF CARE
Recommendation/Intervention: 5/10  1. When medically appropriate, Initiate Custom TPN:   -Custom Macronutrients AA- 72g; CHO- 200g.   -GIR: 1.19.   -Standard lipids pending triglycerides.   -Check Mg, Na, Phos, and Glucose before and during initiation;Correct as indicated.      2. Weekly weights per RD follow up.    Nanda Holman RD,LDN

## 2022-05-10 NOTE — ASSESSMENT & PLAN NOTE
5/4 Expl Lap and SB excision with washout and AB thera wound vac for bowel left in discontinuity  5/7 washout and wound vac replacement  hx Diverticulosis but cecal mass and suspected metastatic lesions found in exploration    IVAB for peritonitis/sepsis  NPO and IVFs with NG to suction  Day #7 without nutrition, pharmacy and RD to give TPN recs for goal minimal fluid volume. Risks associated with parenteral nutrition discussed with daughter.

## 2022-05-10 NOTE — PLAN OF CARE
VSS throughout shift. Afib on cardiac monitor. Amio, levo and fent continued. Started TPN today. Lasix given today with adequate urine output. Turned q2 on wedge. Heel boots in place. SCDS in place. Family updated at bedside. All alarms active and audible. Emergency equipment remains at bedside. Will continue to monitor.   Problem: Adult Inpatient Plan of Care  Goal: Optimal Comfort and Wellbeing  Outcome: Ongoing, Progressing     Problem: Infection  Goal: Absence of Infection Signs and Symptoms  Outcome: Ongoing, Progressing     Problem: Nutrition Impaired (Sepsis/Septic Shock)  Goal: Optimal Nutrition Intake  Outcome: Ongoing, Progressing

## 2022-05-10 NOTE — ASSESSMENT & PLAN NOTE
Secondary to Peritonitis from bowel perforation with major fecal contamination  Blood and sputum cultures ngtd  Wbc down after washout 5/7 but again rising  Continue zyvox, invanz and Micafungin  Stop Vasopressin and continue to titrate pressor for MAP > 75  ICU hemodynamic monitoring  Echo reviewed

## 2022-05-10 NOTE — SUBJECTIVE & OBJECTIVE
Review of Systems   Unable to perform ROS: Intubated     Objective:     Vital Signs (Most Recent):  Temp: 99.68 °F (37.6 °C) (05/10/22 0800)  Pulse: 76 (05/10/22 0800)  Resp: (!) 28 (05/10/22 0800)  BP: 107/73 (05/10/22 0800)  SpO2: 100 % (05/10/22 0800)   Vital Signs (24h Range):  Temp:  [98.06 °F (36.7 °C)-100.58 °F (38.1 °C)] 99.68 °F (37.6 °C)  Pulse:  [] 76  Resp:  [23-31] 28  SpO2:  [84 %-100 %] 100 %  BP: ()/(54-81) 107/73  Arterial Line BP: ()/(52-77) 100/61     Weight: 116.5 kg (256 lb 13.4 oz)  Body mass index is 37.93 kg/m².      Intake/Output Summary (Last 24 hours) at 5/10/2022 0832  Last data filed at 5/10/2022 0800  Gross per 24 hour   Intake 2493.07 ml   Output 2400 ml   Net 93.07 ml        amiodarone in dextrose 5% 0.5 mg/min (05/10/22 0800)    fentanyl 250 mcg/hr (05/10/22 0800)    NORepinephrine bitartrate-D5W 0.06 mcg/kg/min (05/10/22 0800)       Physical Exam  Vitals and nursing note reviewed.   Constitutional:       General: He is not in acute distress.     Appearance: He is obese. He is ill-appearing.      Interventions: He is sedated, intubated and restrained.   HENT:      Head: Atraumatic.   Eyes:      General: No scleral icterus.     Conjunctiva/sclera: Conjunctivae normal.   Neck:      Vascular: No JVD.   Cardiovascular:      Rate and Rhythm: Normal rate and regular rhythm.      Pulses:           Radial pulses are 1+ on the right side and 1+ on the left side.        Dorsalis pedis pulses are 1+ on the right side and 1+ on the left side.   Pulmonary:      Effort: He is intubated.      Breath sounds: Decreased breath sounds present. No wheezing or rhonchi.   Abdominal:      General: Bowel sounds are absent.       Musculoskeletal:      Right lower le+ Pitting Edema present.      Left lower le+ Pitting Edema present.   Skin:     General: Skin is cool.      Capillary Refill: Capillary refill takes more than 3 seconds.       Vents:  Vent Mode: A/C (05/10/22  0755)  Ventilator Initiated: Yes (05/04/22 1258)  Set Rate: 25 BPM (05/10/22 0755)  Vt Set: 450 mL (05/10/22 0755)  Pressure Support: 0 cmH20 (05/10/22 0755)  PEEP/CPAP: 5 cmH20 (05/10/22 0755)  Oxygen Concentration (%): 40 (05/10/22 0800)  Peak Airway Pressure: 25 cmH2O (05/10/22 0755)  Plateau Pressure: 18 cmH20 (05/10/22 0755)  Total Ve: 12.1 mL (05/10/22 0755)  F/VT Ratio<105 (RSBI): (!) 61.7 (05/10/22 0755)    Lines/Drains/Airways       Central Venous Catheter Line  Duration             Percutaneous Central Line Insertion/Assessment - Triple Lumen  05/04/22 1200 right internal jugular 5 days              Drain  Duration                  NG/OG Tube 05/04/22 1600 5 days         Urethral Catheter 05/04/22 1105 Straight-tip;Silicone 16 Fr. 5 days              Airway  Duration                  Airway - Non-Surgical 05/04/22 1051 Endotracheal Tube 5 days              Arterial Line  Duration             Arterial Line 05/07/22 1330 Right Radial 2 days                    Significant Labs:    CBC/Anemia Profile:  Recent Labs   Lab 05/09/22  0452 05/10/22  0406   WBC 10.60 13.35*   HGB 7.8* 8.6*   HCT 24.9* 28.0*   PLT 90* 100*   MCV 71* 71*   RDW 24.0* 24.7*          Chemistries:  Recent Labs   Lab 05/08/22  1603 05/09/22  0452 05/10/22  0406   * 132* 132*   K 4.9 4.6 5.0   CL 92* 91* 90*   CO2 18* 22* 19*   BUN 75* 81* 87*   CREATININE 4.7* 4.7* 4.8*   CALCIUM 6.0* 6.7* 7.1*   ALBUMIN  --  1.2* 1.3*   PROT  --  3.9* 4.4*   BILITOT  --  1.1* 0.9   ALKPHOS  --  102 120   ALT  --  495* 438*   AST  --  514* 362*   MG 1.7 2.0 2.0         All pertinent labs within the past 24 hours have been reviewed.    Significant Imaging:  I have reviewed all pertinent imaging results/findings within the past 24 hours.

## 2022-05-10 NOTE — PHYSICIAN QUERY
PT Name: Franky Masters  MR #: [id number]    DOCUMENTATION CLARIFICATION      CDS: Ashley COLINDRES RN  Contact information: [email or web address]      This form is a permanent document in the medical record.      Query Date: May 10, 2022    By submitting this query, we are merely seeking further clarification of documentation. Please utilize your independent clinical judgment when addressing the question(s) below.    The Medical Record contains the following:   Indicators  Supporting Clinical Findings Location in Medical Record   X Anemia documented Acute on chronic anemia  Hgb 10.3 s/p Transfusion 4 units Prbc    Acute on chronic anemia  Received 2 units PRBCs in ED and 2 more in OR on 5/4  CBC stable   Monitor wound vac output and daily CBC with Conservative transfusion protocol    Anemia acute on chronic and Multifactorial  PN HM 5/4/2022      PN Pulmonology 5/9/2022            Consults Nephro 5/9/2022   X H&H  05/04/22 13:30 05/05/22 03:25 05/06/22 04:10 05/07/22 04:16 05/08/22 04:21 05/09/22 04:52   Hemoglobin 10.3 (L) 11.4 (L) 10.3 (L) 9.6 (L) 8.5 (L) 7.8 (L)   Hematocrit 36.6 (L) 39.6 (L) 34.9 (L) 33.7 (L) 28.0 (L) 24.9 (L)    Labs 5/4-5/9   X BP                    HR Vital Signs (24h Range):  Pulse:  [] 81  BP: ()/(46-74) 118/59 Consults  5/4/2022    GI bleeding documented     X Acute bleeding (Non GI site) EBL: 100 mL Op note 5/4/2022   X Transfusion(s) Received 2 units PRBCs in ED and another 2 units in OR also on Levophed and Vasopressin infusions.   PN Pulmonology 5/9/2022   X Acute/Chronic illness hx Diverticulosis but cecal mass and suspected metastatic lesions found in exploration    Septic shock sec to Peritonitis from SB perforation  Secondary to Peritonitis from bowel perforation with major fecal contamination PN Pulmonology 5/9/2022    PN Pulmonology 5/9/2022   X Treatments Transfuse for Hgb <7  Monitor    Patient received 4 units PRBC and remains on pressor support.    LR (mL)   Total  volume:  1,500 mL    sodium chloride 0.9% bolus 1,000 mL    sodium chloride 0.9% bolus 500 mL PN HM 5/9/2022      PN Hosp Med 5/9/2022    Anesthesia Info 5/4/2022    MAR 5/4/2022 0533, 0853    MAR 5/4/2022 1334, 1400, 1630    Other       Provider, please specify diagnosis or diagnoses associated with above clinical findings.     Please select all that apply:    [   ] Acute blood loss anemia    [   ] Acute blood loss anemia expected post-operatively    [   ] Chronic blood loss anemia     [ x ] Anemia due to neoplastic disease   [   ] Anemia due to chronic disease (please specify): _________________   [   ] Anemia, unspecified    [   ] Other Hematological Diagnosis (please specify): _________________   [   ] Clinically Undetermined            Please document in your progress notes daily for the duration of treatment, until resolved, and include in your discharge summary.    Form No. [id number]

## 2022-05-10 NOTE — ASSESSMENT & PLAN NOTE
New onset 5/6  on amiodarone infusion; maintain while bowel in discontinuity  Continue cardiac monitoring

## 2022-05-10 NOTE — ASSESSMENT & PLAN NOTE
S/t septic shock  Adequate volume resuscitation +22L  Avoid nephrotoxins  Strict I/O  No acute indication for dialysis but high potential for continued decline, daughter(HCPOA) will not pursue dialysis if decline per her father's wishes  Nephrology consulted to optimize medical management  Adequate urine post diuretic trial but no improvement in BUN/Cr/K

## 2022-05-10 NOTE — PLAN OF CARE
Patient VSS throughout shift. HR in afib rhythm. Amio gtt continued. Fent, levo, vaso gtts all continued as well with minimal titration needed. Doherty with adequate UOP. OGT and Wound vac output monitoring and recorded. Patient turned q2, heels in heel boots. Will monitor.     Problem: Adult Inpatient Plan of Care  Goal: Plan of Care Review  Outcome: Ongoing, Progressing  Goal: Patient-Specific Goal (Individualized)  Outcome: Ongoing, Progressing  Goal: Absence of Hospital-Acquired Illness or Injury  Outcome: Ongoing, Progressing  Goal: Optimal Comfort and Wellbeing  Outcome: Ongoing, Progressing  Goal: Readiness for Transition of Care  Outcome: Ongoing, Progressing

## 2022-05-10 NOTE — SUBJECTIVE & OBJECTIVE
Interval History: - Day 7- remains same in septic shock on 2 vasopressors, intubated and sedated, still in afib. JOSE has remained stable at 4.7 but urine output improved with IV lasix. Abthera wound vac in place. No surgery today- put out about 2.5 L yesterday, given lasix again today.    Review of Systems   Unable to perform ROS: Intubated   Objective:     Vital Signs (Most Recent):  Temp: 99.68 °F (37.6 °C) (05/10/22 1800)  Pulse: 96 (05/10/22 1800)  Resp: (!) 28 (05/10/22 1800)  BP: 120/69 (05/10/22 1700)  SpO2: 100 % (05/10/22 1800)   Vital Signs (24h Range):  Temp:  [98.42 °F (36.9 °C)-100.58 °F (38.1 °C)] 99.68 °F (37.6 °C)  Pulse:  [] 96  Resp:  [23-32] 28  SpO2:  [84 %-100 %] 100 %  BP: ()/(54-81) 120/69  Arterial Line BP: ()/(56-73) 102/58     Weight: 116.5 kg (256 lb 13.4 oz)  Body mass index is 37.93 kg/m².    Intake/Output Summary (Last 24 hours) at 5/10/2022 1858  Last data filed at 5/10/2022 1800  Gross per 24 hour   Intake 2503.39 ml   Output 2530 ml   Net -26.61 ml      Physical Exam  Vitals and nursing note reviewed.   Constitutional:       General: He is not in acute distress.     Appearance: He is obese. He is ill-appearing.      Interventions: He is sedated, intubated and restrained.   HENT:      Head: Atraumatic.   Eyes:      General: No scleral icterus.     Conjunctiva/sclera: Conjunctivae normal.   Neck:      Vascular: No JVD.   Cardiovascular:      Rate and Rhythm: Normal rate and regular rhythm.      Pulses:           Radial pulses are 1+ on the right side and 1+ on the left side.        Dorsalis pedis pulses are 1+ on the right side and 1+ on the left side.   Pulmonary:      Effort: He is intubated.      Breath sounds: Decreased breath sounds present. No wheezing or rhonchi.   Abdominal:      General: Bowel sounds are absent.       Musculoskeletal:      Right lower le+ Pitting Edema present.      Left lower le+ Pitting Edema present.   Skin:     General: Skin is  cool.      Capillary Refill: Capillary refill takes more than 3 seconds.     Flow (L/min): 4  Vent Mode: A/C  Oxygen Concentration (%):  [40] 40  Resp Rate Total:  [25 br/min-34 br/min] 27 br/min  Vt Set:  [450 mL] 450 mL  PEEP/CPAP:  [5 cmH20] 5 cmH20  Pressure Support:  [0 cmH20] 0 cmH20  Mean Airway Pressure:  [9.9 ysK74-06 cmH20] 11 cmH20  Temp:  [98.42 °F (36.9 °C)-100.58 °F (38.1 °C)] 99.68 °F (37.6 °C)  Pulse:  [] 96  Resp:  [23-32] 28  SpO2:  [84 %-100 %] 100 %  BP: ()/(54-81) 120/69  Arterial Line BP: ()/(56-73) 102/58   Art pH/pCO2/pO2/HCO3:  7.462/34.9/105/24.9 (05/10 0412)  Lab Results   Component Value Date    LMX11IYBZSBR Negative 05/04/2022    [id number] Positive (A) 08/05/2021      Recent Labs   Lab 05/05/22  1150 05/06/22  0410 05/07/22  1345 05/08/22  0421 05/08/22  1603 05/09/22  0452 05/10/22  0406   LACTATE 5.0*  --  6.7*  --   --   --  3.5*   NA  --    < >  --  134* 132* 132* 132*   WBC  --    < >  --  12.29  --  10.60 13.35*   GRAN  --    < >  --  86.4*  10.6*  --  84.7*  9.0* 87.8*  11.7*   LYMPH  --    < >  --  7.3*  0.9*  --  6.8*  0.7* 6.3*  0.8*   HGB  --    < >  --  8.5*  --  7.8* 8.6*   HCT  --    < >  --  28.0*  --  24.9* 28.0*   BUN  --    < >  --  69* 75* 81* 87*   CREATININE  --    < >  --  4.6* 4.7* 4.7* 4.8*   ESTGFRAFRICA  --    < >  --  14* 14* 14* 13*   EGFRNONAA  --    < >  --  12* 12* 12* 12*   K  --    < >  --  5.1 4.9 4.6 5.0   CL  --    < >  --  93* 92* 91* 90*   CO2  --    < >  --  23 18* 22* 19*   MG  --    < >  --  1.8 1.7 2.0 2.0    < > = values in this interval not displayed.     Microbiology Results (last 7 days)       Procedure Component Value Units Date/Time    Blood culture [[id number]] Collected: 05/04/22 1428    Order Status: Completed Specimen: Blood from Line, Arterial, Left Updated: 05/10/22 0612     Blood Culture, Routine No growth after 5 days.    Blood culture [[id number]] Collected: 05/04/22 1429    Order Status: Completed  Specimen: Blood from Line, Jugular, Internal Right Updated: 05/10/22 0612     Blood Culture, Routine No growth after 5 days.    Culture, Respiratory with Gram Stain [[id number]] Collected: 05/04/22 1253    Order Status: Completed Specimen: Respiratory from Endotracheal Aspirate Updated: 05/07/22 0921     Respiratory Culture No growth     Gram Stain (Respiratory) Few WBC's     Gram Stain (Respiratory) Rare Gram positive rods          Antibiotics (From admission, onward)                Start     Stop Route Frequency Ordered    05/09/22 2200  ertapenem (INVANZ) 500 mg in sodium chloride 0.9% 100 mL IVPB         05/14 2159 IV Every 24 hours (non-standard times) 05/09/22 1624    05/05/22 2115  linezolid 600 mg/300 mL IVPB 600 mg         05/11 2114 IV Every 12 hours (non-standard times) 05/05/22 2000          Anticoagulants       Ordered     Route Frequency Start Stop    05/04/22 1255  heparin (porcine)  (VTE Prophylaxis Orders - High Risk)         SubQ Every 8 hours 05/05/22 0600 --          X-Ray Chest 1 View  Narrative: EXAMINATION:  XR CHEST 1 VIEW    CLINICAL HISTORY:  respiratory failure; Pneumonia, unspecified organism    TECHNIQUE:  Single frontal view of the chest was performed.    COMPARISON:  05/05/2022    FINDINGS:  Tubes and lines are stable.   In comparison to the prior study, there is no adverse interval changes.  Impression: In comparison to the prior study, there is no adverse interval changes    Electronically signed by: Philippe Horton MD  Date:    05/09/2022  Time:    12:04   Significant Labs: All pertinent labs within the past 24 hours have been reviewed.    Significant Imaging: I have reviewed all pertinent imaging results/findings within the past 24 hours.

## 2022-05-11 ENCOUNTER — ANESTHESIA EVENT (OUTPATIENT)
Dept: SURGERY | Facility: HOSPITAL | Age: 67
DRG: 853 | End: 2022-05-11
Payer: MEDICARE

## 2022-05-11 ENCOUNTER — ANESTHESIA (OUTPATIENT)
Dept: SURGERY | Facility: HOSPITAL | Age: 67
DRG: 853 | End: 2022-05-11
Payer: MEDICARE

## 2022-05-11 PROBLEM — R73.9 HYPERGLYCEMIA, UNSPECIFIED: Status: RESOLVED | Noted: 2022-05-04 | Resolved: 2022-05-11

## 2022-05-11 LAB
ACANTHOCYTES BLD QL SMEAR: PRESENT
ALBUMIN SERPL BCP-MCNC: 1.2 G/DL (ref 3.5–5.2)
ALP SERPL-CCNC: 138 U/L (ref 55–135)
ALT SERPL W/O P-5'-P-CCNC: 350 U/L (ref 10–44)
ANION GAP SERPL CALC-SCNC: 22 MMOL/L (ref 8–16)
ANISOCYTOSIS BLD QL SMEAR: SLIGHT
AST SERPL-CCNC: 272 U/L (ref 10–40)
BASOPHILS # BLD AUTO: 0.06 K/UL (ref 0–0.2)
BASOPHILS NFR BLD: 0.4 % (ref 0–1.9)
BILIRUB SERPL-MCNC: 0.8 MG/DL (ref 0.1–1)
BUN SERPL-MCNC: 95 MG/DL (ref 8–23)
CALCIUM SERPL-MCNC: 7 MG/DL (ref 8.7–10.5)
CHLORIDE SERPL-SCNC: 91 MMOL/L (ref 95–110)
CO2 SERPL-SCNC: 20 MMOL/L (ref 23–29)
CREAT SERPL-MCNC: 4.7 MG/DL (ref 0.5–1.4)
DELSYS: ABNORMAL
DIFFERENTIAL METHOD: ABNORMAL
EOSINOPHIL # BLD AUTO: 0.1 K/UL (ref 0–0.5)
EOSINOPHIL NFR BLD: 0.7 % (ref 0–8)
ERYTHROCYTE [DISTWIDTH] IN BLOOD BY AUTOMATED COUNT: 24.7 % (ref 11.5–14.5)
ERYTHROCYTE [SEDIMENTATION RATE] IN BLOOD BY WESTERGREN METHOD: 25 MM/H
EST. GFR  (AFRICAN AMERICAN): 14 ML/MIN/1.73 M^2
EST. GFR  (NON AFRICAN AMERICAN): 12 ML/MIN/1.73 M^2
FIO2: 40
GLUCOSE SERPL-MCNC: 87 MG/DL (ref 70–110)
HCO3 UR-SCNC: 26.9 MMOL/L (ref 24–28)
HCT VFR BLD AUTO: 28.4 % (ref 40–54)
HGB BLD-MCNC: 8.6 G/DL (ref 14–18)
HYPOCHROMIA BLD QL SMEAR: ABNORMAL
IMM GRANULOCYTES # BLD AUTO: 0.2 K/UL (ref 0–0.04)
IMM GRANULOCYTES NFR BLD AUTO: 1.2 % (ref 0–0.5)
LYMPHOCYTES # BLD AUTO: 0.6 K/UL (ref 1–4.8)
LYMPHOCYTES NFR BLD: 3.6 % (ref 18–48)
MAGNESIUM SERPL-MCNC: 1.9 MG/DL (ref 1.6–2.6)
MCH RBC QN AUTO: 21.9 PG (ref 27–31)
MCHC RBC AUTO-ENTMCNC: 30.3 G/DL (ref 32–36)
MCV RBC AUTO: 72 FL (ref 82–98)
MODE: ABNORMAL
MONOCYTES # BLD AUTO: 0.4 K/UL (ref 0.3–1)
MONOCYTES NFR BLD: 2.6 % (ref 4–15)
NEUTROPHILS # BLD AUTO: 15.7 K/UL (ref 1.8–7.7)
NEUTROPHILS NFR BLD: 91.5 % (ref 38–73)
NRBC BLD-RTO: 0 /100 WBC
OVALOCYTES BLD QL SMEAR: ABNORMAL
PCO2 BLDA: 35 MMHG (ref 35–45)
PEEP: 5
PH SMN: 7.49 [PH] (ref 7.35–7.45)
PHOSPHATE SERPL-MCNC: 8.9 MG/DL (ref 2.7–4.5)
PLATELET # BLD AUTO: 108 K/UL (ref 150–450)
PLATELET BLD QL SMEAR: ABNORMAL
PMV BLD AUTO: ABNORMAL FL (ref 9.2–12.9)
PO2 BLDA: 153 MMHG (ref 80–100)
POC BE: 4 MMOL/L
POC SATURATED O2: 100 % (ref 95–100)
POCT GLUCOSE: 104 MG/DL (ref 70–110)
POCT GLUCOSE: 109 MG/DL (ref 70–110)
POCT GLUCOSE: 78 MG/DL (ref 70–110)
POCT GLUCOSE: 87 MG/DL (ref 70–110)
POCT GLUCOSE: 87 MG/DL (ref 70–110)
POIKILOCYTOSIS BLD QL SMEAR: ABNORMAL
POLYCHROMASIA BLD QL SMEAR: ABNORMAL
POTASSIUM SERPL-SCNC: 4.3 MMOL/L (ref 3.5–5.1)
PROT SERPL-MCNC: 4.4 G/DL (ref 6–8.4)
RBC # BLD AUTO: 3.92 M/UL (ref 4.6–6.2)
SAMPLE: ABNORMAL
SARS-COV-2 RDRP RESP QL NAA+PROBE: NEGATIVE
SCHISTOCYTES BLD QL SMEAR: PRESENT
SICKLE CELLS BLD QL SMEAR: ABNORMAL
SITE: ABNORMAL
SODIUM SERPL-SCNC: 133 MMOL/L (ref 136–145)
TARGETS BLD QL SMEAR: ABNORMAL
TRIGL SERPL-MCNC: 117 MG/DL (ref 30–150)
VT: 450
WBC # BLD AUTO: 17.1 K/UL (ref 3.9–12.7)

## 2022-05-11 PROCEDURE — 37000009 HC ANESTHESIA EA ADD 15 MINS: Performed by: SURGERY

## 2022-05-11 PROCEDURE — 99900035 HC TECH TIME PER 15 MIN (STAT)

## 2022-05-11 PROCEDURE — 80053 COMPREHEN METABOLIC PANEL: CPT | Mod: 91 | Performed by: SURGERY

## 2022-05-11 PROCEDURE — 63600175 PHARM REV CODE 636 W HCPCS: Performed by: NURSE ANESTHETIST, CERTIFIED REGISTERED

## 2022-05-11 PROCEDURE — 47100 WEDGE BIOPSY OF LIVER: CPT | Mod: 80,58,51, | Performed by: SURGERY

## 2022-05-11 PROCEDURE — 25000003 PHARM REV CODE 250: Performed by: SURGERY

## 2022-05-11 PROCEDURE — 94761 N-INVAS EAR/PLS OXIMETRY MLT: CPT

## 2022-05-11 PROCEDURE — 83735 ASSAY OF MAGNESIUM: CPT | Performed by: NURSE PRACTITIONER

## 2022-05-11 PROCEDURE — 80053 COMPREHEN METABOLIC PANEL: CPT | Performed by: NURSE PRACTITIONER

## 2022-05-11 PROCEDURE — 82803 BLOOD GASES ANY COMBINATION: CPT

## 2022-05-11 PROCEDURE — 25000003 PHARM REV CODE 250: Performed by: NURSE PRACTITIONER

## 2022-05-11 PROCEDURE — 99900026 HC AIRWAY MAINTENANCE (STAT)

## 2022-05-11 PROCEDURE — 94003 VENT MGMT INPAT SUBQ DAY: CPT

## 2022-05-11 PROCEDURE — A4217 STERILE WATER/SALINE, 500 ML: HCPCS | Performed by: NURSE PRACTITIONER

## 2022-05-11 PROCEDURE — 44141 PARTIAL REMOVAL OF COLON: CPT | Mod: 58,,, | Performed by: SURGERY

## 2022-05-11 PROCEDURE — 99233 SBSQ HOSP IP/OBS HIGH 50: CPT | Mod: NSCH,,, | Performed by: INTERNAL MEDICINE

## 2022-05-11 PROCEDURE — 84478 ASSAY OF TRIGLYCERIDES: CPT | Performed by: NURSE PRACTITIONER

## 2022-05-11 PROCEDURE — 99291 CRITICAL CARE FIRST HOUR: CPT | Mod: ,,, | Performed by: NURSE PRACTITIONER

## 2022-05-11 PROCEDURE — 63600175 PHARM REV CODE 636 W HCPCS: Performed by: SURGERY

## 2022-05-11 PROCEDURE — 63600175 PHARM REV CODE 636 W HCPCS: Mod: JG | Performed by: NURSE PRACTITIONER

## 2022-05-11 PROCEDURE — 44141 PR PART REMOVAL COLON W COLOSTOMY: ICD-10-PCS | Mod: 80,58,, | Performed by: SURGERY

## 2022-05-11 PROCEDURE — 25000003 PHARM REV CODE 250: Performed by: NURSE ANESTHETIST, CERTIFIED REGISTERED

## 2022-05-11 PROCEDURE — 44141 PR PART REMOVAL COLON W COLOSTOMY: ICD-10-PCS | Mod: 58,,, | Performed by: SURGERY

## 2022-05-11 PROCEDURE — U0002 COVID-19 LAB TEST NON-CDC: HCPCS | Performed by: SURGERY

## 2022-05-11 PROCEDURE — 36000708 HC OR TIME LEV III 1ST 15 MIN: Performed by: SURGERY

## 2022-05-11 PROCEDURE — B4185 PARENTERAL SOL 10 GM LIPIDS: HCPCS | Performed by: NURSE PRACTITIONER

## 2022-05-11 PROCEDURE — 47100 WEDGE BIOPSY OF LIVER: CPT | Mod: 58,51,, | Performed by: SURGERY

## 2022-05-11 PROCEDURE — 85025 COMPLETE CBC W/AUTO DIFF WBC: CPT | Performed by: SURGERY

## 2022-05-11 PROCEDURE — 63600175 PHARM REV CODE 636 W HCPCS: Performed by: NURSE PRACTITIONER

## 2022-05-11 PROCEDURE — 37000008 HC ANESTHESIA 1ST 15 MINUTES: Performed by: SURGERY

## 2022-05-11 PROCEDURE — 84100 ASSAY OF PHOSPHORUS: CPT | Performed by: NURSE PRACTITIONER

## 2022-05-11 PROCEDURE — 37799 UNLISTED PX VASCULAR SURGERY: CPT

## 2022-05-11 PROCEDURE — 44141 PARTIAL REMOVAL OF COLON: CPT | Mod: 80,58,, | Performed by: SURGERY

## 2022-05-11 PROCEDURE — 99291 PR CRITICAL CARE, E/M 30-74 MINUTES: ICD-10-PCS | Mod: ,,, | Performed by: NURSE PRACTITIONER

## 2022-05-11 PROCEDURE — 99233 PR SUBSEQUENT HOSPITAL CARE,LEVL III: ICD-10-PCS | Mod: NSCH,,, | Performed by: INTERNAL MEDICINE

## 2022-05-11 PROCEDURE — 47100 PR WEDGE BIOPSY OF LIVER: ICD-10-PCS | Mod: 80,58,51, | Performed by: SURGERY

## 2022-05-11 PROCEDURE — 99233 SBSQ HOSP IP/OBS HIGH 50: CPT | Mod: ,,, | Performed by: INTERNAL MEDICINE

## 2022-05-11 PROCEDURE — 27201423 OPTIME MED/SURG SUP & DEVICES STERILE SUPPLY: Performed by: SURGERY

## 2022-05-11 PROCEDURE — 27000221 HC OXYGEN, UP TO 24 HOURS

## 2022-05-11 PROCEDURE — 36000709 HC OR TIME LEV III EA ADD 15 MIN: Performed by: SURGERY

## 2022-05-11 PROCEDURE — 99233 PR SUBSEQUENT HOSPITAL CARE,LEVL III: ICD-10-PCS | Mod: ,,, | Performed by: INTERNAL MEDICINE

## 2022-05-11 PROCEDURE — C9290 INJ, BUPIVACAINE LIPOSOME: HCPCS | Performed by: SURGERY

## 2022-05-11 PROCEDURE — 47100 PR WEDGE BIOPSY OF LIVER: ICD-10-PCS | Mod: 58,51,, | Performed by: SURGERY

## 2022-05-11 PROCEDURE — 20000000 HC ICU ROOM

## 2022-05-11 PROCEDURE — 71000033 HC RECOVERY, INTIAL HOUR: Performed by: SURGERY

## 2022-05-11 RX ORDER — PHENYLEPHRINE HYDROCHLORIDE 10 MG/ML
INJECTION INTRAVENOUS
Status: DISCONTINUED | OUTPATIENT
Start: 2022-05-11 | End: 2022-05-11

## 2022-05-11 RX ORDER — ROCURONIUM BROMIDE 10 MG/ML
INJECTION, SOLUTION INTRAVENOUS
Status: DISCONTINUED | OUTPATIENT
Start: 2022-05-11 | End: 2022-05-11

## 2022-05-11 RX ORDER — PROPOFOL 10 MG/ML
VIAL (ML) INTRAVENOUS
Status: DISCONTINUED | OUTPATIENT
Start: 2022-05-11 | End: 2022-05-11

## 2022-05-11 RX ORDER — CALCIUM CHLORIDE INJECTION 100 MG/ML
INJECTION, SOLUTION INTRAVENOUS
Status: DISCONTINUED | OUTPATIENT
Start: 2022-05-11 | End: 2022-05-11

## 2022-05-11 RX ORDER — VASOPRESSIN 20 [USP'U]/ML
INJECTION, SOLUTION INTRAMUSCULAR; SUBCUTANEOUS
Status: DISCONTINUED | OUTPATIENT
Start: 2022-05-11 | End: 2022-05-11

## 2022-05-11 RX ORDER — KETAMINE HYDROCHLORIDE 10 MG/ML
INJECTION, SOLUTION INTRAMUSCULAR; INTRAVENOUS
Status: DISCONTINUED | OUTPATIENT
Start: 2022-05-11 | End: 2022-05-11

## 2022-05-11 RX ORDER — BUPIVACAINE HYDROCHLORIDE 2.5 MG/ML
INJECTION, SOLUTION EPIDURAL; INFILTRATION; INTRACAUDAL
Status: DISCONTINUED | OUTPATIENT
Start: 2022-05-11 | End: 2022-05-11 | Stop reason: HOSPADM

## 2022-05-11 RX ORDER — ACETAMINOPHEN 10 MG/ML
INJECTION, SOLUTION INTRAVENOUS
Status: DISCONTINUED | OUTPATIENT
Start: 2022-05-11 | End: 2022-05-11

## 2022-05-11 RX ADMIN — VASOPRESSIN 1 UNITS: 20 INJECTION INTRAVENOUS at 05:05

## 2022-05-11 RX ADMIN — KETAMINE HYDROCHLORIDE 30 MG: 10 INJECTION INTRAMUSCULAR; INTRAVENOUS at 04:05

## 2022-05-11 RX ADMIN — AMIODARONE HYDROCHLORIDE 0.5 MG/MIN: 1.8 INJECTION, SOLUTION INTRAVENOUS at 02:05

## 2022-05-11 RX ADMIN — ROCURONIUM BROMIDE 10 MG: 10 INJECTION, SOLUTION INTRAVENOUS at 04:05

## 2022-05-11 RX ADMIN — PHENYLEPHRINE HYDROCHLORIDE 100 MCG: 10 INJECTION INTRAVENOUS at 04:05

## 2022-05-11 RX ADMIN — ERTAPENEM 500 MG: 1 INJECTION INTRAMUSCULAR; INTRAVENOUS at 09:05

## 2022-05-11 RX ADMIN — Medication 250 MCG/HR: at 05:05

## 2022-05-11 RX ADMIN — HEPARIN SODIUM 5000 UNITS: 5000 INJECTION INTRAVENOUS; SUBCUTANEOUS at 01:05

## 2022-05-11 RX ADMIN — SODIUM CHLORIDE, SODIUM LACTATE, POTASSIUM CHLORIDE, AND CALCIUM CHLORIDE: .6; .31; .03; .02 INJECTION, SOLUTION INTRAVENOUS at 03:05

## 2022-05-11 RX ADMIN — CALCIUM CHLORIDE 500 MG: 100 INJECTION INTRAVENOUS; INTRAVENTRICULAR at 04:05

## 2022-05-11 RX ADMIN — MINERAL OIL, PETROLATUM: 425; 573 OINTMENT OPHTHALMIC at 09:05

## 2022-05-11 RX ADMIN — FAMOTIDINE 20 MG: 10 INJECTION INTRAVENOUS at 08:05

## 2022-05-11 RX ADMIN — HEPARIN SODIUM 5000 UNITS: 5000 INJECTION INTRAVENOUS; SUBCUTANEOUS at 05:05

## 2022-05-11 RX ADMIN — SODIUM CHLORIDE: 234 INJECTION, SOLUTION INTRAVENOUS at 06:05

## 2022-05-11 RX ADMIN — PROPOFOL 50 MG: 10 INJECTION, EMULSION INTRAVENOUS at 03:05

## 2022-05-11 RX ADMIN — LINEZOLID 600 MG: 600 INJECTION, SOLUTION INTRAVENOUS at 08:05

## 2022-05-11 RX ADMIN — MICAFUNGIN SODIUM 100 MG: 100 INJECTION, POWDER, LYOPHILIZED, FOR SOLUTION INTRAVENOUS at 02:05

## 2022-05-11 RX ADMIN — CHLORHEXIDINE GLUCONATE 0.12% ORAL RINSE 15 ML: 1.2 LIQUID ORAL at 08:05

## 2022-05-11 RX ADMIN — SMOFLIPID 250 ML: 6; 6; 5; 3 INJECTION, EMULSION INTRAVENOUS at 06:05

## 2022-05-11 RX ADMIN — ROCURONIUM BROMIDE 50 MG: 10 INJECTION, SOLUTION INTRAVENOUS at 03:05

## 2022-05-11 RX ADMIN — Medication 250 MCG/HR: at 02:05

## 2022-05-11 RX ADMIN — CHLORHEXIDINE GLUCONATE 0.12% ORAL RINSE 15 ML: 1.2 LIQUID ORAL at 09:05

## 2022-05-11 NOTE — PROGRESS NOTES
Hibiclens bath given, linen changed, pre-op covid swab done. Pt transported to OR with anesthesia and surgery RN at this time. Daughter Claudia at bedside

## 2022-05-11 NOTE — BRIEF OP NOTE
[facility] - Surgery (Hospital)  Brief Operative Note    SUMMARY     Surgery Date: 5/11/2022     Surgeon(s) and Role:     * Isamar Parker DO - Primary     * Selwyn Sanchez MD - Assisting        Pre-op Diagnosis:  Small bowel perforation [K63.1]  Septic shock [A41.9, R65.21]  Mass of colon [K63.89]  Liver mass    Post-op Diagnosis:  Post-Op Diagnosis Codes:     * Small bowel perforation [K63.1]     * Septic shock [A41.9, R65.21]     * Mass of colon [K63.89]  Liver mass    Procedure(s) (LRB):  Reopening of recent laparotomy  Abdominal washout  HEMICOLECTOMY, RIGHT (N/A)  BIOPSY, LIVER (Right)  Transversus abdominis plane (TAP) block  Abdominal wall closure  CREATION, end ILEOSTOMY (N/A)      Anesthesia: General    Operative Findings: Pieces of food/vegetation still throughout the abdomen. Bowel was edematous but otherwise pink and viable appearing. Large cecal mass with bulky mesenteric lymphadenopathy around ileocolic pedicle. Palpable liver masses.    Estimated Blood Loss: 250 mL    Estimated Blood Loss has been documented.         Specimens:   Specimen (24h ago, onward)             Start     Ordered    05/11/22 1714  Specimen to Pathology, Surgery General Surgery  Once        Comments: Pre-op Diagnosis: Small bowel perforation [K63.1]Septic shock [A41.9, R65.21]Mass of colon [K63.89]Procedure(s):CREATION, ILEOSTOMYHEMICOLECTOMY, RIGHT Number of specimens: 2Name of specimens: 1. Right colon terminal ileum (perm).   2. Liver biopsy right lobe - suspected metastatic disease (perm).     References:    Click here for ordering Quick Tip   Question Answer Comment   Procedure Type: General Surgery    Specimen Class: Routine/Screening    Which provider would you like to cc? ISAMAR PARKER    Release to patient Immediate        05/11/22 1714    05/11/22 1645  Specimen to Pathology, Surgery General Surgery  Once        Comments: Pre-op Diagnosis: Small bowel perforation [K63.1]Septic shock [A41.9, R65.21]Mass of  colon [K63.89]Procedure(s):CREATION, ILEOSTOMYHEMICOLECTOMY, RIGHT Number of specimens: 3Name of specimens: 1. Right colon terminal ileum (perm)  2. Liver biopsy right lobe - suspected metastatic disease (perm)  3.     References:    Click here for ordering Quick Tip   Question Answer Comment   Procedure Type: General Surgery    Specimen Class: Routine/Screening    Which provider would you like to cc? ISAMAR GODFREY    Release to patient Immediate        05/11/22 1711                [id number]

## 2022-05-11 NOTE — PLAN OF CARE
Intubated and sedated, on levophed, amiodarone and Fentanyl gtt, TPN   @ 30cc/hr. Tmax 38.1. Restrained to BUE. Doherty to gravity with 1.9L urine output this shift. In AFIB with rate in 100's, map goal>75. Lasix given x1 dose. Turned q2hr. Family updated by General Surgery on plan for OR on 5/11/22 and has consented to procedure, this bedside nurse was at bedside to answer any questions family had.     Problem: Adult Inpatient Plan of Care  Goal: Plan of Care Review  Outcome: Ongoing, Progressing  Goal: Patient-Specific Goal (Individualized)  Outcome: Ongoing, Progressing  Goal: Absence of Hospital-Acquired Illness or Injury  Outcome: Ongoing, Progressing  Goal: Optimal Comfort and Wellbeing  Outcome: Ongoing, Progressing  Goal: Readiness for Transition of Care  Outcome: Ongoing, Progressing     Problem: Infection  Goal: Absence of Infection Signs and Symptoms  Outcome: Ongoing, Progressing     Problem: Adjustment to Illness (Sepsis/Septic Shock)  Goal: Optimal Coping  Outcome: Ongoing, Progressing     Problem: Bleeding (Sepsis/Septic Shock)  Goal: Absence of Bleeding  Outcome: Ongoing, Progressing     Problem: Glycemic Control Impaired (Sepsis/Septic Shock)  Goal: Blood Glucose Level Within Desired Range  Outcome: Ongoing, Progressing     Problem: Infection Progression (Sepsis/Septic Shock)  Goal: Absence of Infection Signs and Symptoms  Outcome: Ongoing, Progressing     Problem: Nutrition Impaired (Sepsis/Septic Shock)  Goal: Optimal Nutrition Intake  Outcome: Ongoing, Progressing     Problem: Fall Injury Risk  Goal: Absence of Fall and Fall-Related Injury  Outcome: Ongoing, Progressing     Problem: Restraint, Nonbehavioral (Nonviolent)  Goal: Absence of Harm or Injury  Outcome: Ongoing, Progressing     Problem: Communication Impairment (Mechanical Ventilation, Invasive)  Goal: Effective Communication  Outcome: Ongoing, Progressing     Problem: Device-Related Complication Risk (Mechanical Ventilation,  Invasive)  Goal: Optimal Device Function  Outcome: Ongoing, Progressing     Problem: Inability to Wean (Mechanical Ventilation, Invasive)  Goal: Mechanical Ventilation Liberation  Outcome: Ongoing, Progressing     Problem: Nutrition Impairment (Mechanical Ventilation, Invasive)  Goal: Optimal Nutrition Delivery  Outcome: Ongoing, Progressing     Problem: Skin and Tissue Injury (Mechanical Ventilation, Invasive)  Goal: Absence of Device-Related Skin and Tissue Injury  Outcome: Ongoing, Progressing     Problem: Ventilator-Induced Lung Injury (Mechanical Ventilation, Invasive)  Goal: Absence of Ventilator-Induced Lung Injury  Outcome: Ongoing, Progressing     Problem: Communication Impairment (Artificial Airway)  Goal: Effective Communication  Outcome: Ongoing, Progressing     Problem: Device-Related Complication Risk (Artificial Airway)  Goal: Optimal Device Function  Outcome: Ongoing, Progressing     Problem: Skin and Tissue Injury (Artificial Airway)  Goal: Absence of Device-Related Skin or Tissue Injury  Outcome: Ongoing, Progressing     Problem: Noninvasive Ventilation Acute  Goal: Effective Unassisted Ventilation and Oxygenation  Outcome: Ongoing, Progressing     Problem: Fluid Imbalance (Pneumonia)  Goal: Fluid Balance  Outcome: Ongoing, Progressing     Problem: Infection (Pneumonia)  Goal: Resolution of Infection Signs and Symptoms  Outcome: Ongoing, Progressing     Problem: Respiratory Compromise (Pneumonia)  Goal: Effective Oxygenation and Ventilation  Outcome: Ongoing, Progressing     Problem: Skin Injury Risk Increased  Goal: Skin Health and Integrity  Outcome: Ongoing, Progressing     Problem: Fluid and Electrolyte Imbalance (Acute Kidney Injury/Impairment)  Goal: Fluid and Electrolyte Balance  Outcome: Ongoing, Progressing     Problem: Oral Intake Inadequate (Acute Kidney Injury/Impairment)  Goal: Optimal Nutrition Intake  Outcome: Ongoing, Progressing     Problem: Renal Function Impairment (Acute Kidney  Injury/Impairment)  Goal: Effective Renal Function  Outcome: Ongoing, Progressing

## 2022-05-11 NOTE — PROGRESS NOTES
[facility] - [facility]ensive [facility] ([facility])  Nephrology  Progress Note    Patient Name: Franky Masters  MRN: [id number]  Admission Date: 5/4/2022  Hospital Length of Stay: 7 days  Attending Provider: Elvie Parker DO   Primary Care Physician: HOLLY ROSEN  Principal Problem:Septic shock   Reason for Consult: Acute Kidney Injury       Subjective:     HPI:   History obtained per Chart Review  66 yo male with no routine medical care by patient's choice admitted 5 days prior for perforated bowel, found to have cecal mass intraoperatively with liver nodules, he is post op Day #5. He underwent lavage 5/7. He continues to be in septic shock on 2 vasopressors, intubated and sedated. His post operative course was complicated by afib as well.   Admission creatinine was 0.9mg/dL and initial JOSE was oliguric, but has increased UOP in the past 24 hours, albeit s/p Lasix IVP. Hid creatinine of 4.6-4.7mg/dl has remained stable since Saturday 5/7.     5/10  - UOP maintaining, 1.7L previous 24hr     5/11  - Responded to lasix with 3.5L UOP, 1.6 Liters negative previous 24 hours   - for OR today, based on last evening note      Review of patient's allergies indicates:  No Known Allergies  Current Facility-Administered Medications   Medication Frequency    0.9%  NaCl infusion (for blood administration) Q24H PRN    0.9%  NaCl infusion PRN    acetaminophen suppository 650 mg Q6H PRN    amiodarone 360 mg/200 mL (1.8 mg/mL) infusion Continuous    chlorhexidine 0.12 % solution 15 mL BID    dextrose 10 % infusion Continuous PRN    dextrose 10% bolus 125 mL PRN    ertapenem (INVANZ) 500 mg in sodium chloride 0.9% 100 mL IVPB Q24H    famotidine (PF) injection 20 mg Daily    fentaNYL 2500 mcg in 0.9% sodium chloride 250 mL infusion premix (titrating) Continuous    glucagon (human recombinant) injection 1 mg PRN    heparin (porcine) injection 5,000 Units Q8H    HYDROmorphone (PF) injection 0.2 mg Q5 Min PRN    insulin aspart U-100  pen 1-10 Units Q4H PRN    lorazepam injection 2 mg Q4H PRN    micafungin 100 mg in sodium chloride 0.9 % 100 mL IVPB (ready to mix system) Q24H    NORepinephrine 32 mg in dextrose 5 % 250 mL infusion Continuous    ondansetron injection 4 mg Daily PRN    ondansetron injection 4 mg Q8H PRN    TPN ADULT CENTRAL LINE CUSTOM Continuous    white petrolatum-mineral oil 57.3-42.5% ophthalmic ointment QHS         Review of Systems   Unable to perform ROS: Intubated   Constitutional: Positive for fever.   Genitourinary: Negative for decreased urine volume.   Skin: Positive for wound (incisional).   Allergic/Immunologic: Positive for immunocompromised state.     Objective:     Vital Signs (Most Recent):  Temp: 99.68 °F (37.6 °C) (05/11/22 0945)  Pulse: 103 (05/11/22 0945)  Resp: (!) 27 (05/11/22 0945)  BP: 120/76 (05/11/22 0900)  SpO2: 100 % (05/11/22 0945)  O2 Device (Oxygen Therapy): ventilator (05/11/22 0945) Vital Signs (24h Range):  Temp:  [98.42 °F (36.9 °C)-100.58 °F (38.1 °C)] 99.68 °F (37.6 °C)  Pulse:  [] 103  Resp:  [21-32] 27  SpO2:  [99 %-100 %] 100 %  BP: ()/(57-86) 120/76  Arterial Line BP: ()/(53-74) 146/68         Physical Exam  Vitals and nursing note reviewed.   Constitutional:       General: He is not in acute distress.     Appearance: He is obese. He is ill-appearing.      Interventions: He is sedated and intubated.   HENT:      Head: Atraumatic.   Cardiovascular:      Rate and Rhythm: Normal rate.      Heart sounds:     No friction rub.      Comments: Normal to slight acacia   Pulmonary:      Effort: Respiratory distress (intubated, coughing noted) present. He is intubated.   Abdominal:      General: There is distension.   Genitourinary:     Comments: Doherty with yellow urine draining  Musculoskeletal:         General: Swelling present.      Right lower leg: Edema present.      Left lower leg: Edema present.   Skin:     Findings: Bruising present.         Significant Labs:  reviewed    Significant Imaging:      Assessment/Plan:     Active Diagnoses:    Diagnosis Date Noted POA    PRINCIPAL PROBLEM:  Septic shock sec to Peritonitis from SB perforation [A41.9, R65.21] 05/04/2022 Yes    Atrial fibrillation with RVR [I48.91] 05/06/2022 No    On mechanically assisted ventilation [Z99.11] 05/05/2022 Not Applicable    JOSE (acute kidney injury) [N17.9] 05/05/2022 Yes    Small bowel perforation with large Cecal mass  [K63.1] 05/04/2022 Yes    Hyperglycemia, unspecified [R73.9] 05/04/2022 Yes    Mass of colon [K63.89] 05/04/2022 Yes    Acute on chronic anemia [D64.9] 08/05/2021 Yes    Acute hypoxemic respiratory failure on mechanical vent [J96.01] 08/05/2021 Yes    Obesity (BMI 30.0-34.9) [E66.9] 08/05/2021 Yes     Chronic      Problems Resolved During this Admission:    Diagnosis Date Noted Date Resolved POA    Pneumonia of left lower lobe due to infectious organism [J18.9] 05/04/2022 05/10/2022 Yes       JOSE with baseline creatinine 0.9mg/dL in setting of septic shock  - JOSE due to ATN and hemodynamic changes  - initially oliguric but now maintaining UOP and responding to mid range Lasix dose   - kidney function stable  - follow kidney function closely post-op   - no acute indications for RRT but in speaking with daughter, Claudia campa POA, in multiple disucssions, she feels her father would not desire dialysis    Septic Shock from perforated viscus/Peritonitis   - to OR today for further source control, biopsies, ileostomy, and incisional close   - low grade fever, increased WBC     Perforated Bowel  - surgery notes reviewed  - cecal mass strongly suspicious for cancer with likely liver mets  - per daughter father would not desire further treatment of cancer     Respiratory Failure, intubated  Hemodynamic Failure--remains on vasopressors   Shock Liver--enzymes improving   Afib with RVR, new onset  Severe hypoalbuminemia   Anemia acute on chronic and Multifactorial    Thrombocytopenia-stable  Hyponatremia   Respiratory Alkalosis and underlying anion gap acidosis       Avery Persaud MD  Nephrology  [facility] ([facility])

## 2022-05-11 NOTE — TRANSFER OF CARE
"Anesthesia Transfer of Care Note    Patient: Franky Masters    Procedure(s) Performed: Procedure(s) (LRB):  CREATION, ILEOSTOMY (N/A)  HEMICOLECTOMY, RIGHT (N/A)  BIOPSY, LIVER (Right)    Patient location: ICU    Anesthesia Type: general    Transport from OR: Transported from OR intubated on 100% O2 by AMBU with adequate controlled ventilation. Upon arrival to PACU/ICU, patient attached to ventilator and auscultated to confirm bilateral breath sounds and adequate TV. Continuous ECG monitoring in transport. Continuous SpO2 monitoring in transport. Continuos invasive BP monitoring in transport    Post pain: adequate analgesia    Post assessment: no apparent anesthetic complications    Post vital signs: stable    Level of consciousness: sedated    Nausea/Vomiting: no nausea/vomiting    Complications: none    Transfer of care protocol was followed      Last vitals:   Visit Vitals  /70   Pulse 86   Temp 37.4 °C (99.32 °F)   Resp (!) 25   Ht 5' 9" (1.753 m)   Wt 116.5 kg (256 lb 13.4 oz)   SpO2 100%   BMI 37.93 kg/m²     "

## 2022-05-11 NOTE — ASSESSMENT & PLAN NOTE
Hgb 10.3 s/p Transfusion 4 units Prbc  Transfuse for Hgb <7  Monitor    5/5  Improved  Hgb 11.4 today  Transfuse as indicated    5/10- H/H 8.6/28- likely dilutional from ATN- improving

## 2022-05-11 NOTE — ANESTHESIA PREPROCEDURE EVALUATION
05/11/2022  Franky Masters is a 67 y.o., male.    Patient Active Problem List   Diagnosis    Acute hypoxemic respiratory failure on mechanical vent    Colitis    Pneumonia due to COVID-19 virus    Acute on chronic anemia    Obesity (BMI 30.0-34.9)    Elevated d-dimer    Hypoalbuminemia    Elevated liver enzymes    Elevated troponin    Small bowel perforation with large Cecal mass     Septic shock sec to Peritonitis from SB perforation    Hyperglycemia, unspecified    Mass of colon    On mechanically assisted ventilation    JOSE (acute kidney injury)    Atrial fibrillation with RVR     Past Surgical History:   Procedure Laterality Date    ABDOMINAL WASHOUT  5/7/2022    Procedure: LAVAGE, PERITONEAL, THERAPEUTIC;  Surgeon: Elvie Parker DO;  Location: [facility] OR;  Service: General;;    ABDOMINAL WASHOUT  5/7/2022    Procedure: ;  Surgeon: Elvie Parker DO;  Location: [facility] OR;  Service: General;;    APPLICATION OF WOUND VACUUM-ASSISTED CLOSURE DEVICE N/A 5/4/2022    Procedure: APPLICATION, WOUND VAC;  Surgeon: Elvie Parker DO;  Location: [facility] OR;  Service: General;  Laterality: N/A;    LAPAROTOMY N/A 5/7/2022    Procedure: LAPAROTOMY;  Surgeon: Elvie Parker DO;  Location: [facility] OR;  Service: General;  Laterality: N/A;       Pre-op Assessment    I have reviewed the Patient Summary Reports.     I have reviewed the Nursing Notes. I have reviewed the NPO Status.   I have reviewed the Medications.     Review of Systems  Anesthesia Hx:  No problems with previous Anesthesia Previous RSI with Grade 1 view with Muro 2. History of prior surgery of interest to airway management or planning: Previous anesthesia: General   Social:  Non-Smoker    Hematology/Oncology:         -- Anemia:   Cardiovascular:  Cardiovascular Normal  Acute Afib with RVR and further hypotension, treated with  "amio and tay.   Pulmonary:  Pulmonary Normal Acute hypoxic resp failure, intubated,sedated full vent support.   Renal/:   Chronic Renal Disease, ARF    Hepatic/GI:  Hepatic/GI Normal Small bowel perf due to large cecal mass with prob liver mets.  S/P resection ,   Wound vac.  Septic shock, on levo and bicarb.   Neurological:  Neurology Normal    Endocrine:  Endocrine Normal  Obesity / BMI > 30      Physical Exam  General: Unconscious    Airway:  Mallampati: II   Mouth Opening: Normal  TM Distance: Normal  Tongue: Normal  Neck ROM: Normal ROM  Pre-Existing Airway: Oral Endotracheal tube    Dental:  Dentures    Chest/Lungs:  Rhonchi    Heart:  Rhythm: Irregularly Irregular    Abdomen:Wound vac      Anesthesia Plan  Type of Anesthesia, risks & benefits discussed:    Anesthesia Type: Gen ETT  Intra-op Monitoring Plan: Standard ASA Monitors  Post Op Pain Control Plan: multimodal analgesia  Induction:  IV  Informed Consent: Informed consent signed with the Patient representative and all parties understand the risks and agree with anesthesia plan.  All questions answered.   ASA Score: 4 Emergent  Day of Surgery Review of History & Physical: H&P Update referred to the surgeon/provider.  Anesthesia Plan Notes: Spoke with pt's daughter at length about treatment in OR.  No CPR or "shocks".      Ready For Surgery From Anesthesia Perspective.     .        Chemistry        Component Value Date/Time     (L) 05/11/2022 0416    K 4.3 05/11/2022 0416    CL 91 (L) 05/11/2022 0416    CO2 20 (L) 05/11/2022 0416    BUN 95 (H) 05/11/2022 0416    CREATININE 4.7 (H) 05/11/2022 0416    GLU 87 05/11/2022 0416        Component Value Date/Time    CALCIUM 7.0 (L) 05/11/2022 0416    ALKPHOS 138 (H) 05/11/2022 0416     (H) 05/11/2022 0416     (H) 05/11/2022 0416    BILITOT 0.8 05/11/2022 0416    ESTGFRAFRICA 14 (A) 05/11/2022 0416    EGFRNONAA 12 (A) 05/11/2022 0416        Lab Results   Component Value Date    WBC 17.10 (H) " 05/11/2022    HGB 8.6 (L) 05/11/2022    HCT 28.4 (L) 05/11/2022    MCV 72 (L) 05/11/2022     (L) 05/11/2022     Sinus rhythm with Premature atrial complexes with Aberrant conduction   Left axis deviation   Low voltage QRS   Cannot rule out Anteroseptal infarct ,age undetermined   T wave abnormality, consider lateral ischemia   Abnormal ECG   No previous ECGs available   Confirmed by DAJUAN KHANNA, ALEJANDRO WRIGHT ([id number]) on 8/7/2021 4:46:13 AM

## 2022-05-11 NOTE — PROGRESS NOTES
[facility] - Intensive Care ([facility])  General Surgery  Progress Note    Subjective:     History of Present Illness:  No notes on file    Post-Op Info:  Procedure(s) (LRB):  LAPAROTOMY (N/A)  LAVAGE, PERITONEAL, THERAPEUTIC   4 Days Post-Op     Interval History: Intubated. Son and daughter at bedside.    Medications:  Continuous Infusions:   amiodarone in dextrose 5% 0.5 mg/min (05/11/22 0600)    dextrose 10 % in water (D10W)      fentanyl 250 mcg/hr (05/11/22 0600)    NORepinephrine bitartrate-D5W 0.12 mcg/kg/min (05/11/22 0611)    TPN ADULT CENTRAL LINE CUSTOM 30 mL/hr at 05/11/22 0600     Scheduled Meds:   chlorhexidine  15 mL Mouth/Throat BID    ertapenem (INVANZ) IVPB  500 mg Intravenous Q24H    famotidine (PF)  20 mg Intravenous Daily    heparin (porcine)  5,000 Units Subcutaneous Q8H    linezolid  600 mg Intravenous Q12H    micafungin (MYCAMINE) IVPB  100 mg Intravenous Q24H    white petrolatum-mineral oil 57.3-42.5%   Both Eyes QHS     PRN Meds:sodium chloride, sodium chloride 0.9%, acetaminophen, dextrose 10 % in water (D10W), dextrose 10%, glucagon (human recombinant), HYDROmorphone, insulin aspart U-100, lorazepam, ondansetron, ondansetron     Review of patient's allergies indicates:  No Known Allergies  Objective:     Vital Signs (Most Recent):  Temp: (!) 100.4 °F (38 °C) (05/11/22 0600)  Pulse: (!) 123 (05/11/22 0600)  Resp: (!) 32 (05/11/22 0600)  BP: 98/66 (05/11/22 0500)  SpO2: 99 % (05/11/22 0600)   Vital Signs (24h Range):  Temp:  [98.42 °F (36.9 °C)-100.58 °F (38.1 °C)] 100.4 °F (38 °C)  Pulse:  [] 123  Resp:  [21-32] 32  SpO2:  [99 %-100 %] 99 %  BP: ()/(57-86) 98/66  Arterial Line BP: ()/(53-74) 146/68     Weight: 116.5 kg (256 lb 13.4 oz)  Body mass index is 37.93 kg/m².    Intake/Output - Last 3 Shifts         05/09 0700  05/10 0659 05/10 0700 05/11 0659 05/11 0700  05/12 0659    I.V. (mL/kg) 1702.9 (14.6) 1384.3 (11.9)     IV Piggyback 784.7 759.8     TPN  431.9      Total Intake(mL/kg) 2487.6 (21.4) 2576 (22.1)     Urine (mL/kg/hr) 1730 (0.6) 3467 (1.2)     Drains 250 250     Other 450 465     Total Output 2430 4182     Net +57.6 -1606                    Physical Exam  Vitals and nursing note reviewed.   Constitutional:       Appearance: He is obese. He is ill-appearing and toxic-appearing.   Cardiovascular:      Rate and Rhythm: Regular rhythm.   Pulmonary:      Comments: Mechanical breath sounds  Abdominal:      Palpations: Abdomen is soft.      Comments: Abthera vac in place holding suction, serous output   Genitourinary:     Comments: Doherty in place  Musculoskeletal:      Right lower leg: Edema present.      Left lower leg: Edema present.   Neurological:      Comments: Sedated       Significant Labs:  I have reviewed all pertinent lab results within the past 24 hours.  CBC:   Recent Labs   Lab 05/11/22 0416   WBC 17.10*   RBC 3.92*   HGB 8.6*   HCT 28.4*   *   MCV 72*   MCH 21.9*   MCHC 30.3*     CMP:   Recent Labs   Lab 05/11/22 0416   GLU 87   CALCIUM 7.0*   ALBUMIN 1.2*   PROT 4.4*   *   K 4.3   CO2 20*   CL 91*   BUN 95*   CREATININE 4.7*   ALKPHOS 138*   *   *   BILITOT 0.8     ABGs:   Recent Labs   Lab 05/11/22  0332   PH 7.494*   PCO2 35.0   PO2 153*   HCO3 26.9   POCSATURATED 100   BE 4       Significant Diagnostics:  I have reviewed all pertinent imaging results/findings within the past 24 hours.    Assessment/Plan:     Atrial fibrillation with RVR  Management per medicine/critical care    JOSE (acute kidney injury)  Management per medicine/critical care    On mechanically assisted ventilation  Management per medicine/critical care    Hyperglycemia, unspecified  Management per medicine/critical care    Small bowel perforation with large Cecal mass   S/p exploratory laparotomy, abdominal washout, segmental small bowel resection (left in discontinuity), placement of Abthera vac 5/4/22  S/p reopening of recent laparotomy, abdominal  washout, replacement of Abthera vac 5/7/22    - Continue NG tube to LIS. NO meds or feeds (patient is in bowel discontinuity)  - Continue ICU management. Currently still requiring vasopressor support.  - Strict I/Os  - TPN  - Medical and ICU management per hospital/ICU team    Plan to return to the OR tomorrow for right hemicolectomy, liver biopsy, end ileostomy, and abdominal wall closure.    Acute on chronic anemia  Management per medicine/critical care    Acute hypoxemic respiratory failure on mechanical vent  Management per medicine/critical care        Elvie Parker, DO  General Surgery  [facility] - Intensive [facility] ([facility])

## 2022-05-11 NOTE — PROGRESS NOTES
Pt arrived back to ICU from OR with levophed and amio infusing, fentanyl gt off on arrival and immediately restarted per Saba Crouch NP at bedside. Abd closed in OR, abd dsg CDI, new RLQ ileostomy present, bilateral ADDIS drains in place with serosang output.  Left nare NGT in place, connected to LIWS per order.

## 2022-05-11 NOTE — SUBJECTIVE & OBJECTIVE
Review of Systems   Unable to perform ROS: Intubated     Objective:     Vital Signs (Most Recent):  Temp: 99.32 °F (37.4 °C) (22 153)  Pulse: 86 (22 153)  Resp: (!) 25 (22 153)  BP: 101/70 (22 1500)  SpO2: 97 % (22 1836)   Vital Signs (24h Range):  Temp:  [98.42 °F (36.9 °C)-100.58 °F (38.1 °C)] 100.22 °F (37.9 °C)  Pulse:  [] 106  Resp:  [21-32] 28  SpO2:  [99 %-100 %] 100 %  BP: ()/(57-86) 121/68  Arterial Line BP: ()/(53-74) 146/68     Weight: 116.5 kg (256 lb 13.4 oz)  Body mass index is 37.93 kg/m².      Intake/Output Summary (Last 24 hours) at 2022 0816  Last data filed at 2022 0800  Gross per 24 hour   Intake 2584.36 ml   Output 4472 ml   Net -1887.64 ml        amiodarone in dextrose 5% 0.5 mg/min (22 1800)    dextrose 10 % in water (D10W)      fentanyl Stopped (22 1542)    NORepinephrine bitartrate-D5W 0.15 mcg/kg/min (22 1800)    TPN ADULT CENTRAL LINE CUSTOM 32 mL/hr at 22 1840       Physical Exam  Vitals and nursing note reviewed.   Constitutional:       General: He is not in acute distress.     Appearance: He is obese. He is ill-appearing.      Interventions: He is sedated, intubated and restrained.   HENT:      Head: Atraumatic.   Eyes:      General: No scleral icterus.     Conjunctiva/sclera: Conjunctivae normal.   Neck:      Vascular: No JVD.   Cardiovascular:      Rate and Rhythm: Normal rate and regular rhythm.      Pulses:           Radial pulses are 1+ on the right side and 1+ on the left side.        Dorsalis pedis pulses are 1+ on the right side and 1+ on the left side.   Pulmonary:      Effort: He is intubated.      Breath sounds: Decreased breath sounds present. No wheezing or rhonchi.   Abdominal:      General: Bowel sounds are absent.       Musculoskeletal:      Right lower le+ Pitting Edema present.      Left lower le+ Pitting Edema present.   Skin:     General: Skin is cool.      Capillary Refill:  Capillary refill takes more than 3 seconds.       Vents:  Vent Mode: A/C (05/11/22 0718)  Ventilator Initiated: Yes (05/04/22 1258)  Set Rate: 25 BPM (05/11/22 0718)  Vt Set: 450 mL (05/11/22 0718)  Pressure Support: 0 cmH20 (05/11/22 0718)  PEEP/CPAP: 5 cmH20 (05/11/22 0718)  Oxygen Concentration (%): 40 (05/11/22 0745)  Peak Airway Pressure: 25 cmH2O (05/11/22 0718)  Plateau Pressure: 18 cmH20 (05/11/22 0718)  Total Ve: 12.4 mL (05/11/22 0718)  F/VT Ratio<105 (RSBI): (!) 43.98 (05/11/22 0718)    Lines/Drains/Airways       Central Venous Catheter Line  Duration             Percutaneous Central Line Insertion/Assessment - Triple Lumen  05/04/22 1200 right internal jugular 6 days              Drain  Duration                  NG/OG Tube 05/04/22 1600 6 days         Urethral Catheter 05/04/22 1105 Straight-tip;Silicone 16 Fr. 6 days              Airway  Duration                  Airway - Non-Surgical 05/04/22 1051 Endotracheal Tube 6 days              Arterial Line  Duration             Arterial Line 05/07/22 1330 Right Radial 3 days                    Significant Labs:    CBC/Anemia Profile:  Recent Labs   Lab 05/10/22  0406 05/11/22  0416   WBC 13.35* 17.10*   HGB 8.6* 8.6*   HCT 28.0* 28.4*   * 108*   MCV 71* 72*   RDW 24.7* 24.7*          Chemistries:  Recent Labs   Lab 05/10/22  0406 05/11/22  0416   * 133*   K 5.0 4.3   CL 90* 91*   CO2 19* 20*   BUN 87* 95*   CREATININE 4.8* 4.7*   CALCIUM 7.1* 7.0*   ALBUMIN 1.3* 1.2*   PROT 4.4* 4.4*   BILITOT 0.9 0.8   ALKPHOS 120 138*   * 350*   * 272*   MG 2.0 1.9   PHOS  --  8.9*         All pertinent labs within the past 24 hours have been reviewed.    Significant Imaging:  I have reviewed all pertinent imaging results/findings within the past 24 hours.

## 2022-05-11 NOTE — SUBJECTIVE & OBJECTIVE
Interval History: Intubated. Son and daughter at bedside.    Medications:  Continuous Infusions:   amiodarone in dextrose 5% 0.5 mg/min (05/11/22 0600)    dextrose 10 % in water (D10W)      fentanyl 250 mcg/hr (05/11/22 0600)    NORepinephrine bitartrate-D5W 0.12 mcg/kg/min (05/11/22 0611)    TPN ADULT CENTRAL LINE CUSTOM 30 mL/hr at 05/11/22 0600     Scheduled Meds:   chlorhexidine  15 mL Mouth/Throat BID    ertapenem (INVANZ) IVPB  500 mg Intravenous Q24H    famotidine (PF)  20 mg Intravenous Daily    heparin (porcine)  5,000 Units Subcutaneous Q8H    linezolid  600 mg Intravenous Q12H    micafungin (MYCAMINE) IVPB  100 mg Intravenous Q24H    white petrolatum-mineral oil 57.3-42.5%   Both Eyes QHS     PRN Meds:sodium chloride, sodium chloride 0.9%, acetaminophen, dextrose 10 % in water (D10W), dextrose 10%, glucagon (human recombinant), HYDROmorphone, insulin aspart U-100, lorazepam, ondansetron, ondansetron     Review of patient's allergies indicates:  No Known Allergies  Objective:     Vital Signs (Most Recent):  Temp: (!) 100.4 °F (38 °C) (05/11/22 0600)  Pulse: (!) 123 (05/11/22 0600)  Resp: (!) 32 (05/11/22 0600)  BP: 98/66 (05/11/22 0500)  SpO2: 99 % (05/11/22 0600)   Vital Signs (24h Range):  Temp:  [98.42 °F (36.9 °C)-100.58 °F (38.1 °C)] 100.4 °F (38 °C)  Pulse:  [] 123  Resp:  [21-32] 32  SpO2:  [99 %-100 %] 99 %  BP: ()/(57-86) 98/66  Arterial Line BP: ()/(53-74) 146/68     Weight: 116.5 kg (256 lb 13.4 oz)  Body mass index is 37.93 kg/m².    Intake/Output - Last 3 Shifts         05/09 0700  05/10 0659 05/10 0700 05/11 0659 05/11 0700 05/12 0659    I.V. (mL/kg) 1702.9 (14.6) 1384.3 (11.9)     IV Piggyback 784.7 759.8     TPN  431.9     Total Intake(mL/kg) 2487.6 (21.4) 2576 (22.1)     Urine (mL/kg/hr) 1730 (0.6) 3467 (1.2)     Drains 250 250     Other 450 465     Total Output 2430 4182     Net +57.6 -1606                    Physical Exam  Vitals and nursing note reviewed.    Constitutional:       Appearance: He is obese. He is ill-appearing and toxic-appearing.   Cardiovascular:      Rate and Rhythm: Regular rhythm.   Pulmonary:      Comments: Mechanical breath sounds  Abdominal:      Palpations: Abdomen is soft.      Comments: Abthera vac in place holding suction, serous output   Genitourinary:     Comments: Doherty in place  Musculoskeletal:      Right lower leg: Edema present.      Left lower leg: Edema present.   Neurological:      Comments: Sedated       Significant Labs:  I have reviewed all pertinent lab results within the past 24 hours.  CBC:   Recent Labs   Lab 05/11/22 0416   WBC 17.10*   RBC 3.92*   HGB 8.6*   HCT 28.4*   *   MCV 72*   MCH 21.9*   MCHC 30.3*     CMP:   Recent Labs   Lab 05/11/22 0416   GLU 87   CALCIUM 7.0*   ALBUMIN 1.2*   PROT 4.4*   *   K 4.3   CO2 20*   CL 91*   BUN 95*   CREATININE 4.7*   ALKPHOS 138*   *   *   BILITOT 0.8     ABGs:   Recent Labs   Lab 05/11/22  0332   PH 7.494*   PCO2 35.0   PO2 153*   HCO3 26.9   POCSATURATED 100   BE 4       Significant Diagnostics:  I have reviewed all pertinent imaging results/findings within the past 24 hours.

## 2022-05-11 NOTE — ASSESSMENT & PLAN NOTE
Pressors  Abx - Zosyn / Micafungin  ID on consult    Remains in severe Septic Shock complicated by JOSE/ATN- Anuria and Resp Failure on Vent  ICU team has d/w daughter about HD if and when needed- she does not appear to be in favor of HD at present  Prognosis poor    Day 4 in Septic Shock and on vent  Continue Levo and Vaso plus IV Abx  Prognosis poor    Day 5- Continues same on Vent, WBC better but remains in renal shut down due to shock plus 2 Pressors    Day 6- Continue present supportive care

## 2022-05-11 NOTE — ASSESSMENT & PLAN NOTE
S/p exploratory laparotomy, abdominal washout, segmental small bowel resection (left in discontinuity), placement of Abthera vac 5/4/22  S/p reopening of recent laparotomy, abdominal washout, replacement of Abthera vac 5/7/22    - Continue NG tube to LIS. NO meds or feeds (patient is in bowel discontinuity)  - Continue ICU management. Currently still requiring vasopressor support.  - Strict I/Os  - TPN  - Medical and ICU management per hospital/ICU team    Plan to return to the OR tomorrow for right hemicolectomy, liver biopsy, end ileostomy, and abdominal wall closure.

## 2022-05-11 NOTE — ASSESSMENT & PLAN NOTE
Wean as tolerated  CC Team  Pulmonary    Cont vent support    Expect further improvement with diuresis

## 2022-05-11 NOTE — ASSESSMENT & PLAN NOTE
Patient stable s/p sx POD#1  Plan for washout, etc per primary service  Wound vac  Goals of care assessment  DNR    S/p SB resection- may go to surgery again tomorrow    5/7- Per Dr. Parker- pt too unstable for right hemicolectomy, end ileostomy, liver biopsy today s/p abdominal washout with Abthera replacement today.  5/8- POD 3 with s/p Laparotomy and bowel resection and subsequent laparoscopic washout- persistent bowel discontinuity and abthera wound vac closure   5/9- persistent bowel discontinuity- await further decision by family    Await further input from surgery

## 2022-05-11 NOTE — PROGRESS NOTES
[facility] - Intensive [facility] ([facility]  Progress Note    Patient Name: Franky Masters  MRN: [id number]  Patient Class: IP- Inpatient   Admission Date: 5/4/2022  Length of Stay: 6 days  Attending Physician: Elvie Parker DO  Primary Care Provider: HOLLY ROSEN        Subjective:     Principal Problem:Septic shock        HPI:  Mr Masters is a 65 yo male POD#0 s/p SB perforation with surgical intervention demonstrating a large cecal mass and 3l feculant fluid in the abdominal cavity. Additional findings of a perforated ileum and a liver mass. Patient tolerated the Exlap with Washout and small bowel excision. Patient had a wound vac placed, is currently intubated, sedated and recovering in the ICU. Patient received 4 units PRBC and remains on pressor support. Patient is a DNR and HM consulted with assistance with medical management. Daughter at bedside. Patient discussed with care team.          Overview/Hospital Course:  Patient continues to require pressors, remains intubated, sedated. Patient urine o/p declining and concerning. Discussed POC in detail at bedside with daughter POA. She expressed her fathers wish to not undergo treatments  like perm HD, where he has a diminished quality of life. We spoke frankly about issues and concerns and she will be communicating with the family as his case evolves. Comfort care was discussed and the goals of care will develop consistent with the patients expressed wishes. Potential for another surgery likely Saturday with a washout and possible biopsy vs resection are on the horizon. This possible intervention will be covered in detail by surgery sharing the risks and benefits of that approach. Case discussed with the primary service - surgery, and critical care team.    5/6- Pt seen and examined in the ICU plus discussed with ICU team and the pt's daughter/ POA: Remains critically ill, intubated, sedated on Vent, on Pressors- Levo plus Vaso- JOSE with oliguria  getting worse- Cr rising to 3.6, becoming severely acidotic- requiring Ertapenem, Zyvox, Micafungin as well as on Bicarb and Fentanyl gtt. He has massive fluid overload and generalized anasarca.  Possible return to OR tomorrow 5/7 if patient is more stable for right hemicolectomy, possible ileostomy, liver biopsy, abdominal wall closure. Dw Pt's daughter.       5/7- remains Intubated, sedated on Vent- Went into Afib w RVR- hence added Amio to Levo and Vasopressin- back in NSR. Getting Invanz and Zyvox plus Micafungin. Dr. Parker took him back to OR for for abdominal washout ( 3-3.5 L feculent fluid with food particles suctioned out in the OR, small bowel appeared better) and replaced Abthera- still has bowel discontinuity. His WBC, Lactate and Cr have all increased. Family updated about his critical condition and poor prognosis and JOSE/ATN- they are considering Comfort measures.     5/8- Day 5 on Vent- family at bedside, remains intubated on Vent with 2 Pressors- still on Amiodarone gtt for Afib, Still has Abthera wound vac to open Abdomen with small bowel discontinuity. Remains oliguric with generalized anasarca- almost 24 L fluid positive. Cr increased to 4.6. WBC down to 12, family does not want any HD/CRRT, so getting an IV Lasix trial to improve the urine output.     5/9- Day 6 on vent- remains the same, remains intubated, on vent with Abthera Wound vac and bowel discontinuity. Put out about 3 L urine since yesterday with IV Lasix, family still does not want any HD, WBC down to 10.2, H/H 7.8/24, still on 2 Pressors and Amio gtt. Family still deciding about comfort care.     5/10- Appreciate all- Day 7- remains same in septic shock on 2 vasopressors, intubated and sedated, still in afib. JOSE has remained stable at 4.7 but urine output improved with IV lasix. Abthera wound vac in place. No surgery today- put out about 2.5 L yesterday, given lasix again today.      Interval History: - Day 7- remains same in  septic shock on 2 vasopressors, intubated and sedated, still in afib. JOSE has remained stable at 4.7 but urine output improved with IV lasix. Abthera wound vac in place. No surgery today- put out about 2.5 L yesterday, given lasix again today.    Review of Systems   Unable to perform ROS: Intubated   Objective:     Vital Signs (Most Recent):  Temp: 99.68 °F (37.6 °C) (05/10/22 1800)  Pulse: 96 (05/10/22 1800)  Resp: (!) 28 (05/10/22 1800)  BP: 120/69 (05/10/22 1700)  SpO2: 100 % (05/10/22 1800)   Vital Signs (24h Range):  Temp:  [98.42 °F (36.9 °C)-100.58 °F (38.1 °C)] 99.68 °F (37.6 °C)  Pulse:  [] 96  Resp:  [23-32] 28  SpO2:  [84 %-100 %] 100 %  BP: ()/(54-81) 120/69  Arterial Line BP: ()/(56-73) 102/58     Weight: 116.5 kg (256 lb 13.4 oz)  Body mass index is 37.93 kg/m².    Intake/Output Summary (Last 24 hours) at 5/10/2022 1858  Last data filed at 5/10/2022 1800  Gross per 24 hour   Intake 2503.39 ml   Output 2530 ml   Net -26.61 ml      Physical Exam  Vitals and nursing note reviewed.   Constitutional:       General: He is not in acute distress.     Appearance: He is obese. He is ill-appearing.      Interventions: He is sedated, intubated and restrained.   HENT:      Head: Atraumatic.   Eyes:      General: No scleral icterus.     Conjunctiva/sclera: Conjunctivae normal.   Neck:      Vascular: No JVD.   Cardiovascular:      Rate and Rhythm: Normal rate and regular rhythm.      Pulses:           Radial pulses are 1+ on the right side and 1+ on the left side.        Dorsalis pedis pulses are 1+ on the right side and 1+ on the left side.   Pulmonary:      Effort: He is intubated.      Breath sounds: Decreased breath sounds present. No wheezing or rhonchi.   Abdominal:      General: Bowel sounds are absent.       Musculoskeletal:      Right lower le+ Pitting Edema present.      Left lower le+ Pitting Edema present.   Skin:     General: Skin is cool.      Capillary Refill: Capillary refill  takes more than 3 seconds.     Flow (L/min): 4  Vent Mode: A/C  Oxygen Concentration (%):  [40] 40  Resp Rate Total:  [25 br/min-34 br/min] 27 br/min  Vt Set:  [450 mL] 450 mL  PEEP/CPAP:  [5 cmH20] 5 cmH20  Pressure Support:  [0 cmH20] 0 cmH20  Mean Airway Pressure:  [9.9 icD95-50 cmH20] 11 cmH20  Temp:  [98.42 °F (36.9 °C)-100.58 °F (38.1 °C)] 99.68 °F (37.6 °C)  Pulse:  [] 96  Resp:  [23-32] 28  SpO2:  [84 %-100 %] 100 %  BP: ()/(54-81) 120/69  Arterial Line BP: ()/(56-73) 102/58   Art pH/pCO2/pO2/HCO3:  7.462/34.9/105/24.9 (05/10 0412)  Lab Results   Component Value Date    [id number] Negative 05/04/2022    [id number] Positive (A) 08/05/2021      Recent Labs   Lab 05/05/22  1150 05/06/22  0410 05/07/22  1345 05/08/22  0421 05/08/22  1603 05/09/22  0452 05/10/22  0406   LACTATE 5.0*  --  6.7*  --   --   --  3.5*   NA  --    < >  --  134* 132* 132* 132*   WBC  --    < >  --  12.29  --  10.60 13.35*   GRAN  --    < >  --  86.4*  10.6*  --  84.7*  9.0* 87.8*  11.7*   LYMPH  --    < >  --  7.3*  0.9*  --  6.8*  0.7* 6.3*  0.8*   HGB  --    < >  --  8.5*  --  7.8* 8.6*   HCT  --    < >  --  28.0*  --  24.9* 28.0*   BUN  --    < >  --  69* 75* 81* 87*   CREATININE  --    < >  --  4.6* 4.7* 4.7* 4.8*   ESTGFRAFRICA  --    < >  --  14* 14* 14* 13*   EGFRNONAA  --    < >  --  12* 12* 12* 12*   K  --    < >  --  5.1 4.9 4.6 5.0   CL  --    < >  --  93* 92* 91* 90*   CO2  --    < >  --  23 18* 22* 19*   MG  --    < >  --  1.8 1.7 2.0 2.0    < > = values in this interval not displayed.     Microbiology Results (last 7 days)       Procedure Component Value Units Date/Time    Blood culture [[id number]] Collected: 05/04/22 1428    Order Status: Completed Specimen: Blood from Line, Arterial, Left Updated: 05/10/22 0612     Blood Culture, Routine No growth after 5 days.    Blood culture [[id number]] Collected: 05/04/22 1429    Order Status: Completed Specimen: Blood from Line, Jugular, Internal Right  Updated: 05/10/22 0612     Blood Culture, Routine No growth after 5 days.    Culture, Respiratory with Gram Stain [[id number]] Collected: 05/04/22 1253    Order Status: Completed Specimen: Respiratory from Endotracheal Aspirate Updated: 05/07/22 0921     Respiratory Culture No growth     Gram Stain (Respiratory) Few WBC's     Gram Stain (Respiratory) Rare Gram positive rods          Antibiotics (From admission, onward)                Start     Stop Route Frequency Ordered    05/09/22 2200  ertapenem (INVANZ) 500 mg in sodium chloride 0.9% 100 mL IVPB         05/14 2159 IV Every 24 hours (non-standard times) 05/09/22 1624    05/05/22 2115  linezolid 600 mg/300 mL IVPB 600 mg         05/11 2114 IV Every 12 hours (non-standard times) 05/05/22 2000          Anticoagulants       Ordered     Route Frequency Start Stop    05/04/22 1255  heparin (porcine)  (VTE Prophylaxis Orders - High Risk)         SubQ Every 8 hours 05/05/22 0600 --          X-Ray Chest 1 View  Narrative: EXAMINATION:  XR CHEST 1 VIEW    CLINICAL HISTORY:  respiratory failure; Pneumonia, unspecified organism    TECHNIQUE:  Single frontal view of the chest was performed.    COMPARISON:  05/05/2022    FINDINGS:  Tubes and lines are stable.   In comparison to the prior study, there is no adverse interval changes.  Impression: In comparison to the prior study, there is no adverse interval changes    Electronically signed by: Philippe Horton MD  Date:    05/09/2022  Time:    12:04   Significant Labs: All pertinent labs within the past 24 hours have been reviewed.    Significant Imaging: I have reviewed all pertinent imaging results/findings within the past 24 hours.      Assessment/Plan:      * Septic shock sec to Peritonitis from SB perforation  Pressors  Abx - Zosyn / Micafungin  ID on consult    Remains in severe Septic Shock complicated by JOSE/ATN- Anuria and Resp Failure on Vent  ICU team has d/w daughter about HD if and when needed- she does not appear to  be in favor of HD at present  Prognosis poor    Day 4 in Septic Shock and on vent  Continue Levo and Vaso plus IV Abx  Prognosis poor    Day 5- Continues same on Vent, WBC better but remains in renal shut down due to shock plus 2 Pressors    Day 6- Continue present supportive care    Acute hypoxemic respiratory failure on mechanical vent  Patient with Hypoxic Respiratory failure which is Acute.  he is not on home oxygen. Supplemental oxygen was provided and noted- Vent Mode: A/C  Oxygen Concentration (%):  [40] 40  Resp Rate Total:  [25 br/min-36 br/min] 27 br/min  Vt Set:  [450 mL] 450 mL  PEEP/CPAP:  [5 cmH20] 5 cmH20  Pressure Support:  [0 cmH20] 0 cmH20  Mean Airway Pressure:  [9.9 jfV82-46 cmH20] 11 cmH20.   Signs/symptoms of respiratory failure include- tachypnea. Contributing diagnoses includes - Obesity Hypoventilation Labs and images were reviewed. Patient Has recent ABG, which has been reviewed. Will treat underlying causes and adjust management of respiratory failure as indicated. 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 Parker- pt too unstable for right hemicolectomy, end ileostomy, liver biopsy today s/p abdominal washout with Abthera replacement today.  5/8- POD 3 with s/p Laparotomy and bowel resection and subsequent laparoscopic washout- persistent bowel discontinuity and abthera wound vac closure   5/9- persistent bowel discontinuity- await further decision by family    Await further input from surgery    Mass of colon  Based on Surgery  Potential  interventions  Goals of care  Biopsy as indicated  Likely Oncologic process with mets  Heme Onc as indicated    See above      On mechanically assisted ventilation  Wean as tolerated  CC Team  Pulmonary    Cont vent support    Expect further improvement with diuresis      JOSE (acute kidney injury)  Worsening  Trend  Cr 2.2 today    Cr now 3.8- Oliguric- heading towards Anuria and ATN/ May need HD vs CRRT- she has massive fluid Overload.     Cr now 4.5-  Remains anuric  POA/ Family not in favor of HD    Remains Oliguric due to Septic Shock on 2 Pressors  Some urine output with lasix but no Polyuria     5/10 Urine Out put improved with diuresis while renal functions remains stable    Atrial fibrillation with RVR  Converted to NSR with Amio gtt          Hyperglycemia, unspecified  NISS  Accuchecks  Monitor  Controlled      Obesity (BMI 30.0-34.9)  Diet  Nutrition on recovery      Acute on chronic anemia  Hgb 10.3 s/p Transfusion 4 units Prbc  Transfuse for Hgb <7  Monitor    5/5  Improved  Hgb 11.4 today  Transfuse as indicated    5/10- H/H 8.6/28- likely dilutional from ATN- improving        VTE Risk Mitigation (From admission, onward)         Ordered     heparin (porcine) injection 5,000 Units  Every 8 hours         05/04/22 1255     IP VTE HIGH RISK PATIENT  Once         05/04/22 1253     Place sequential compression device  Until discontinued         05/04/22 1253                Discharge Planning   ELLIOT:      Code Status: DNR   Is the patient medically ready for discharge?:     Reason for patient still in hospital (select all that apply): Patient unstable, Patient trending condition, Laboratory test, Treatment, Imaging and Consult recommendations  Discharge Plan A: Comfort care/withdrawal      Seen and discussed with Dr. Whelan and the ICU team  Condition: Critical  Prognosis: Guarded to poor        Critical care time spent on the evaluation and treatment of severe organ dysfunction, review of pertinent labs and  imaging studies, discussions with consulting providers and discussions with patient/family: 37 minutes.      Megha Mejia MD  Department of Hospital Medicine   '[facility] - Intensive Care ([facility])

## 2022-05-11 NOTE — ASSESSMENT & PLAN NOTE
Patient with Hypoxic Respiratory failure which is Acute.  he is not on home oxygen. Supplemental oxygen was provided and noted- Vent Mode: A/C  Oxygen Concentration (%):  [40] 40  Resp Rate Total:  [25 br/min-36 br/min] 27 br/min  Vt Set:  [450 mL] 450 mL  PEEP/CPAP:  [5 cmH20] 5 cmH20  Pressure Support:  [0 cmH20] 0 cmH20  Mean Airway Pressure:  [9.9 xeK41-73 cmH20] 11 cmH20.   Signs/symptoms of respiratory failure include- tachypnea. Contributing diagnoses includes - Obesity Hypoventilation Labs and images were reviewed. Patient Has recent ABG, which has been reviewed. Will treat underlying causes and adjust management of respiratory failure as indicated. Pulmonary CC on board  Day 2 on Vent- remains intubated on vent  Cont vent support    Day 4 on Vent    Day 5 on vent- adequate O2, sats    Day 6- continue supportive care, await family's decision about comfort care    Day 7- continue support- trying to diurese

## 2022-05-11 NOTE — ASSESSMENT & PLAN NOTE
Worsening  Trend  Cr 2.2 today    Cr now 3.8- Oliguric- heading towards Anuria and ATN/ May need HD vs CRRT- she has massive fluid Overload.     Cr now 4.5-  Remains anuric  POA/ Family not in favor of HD    Remains Oliguric due to Septic Shock on 2 Pressors  Some urine output with lasix but no Polyuria     5/10 Urine Out put improved with diuresis while renal functions remains stable

## 2022-05-12 LAB
ACANTHOCYTES BLD QL SMEAR: PRESENT
ALBUMIN SERPL BCP-MCNC: 1.1 G/DL (ref 3.5–5.2)
ALBUMIN SERPL BCP-MCNC: 1.1 G/DL (ref 3.5–5.2)
ALLENS TEST: ABNORMAL
ALP SERPL-CCNC: 112 U/L (ref 55–135)
ALP SERPL-CCNC: 113 U/L (ref 55–135)
ALT SERPL W/O P-5'-P-CCNC: 217 U/L (ref 10–44)
ALT SERPL W/O P-5'-P-CCNC: 244 U/L (ref 10–44)
ANION GAP SERPL CALC-SCNC: 22 MMOL/L (ref 8–16)
ANION GAP SERPL CALC-SCNC: 23 MMOL/L (ref 8–16)
ANISOCYTOSIS BLD QL SMEAR: ABNORMAL
APTT BLDCRRT: 34.1 SEC (ref 21–32)
APTT BLDCRRT: 38.7 SEC (ref 21–32)
AST SERPL-CCNC: 124 U/L (ref 10–40)
AST SERPL-CCNC: 161 U/L (ref 10–40)
BASOPHILS # BLD AUTO: 0.05 K/UL (ref 0–0.2)
BASOPHILS NFR BLD: 0.4 % (ref 0–1.9)
BILIRUB SERPL-MCNC: 0.9 MG/DL (ref 0.1–1)
BILIRUB SERPL-MCNC: 0.9 MG/DL (ref 0.1–1)
BUN SERPL-MCNC: 101 MG/DL (ref 8–23)
BUN SERPL-MCNC: 98 MG/DL (ref 8–23)
BURR CELLS BLD QL SMEAR: ABNORMAL
CALCIUM SERPL-MCNC: 6.3 MG/DL (ref 8.7–10.5)
CALCIUM SERPL-MCNC: 6.4 MG/DL (ref 8.7–10.5)
CHLORIDE SERPL-SCNC: 94 MMOL/L (ref 95–110)
CHLORIDE SERPL-SCNC: 94 MMOL/L (ref 95–110)
CO2 SERPL-SCNC: 19 MMOL/L (ref 23–29)
CO2 SERPL-SCNC: 21 MMOL/L (ref 23–29)
CREAT SERPL-MCNC: 4.3 MG/DL (ref 0.5–1.4)
CREAT SERPL-MCNC: 4.4 MG/DL (ref 0.5–1.4)
DACRYOCYTES BLD QL SMEAR: ABNORMAL
DELSYS: ABNORMAL
DIFFERENTIAL METHOD: ABNORMAL
EOSINOPHIL # BLD AUTO: 0.1 K/UL (ref 0–0.5)
EOSINOPHIL NFR BLD: 0.4 % (ref 0–8)
ERYTHROCYTE [DISTWIDTH] IN BLOOD BY AUTOMATED COUNT: 25.7 % (ref 11.5–14.5)
ERYTHROCYTE [SEDIMENTATION RATE] IN BLOOD BY WESTERGREN METHOD: 24 MM/H
EST. GFR  (AFRICAN AMERICAN): 15 ML/MIN/1.73 M^2
EST. GFR  (AFRICAN AMERICAN): 15 ML/MIN/1.73 M^2
EST. GFR  (NON AFRICAN AMERICAN): 13 ML/MIN/1.73 M^2
EST. GFR  (NON AFRICAN AMERICAN): 13 ML/MIN/1.73 M^2
FINAL PATHOLOGIC DIAGNOSIS: NORMAL
FIO2: 40
GLUCOSE SERPL-MCNC: 114 MG/DL (ref 70–110)
GLUCOSE SERPL-MCNC: 129 MG/DL (ref 70–110)
GROSS: NORMAL
HCO3 UR-SCNC: 25.8 MMOL/L (ref 24–28)
HCT VFR BLD AUTO: 28.8 % (ref 40–54)
HGB BLD-MCNC: 8.6 G/DL (ref 14–18)
HYPOCHROMIA BLD QL SMEAR: ABNORMAL
IMM GRANULOCYTES # BLD AUTO: 0.22 K/UL (ref 0–0.04)
IMM GRANULOCYTES NFR BLD AUTO: 1.6 % (ref 0–0.5)
INR PPP: 1.1 (ref 0.8–1.2)
LYMPHOCYTES # BLD AUTO: 0.4 K/UL (ref 1–4.8)
LYMPHOCYTES NFR BLD: 3 % (ref 18–48)
Lab: NORMAL
MAGNESIUM SERPL-MCNC: 1.9 MG/DL (ref 1.6–2.6)
MCH RBC QN AUTO: 21.7 PG (ref 27–31)
MCHC RBC AUTO-ENTMCNC: 29.9 G/DL (ref 32–36)
MCV RBC AUTO: 73 FL (ref 82–98)
MODE: ABNORMAL
MONOCYTES # BLD AUTO: 0.4 K/UL (ref 0.3–1)
MONOCYTES NFR BLD: 2.5 % (ref 4–15)
NEUTROPHILS # BLD AUTO: 13.1 K/UL (ref 1.8–7.7)
NEUTROPHILS NFR BLD: 92.1 % (ref 38–73)
NRBC BLD-RTO: 1 /100 WBC
OVALOCYTES BLD QL SMEAR: ABNORMAL
PCO2 BLDA: 36 MMHG (ref 35–45)
PEEP: 5
PH SMN: 7.46 [PH] (ref 7.35–7.45)
PHOSPHATE SERPL-MCNC: 8.9 MG/DL (ref 2.7–4.5)
PLATELET # BLD AUTO: 109 K/UL (ref 150–450)
PLATELET BLD QL SMEAR: ABNORMAL
PMV BLD AUTO: ABNORMAL FL (ref 9.2–12.9)
PO2 BLDA: 154 MMHG (ref 80–100)
POC BE: 2 MMOL/L
POC SATURATED O2: 99 % (ref 95–100)
POCT GLUCOSE: 121 MG/DL (ref 70–110)
POCT GLUCOSE: 122 MG/DL (ref 70–110)
POCT GLUCOSE: 128 MG/DL (ref 70–110)
POCT GLUCOSE: 143 MG/DL (ref 70–110)
POCT GLUCOSE: 148 MG/DL (ref 70–110)
POCT GLUCOSE: 155 MG/DL (ref 70–110)
POIKILOCYTOSIS BLD QL SMEAR: ABNORMAL
POLYCHROMASIA BLD QL SMEAR: ABNORMAL
POTASSIUM SERPL-SCNC: 4.1 MMOL/L (ref 3.5–5.1)
POTASSIUM SERPL-SCNC: 4.3 MMOL/L (ref 3.5–5.1)
PROT SERPL-MCNC: 3.3 G/DL (ref 6–8.4)
PROT SERPL-MCNC: 3.3 G/DL (ref 6–8.4)
PROTHROMBIN TIME: 11.9 SEC (ref 9–12.5)
RBC # BLD AUTO: 3.96 M/UL (ref 4.6–6.2)
SAMPLE: ABNORMAL
SCHISTOCYTES BLD QL SMEAR: PRESENT
SICKLE CELLS BLD QL SMEAR: ABNORMAL
SITE: ABNORMAL
SODIUM SERPL-SCNC: 136 MMOL/L (ref 136–145)
SODIUM SERPL-SCNC: 137 MMOL/L (ref 136–145)
TARGETS BLD QL SMEAR: ABNORMAL
VT: 450
WBC # BLD AUTO: 14.17 K/UL (ref 3.9–12.7)

## 2022-05-12 PROCEDURE — 20000000 HC ICU ROOM

## 2022-05-12 PROCEDURE — 25000003 PHARM REV CODE 250: Performed by: SURGERY

## 2022-05-12 PROCEDURE — 63600175 PHARM REV CODE 636 W HCPCS: Performed by: SURGERY

## 2022-05-12 PROCEDURE — 94003 VENT MGMT INPAT SUBQ DAY: CPT

## 2022-05-12 PROCEDURE — 25000003 PHARM REV CODE 250: Performed by: INTERNAL MEDICINE

## 2022-05-12 PROCEDURE — 85347 COAGULATION TIME ACTIVATED: CPT

## 2022-05-12 PROCEDURE — 83735 ASSAY OF MAGNESIUM: CPT | Performed by: SURGERY

## 2022-05-12 PROCEDURE — A4217 STERILE WATER/SALINE, 500 ML: HCPCS | Performed by: SURGERY

## 2022-05-12 PROCEDURE — 27000221 HC OXYGEN, UP TO 24 HOURS

## 2022-05-12 PROCEDURE — 63600175 PHARM REV CODE 636 W HCPCS: Performed by: NURSE PRACTITIONER

## 2022-05-12 PROCEDURE — 25000003 PHARM REV CODE 250: Performed by: NURSE PRACTITIONER

## 2022-05-12 PROCEDURE — 82803 BLOOD GASES ANY COMBINATION: CPT

## 2022-05-12 PROCEDURE — 85610 PROTHROMBIN TIME: CPT | Performed by: NURSE PRACTITIONER

## 2022-05-12 PROCEDURE — 84100 ASSAY OF PHOSPHORUS: CPT | Performed by: SURGERY

## 2022-05-12 PROCEDURE — B4185 PARENTERAL SOL 10 GM LIPIDS: HCPCS | Performed by: SURGERY

## 2022-05-12 PROCEDURE — 99900026 HC AIRWAY MAINTENANCE (STAT)

## 2022-05-12 PROCEDURE — 37799 UNLISTED PX VASCULAR SURGERY: CPT

## 2022-05-12 PROCEDURE — 99233 SBSQ HOSP IP/OBS HIGH 50: CPT | Mod: ,,, | Performed by: INTERNAL MEDICINE

## 2022-05-12 PROCEDURE — 85025 COMPLETE CBC W/AUTO DIFF WBC: CPT | Performed by: SURGERY

## 2022-05-12 PROCEDURE — 36600 WITHDRAWAL OF ARTERIAL BLOOD: CPT

## 2022-05-12 PROCEDURE — 99900035 HC TECH TIME PER 15 MIN (STAT)

## 2022-05-12 PROCEDURE — 99291 PR CRITICAL CARE, E/M 30-74 MINUTES: ICD-10-PCS | Mod: ,,, | Performed by: NURSE PRACTITIONER

## 2022-05-12 PROCEDURE — 99291 CRITICAL CARE FIRST HOUR: CPT | Mod: ,,, | Performed by: NURSE PRACTITIONER

## 2022-05-12 PROCEDURE — 94760 N-INVAS EAR/PLS OXIMETRY 1: CPT

## 2022-05-12 PROCEDURE — 80053 COMPREHEN METABOLIC PANEL: CPT | Performed by: SURGERY

## 2022-05-12 PROCEDURE — 85730 THROMBOPLASTIN TIME PARTIAL: CPT | Performed by: NURSE PRACTITIONER

## 2022-05-12 PROCEDURE — 99233 PR SUBSEQUENT HOSPITAL CARE,LEVL III: ICD-10-PCS | Mod: ,,, | Performed by: INTERNAL MEDICINE

## 2022-05-12 PROCEDURE — 63600175 PHARM REV CODE 636 W HCPCS: Performed by: INTERNAL MEDICINE

## 2022-05-12 RX ORDER — HEPARIN SODIUM,PORCINE/D5W 25000/250
0-40 INTRAVENOUS SOLUTION INTRAVENOUS CONTINUOUS
Status: DISCONTINUED | OUTPATIENT
Start: 2022-05-12 | End: 2022-05-20

## 2022-05-12 RX ORDER — FENTANYL CITRATE-0.9 % NACL/PF 10 MCG/ML
0-250 PLASTIC BAG, INJECTION (ML) INTRAVENOUS CONTINUOUS
Status: DISCONTINUED | OUTPATIENT
Start: 2022-05-12 | End: 2022-05-14

## 2022-05-12 RX ORDER — DEXTROSE MONOHYDRATE 100 MG/ML
INJECTION, SOLUTION INTRAVENOUS CONTINUOUS PRN
Status: DISCONTINUED | OUTPATIENT
Start: 2022-05-12 | End: 2022-05-21

## 2022-05-12 RX ADMIN — ERTAPENEM 500 MG: 1 INJECTION INTRAMUSCULAR; INTRAVENOUS at 09:05

## 2022-05-12 RX ADMIN — SMOFLIPID 250 ML: 6; 6; 5; 3 INJECTION, EMULSION INTRAVENOUS at 07:05

## 2022-05-12 RX ADMIN — HEPARIN SODIUM 5000 UNITS: 5000 INJECTION INTRAVENOUS; SUBCUTANEOUS at 05:05

## 2022-05-12 RX ADMIN — HEPARIN SODIUM 12 UNITS/KG/HR: 5000 INJECTION INTRAVENOUS; SUBCUTANEOUS at 03:05

## 2022-05-12 RX ADMIN — Medication 25 MCG/HR: at 10:05

## 2022-05-12 RX ADMIN — ACETAMINOPHEN 650 MG: 650 SUPPOSITORY RECTAL at 02:05

## 2022-05-12 RX ADMIN — MICAFUNGIN SODIUM 100 MG: 100 INJECTION, POWDER, LYOPHILIZED, FOR SOLUTION INTRAVENOUS at 02:05

## 2022-05-12 RX ADMIN — CHLORHEXIDINE GLUCONATE 0.12% ORAL RINSE 15 ML: 1.2 LIQUID ORAL at 08:05

## 2022-05-12 RX ADMIN — CALCIUM GLUCONATE: 98 INJECTION, SOLUTION INTRAVENOUS at 07:05

## 2022-05-12 RX ADMIN — AMIODARONE HYDROCHLORIDE 0.5 MG/MIN: 1.8 INJECTION, SOLUTION INTRAVENOUS at 03:05

## 2022-05-12 RX ADMIN — NOREPINEPHRINE BITARTRATE 0.16 MCG/KG/MIN: 1 INJECTION, SOLUTION, CONCENTRATE INTRAVENOUS at 12:05

## 2022-05-12 RX ADMIN — Medication 250 MCG/HR: at 02:05

## 2022-05-12 RX ADMIN — MINERAL OIL, PETROLATUM: 425; 573 OINTMENT OPHTHALMIC at 08:05

## 2022-05-12 RX ADMIN — FAMOTIDINE 20 MG: 10 INJECTION INTRAVENOUS at 08:05

## 2022-05-12 RX ADMIN — INSULIN ASPART 1 UNITS: 100 INJECTION, SOLUTION INTRAVENOUS; SUBCUTANEOUS at 08:05

## 2022-05-12 NOTE — ASSESSMENT & PLAN NOTE
Patient stable s/p sx POD#1  Plan for washout, etc per primary service  Wound vac  Goals of care assessment  DNR    S/p SB resection- may go to surgery again tomorrow    5/7- Per Dr. Parker- pt too unstable for right hemicolectomy, end ileostomy, liver biopsy today s/p abdominal washout with Abthera replacement today.  5/8- POD 3 with s/p Laparotomy and bowel resection and subsequent laparoscopic washout- persistent bowel discontinuity and abthera wound vac closure   5/9- persistent bowel discontinuity- await further decision by family    Await further input from surgery    Await surgery today- going for closure today    S/p- Reopening of recent Laparotomy, Abdominal washout, R Hemicolectomy with Liver Bx, TAP block, Abdominal wall closure, Creation, end Ileostomy. POD 1- looks better, still on Levophed and Amio gtt for Afib, started on TPN, ID stopped Zyvox and continued Merrem/Mycafungin.

## 2022-05-12 NOTE — SUBJECTIVE & OBJECTIVE
Interval History: Day 8 on Vent- looks much better, remains on Vent with Levophed and Amio gtts. Had extensive surgery yesterday and did very well post op- Reopening of recent Laparotomy, Abdominal washout, R Hemicolectomy with Liver Bx, TAP block, Abdominal wall closure, Creation, end Ileostomy. POD 1- looks better, still on Levophed and Amio gtt for Afib, started on TPN, ID stopped Zyvox and continued Merrem/Mycafungin.     Review of Systems   Unable to perform ROS: Intubated   Objective:     Vital Signs (Most Recent):  Temp: 100.22 °F (37.9 °C) (05/12/22 1532)  Pulse: (!) 120 (05/12/22 1532)  Resp: 15 (05/12/22 1532)  BP: (!) 159/72 (05/12/22 1200)  SpO2: 100 % (05/12/22 1532)   Vital Signs (24h Range):  Temp:  [98.78 °F (37.1 °C)-100.58 °F (38.1 °C)] 100.22 °F (37.9 °C)  Pulse:  [108-123] 120  Resp:  [15-30] 15  SpO2:  [81 %-100 %] 100 %  BP: ()/(56-88) 159/72  Arterial Line BP: ()/() 156/77     Weight: 115.8 kg (255 lb 4.7 oz)  Body mass index is 37.7 kg/m².    Intake/Output Summary (Last 24 hours) at 5/12/2022 1637  Last data filed at 5/12/2022 1500  Gross per 24 hour   Intake 2996.93 ml   Output 3135 ml   Net -138.07 ml      Physical Exam  Vitals and nursing note reviewed.   Constitutional:       General: He is not in acute distress.     Appearance: He is well-developed. He is obese. He is ill-appearing. He is not toxic-appearing or diaphoretic.      Interventions: He is sedated, intubated and restrained.   HENT:      Head: Atraumatic.      Nose:        Mouth/Throat:      Mouth: Mucous membranes are moist.   Eyes:      General: No scleral icterus.     Conjunctiva/sclera: Conjunctivae normal.      Pupils: Pupils are equal, round, and reactive to light.   Neck:      Vascular: No JVD.     Cardiovascular:      Rate and Rhythm: Tachycardia present. Rhythm irregular.      Pulses:           Radial pulses are 1+ on the right side and 1+ on the left side.        Dorsalis pedis pulses are 1+ on the  right side and 1+ on the left side.      Heart sounds: Heart sounds are distant.   Pulmonary:      Effort: No accessory muscle usage or respiratory distress. He is intubated.      Breath sounds: Decreased breath sounds present. No wheezing or rhonchi.   Chest:      Chest wall: No deformity or tenderness.   Abdominal:      General: Bowel sounds are absent. There is no distension.       Genitourinary:     Comments: Doherty in place  Musculoskeletal:      Cervical back: Neck supple.      Right lower leg: 3+ Pitting Edema present.      Left lower leg: 3+ Pitting Edema present.      Right foot: No deformity.      Left foot: No deformity.   Lymphadenopathy:      Cervical: No cervical adenopathy.   Skin:     General: Skin is warm.      Capillary Refill: Capillary refill takes 2 to 3 seconds.          Neurological:      Comments: Heavily sedated     Flow (L/min): 4  Vent Mode: Spont  Oxygen Concentration (%):  [35-40] 35  Resp Rate Total:  [18 br/min-30 br/min] 20 br/min  Vt Set:  [450 mL] 450 mL  PEEP/CPAP:  [5 cmH20] 5 cmH20  Pressure Support:  [0 cmH20-10 cmH20] 10 cmH20  Mean Airway Pressure:  [8.6 viO99-47 cmH20] 8.8 cmH20  Temp:  [98.78 °F (37.1 °C)-100.58 °F (38.1 °C)] 100.22 °F (37.9 °C)  Pulse:  [108-123] 120  Resp:  [15-30] 15  SpO2:  [81 %-100 %] 100 %  BP: ()/(56-88) 159/72  Arterial Line BP: ()/() 156/77   Art pH/pCO2/pO2/HCO3:  7.463/36.0/154/25.8 (05/12 0418)  Lab Results   Component Value Date    POA04CTBAQVQ Negative 05/11/2022    ZWJ03TIPPADH Negative 05/04/2022    VAE33IGPVJGZ Positive (A) 08/05/2021      Recent Labs   Lab 05/07/22  1345 05/08/22  0421 05/10/22  0406 05/11/22  0416 05/11/22  2256 05/12/22 0419   LACTATE 6.7*  --  3.5*  --   --   --    NA  --    < > 132* 133* 136 137   WBC  --    < > 13.35* 17.10*  --  14.17*   GRAN  --    < > 87.8*  11.7* 91.5*  15.7*  --  92.1*  13.1*   LYMPH  --    < > 6.3*  0.8* 3.6*  0.6*  --  3.0*  0.4*   HGB  --    < > 8.6* 8.6*  --  8.6*    HCT  --    < > 28.0* 28.4*  --  28.8*   BUN  --    < > 87* 95* 98* 101*   CREATININE  --    < > 4.8* 4.7* 4.4* 4.3*   ESTGFRAFRICA  --    < > 13* 14* 15* 15*   EGFRNONAA  --    < > 12* 12* 13* 13*   K  --    < > 5.0 4.3 4.3 4.1   CL  --    < > 90* 91* 94* 94*   CO2  --    < > 19* 20* 19* 21*   PHOS  --   --   --  8.9*  --  8.9*   MG  --    < > 2.0 1.9  --  1.9    < > = values in this interval not displayed.     Microbiology Results (last 7 days)       Procedure Component Value Units Date/Time    Blood culture [[id number]] Collected: 05/04/22 1428    Order Status: Completed Specimen: Blood from Line, Arterial, Left Updated: 05/10/22 0612     Blood Culture, Routine No growth after 5 days.    Blood culture [[id number]] Collected: 05/04/22 1429    Order Status: Completed Specimen: Blood from Line, Jugular, Internal Right Updated: 05/10/22 0612     Blood Culture, Routine No growth after 5 days.    Culture, Respiratory with Gram Stain [[id number]] Collected: 05/04/22 1253    Order Status: Completed Specimen: Respiratory from Endotracheal Aspirate Updated: 05/07/22 0921     Respiratory Culture No growth     Gram Stain (Respiratory) Few WBC's     Gram Stain (Respiratory) Rare Gram positive rods          Antibiotics (From admission, onward)                Start     Stop Route Frequency Ordered    05/09/22 2200  ertapenem (INVANZ) 500 mg in sodium chloride 0.9% 100 mL IVPB         05/19 2359 IV Every 24 hours (non-standard times) 05/09/22 1624          Anticoagulants       Ordered     Route Frequency Start Stop    05/12/22 1408  heparin (PORCINE)  (LOW INTENSITY heparin infusion nomogram - [facility] (Recommended Indications: Acute Coronary Syndrome, Atrial Fibrillation, Prophylaxis in Prosthetic Heart Valves, and other indication where full anticoagulation is desired, bleeding risk is moderate, antiplatelet agents have been utilized, and time to therapeutic can be delayed minimizing the increased bleeding risk))         IV As needed  (PRN) 05/12/22 1508 --    05/12/22 1408  heparin (PORCINE)  (LOW INTENSITY heparin infusion nomogram - [facility] (Recommended Indications: Acute Coronary Syndrome, Atrial Fibrillation, Prophylaxis in Prosthetic Heart Valves, and other indication where full anticoagulation is desired, bleeding risk is moderate, antiplatelet agents have been utilized, and time to therapeutic can be delayed minimizing the increased bleeding risk))         IV As needed (PRN) 05/12/22 1508 --    05/12/22 1408  heparin (porcine) in D5W  (LOW INTENSITY heparin infusion nomogram - [facility] (Recommended Indications: Acute Coronary Syndrome, Atrial Fibrillation, Prophylaxis in Prosthetic Heart Valves, and other indication where full anticoagulation is desired, bleeding risk is moderate, antiplatelet agents have been utilized, and time to therapeutic can be delayed minimizing the increased bleeding risk))         IV Continuous 05/12/22 1415 --          X-Ray Chest 1 View  Narrative: EXAMINATION:  XR CHEST 1 VIEW    CLINICAL HISTORY:  respiratory failure; Pneumonia, unspecified organism    TECHNIQUE:  Single frontal view of the chest was performed.    COMPARISON:  05/05/2022    FINDINGS:  Tubes and lines are stable.   In comparison to the prior study, there is no adverse interval changes.  Impression: In comparison to the prior study, there is no adverse interval changes    Electronically signed by: Philippe Horton MD  Date:    05/09/2022  Time:    12:04   Significant Labs: All pertinent labs within the past 24 hours have been reviewed.    Significant Imaging: I have reviewed all pertinent imaging results/findings within the past 24 hours.

## 2022-05-12 NOTE — ASSESSMENT & PLAN NOTE
S/p exploratory laparotomy, abdominal washout, segmental small bowel resection (left in discontinuity), placement of Abthera vac 5/4/22  S/p reopening of recent laparotomy, abdominal washout, replacement of Abthera vac 5/7/22  S/p reopening of recent laparotomy, abdominal washout, right hemicolectomy, liver biopsy, end ileostomy, TAP block, abdominal wall closure 5/11/22    - Continue NG tube to LIS today. Tomorrow, will initiate trickle tube feeds and can start having crushed PO meds.  - Continue ICU management. Currently still requiring vasopressor support. Now on heparin gtt for afib.  - Ostomy nurse consult  - Strict I/Os  - TPN  - Medical and ICU management per hospital/ICU team    Discussed with ICU nurse, wife, and daughter at bedside.

## 2022-05-12 NOTE — ASSESSMENT & PLAN NOTE
Vent settings reviewed and adjusted to optimize gas exchange  VAP prophylaxis  ABG daily and prn to assess response to therapy  SAT/SBT for extubation readiness in am

## 2022-05-12 NOTE — ASSESSMENT & PLAN NOTE
Found on exploration along with palpable liver mass  Sent for pathology 5/11 results still pending  High concern for metastatic malignancy

## 2022-05-12 NOTE — ASSESSMENT & PLAN NOTE
Will continue Zosyn , continue close monitoring     05/05/22- due to persistent fever , will switch to Micafungin, Ertapenem and zyvox   Follow cultures   05/06/22- continue micafungin , zyvox/ertapenem, follow surgery for washout in AM  05/107- follow Gen surgery for wash out -will treat for 2 weeks .  Will follow and monitor clinical course .  Continue Zyvox/ertapenem /micafungin for now  05/11/22   6 days of Zyvox, on day 7 of Ertapenem and day 8 of Micafungin.  Will plan to treat for 2 weeks  Will discuss with team   Will stop Zyvox

## 2022-05-12 NOTE — ASSESSMENT & PLAN NOTE
Patient stable s/p sx POD#1  Plan for washout, etc per primary service  Wound vac  Goals of care assessment  DNR    S/p SB resection- may go to surgery again tomorrow    5/7- Per Dr. Parker- pt too unstable for right hemicolectomy, end ileostomy, liver biopsy today s/p abdominal washout with Abthera replacement today.  5/8- POD 3 with s/p Laparotomy and bowel resection and subsequent laparoscopic washout- persistent bowel discontinuity and abthera wound vac closure   5/9- persistent bowel discontinuity- await further decision by family    Await further input from surgery    Await surgery today- going for closure today

## 2022-05-12 NOTE — PROGRESS NOTES
[facility] ([facility])  Adult Nutrition  Consult Note    SUMMARY     Recommendations    Recommendation/Intervention:   1. Recommend to continue Custom TPN:   -Custom Macronutrients AA- 72g; CHO- 200g.   -GIR: 1.19.   -Standard lipids pending triglycerides.   -Check Mg, Na, Phos, and Glucose before and during initiation;Correct as indicated.     2. Weekly weights per RD follow up.    Goals:   1. Patient will continue to meet >75% of estimated energy needs by RD follow up.     Nutrition Goal Status: goal met     Assessment and Plan    Nutrition Problem  Inadequate oral intake     Related to (etiology):   Decreased ability to consume sufficient energy     Signs and Symptoms (as evidenced by):   NPO, intubated     Interventions/Recommendations (treatment strategy):  TPN   Collaboration with Medical Providers   Weekly Weights     Nutrition Diagnosis Status:   Continues        Malnutrition Assessment    Pt does not meet at least 2 ASPEN criteria for malnutrition at this time.  Will continue to monitor.       Reason for Assessment    Reason For Assessment: Follow up   Diagnosis: infection/sepsis  Relevant Medical History: diverticulitis  Interdisciplinary Rounds: did not attend  General Information Comments:     5/10:RD consulted for TPN rec's. Patient is currently NPO and intubated. Patient had surgery on 5/4. Patient had a laparotomy, washout, wound vac, and small intestine excision. Patient has 3+ moderate edema to feet, scrotum. Patient has 4+ severe edema to hands. Patient does not want dialysis per patient wishes. Patients last BM 5/3. NFPE not appropriate at this time. Will continue to monitor.    5/12: Patient seen for follow up. Patient is NPO and tolerating TPN. Patient is also intubated and not receiving propofol. Patient has 4+ severe dependent edema and 3+ moderate generalized edema. Patient had 200mL of dark green bile output from tube. Yesterday patient had ileostomy creation, right  "hemicolectomy, and liver biopsy. Patient had triglycerides taken yesterday and are in normal range. Patients last BM 5/3. Will continue to monitor.     Nutrition Discharge Planning: pending medical course    Nutrition Risk Screen    Nutrition Risk Screen: tube feeding or parenteral nutrition    Nutrition/Diet History    Patient Reported Diet/Restrictions/Preferences:  (unknown)  Spiritual, Cultural Beliefs, Hindu Practices, Values that Affect Care: no  Food Allergies: NKFA  Factors Affecting Nutritional Intake: NPO, on mechanical ventilation    Anthropometrics    Temp: 99.14 °F (37.3 °C)  Height: 5' 9" (175.3 cm)  Height (inches): 69 in  Weight Method: Bed Scale  Weight: 115.8 kg (255 lb 4.7 oz)  Weight (lb): 255.3 lb  Ideal Body Weight (IBW), Male: 160 lb  % Ideal Body Weight, Male (lb): 160.53 %  BMI (Calculated): 37.7  BMI Grade: 35 - 39.9 - obesity - grade II       Lab/Procedures/Meds  BMP  Lab Results   Component Value Date     05/12/2022    K 4.1 05/12/2022    CL 94 (L) 05/12/2022    CO2 21 (L) 05/12/2022     (H) 05/12/2022    CREATININE 4.3 (H) 05/12/2022    CALCIUM 6.4 (LL) 05/12/2022    ANIONGAP 22 (H) 05/12/2022    ESTGFRAFRICA 15 (A) 05/12/2022    EGFRNONAA 13 (A) 05/12/2022     Lab Results   Component Value Date     05/12/2022    K 4.1 05/12/2022    CL 94 (L) 05/12/2022    CO2 21 (L) 05/12/2022     Lab Results   Component Value Date    LABPROT 12.0 05/05/2022    ALBUMIN 1.1 (L) 05/12/2022     Lab Results   Component Value Date    CALCIUM 6.4 (LL) 05/12/2022    PHOS 8.9 (H) 05/12/2022     Pertinent Labs Reviewed: reviewed  Pertinent Medications Reviewed: reviewed  Scheduled Meds:   chlorhexidine  15 mL Mouth/Throat BID    ertapenem (INVANZ) IVPB  500 mg Intravenous Q24H    famotidine (PF)  20 mg Intravenous Daily    heparin (porcine)  5,000 Units Subcutaneous Q8H    micafungin (MYCAMINE) IVPB  100 mg Intravenous Q24H    white petrolatum-mineral oil 57.3-42.5%   Both Eyes QHS "     Continuous Infusions:   amiodarone in dextrose 5% 0.5 mg/min (05/12/22 0800)    dextrose 10 % in water (D10W)      fentanyl 250 mcg/hr (05/12/22 0800)    NORepinephrine bitartrate-D5W 0.14 mcg/kg/min (05/12/22 0800)    TPN ADULT CENTRAL LINE CUSTOM 32 mL/hr at 05/12/22 0800     PRN Meds:.sodium chloride, sodium chloride 0.9%, acetaminophen, dextrose 10 % in water (D10W), dextrose 10%, glucagon (human recombinant), insulin aspart U-100, lorazepam, ondansetron    Physical Findings/Assessment         Estimated/Assessed Needs    Weight Used For Calorie Calculations: 116.5 kg (256 lb 13.4 oz)  Energy Calorie Requirements (kcal): 5894-0589 (1670-6264)  Energy Need Method: Kcal/kg  Protein Requirements: 58-72 (0.8-1.0g/kg, renal labs)  Weight Used For Protein Calculations: 72.7 kg (160 lb 4.4 oz) (IBW)  Fluid Requirements (mL): 7791-5074  Estimated Fluid Requirement Method: RDA Method  RDA Method (mL): 1281  CHO Requirement: 160-203      Nutrition Prescription Ordered    Current Diet Order: NPO    Evaluation of Received Nutrient/Fluid Intake    Parenteral Calories: 798  Lipid Calories: 500  Total Calories: 1298  % Kcal Needs: 100%  % Protein Needs: 100%  Energy Calories Required:  meeting needs  Protein Required: meeting needs  Fluid Required: meeting needs  Tolerance:  (Patient NPO) Tolerating TPN  % Intake of Estimated Energy Needs: 75 - 100 %  % Meal Intake: NPO    Nutrition Risk    Level of Risk/Frequency of Follow-up: moderate        Monitor and Evaluation    Food and Nutrient Intake: energy intake, parenteral nutrition intake  Food and Nutrient Adminstration: enteral and parenteral nutrition administration  Anthropometric Measurements: weight, weight change, body mass index  Biochemical Data, Medical Tests and Procedures: electrolyte and renal panel, gastrointestinal profile, inflammatory profile, glucose/endocrine profile, lipid profile  Nutrition-Focused Physical Findings: overall appearance        Nutrition Follow-Up    RD Follow-up?: Yes   Nanda Holman RD,LDN

## 2022-05-12 NOTE — PROGRESS NOTES
[facility] ([facility])  Nephrology  Progress Note    Patient Name: Franky Masters  MRN: [id number]  Admission Date: 5/4/2022  Hospital Length of Stay: 8 days  Attending Provider: Elvie Parker DO   Primary Care Physician: HOLLY ROSEN  Principal Problem:Septic shock   Reason for Consult: Acute Kidney Injury       Subjective:     HPI:   History obtained per Chart Review  68 yo male with no routine medical care by patient's choice admitted 5 days prior for perforated bowel, found to have cecal mass intraoperatively with liver nodules, he is post op Day #5. He underwent lavage 5/7. He continues to be in septic shock on 2 vasopressors, intubated and sedated. His post operative course was complicated by afib as well.   Admission creatinine was 0.9mg/dL and initial JOSE was oliguric, but has increased UOP in the past 24 hours, albeit s/p Lasix IVP. Hid creatinine of 4.6-4.7mg/dl has remained stable since Saturday 5/7.     5/10  - UOP maintaining, 1.7L previous 24hr     5/11  - Responded to lasix with 3.5L UOP, 1.6 Liters negative previous 24 hours   - for OR today, based on last evening note    5/12  - POD #1  - UOP maintaining       Review of patient's allergies indicates:  No Known Allergies  Current Facility-Administered Medications   Medication Frequency    0.9%  NaCl infusion (for blood administration) Q24H PRN    0.9%  NaCl infusion PRN    acetaminophen suppository 650 mg Q6H PRN    amiodarone 360 mg/200 mL (1.8 mg/mL) infusion Continuous    chlorhexidine 0.12 % solution 15 mL BID    dextrose 10 % infusion Continuous PRN    dextrose 10% bolus 125 mL PRN    ertapenem (INVANZ) 500 mg in sodium chloride 0.9% 100 mL IVPB Q24H    famotidine (PF) injection 20 mg Daily    fentaNYL 2500 mcg in 0.9% sodium chloride 250 mL infusion premix (titrating) Continuous    glucagon (human recombinant) injection 1 mg PRN    heparin 25,000 units in dextrose 5% (100 units/ml) IV bolus from bag - ADDITIONAL  PRN BOLUS - 30 units/kg PRN    heparin 25,000 units in dextrose 5% (100 units/ml) IV bolus from bag - ADDITIONAL PRN BOLUS - 60 units/kg PRN    heparin 25,000 units in dextrose 5% 250 mL (100 units/mL) infusion LOW INTENSITY nomogram - [facility] Continuous    insulin aspart U-100 pen 1-10 Units Q4H PRN    lipid (SMOFLIPID) (SMOFLIPID) 20 % infusion 250 mL Daily    lorazepam injection 2 mg Q4H PRN    micafungin 100 mg in sodium chloride 0.9 % 100 mL IVPB (ready to mix system) Q24H    NORepinephrine 32 mg in dextrose 5 % 250 mL infusion Continuous    ondansetron injection 4 mg Q8H PRN    TPN ADULT CENTRAL LINE CUSTOM Continuous    TPN ADULT CENTRAL LINE CUSTOM Continuous    white petrolatum-mineral oil 57.3-42.5% ophthalmic ointment QHS         Review of Systems   Unable to perform ROS: Intubated   Constitutional: Positive for fever.   Genitourinary: Negative for decreased urine volume.   Skin: Positive for wound (incisional).   Allergic/Immunologic: Positive for immunocompromised state.     Objective:     Vital Signs (Most Recent):  Temp: 100.22 °F (37.9 °C) (05/12/22 1532)  Pulse: (!) 120 (05/12/22 1532)  Resp: 15 (05/12/22 1532)  BP: (!) 159/72 (05/12/22 1200)  SpO2: 100 % (05/12/22 1532)  O2 Device (Oxygen Therapy): ventilator (05/12/22 1532) Vital Signs (24h Range):  Temp:  [98.78 °F (37.1 °C)-100.58 °F (38.1 °C)] 100.22 °F (37.9 °C)  Pulse:  [108-123] 120  Resp:  [15-30] 15  SpO2:  [81 %-100 %] 100 %  BP: ()/(56-88) 159/72  Arterial Line BP: ()/() 156/77         Physical Exam  Vitals and nursing note reviewed.   Constitutional:       General: He is not in acute distress.     Appearance: He is obese. He is ill-appearing.      Interventions: He is sedated and intubated.   HENT:      Head: Atraumatic.   Eyes:      General: No scleral icterus.  Cardiovascular:      Rate and Rhythm: Tachycardia present.      Heart sounds:     No friction rub.      Comments: Normal to slight acacia   Pulmonary:       Effort: No respiratory distress. He is intubated.   Abdominal:      General: There is distension.   Genitourinary:     Comments: Doherty with yellow urine draining  Musculoskeletal:         General: Swelling present.      Right lower leg: Edema present.      Left lower leg: Edema present.         Significant Labs: reviewed    Significant Imaging:      Assessment/Plan:     Active Diagnoses:    Diagnosis Date Noted POA    PRINCIPAL PROBLEM:  Septic shock sec to Peritonitis from SB perforation [A41.9, R65.21] 05/04/2022 Yes    Atrial fibrillation with RVR [I48.91] 05/06/2022 No    On mechanically assisted ventilation [Z99.11] 05/05/2022 Not Applicable    JOSE (acute kidney injury) [N17.9] 05/05/2022 Yes    Small bowel perforation with large Cecal mass  [K63.1] 05/04/2022 Yes    Mass of colon [K63.89] 05/04/2022 Yes    Acute on chronic anemia [D64.9] 08/05/2021 Yes    Acute hypoxemic respiratory failure on mechanical vent [J96.01] 08/05/2021 Yes    Obesity (BMI 30.0-34.9) [E66.9] 08/05/2021 Yes     Chronic      Problems Resolved During this Admission:    Diagnosis Date Noted Date Resolved POA    Hyperglycemia, unspecified [R73.9] 05/04/2022 05/11/2022 Yes    Pneumonia of left lower lobe due to infectious organism [J18.9] 05/04/2022 05/10/2022 Yes       JOSE with baseline creatinine 0.9mg/dL in setting of septic shock  - JOSE due to ATN and hemodynamic changes  - initially oliguric but now maintaining UOP and responding to mid range Lasix dose   - kidney function stable/slightly improved (?dilutional)   - 3rd OR trip POD#1  - no acute indications for RRT but in speaking with daughter, Claudia campa POA, in multiple disucssions, she feels her father would not desire dialysis    Septic Shock from perforated viscus/Peritonitis   - to OR yesterday afternoon for further source control, biopsies, hemicolectomy,  ileostomy, and incisional close   - low grade fever, increased WBC     Perforated Bowel  -  surgery notes reviewed  - cecal mass strongly suspicious for cancer with likely liver mets, s/p hemicolectomy POD#1 and liver bx   - per daughter with [employer] POA father would not desire further treatment of cancer     Respiratory Failure, remains intubated  Hemodynamic Failure--remains on vasopressor support  Shock Liver--liver enzymes improving   Afib with RVR, new onset  Severe hypoalbuminemia   Anemia acute on chronic and Multifactorial   Thrombocytopenia-stable  Hyponatremia--resolved, monitor  Respiratory Alkalosis and underlying anion gap acidosis       Avery Persaud MD  Nephrology  [facility] ([facility])

## 2022-05-12 NOTE — ANESTHESIA POSTPROCEDURE EVALUATION
Anesthesia Post Evaluation    Patient: Franky Masters    Procedure(s) Performed: Procedure(s) (LRB):  CREATION, ILEOSTOMY (N/A)  HEMICOLECTOMY, RIGHT (N/A)  BIOPSY, LIVER (Right)    Final Anesthesia Type: general      Patient location during evaluation: ICU  Patient participation: Yes- Able to Participate  Level of consciousness: awake and alert  Post-procedure vital signs: reviewed and stable  Pain management: adequate  Airway patency: patent    PONV status at discharge: No PONV  Anesthetic complications: no      Cardiovascular status: blood pressure returned to baseline  Respiratory status: unassisted  Hydration status: euvolemic  Follow-up not needed.          Vitals Value Taken Time   /73 05/12/22 0601   Temp 37.4 °C (99.32 °F) 05/12/22 0645   Pulse 116 05/12/22 0645   Resp 25 05/12/22 0645   SpO2 99 % 05/12/22 0645   Vitals shown include unvalidated device data.      Event Time   Out of Recovery 05/11/2022 18:24:00         Pain/Stefani Score: Pain Rating Prior to Med Admin: 0 (5/12/2022  2:42 AM)  Pain Rating Post Med Admin: 0 (5/12/2022  3:12 AM)

## 2022-05-12 NOTE — SUBJECTIVE & OBJECTIVE
"Interval History:   66 year old man s/p I and D for  perforation demonstrating a large cecal mass and 3l feculant fluid in the abdominal cavity.  He is now febrile  T max 102.   05/06- remains intubated , has fever   Blood cultures -negative till date.  05/07- will be taken back to OR today .  Remains intubated.  05/11/22  S/p exp lap-  Operative findings-  "Pieces of food/vegetation still throughout the abdomen. Bowel was edematous but otherwise pink and viable appearing. Large cecal mass with bulky mesenteric lymphadenopathy around ileocolic pedicle. Palpable liver masses"  Review of Systems   Unable to perform ROS: Intubated   Objective:     Vital Signs (Most Recent):  Temp: 99.32 °F (37.4 °C) (05/12/22 0645)  Pulse: (!) 117 (05/12/22 0645)  Resp: (!) 26 (05/12/22 0645)  BP: 109/73 (05/12/22 0600)  SpO2: 99 % (05/12/22 0645)   Vital Signs (24h Range):  Temp:  [98.78 °F (37.1 °C)-100.22 °F (37.9 °C)] 99.32 °F (37.4 °C)  Pulse:  [] 117  Resp:  [23-32] 26  SpO2:  [81 %-100 %] 99 %  BP: ()/(50-78) 109/73  Arterial Line BP: ()/(46-84) 116/67     Weight: 115.8 kg (255 lb 4.7 oz)  Body mass index is 37.7 kg/m².    Estimated Creatinine Clearance: 20.6 mL/min (A) (based on SCr of 4.3 mg/dL (H)).    Physical Exam  Vitals and nursing note reviewed.   Constitutional:       Appearance: He is well-developed.   HENT:      Head: Normocephalic and atraumatic.      Nose: Nose normal.   Eyes:      Pupils: Pupils are equal, round, and reactive to light.   Cardiovascular:      Rate and Rhythm: Normal rate and regular rhythm.      Heart sounds: Normal heart sounds.   Pulmonary:      Effort: Pulmonary effort is normal. No respiratory distress.      Breath sounds: Normal breath sounds. No wheezing or rales.   Abdominal:      General: There is distension.      Tenderness: There is no abdominal tenderness.      Comments: Has colostomy bag   Musculoskeletal:      Cervical back: Normal range of motion and neck supple. "   Skin:     General: Skin is dry.   Neurological:      Comments: Intubated,sedated       Significant Labs: Blood Culture:   Recent Labs   Lab 05/04/22  1428 05/04/22  1429   LABBLOO No growth after 5 days. No growth after 5 days.       BMP:   Recent Labs   Lab 05/12/22 0419   *      K 4.1   CL 94*   CO2 21*   *   CREATININE 4.3*   CALCIUM 6.4*   MG 1.9       CBC:   Recent Labs   Lab 05/11/22 0416 05/12/22 0419   WBC 17.10* 14.17*   HGB 8.6* 8.6*   HCT 28.4* 28.8*   * 109*       CMP:   Recent Labs   Lab 05/11/22 0416 05/11/22 2256 05/12/22 0419   * 136 137   K 4.3 4.3 4.1   CL 91* 94* 94*   CO2 20* 19* 21*   GLU 87 114* 129*   BUN 95* 98* 101*   CREATININE 4.7* 4.4* 4.3*   CALCIUM 7.0* 6.3* 6.4*   PROT 4.4* 3.3* 3.3*   ALBUMIN 1.2* 1.1* 1.1*   BILITOT 0.8 0.9 0.9   ALKPHOS 138* 112 113   * 161* 124*   * 244* 217*   ANIONGAP 22* 23* 22*   EGFRNONAA 12* 13* 13*         Significant Imaging: I have reviewed all pertinent imaging results/findings within the past 24 hours.

## 2022-05-12 NOTE — ASSESSMENT & PLAN NOTE
New onset 5/6  on amiodarone infusion; maintain until clear for enteral route  Continue cardiac monitoring  Add Heparin infusion and monitor closely for bleeding

## 2022-05-12 NOTE — ASSESSMENT & PLAN NOTE
Patient with Hypoxic Respiratory failure which is Acute.  he is not on home oxygen. Supplemental oxygen was provided and noted- Vent Mode: Spont  Oxygen Concentration (%):  [35-40] 35  Resp Rate Total:  [18 br/min-30 br/min] 18 br/min  Vt Set:  [450 mL] 450 mL  PEEP/CPAP:  [5 cmH20] 5 cmH20  Pressure Support:  [0 cmH20-10 cmH20] 10 cmH20  Mean Airway Pressure:  [8.6 jyG36-19 cmH20] 8.6 cmH20.   Signs/symptoms of respiratory failure include- tachypnea. Contributing diagnoses includes - Obesity Hypoventilation Labs and images were reviewed. Patient Has recent ABG, which has been reviewed. Will treat underlying causes and adjust management of respiratory failure as indicated. Pulmonary CC on board  Day 2 on Vent- remains intubated on vent  Cont vent support    Day 4 on Vent    Day 5 on vent- adequate O2, sats    Day 6- continue supportive care, await family's decision about comfort care    Day 7- continue support- trying to diurese

## 2022-05-12 NOTE — ASSESSMENT & PLAN NOTE
Secondary to Peritonitis from bowel perforation with major fecal contamination  Blood and sputum cultures ngtd  Will stop zyvox now post closure  Continue invanz and Micafungin  continue to titrate pressor for MAP > 75  ICU hemodynamic monitoring

## 2022-05-12 NOTE — ASSESSMENT & PLAN NOTE
Worsening  Trend  Cr 2.2 today    Cr now 3.8- Oliguric- heading towards Anuria and ATN/ May need HD vs CRRT- she has massive fluid Overload.     Cr now 4.5-  Remains anuric  POA/ Family not in favor of HD    Remains Oliguric due to Septic Shock on 2 Pressors  Some urine output with lasix but no Polyuria     5/10 Urine Out put improved with diuresis while renal functions remains stable    Improving, Cr coming down, good urine output

## 2022-05-12 NOTE — ASSESSMENT & PLAN NOTE
Wean as tolerated  CC Team  Pulmonary    Cont vent support    Expect further improvement with diuresis    Cont Vent support  Day 7    Day 8- will start weaning vent soon

## 2022-05-12 NOTE — ASSESSMENT & PLAN NOTE
5/4 Expl Lap and SB excision with washout and AB thera wound vac for bowel left in discontinuity  5/7 washout and wound vac replacement  hx Diverticulosis but cecal mass and suspected metastatic lesions found in exploration  5/11 ABD WASHOUT, RT HEMICOLECTOMY, ILEOSTOMY, LIVER BIOPSY, AND ABD WALL CLOSURE  IVAB for peritonitis/sepsis  NPO and IVFs with NG to suction  TPN initiated on 5/10; pharmD and RD following for daily adjustment recs  Pathology still pending

## 2022-05-12 NOTE — ASSESSMENT & PLAN NOTE
Vent settings reviewed and adjusted to optimize gas exchange  VAP prophylaxis  ABG daily and prn to assess response to therapy  Weaning sedation for SAT  SBT once more alert

## 2022-05-12 NOTE — NURSING
Suppository tylenol was given and patient  Was bath and bed  Change. Patient  Didn't  Tolerate well. Titrations out of perimeters with MANUELA Hernandez. Informed PM RN of patient instability with turns since decreasing of the fentanyl. Handoff of heparin at bedside. All safety measures in place for PM RN to resume care.

## 2022-05-12 NOTE — PROGRESS NOTES
[facility] - Intensive Care ([facility] Medicine  Progress Note    Patient Name: Franky Masters  MRN: [id number]  Patient Class: IP- Inpatient   Admission Date: 5/4/2022  Length of Stay: 8 days  Attending Physician: Elvie Parker DO  Primary Care Provider: HOLLY ROSEN        Subjective:     Principal Problem:Septic shock        HPI:  Mr Masters is a 65 yo male POD#0 s/p SB perforation with surgical intervention demonstrating a large cecal mass and 3l feculant fluid in the abdominal cavity. Additional findings of a perforated ileum and a liver mass. Patient tolerated the Exlap with Washout and small bowel excision. Patient had a wound vac placed, is currently intubated, sedated and recovering in the ICU. Patient received 4 units PRBC and remains on pressor support. Patient is a DNR and HM consulted with assistance with medical management. Daughter at bedside. Patient discussed with care team.          Overview/Hospital Course:  Patient continues to require pressors, remains intubated, sedated. Patient urine o/p declining and concerning. Discussed POC in detail at bedside with daughter POA. She expressed her fathers wish to not undergo treatments  like perm HD, where he has a diminished quality of life. We spoke frankly about issues and concerns and she will be communicating with the family as his case evolves. Comfort care was discussed and the goals of care will develop consistent with the patients expressed wishes. Potential for another surgery likely Saturday with a washout and possible biopsy vs resection are on the horizon. This possible intervention will be covered in detail by surgery sharing the risks and benefits of that approach. Case discussed with the primary service - surgery, and critical care team.    5/6- Pt seen and examined in the ICU plus discussed with ICU team and the pt's daughter/ POA: Remains critically ill, intubated, sedated on Vent, on Pressors- Levo plus Vaso- JOSE with oliguria  getting worse- Cr rising to 3.6, becoming severely acidotic- requiring Ertapenem, Zyvox, Micafungin as well as on Bicarb and Fentanyl gtt. He has massive fluid overload and generalized anasarca.  Possible return to OR tomorrow 5/7 if patient is more stable for right hemicolectomy, possible ileostomy, liver biopsy, abdominal wall closure. Dw Pt's daughter.       5/7- remains Intubated, sedated on Vent- Went into Afib w RVR- hence added Amio to Levo and Vasopressin- back in NSR. Getting Invanz and Zyvox plus Micafungin. Dr. Parker took him back to OR for for abdominal washout ( 3-3.5 L feculent fluid with food particles suctioned out in the OR, small bowel appeared better) and replaced Abthera- still has bowel discontinuity. His WBC, Lactate and Cr have all increased. Family updated about his critical condition and poor prognosis and JOSE/ATN- they are considering Comfort measures.     5/8- Day 5 on Vent- family at bedside, remains intubated on Vent with 2 Pressors- still on Amiodarone gtt for Afib, Still has Abthera wound vac to open Abdomen with small bowel discontinuity. Remains oliguric with generalized anasarca- almost 24 L fluid positive. Cr increased to 4.6. WBC down to 12, family does not want any HD/CRRT, so getting an IV Lasix trial to improve the urine output.     5/9- Day 6 on vent- remains the same, remains intubated, on vent with Abthera Wound vac and bowel discontinuity. Put out about 3 L urine since yesterday with IV Lasix, family still does not want any HD, WBC down to 10.2, H/H 7.8/24, still on 2 Pressors and Amio gtt. Family still deciding about comfort care.     5/10- Appreciate all- Day 7- remains same in septic shock on 2 vasopressors, intubated and sedated, still in afib. JOSE has remained stable at 4.7 but urine output improved with IV lasix. Abthera wound vac in place. No surgery today- put out about 2.5 L yesterday, given lasix again today.    5/11- Seen Pre op- looks better, less swollen,  remains intubated, sedated on Vent, on 1 Pressor- on Levophed. Going for OR today for Laparotomy and washout and abd wound closure. Good response to Lasix. Still Afib on Amiodarone gtt. Bun/Cr 98/4.4, WBC down to 17, H/H 8.6/26.       Interval History: Seen Pre op- looks better, less swollen, remains intubated, sedated on Vent, on 1 Pressor- on Levophed. Going for OR today for Laparotomy and washout and abd wound closure. Good response to Lasix. Still Afib on Amiodarone gtt. Bun/Cr 98/4.4, WBC down to 17, H/H 8.6/26.     Review of Systems   Unable to perform ROS: Intubated   Objective:     Vital Signs (Most Recent):  Temp: 99.32 °F (37.4 °C) (05/11/22 1532)  Pulse: 86 (05/11/22 1532)  Resp: (!) 25 (05/11/22 1532)  BP: 101/70 (05/11/22 1500)  SpO2: 97 % (05/11/22 1836)    Vital Signs (24h Range):  Temp:  [98.42 °F (36.9 °C)-100.58 °F (38.1 °C)] 100.22 °F (37.9 °C)  Pulse:  [] 106  Resp:  [21-32] 28  SpO2:  [99 %-100 %] 100 %  BP: ()/(57-86) 121/68  Arterial Line BP: ()/(53-74) 146/68      Weight: 116.5 kg (256 lb 13.4 oz)  Body mass index is 37.93 kg/m².        Intake/Output Summary (Last 24 hours) at 5/11/2022 0816  Last data filed at 5/11/2022 0800      Gross per 24 hour   Intake 2584.36 ml   Output 4472 ml   Net -1887.64 ml      Physical Exam  Vitals and nursing note reviewed.   Constitutional:       General: He is not in acute distress.     Appearance: He is obese. He is ill-appearing.      Interventions: He is sedated, intubated and restrained.   HENT:      Head: Atraumatic.   Eyes:      General: No scleral icterus.     Conjunctiva/sclera: Conjunctivae normal.   Neck:      Vascular: No JVD.   Cardiovascular:      Rate and Rhythm: Normal rate and regular rhythm.      Pulses:           Radial pulses are 1+ on the right side and 1+ on the left side.        Dorsalis pedis pulses are 1+ on the right side and 1+ on the left side.   Pulmonary:      Effort: He is intubated.      Breath sounds:  Decreased breath sounds present. No wheezing or rhonchi.   Abdominal:      General: Bowel sounds are absent.       Musculoskeletal:      Right lower le+ Pitting Edema present.      Left lower le+ Pitting Edema present.   Skin:     General: Skin is cool.      Capillary Refill: Capillary refill takes more than 3 seconds.       Significant Labs: All pertinent labs within the past 24 hours have been reviewed.    Significant Imaging: I have reviewed all pertinent imaging results/findings within the past 24 hours.      Assessment/Plan:      * Septic shock sec to Peritonitis from SB perforation  Pressors  Abx - Zosyn / Micafungin  ID on consult    Remains in severe Septic Shock complicated by JOSE/ATN- Anuria and Resp Failure on Vent  ICU team has d/w daughter about HD if and when needed- she does not appear to be in favor of HD at present  Prognosis poor    Day 4 in Septic Shock and on vent  Continue Levo and Vaso plus IV Abx  Prognosis poor    Day 5- Continues same on Vent, WBC better but remains in renal shut down due to shock plus 2 Pressors    Day 6- Continue present supportive care    Day 7- gradually improving, now on 1 pressor and WBC down to 17  Cont present care  Going for surgery today    Acute hypoxemic respiratory failure on mechanical vent  Patient with Hypoxic Respiratory failure which is Acute.  he is not on home oxygen. Supplemental oxygen was provided and noted- Vent Mode: Spont  Oxygen Concentration (%):  [35-40] 35  Resp Rate Total:  [18 br/min-30 br/min] 18 br/min  Vt Set:  [450 mL] 450 mL  PEEP/CPAP:  [5 cmH20] 5 cmH20  Pressure Support:  [0 cmH20-10 cmH20] 10 cmH20  Mean Airway Pressure:  [8.6 diR08-34 cmH20] 8.6 cmH20.   Signs/symptoms of respiratory failure include- tachypnea. Contributing diagnoses includes - Obesity Hypoventilation Labs and images were reviewed. Patient Has recent ABG, which has been reviewed. Will treat underlying causes and adjust management of respiratory failure as  indicated. 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 Parker- pt too unstable for right hemicolectomy, end ileostomy, liver biopsy today s/p abdominal washout with Abthera replacement today.  5/8- POD 3 with s/p Laparotomy and bowel resection and subsequent laparoscopic washout- persistent bowel discontinuity and abthera wound vac closure   5/9- persistent bowel discontinuity- await further decision by family    Await further input from surgery    Await surgery today- going for closure today    Mass of colon  Based on Surgery  Potential interventions  Goals of care  Biopsy as indicated  Likely Oncologic process with mets  Heme Onc as indicated    See above    Possible resection    On mechanically assisted ventilation  Wean as tolerated  CC Team  Pulmonary    Cont vent support    Expect further improvement with diuresis    Cont Vent support  Day 7      JOSE (acute kidney injury)  Worsening  Trend  Cr 2.2 today    Cr now 3.8- Oliguric- heading towards Anuria and ATN/ May need HD vs CRRT- she has massive fluid Overload.     Cr now 4.5-  Remains anuric  POA/ Family not in favor of HD    Remains Oliguric due to Septic Shock on 2 Pressors  Some urine output with lasix but no Polyuria     5/10 Urine Out put improved with diuresis while renal functions remains stable      Atrial fibrillation with RVR  Converted to NSR with Amio gtt    Cont Amio gtt          Obesity (BMI 30.0-34.9)  Diet  Nutrition on recovery      Acute on chronic anemia  Hgb 10.3 s/p Transfusion 4 units  Prbc  Transfuse for Hgb <7  Monitor    5/5  Improved  Hgb 11.4 today  Transfuse as indicated    5/10- H/H 8.6/28- likely dilutional from ATN- improving        VTE Risk Mitigation (From admission, onward)         Ordered     heparin 25,000 units in dextrose 5% (100 units/ml) IV bolus from bag - ADDITIONAL PRN BOLUS - 60 units/kg  As needed (PRN)        Question:  Heparin Infusion Adjustment (DO NOT MODIFY ANSWER)  Answer:  \\[email or web address]\epic\Images\Pharmacy\HeparinInfusions\heparin LOW INTENSITY nomogram for [facility] [id number]    05/12/22 1408     heparin 25,000 units in dextrose 5% (100 units/ml) IV bolus from bag - ADDITIONAL PRN BOLUS - 30 units/kg  As needed (PRN)        Question:  Heparin Infusion Adjustment (DO NOT MODIFY ANSWER)  Answer:  \\[email or web address]\epic\Images\Pharmacy\HeparinInfusions\heparin LOW INTENSITY nomogram for [facility] [id number]    05/12/22 1408     heparin 25,000 units in dextrose 5% 250 mL (100 units/mL) infusion LOW INTENSITY nomogram - [facility]  Continuous        Question Answer Comment   Heparin Infusion Adjustment (DO NOT MODIFY ANSWER) \\[email or web address]\epic\Images\Pharmacy\HeparinInfusions\heparin LOW INTENSITY nomogram for [facility] [id number]    Begin at (in units/kg/hr) 12        05/12/22 1408     IP VTE HIGH RISK PATIENT  Once         05/04/22 1253     Place sequential compression device  Until discontinued         05/04/22 1253                Discharge Planning   ELLIOT:      Code Status: DNR   Is the patient medically ready for discharge?:     Reason for patient still in hospital (select all that apply): Patient trending condition, Laboratory test, Treatment, Imaging and Consult recommendations  Discharge Plan A: Comfort care/withdrawal        Seen and discussed with Dr. Whelan and the ICU team  Condition: Critical  Prognosis: Guarded to poor      Critical care time spent on the evaluation and treatment of severe organ dysfunction, review of pertinent labs and imaging studies, discussions with consulting  providers and discussions with patient/family: 37 minutes.      Megha Mejia MD  Department of Hospital Medicine   [facility] - Intensive Care ([facility])

## 2022-05-12 NOTE — PROGRESS NOTES
[facility] - [facility]ensive [facility] ([facility])  Critical Care Medicine  Progress Note    Patient Name: Franky Masters  MRN: [id number]  Admission Date: 5/4/2022  Hospital Length of Stay: 8 days  Code Status: DNR  Attending Provider: Elvie Parker DO  Primary Care Provider: HOLLY ROSEN   Principal Problem: Septic shock    Subjective:     HPI:  Ms Masters is a 65 yo obese male with a PMH of diverticulosis and hx of hospitalization in Aug 2021 with COVID PNA and Hypoxic Resp Failure but did not require ICU or intubation.  She presented early this AM to [facility] ED about 0515 hr via EMS with complaint of abd pain X 2 hours that awakened her from sleep and had associated N/V/D.  In ED BP 86/46, RA SAT 92%, LA 8, Hgb 7.2 and CT Abd with suspected bowel perforation and free air.  General Surgery consulted and taken to OR this AM revealing SB perf with 3 L feculent fluid and food in cavity and large obstructing cecal mass with perf ileum and palpable liver mass.  Had Expl Lap with washout and excision of SB with wound vac placement admitted post op to ICU intubated on mech ventilation.  Received 2 units PRBCs in ED and another 2 units in OR also on Levophed and Vasopressin infusions.  Before surgery patient was insistent he be DNR post op but consented to surgery and invasive mech ventilation but no ACLS post op in event of cardiac arrest and no prolonged mech ventilation. Reportedly patient does not routinely follow with practitioner as outpt.       Hospital/ICU Course:  5/4 - Admitted to ICU sedated and intubated on Levophed and Vasopressin infusions in no distress  5/5 - remains intubated and sedated on mechanical vent and pressor support; scant urine output overnight and creatinine rise to 2.2 with fluid balance +8.9L  5/6 - remains intubated and sedated on mechanical vent with decreasing pressor demand; still oliguric with creatinine up to 3.6, K 5.2; fluid balance +13L; now with bigeminy and cardiac rhythm changes, K  stable on abg but iCa 0.78  5/7 - remains intubated and sedated with open abdomen to abthera wound vac and pressor support; overnight atrial fib with RVR, now on amio infusion with SR 68 on monitor; plan to OR for washout and vac replacement today  5/8 - remains intubated, sedated with ab thera wound vac to open abdomen, mechanical ventilation, and 2 pressor support. SR on monitor, on amiodarone infusion. Tmax 100.2, wbc down at 12.3k, creatinine rising 4.6,urine output scant, K 5.1  5/9 - remains intubated and sedated with ab thera wound vac to open abdomen, mech vent, and 2 pressor support. Remains SR on monitor with amio infusion. Tmax 98.9, wbc down 10.6k, creatinine holding at 4.7, K 4.6 after lasix trial with 1.2L urinary output  5/10 - remains intubated and sedated with ab thera wound vac to open abdomen, mech vent, on pressor support. Atrial fib on monitor, rate 90s with occasional non sustained elevation. Urine output adequate since lasix trial but creatinine, K, BUN unchanged and remains 22L positive fluid status since admit  5/11 - return from OR late this evening after ABD WASHOUT, RT HEMICOLECTOMY, ILEOSTOMY, LIVER BIOPSY, AND ABD WALL CLOSURE. Remains atrial fib 80-100s on monitor on levophed support.  5/12 - semi erect in bed intubated on mech ventilation in no distress still in A-fib rate 113 bpm sedated heavily still on Fentanyl infusion.  Also still on Amiodarone and Levophed infusions.  Receiving TPN.  S/P abd washout and closure yesterday.        Review of Systems   Unable to perform ROS: Intubated       Objective:     Vital Signs (Most Recent):  Temp: 100.22 °F (37.9 °C) (05/12/22 1230)  Pulse: (!) 116 (05/12/22 1230)  Resp: 20 (05/12/22 1230)  BP: (!) 159/72 (05/12/22 1200)  SpO2: 98 % (05/12/22 1230)   Vital Signs (24h Range):  Temp:  [98.78 °F (37.1 °C)-100.22 °F (37.9 °C)] 100.22 °F (37.9 °C)  Pulse:  [] 116  Resp:  [20-30] 20  SpO2:  [81 %-100 %] 98 %  BP: ()/(56-88)  159/72  Arterial Line BP: ()/(46-84) 116/67     Weight: 115.8 kg (255 lb 4.7 oz)  Body mass index is 37.7 kg/m².      Intake/Output Summary (Last 24 hours) at 5/12/2022 1343  Last data filed at 5/12/2022 1200  Gross per 24 hour   Intake 3097.83 ml   Output 3270 ml   Net -172.17 ml       Physical Exam  Vitals and nursing note reviewed.   Constitutional:       General: He is not in acute distress.     Appearance: He is well-developed. He is obese. He is ill-appearing. He is not toxic-appearing or diaphoretic.      Interventions: He is sedated, intubated and restrained.   HENT:      Head: Atraumatic.      Nose:        Mouth/Throat:      Mouth: Mucous membranes are moist.   Eyes:      General: No scleral icterus.     Conjunctiva/sclera: Conjunctivae normal.      Pupils: Pupils are equal, round, and reactive to light.   Neck:      Vascular: No JVD.     Cardiovascular:      Rate and Rhythm: Tachycardia present. Rhythm irregular.      Pulses:           Radial pulses are 1+ on the right side and 1+ on the left side.        Dorsalis pedis pulses are 1+ on the right side and 1+ on the left side.      Heart sounds: Heart sounds are distant.   Pulmonary:      Effort: No accessory muscle usage or respiratory distress. He is intubated.      Breath sounds: Decreased breath sounds present. No wheezing or rhonchi.   Chest:      Chest wall: No deformity or tenderness.   Abdominal:      General: Bowel sounds are absent. There is no distension.       Genitourinary:     Comments: Doherty in place  Musculoskeletal:      Cervical back: Neck supple.      Right lower leg: 3+ Pitting Edema present.      Left lower leg: 3+ Pitting Edema present.      Right foot: No deformity.      Left foot: No deformity.   Lymphadenopathy:      Cervical: No cervical adenopathy.   Skin:     General: Skin is warm.      Capillary Refill: Capillary refill takes 2 to 3 seconds.          Neurological:      Comments: Heavily sedated       Vents:  Vent Mode: A/C  (05/12/22 1119)  Ventilator Initiated: Yes (05/04/22 1258)  Set Rate: 20 BPM (05/12/22 1119)  Vt Set: 450 mL (05/12/22 1119)  Pressure Support: 0 cmH20 (05/12/22 1119)  PEEP/CPAP: 5 cmH20 (05/12/22 1119)  Oxygen Concentration (%): 35 (05/12/22 1230)  Peak Airway Pressure: 19 cmH2O (05/12/22 1119)  Plateau Pressure: 16 cmH20 (05/12/22 1119)  Total Ve: 9.7 mL (05/12/22 1119)  F/VT Ratio<105 (RSBI): (!) 42.37 (05/12/22 1119)    Lines/Drains/Airways       Central Venous Catheter Line  Duration             Percutaneous Central Line Insertion/Assessment - Triple Lumen  05/04/22 1200 right internal jugular 8 days              Drain  Duration                  Urethral Catheter 05/04/22 1105 Straight-tip;Silicone 16 Fr. 8 days         Closed/Suction Drain 05/11/22 1735 LUQ Bulb 19 Fr. <1 day         Closed/Suction Drain 05/11/22 1735 RLQ Bulb 19 Fr. <1 day         Colostomy 05/11/22 1736 RUQ <1 day         NG/OG Tube 05/11/22 1715 [place] sump 18 Fr. Left nostril <1 day              Airway  Duration                  Airway - Non-Surgical 05/04/22 1051 Endotracheal Tube 8 days              Arterial Line  Duration             Arterial Line 05/07/22 1330 Right Radial 5 days              Peripheral Intravenous Line  Duration                  Peripheral IV - Single Lumen 05/11/22 2100 20 G Right Antecubital <1 day                    Significant Labs:    CBC/Anemia Profile:  Recent Labs   Lab 05/11/22  0416 05/12/22  0419   WBC 17.10* 14.17*   HGB 8.6* 8.6*   HCT 28.4* 28.8*   * 109*   MCV 72* 73*   RDW 24.7* 25.7*        Chemistries:  Recent Labs   Lab 05/11/22  0416 05/11/22  2256 05/12/22  0419   * 136 137   K 4.3 4.3 4.1   CL 91* 94* 94*   CO2 20* 19* 21*   BUN 95* 98* 101*   CREATININE 4.7* 4.4* 4.3*   CALCIUM 7.0* 6.3* 6.4*   ALBUMIN 1.2* 1.1* 1.1*   PROT 4.4* 3.3* 3.3*   BILITOT 0.8 0.9 0.9   ALKPHOS 138* 112 113   * 244* 217*   * 161* 124*   MG 1.9  --  1.9   PHOS 8.9*  --  8.9*       ABGs:    Recent Labs   Lab 05/12/22  0418   PH 7.463*   PCO2 36.0   HCO3 25.8   POCSATURATED 99   BE 2     POCT Glucose:   Recent Labs   Lab 05/12/22  0421 05/12/22  0825 05/12/22  1227   POCTGLUCOSE 128* 148* 143*     All pertinent labs within the past 24 hours have been reviewed.        ABG  Recent Labs   Lab 05/12/22 0418   PH 7.463*   PO2 154*   PCO2 36.0   HCO3 25.8   BE 2     Assessment/Plan:     Pulmonary  Acute hypoxemic respiratory failure on mechanical vent  Vent settings reviewed and adjusted to optimize gas exchange  VAP prophylaxis  ABG daily and prn to assess response to therapy  Weaning sedation for SAT  SBT once more alert    Cardiac/Vascular  Atrial fibrillation with RVR  New onset 5/6  on amiodarone infusion; maintain until clear for enteral route  Continue cardiac monitoring  Add Heparin infusion and monitor closely for bleeding    Renal/  JOSE (acute kidney injury)  S/t septic shock  Adequate volume resuscitation +21L  Avoid nephrotoxins  Strict I/O  No acute indication for dialysis but high potential for continued decline, daughter(TAY) will not pursue dialysis if decline per her father's wishes  Nephrology following  Adequate urine post diuretic trial but no improvement in BUN/Cr/K    ID  * Septic shock sec to Peritonitis from SB perforation  Secondary to Peritonitis from bowel perforation with major fecal contamination  Blood and sputum cultures 5/4 Neg  S/P Zyvox  Continue invanz and Micafungin w/ ID following  continue to titrate pressor for MAP > 75  ICU hemodynamic monitoring    Oncology  Acute on chronic anemia  Received 2 units PRBCs in ED and 2 more in OR on 5/4  CBC stable follow daily  Monitor closely with adding Heparin infusion for A-fib     Endocrine  Obesity (BMI 30.0-34.9)  Will encourage weight loss once/if survives and extubated and fully awake/alert    GI  Mass of colon  Found on exploration along with palpable liver mass  Sent for pathology 5/11 results still pending  High  concern for metastatic malignancy    Small bowel perforation with large Cecal mass   5/4 Expl Lap and SB excision with washout and AB thera wound vac for bowel left in discontinuity  5/7 washout and wound vac replacement  hx Diverticulosis but cecal mass and suspected metastatic lesions found in exploration  5/11 ABD WASHOUT, RT HEMICOLECTOMY, ILEOSTOMY, LIVER BIOPSY, AND ABD WALL CLOSURE  IVAB for peritonitis/sepsis  NPO and IVFs with NG to suction  TPN initiated on 5/10; pharmD and RD following for daily adjustment recs  Pathology still pending    Palliative Care  Pt is DNR, discussed at length with surgeon prior to surgery and he consented to intubation for surgery with understanding of likely need for prolonged vent support post op until bowel/abdomen closed. He was clear that he would not desire long term vent support past that necessary for immediate post op recovery.   Daughter Claudia is SDM and is aware and supportive of his wishes. IF at any point his survival chances become poor or prolonged life support is anticipated she would honor his wish and transition to comfort focused care.  5/6 - discussed status with daughter. She expressed again understanding of her father's wishes for very limited life support and further stated today that after time to reflect on current status, in the event of continued decline she would not want to pursue potential dialysis but if kidneys fail and indications for RRT exist she would at that time desire transition to a full comfort care focus. She would hope that he could be extubated to comfort focus and have opportunity to interact with family but verbalizes understanding that this may not be possible given open abdomen and comfort goal.  Continue daily and prn updates        Preventive Measures and Monitoring:   Stress Ulcer: Pepcid  Nutrition: TPN  Glucose control: SSI  Bowel prophylaxis: S/P colon surgery  DVT prophylaxis: Heparin infusion  Hx CAD on B-Blocker: no hx  CAD  Head of Bed/Reposition: Elevate HOB and turn Q1-2 hours   Early Mobility: bed rest  SAT/SBT: SBT pending awakening from SAT  RASS goal: -1  Vent Day: #9  NG Day: #2  Central Line Right IJ Day: #9  Doherty Day: #9  IVAB Day: #9  Code Status: DNR    Counseling/Consultation:I have discussed the care of this patient in detail with the bedside nursing staff and Dr. Whelan and Dr. Parker    Critical Care Time: 57 minutes  Critical secondary to Patient has a condition that poses threat to life and bodily function: Acute Renal Failure and Septic Shock and Acute Hypoxic Resp Failure intubated on [facility] ventilation  Patient is currently on drug therapy requiring intensive monitoring for toxicity: Amiodarone and Levophed infusions  Patient is currently receiving parenteral controlled substances: Fentanyl infusion      Critical care was time spent personally by me on the following activities: development of treatment plan with patient or surrogate and bedside caregivers, discussions with consultants, evaluation of patient's response to treatment, examination of patient, ordering and performing treatments and interventions, ordering and review of laboratory studies, ordering and review of radiographic studies, pulse oximetry, re-evaluation of patient's condition. This critical care time did not overlap with that of any other provider or involve time for any procedures.     Rai Hernandez NP  Critical Care Medicine  [facility] - Intensive [facility] ([facility])

## 2022-05-12 NOTE — ASSESSMENT & PLAN NOTE
Found on exploration along with palpable liver mass  Sent for pathology 5/11  High concern for metastatic malignancy

## 2022-05-12 NOTE — ASSESSMENT & PLAN NOTE
5/4 Expl Lap and SB excision with washout and AB thera wound vac for bowel left in discontinuity  5/7 washout and wound vac replacement  hx Diverticulosis but cecal mass and suspected metastatic lesions found in exploration  5/11 ABD WASHOUT, RT HEMICOLECTOMY, ILEOSTOMY, LIVER BIOPSY, AND ABD WALL CLOSURE  IVAB for peritonitis/sepsis  NPO and IVFs with NG to suction  TPN initiated on 5/10; pharmD and RD following for daily adjustment recs

## 2022-05-12 NOTE — PLAN OF CARE
Recommendation/Intervention: 5/12  1. Recommend to continue Custom TPN:   -Custom Macronutrients AA- 72g; CHO- 200g.   -GIR: 1.19.   -Standard lipids pending triglycerides.   -Check Mg, Na, Phos, and Glucose before and during initiation;Correct as indicated.      2. Weekly weights per RD follow up.    Nanda Holman RD,LDN

## 2022-05-12 NOTE — ASSESSMENT & PLAN NOTE
S/t septic shock  Adequate volume resuscitation +21L  Avoid nephrotoxins  Strict I/O  No acute indication for dialysis but high potential for continued decline, daughter(HCPOA) will not pursue dialysis if decline per her father's wishes  Nephrology following  Adequate urine post diuretic trial but no improvement in BUN/Cr/K

## 2022-05-12 NOTE — ASSESSMENT & PLAN NOTE
Pressors  Abx - Zosyn / Micafungin  ID on consult    Remains in severe Septic Shock complicated by JOSE/ATN- Anuria and Resp Failure on Vent  ICU team has d/w daughter about HD if and when needed- she does not appear to be in favor of HD at present  Prognosis poor    Day 4 in Septic Shock and on vent  Continue Levo and Vaso plus IV Abx  Prognosis poor    Day 5- Continues same on Vent, WBC better but remains in renal shut down due to shock plus 2 Pressors    Day 6- Continue present supportive care    Day 7- gradually improving, now on 1 pressor and WBC down to 17  Cont present care  Going for surgery today

## 2022-05-12 NOTE — PROGRESS NOTES
[facility] - Intensive Care ([facility])  Infectious Disease  Progress Note    Patient Name: Franky Masters  MRN: [id number]  Admission Date: 5/4/2022  Length of Stay: 8 days  Attending Physician: Elvie Parker DO  Primary Care Provider: HOLLY ROSEN    Isolation Status: No active isolations  Assessment/Plan:      JOSE (acute kidney injury)  Nephrology follow up, avoid nephrotoxic meds    Small bowel perforation with large Cecal mass   Will continue Zosyn , continue close monitoring     05/05/22- due to persistent fever , will switch to Micafungin, Ertapenem and zyvox   Follow cultures   05/06/22- continue micafungin , zyvox/ertapenem, follow surgery for washout in AM  05/107- follow Gen surgery for wash out -will treat for 2 weeks .  Will follow and monitor clinical course .  Continue Zyvox/ertapenem /micafungin for now  05/11/22   6 days of Zyvox, on day 7 of Ertapenem and day 8 of Micafungin.  Will plan to treat for 2 weeks  Will discuss with team   Will stop Zyvox    Acute hypoxemic respiratory failure on mechanical vent  Critical care follow up         Anticipated Disposition:     Thank you for your consult. I will follow-up with patient. Please contact us if you have any additional questions.    Rai Hidalgo MD  Infectious Disease  [facility] - Intensive Care ([facility])    Subjective:     Principal Problem:Septic shock    HPI:   67 yo male POD#0 s/p SB perforation with surgical intervention demonstrating a large cecal mass and 3l feculant fluid in the abdominal cavity.    He is presently intubated . Operative note reviewed- perforated ileum and a liver mass.  Labs and imaging test reviewed.  Component      Latest Ref Rng & Units 5/4/2022 5/4/2022           1:30 PM  6:57 AM   WBC      3.90 - 12.70 K/uL 1.05 (LL) 2.48 (L)     Interval History:   66 year old man s/p I and D for  perforation demonstrating a large cecal mass and 3l feculant fluid in the abdominal cavity.  He is now febrile  T max 102.   05/06- remains  "intubated , has fever   Blood cultures -negative till date.  05/07- will be taken back to OR today .  Remains intubated.  05/11/22  S/p exp lap-  Operative findings-  "Pieces of food/vegetation still throughout the abdomen. Bowel was edematous but otherwise pink and viable appearing. Large cecal mass with bulky mesenteric lymphadenopathy around ileocolic pedicle. Palpable liver masses"  Review of Systems   Unable to perform ROS: Intubated   Objective:     Vital Signs (Most Recent):  Temp: 99.32 °F (37.4 °C) (05/12/22 0645)  Pulse: (!) 117 (05/12/22 0645)  Resp: (!) 26 (05/12/22 0645)  BP: 109/73 (05/12/22 0600)  SpO2: 99 % (05/12/22 0645)   Vital Signs (24h Range):  Temp:  [98.78 °F (37.1 °C)-100.22 °F (37.9 °C)] 99.32 °F (37.4 °C)  Pulse:  [] 117  Resp:  [23-32] 26  SpO2:  [81 %-100 %] 99 %  BP: ()/(50-78) 109/73  Arterial Line BP: ()/(46-84) 116/67     Weight: 115.8 kg (255 lb 4.7 oz)  Body mass index is 37.7 kg/m².    Estimated Creatinine Clearance: 20.6 mL/min (A) (based on SCr of 4.3 mg/dL (H)).    Physical Exam  Vitals and nursing note reviewed.   Constitutional:       Appearance: He is well-developed.   HENT:      Head: Normocephalic and atraumatic.      Nose: Nose normal.   Eyes:      Pupils: Pupils are equal, round, and reactive to light.   Cardiovascular:      Rate and Rhythm: Normal rate and regular rhythm.      Heart sounds: Normal heart sounds.   Pulmonary:      Effort: Pulmonary effort is normal. No respiratory distress.      Breath sounds: Normal breath sounds. No wheezing or rales.   Abdominal:      General: There is distension.      Tenderness: There is no abdominal tenderness.      Comments: Has colostomy bag   Musculoskeletal:      Cervical back: Normal range of motion and neck supple.   Skin:     General: Skin is dry.   Neurological:      Comments: Intubated,sedated       Significant Labs: Blood Culture:   Recent Labs   Lab 05/04/22  1428 05/04/22  1429   LABBLOO No growth after " 5 days. No growth after 5 days.       BMP:   Recent Labs   Lab 05/12/22 0419   *      K 4.1   CL 94*   CO2 21*   *   CREATININE 4.3*   CALCIUM 6.4*   MG 1.9       CBC:   Recent Labs   Lab 05/11/22 0416 05/12/22 0419   WBC 17.10* 14.17*   HGB 8.6* 8.6*   HCT 28.4* 28.8*   * 109*       CMP:   Recent Labs   Lab 05/11/22 0416 05/11/22 2256 05/12/22 0419   * 136 137   K 4.3 4.3 4.1   CL 91* 94* 94*   CO2 20* 19* 21*   GLU 87 114* 129*   BUN 95* 98* 101*   CREATININE 4.7* 4.4* 4.3*   CALCIUM 7.0* 6.3* 6.4*   PROT 4.4* 3.3* 3.3*   ALBUMIN 1.2* 1.1* 1.1*   BILITOT 0.8 0.9 0.9   ALKPHOS 138* 112 113   * 161* 124*   * 244* 217*   ANIONGAP 22* 23* 22*   EGFRNONAA 12* 13* 13*         Significant Imaging: I have reviewed all pertinent imaging results/findings within the past 24 hours.

## 2022-05-12 NOTE — PROGRESS NOTES
[facility] ([facility])  Critical Care Medicine  Progress Note    Patient Name: Franky Masters  MRN: [id number]  Admission Date: 5/4/2022  Hospital Length of Stay: 7 days  Code Status: DNR  Attending Provider: Elvie Parker DO  Primary Care Provider: HOLLY ROSEN   Principal Problem: Septic shock    Subjective:     HPI:  Ms Masters is a 65 yo obese male with a PMH of diverticulosis and hx of hospitalization in Aug 2021 with COVID PNA and Hypoxic Resp Failure but did not require ICU or intubation.  She presented early this AM to [facility] ED about 0515 hr via EMS with complaint of abd pain X 2 hours that awakened her from sleep and had associated N/V/D.  In ED BP 86/46, RA SAT 92%, LA 8, Hgb 7.2 and CT Abd with suspected bowel perforation and free air.  General Surgery consulted and taken to OR this AM revealing SB perf with 3 L feculent fluid and food in cavity and large obstructing cecal mass with perf ileum and palpable liver mass.  Had Expl Lap with washout and excision of SB with wound vac placement admitted post op to ICU intubated on mech ventilation.  Received 2 units PRBCs in ED and another 2 units in OR also on Levophed and Vasopressin infusions.  Before surgery patient was insistent he be DNR post op but consented to surgery and invasive mech ventilation but no ACLS post op in event of cardiac arrest and no prolonged mech ventilation. Reportedly patient does not routinely follow with practitioner as outpt.       Hospital/ICU Course:  5/4 - Admitted to ICU sedated and intubated on Levophed and Vasopressin infusions in no distress  5/5 - remains intubated and sedated on mechanical vent and pressor support; scant urine output overnight and creatinine rise to 2.2 with fluid balance +8.9L  5/6 - remains intubated and sedated on mechanical vent with decreasing pressor demand; still oliguric with creatinine up to 3.6, K 5.2; fluid balance +13L; now with bigeminy and cardiac rhythm changes, K  stable on abg but iCa 0.78  5/7 - remains intubated and sedated with open abdomen to abthera wound vac and pressor support; overnight atrial fib with RVR, now on amio infusion with SR 68 on monitor; plan to OR for washout and vac replacement today  5/8 - remains intubated, sedated with ab thera wound vac to open abdomen, mechanical ventilation, and 2 pressor support. SR on monitor, on amiodarone infusion. Tmax 100.2, wbc down at 12.3k, creatinine rising 4.6,urine output scant, K 5.1  5/9 - remains intubated and sedated with ab thera wound vac to open abdomen, mech vent, and 2 pressor support. Remains SR on monitor with amio infusion. Tmax 98.9, wbc down 10.6k, creatinine holding at 4.7, K 4.6 after lasix trial with 1.2L urinary output  5/10 - remains intubated and sedated with ab thera wound vac to open abdomen, mech vent, on pressor support. Atrial fib on monitor, rate 90s with occasional non sustained elevation. Urine output adequate since lasix trial but creatinine, K, BUN unchanged and remains 22L positive fluid status since admit  5/11 - return from OR late this evening after ABD WASHOUT, RT HEMICOLECTOMY, ILEOSTOMY, LIVER BIOPSY, AND ABD WALL CLOSURE. Remains atrial fib 80-100s on monitor on levophed support.      Review of Systems   Unable to perform ROS: Intubated     Objective:     Vital Signs (Most Recent):  Temp: 99.32 °F (37.4 °C) (05/11/22 1532)  Pulse: 86 (05/11/22 1532)  Resp: (!) 25 (05/11/22 1532)  BP: 101/70 (05/11/22 1500)  SpO2: 97 % (05/11/22 1836)   Vital Signs (24h Range):  Temp:  [98.42 °F (36.9 °C)-100.58 °F (38.1 °C)] 100.22 °F (37.9 °C)  Pulse:  [] 106  Resp:  [21-32] 28  SpO2:  [99 %-100 %] 100 %  BP: ()/(57-86) 121/68  Arterial Line BP: ()/(53-74) 146/68     Weight: 116.5 kg (256 lb 13.4 oz)  Body mass index is 37.93 kg/m².      Intake/Output Summary (Last 24 hours) at 5/11/2022 0816  Last data filed at 5/11/2022 0800  Gross per 24 hour   Intake 2584.36 ml   Output 4472  ml   Net -1887.64 ml        amiodarone in dextrose 5% 0.5 mg/min (22 1800)    dextrose 10 % in water (D10W)      fentanyl Stopped (22 1542)    NORepinephrine bitartrate-D5W 0.15 mcg/kg/min (22 1800)    TPN ADULT CENTRAL LINE CUSTOM 32 mL/hr at 22 1840       Physical Exam  Vitals and nursing note reviewed.   Constitutional:       General: He is not in acute distress.     Appearance: He is obese. He is ill-appearing.      Interventions: He is sedated, intubated and restrained.   HENT:      Head: Atraumatic.   Eyes:      General: No scleral icterus.     Conjunctiva/sclera: Conjunctivae normal.   Neck:      Vascular: No JVD.   Cardiovascular:      Rate and Rhythm: Normal rate and regular rhythm.      Pulses:           Radial pulses are 1+ on the right side and 1+ on the left side.        Dorsalis pedis pulses are 1+ on the right side and 1+ on the left side.   Pulmonary:      Effort: He is intubated.      Breath sounds: Decreased breath sounds present. No wheezing or rhonchi.   Abdominal:      General: Bowel sounds are absent.       Musculoskeletal:      Right lower le+ Pitting Edema present.      Left lower le+ Pitting Edema present.   Skin:     General: Skin is cool.      Capillary Refill: Capillary refill takes more than 3 seconds.       Vents:  Vent Mode: A/C (22)  Ventilator Initiated: Yes (22 125)  Set Rate: 25 BPM (22)  Vt Set: 450 mL (22)  Pressure Support: 0 cmH20 (22)  PEEP/CPAP: 5 cmH20 (22)  Oxygen Concentration (%): 40 (22 0745)  Peak Airway Pressure: 25 cmH2O (22)  Plateau Pressure: 18 cmH20 (22)  Total Ve: 12.4 mL (22)  F/VT Ratio<105 (RSBI): (!) 43.98 (22)    Lines/Drains/Airways       Central Venous Catheter Line  Duration             Percutaneous Central Line Insertion/Assessment - Triple Lumen  22 1200 right internal jugular 6 days               Drain  Duration                  NG/OG Tube 05/04/22 1600 6 days         Urethral Catheter 05/04/22 1105 Straight-tip;Silicone 16 Fr. 6 days              Airway  Duration                  Airway - Non-Surgical 05/04/22 1051 Endotracheal Tube 6 days              Arterial Line  Duration             Arterial Line 05/07/22 1330 Right Radial 3 days                    Significant Labs:    CBC/Anemia Profile:  Recent Labs   Lab 05/10/22  0406 05/11/22  0416   WBC 13.35* 17.10*   HGB 8.6* 8.6*   HCT 28.0* 28.4*   * 108*   MCV 71* 72*   RDW 24.7* 24.7*          Chemistries:  Recent Labs   Lab 05/10/22  0406 05/11/22  0416   * 133*   K 5.0 4.3   CL 90* 91*   CO2 19* 20*   BUN 87* 95*   CREATININE 4.8* 4.7*   CALCIUM 7.1* 7.0*   ALBUMIN 1.3* 1.2*   PROT 4.4* 4.4*   BILITOT 0.9 0.8   ALKPHOS 120 138*   * 350*   * 272*   MG 2.0 1.9   PHOS  --  8.9*         All pertinent labs within the past 24 hours have been reviewed.    Significant Imaging:  I have reviewed all pertinent imaging results/findings within the past 24 hours.      ABG  Recent Labs   Lab 05/11/22  0332   PH 7.494*   PO2 153*   PCO2 35.0   HCO3 26.9   BE 4     Assessment/Plan:     Pulmonary  Acute hypoxemic respiratory failure on mechanical vent  Vent settings reviewed and adjusted to optimize gas exchange  VAP prophylaxis  ABG daily and prn to assess response to therapy  SAT/SBT for extubation readiness in am    Cardiac/Vascular  Atrial fibrillation with RVR  New onset 5/6  on amiodarone infusion; maintain until clear for enteral route  Continue cardiac monitoring    Renal/  JOSE (acute kidney injury)  S/t septic shock  Adequate volume resuscitation +21L  Avoid nephrotoxins  Strict I/O  No acute indication for dialysis but high potential for continued decline, daughter(HCPOA) will not pursue dialysis if decline per her father's wishes  Nephrology consulted to optimize medical management  Adequate urine post diuretic trial but no  improvement in BUN/Cr/K    ID  * Septic shock sec to Peritonitis from SB perforation  Secondary to Peritonitis from bowel perforation with major fecal contamination  Blood and sputum cultures ngtd  Will stop zyvox now post closure  Continue invanz and Micafungin  continue to titrate pressor for MAP > 75  ICU hemodynamic monitoring    Oncology  Acute on chronic anemia  Received 2 units PRBCs in ED and 2 more in OR on 5/4  CBC stable   Monitor wound vac output and daily CBC with Conservative transfusion protocol    Endocrine  Obesity (BMI 30.0-34.9)  Will encourage weight loss once/if survives and extubated and fully awake/alert    GI  Mass of colon  Found on exploration along with palpable liver mass  Sent for pathology 5/11  High concern for metastatic malignancy    Small bowel perforation with large Cecal mass   5/4 Expl Lap and SB excision with washout and AB thera wound vac for bowel left in discontinuity  5/7 washout and wound vac replacement  hx Diverticulosis but cecal mass and suspected metastatic lesions found in exploration  5/11 ABD WASHOUT, RT HEMICOLECTOMY, ILEOSTOMY, LIVER BIOPSY, AND ABD WALL CLOSURE  IVAB for peritonitis/sepsis  NPO and IVFs with NG to suction  TPN initiated on 5/10; pharmD and RD following for daily adjustment recs    Palliative Care  Pt is DNR, discussed at length with surgeon prior to surgery and he consented to intubation for surgery with understanding of likely need for prolonged vent support post op until bowel/abdomen closed. He was clear that he would not desire long term vent support past that necessary for immediate post op recovery.   Daughter Claudia is SDM and is aware and supportive of his wishes. IF at any point his survival chances become poor or prolonged life support is anticipated she would honor his wish and transition to comfort focused care.  5/6 - discussed status with daughter. She expressed again understanding of her father's wishes for very limited life  support and further stated today that after time to reflect on current status, in the event of continued decline she would not want to pursue potential dialysis but if kidneys fail and indications for RRT exist she would at that time desire transition to a full comfort care focus. She would hope that he could be extubated to comfort focus and have opportunity to interact with family but verbalizes understanding that this may not be possible given open abdomen and comfort goal.  Continue daily and prn updates     Critical Care Daily Checklist:    A: Awake: RASS Goal/Actual Goal: RASS Goal: -3-->moderate sedation  Actual: German Agitation Sedation Scale (RASS): Deep sedation   B: Spontaneous Breathing Trial Performed?     C: SAT & SBT Coordinated?  Start in am                      D: Delirium: CAM-ICU Overall CAM-ICU: Positive   E: Early Mobility Performed? Yes   F: Feeding Goal: Goals: 1. Parenteral Nutrition Initiation within 24 hours.  Status: Nutrition Goal Status: new   Current Diet Order   Procedures    Diet NPO      AS: Analgesia/Sedation Fentanyl infusion   T: Thromboembolic Prophylaxis heparin   H: HOB > 300 Yes   U: Stress Ulcer Prophylaxis (if needed) pepcid   G: Glucose Control As above   B: Bowel Function     I: Indwelling Catheter (Lines & Doherty) Necessity reviewed   D: De-escalation of Antimicrobials/Pharmacotherapies reviewed    Plan for the day/ETD Supportive care as above    Code Status:  Family/Goals of Care: DNR  Pending hospital course; see palliative discussion above   I have discussed case and plan of care in detail with Dr Whelan; Status and plan of care were discussed with team on multidisciplinary rounds.    Critical Care Time: 55 minutes  Critical secondary to septic shock, JOSE, mechanical vent and pressor support  Critical care was time spent personally by me on the following activities: development of treatment plan with patient or surrogate and bedside caregivers, discussions with  consultants, evaluation of patient's response to treatment, examination of patient, ordering and performing treatments and interventions, ordering and review of laboratory studies, ordering and review of radiographic studies, pulse oximetry, re-evaluation of patient's condition. This critical care time did not overlap with that of any other provider or involve time for any procedures.     KATHARINE Miranda-BC  Critical Care Medicine  [facility] ([facility])

## 2022-05-12 NOTE — SUBJECTIVE & OBJECTIVE
Interval History: Seen Pre op- looks better, less swollen, remains intubated, sedated on Vent, on 1 Pressor- on Levophed. Going for OR today for Laparotomy and washout and abd wound closure. Good response to Lasix. Still Afib on Amiodarone gtt. Bun/Cr 98/4.4, WBC down to 17, H/H 8.6/26.     Review of Systems   Unable to perform ROS: Intubated   Objective:     Vital Signs (Most Recent):  Temp: 99.32 °F (37.4 °C) (22 153)  Pulse: 86 (22 153)  Resp: (!) 25 (22 153)  BP: 101/70 (22 1500)  SpO2: 97 % (22 1836)    Vital Signs (24h Range):  Temp:  [98.42 °F (36.9 °C)-100.58 °F (38.1 °C)] 100.22 °F (37.9 °C)  Pulse:  [] 106  Resp:  [21-32] 28  SpO2:  [99 %-100 %] 100 %  BP: ()/(57-86) 121/68  Arterial Line BP: ()/(53-74) 146/68      Weight: 116.5 kg (256 lb 13.4 oz)  Body mass index is 37.93 kg/m².        Intake/Output Summary (Last 24 hours) at 2022 0816  Last data filed at 2022 0800      Gross per 24 hour   Intake 2584.36 ml   Output 4472 ml   Net -1887.64 ml      Physical Exam  Vitals and nursing note reviewed.   Constitutional:       General: He is not in acute distress.     Appearance: He is obese. He is ill-appearing.      Interventions: He is sedated, intubated and restrained.   HENT:      Head: Atraumatic.   Eyes:      General: No scleral icterus.     Conjunctiva/sclera: Conjunctivae normal.   Neck:      Vascular: No JVD.   Cardiovascular:      Rate and Rhythm: Normal rate and regular rhythm.      Pulses:           Radial pulses are 1+ on the right side and 1+ on the left side.        Dorsalis pedis pulses are 1+ on the right side and 1+ on the left side.   Pulmonary:      Effort: He is intubated.      Breath sounds: Decreased breath sounds present. No wheezing or rhonchi.   Abdominal:      General: Bowel sounds are absent.       Musculoskeletal:      Right lower le+ Pitting Edema present.      Left lower le+ Pitting Edema present.   Skin:      General: Skin is cool.      Capillary Refill: Capillary refill takes more than 3 seconds.       Significant Labs: All pertinent labs within the past 24 hours have been reviewed.    Significant Imaging: I have reviewed all pertinent imaging results/findings within the past 24 hours.

## 2022-05-12 NOTE — PLAN OF CARE
Pt remains intubated, and sedated post-op on max fent for goal RASS -3.   Afib rate 110s-120s on monitor. Temps 99-99.3 overnight. BP controlled w/levo gtt for MAPs >75. Abd incision dressing intact, minimal serosanguinous drainage unchanged overnight. Total 335mL combined mostly serous OP per JPs x 2. Significant edema unchanged. 200mL dark green bile per NGT. CBGs 110s-120s. Avg UOP 50mL/hr per [place] Q2 turns w/wedge; heel boots in place. BSWR for safety.  POC discussed w/patient's daughter at bedside, questions encouraged and answered, verbalized understanding.

## 2022-05-12 NOTE — ASSESSMENT & PLAN NOTE
Based on Surgery  Potential interventions  Goals of care  Biopsy as indicated  Likely Oncologic process with mets  Heme Onc as indicated    See above    Possible resection    resected

## 2022-05-12 NOTE — PROGRESS NOTES
[facility] - Intensive Care ([facility] Medicine  Progress Note    Patient Name: Franky Masters  MRN: [id number]  Patient Class: IP- Inpatient   Admission Date: 5/4/2022  Length of Stay: 8 days  Attending Physician: Elvie Parker DO  Primary Care Provider: HOLLY ROSEN        Subjective:     Principal Problem:Septic shock        HPI:  Mr Masters is a 65 yo male POD#0 s/p SB perforation with surgical intervention demonstrating a large cecal mass and 3l feculant fluid in the abdominal cavity. Additional findings of a perforated ileum and a liver mass. Patient tolerated the Exlap with Washout and small bowel excision. Patient had a wound vac placed, is currently intubated, sedated and recovering in the ICU. Patient received 4 units PRBC and remains on pressor support. Patient is a DNR and HM consulted with assistance with medical management. Daughter at bedside. Patient discussed with care team.          Overview/Hospital Course:  Patient continues to require pressors, remains intubated, sedated. Patient urine o/p declining and concerning. Discussed POC in detail at bedside with daughter POA. She expressed her fathers wish to not undergo treatments  like perm HD, where he has a diminished quality of life. We spoke frankly about issues and concerns and she will be communicating with the family as his case evolves. Comfort care was discussed and the goals of care will develop consistent with the patients expressed wishes. Potential for another surgery likely Saturday with a washout and possible biopsy vs resection are on the horizon. This possible intervention will be covered in detail by surgery sharing the risks and benefits of that approach. Case discussed with the primary service - surgery, and critical care team.    5/6- Pt seen and examined in the ICU plus discussed with ICU team and the pt's daughter/ POA: Remains critically ill, intubated, sedated on Vent, on Pressors- Levo plus Vaso- JOSE with oliguria  getting worse- Cr rising to 3.6, becoming severely acidotic- requiring Ertapenem, Zyvox, Micafungin as well as on Bicarb and Fentanyl gtt. He has massive fluid overload and generalized anasarca.  Possible return to OR tomorrow 5/7 if patient is more stable for right hemicolectomy, possible ileostomy, liver biopsy, abdominal wall closure. Dw Pt's daughter.       5/7- remains Intubated, sedated on Vent- Went into Afib w RVR- hence added Amio to Levo and Vasopressin- back in NSR. Getting Invanz and Zyvox plus Micafungin. Dr. Parker took him back to OR for for abdominal washout ( 3-3.5 L feculent fluid with food particles suctioned out in the OR, small bowel appeared better) and replaced Abthera- still has bowel discontinuity. His WBC, Lactate and Cr have all increased. Family updated about his critical condition and poor prognosis and JOSE/ATN- they are considering Comfort measures.     5/8- Day 5 on Vent- family at bedside, remains intubated on Vent with 2 Pressors- still on Amiodarone gtt for Afib, Still has Abthera wound vac to open Abdomen with small bowel discontinuity. Remains oliguric with generalized anasarca- almost 24 L fluid positive. Cr increased to 4.6. WBC down to 12, family does not want any HD/CRRT, so getting an IV Lasix trial to improve the urine output.     5/9- Day 6 on vent- remains the same, remains intubated, on vent with Abthera Wound vac and bowel discontinuity. Put out about 3 L urine since yesterday with IV Lasix, family still does not want any HD, WBC down to 10.2, H/H 7.8/24, still on 2 Pressors and Amio gtt. Family still deciding about comfort care.     5/10- Appreciate all- Day 7- remains same in septic shock on 2 vasopressors, intubated and sedated, still in afib. JOSE has remained stable at 4.7 but urine output improved with IV lasix. Abthera wound vac in place. No surgery today- put out about 2.5 L yesterday, given lasix again today.    5/11- Seen Pre op- looks better, less swollen,  remains intubated, sedated on Vent, on 1 Pressor- on Levophed. Going for OR today for Laparotomy and washout and abd wound closure. Good response to Lasix. Still Afib on Amiodarone gtt. Bun/Cr 98/4.4, WBC down to 17, H/H 8.6/26.     5/12- Day 8 on Vent- looks much better, remains on Vent with Levophed and Amio gtts. Had extensive surgery yesterday and did very well post op- Reopening of recent Laparotomy, Abdominal washout, R Hemicolectomy with Liver Bx, TAP block, Abdominal wall closure, Creation, end Ileostomy. POD 1- looks better, still on Levophed and Amio gtt for Afib, started on TPN, ID stopped Zyvox and continued Merrem/Mycafungin.          Interval History: Day 8 on Vent- looks much better, remains on Vent with Levophed and Amio gtts. Had extensive surgery yesterday and did very well post op- Reopening of recent Laparotomy, Abdominal washout, R Hemicolectomy with Liver Bx, TAP block, Abdominal wall closure, Creation, end Ileostomy. POD 1- looks better, still on Levophed and Amio gtt for Afib, started on TPN, ID stopped Zyvox and continued Merrem/Mycafungin.     Review of Systems   Unable to perform ROS: Intubated   Objective:     Vital Signs (Most Recent):  Temp: 100.22 °F (37.9 °C) (05/12/22 1532)  Pulse: (!) 120 (05/12/22 1532)  Resp: 15 (05/12/22 1532)  BP: (!) 159/72 (05/12/22 1200)  SpO2: 100 % (05/12/22 1532)   Vital Signs (24h Range):  Temp:  [98.78 °F (37.1 °C)-100.58 °F (38.1 °C)] 100.22 °F (37.9 °C)  Pulse:  [108-123] 120  Resp:  [15-30] 15  SpO2:  [81 %-100 %] 100 %  BP: ()/(56-88) 159/72  Arterial Line BP: ()/() 156/77     Weight: 115.8 kg (255 lb 4.7 oz)  Body mass index is 37.7 kg/m².    Intake/Output Summary (Last 24 hours) at 5/12/2022 1637  Last data filed at 5/12/2022 1500  Gross per 24 hour   Intake 2996.93 ml   Output 3135 ml   Net -138.07 ml      Physical Exam  Vitals and nursing note reviewed.   Constitutional:       General: He is not in acute distress.      Appearance: He is well-developed. He is obese. He is ill-appearing. He is not toxic-appearing or diaphoretic.      Interventions: He is sedated, intubated and restrained.   HENT:      Head: Atraumatic.      Nose:        Mouth/Throat:      Mouth: Mucous membranes are moist.   Eyes:      General: No scleral icterus.     Conjunctiva/sclera: Conjunctivae normal.      Pupils: Pupils are equal, round, and reactive to light.   Neck:      Vascular: No JVD.     Cardiovascular:      Rate and Rhythm: Tachycardia present. Rhythm irregular.      Pulses:           Radial pulses are 1+ on the right side and 1+ on the left side.        Dorsalis pedis pulses are 1+ on the right side and 1+ on the left side.      Heart sounds: Heart sounds are distant.   Pulmonary:      Effort: No accessory muscle usage or respiratory distress. He is intubated.      Breath sounds: Decreased breath sounds present. No wheezing or rhonchi.   Chest:      Chest wall: No deformity or tenderness.   Abdominal:      General: Bowel sounds are absent. There is no distension.       Genitourinary:     Comments: Doherty in place  Musculoskeletal:      Cervical back: Neck supple.      Right lower leg: 3+ Pitting Edema present.      Left lower leg: 3+ Pitting Edema present.      Right foot: No deformity.      Left foot: No deformity.   Lymphadenopathy:      Cervical: No cervical adenopathy.   Skin:     General: Skin is warm.      Capillary Refill: Capillary refill takes 2 to 3 seconds.          Neurological:      Comments: Heavily sedated     Flow (L/min): 4  Vent Mode: Spont  Oxygen Concentration (%):  [35-40] 35  Resp Rate Total:  [18 br/min-30 br/min] 20 br/min  Vt Set:  [450 mL] 450 mL  PEEP/CPAP:  [5 cmH20] 5 cmH20  Pressure Support:  [0 cmH20-10 cmH20] 10 cmH20  Mean Airway Pressure:  [8.6 ffK51-77 cmH20] 8.8 cmH20  Temp:  [98.78 °F (37.1 °C)-100.58 °F (38.1 °C)] 100.22 °F (37.9 °C)  Pulse:  [108-123] 120  Resp:  [15-30] 15  SpO2:  [81 %-100 %] 100 %  BP:  ()/(56-88) 159/72  Arterial Line BP: ()/() 156/77   Art pH/pCO2/pO2/HCO3:  7.463/36.0/154/25.8 (05/12 0418)  Lab Results   Component Value Date    [id number] Negative 05/11/2022    [id number] Negative 05/04/2022    [id number] Positive (A) 08/05/2021      Recent Labs   Lab 05/07/22  1345 05/08/22  0421 05/10/22  0406 05/11/22  0416 05/11/22 2256 05/12/22 0419   LACTATE 6.7*  --  3.5*  --   --   --    NA  --    < > 132* 133* 136 137   WBC  --    < > 13.35* 17.10*  --  14.17*   GRAN  --    < > 87.8*  11.7* 91.5*  15.7*  --  92.1*  13.1*   LYMPH  --    < > 6.3*  0.8* 3.6*  0.6*  --  3.0*  0.4*   HGB  --    < > 8.6* 8.6*  --  8.6*   HCT  --    < > 28.0* 28.4*  --  28.8*   BUN  --    < > 87* 95* 98* 101*   CREATININE  --    < > 4.8* 4.7* 4.4* 4.3*   ESTGFRAFRICA  --    < > 13* 14* 15* 15*   EGFRNONAA  --    < > 12* 12* 13* 13*   K  --    < > 5.0 4.3 4.3 4.1   CL  --    < > 90* 91* 94* 94*   CO2  --    < > 19* 20* 19* 21*   PHOS  --   --   --  8.9*  --  8.9*   MG  --    < > 2.0 1.9  --  1.9    < > = values in this interval not displayed.     Microbiology Results (last 7 days)       Procedure Component Value Units Date/Time    Blood culture [[id number]] Collected: 05/04/22 1428    Order Status: Completed Specimen: Blood from Line, Arterial, Left Updated: 05/10/22 0612     Blood Culture, Routine No growth after 5 days.    Blood culture [[id number]] Collected: 05/04/22 1429    Order Status: Completed Specimen: Blood from Line, Jugular, Internal Right Updated: 05/10/22 0612     Blood Culture, Routine No growth after 5 days.    Culture, Respiratory with Gram Stain [[id number]] Collected: 05/04/22 1253    Order Status: Completed Specimen: Respiratory from Endotracheal Aspirate Updated: 05/07/22 0921     Respiratory Culture No growth     Gram Stain (Respiratory) Few WBC's     Gram Stain (Respiratory) Rare Gram positive rods          Antibiotics (From admission, onward)                Start     Stop  Route Frequency Ordered    05/09/22 2200  ertapenem (INVANZ) 500 mg in sodium chloride 0.9% 100 mL IVPB         05/19 2359 IV Every 24 hours (non-standard times) 05/09/22 1624          Anticoagulants       Ordered     Route Frequency Start Stop    05/12/22 1408  heparin (PORCINE)  (LOW INTENSITY heparin infusion nomogram - [facility] (Recommended Indications: Acute Coronary Syndrome, Atrial Fibrillation, Prophylaxis in Prosthetic Heart Valves, and other indication where full anticoagulation is desired, bleeding risk is moderate, antiplatelet agents have been utilized, and time to therapeutic can be delayed minimizing the increased bleeding risk))         IV As needed (PRN) 05/12/22 1508 --    05/12/22 1408  heparin (PORCINE)  (LOW INTENSITY heparin infusion nomogram - [facility] (Recommended Indications: Acute Coronary Syndrome, Atrial Fibrillation, Prophylaxis in Prosthetic Heart Valves, and other indication where full anticoagulation is desired, bleeding risk is moderate, antiplatelet agents have been utilized, and time to therapeutic can be delayed minimizing the increased bleeding risk))         IV As needed (PRN) 05/12/22 1508 --    05/12/22 1408  heparin (porcine) in D5W  (LOW INTENSITY heparin infusion nomogram - [facility] (Recommended Indications: Acute Coronary Syndrome, Atrial Fibrillation, Prophylaxis in Prosthetic Heart Valves, and other indication where full anticoagulation is desired, bleeding risk is moderate, antiplatelet agents have been utilized, and time to therapeutic can be delayed minimizing the increased bleeding risk))         IV Continuous 05/12/22 1415 --          X-Ray Chest 1 View  Narrative: EXAMINATION:  XR CHEST 1 VIEW    CLINICAL HISTORY:  respiratory failure; Pneumonia, unspecified organism    TECHNIQUE:  Single frontal view of the chest was performed.    COMPARISON:  05/05/2022    FINDINGS:  Tubes and lines are stable.   In comparison to the prior study, there is no adverse interval  changes.  Impression: In comparison to the prior study, there is no adverse interval changes    Electronically signed by: Philippe Horton MD  Date:    05/09/2022  Time:    12:04   Significant Labs: All pertinent labs within the past 24 hours have been reviewed.    Significant Imaging: I have reviewed all pertinent imaging results/findings within the past 24 hours.      Assessment/Plan:      * Septic shock sec to Peritonitis from SB perforation  Pressors  Abx - Zosyn / Micafungin  ID on consult    Remains in severe Septic Shock complicated by JOSE/ATN- Anuria and Resp Failure on Vent  ICU team has d/w daughter about HD if and when needed- she does not appear to be in favor of HD at present  Prognosis poor    Day 4 in Septic Shock and on vent  Continue Levo and Vaso plus IV Abx  Prognosis poor    Day 5- Continues same on Vent, WBC better but remains in renal shut down due to shock plus 2 Pressors    Day 6- Continue present supportive care    Day 7- gradually improving, now on 1 pressor and WBC down to 17  Cont present care  Going for surgery today    Day 8- improving, s/p extensive surgery yesterday  Will try to wean off Levophed    Acute hypoxemic respiratory failure on mechanical vent  Patient with Hypoxic Respiratory failure which is Acute.  he is not on home oxygen. Supplemental oxygen was provided and noted- Vent Mode: Spont  Oxygen Concentration (%):  [35-40] 35  Resp Rate Total:  [18 br/min-30 br/min] 20 br/min  Vt Set:  [450 mL] 450 mL  PEEP/CPAP:  [5 cmH20] 5 cmH20  Pressure Support:  [0 cmH20-10 cmH20] 10 cmH20  Mean Airway Pressure:  [8.6 syY41-37 cmH20] 8.8 cmH20.   Signs/symptoms of respiratory failure include- tachypnea. Contributing diagnoses includes - Obesity Hypoventilation Labs and images were reviewed. Patient Has recent ABG, which has been reviewed. Will treat underlying causes and adjust management of respiratory failure as indicated. 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 Parker- pt too unstable for right hemicolectomy, end ileostomy, liver biopsy today s/p abdominal washout with Abthera replacement today.  5/8- POD 3 with s/p Laparotomy and bowel resection and subsequent laparoscopic washout- persistent bowel discontinuity and abthera wound vac closure   5/9- persistent bowel discontinuity- await further decision by family    Await further input from surgery    Await surgery today- going for closure today    S/p- Reopening of recent Laparotomy, Abdominal washout, R Hemicolectomy with Liver Bx, TAP block, Abdominal wall closure, Creation, end Ileostomy. POD 1- looks better, still on Levophed and Amio gtt for Afib, started on TPN, ID stopped Zyvox and continued Merrem/Mycafungin.     Mass of colon  Based on Surgery  Potential interventions  Goals of care  Biopsy as indicated  Likely Oncologic process with mets  Heme Onc as indicated    See above    Possible resection    resected    On mechanically assisted ventilation  Wean as tolerated  CC Team  Pulmonary    Cont vent support    Expect further improvement with diuresis    Cont Vent support  Day 7    Day 8- will start weaning vent soon      JOSE (acute kidney injury)  Worsening  Trend  Cr 2.2 today    Cr now 3.8- Oliguric- heading towards Anuria and ATN/ May need HD vs CRRT- she has massive fluid Overload.     Cr now 4.5-  Remains anuric  POA/ Family not in favor of HD    Remains Oliguric due to Septic Shock on 2 Pressors  Some urine output with lasix  but no Polyuria     5/10 Urine Out put improved with diuresis while renal functions remains stable    Improving, Cr coming down, good urine output    Atrial fibrillation with RVR  Converted to NSR with Amio gtt    Cont Amio gtt          Obesity (BMI 30.0-34.9)  Diet  Nutrition on recovery      Acute on chronic anemia  Hgb 10.3 s/p Transfusion 4 units Prbc  Transfuse for Hgb <7  Monitor    5/5  Improved  Hgb 11.4 today  Transfuse as indicated    5/10- H/H 8.6/28- likely dilutional from ATN- improving        VTE Risk Mitigation (From admission, onward)         Ordered     heparin 25,000 units in dextrose 5% (100 units/ml) IV bolus from bag - ADDITIONAL PRN BOLUS - 60 units/kg  As needed (PRN)        Question:  Heparin Infusion Adjustment (DO NOT MODIFY ANSWER)  Answer:  \\[email or web address]\epic\Images\Pharmacy\HeparinInfusions\heparin LOW INTENSITY nomogram for [facility] [id number]    05/12/22 1408     heparin 25,000 units in dextrose 5% (100 units/ml) IV bolus from bag - ADDITIONAL PRN BOLUS - 30 units/kg  As needed (PRN)        Question:  Heparin Infusion Adjustment (DO NOT MODIFY ANSWER)  Answer:  \\[email or web address]\epic\Images\Pharmacy\HeparinInfusions\heparin LOW INTENSITY nomogram for [facility] [id number].pdf    05/12/22 1408     heparin 25,000 units in dextrose 5% 250 mL (100 units/mL) infusion LOW INTENSITY nomogram - [facility]  Continuous        Question Answer Comment   Heparin Infusion Adjustment (DO NOT MODIFY ANSWER) \\[email or web address]\epic\Images\Pharmacy\HeparinInfusions\heparin LOW INTENSITY nomogram for [facility] [id number].pdf    Begin at (in units/kg/hr) 12        05/12/22 1408     IP VTE HIGH RISK PATIENT  Once         05/04/22 1253     Place sequential compression device  Until discontinued         05/04/22 1253                Discharge Planning   ELLIOT:      Code Status: DNR   Is the patient medically ready for discharge?:     Reason for patient still in hospital (select all that apply): Patient trending condition, Laboratory test, Treatment,  Imaging and Consult recommendations  Discharge Plan A: Comfort care/withdrawal        Seen and discussed with Dr. Whelan and the ICU team  Condition: Critical  Prognosis: Guarded      Critical care time spent on the evaluation and treatment of severe organ dysfunction, review of pertinent labs and imaging studies, discussions with consulting providers and discussions with patient/family: 45 minutes.      Megha Mejia MD  Department of Hospital Medicine   [facility] - Intensive Care ([facility])

## 2022-05-12 NOTE — SUBJECTIVE & OBJECTIVE
Review of Systems   Unable to perform ROS: Intubated       Objective:     Vital Signs (Most Recent):  Temp: 100.22 °F (37.9 °C) (05/12/22 1230)  Pulse: (!) 116 (05/12/22 1230)  Resp: 20 (05/12/22 1230)  BP: (!) 159/72 (05/12/22 1200)  SpO2: 98 % (05/12/22 1230)   Vital Signs (24h Range):  Temp:  [98.78 °F (37.1 °C)-100.22 °F (37.9 °C)] 100.22 °F (37.9 °C)  Pulse:  [] 116  Resp:  [20-30] 20  SpO2:  [81 %-100 %] 98 %  BP: ()/(56-88) 159/72  Arterial Line BP: ()/(46-84) 116/67     Weight: 115.8 kg (255 lb 4.7 oz)  Body mass index is 37.7 kg/m².      Intake/Output Summary (Last 24 hours) at 5/12/2022 1343  Last data filed at 5/12/2022 1200  Gross per 24 hour   Intake 3097.83 ml   Output 3270 ml   Net -172.17 ml       Physical Exam  Vitals and nursing note reviewed.   Constitutional:       General: He is not in acute distress.     Appearance: He is well-developed. He is obese. He is ill-appearing. He is not toxic-appearing or diaphoretic.      Interventions: He is sedated, intubated and restrained.   HENT:      Head: Atraumatic.      Nose:        Mouth/Throat:      Mouth: Mucous membranes are moist.   Eyes:      General: No scleral icterus.     Conjunctiva/sclera: Conjunctivae normal.      Pupils: Pupils are equal, round, and reactive to light.   Neck:      Vascular: No JVD.     Cardiovascular:      Rate and Rhythm: Tachycardia present. Rhythm irregular.      Pulses:           Radial pulses are 1+ on the right side and 1+ on the left side.        Dorsalis pedis pulses are 1+ on the right side and 1+ on the left side.      Heart sounds: Heart sounds are distant.   Pulmonary:      Effort: No accessory muscle usage or respiratory distress. He is intubated.      Breath sounds: Decreased breath sounds present. No wheezing or rhonchi.   Chest:      Chest wall: No deformity or tenderness.   Abdominal:      General: Bowel sounds are absent. There is no distension.       Genitourinary:     Comments: Doherty in  place  Musculoskeletal:      Cervical back: Neck supple.      Right lower leg: 3+ Pitting Edema present.      Left lower leg: 3+ Pitting Edema present.      Right foot: No deformity.      Left foot: No deformity.   Lymphadenopathy:      Cervical: No cervical adenopathy.   Skin:     General: Skin is warm.      Capillary Refill: Capillary refill takes 2 to 3 seconds.          Neurological:      Comments: Heavily sedated       Vents:  Vent Mode: A/C (05/12/22 1119)  Ventilator Initiated: Yes (05/04/22 1258)  Set Rate: 20 BPM (05/12/22 1119)  Vt Set: 450 mL (05/12/22 1119)  Pressure Support: 0 cmH20 (05/12/22 1119)  PEEP/CPAP: 5 cmH20 (05/12/22 1119)  Oxygen Concentration (%): 35 (05/12/22 1230)  Peak Airway Pressure: 19 cmH2O (05/12/22 1119)  Plateau Pressure: 16 cmH20 (05/12/22 1119)  Total Ve: 9.7 mL (05/12/22 1119)  F/VT Ratio<105 (RSBI): (!) 42.37 (05/12/22 1119)    Lines/Drains/Airways       Central Venous Catheter Line  Duration             Percutaneous Central Line Insertion/Assessment - Triple Lumen  05/04/22 1200 right internal jugular 8 days              Drain  Duration                  Urethral Catheter 05/04/22 1105 Straight-tip;Silicone 16 Fr. 8 days         Closed/Suction Drain 05/11/22 1735 LUQ Bulb 19 Fr. <1 day         Closed/Suction Drain 05/11/22 1735 RLQ Bulb 19 Fr. <1 day         Colostomy 05/11/22 1736 RUQ <1 day         NG/OG Tube 05/11/22 1715 [place] sump 18 Fr. Left nostril <1 day              Airway  Duration                  Airway - Non-Surgical 05/04/22 1051 Endotracheal Tube 8 days              Arterial Line  Duration             Arterial Line 05/07/22 1330 Right Radial 5 days              Peripheral Intravenous Line  Duration                  Peripheral IV - Single Lumen 05/11/22 2100 20 G Right Antecubital <1 day                    Significant Labs:    CBC/Anemia Profile:  Recent Labs   Lab 05/11/22  0416 05/12/22  0419   WBC 17.10* 14.17*   HGB 8.6* 8.6*   HCT 28.4* 28.8*   * 109*    MCV 72* 73*   RDW 24.7* 25.7*        Chemistries:  Recent Labs   Lab 05/11/22  0416 05/11/22  2256 05/12/22  0419   * 136 137   K 4.3 4.3 4.1   CL 91* 94* 94*   CO2 20* 19* 21*   BUN 95* 98* 101*   CREATININE 4.7* 4.4* 4.3*   CALCIUM 7.0* 6.3* 6.4*   ALBUMIN 1.2* 1.1* 1.1*   PROT 4.4* 3.3* 3.3*   BILITOT 0.8 0.9 0.9   ALKPHOS 138* 112 113   * 244* 217*   * 161* 124*   MG 1.9  --  1.9   PHOS 8.9*  --  8.9*       ABGs:   Recent Labs   Lab 05/12/22 0418   PH 7.463*   PCO2 36.0   HCO3 25.8   POCSATURATED 99   BE 2     POCT Glucose:   Recent Labs   Lab 05/12/22  0421 05/12/22  0825 05/12/22  1227   POCTGLUCOSE 128* 148* 143*     All pertinent labs within the past 24 hours have been reviewed.

## 2022-05-12 NOTE — ASSESSMENT & PLAN NOTE
Patient with Hypoxic Respiratory failure which is Acute.  he is not on home oxygen. Supplemental oxygen was provided and noted- Vent Mode: Spont  Oxygen Concentration (%):  [35-40] 35  Resp Rate Total:  [18 br/min-30 br/min] 20 br/min  Vt Set:  [450 mL] 450 mL  PEEP/CPAP:  [5 cmH20] 5 cmH20  Pressure Support:  [0 cmH20-10 cmH20] 10 cmH20  Mean Airway Pressure:  [8.6 ncG67-86 cmH20] 8.8 cmH20.   Signs/symptoms of respiratory failure include- tachypnea. Contributing diagnoses includes - Obesity Hypoventilation Labs and images were reviewed. Patient Has recent ABG, which has been reviewed. Will treat underlying causes and adjust management of respiratory failure as indicated. Pulmonary CC on board  Day 2 on Vent- remains intubated on vent  Cont vent support    Day 4 on Vent    Day 5 on vent- adequate O2, sats    Day 6- continue supportive care, await family's decision about comfort care    Day 7- continue support- trying to diurese     Day 8- minimal vent support- start weaning soon

## 2022-05-12 NOTE — ASSESSMENT & PLAN NOTE
Wean as tolerated  CC Team  Pulmonary    Cont vent support    Expect further improvement with diuresis    Cont Vent support  Day 7

## 2022-05-12 NOTE — ASSESSMENT & PLAN NOTE
Received 2 units PRBCs in ED and 2 more in OR on 5/4  CBC stable follow daily  Monitor closely with adding Heparin infusion for A-fib

## 2022-05-12 NOTE — PLAN OF CARE
Pt taken to OR this afternoon and returned this evening with abd closed, new RLQ ileostomy and bilateral ADDIS drains in place. Levophed titrated to maintain MAP>75, amio gtt cont; remains in AF with HR 110s-120s. Max dose fentanyl gtt in use to maintain RASS -3. Tmax 100.2. OGT to LIWs, replaced in OR as left nare NGT, draining dark brown liquid output. Doherty with approx 1.5L UOP. TPN and lipids infusing per order. Blood glucose monitored/managed q4h. Turned/repostioned with wedge and pillows q2h, heels floated on boots. BSWRs in place, no injuries noted. Daughter at bedside all shift and updated on POC.

## 2022-05-12 NOTE — ASSESSMENT & PLAN NOTE
Secondary to Peritonitis from bowel perforation with major fecal contamination  Blood and sputum cultures 5/4 Neg  S/P Zyvox  Continue invanz and Micafungin w/ ID following  continue to titrate pressor for MAP > 75  ICU hemodynamic monitoring

## 2022-05-12 NOTE — ASSESSMENT & PLAN NOTE
S/t septic shock  Adequate volume resuscitation +21L  Avoid nephrotoxins  Strict I/O  No acute indication for dialysis but high potential for continued decline, daughter(HCPOA) will not pursue dialysis if decline per her father's wishes  Nephrology consulted to optimize medical management  Adequate urine post diuretic trial but no improvement in BUN/Cr/K

## 2022-05-12 NOTE — ASSESSMENT & PLAN NOTE
Based on Surgery  Potential interventions  Goals of care  Biopsy as indicated  Likely Oncologic process with mets  Heme Onc as indicated    See above    Possible resection

## 2022-05-12 NOTE — ASSESSMENT & PLAN NOTE
Pressors  Abx - Zosyn / Micafungin  ID on consult    Remains in severe Septic Shock complicated by JOSE/ATN- Anuria and Resp Failure on Vent  ICU team has d/w daughter about HD if and when needed- she does not appear to be in favor of HD at present  Prognosis poor    Day 4 in Septic Shock and on vent  Continue Levo and Vaso plus IV Abx  Prognosis poor    Day 5- Continues same on Vent, WBC better but remains in renal shut down due to shock plus 2 Pressors    Day 6- Continue present supportive care    Day 7- gradually improving, now on 1 pressor and WBC down to 17  Cont present care  Going for surgery today    Day 8- improving, s/p extensive surgery yesterday  Will try to wean off Levophed

## 2022-05-12 NOTE — ASSESSMENT & PLAN NOTE
New onset 5/6  on amiodarone infusion; maintain until clear for enteral route  Continue cardiac monitoring

## 2022-05-12 NOTE — SUBJECTIVE & OBJECTIVE
Interval History: Intubated but spontaneously breathing on the ventilator. Nurse reports that his blood pressure has been labile. He is currently on levophed. Heparin gtt started due to afib.    Medications:  Continuous Infusions:   amiodarone in dextrose 5% 0.5 mg/min (05/12/22 1600)    dextrose 10 % in water (D10W)      fentanyl 50 mcg/hr (05/12/22 1600)    heparin (porcine) in D5W 12 Units/kg/hr (05/12/22 1501)    NORepinephrine bitartrate-D5W 0.2 mcg/kg/min (05/12/22 1600)    TPN ADULT CENTRAL LINE CUSTOM Stopped (05/12/22 1503)    TPN ADULT CENTRAL LINE CUSTOM       Scheduled Meds:   chlorhexidine  15 mL Mouth/Throat BID    ertapenem (INVANZ) IVPB  500 mg Intravenous Q24H    famotidine (PF)  20 mg Intravenous Daily    lipid (SMOFLIPID)  250 mL Intravenous Daily    micafungin (MYCAMINE) IVPB  100 mg Intravenous Q24H    white petrolatum-mineral oil 57.3-42.5%   Both Eyes QHS     PRN Meds:sodium chloride, sodium chloride 0.9%, acetaminophen, dextrose 10 % in water (D10W), dextrose 10%, glucagon (human recombinant), heparin (PORCINE), heparin (PORCINE), insulin aspart U-100, lorazepam, ondansetron     Review of patient's allergies indicates:  No Known Allergies  Objective:     Vital Signs (Most Recent):  Temp: 99.86 °F (37.7 °C) (05/12/22 1746)  Pulse: (!) 123 (05/12/22 1746)  Resp: 16 (05/12/22 1746)  BP: (!) 173/78 (05/12/22 1645)  SpO2: 100 % (05/12/22 1746)   Vital Signs (24h Range):  Temp:  [98.78 °F (37.1 °C)-100.76 °F (38.2 °C)] 99.86 °F (37.7 °C)  Pulse:  [108-130] 123  Resp:  [14-30] 16  SpO2:  [81 %-100 %] 100 %  BP: ()/(56-92) 173/78  Arterial Line BP: ()/() 145/77     Weight: 115.8 kg (255 lb 4.7 oz)  Body mass index is 37.7 kg/m².    Intake/Output - Last 3 Shifts         05/10 0700 05/11 0659 05/11 0700 05/12 0659 05/12 0700 05/13 0659    I.V. (mL/kg) 1384.3 (11.9) 1083.7 (9.4) 400.4 (3.5)    NG/GT   50    IV Piggyback 759.8 1496 97.9    .9 844.7 303.9    Total Intake(mL/kg)  2576 (22.1) 3424.5 (29.6) 852.2 (7.4)    Urine (mL/kg/hr) 3467 (1.2) 2300 (0.8) 825 (0.6)    Drains 250 915 420    Other 465 135     Stool  20     Blood  250     Total Output 4182 3620 1245    Net -1606 -195.5 -392.8                   Physical Exam  Vitals and nursing note reviewed.   Constitutional:       Appearance: He is obese. He is ill-appearing and toxic-appearing.   Cardiovascular:      Rate and Rhythm: Regular rhythm.   Pulmonary:      Comments: Mechanical breath sounds  Abdominal:      Palpations: Abdomen is soft.      Comments: Midline incision with staples and packing in place. Right-sided end ileostomy is red with some liquid stool output. ADDIS drains are both serous.   Genitourinary:     Comments: Doherty in place  Musculoskeletal:      Right lower leg: Edema present.      Left lower leg: Edema present.   Neurological:      Comments: Sedated       Significant Labs:  I have reviewed all pertinent lab results within the past 24 hours.  CBC:   Recent Labs   Lab 05/12/22 0419   WBC 14.17*   RBC 3.96*   HGB 8.6*   HCT 28.8*   *   MCV 73*   MCH 21.7*   MCHC 29.9*     CMP:   Recent Labs   Lab 05/12/22 0419   *   CALCIUM 6.4*   ALBUMIN 1.1*   PROT 3.3*      K 4.1   CO2 21*   CL 94*   *   CREATININE 4.3*   ALKPHOS 113   *   *   BILITOT 0.9     ABGs:   Recent Labs   Lab 05/12/22 0418   PH 7.463*   PCO2 36.0   PO2 154*   HCO3 25.8   POCSATURATED 99   BE 2       Significant Diagnostics:  I have reviewed all pertinent imaging results/findings within the past 24 hours.

## 2022-05-12 NOTE — PROGRESS NOTES
[facility] - Intensive Care ([facility])  General Surgery  Progress Note    Subjective:     History of Present Illness:  No notes on file    Post-Op Info:  Procedure(s) (LRB):  CREATION, ILEOSTOMY (N/A)  HEMICOLECTOMY, RIGHT (N/A)  BIOPSY, LIVER (Right)   1 Day Post-Op     Interval History: Intubated but spontaneously breathing on the ventilator. Nurse reports that his blood pressure has been labile. He is currently on levophed. Heparin gtt started due to afib.    Medications:  Continuous Infusions:   amiodarone in dextrose 5% 0.5 mg/min (05/12/22 1600)    dextrose 10 % in water (D10W)      fentanyl 50 mcg/hr (05/12/22 1600)    heparin (porcine) in D5W 12 Units/kg/hr (05/12/22 1501)    NORepinephrine bitartrate-D5W 0.2 mcg/kg/min (05/12/22 1600)    TPN ADULT CENTRAL LINE CUSTOM Stopped (05/12/22 1503)    TPN ADULT CENTRAL LINE CUSTOM       Scheduled Meds:   chlorhexidine  15 mL Mouth/Throat BID    ertapenem (INVANZ) IVPB  500 mg Intravenous Q24H    famotidine (PF)  20 mg Intravenous Daily    lipid (SMOFLIPID)  250 mL Intravenous Daily    micafungin (MYCAMINE) IVPB  100 mg Intravenous Q24H    white petrolatum-mineral oil 57.3-42.5%   Both Eyes QHS     PRN Meds:sodium chloride, sodium chloride 0.9%, acetaminophen, dextrose 10 % in water (D10W), dextrose 10%, glucagon (human recombinant), heparin (PORCINE), heparin (PORCINE), insulin aspart U-100, lorazepam, ondansetron     Review of patient's allergies indicates:  No Known Allergies  Objective:     Vital Signs (Most Recent):  Temp: 99.86 °F (37.7 °C) (05/12/22 1746)  Pulse: (!) 123 (05/12/22 1746)  Resp: 16 (05/12/22 1746)  BP: (!) 173/78 (05/12/22 1645)  SpO2: 100 % (05/12/22 1746)   Vital Signs (24h Range):  Temp:  [98.78 °F (37.1 °C)-100.76 °F (38.2 °C)] 99.86 °F (37.7 °C)  Pulse:  [108-130] 123  Resp:  [14-30] 16  SpO2:  [81 %-100 %] 100 %  BP: ()/(56-92) 173/78  Arterial Line BP: ()/() 145/77     Weight: 115.8 kg (255 lb 4.7 oz)  Body  mass index is 37.7 kg/m².    Intake/Output - Last 3 Shifts         05/10 0700  05/11 0659 05/11 0700 05/12 0659 05/12 0700 05/13 0659    I.V. (mL/kg) 1384.3 (11.9) 1083.7 (9.4) 400.4 (3.5)    NG/GT   50    IV Piggyback 759.8 1496 97.9    .9 844.7 303.9    Total Intake(mL/kg) 2576 (22.1) 3424.5 (29.6) 852.2 (7.4)    Urine (mL/kg/hr) 3467 (1.2) 2300 (0.8) 825 (0.6)    Drains 250 915 420    Other 465 135     Stool  20     Blood  250     Total Output 4182 3620 1245    Net -1606 -195.5 -392.8                   Physical Exam  Vitals and nursing note reviewed.   Constitutional:       Appearance: He is obese. He is ill-appearing and toxic-appearing.   Cardiovascular:      Rate and Rhythm: Regular rhythm.   Pulmonary:      Comments: Mechanical breath sounds  Abdominal:      Palpations: Abdomen is soft.      Comments: Midline incision with staples and packing in place. Right-sided end ileostomy is red with some liquid stool output. ADDIS drains are both serous.   Genitourinary:     Comments: Doherty in place  Musculoskeletal:      Right lower leg: Edema present.      Left lower leg: Edema present.   Neurological:      Comments: Sedated       Significant Labs:  I have reviewed all pertinent lab results within the past 24 hours.  CBC:   Recent Labs   Lab 05/12/22 0419   WBC 14.17*   RBC 3.96*   HGB 8.6*   HCT 28.8*   *   MCV 73*   MCH 21.7*   MCHC 29.9*     CMP:   Recent Labs   Lab 05/12/22 0419   *   CALCIUM 6.4*   ALBUMIN 1.1*   PROT 3.3*      K 4.1   CO2 21*   CL 94*   *   CREATININE 4.3*   ALKPHOS 113   *   *   BILITOT 0.9     ABGs:   Recent Labs   Lab 05/12/22 0418   PH 7.463*   PCO2 36.0   PO2 154*   HCO3 25.8   POCSATURATED 99   BE 2       Significant Diagnostics:  I have reviewed all pertinent imaging results/findings within the past 24 hours.    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 Tomorrow, will initiate trickle tube feeds and can start having crushed PO meds.  - Continue ICU management. Currently still requiring vasopressor support. Now on heparin gtt for afib.  - Ostomy nurse consult  - Strict I/Os  - TPN  - Medical and ICU management per hospital/ICU team    Discussed with ICU nurse, wife, and daughter at bedside.    Acute on chronic anemia  Management per medicine/critical care    Acute hypoxemic respiratory failure on mechanical vent  Management per medicine/critical care        Elvie Parker, DO  General Surgery  [facility] - Intensive Care (Hospital)

## 2022-05-13 LAB
ACANTHOCYTES BLD QL SMEAR: PRESENT
ALBUMIN SERPL BCP-MCNC: 1.1 G/DL (ref 3.5–5.2)
ALLENS TEST: ABNORMAL
ALP SERPL-CCNC: 102 U/L (ref 55–135)
ALT SERPL W/O P-5'-P-CCNC: 131 U/L (ref 10–44)
ANION GAP SERPL CALC-SCNC: 18 MMOL/L (ref 8–16)
ANISOCYTOSIS BLD QL SMEAR: ABNORMAL
APTT BLDCRRT: 42.1 SEC (ref 21–32)
APTT BLDCRRT: 42.1 SEC (ref 21–32)
AST SERPL-CCNC: 52 U/L (ref 10–40)
BASOPHILS # BLD AUTO: 0.05 K/UL (ref 0–0.2)
BASOPHILS NFR BLD: 0.3 % (ref 0–1.9)
BILIRUB SERPL-MCNC: 0.8 MG/DL (ref 0.1–1)
BUN SERPL-MCNC: 107 MG/DL (ref 8–23)
BURR CELLS BLD QL SMEAR: ABNORMAL
CALCIUM SERPL-MCNC: 7.2 MG/DL (ref 8.7–10.5)
CHLORIDE SERPL-SCNC: 95 MMOL/L (ref 95–110)
CO2 SERPL-SCNC: 25 MMOL/L (ref 23–29)
CREAT SERPL-MCNC: 4 MG/DL (ref 0.5–1.4)
DACRYOCYTES BLD QL SMEAR: ABNORMAL
DELSYS: ABNORMAL
DIFFERENTIAL METHOD: ABNORMAL
EOSINOPHIL # BLD AUTO: 0.1 K/UL (ref 0–0.5)
EOSINOPHIL NFR BLD: 0.8 % (ref 0–8)
ERYTHROCYTE [DISTWIDTH] IN BLOOD BY AUTOMATED COUNT: 25.9 % (ref 11.5–14.5)
ERYTHROCYTE [SEDIMENTATION RATE] IN BLOOD BY WESTERGREN METHOD: 20 MM/H
EST. GFR  (AFRICAN AMERICAN): 17 ML/MIN/1.73 M^2
EST. GFR  (NON AFRICAN AMERICAN): 14 ML/MIN/1.73 M^2
FIO2: 30
GLUCOSE SERPL-MCNC: 152 MG/DL (ref 70–110)
HCO3 UR-SCNC: 29.5 MMOL/L (ref 24–28)
HCT VFR BLD AUTO: 27.3 % (ref 40–54)
HGB BLD-MCNC: 8.2 G/DL (ref 14–18)
HYPOCHROMIA BLD QL SMEAR: ABNORMAL
IMM GRANULOCYTES # BLD AUTO: 0.21 K/UL (ref 0–0.04)
IMM GRANULOCYTES NFR BLD AUTO: 1.3 % (ref 0–0.5)
LYMPHOCYTES # BLD AUTO: 0.7 K/UL (ref 1–4.8)
LYMPHOCYTES NFR BLD: 4.2 % (ref 18–48)
MAGNESIUM SERPL-MCNC: 2 MG/DL (ref 1.6–2.6)
MCH RBC QN AUTO: 22.2 PG (ref 27–31)
MCHC RBC AUTO-ENTMCNC: 30 G/DL (ref 32–36)
MCV RBC AUTO: 74 FL (ref 82–98)
MODE: ABNORMAL
MONOCYTES # BLD AUTO: 0.6 K/UL (ref 0.3–1)
MONOCYTES NFR BLD: 3.5 % (ref 4–15)
NEUTROPHILS # BLD AUTO: 14.7 K/UL (ref 1.8–7.7)
NEUTROPHILS NFR BLD: 89.9 % (ref 38–73)
NRBC BLD-RTO: 0 /100 WBC
OVALOCYTES BLD QL SMEAR: ABNORMAL
PCO2 BLDA: 41.7 MMHG (ref 35–45)
PEEP: 5
PH SMN: 7.46 [PH] (ref 7.35–7.45)
PHOSPHATE SERPL-MCNC: 8.1 MG/DL (ref 2.7–4.5)
PLATELET # BLD AUTO: 132 K/UL (ref 150–450)
PLATELET BLD QL SMEAR: ABNORMAL
PMV BLD AUTO: ABNORMAL FL (ref 9.2–12.9)
PO2 BLDA: 106 MMHG (ref 80–100)
POC BE: 6 MMOL/L
POC SATURATED O2: 98 % (ref 95–100)
POCT GLUCOSE: 153 MG/DL (ref 70–110)
POCT GLUCOSE: 153 MG/DL (ref 70–110)
POCT GLUCOSE: 155 MG/DL (ref 70–110)
POCT GLUCOSE: 159 MG/DL (ref 70–110)
POCT GLUCOSE: 159 MG/DL (ref 70–110)
POCT GLUCOSE: 173 MG/DL (ref 70–110)
POIKILOCYTOSIS BLD QL SMEAR: ABNORMAL
POLYCHROMASIA BLD QL SMEAR: ABNORMAL
POTASSIUM SERPL-SCNC: 3.4 MMOL/L (ref 3.5–5.1)
PROT SERPL-MCNC: 4.3 G/DL (ref 6–8.4)
RBC # BLD AUTO: 3.7 M/UL (ref 4.6–6.2)
SAMPLE: ABNORMAL
SCHISTOCYTES BLD QL SMEAR: PRESENT
SICKLE CELLS BLD QL SMEAR: ABNORMAL
SITE: ABNORMAL
SODIUM SERPL-SCNC: 138 MMOL/L (ref 136–145)
TARGETS BLD QL SMEAR: ABNORMAL
VT: 450
WBC # BLD AUTO: 16.29 K/UL (ref 3.9–12.7)

## 2022-05-13 PROCEDURE — 85730 THROMBOPLASTIN TIME PARTIAL: CPT | Mod: 91 | Performed by: SURGERY

## 2022-05-13 PROCEDURE — 83735 ASSAY OF MAGNESIUM: CPT | Performed by: SURGERY

## 2022-05-13 PROCEDURE — 94761 N-INVAS EAR/PLS OXIMETRY MLT: CPT

## 2022-05-13 PROCEDURE — 99232 PR SUBSEQUENT HOSPITAL CARE,LEVL II: ICD-10-PCS | Mod: ,,, | Performed by: INTERNAL MEDICINE

## 2022-05-13 PROCEDURE — 85730 THROMBOPLASTIN TIME PARTIAL: CPT | Performed by: NURSE PRACTITIONER

## 2022-05-13 PROCEDURE — 94003 VENT MGMT INPAT SUBQ DAY: CPT

## 2022-05-13 PROCEDURE — 63600175 PHARM REV CODE 636 W HCPCS: Performed by: SURGERY

## 2022-05-13 PROCEDURE — 99291 CRITICAL CARE FIRST HOUR: CPT | Mod: ,,, | Performed by: NURSE PRACTITIONER

## 2022-05-13 PROCEDURE — 84100 ASSAY OF PHOSPHORUS: CPT | Performed by: SURGERY

## 2022-05-13 PROCEDURE — 80053 COMPREHEN METABOLIC PANEL: CPT | Performed by: SURGERY

## 2022-05-13 PROCEDURE — 25000003 PHARM REV CODE 250: Performed by: INTERNAL MEDICINE

## 2022-05-13 PROCEDURE — 82803 BLOOD GASES ANY COMBINATION: CPT

## 2022-05-13 PROCEDURE — 20000000 HC ICU ROOM

## 2022-05-13 PROCEDURE — 25000003 PHARM REV CODE 250: Performed by: NURSE PRACTITIONER

## 2022-05-13 PROCEDURE — C9399 UNCLASSIFIED DRUGS OR BIOLOG: HCPCS | Performed by: NURSE PRACTITIONER

## 2022-05-13 PROCEDURE — 37799 UNLISTED PX VASCULAR SURGERY: CPT

## 2022-05-13 PROCEDURE — 63600175 PHARM REV CODE 636 W HCPCS: Performed by: NURSE PRACTITIONER

## 2022-05-13 PROCEDURE — 85025 COMPLETE CBC W/AUTO DIFF WBC: CPT | Performed by: SURGERY

## 2022-05-13 PROCEDURE — 99232 SBSQ HOSP IP/OBS MODERATE 35: CPT | Mod: ,,, | Performed by: INTERNAL MEDICINE

## 2022-05-13 PROCEDURE — A4217 STERILE WATER/SALINE, 500 ML: HCPCS | Performed by: SURGERY

## 2022-05-13 PROCEDURE — 63600175 PHARM REV CODE 636 W HCPCS: Mod: JG | Performed by: INTERNAL MEDICINE

## 2022-05-13 PROCEDURE — 99900035 HC TECH TIME PER 15 MIN (STAT)

## 2022-05-13 PROCEDURE — 27000221 HC OXYGEN, UP TO 24 HOURS

## 2022-05-13 PROCEDURE — 99900026 HC AIRWAY MAINTENANCE (STAT)

## 2022-05-13 PROCEDURE — 25000003 PHARM REV CODE 250: Performed by: SURGERY

## 2022-05-13 PROCEDURE — 99291 PR CRITICAL CARE, E/M 30-74 MINUTES: ICD-10-PCS | Mod: ,,, | Performed by: NURSE PRACTITIONER

## 2022-05-13 RX ORDER — FENTANYL CITRATE 50 UG/ML
50 INJECTION, SOLUTION INTRAMUSCULAR; INTRAVENOUS ONCE AS NEEDED
Status: DISCONTINUED | OUTPATIENT
Start: 2022-05-13 | End: 2022-05-14

## 2022-05-13 RX ORDER — DEXMEDETOMIDINE HYDROCHLORIDE 4 UG/ML
0-1.4 INJECTION INTRAVENOUS CONTINUOUS
Status: DISCONTINUED | OUTPATIENT
Start: 2022-05-13 | End: 2022-05-14

## 2022-05-13 RX ORDER — POTASSIUM CHLORIDE 14.9 MG/ML
20 INJECTION INTRAVENOUS EVERY 4 HOURS
Status: COMPLETED | OUTPATIENT
Start: 2022-05-13 | End: 2022-05-13

## 2022-05-13 RX ADMIN — AMIODARONE HYDROCHLORIDE 0.5 MG/MIN: 1.8 INJECTION, SOLUTION INTRAVENOUS at 03:05

## 2022-05-13 RX ADMIN — INSULIN ASPART 2 UNITS: 100 INJECTION, SOLUTION INTRAVENOUS; SUBCUTANEOUS at 12:05

## 2022-05-13 RX ADMIN — INSULIN ASPART 2 UNITS: 100 INJECTION, SOLUTION INTRAVENOUS; SUBCUTANEOUS at 05:05

## 2022-05-13 RX ADMIN — HEPARIN SODIUM 14 UNITS/KG/HR: 5000 INJECTION INTRAVENOUS; SUBCUTANEOUS at 12:05

## 2022-05-13 RX ADMIN — INSULIN ASPART 2 UNITS: 100 INJECTION, SOLUTION INTRAVENOUS; SUBCUTANEOUS at 04:05

## 2022-05-13 RX ADMIN — MINERAL OIL, PETROLATUM: 425; 573 OINTMENT OPHTHALMIC at 08:05

## 2022-05-13 RX ADMIN — NOREPINEPHRINE BITARTRATE 0.1 MCG/KG/MIN: 1 INJECTION, SOLUTION, CONCENTRATE INTRAVENOUS at 08:05

## 2022-05-13 RX ADMIN — ERTAPENEM 500 MG: 1 INJECTION INTRAMUSCULAR; INTRAVENOUS at 09:05

## 2022-05-13 RX ADMIN — MICAFUNGIN SODIUM 100 MG: 100 INJECTION, POWDER, LYOPHILIZED, FOR SOLUTION INTRAVENOUS at 02:05

## 2022-05-13 RX ADMIN — CHLORHEXIDINE GLUCONATE 0.12% ORAL RINSE 15 ML: 1.2 LIQUID ORAL at 08:05

## 2022-05-13 RX ADMIN — DEXMEDETOMIDINE HYDROCHLORIDE 0.2 MCG/KG/HR: 4 INJECTION INTRAVENOUS at 09:05

## 2022-05-13 RX ADMIN — CALCIUM GLUCONATE: 98 INJECTION, SOLUTION INTRAVENOUS at 06:05

## 2022-05-13 RX ADMIN — AMIODARONE HYDROCHLORIDE 0.5 MG/MIN: 1.8 INJECTION, SOLUTION INTRAVENOUS at 04:05

## 2022-05-13 RX ADMIN — FAMOTIDINE 20 MG: 10 INJECTION INTRAVENOUS at 08:05

## 2022-05-13 RX ADMIN — INSULIN ASPART 1 UNITS: 100 INJECTION, SOLUTION INTRAVENOUS; SUBCUTANEOUS at 08:05

## 2022-05-13 RX ADMIN — INSULIN ASPART 1 UNITS: 100 INJECTION, SOLUTION INTRAVENOUS; SUBCUTANEOUS at 12:05

## 2022-05-13 RX ADMIN — INSULIN ASPART 2 UNITS: 100 INJECTION, SOLUTION INTRAVENOUS; SUBCUTANEOUS at 08:05

## 2022-05-13 RX ADMIN — POTASSIUM CHLORIDE 20 MEQ: 200 INJECTION, SOLUTION INTRAVENOUS at 09:05

## 2022-05-13 RX ADMIN — DEXMEDETOMIDINE HYDROCHLORIDE 0.3 MCG/KG/HR: 4 INJECTION INTRAVENOUS at 07:05

## 2022-05-13 NOTE — OP NOTE
Franky Masters  : 1955, MRN: [id number]  Date of procedure: 22      Procedure: Reopening of recent laparotomy, abdominal washout, right hemicolectomy, liver biopsy, transversus abdominis plane (TAP) block, abdominal wall closure, creation of end ileostomy    Pre-procedure diagnosis:    * Small bowel perforation [K63.1]     * Septic shock [A41.9, R65.21]     * Mass of colon [K63.89]  Liver mass  Post-procedure diagnosis: Same  Surgeon: Elvie Parker DO  Assistant: Selwyn Sanchez MD  Anesthesia: General  EBL: 250 mL  Implants/Drains: 19 Fr Stan drain x2  Specimen: Right colon  Complications: None apparent    Significant findings: Pieces of food/vegetation still throughout the abdomen. Bowel was edematous but otherwise pink and viable appearing. Large cecal mass with bulky mesenteric lymphadenopathy around ileocolic pedicle. Palpable liver masses.    Indications for procedure: The patient presented with a small bowel perforation and a large, obstructing cecal mass. He went to emergent surgery for ex lap, abdominal washout, segmental small bowel resection, placement of Abthera abdominal vac. He was left in discontinuity and an open abdomen due to his profound septic shock. Plan is to return to the OR for abdominal wall closure. After explaining the risks, benefits, and alternatives, the patient's daughter verbalized understanding and informed written consent was obtained. All questions were answered to their satisfaction.    Description of procedure: The patient was brought to the OR and SCDs were applied. General anesthesia was induced by the Anesthesia Department. Patient was in the supine position. The overlying Abthera was removed and the abdomen was prepped and draped in usual sterile fashion. A time out was taken to identify the correct patient, correct procedure, and correct anatomical site; all parties were in agreement.  The small bowel was inspected which appeared pink and viable. The staples  lines were intact. There were still small bits of food and vegetation which we did our best to clear. The right colectomy was began by ligating the ileocolic pedicle with the Ligasure device. The white line of Toldt had already been taken down during the first procedure. The mesentery was then ligated up to the proximal transverse colon, taking the right colic artery and the right branch of the middle colic artery. Care was taken to identify the underlying duodenum and protect it. The greater omentum and right aspect of the gastrocolic ligament was transected. The mesentery of the terminal ileum was also ligated and the specimen was removed from the field. A liver biopsy was performed by using the Ligasure device to carve out the obvious mass above the gallbladder. Good hemostasis was obtained. The abdomen was then copiously irrigated with warm saline.  Due to the patient's critical nature and hemodynamic instability still requiring vasopressor support, the decision was made to create an end ileostomy instead of an anastomosis. A site was chosen in the left mid abdomen where the terminal ileum could be brought to the abdominal wall without tension or twisting. A 3 cm circular incision was made in the epidermis and carried down through the subcutaneous tissue to the anterior rectus sheathe. The sheathe was incised in a cruciate manner and the muscle fibers of the rectus abdominis muscle were swept to either side. The posterior rectus sheathe was incised in a mercedes sign manner. The terminal ileum was grasped and pulled up through the abdominal wall.  Two 19 [place] Stan drains were placed, one on the right side of the abdomen extending down into the pelvis, and one on the left side of the abdomen extending up into the left upper quadrant. The drains were secured to the skin with 3-0 silk sutures.  A TAP block was then performed by injecting 30 cc of Exparel into the transversus abdominis plane on each side of the  incision.  The fascia was then closed with two running bidirectional #1 Strattifix sutures. The skin was closed with staples and gauze packing was placed between the staples. The incision was then protected with a sterile towel.  The ileostomy was matured by incising the staple line off. The mucosa was everted with 3-0 vicryl sutures fixated at 3 points at each of the cardinal points. An ostomy appliance was cut to size and applied on top.    All sponge and instrument counts were deemed correct. The patient was transported back to the ICU.    Elvie Parker,   General Surgery  [facility]

## 2022-05-13 NOTE — NURSING
Pt sedated with 25mcg/kg/hr. RASS -3. Pt withdrawals on all extremities to pain. Vent switched from SBT to AC overnight for rest. VSS. On levo at 0.127. Pt extremely sensitive to titrations. Remains on heparin gtt. PTT q6H. No signs of bleeding. Triple lumen CL and Midline in use. NPO, L nare NG to LWS continuous. TPN and lipids hung. Doherty with adequate UO. Afebrile. All abx given. Accu check q4h. Skin intact with blanchable redness. Generalized edema +4 and weeping. Pt turned q2h, wedge in use and heels elevated off bed, SCDS. Suction and Ambu at bedside. Lads this Am.

## 2022-05-13 NOTE — CONSULTS
[facility] - Intensive Care ([facility])  Wound Care    Patient Name:  Franky Masters   MRN:  [id number]  Date: 5/13/2022  Diagnosis: Septic shock    History:     Past Medical History:   Diagnosis Date    Diverticulitis     Supplemental oxygen dependent        Social History     Socioeconomic History    Marital status:    Tobacco Use    Smoking status: Never Smoker    Smokeless tobacco: Never Used   Substance and Sexual Activity    Alcohol use: Not Currently    Drug use: Never       Precautions:     Allergies as of 05/04/2022    (No Known Allergies)       [facility] Assessment Details/Treatment     Consulted on Mr. Masters for new Ileostomy management. He is currently in ICU, intubated, sedated, on IV pressors. He was admitted with small bowel perforation. He underwent exploratory lap with washout and Ab-Thera wound vac placement on 5/4, returned to OR 5/6 and 5/7 for washout and replacement of Ab-Thera, then returned to OR on 5/11 for abdominal closure and end Ileostomy creation.  Daughter is at beside currently.   Abdomen assessed. Incision with surgical dressing in place, not disturbed. 2 ADDIS drains noted. RUQ Ileostomy with surgical pouch intact, no leak. Moderate amount liquid brown stool noted in pouch currently. Stoma is moist and red. Discussed basic Ileostomy care with daughter and extra pouches left at bedside. Plan to follow loosely while intubated, then once alert and able to participate, will plan for family meeting for Ileostomy management education. Will follow.     05/13/2022

## 2022-05-13 NOTE — ASSESSMENT & PLAN NOTE
Pressors  Abx - Zosyn / Micafungin  ID on consult    Remains in severe Septic Shock complicated by JOSE/ATN- Anuria and Resp Failure on Vent  ICU team has d/w daughter about HD if and when needed- she does not appear to be in favor of HD at present  Prognosis poor    Day 4 in Septic Shock and on vent  Continue Levo and Vaso plus IV Abx  Prognosis poor    Day 5- Continues same on Vent, WBC better but remains in renal shut down due to shock plus 2 Pressors    Day 6- Continue present supportive care    Day 7- gradually improving, now on 1 pressor and WBC down to 17  Cont present care  Going for surgery today    Day 8- improving, s/p extensive surgery yesterday  Will try to wean off Levophed    Day 9:  Marginally more stable.

## 2022-05-13 NOTE — ASSESSMENT & PLAN NOTE
Vent settings reviewed and adjusted to optimize gas exchange  VAP prophylaxis  ABG daily and prn to assess response to therapy  Weaning sedation for SAT  SBT once more alert and calm  Changed Propofol to Precedex infusion 5/12 for vent weaning

## 2022-05-13 NOTE — ASSESSMENT & PLAN NOTE
Secondary to Peritonitis from bowel perforation with major fecal contamination  Blood and sputum cultures 5/4 Neg  S/P Zyvox  Continue Invanz and Micafungin per ID following  continue to titrate pressor for MAP > 75  ICU hemodynamic monitoring  Follow fever curve and WBC

## 2022-05-13 NOTE — PROGRESS NOTES
[facility] - [facility]ensive [facility] ([facility]  Critical Care Medicine  Progress Note    Patient Name: Franky Masters  MRN: [id number]  Admission Date: 5/4/2022  Hospital Length of Stay: 9 days  Code Status: DNR  Attending Provider: Elvie Parker DO  Primary Care Provider: HOLLY ROSEN   Principal Problem: Septic shock    Subjective:     HPI:  Ms Masters is a 65 yo obese male with a PMH of diverticulosis and hx of hospitalization in Aug 2021 with COVID PNA and Hypoxic Resp Failure but did not require ICU or intubation.  She presented early this AM to [facility] ED about 0515 hr via EMS with complaint of abd pain X 2 hours that awakened her from sleep and had associated N/V/D.  In ED BP 86/46, RA SAT 92%, LA 8, Hgb 7.2 and CT Abd with suspected bowel perforation and free air.  General Surgery consulted and taken to OR this AM revealing SB perf with 3 L feculent fluid and food in cavity and large obstructing cecal mass with perf ileum and palpable liver mass.  Had Expl Lap with washout and excision of SB with wound vac placement admitted post op to ICU intubated on mech ventilation.  Received 2 units PRBCs in ED and another 2 units in OR also on Levophed and Vasopressin infusions.  Before surgery patient was insistent he be DNR post op but consented to surgery and invasive mech ventilation but no ACLS post op in event of cardiac arrest and no prolonged mech ventilation. Reportedly patient does not routinely follow with practitioner as outpt.       Hospital/ICU Course:  5/4 - Admitted to ICU sedated and intubated on Levophed and Vasopressin infusions in no distress  5/5 - remains intubated and sedated on mechanical vent and pressor support; scant urine output overnight and creatinine rise to 2.2 with fluid balance +8.9L  5/6 - remains intubated and sedated on mechanical vent with decreasing pressor demand; still oliguric with creatinine up to 3.6, K 5.2; fluid balance +13L; now with bigeminy and cardiac rhythm changes, K  stable on abg but iCa 0.78  5/7 - remains intubated and sedated with open abdomen to abthera wound vac and pressor support; overnight atrial fib with RVR, now on amio infusion with SR 68 on monitor; plan to OR for washout and vac replacement today  5/8 - remains intubated, sedated with ab thera wound vac to open abdomen, mechanical ventilation, and 2 pressor support. SR on monitor, on amiodarone infusion. Tmax 100.2, wbc down at 12.3k, creatinine rising 4.6,urine output scant, K 5.1  5/9 - remains intubated and sedated with ab thera wound vac to open abdomen, mech vent, and 2 pressor support. Remains SR on monitor with amio infusion. Tmax 98.9, wbc down 10.6k, creatinine holding at 4.7, K 4.6 after lasix trial with 1.2L urinary output  5/10 - remains intubated and sedated with ab thera wound vac to open abdomen, mech vent, on pressor support. Atrial fib on monitor, rate 90s with occasional non sustained elevation. Urine output adequate since lasix trial but creatinine, K, BUN unchanged and remains 22L positive fluid status since admit  5/11 - return from OR late this evening after ABD WASHOUT, RT HEMICOLECTOMY, ILEOSTOMY, LIVER BIOPSY, AND ABD WALL CLOSURE. Remains atrial fib 80-100s on monitor on levophed support.  5/12 - semi erect in bed intubated on mech ventilation in no distress still in A-fib rate 113 bpm sedated heavily still on Fentanyl infusion.  Also still on Amiodarone and Levophed infusions.  Receiving TPN.  S/P abd washout and closure yesterday.    5/13 - semi erect in bed intubated on mech ventilation and sedated in no distress on Precedex, Heparin, Amiodarone, Fentanyl and Levophed infusions.  Also on TPN.  BP labile on pressor.  Still in A-Fib rate 113 bpm.      Review of Systems   Unable to perform ROS: Intubated       Objective:     Vital Signs (Most Recent):  Temp: 98.6 °F (37 °C) (05/13/22 1140)  Pulse: 80 (05/13/22 1140)  Resp: 16 (05/13/22 1140)  BP: 112/62 (05/13/22 1000)  SpO2: 100 %  (05/13/22 1140) Vital Signs (24h Range):  Temp:  [98.24 °F (36.8 °C)-100.76 °F (38.2 °C)] 98.6 °F (37 °C)  Pulse:  [] 80  Resp:  [14-27] 16  SpO2:  [85 %-100 %] 100 %  BP: ()/(62-92) 112/62  Arterial Line BP: ()/() 131/65     Weight: 115.8 kg (255 lb 4.7 oz)  Body mass index is 37.7 kg/m².      Intake/Output Summary (Last 24 hours) at 5/13/2022 1243  Last data filed at 5/13/2022 1200  Gross per 24 hour   Intake 2363.4 ml   Output 2500 ml   Net -136.6 ml       Physical Exam  Vitals and nursing note reviewed.   Constitutional:       General: He is not in acute distress.     Appearance: He is well-developed. He is obese. He is ill-appearing. He is not toxic-appearing or diaphoretic.      Interventions: He is sedated, intubated and restrained.   HENT:      Head: Atraumatic.      Nose:        Mouth/Throat:      Mouth: Mucous membranes are moist.   Eyes:      General: No scleral icterus.     Conjunctiva/sclera: Conjunctivae normal.      Pupils: Pupils are equal, round, and reactive to light.   Neck:      Vascular: No JVD.     Cardiovascular:      Rate and Rhythm: Tachycardia present. Rhythm irregular.      Pulses:           Radial pulses are 1+ on the right side and 1+ on the left side.        Dorsalis pedis pulses are 1+ on the right side and 1+ on the left side.      Heart sounds: Heart sounds are distant.   Pulmonary:      Effort: No tachypnea, accessory muscle usage or respiratory distress. He is intubated.      Breath sounds: Decreased breath sounds present. No wheezing or rhonchi.   Chest:      Chest wall: No deformity or tenderness.   Abdominal:      General: There is no distension.      Palpations: Abdomen is soft.          Comments: Rare tinkling bowel sounds but some bile stool in colostomy   Genitourinary:     Comments: Doherty in place  Musculoskeletal:      Cervical back: Neck supple.      Right lower leg: 3+ Pitting Edema present.      Left lower leg: 3+ Pitting Edema present.       Right foot: No deformity.      Left foot: No deformity.   Lymphadenopathy:      Cervical: No cervical adenopathy.   Skin:     General: Skin is warm.      Capillary Refill: Capillary refill takes 2 to 3 seconds.          Neurological:      Comments: Overnight had restless awakening with sedation weaning.  This AM still obtunded with weaning sedation       Vents:  Vent Mode: Spont (05/13/22 1140)  Ventilator Initiated: Yes (05/04/22 1258)  Set Rate: 0 BPM (05/13/22 1140)  Vt Set: 450 mL (05/13/22 1140)  Pressure Support: 10 cmH20 (05/13/22 1140)  PEEP/CPAP: 5 cmH20 (05/13/22 1140)  Oxygen Concentration (%): 30 (05/13/22 1140)  Peak Airway Pressure: 15 cmH2O (05/13/22 1140)  Plateau Pressure: 17 cmH20 (05/13/22 1140)  Total Ve: 7.77 mL (05/13/22 1140)  F/VT Ratio<105 (RSBI): (!) 29.85 (05/13/22 1140)    Lines/Drains/Airways       Central Venous Catheter Line  Duration             Percutaneous Central Line Insertion/Assessment - Triple Lumen  05/04/22 1200 right internal jugular 9 days              Drain  Duration                  Urethral Catheter 05/04/22 1105 Straight-tip;Silicone 16 Fr. 9 days         Closed/Suction Drain 05/11/22 1735 LUQ Bulb 19 Fr. 1 day         Closed/Suction Drain 05/11/22 1735 RLQ Bulb 19 Fr. 1 day         Colostomy 05/11/22 1736 RUQ 1 day         NG/OG Tube 05/11/22 1715 Costilla sump 18 Fr. Left nostril 1 day              Airway  Duration                  Airway - Non-Surgical 05/04/22 1051 Endotracheal Tube 9 days              Arterial Line  Duration             Arterial Line 05/07/22 1330 Right Radial 5 days              Peripheral Intravenous Line  Duration                  Midline Catheter Insertion/Assessment  - Single Lumen 05/12/22 1730 Left basilic vein (medial side of arm) <1 day                    Significant Labs:    CBC/Anemia Profile:  Recent Labs   Lab 05/12/22  0419 05/13/22  0350   WBC 14.17* 16.29*   HGB 8.6* 8.2*   HCT 28.8* 27.3*   * 132*   MCV 73* 74*   RDW 25.7*  25.9*        Chemistries:  Recent Labs   Lab 05/11/22  2256 05/12/22  0419 05/13/22  0350    137 138   K 4.3 4.1 3.4*   CL 94* 94* 95   CO2 19* 21* 25   BUN 98* 101* 107*   CREATININE 4.4* 4.3* 4.0*   CALCIUM 6.3* 6.4* 7.2*   ALBUMIN 1.1* 1.1* 1.1*   PROT 3.3* 3.3* 4.3*   BILITOT 0.9 0.9 0.8   ALKPHOS 112 113 102   * 217* 131*   * 124* 52*   MG  --  1.9 2.0   PHOS  --  8.9* 8.1*       ABGs:   Recent Labs   Lab 05/13/22  0347   PH 7.458*   PCO2 41.7   HCO3 29.5*   POCSATURATED 98   BE 6     Coagulation:   Recent Labs   Lab 05/12/22  1451 05/12/22  2057 05/13/22  1001   INR 1.1  --   --    APTT 34.1*   < > 42.1*    < > = values in this interval not displayed.     POCT Glucose:   Recent Labs   Lab 05/13/22  0015 05/13/22  0511 05/13/22  0850   POCTGLUCOSE 159* 153* 173*     All pertinent labs within the past 24 hours have been reviewed.    Significant Imaging:  I have reviewed all pertinent imaging results/findings within the past 24 hours.  CXR: I have reviewed all pertinent results/findings within the past 24 hours and my personal findings are:  Small left pleural effusion with bibasilar patchy infiltrates.  Prominence of the keila again noted.  Right-sided infrahilar lesion or infiltrate suspected.        ABG  Recent Labs   Lab 05/13/22  0347   PH 7.458*   PO2 106*   PCO2 41.7   HCO3 29.5*   BE 6     Assessment/Plan:     Pulmonary  Acute hypoxemic respiratory failure on mechanical vent  Vent settings reviewed and adjusted to optimize gas exchange  VAP prophylaxis  ABG daily and prn to assess response to therapy  Weaning sedation for SAT  SBT once more alert and calm  Changed Propofol to Precedex infusion 5/12 for vent weaning    Cardiac/Vascular  Atrial fibrillation with RVR  New onset 5/6  on amiodarone infusion; maintain until clear for enteral route  Continue cardiac monitoring  Cont Heparin infusion till cleared for enteral route    Renal/  JOSE (acute kidney injury)  S/t septic  shock  Adequate volume resuscitation +21L I/O balance  Avoid nephrotoxins  Strict I/O  No acute indication for dialysis but high potential for continued decline, daughter(TAY) will not pursue dialysis if decline per her father's wishes  Nephrology following  Adequate urine post diuretic trial but no improvement in BUN/Cr/K    ID  * Septic shock sec to Peritonitis from SB perforation  Secondary to Peritonitis from bowel perforation with major fecal contamination  Blood and sputum cultures 5/4 Neg  S/P Zyvox  Continue Invanz and Micafungin per ID following  continue to titrate pressor for MAP > 75  ICU hemodynamic monitoring  Follow fever curve and WBC    Oncology  Acute on chronic anemia  Received 2 units PRBCs in ED and 2 more in OR on 5/4  CBC stable follow daily  Monitor closely with adding Heparin infusion for A-fib     Endocrine  Obesity (BMI 30.0-34.9)  Will encourage weight loss once/if survives and extubated and fully awake/alert    GI  Mass of colon  Found on exploration along with palpable liver mass  Sent for pathology 5/11 results still pending  High concern for metastatic malignancy    Small bowel perforation with large Cecal mass   5/4 Expl Lap and SB excision with washout and AB thera wound vac for bowel left in discontinuity  5/7 washout and wound vac replacement  hx Diverticulosis but cecal mass and suspected metastatic lesions found in exploration  5/11 ABD WASHOUT, RT HEMICOLECTOMY, ILEOSTOMY, LIVER BIOPSY, AND ABD WALL CLOSURE  IVAB for peritonitis/sepsis  NPO and TPN with NG to suction  Pathology still pending  Possible trickle TF today per surgery    Palliative Care  Pt is DNR, discussed at length with surgeon prior to surgery and he consented to intubation for surgery with understanding of likely need for prolonged vent support post op until bowel/abdomen closed. He was clear that he would not desire long term vent support past that necessary for immediate post op recovery.   Daughter Claudia  is SDM and is aware and supportive of his wishes. IF at any point his survival chances become poor or prolonged life support is anticipated she would honor his wish and transition to comfort focused care.  5/6 - discussed status with daughter. She expressed again understanding of her father's wishes for very limited life support and further stated today that after time to reflect on current status, in the event of continued decline she would not want to pursue potential dialysis but if kidneys fail and indications for RRT exist she would at that time desire transition to a full comfort care focus. She would hope that he could be extubated to comfort focus and have opportunity to interact with family but verbalizes understanding that this may not be possible given open abdomen and comfort goal.  Continue daily and prn updates        Preventive Measures and Monitoring:   Stress Ulcer: Pepcid  Nutrition: TPN  Glucose control: SSI  Bowel prophylaxis: S/P colon surgery  DVT prophylaxis: Heparin infusion  Hx CAD on B-Blocker: no hx CAD  Head of Bed/Reposition: Elevate HOB and turn Q1-2 hours   Early Mobility: bed rest  SAT/SBT: SBT pending awakening from SAT  RASS goal: -1  Vent Day: #10  NG Day: #3  Central Line Right IJ Day: #10  Doherty Day: #10  IVAB Day: #10  Code Status: DNR     Counseling/Consultation:I have discussed the care of this patient in detail with the bedside nursing staff and Dr. Whelan and Dr. Parker    Patient assessed.  Soft bilat wrist restraints renewed due to risk of pulling lines, tubes and/or climbing OOB.     Critical Care Time: 55 minutes  Critical secondary to Patient has a condition that poses threat to life and bodily function: Acute Renal Failure and Septic Shock and Acute Hypoxic Resp Failure intubated on [facility] ventilation  Patient is currently on drug therapy requiring intensive monitoring for toxicity: Amiodarone, Heparin and Levophed infusions  Patient is currently receiving parenteral  controlled substances: Fentanyl and Precedex infusions      Critical care was time spent personally by me on the following activities: development of treatment plan with patient or surrogate and bedside caregivers, discussions with consultants, evaluation of patient's response to treatment, examination of patient, ordering and performing treatments and interventions, ordering and review of laboratory studies, ordering and review of radiographic studies, pulse oximetry, re-evaluation of patient's condition. This critical care time did not overlap with that of any other provider or involve time for any procedures.     Rai Hernandez NP  Critical Care Medicine  [facility] - Intensive Care [facility])

## 2022-05-13 NOTE — PLAN OF CARE
Problem: Adult Inpatient Plan of Care  Goal: Plan of Care Review  Outcome: Ongoing, Progressing     Problem: Adult Inpatient Plan of Care  Goal: Readiness for Transition of Care  Outcome: Ongoing, Progressing     Problem: Infection  Goal: Absence of Infection Signs and Symptoms  Outcome: Ongoing, Progressing

## 2022-05-13 NOTE — PLAN OF CARE
Problem: Adult Inpatient Plan of Care  Goal: Plan of Care Review  Outcome: Ongoing, Progressing  Goal: Patient-Specific Goal (Individualized)  Outcome: Ongoing, Progressing  Goal: Absence of Hospital-Acquired Illness or Injury  Outcome: Ongoing, Progressing  Goal: Optimal Comfort and Wellbeing  Outcome: Ongoing, Progressing  Goal: Readiness for Transition of Care  Outcome: Ongoing, Progressing     Problem: Infection  Goal: Absence of Infection Signs and Symptoms  Outcome: Ongoing, Progressing     Problem: Adjustment to Illness (Sepsis/Septic Shock)  Goal: Optimal Coping  Outcome: Ongoing, Progressing     Problem: Bleeding (Sepsis/Septic Shock)  Goal: Absence of Bleeding  Outcome: Ongoing, Progressing     Problem: Glycemic Control Impaired (Sepsis/Septic Shock)  Goal: Blood Glucose Level Within Desired Range  Outcome: Ongoing, Progressing     Problem: Infection Progression (Sepsis/Septic Shock)  Goal: Absence of Infection Signs and Symptoms  Outcome: Ongoing, Progressing     Problem: Nutrition Impaired (Sepsis/Septic Shock)  Goal: Optimal Nutrition Intake  Outcome: Ongoing, Progressing     Problem: Fall Injury Risk  Goal: Absence of Fall and Fall-Related Injury  Outcome: Ongoing, Progressing     Problem: Restraint, Nonbehavioral (Nonviolent)  Goal: Absence of Harm or Injury  Outcome: Ongoing, Progressing     Problem: Communication Impairment (Mechanical Ventilation, Invasive)  Goal: Effective Communication  Outcome: Ongoing, Progressing     Problem: Device-Related Complication Risk (Mechanical Ventilation, Invasive)  Goal: Optimal Device Function  Outcome: Ongoing, Progressing     Problem: Inability to Wean (Mechanical Ventilation, Invasive)  Goal: Mechanical Ventilation Liberation  Outcome: Ongoing, Progressing     Problem: Nutrition Impairment (Mechanical Ventilation, Invasive)  Goal: Optimal Nutrition Delivery  Outcome: Ongoing, Progressing     Problem: Skin and Tissue Injury (Mechanical Ventilation,  Invasive)  Goal: Absence of Device-Related Skin and Tissue Injury  Outcome: Ongoing, Progressing     Problem: Ventilator-Induced Lung Injury (Mechanical Ventilation, Invasive)  Goal: Absence of Ventilator-Induced Lung Injury  Outcome: Ongoing, Progressing     Problem: Communication Impairment (Artificial Airway)  Goal: Effective Communication  Outcome: Ongoing, Progressing     Problem: Device-Related Complication Risk (Artificial Airway)  Goal: Optimal Device Function  Outcome: Ongoing, Progressing     Problem: Skin and Tissue Injury (Artificial Airway)  Goal: Absence of Device-Related Skin or Tissue Injury  Outcome: Ongoing, Progressing     Problem: Noninvasive Ventilation Acute  Goal: Effective Unassisted Ventilation and Oxygenation  Outcome: Ongoing, Progressing     Problem: Fluid Imbalance (Pneumonia)  Goal: Fluid Balance  Outcome: Ongoing, Progressing     Problem: Infection (Pneumonia)  Goal: Resolution of Infection Signs and Symptoms  Outcome: Ongoing, Progressing     Problem: Respiratory Compromise (Pneumonia)  Goal: Effective Oxygenation and Ventilation  Outcome: Ongoing, Progressing     Problem: Skin Injury Risk Increased  Goal: Skin Health and Integrity  Outcome: Ongoing, Progressing     Problem: Fluid and Electrolyte Imbalance (Acute Kidney Injury/Impairment)  Goal: Fluid and Electrolyte Balance  Outcome: Ongoing, Progressing     Problem: Oral Intake Inadequate (Acute Kidney Injury/Impairment)  Goal: Optimal Nutrition Intake  Outcome: Ongoing, Progressing     Problem: Renal Function Impairment (Acute Kidney Injury/Impairment)  Goal: Effective Renal Function  Outcome: Ongoing, Progressing

## 2022-05-13 NOTE — ASSESSMENT & PLAN NOTE
5/4 Expl Lap and SB excision with washout and AB thera wound vac for bowel left in discontinuity  5/7 washout and wound vac replacement  hx Diverticulosis but cecal mass and suspected metastatic lesions found in exploration  5/11 ABD WASHOUT, RT HEMICOLECTOMY, ILEOSTOMY, LIVER BIOPSY, AND ABD WALL CLOSURE  IVAB for peritonitis/sepsis  NPO and TPN with NG to suction  Pathology still pending  Possible trickle TF today per surgery

## 2022-05-13 NOTE — ASSESSMENT & PLAN NOTE
S/t septic shock  Adequate volume resuscitation +21L I/O balance  Avoid nephrotoxins  Strict I/O  No acute indication for dialysis but high potential for continued decline, daughter(HCPOA) will not pursue dialysis if decline per her father's wishes  Nephrology following  Adequate urine post diuretic trial but no improvement in BUN/Cr/K

## 2022-05-13 NOTE — PROGRESS NOTES
[facility] - [facility]ensive [facility] ([facility])  Nephrology  Progress Note    Patient Name: Franky Masters  MRN: [id number]  Admission Date: 5/4/2022  Hospital Length of Stay: 9 days  Attending Provider: Elvie Parker DO   Primary Care Physician: HOLLY ROSEN  Principal Problem:Septic shock    Consults  Subjective:     Interval History:     Patient was seen in the intensive care unit.  In bed resting comfortably.  Remains intubated.  Family member was at the bedside.  All nephrology related questions were answered to her satisfaction.    Review of systems:  Not obtainable    Review of patient's allergies indicates:  No Known Allergies  Current Facility-Administered Medications   Medication Frequency    0.9%  NaCl infusion (for blood administration) Q24H PRN    0.9%  NaCl infusion PRN    acetaminophen suppository 650 mg Q6H PRN    amiodarone 360 mg/200 mL (1.8 mg/mL) infusion Continuous    chlorhexidine 0.12 % solution 15 mL BID    dexmedetomidine (PRECEDEX) 400mcg/100mL dextrose 5% infusion Continuous    dextrose 10 % infusion Continuous PRN    dextrose 10% bolus 125 mL PRN    ertapenem (INVANZ) 500 mg in sodium chloride 0.9% 100 mL IVPB Q24H    famotidine (PF) injection 20 mg Daily    fentaNYL 2500 mcg in 0.9% sodium chloride 250 mL infusion premix (titrating) Continuous    glucagon (human recombinant) injection 1 mg PRN    heparin 25,000 units in dextrose 5% (100 units/ml) IV bolus from bag - ADDITIONAL PRN BOLUS - 30 units/kg PRN    heparin 25,000 units in dextrose 5% (100 units/ml) IV bolus from bag - ADDITIONAL PRN BOLUS - 60 units/kg PRN    heparin 25,000 units in dextrose 5% 250 mL (100 units/mL) infusion LOW INTENSITY nomogram - [facility] Continuous    insulin aspart U-100 pen 1-10 Units Q4H PRN    lorazepam injection 2 mg Q4H PRN    micafungin 100 mg in sodium chloride 0.9 % 100 mL IVPB (ready to mix system) Q24H    NORepinephrine 32 mg in dextrose 5 % 250 mL infusion Continuous    ondansetron  injection 4 mg Q8H PRN    TPN ADULT CENTRAL LINE CUSTOM Continuous    TPN ADULT CENTRAL LINE CUSTOM Continuous    white petrolatum-mineral oil 57.3-42.5% ophthalmic ointment QHS       Objective:     Vital Signs (Most Recent):  Temp: 98.6 °F (37 °C) (05/13/22 1140)  Pulse: 80 (05/13/22 1140)  Resp: 16 (05/13/22 1140)  BP: 112/62 (05/13/22 1000)  SpO2: 100 % (05/13/22 1140)  O2 Device (Oxygen Therapy): ventilator (05/13/22 1140) Vital Signs (24h Range):  Temp:  [98.24 °F (36.8 °C)-100.76 °F (38.2 °C)] 98.6 °F (37 °C)  Pulse:  [] 80  Resp:  [14-27] 16  SpO2:  [85 %-100 %] 100 %  BP: ()/(62-92) 112/62  Arterial Line BP: ()/() 131/65     Weight: 115.8 kg (255 lb 4.7 oz) (05/12/22 0620)  Body mass index is 37.7 kg/m².  Body surface area is 2.37 meters squared.    I/O last 3 completed shifts:  In: 3605.9 [I.V.:1576.7; NG/GT:125; IV Piggyback:317.3]  Out: 3880 [Urine:2415; Drains:1390; Stool:75]    Physical Exam  Constitutional:       Appearance: Normal appearance.   HENT:      Head: Normocephalic and atraumatic.      Mouth/Throat:      Comments: ET tube in place  Eyes:      General: No scleral icterus.     Extraocular Movements: Extraocular movements intact.      Pupils: Pupils are equal, round, and reactive to light.   Cardiovascular:      Rate and Rhythm: Normal rate and regular rhythm.   Pulmonary:      Effort: Pulmonary effort is normal.      Comments: Intubated  Musculoskeletal:      Right lower leg: No edema.      Left lower leg: No edema.   Skin:     General: Skin is warm and dry.   Neurological:      General: No focal deficit present.      Mental Status: He is alert and oriented to person, place, and time.   Psychiatric:         Mood and Affect: Mood normal.         Behavior: Behavior normal.         Significant Labs:sure  BMP:   Recent Labs   Lab 05/13/22  0350   *      K 3.4*   CL 95   CO2 25   *   CREATININE 4.0*   CALCIUM 7.2*   MG 2.0     CMP:   Recent Labs   Lab  05/13/22  0350   *   CALCIUM 7.2*   ALBUMIN 1.1*   PROT 4.3*      K 3.4*   CO2 25   CL 95   *   CREATININE 4.0*   ALKPHOS 102   *   AST 52*   BILITOT 0.8     All labs within the past 24 hours have been reviewed.    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 Acute kidney injury:  Secondary to sepsis with ATN.  Creatinine has slowly improved.  Down to 4.0 this morning.  Urine output has improved, reported at 1580 cc over the past 24 hours.    Baseline creatinine appears to run around 0.7.    2. Electrolytes are stable:  Potassium is 3.4.  Bicarbonate is 25.    Discussed with Critical Care Medicine.    Approximately 25 minutes was spent in face-to-face conversation, chart review and coordination of care with other providers.      Thank you for your consult.     Jordan Renee MD  Nephrology  [facility] [facility] ([facility])

## 2022-05-13 NOTE — SUBJECTIVE & OBJECTIVE
Review of Systems   Unable to perform ROS: Intubated       Objective:     Vital Signs (Most Recent):  Temp: 98.6 °F (37 °C) (05/13/22 1140)  Pulse: 80 (05/13/22 1140)  Resp: 16 (05/13/22 1140)  BP: 112/62 (05/13/22 1000)  SpO2: 100 % (05/13/22 1140) Vital Signs (24h Range):  Temp:  [98.24 °F (36.8 °C)-100.76 °F (38.2 °C)] 98.6 °F (37 °C)  Pulse:  [] 80  Resp:  [14-27] 16  SpO2:  [85 %-100 %] 100 %  BP: ()/(62-92) 112/62  Arterial Line BP: ()/() 131/65     Weight: 115.8 kg (255 lb 4.7 oz)  Body mass index is 37.7 kg/m².      Intake/Output Summary (Last 24 hours) at 5/13/2022 1243  Last data filed at 5/13/2022 1200  Gross per 24 hour   Intake 2363.4 ml   Output 2500 ml   Net -136.6 ml       Physical Exam  Vitals and nursing note reviewed.   Constitutional:       General: He is not in acute distress.     Appearance: He is well-developed. He is obese. He is ill-appearing. He is not toxic-appearing or diaphoretic.      Interventions: He is sedated, intubated and restrained.   HENT:      Head: Atraumatic.      Nose:        Mouth/Throat:      Mouth: Mucous membranes are moist.   Eyes:      General: No scleral icterus.     Conjunctiva/sclera: Conjunctivae normal.      Pupils: Pupils are equal, round, and reactive to light.   Neck:      Vascular: No JVD.     Cardiovascular:      Rate and Rhythm: Tachycardia present. Rhythm irregular.      Pulses:           Radial pulses are 1+ on the right side and 1+ on the left side.        Dorsalis pedis pulses are 1+ on the right side and 1+ on the left side.      Heart sounds: Heart sounds are distant.   Pulmonary:      Effort: No tachypnea, accessory muscle usage or respiratory distress. He is intubated.      Breath sounds: Decreased breath sounds present. No wheezing or rhonchi.   Chest:      Chest wall: No deformity or tenderness.   Abdominal:      General: There is no distension.      Palpations: Abdomen is soft.          Comments: Rare tinkling bowel sounds  but some bile stool in colostomy   Genitourinary:     Comments: Doherty in place  Musculoskeletal:      Cervical back: Neck supple.      Right lower leg: 3+ Pitting Edema present.      Left lower leg: 3+ Pitting Edema present.      Right foot: No deformity.      Left foot: No deformity.   Lymphadenopathy:      Cervical: No cervical adenopathy.   Skin:     General: Skin is warm.      Capillary Refill: Capillary refill takes 2 to 3 seconds.          Neurological:      Comments: Overnight had restless awakening with sedation weaning.  This AM still obtunded with weaning sedation       Vents:  Vent Mode: Spont (05/13/22 1140)  Ventilator Initiated: Yes (05/04/22 1258)  Set Rate: 0 BPM (05/13/22 1140)  Vt Set: 450 mL (05/13/22 1140)  Pressure Support: 10 cmH20 (05/13/22 1140)  PEEP/CPAP: 5 cmH20 (05/13/22 1140)  Oxygen Concentration (%): 30 (05/13/22 1140)  Peak Airway Pressure: 15 cmH2O (05/13/22 1140)  Plateau Pressure: 17 cmH20 (05/13/22 1140)  Total Ve: 7.77 mL (05/13/22 1140)  F/VT Ratio<105 (RSBI): (!) 29.85 (05/13/22 1140)    Lines/Drains/Airways       Central Venous Catheter Line  Duration             Percutaneous Central Line Insertion/Assessment - Triple Lumen  05/04/22 1200 right internal jugular 9 days              Drain  Duration                  Urethral Catheter 05/04/22 1105 Straight-tip;Silicone 16 Fr. 9 days         Closed/Suction Drain 05/11/22 1735 LUQ Bulb 19 Fr. 1 day         Closed/Suction Drain 05/11/22 1735 RLQ Bulb 19 Fr. 1 day         Colostomy 05/11/22 1736 RUQ 1 day         NG/OG Tube 05/11/22 1715 Pella sump 18 Fr. Left nostril 1 day              Airway  Duration                  Airway - Non-Surgical 05/04/22 1051 Endotracheal Tube 9 days              Arterial Line  Duration             Arterial Line 05/07/22 1330 Right Radial 5 days              Peripheral Intravenous Line  Duration                  Midline Catheter Insertion/Assessment  - Single Lumen 05/12/22 1730 Left basilic vein (medial  side of arm) <1 day                    Significant Labs:    CBC/Anemia Profile:  Recent Labs   Lab 05/12/22  0419 05/13/22  0350   WBC 14.17* 16.29*   HGB 8.6* 8.2*   HCT 28.8* 27.3*   * 132*   MCV 73* 74*   RDW 25.7* 25.9*        Chemistries:  Recent Labs   Lab 05/11/22  2256 05/12/22 0419 05/13/22  0350    137 138   K 4.3 4.1 3.4*   CL 94* 94* 95   CO2 19* 21* 25   BUN 98* 101* 107*   CREATININE 4.4* 4.3* 4.0*   CALCIUM 6.3* 6.4* 7.2*   ALBUMIN 1.1* 1.1* 1.1*   PROT 3.3* 3.3* 4.3*   BILITOT 0.9 0.9 0.8   ALKPHOS 112 113 102   * 217* 131*   * 124* 52*   MG  --  1.9 2.0   PHOS  --  8.9* 8.1*       ABGs:   Recent Labs   Lab 05/13/22  0347   PH 7.458*   PCO2 41.7   HCO3 29.5*   POCSATURATED 98   BE 6     Coagulation:   Recent Labs   Lab 05/12/22  1451 05/12/22  2057 05/13/22  1001   INR 1.1  --   --    APTT 34.1*   < > 42.1*    < > = values in this interval not displayed.     POCT Glucose:   Recent Labs   Lab 05/13/22  0015 05/13/22  0511 05/13/22  0850   POCTGLUCOSE 159* 153* 173*     All pertinent labs within the past 24 hours have been reviewed.    Significant Imaging:  I have reviewed all pertinent imaging results/findings within the past 24 hours.  CXR: I have reviewed all pertinent results/findings within the past 24 hours and my personal findings are:  Small left pleural effusion with bibasilar patchy infiltrates.  Prominence of the keila again noted.  Right-sided infrahilar lesion or infiltrate suspected.

## 2022-05-13 NOTE — SUBJECTIVE & OBJECTIVE
Interval History: Day 9 on Vent.  Abdomen closed.  Border line tachycardia but requiring slightly less Norepinephrine.  Continuing antibiotics and coordinating care with General Surgery and Critical Care Medicine.    Review of Systems   Unable to perform ROS: Intubated   Objective:     Vital Signs (Most Recent):  Temp: 97.7 °F (36.5 °C) (05/13/22 1800)  Pulse: (!) 125 (05/13/22 1830)  Resp: (!) 22 (05/13/22 1830)  BP: 132/80 (05/13/22 1800)  SpO2: 100 % (05/13/22 1830)   Vital Signs (24h Range):  Temp:  [97.3 °F (36.3 °C)-99.32 °F (37.4 °C)] 97.7 °F (36.5 °C)  Pulse:  [] 125  Resp:  [13-27] 22  SpO2:  [99 %-100 %] 100 %  BP: ()/(62-86) 132/80  Arterial Line BP: ()/() 125/60     Weight: 115.8 kg (255 lb 4.7 oz)  Body mass index is 37.7 kg/m².    Intake/Output Summary (Last 24 hours) at 5/13/2022 1845  Last data filed at 5/13/2022 1800  Gross per 24 hour   Intake 2653.12 ml   Output 2579 ml   Net 74.12 ml        Physical Exam  Vitals and nursing note reviewed.   Constitutional:       General: He is not in acute distress.     Appearance: He is well-developed. He is obese. He is ill-appearing. He is not toxic-appearing or diaphoretic.      Interventions: He is sedated, intubated and restrained.   HENT:      Head: Atraumatic.      Nose:        Mouth/Throat:      Mouth: Mucous membranes are moist.   Eyes:      General: No scleral icterus.     Conjunctiva/sclera: Conjunctivae normal.      Pupils: Pupils are equal, round, and reactive to light.   Neck:      Vascular: No JVD.     Cardiovascular:      Rate and Rhythm: Tachycardia present. Rhythm irregular.      Pulses:           Radial pulses are 1+ on the right side and 1+ on the left side.        Dorsalis pedis pulses are 1+ on the right side and 1+ on the left side.      Heart sounds: Heart sounds are distant.   Pulmonary:      Effort: No accessory muscle usage or respiratory distress. He is intubated.      Breath sounds: Decreased breath sounds  present. No wheezing or rhonchi.   Chest:      Chest wall: No deformity or tenderness.   Abdominal:      General: Bowel sounds are absent. There is no distension.       Genitourinary:     Comments: Doherty in place  Musculoskeletal:      Cervical back: Neck supple.      Right lower leg: 3+ Pitting Edema present.      Left lower leg: 3+ Pitting Edema present.      Right foot: No deformity.      Left foot: No deformity.   Lymphadenopathy:      Cervical: No cervical adenopathy.   Skin:     General: Skin is warm.      Capillary Refill: Capillary refill takes 2 to 3 seconds.          Neurological:      Comments: Heavily sedated     Flow (L/min): 4  Vent Mode: Spont  Oxygen Concentration (%):  [30-35] 30  Resp Rate Total:  [14 br/min-26 br/min] 22 br/min  Vt Set:  [450 mL] 450 mL  PEEP/CPAP:  [5 cmH20] 5 cmH20  Pressure Support:  [0 cmH20-10 cmH20] 10 cmH20  Mean Airway Pressure:  [8.2 umJ15-93 cmH20] 8.8 cmH20  Temp:  [97.3 °F (36.3 °C)-99.32 °F (37.4 °C)] 97.7 °F (36.5 °C)  Pulse:  [] 125  Resp:  [13-27] 22  SpO2:  [99 %-100 %] 100 %  BP: ()/(62-86) 132/80  Arterial Line BP: ()/() 125/60   Art pH/pCO2/pO2/HCO3:  7.458/41.7/106/29.5 (05/13 0347)  Lab Results   Component Value Date    [id number] Negative 05/11/2022    [id number] Negative 05/04/2022    [id number] Positive (A) 08/05/2021        Recent Labs   Lab 05/07/22  1345 05/08/22  0421 05/10/22  0406 05/11/22  0416 05/11/22  2256 05/12/22  0419 05/13/22  0350   LACTATE 6.7*  --  3.5*  --   --   --   --    NA  --    < > 132* 133* 136 137 138   WBC  --    < > 13.35* 17.10*  --  14.17* 16.29*   GRAN  --    < > 87.8*  11.7* 91.5*  15.7*  --  92.1*  13.1* 89.9*  14.7*   LYMPH  --    < > 6.3*  0.8* 3.6*  0.6*  --  3.0*  0.4* 4.2*  0.7*   HGB  --    < > 8.6* 8.6*  --  8.6* 8.2*   HCT  --    < > 28.0* 28.4*  --  28.8* 27.3*   BUN  --    < > 87* 95* 98* 101* 107*   CREATININE  --    < > 4.8* 4.7* 4.4* 4.3* 4.0*   ESTGFRAFRICA  --    < > 13*  14* 15* 15* 17*   EGFRNONAA  --    < > 12* 12* 13* 13* 14*   K  --    < > 5.0 4.3 4.3 4.1 3.4*   CL  --    < > 90* 91* 94* 94* 95   CO2  --    < > 19* 20* 19* 21* 25   PHOS  --   --   --  8.9*  --  8.9* 8.1*   MG  --    < > 2.0 1.9  --  1.9 2.0    < > = values in this interval not displayed.       Microbiology Results (last 7 days)       Procedure Component Value Units Date/Time    Blood culture [[id number]] Collected: 05/04/22 1428    Order Status: Completed Specimen: Blood from Line, Arterial, Left Updated: 05/10/22 0612     Blood Culture, Routine No growth after 5 days.    Blood culture [[id number]] Collected: 05/04/22 1429    Order Status: Completed Specimen: Blood from Line, Jugular, Internal Right Updated: 05/10/22 0612     Blood Culture, Routine No growth after 5 days.    Culture, Respiratory with Gram Stain [[id number]] Collected: 05/04/22 1253    Order Status: Completed Specimen: Respiratory from Endotracheal Aspirate Updated: 05/07/22 0921     Respiratory Culture No growth     Gram Stain (Respiratory) Few WBC's     Gram Stain (Respiratory) Rare Gram positive rods          Antibiotics (From admission, onward)                Start     Stop Route Frequency Ordered    05/09/22 2200  ertapenem (INVANZ) 500 mg in sodium chloride 0.9% 100 mL IVPB         05/19 2359 IV Every 24 hours (non-standard times) 05/09/22 1624          Anticoagulants       Ordered     Route Frequency Start Stop    05/12/22 1408  heparin (PORCINE)  (LOW INTENSITY heparin infusion nomogram - [facility] (Recommended Indications: Acute Coronary Syndrome, Atrial Fibrillation, Prophylaxis in Prosthetic Heart Valves, and other indication where full anticoagulation is desired, bleeding risk is moderate, antiplatelet agents have been utilized, and time to therapeutic can be delayed minimizing the increased bleeding risk))         IV As needed (PRN) 05/12/22 1508 --    05/12/22 1408  heparin (PORCINE)  (LOW INTENSITY heparin infusion nomogram - [facility]  (Recommended Indications: Acute Coronary Syndrome, Atrial Fibrillation, Prophylaxis in Prosthetic Heart Valves, and other indication where full anticoagulation is desired, bleeding risk is moderate, antiplatelet agents have been utilized, and time to therapeutic can be delayed minimizing the increased bleeding risk))         IV As needed (PRN) 05/12/22 1508 --    05/12/22 1408  heparin (porcine) in D5W  (LOW INTENSITY heparin infusion nomogram - [facility] (Recommended Indications: Acute Coronary Syndrome, Atrial Fibrillation, Prophylaxis in Prosthetic Heart Valves, and other indication where full anticoagulation is desired, bleeding risk is moderate, antiplatelet agents have been utilized, and time to therapeutic can be delayed minimizing the increased bleeding risk))         IV Continuous 05/12/22 1415 --          X-Ray Chest 1 View  Narrative: EXAMINATION:  XR CHEST 1 VIEW    CLINICAL HISTORY:  resp failure;    TECHNIQUE:  Single frontal view of the chest was performed.    COMPARISON:  05/09/2022    FINDINGS:  Tubes and lines are stable. Small left pleural effusion with bibasilar patchy infiltrates.  Prominence of the keila again noted.  Right-sided infrahilar lesion or infiltrate suspected.  Recommend follow-up to resolution which may include cross-sectional imaging such is noncontrast chest CT.  In comparison to the prior study, there is no adverse interval changes.  Impression: In comparison to the prior study, there is no adverse interval changes    Electronically signed by: Philippe Horton MD  Date:    05/13/2022  Time:    07:22   Significant Labs: All pertinent labs within the past 24 hours have been reviewed.    Significant Imaging: I have reviewed all pertinent imaging results/findings within the past 24 hours.

## 2022-05-13 NOTE — PROGRESS NOTES
[facility] - Intensive Care ([facility])  General Surgery  Progress Note    Subjective:     History of Present Illness:  No notes on file    Post-Op Info:  Procedure(s) (LRB):  CREATION, ILEOSTOMY (N/A)  HEMICOLECTOMY, RIGHT (N/A)  BIOPSY, LIVER (Right)   2 Days Post-Op     Interval History: Still in afib. On levo.    Medications:  Continuous Infusions:   amiodarone in dextrose 5% 0.5 mg/min (05/13/22 1400)    dexmedetomidine (PRECEDEX) infusion Stopped (05/13/22 1320)    dextrose 10 % in water (D10W)      fentanyl Stopped (05/13/22 0940)    heparin (porcine) in D5W 14 Units/kg/hr (05/13/22 1400)    NORepinephrine bitartrate-D5W 0.08 mcg/kg/min (05/13/22 1400)    TPN ADULT CENTRAL LINE CUSTOM 35 mL/hr at 05/13/22 1400    TPN ADULT CENTRAL LINE CUSTOM       Scheduled Meds:   chlorhexidine  15 mL Mouth/Throat BID    ertapenem (INVANZ) IVPB  500 mg Intravenous Q24H    famotidine (PF)  20 mg Intravenous Daily    micafungin (MYCAMINE) IVPB  100 mg Intravenous Q24H    white petrolatum-mineral oil 57.3-42.5%   Both Eyes QHS     PRN Meds:sodium chloride, sodium chloride 0.9%, acetaminophen, dextrose 10 % in water (D10W), dextrose 10%, glucagon (human recombinant), heparin (PORCINE), heparin (PORCINE), insulin aspart U-100, lorazepam, ondansetron     Review of patient's allergies indicates:  No Known Allergies  Objective:     Vital Signs (Most Recent):  Temp: 98.6 °F (37 °C) (05/13/22 1400)  Pulse: 109 (05/13/22 1400)  Resp: 19 (05/13/22 1400)  BP: 113/73 (05/13/22 1400)  SpO2: 100 % (05/13/22 1400) Vital Signs (24h Range):  Temp:  [98.24 °F (36.8 °C)-100.22 °F (37.9 °C)] 98.6 °F (37 °C)  Pulse:  [] 109  Resp:  [14-27] 19  SpO2:  [95 %-100 %] 100 %  BP: ()/(62-92) 113/73  Arterial Line BP: ()/() 144/65     Weight: 115.8 kg (255 lb 4.7 oz)  Body mass index is 37.7 kg/m².    Intake/Output - Last 3 Shifts         05/11 0700  05/12 0659 05/12 0700  05/13 0659 05/13 0700  05/14 0659    I.V. (mL/kg)  1083.7 (9.4) 999.5 (8.6) 360.2 (3.1)    NG/GT  125 70    IV Piggyback 1496 221.3 100.1    .7 950.3 301.5    Total Intake(mL/kg) 3424.5 (29.6) 2296.2 (19.8) 831.8 (7.2)    Urine (mL/kg/hr) 2300 (0.8) 1680 (0.6) 680 (0.7)    Drains 915 855 175    Other 135      Stool 20 55 0    Blood 250      Total Output 3620 2590 855    Net -195.5 -293.9 -23.2           Stool Occurrence   1 x            Physical Exam  Vitals and nursing note reviewed.   Constitutional:       Appearance: He is obese. He is ill-appearing.   Cardiovascular:      Rate and Rhythm: Rhythm irregular.   Pulmonary:      Comments: Mechanical breath sounds  Abdominal:      Palpations: Abdomen is soft.      Comments: Midline incision with staples and packing in place. Right-sided end ileostomy is red with some liquid stool output. ADDIS drains are both serous.   Genitourinary:     Comments: Doherty in place  Musculoskeletal:      Right lower leg: Edema present.      Left lower leg: Edema present.   Neurological:      Comments: Sedated       Significant Labs:  I have reviewed all pertinent lab results within the past 24 hours.  CBC:   Recent Labs   Lab 05/13/22  0350   WBC 16.29*   RBC 3.70*   HGB 8.2*   HCT 27.3*   *   MCV 74*   MCH 22.2*   MCHC 30.0*     CMP:   Recent Labs   Lab 05/13/22  0350   *   CALCIUM 7.2*   ALBUMIN 1.1*   PROT 4.3*      K 3.4*   CO2 25   CL 95   *   CREATININE 4.0*   ALKPHOS 102   *   AST 52*   BILITOT 0.8     ABGs:   Recent Labs   Lab 05/13/22  0347   PH 7.458*   PCO2 41.7   PO2 106*   HCO3 29.5*   POCSATURATED 98   BE 6       Significant Diagnostics:  I have reviewed all pertinent imaging results/findings within the past 24 hours.    Assessment/Plan:     Atrial fibrillation with RVR  Management per medicine/critical care    JOSE (acute kidney injury)  Management per medicine/critical care    On mechanically assisted ventilation  Management per medicine/critical care    Small bowel perforation with  large Cecal mass   S/p exploratory laparotomy, abdominal washout, segmental small bowel resection (left in discontinuity), placement of Abthera vac 5/4/22  S/p reopening of recent laparotomy, abdominal washout, replacement of Abthera vac 5/7/22  S/p reopening of recent laparotomy, abdominal washout, right hemicolectomy, liver biopsy, end ileostomy, TAP block, abdominal wall closure 5/11/22    - Initiate trickle tube feeds and can start having crushed PO meds.  - Continue ICU management. Currently still requiring vasopressor support. On heparin and amio gtt for afib.  - Ostomy nurse consult  - Strict I/Os  - TPN  - Medical and ICU management per hospital/ICU team    Discussed with ICU nurse and daughter at bedside.    Acute on chronic anemia  Management per medicine/critical care    Acute hypoxemic respiratory failure on mechanical vent  Management per medicine/critical care        Elvie Parker, DO  General Surgery  [facility] - Intensive Care (Hospital)

## 2022-05-13 NOTE — SUBJECTIVE & OBJECTIVE
Interval History: Still in afib. On levo.    Medications:  Continuous Infusions:   amiodarone in dextrose 5% 0.5 mg/min (05/13/22 1400)    dexmedetomidine (PRECEDEX) infusion Stopped (05/13/22 1320)    dextrose 10 % in water (D10W)      fentanyl Stopped (05/13/22 0940)    heparin (porcine) in D5W 14 Units/kg/hr (05/13/22 1400)    NORepinephrine bitartrate-D5W 0.08 mcg/kg/min (05/13/22 1400)    TPN ADULT CENTRAL LINE CUSTOM 35 mL/hr at 05/13/22 1400    TPN ADULT CENTRAL LINE CUSTOM       Scheduled Meds:   chlorhexidine  15 mL Mouth/Throat BID    ertapenem (INVANZ) IVPB  500 mg Intravenous Q24H    famotidine (PF)  20 mg Intravenous Daily    micafungin (MYCAMINE) IVPB  100 mg Intravenous Q24H    white petrolatum-mineral oil 57.3-42.5%   Both Eyes QHS     PRN Meds:sodium chloride, sodium chloride 0.9%, acetaminophen, dextrose 10 % in water (D10W), dextrose 10%, glucagon (human recombinant), heparin (PORCINE), heparin (PORCINE), insulin aspart U-100, lorazepam, ondansetron     Review of patient's allergies indicates:  No Known Allergies  Objective:     Vital Signs (Most Recent):  Temp: 98.6 °F (37 °C) (05/13/22 1400)  Pulse: 109 (05/13/22 1400)  Resp: 19 (05/13/22 1400)  BP: 113/73 (05/13/22 1400)  SpO2: 100 % (05/13/22 1400) Vital Signs (24h Range):  Temp:  [98.24 °F (36.8 °C)-100.22 °F (37.9 °C)] 98.6 °F (37 °C)  Pulse:  [] 109  Resp:  [14-27] 19  SpO2:  [95 %-100 %] 100 %  BP: ()/(62-92) 113/73  Arterial Line BP: ()/() 144/65     Weight: 115.8 kg (255 lb 4.7 oz)  Body mass index is 37.7 kg/m².    Intake/Output - Last 3 Shifts         05/11 0700 05/12 0659 05/12 0700 05/13 0659 05/13 0700 05/14 0659    I.V. (mL/kg) 1083.7 (9.4) 999.5 (8.6) 360.2 (3.1)    NG/GT  125 70    IV Piggyback 1496 221.3 100.1    .7 950.3 301.5    Total Intake(mL/kg) 3424.5 (29.6) 2296.2 (19.8) 831.8 (7.2)    Urine (mL/kg/hr) 2300 (0.8) 1680 (0.6) 680 (0.7)    Drains 915 855 175    Other 135      Stool 20 55 0     Blood 250      Total Output 3620 2590 855    Net -195.5 -293.9 -23.2           Stool Occurrence   1 x            Physical Exam  Vitals and nursing note reviewed.   Constitutional:       Appearance: He is obese. He is ill-appearing.   Cardiovascular:      Rate and Rhythm: Rhythm irregular.   Pulmonary:      Comments: Mechanical breath sounds  Abdominal:      Palpations: Abdomen is soft.      Comments: Midline incision with staples and packing in place. Right-sided end ileostomy is red with some liquid stool output. ADDIS drains are both serous.   Genitourinary:     Comments: Doherty in place  Musculoskeletal:      Right lower leg: Edema present.      Left lower leg: Edema present.   Neurological:      Comments: Sedated       Significant Labs:  I have reviewed all pertinent lab results within the past 24 hours.  CBC:   Recent Labs   Lab 05/13/22  0350   WBC 16.29*   RBC 3.70*   HGB 8.2*   HCT 27.3*   *   MCV 74*   MCH 22.2*   MCHC 30.0*     CMP:   Recent Labs   Lab 05/13/22  0350   *   CALCIUM 7.2*   ALBUMIN 1.1*   PROT 4.3*      K 3.4*   CO2 25   CL 95   *   CREATININE 4.0*   ALKPHOS 102   *   AST 52*   BILITOT 0.8     ABGs:   Recent Labs   Lab 05/13/22  0347   PH 7.458*   PCO2 41.7   PO2 106*   HCO3 29.5*   POCSATURATED 98   BE 6       Significant Diagnostics:  I have reviewed all pertinent imaging results/findings within the past 24 hours.

## 2022-05-13 NOTE — PROGRESS NOTES
[facility] - Intensive Care ([facility]  Progress Note    Patient Name: Franky Masters  MRN: [id number]  Patient Class: IP- Inpatient   Admission Date: 5/4/2022  Length of Stay: 9 days  Attending Physician: Elvie Parker DO  Primary Care Provider: HOLLY ROSEN        Subjective:     Principal Problem:Septic shock        HPI:  Mr Masters is a 67 yo male POD#0 s/p SB perforation with surgical intervention demonstrating a large cecal mass and 3l feculant fluid in the abdominal cavity. Additional findings of a perforated ileum and a liver mass. Patient tolerated the Exlap with Washout and small bowel excision. Patient had a wound vac placed, is currently intubated, sedated and recovering in the ICU. Patient received 4 units PRBC and remains on pressor support. Patient is a DNR and HM consulted with assistance with medical management. Daughter at bedside. Patient discussed with care team.          Overview/Hospital Course:  Patient continues to require pressors, remains intubated, sedated. Patient urine o/p declining and concerning. Discussed POC in detail at bedside with daughter POA. She expressed her fathers wish to not undergo treatments  like perm HD, where he has a diminished quality of life. We spoke frankly about issues and concerns and she will be communicating with the family as his case evolves. Comfort care was discussed and the goals of care will develop consistent with the patients expressed wishes. Potential for another surgery likely Saturday with a washout and possible biopsy vs resection are on the horizon. This possible intervention will be covered in detail by surgery sharing the risks and benefits of that approach. Case discussed with the primary service - surgery, and critical care team.    5/6- Pt seen and examined in the ICU plus discussed with ICU team and the pt's daughter/ POA: Remains critically ill, intubated, sedated on Vent, on Pressors- Levo plus Vaso- JOSE with oliguria  getting worse- Cr rising to 3.6, becoming severely acidotic- requiring Ertapenem, Zyvox, Micafungin as well as on Bicarb and Fentanyl gtt. He has massive fluid overload and generalized anasarca.  Possible return to OR tomorrow 5/7 if patient is more stable for right hemicolectomy, possible ileostomy, liver biopsy, abdominal wall closure. Dw Pt's daughter.       5/7- remains Intubated, sedated on Vent- Went into Afib w RVR- hence added Amio to Levo and Vasopressin- back in NSR. Getting Invanz and Zyvox plus Micafungin. Dr. Parker took him back to OR for for abdominal washout ( 3-3.5 L feculent fluid with food particles suctioned out in the OR, small bowel appeared better) and replaced Abthera- still has bowel discontinuity. His WBC, Lactate and Cr have all increased. Family updated about his critical condition and poor prognosis and JOSE/ATN- they are considering Comfort measures.     5/8- Day 5 on Vent- family at bedside, remains intubated on Vent with 2 Pressors- still on Amiodarone gtt for Afib, Still has Abthera wound vac to open Abdomen with small bowel discontinuity. Remains oliguric with generalized anasarca- almost 24 L fluid positive. Cr increased to 4.6. WBC down to 12, family does not want any HD/CRRT, so getting an IV Lasix trial to improve the urine output.     5/9- Day 6 on vent- remains the same, remains intubated, on vent with Abthera Wound vac and bowel discontinuity. Put out about 3 L urine since yesterday with IV Lasix, family still does not want any HD, WBC down to 10.2, H/H 7.8/24, still on 2 Pressors and Amio gtt. Family still deciding about comfort care.     5/10- Appreciate all- Day 7- remains same in septic shock on 2 vasopressors, intubated and sedated, still in afib. JOSE has remained stable at 4.7 but urine output improved with IV lasix. Abthera wound vac in place. No surgery today- put out about 2.5 L yesterday, given lasix again today.    5/11- Seen Pre op- looks better, less swollen,  remains intubated, sedated on Vent, on 1 Pressor- on Levophed. Going for OR today for Laparotomy and washout and abd wound closure. Good response to Lasix. Still Afib on Amiodarone gtt. Bun/Cr 98/4.4, WBC down to 17, H/H 8.6/26.     5/12- Day 8 on Vent- looks much better, remains on Vent with Levophed and Amio gtts. Had extensive surgery yesterday and did very well post op- Reopening of recent Laparotomy, Abdominal washout, R Hemicolectomy with Liver Bx, TAP block, Abdominal wall closure, Creation, end Ileostomy. POD 1- looks better, still on Levophed and Amio gtt for Afib, started on TPN, ID stopped Zyvox and continued Merrem/Mycafungin.          Interval History: Day 9 on Vent.  Abdomen closed.  Border line tachycardia but requiring slightly less Norepinephrine.  Continuing antibiotics and coordinating care with General Surgery and Critical Care Medicine.    Review of Systems   Unable to perform ROS: Intubated   Objective:     Vital Signs (Most Recent):  Temp: 97.7 °F (36.5 °C) (05/13/22 1800)  Pulse: (!) 125 (05/13/22 1830)  Resp: (!) 22 (05/13/22 1830)  BP: 132/80 (05/13/22 1800)  SpO2: 100 % (05/13/22 1830)   Vital Signs (24h Range):  Temp:  [97.3 °F (36.3 °C)-99.32 °F (37.4 °C)] 97.7 °F (36.5 °C)  Pulse:  [] 125  Resp:  [13-27] 22  SpO2:  [99 %-100 %] 100 %  BP: ()/(62-86) 132/80  Arterial Line BP: ()/() 125/60     Weight: 115.8 kg (255 lb 4.7 oz)  Body mass index is 37.7 kg/m².    Intake/Output Summary (Last 24 hours) at 5/13/2022 1845  Last data filed at 5/13/2022 1800  Gross per 24 hour   Intake 2653.12 ml   Output 2579 ml   Net 74.12 ml        Physical Exam  Vitals and nursing note reviewed.   Constitutional:       General: He is not in acute distress.     Appearance: He is well-developed. He is obese. He is ill-appearing. He is not toxic-appearing or diaphoretic.      Interventions: He is sedated, intubated and restrained.   HENT:      Head: Atraumatic.      Nose:         Mouth/Throat:      Mouth: Mucous membranes are moist.   Eyes:      General: No scleral icterus.     Conjunctiva/sclera: Conjunctivae normal.      Pupils: Pupils are equal, round, and reactive to light.   Neck:      Vascular: No JVD.     Cardiovascular:      Rate and Rhythm: Tachycardia present. Rhythm irregular.      Pulses:           Radial pulses are 1+ on the right side and 1+ on the left side.        Dorsalis pedis pulses are 1+ on the right side and 1+ on the left side.      Heart sounds: Heart sounds are distant.   Pulmonary:      Effort: No accessory muscle usage or respiratory distress. He is intubated.      Breath sounds: Decreased breath sounds present. No wheezing or rhonchi.   Chest:      Chest wall: No deformity or tenderness.   Abdominal:      General: Bowel sounds are absent. There is no distension.       Genitourinary:     Comments: Doherty in place  Musculoskeletal:      Cervical back: Neck supple.      Right lower leg: 3+ Pitting Edema present.      Left lower leg: 3+ Pitting Edema present.      Right foot: No deformity.      Left foot: No deformity.   Lymphadenopathy:      Cervical: No cervical adenopathy.   Skin:     General: Skin is warm.      Capillary Refill: Capillary refill takes 2 to 3 seconds.          Neurological:      Comments: Heavily sedated     Flow (L/min): 4  Vent Mode: Spont  Oxygen Concentration (%):  [30-35] 30  Resp Rate Total:  [14 br/min-26 br/min] 22 br/min  Vt Set:  [450 mL] 450 mL  PEEP/CPAP:  [5 cmH20] 5 cmH20  Pressure Support:  [0 cmH20-10 cmH20] 10 cmH20  Mean Airway Pressure:  [8.2 fxI69-19 cmH20] 8.8 cmH20  Temp:  [97.3 °F (36.3 °C)-99.32 °F (37.4 °C)] 97.7 °F (36.5 °C)  Pulse:  [] 125  Resp:  [13-27] 22  SpO2:  [99 %-100 %] 100 %  BP: ()/(62-86) 132/80  Arterial Line BP: ()/() 125/60   Art pH/pCO2/pO2/HCO3:  7.458/41.7/106/29.5 (05/13 0347)  Lab Results   Component Value Date    [id number] Negative 05/11/2022    [id number] Negative  05/04/2022    [id number] Positive (A) 08/05/2021        Recent Labs   Lab 05/07/22  1345 05/08/22  0421 05/10/22  0406 05/11/22  0416 05/11/22  2256 05/12/22 0419 05/13/22  0350   LACTATE 6.7*  --  3.5*  --   --   --   --    NA  --    < > 132* 133* 136 137 138   WBC  --    < > 13.35* 17.10*  --  14.17* 16.29*   GRAN  --    < > 87.8*  11.7* 91.5*  15.7*  --  92.1*  13.1* 89.9*  14.7*   LYMPH  --    < > 6.3*  0.8* 3.6*  0.6*  --  3.0*  0.4* 4.2*  0.7*   HGB  --    < > 8.6* 8.6*  --  8.6* 8.2*   HCT  --    < > 28.0* 28.4*  --  28.8* 27.3*   BUN  --    < > 87* 95* 98* 101* 107*   CREATININE  --    < > 4.8* 4.7* 4.4* 4.3* 4.0*   ESTGFRAFRICA  --    < > 13* 14* 15* 15* 17*   EGFRNONAA  --    < > 12* 12* 13* 13* 14*   K  --    < > 5.0 4.3 4.3 4.1 3.4*   CL  --    < > 90* 91* 94* 94* 95   CO2  --    < > 19* 20* 19* 21* 25   PHOS  --   --   --  8.9*  --  8.9* 8.1*   MG  --    < > 2.0 1.9  --  1.9 2.0    < > = values in this interval not displayed.       Microbiology Results (last 7 days)       Procedure Component Value Units Date/Time    Blood culture [[id number]] Collected: 05/04/22 1428    Order Status: Completed Specimen: Blood from Line, Arterial, Left Updated: 05/10/22 0612     Blood Culture, Routine No growth after 5 days.    Blood culture [[id number]] Collected: 05/04/22 1429    Order Status: Completed Specimen: Blood from Line, Jugular, Internal Right Updated: 05/10/22 0612     Blood Culture, Routine No growth after 5 days.    Culture, Respiratory with Gram Stain [[id number]] Collected: 05/04/22 1253    Order Status: Completed Specimen: Respiratory from Endotracheal Aspirate Updated: 05/07/22 0921     Respiratory Culture No growth     Gram Stain (Respiratory) Few WBC's     Gram Stain (Respiratory) Rare Gram positive rods          Antibiotics (From admission, onward)                Start     Stop Route Frequency Ordered    05/09/22 2200  ertapenem (INVANZ) 500 mg in sodium chloride 0.9% 100 mL IVPB          05/19 2359 IV Every 24 hours (non-standard times) 05/09/22 1624          Anticoagulants       Ordered     Route Frequency Start Stop    05/12/22 1408  heparin (PORCINE)  (LOW INTENSITY heparin infusion nomogram - [facility] (Recommended Indications: Acute Coronary Syndrome, Atrial Fibrillation, Prophylaxis in Prosthetic Heart Valves, and other indication where full anticoagulation is desired, bleeding risk is moderate, antiplatelet agents have been utilized, and time to therapeutic can be delayed minimizing the increased bleeding risk))         IV As needed (PRN) 05/12/22 1508 --    05/12/22 1408  heparin (PORCINE)  (LOW INTENSITY heparin infusion nomogram - [facility] (Recommended Indications: Acute Coronary Syndrome, Atrial Fibrillation, Prophylaxis in Prosthetic Heart Valves, and other indication where full anticoagulation is desired, bleeding risk is moderate, antiplatelet agents have been utilized, and time to therapeutic can be delayed minimizing the increased bleeding risk))         IV As needed (PRN) 05/12/22 1508 --    05/12/22 1408  heparin (porcine) in D5W  (LOW INTENSITY heparin infusion nomogram - [facility] (Recommended Indications: Acute Coronary Syndrome, Atrial Fibrillation, Prophylaxis in Prosthetic Heart Valves, and other indication where full anticoagulation is desired, bleeding risk is moderate, antiplatelet agents have been utilized, and time to therapeutic can be delayed minimizing the increased bleeding risk))         IV Continuous 05/12/22 1415 --          X-Ray Chest 1 View  Narrative: EXAMINATION:  XR CHEST 1 VIEW    CLINICAL HISTORY:  resp failure;    TECHNIQUE:  Single frontal view of the chest was performed.    COMPARISON:  05/09/2022    FINDINGS:  Tubes and lines are stable. Small left pleural effusion with bibasilar patchy infiltrates.  Prominence of the keila again noted.  Right-sided infrahilar lesion or infiltrate suspected.  Recommend follow-up to resolution which may include cross-sectional imaging  such is noncontrast chest CT.  In comparison to the prior study, there is no adverse interval changes.  Impression: In comparison to the prior study, there is no adverse interval changes    Electronically signed by: Philippe Horton MD  Date:    05/13/2022  Time:    07:22   Significant Labs: All pertinent labs within the past 24 hours have been reviewed.    Significant Imaging: I have reviewed all pertinent imaging results/findings within the past 24 hours.      Assessment/Plan:      * Septic shock sec to Peritonitis from SB perforation  Pressors  Abx - Zosyn / Micafungin  ID on consult    Remains in severe Septic Shock complicated by JOSE/ATN- Anuria and Resp Failure on Vent  ICU team has d/w daughter about HD if and when needed- she does not appear to be in favor of HD at present  Prognosis poor    Day 4 in Septic Shock and on vent  Continue Levo and Vaso plus IV Abx  Prognosis poor    Day 5- Continues same on Vent, WBC better but remains in renal shut down due to shock plus 2 Pressors    Day 6- Continue present supportive care    Day 7- gradually improving, now on 1 pressor and WBC down to 17  Cont present care  Going for surgery today    Day 8- improving, s/p extensive surgery yesterday  Will try to wean off Levophed    Day 9:  Marginally more stable.    Atrial fibrillation with RVR  Converted to NSR with Amio gtt    Cont Amio gtt          JOSE (acute kidney injury)  Worsening  Trend  Cr 2.2 today    Cr now 3.8- Oliguric- heading towards Anuria and ATN/ May need HD vs CRRT- she has massive fluid Overload.     Cr now 4.5-  Remains anuric  POA/ Family not in favor of HD    Remains Oliguric due to Septic Shock on 2 Pressors  Some urine output with lasix but no Polyuria     5/10 Urine Out put improved with diuresis while renal functions remains stable    Improving, Cr coming down, good urine output    On mechanically assisted ventilation  Wean as tolerated  CC Team  Pulmonary    Cont vent support    Expect further  improvement with diuresis    Cont Vent support  Day 7    Day 8- will start weaning vent soon      Mass of colon  Based on Surgery  Potential interventions  Goals of care  Biopsy as indicated  Likely Oncologic process with mets  Heme Onc as indicated    See above    Possible resection    resected    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 Parker- pt too unstable for right hemicolectomy, end ileostomy, liver biopsy today s/p abdominal washout with Abthera replacement today.  5/8- POD 3 with s/p Laparotomy and bowel resection and subsequent laparoscopic washout- persistent bowel discontinuity and abthera wound vac closure   5/9- persistent bowel discontinuity- await further decision by family    Await further input from surgery    Await surgery today- going for closure today    S/p- Reopening of recent Laparotomy, Abdominal washout, R Hemicolectomy with Liver Bx, TAP block, Abdominal wall closure, Creation, end Ileostomy. POD 1- looks better, still on Levophed and Amio gtt for Afib, started on TPN, ID stopped Zyvox and continued Merrem/Mycafungin.     Obesity (BMI 30.0-34.9)  Diet  Nutrition on recovery      Acute on chronic anemia  Hgb 10.3 s/p Transfusion 4 units Prbc  Transfuse for Hgb <7  Monitor    5/5  Improved  Hgb 11.4 today  Transfuse as indicated    5/10- H/H 8.6/28- likely dilutional from ATN- improving      Acute hypoxemic respiratory failure on mechanical vent  Patient with Hypoxic Respiratory failure which is Acute.  he is not on home oxygen. Supplemental oxygen was provided and noted- Vent Mode: Spont  Oxygen Concentration (%):  [35-40] 35  Resp Rate Total:  [18 br/min-30 br/min] 20 br/min  Vt Set:  [450 mL] 450 mL  PEEP/CPAP:  [5 cmH20] 5 cmH20  Pressure Support:  [0 cmH20-10 cmH20] 10 cmH20  Mean Airway Pressure:   [8.6 awL35-09 cmH20] 8.8 cmH20.   Signs/symptoms of respiratory failure include- tachypnea. Contributing diagnoses includes - Obesity Hypoventilation Labs and images were reviewed. Patient Has recent ABG, which has been reviewed. Will treat underlying causes and adjust management of respiratory failure as indicated. Pulmonary CC on board  Day 2 on Vent- remains intubated on vent  Cont vent support    Day 4 on Vent    Day 5 on vent- adequate O2, sats    Day 6- continue supportive care, await family's decision about comfort care    Day 7- continue support- trying to diurese     Day 8- minimal vent support- start weaning soon        VTE Risk Mitigation (From admission, onward)         Ordered     heparin 25,000 units in dextrose 5% (100 units/ml) IV bolus from bag - ADDITIONAL PRN BOLUS - 60 units/kg  As needed (PRN)        Question:  Heparin Infusion Adjustment (DO NOT MODIFY ANSWER)  Answer:  \[email or web address]\epic\Images\Pharmacy\HeparinInfusions\heparin LOW INTENSITY nomogram for [facility] [id number]    05/12/22 1408     heparin 25,000 units in dextrose 5% (100 units/ml) IV bolus from bag - ADDITIONAL PRN BOLUS - 30 units/kg  As needed (PRN)        Question:  Heparin Infusion Adjustment (DO NOT MODIFY ANSWER)  Answer:  \\[email or web address]\epic\Images\Pharmacy\HeparinInfusions\heparin LOW INTENSITY nomogram for [facility] [id number].pdf    05/12/22 1408     heparin 25,000 units in dextrose 5% 250 mL (100 units/mL) infusion LOW INTENSITY nomogram - [facility]  Continuous        Question Answer Comment   Heparin Infusion Adjustment (DO NOT MODIFY ANSWER) \\[email or web address]\epic\Images\Pharmacy\HeparinInfusions\heparin LOW INTENSITY nomogram for [facility] [id number].pdf    Begin at (in units/kg/hr) 12        05/12/22 1408     IP VTE HIGH RISK PATIENT  Once         05/04/22 1253     Place sequential compression device  Until discontinued         05/04/22 1253                Discharge Planning   ELLIOT:      Code Status: DNR   Is the patient medically ready for  discharge?:     Reason for patient still in hospital (select all that apply): Patient unstable, Patient trending condition, Treatment and Consult recommendations  Discharge Plan A: Comfort care/withdrawal            Critical care time spent on the evaluation and treatment of severe organ dysfunction, review of pertinent labs and imaging studies, discussions with consulting providers and discussions with patient/family: 30 minutes.      Alexandro Kebede MD  Department of Hospital Medicine   [facility] - Intensive Care ([facility])

## 2022-05-13 NOTE — ASSESSMENT & PLAN NOTE
New onset 5/6  on amiodarone infusion; maintain until clear for enteral route  Continue cardiac monitoring  Cont Heparin infusion till cleared for enteral route

## 2022-05-13 NOTE — ASSESSMENT & PLAN NOTE
S/p exploratory laparotomy, abdominal washout, segmental small bowel resection (left in discontinuity), placement of Abthera vac 5/4/22  S/p reopening of recent laparotomy, abdominal washout, replacement of Abthera vac 5/7/22  S/p reopening of recent laparotomy, abdominal washout, right hemicolectomy, liver biopsy, end ileostomy, TAP block, abdominal wall closure 5/11/22    - Initiate trickle tube feeds and can start having crushed PO meds.  - Continue ICU management. Currently still requiring vasopressor support. On heparin and amio gtt for afib.  - Ostomy nurse consult  - Strict I/Os  - TPN  - Medical and ICU management per hospital/ICU team    Discussed with ICU nurse and daughter at bedside.

## 2022-05-14 LAB
ACANTHOCYTES BLD QL SMEAR: PRESENT
ACANTHOCYTES BLD QL SMEAR: PRESENT
ALBUMIN SERPL BCP-MCNC: 1.1 G/DL (ref 3.5–5.2)
ALLENS TEST: ABNORMAL
ALP SERPL-CCNC: 114 U/L (ref 55–135)
ALT SERPL W/O P-5'-P-CCNC: 82 U/L (ref 10–44)
ANION GAP SERPL CALC-SCNC: 16 MMOL/L (ref 8–16)
ANION GAP SERPL CALC-SCNC: 17 MMOL/L (ref 8–16)
ANISOCYTOSIS BLD QL SMEAR: ABNORMAL
ANISOCYTOSIS BLD QL SMEAR: ABNORMAL
APTT BLDCRRT: 39.6 SEC (ref 21–32)
AST SERPL-CCNC: 57 U/L (ref 10–40)
BASOPHILS # BLD AUTO: 0.03 K/UL (ref 0–0.2)
BASOPHILS # BLD AUTO: 0.04 K/UL (ref 0–0.2)
BASOPHILS NFR BLD: 0.2 % (ref 0–1.9)
BASOPHILS NFR BLD: 0.3 % (ref 0–1.9)
BILIRUB SERPL-MCNC: 0.8 MG/DL (ref 0.1–1)
BUN SERPL-MCNC: 102 MG/DL (ref 8–23)
BUN SERPL-MCNC: 111 MG/DL (ref 8–23)
BURR CELLS BLD QL SMEAR: ABNORMAL
BURR CELLS BLD QL SMEAR: ABNORMAL
CALCIUM SERPL-MCNC: 7.4 MG/DL (ref 8.7–10.5)
CALCIUM SERPL-MCNC: 7.5 MG/DL (ref 8.7–10.5)
CHLORIDE SERPL-SCNC: 98 MMOL/L (ref 95–110)
CHLORIDE SERPL-SCNC: 99 MMOL/L (ref 95–110)
CO2 SERPL-SCNC: 27 MMOL/L (ref 23–29)
CO2 SERPL-SCNC: 27 MMOL/L (ref 23–29)
CREAT SERPL-MCNC: 3.2 MG/DL (ref 0.5–1.4)
CREAT SERPL-MCNC: 3.4 MG/DL (ref 0.5–1.4)
DACRYOCYTES BLD QL SMEAR: ABNORMAL
DACRYOCYTES BLD QL SMEAR: ABNORMAL
DELSYS: ABNORMAL
DIFFERENTIAL METHOD: ABNORMAL
DIFFERENTIAL METHOD: ABNORMAL
EOSINOPHIL # BLD AUTO: 0.1 K/UL (ref 0–0.5)
EOSINOPHIL # BLD AUTO: 0.2 K/UL (ref 0–0.5)
EOSINOPHIL NFR BLD: 0.5 % (ref 0–8)
EOSINOPHIL NFR BLD: 1.1 % (ref 0–8)
ERYTHROCYTE [DISTWIDTH] IN BLOOD BY AUTOMATED COUNT: 26.5 % (ref 11.5–14.5)
ERYTHROCYTE [DISTWIDTH] IN BLOOD BY AUTOMATED COUNT: 27 % (ref 11.5–14.5)
ERYTHROCYTE [SEDIMENTATION RATE] IN BLOOD BY WESTERGREN METHOD: 20 MM/H
EST. GFR  (AFRICAN AMERICAN): 20 ML/MIN/1.73 M^2
EST. GFR  (AFRICAN AMERICAN): 22 ML/MIN/1.73 M^2
EST. GFR  (NON AFRICAN AMERICAN): 18 ML/MIN/1.73 M^2
EST. GFR  (NON AFRICAN AMERICAN): 19 ML/MIN/1.73 M^2
FIO2: 30
GLUCOSE SERPL-MCNC: 149 MG/DL (ref 70–110)
GLUCOSE SERPL-MCNC: 283 MG/DL (ref 70–110)
HCO3 UR-SCNC: 32.4 MMOL/L (ref 24–28)
HCT VFR BLD AUTO: 25.1 % (ref 40–54)
HCT VFR BLD AUTO: 25.5 % (ref 40–54)
HGB BLD-MCNC: 7.7 G/DL (ref 14–18)
HGB BLD-MCNC: 7.8 G/DL (ref 14–18)
HYPOCHROMIA BLD QL SMEAR: ABNORMAL
HYPOCHROMIA BLD QL SMEAR: ABNORMAL
IMM GRANULOCYTES # BLD AUTO: 0.16 K/UL (ref 0–0.04)
IMM GRANULOCYTES # BLD AUTO: 0.16 K/UL (ref 0–0.04)
IMM GRANULOCYTES NFR BLD AUTO: 1.1 % (ref 0–0.5)
IMM GRANULOCYTES NFR BLD AUTO: 1.2 % (ref 0–0.5)
LYMPHOCYTES # BLD AUTO: 0.8 K/UL (ref 1–4.8)
LYMPHOCYTES # BLD AUTO: 1 K/UL (ref 1–4.8)
LYMPHOCYTES NFR BLD: 5.8 % (ref 18–48)
LYMPHOCYTES NFR BLD: 7.4 % (ref 18–48)
MAGNESIUM SERPL-MCNC: 1.9 MG/DL (ref 1.6–2.6)
MCH RBC QN AUTO: 21.8 PG (ref 27–31)
MCH RBC QN AUTO: 21.9 PG (ref 27–31)
MCHC RBC AUTO-ENTMCNC: 30.6 G/DL (ref 32–36)
MCHC RBC AUTO-ENTMCNC: 30.7 G/DL (ref 32–36)
MCV RBC AUTO: 71 FL (ref 82–98)
MCV RBC AUTO: 71 FL (ref 82–98)
MODE: ABNORMAL
MONOCYTES # BLD AUTO: 0.9 K/UL (ref 0.3–1)
MONOCYTES # BLD AUTO: 1 K/UL (ref 0.3–1)
MONOCYTES NFR BLD: 6.5 % (ref 4–15)
MONOCYTES NFR BLD: 7.3 % (ref 4–15)
NEUTROPHILS # BLD AUTO: 11.2 K/UL (ref 1.8–7.7)
NEUTROPHILS # BLD AUTO: 11.9 K/UL (ref 1.8–7.7)
NEUTROPHILS NFR BLD: 83.3 % (ref 38–73)
NEUTROPHILS NFR BLD: 85.3 % (ref 38–73)
NRBC BLD-RTO: 0 /100 WBC
NRBC BLD-RTO: 0 /100 WBC
OVALOCYTES BLD QL SMEAR: ABNORMAL
OVALOCYTES BLD QL SMEAR: ABNORMAL
PCO2 BLDA: 41.8 MMHG (ref 35–45)
PEEP: 5
PH SMN: 7.5 [PH] (ref 7.35–7.45)
PHOSPHATE SERPL-MCNC: 5.5 MG/DL (ref 2.7–4.5)
PLATELET # BLD AUTO: 130 K/UL (ref 150–450)
PLATELET # BLD AUTO: 92 K/UL (ref 150–450)
PLATELET BLD QL SMEAR: ABNORMAL
PLATELET BLD QL SMEAR: ABNORMAL
PMV BLD AUTO: ABNORMAL FL (ref 9.2–12.9)
PMV BLD AUTO: ABNORMAL FL (ref 9.2–12.9)
PO2 BLDA: 86 MMHG (ref 80–100)
POC BE: 9 MMOL/L
POC SATURATED O2: 97 % (ref 95–100)
POCT GLUCOSE: 145 MG/DL (ref 70–110)
POCT GLUCOSE: 157 MG/DL (ref 70–110)
POCT GLUCOSE: 158 MG/DL (ref 70–110)
POCT GLUCOSE: 160 MG/DL (ref 70–110)
POCT GLUCOSE: 165 MG/DL (ref 70–110)
POCT GLUCOSE: 170 MG/DL (ref 70–110)
POIKILOCYTOSIS BLD QL SMEAR: ABNORMAL
POIKILOCYTOSIS BLD QL SMEAR: ABNORMAL
POLYCHROMASIA BLD QL SMEAR: ABNORMAL
POLYCHROMASIA BLD QL SMEAR: ABNORMAL
POTASSIUM SERPL-SCNC: 3 MMOL/L (ref 3.5–5.1)
POTASSIUM SERPL-SCNC: 3.5 MMOL/L (ref 3.5–5.1)
PROT SERPL-MCNC: 4.3 G/DL (ref 6–8.4)
RBC # BLD AUTO: 3.52 M/UL (ref 4.6–6.2)
RBC # BLD AUTO: 3.57 M/UL (ref 4.6–6.2)
SAMPLE: ABNORMAL
SCHISTOCYTES BLD QL SMEAR: PRESENT
SCHISTOCYTES BLD QL SMEAR: PRESENT
SICKLE CELLS BLD QL SMEAR: ABNORMAL
SICKLE CELLS BLD QL SMEAR: ABNORMAL
SITE: ABNORMAL
SODIUM SERPL-SCNC: 141 MMOL/L (ref 136–145)
SODIUM SERPL-SCNC: 143 MMOL/L (ref 136–145)
TARGETS BLD QL SMEAR: ABNORMAL
TARGETS BLD QL SMEAR: ABNORMAL
VT: 45
WBC # BLD AUTO: 13.47 K/UL (ref 3.9–12.7)
WBC # BLD AUTO: 13.93 K/UL (ref 3.9–12.7)

## 2022-05-14 PROCEDURE — 85730 THROMBOPLASTIN TIME PARTIAL: CPT | Performed by: NURSE PRACTITIONER

## 2022-05-14 PROCEDURE — 25000003 PHARM REV CODE 250: Performed by: SURGERY

## 2022-05-14 PROCEDURE — 94003 VENT MGMT INPAT SUBQ DAY: CPT

## 2022-05-14 PROCEDURE — 99233 PR SUBSEQUENT HOSPITAL CARE,LEVL III: ICD-10-PCS | Mod: ,,, | Performed by: INTERNAL MEDICINE

## 2022-05-14 PROCEDURE — A4217 STERILE WATER/SALINE, 500 ML: HCPCS | Performed by: NURSE PRACTITIONER

## 2022-05-14 PROCEDURE — 63600175 PHARM REV CODE 636 W HCPCS: Performed by: SURGERY

## 2022-05-14 PROCEDURE — 80053 COMPREHEN METABOLIC PANEL: CPT | Performed by: SURGERY

## 2022-05-14 PROCEDURE — 82803 BLOOD GASES ANY COMBINATION: CPT

## 2022-05-14 PROCEDURE — 85025 COMPLETE CBC W/AUTO DIFF WBC: CPT | Performed by: SURGERY

## 2022-05-14 PROCEDURE — 27000221 HC OXYGEN, UP TO 24 HOURS

## 2022-05-14 PROCEDURE — 83735 ASSAY OF MAGNESIUM: CPT | Performed by: SURGERY

## 2022-05-14 PROCEDURE — C9399 UNCLASSIFIED DRUGS OR BIOLOG: HCPCS | Performed by: NURSE PRACTITIONER

## 2022-05-14 PROCEDURE — 99291 PR CRITICAL CARE, E/M 30-74 MINUTES: ICD-10-PCS | Mod: ,,, | Performed by: NURSE PRACTITIONER

## 2022-05-14 PROCEDURE — 99291 CRITICAL CARE FIRST HOUR: CPT | Mod: ,,, | Performed by: NURSE PRACTITIONER

## 2022-05-14 PROCEDURE — 37799 UNLISTED PX VASCULAR SURGERY: CPT

## 2022-05-14 PROCEDURE — 84100 ASSAY OF PHOSPHORUS: CPT | Performed by: SURGERY

## 2022-05-14 PROCEDURE — P9047 ALBUMIN (HUMAN), 25%, 50ML: HCPCS | Mod: JG | Performed by: NURSE PRACTITIONER

## 2022-05-14 PROCEDURE — 85025 COMPLETE CBC W/AUTO DIFF WBC: CPT | Mod: 91 | Performed by: NURSE PRACTITIONER

## 2022-05-14 PROCEDURE — 99900035 HC TECH TIME PER 15 MIN (STAT)

## 2022-05-14 PROCEDURE — 99900026 HC AIRWAY MAINTENANCE (STAT)

## 2022-05-14 PROCEDURE — 63600175 PHARM REV CODE 636 W HCPCS: Performed by: NURSE PRACTITIONER

## 2022-05-14 PROCEDURE — 99233 SBSQ HOSP IP/OBS HIGH 50: CPT | Mod: ,,, | Performed by: INTERNAL MEDICINE

## 2022-05-14 PROCEDURE — 25000003 PHARM REV CODE 250: Performed by: NURSE PRACTITIONER

## 2022-05-14 PROCEDURE — 25000003 PHARM REV CODE 250: Performed by: INTERNAL MEDICINE

## 2022-05-14 PROCEDURE — 94761 N-INVAS EAR/PLS OXIMETRY MLT: CPT

## 2022-05-14 PROCEDURE — 20000000 HC ICU ROOM

## 2022-05-14 PROCEDURE — 80048 BASIC METABOLIC PNL TOTAL CA: CPT | Mod: XB | Performed by: NURSE PRACTITIONER

## 2022-05-14 PROCEDURE — 63600175 PHARM REV CODE 636 W HCPCS: Performed by: INTERNAL MEDICINE

## 2022-05-14 RX ORDER — POTASSIUM CHLORIDE 29.8 MG/ML
40 INJECTION INTRAVENOUS ONCE
Status: COMPLETED | OUTPATIENT
Start: 2022-05-14 | End: 2022-05-14

## 2022-05-14 RX ORDER — FUROSEMIDE 10 MG/ML
40 INJECTION INTRAMUSCULAR; INTRAVENOUS
Status: COMPLETED | OUTPATIENT
Start: 2022-05-14 | End: 2022-05-16

## 2022-05-14 RX ORDER — ALBUMIN HUMAN 250 G/1000ML
12.5 SOLUTION INTRAVENOUS 2 TIMES DAILY
Status: COMPLETED | OUTPATIENT
Start: 2022-05-14 | End: 2022-05-15

## 2022-05-14 RX ORDER — METOPROLOL TARTRATE 1 MG/ML
5 INJECTION, SOLUTION INTRAVENOUS
Status: DISCONTINUED | OUTPATIENT
Start: 2022-05-14 | End: 2022-05-14

## 2022-05-14 RX ORDER — METOPROLOL TARTRATE 1 MG/ML
2.5 INJECTION, SOLUTION INTRAVENOUS EVERY 4 HOURS
Status: DISCONTINUED | OUTPATIENT
Start: 2022-05-14 | End: 2022-05-16

## 2022-05-14 RX ORDER — FENTANYL CITRATE 50 UG/ML
50 INJECTION, SOLUTION INTRAMUSCULAR; INTRAVENOUS
Status: DISCONTINUED | OUTPATIENT
Start: 2022-05-14 | End: 2022-05-17

## 2022-05-14 RX ADMIN — DEXMEDETOMIDINE HYDROCHLORIDE 0.4 MCG/KG/HR: 4 INJECTION INTRAVENOUS at 05:05

## 2022-05-14 RX ADMIN — METOPROLOL TARTRATE 2.5 MG: 1 INJECTION, SOLUTION INTRAVENOUS at 05:05

## 2022-05-14 RX ADMIN — FAMOTIDINE 20 MG: 10 INJECTION INTRAVENOUS at 08:05

## 2022-05-14 RX ADMIN — INSULIN ASPART 2 UNITS: 100 INJECTION, SOLUTION INTRAVENOUS; SUBCUTANEOUS at 01:05

## 2022-05-14 RX ADMIN — METOPROLOL TARTRATE 2.5 MG: 1 INJECTION, SOLUTION INTRAVENOUS at 09:05

## 2022-05-14 RX ADMIN — FENTANYL CITRATE 50 MCG: 50 INJECTION INTRAMUSCULAR; INTRAVENOUS at 10:05

## 2022-05-14 RX ADMIN — HEPARIN SODIUM 14 UNITS/KG/HR: 5000 INJECTION INTRAVENOUS; SUBCUTANEOUS at 09:05

## 2022-05-14 RX ADMIN — AMIODARONE HYDROCHLORIDE 0.5 MG/MIN: 1.8 INJECTION, SOLUTION INTRAVENOUS at 04:05

## 2022-05-14 RX ADMIN — ALBUMIN (HUMAN) 12.5 G: 12.5 SOLUTION INTRAVENOUS at 08:05

## 2022-05-14 RX ADMIN — CHLORHEXIDINE GLUCONATE 0.12% ORAL RINSE 15 ML: 1.2 LIQUID ORAL at 08:05

## 2022-05-14 RX ADMIN — METOPROLOL TARTRATE 2.5 MG: 1 INJECTION, SOLUTION INTRAVENOUS at 01:05

## 2022-05-14 RX ADMIN — POTASSIUM CHLORIDE 40 MEQ: 29.8 INJECTION, SOLUTION INTRAVENOUS at 08:05

## 2022-05-14 RX ADMIN — AMIODARONE HYDROCHLORIDE 150 MG: 1.5 INJECTION, SOLUTION INTRAVENOUS at 09:05

## 2022-05-14 RX ADMIN — MINERAL OIL, PETROLATUM: 425; 573 OINTMENT OPHTHALMIC at 08:05

## 2022-05-14 RX ADMIN — AMIODARONE HYDROCHLORIDE 1 MG/MIN: 1.8 INJECTION, SOLUTION INTRAVENOUS at 09:05

## 2022-05-14 RX ADMIN — INSULIN ASPART 2 UNITS: 100 INJECTION, SOLUTION INTRAVENOUS; SUBCUTANEOUS at 08:05

## 2022-05-14 RX ADMIN — ONDANSETRON 4 MG: 2 INJECTION INTRAMUSCULAR; INTRAVENOUS at 10:05

## 2022-05-14 RX ADMIN — SODIUM CHLORIDE: 234 INJECTION, SOLUTION INTRAVENOUS at 04:05

## 2022-05-14 RX ADMIN — FUROSEMIDE 40 MG: 10 INJECTION, SOLUTION INTRAMUSCULAR; INTRAVENOUS at 03:05

## 2022-05-14 RX ADMIN — NOREPINEPHRINE BITARTRATE 0.02 MCG/KG/MIN: 1 INJECTION, SOLUTION, CONCENTRATE INTRAVENOUS at 11:05

## 2022-05-14 RX ADMIN — ERTAPENEM 500 MG: 1 INJECTION INTRAMUSCULAR; INTRAVENOUS at 10:05

## 2022-05-14 RX ADMIN — POTASSIUM CHLORIDE 40 MEQ: 29.8 INJECTION, SOLUTION INTRAVENOUS at 04:05

## 2022-05-14 RX ADMIN — INSULIN ASPART 2 UNITS: 100 INJECTION, SOLUTION INTRAVENOUS; SUBCUTANEOUS at 05:05

## 2022-05-14 RX ADMIN — INSULIN ASPART 1 UNITS: 100 INJECTION, SOLUTION INTRAVENOUS; SUBCUTANEOUS at 04:05

## 2022-05-14 RX ADMIN — MICAFUNGIN SODIUM 100 MG: 100 INJECTION, POWDER, LYOPHILIZED, FOR SOLUTION INTRAVENOUS at 03:05

## 2022-05-14 RX ADMIN — INSULIN ASPART 2 UNITS: 100 INJECTION, SOLUTION INTRAVENOUS; SUBCUTANEOUS at 11:05

## 2022-05-14 RX ADMIN — ALBUMIN (HUMAN) 12.5 G: 12.5 SOLUTION INTRAVENOUS at 03:05

## 2022-05-14 NOTE — SUBJECTIVE & OBJECTIVE
Interval History:  Minimal vent settings, remains on pressors    Medications:  Continuous Infusions:   amiodarone in dextrose 5% 0.5 mg/min (05/14/22 1400)    dextrose 10 % in water (D10W)      heparin (porcine) in D5W 14 Units/kg/hr (05/14/22 1400)    NORepinephrine bitartrate-D5W Stopped (05/14/22 1347)    TPN ADULT CENTRAL LINE CUSTOM 36 mL/hr at 05/14/22 1400    TPN ADULT CENTRAL LINE CUSTOM       Scheduled Meds:   furosemide (LASIX) injection  40 mg Intravenous Q12H    And    albumin human 25%  12.5 g Intravenous BID    chlorhexidine  15 mL Mouth/Throat BID    ertapenem (INVANZ) IVPB  500 mg Intravenous Q24H    famotidine (PF)  20 mg Intravenous Daily    metoprolol  2.5 mg Intravenous Q4H    micafungin (MYCAMINE) IVPB  100 mg Intravenous Q24H    potassium chloride in water  40 mEq Intravenous Once    white petrolatum-mineral oil 57.3-42.5%   Both Eyes QHS     PRN Meds:sodium chloride, sodium chloride 0.9%, acetaminophen, dextrose 10 % in water (D10W), dextrose 10%, fentaNYL, glucagon (human recombinant), heparin (PORCINE), heparin (PORCINE), insulin aspart U-100, lorazepam, ondansetron     Review of patient's allergies indicates:  No Known Allergies  Objective:     Vital Signs (Most Recent):  Temp: 97.7 °F (36.5 °C) (05/14/22 1200)  Pulse: (!) 128 (05/14/22 1500)  Resp: 20 (05/14/22 1300)  BP: (!) 144/75 (05/14/22 1336)  SpO2: 100 % (05/14/22 1500) Vital Signs (24h Range):  Temp:  [97.3 °F (36.3 °C)-98.2 °F (36.8 °C)] 97.7 °F (36.5 °C)  Pulse:  [] 128  Resp:  [14-23] 20  SpO2:  [100 %] 100 %  BP: (100-144)/(63-92) 144/75  Arterial Line BP: ()/(49-83) 106/60     Weight: 115.8 kg (255 lb 4.7 oz)  Body mass index is 37.7 kg/m².    Intake/Output - Last 3 Shifts         05/12 0700 05/13 0659 05/13 0700 05/14 0659 05/14 0700  05/15 0659    I.V. (mL/kg) 999.5 (8.6) 988.3 (8.5) 266.7 (2.3)    NG/ 210 60    IV Piggyback 221.3 296.4 99.9    .3 801.7 287.8    Total Intake(mL/kg) 2296.2 (19.8)  2296.3 (19.8) 714.4 (6.2)    Urine (mL/kg/hr) 1680 (0.6) 2074 (0.7) 680 (0.7)    Drains 855 400 40    Other       Stool 55 125     Blood       Total Output 2590 2599 720    Net -293.9 -302.7 -5.6           Stool Occurrence  1 x             Physical Exam  Vitals and nursing note reviewed.   Constitutional:       Appearance: He is obese. He is ill-appearing.   Cardiovascular:      Rate and Rhythm: Rhythm irregular.   Pulmonary:      Comments: Mechanical breath sounds  Abdominal:      Palpations: Abdomen is soft.      Comments: Midline incision with staples and packing in place. Right-sided end ileostomy is red with some liquid stool output. ADDIS drains are both serous.   Genitourinary:     Comments: Doherty in place  Musculoskeletal:      Right lower leg: Edema present.      Left lower leg: Edema present.   Neurological:      Comments: Arousable but not following commands or purposeful responses        Significant Labs:  I have reviewed all pertinent lab results within the past 24 hours.  CBC:   Recent Labs   Lab 05/14/22 0439   WBC 13.93*   RBC 3.57*   HGB 7.8*   HCT 25.5*   PLT 92*   MCV 71*   MCH 21.8*   MCHC 30.6*       CMP:   Recent Labs   Lab 05/14/22  0439 05/14/22  1243   * 283*   CALCIUM 7.4* 7.5*   ALBUMIN 1.1*  --    PROT 4.3*  --     143   K 3.0* 3.5   CO2 27 27   CL 98 99   * 102*   CREATININE 3.4* 3.2*   ALKPHOS 114  --    ALT 82*  --    AST 57*  --    BILITOT 0.8  --        ABGs:   Recent Labs   Lab 05/14/22 0459   PH 7.497*   PCO2 41.8   PO2 86   HCO3 32.4*   POCSATURATED 97   BE 9         Significant Diagnostics:  I have reviewed all pertinent imaging results/findings within the past 24 hours.

## 2022-05-14 NOTE — EICU
Alerted by bedside regarding recent attempts at SBT by day team and delimma with sedation for the patient.     Plan for tonight:  Patient is on vent   Precedex to be restarted   Fentanyl 50 mcg x 1 push as needed   No fentanyl infusion     SBT in am

## 2022-05-14 NOTE — SUBJECTIVE & OBJECTIVE
Interval History: Marginally more stable but still requiring intermittent pressors.  Abdomen closed.  Weaning sedation for awakening trials.  Continuing antibiotics and coordinating care with General Surgery and Critical Care Medicine.    Review of Systems   Unable to perform ROS: Intubated   Objective:     Vital Signs (Most Recent):  Temp: 97.8 °F (36.6 °C) (05/14/22 1600)  Pulse: (!) 123 (05/14/22 1800)  Resp: 20 (05/14/22 1600)  BP: 121/66 (05/14/22 1707)  SpO2: 100 % (05/14/22 1800)   Vital Signs (24h Range):  Temp:  [97.7 °F (36.5 °C)-98.2 °F (36.8 °C)] 97.8 °F (36.6 °C)  Pulse:  [] 123  Resp:  [20-22] 20  SpO2:  [100 %] 100 %  BP: (100-144)/(57-92) 121/66  Arterial Line BP: ()/(49-83) 114/60     Weight: 115.8 kg (255 lb 4.7 oz)  Body mass index is 37.7 kg/m².    Intake/Output Summary (Last 24 hours) at 5/14/2022 1853  Last data filed at 5/14/2022 1815  Gross per 24 hour   Intake 2254.88 ml   Output 2765 ml   Net -510.12 ml        Physical Exam  Vitals and nursing note reviewed.   Constitutional:       General: He is not in acute distress.     Appearance: He is well-developed. He is obese. He is ill-appearing. He is not toxic-appearing or diaphoretic.      Interventions: He is sedated, intubated and restrained.   HENT:      Head: Atraumatic.      Nose:        Mouth/Throat:      Mouth: Mucous membranes are moist.   Eyes:      General: No scleral icterus.     Conjunctiva/sclera: Conjunctivae normal.      Pupils: Pupils are equal, round, and reactive to light.   Neck:      Vascular: No JVD.     Cardiovascular:      Rate and Rhythm: Tachycardia present. Rhythm irregular.      Pulses:           Radial pulses are 1+ on the right side and 1+ on the left side.        Dorsalis pedis pulses are 1+ on the right side and 1+ on the left side.      Heart sounds: Heart sounds are distant.   Pulmonary:      Effort: No accessory muscle usage or respiratory distress. He is intubated.      Breath sounds: Decreased  breath sounds present. No wheezing or rhonchi.   Chest:      Chest wall: No deformity or tenderness.   Abdominal:      General: Bowel sounds are absent. There is no distension.       Genitourinary:     Comments: Doherty in place  Musculoskeletal:      Cervical back: Neck supple.      Right lower leg: 3+ Pitting Edema present.      Left lower leg: 3+ Pitting Edema present.      Right foot: No deformity.      Left foot: No deformity.   Lymphadenopathy:      Cervical: No cervical adenopathy.   Skin:     General: Skin is warm.      Capillary Refill: Capillary refill takes 2 to 3 seconds.          Neurological:      Comments: Heavily sedated     Flow (L/min): 4  Vent Mode: Spont  Oxygen Concentration (%):  [30] 30  Resp Rate Total:  [20 br/min-28 br/min] 27 br/min  Vt Set:  [450 mL] 450 mL  PEEP/CPAP:  [5 cmH20] 5 cmH20  Pressure Support:  [0 cmH20-8 cmH20] 8 cmH20  Mean Airway Pressure:  [8.6 cmH20-10 cmH20] 8.6 cmH20  Temp:  [97.7 °F (36.5 °C)-98.2 °F (36.8 °C)] 97.8 °F (36.6 °C)  Pulse:  [] 123  Resp:  [20-22] 20  SpO2:  [100 %] 100 %  BP: (100-144)/(57-92) 121/66  Arterial Line BP: ()/(49-83) 114/60   Art pH/pCO2/pO2/HCO3:  7.497/41.8/86/32.4 (05/14 0459)  Lab Results   Component Value Date    UKS51FTXOFDL Negative 05/11/2022    [id number] Negative 05/04/2022    [id number] Positive (A) 08/05/2021        Recent Labs   Lab 05/10/22  0406 05/11/22  0416 05/12/22  0419 05/13/22  0350 05/14/22  0439 05/14/22  1243 05/14/22  1625   LACTATE 3.5*  --   --   --   --   --   --    *   < > 137 138 141 143  --    WBC 13.35*   < > 14.17* 16.29* 13.93*  --  13.47*   GRAN 87.8*  11.7*   < > 92.1*  13.1* 89.9*  14.7* 85.3*  11.9*  --  83.3*  11.2*   LYMPH 6.3*  0.8*   < > 3.0*  0.4* 4.2*  0.7* 5.8*  0.8*  --  7.4*  1.0   HGB 8.6*   < > 8.6* 8.2* 7.8*  --  7.7*   HCT 28.0*   < > 28.8* 27.3* 25.5*  --  25.1*   BUN 87*   < > 101* 107* 111* 102*  --    CREATININE 4.8*   < > 4.3* 4.0* 3.4* 3.2*  --     ESTGFRAFRICA 13*   < > 15* 17* 20* 22*  --    EGFRNONAA 12*   < > 13* 14* 18* 19*  --    K 5.0   < > 4.1 3.4* 3.0* 3.5  --    CL 90*   < > 94* 95 98 99  --    CO2 19*   < > 21* 25 27 27  --    PHOS  --    < > 8.9* 8.1* 5.5*  --   --    MG 2.0   < > 1.9 2.0 1.9  --   --     < > = values in this interval not displayed.       Microbiology Results (last 7 days)       Procedure Component Value Units Date/Time    Blood culture [[id number]] Collected: 05/04/22 1428    Order Status: Completed Specimen: Blood from Line, Arterial, Left Updated: 05/10/22 0612     Blood Culture, Routine No growth after 5 days.    Blood culture [[id number]] Collected: 05/04/22 1429    Order Status: Completed Specimen: Blood from Line, Jugular, Internal Right Updated: 05/10/22 0612     Blood Culture, Routine No growth after 5 days.          Antibiotics (From admission, onward)                Start     Stop Route Frequency Ordered    05/09/22 2200  ertapenem (INVANZ) 500 mg in sodium chloride 0.9% 100 mL IVPB         05/19 2359 IV Every 24 hours (non-standard times) 05/09/22 1624          Anticoagulants       Ordered     Route Frequency Start Stop    05/12/22 1408  heparin (PORCINE)  (LOW INTENSITY heparin infusion nomogram - [facility] (Recommended Indications: Acute Coronary Syndrome, Atrial Fibrillation, Prophylaxis in Prosthetic Heart Valves, and other indication where full anticoagulation is desired, bleeding risk is moderate, antiplatelet agents have been utilized, and time to therapeutic can be delayed minimizing the increased bleeding risk))         IV As needed (PRN) 05/12/22 1508 --    05/12/22 1408  heparin (PORCINE)  (LOW INTENSITY heparin infusion nomogram - [facility] (Recommended Indications: Acute Coronary Syndrome, Atrial Fibrillation, Prophylaxis in Prosthetic Heart Valves, and other indication where full anticoagulation is desired, bleeding risk is moderate, antiplatelet agents have been utilized, and time to therapeutic can be delayed  minimizing the increased bleeding risk))         IV As needed (PRN) 05/12/22 1508 --    05/12/22 1408  heparin (porcine) in D5W  (LOW INTENSITY heparin infusion nomogram - [facility] (Recommended Indications: Acute Coronary Syndrome, Atrial Fibrillation, Prophylaxis in Prosthetic Heart Valves, and other indication where full anticoagulation is desired, bleeding risk is moderate, antiplatelet agents have been utilized, and time to therapeutic can be delayed minimizing the increased bleeding risk))         IV Continuous 05/12/22 1415 --          X-Ray Chest 1 View  Narrative: EXAMINATION:  XR CHEST 1 VIEW    CLINICAL HISTORY:  resp failure;    TECHNIQUE:  Single frontal view of the chest was performed.    COMPARISON:  05/09/2022    FINDINGS:  Tubes and lines are stable. Small left pleural effusion with bibasilar patchy infiltrates.  Prominence of the keila again noted.  Right-sided infrahilar lesion or infiltrate suspected.  Recommend follow-up to resolution which may include cross-sectional imaging such is noncontrast chest CT.  In comparison to the prior study, there is no adverse interval changes.  Impression: In comparison to the prior study, there is no adverse interval changes    Electronically signed by: Philippe Horton MD  Date:    05/13/2022  Time:    07:22   Significant Labs: All pertinent labs within the past 24 hours have been reviewed.    Significant Imaging: I have reviewed all pertinent imaging results/findings within the past 24 hours.

## 2022-05-14 NOTE — ASSESSMENT & PLAN NOTE
Secondary to Peritonitis from bowel perforation with major fecal contamination  Blood and sputum cultures 5/4 Neg  S/P Zyvox  Continue Invanz and Micafungin per ID following  continue to titrate pressor for MAP > 75, BP labile  ICU hemodynamic monitoring  Follow fever curve and WBC

## 2022-05-14 NOTE — ASSESSMENT & PLAN NOTE
Pressors  Abx - Zosyn / Micafungin  ID on consult    Remains in severe Septic Shock complicated by JOSE/ATN- Anuria and Resp Failure on Vent  ICU team has d/w daughter about HD if and when needed- she does not appear to be in favor of HD at present  Prognosis poor    Day 4 in Septic Shock and on vent  Continue Levo and Vaso plus IV Abx  Prognosis poor    Day 5- Continues same on Vent, WBC better but remains in renal shut down due to shock plus 2 Pressors    Day 6- Continue present supportive care    Day 7- gradually improving, now on 1 pressor and WBC down to 17  Cont present care  Going for surgery today    Day 8- improving, s/p extensive surgery yesterday  Will try to wean off Levophed    Day 9:  Marginally more stable.    14 May:  Less vasopressor requirement.  Weaning sedation for awakening trials.

## 2022-05-14 NOTE — ASSESSMENT & PLAN NOTE
Received 2 units PRBCs in ED and 2 more in OR on 5/4  CBC daily  Conservative transfusion protocol  Monitor closely with adding Heparin infusion for A-fib

## 2022-05-14 NOTE — ASSESSMENT & PLAN NOTE
S/t septic shock  Adequate volume resuscitation +21L I/O balance  Avoid nephrotoxins  Strict I/O  No acute indication for dialysis but high potential for continued decline, daughter(HCPOA) will not pursue dialysis if decline per her father's wishes  Nephrology following  Fair UOP

## 2022-05-14 NOTE — PROGRESS NOTES
[facility] - Intensive Care ([facility])  Nephrology  Progress Note    Patient Name: Franky Masters  MRN: [id number]  Admission Date: 5/4/2022  Hospital Length of Stay: 10 days  Attending Provider: Elvie Parker DO   Primary Care Physician: HOLLY ROSEN  Principal Problem:Septic shock    Consults  Subjective:     Interval History:     Patient was seen in the intensive care unit.  Remains intubated.  Several family members were present.  All nephrology related questions were answered to their satisfaction.    Review of systems:  Not obtainable    Review of patient's allergies indicates:  No Known Allergies  Current Facility-Administered Medications   Medication Frequency    0.9%  NaCl infusion (for blood administration) Q24H PRN    0.9%  NaCl infusion PRN    acetaminophen suppository 650 mg Q6H PRN    furosemide injection 40 mg Q12H    And    albumin human 25% bottle 12.5 g BID    amiodarone 360 mg/200 mL (1.8 mg/mL) infusion Continuous    chlorhexidine 0.12 % solution 15 mL BID    dextrose 10 % infusion Continuous PRN    dextrose 10% bolus 125 mL PRN    ertapenem (INVANZ) 500 mg in sodium chloride 0.9% 100 mL IVPB Q24H    famotidine (PF) injection 20 mg Daily    fentaNYL 50 mcg/mL injection 50 mcg Q2H PRN    glucagon (human recombinant) injection 1 mg PRN    heparin 25,000 units in dextrose 5% (100 units/ml) IV bolus from bag - ADDITIONAL PRN BOLUS - 30 units/kg PRN    heparin 25,000 units in dextrose 5% (100 units/ml) IV bolus from bag - ADDITIONAL PRN BOLUS - 60 units/kg PRN    heparin 25,000 units in dextrose 5% 250 mL (100 units/mL) infusion LOW INTENSITY nomogram - [facility] Continuous    insulin aspart U-100 pen 1-10 Units Q4H PRN    lorazepam injection 2 mg Q4H PRN    metoprolol injection 2.5 mg Q4H    micafungin 100 mg in sodium chloride 0.9 % 100 mL IVPB (ready to mix system) Q24H    NORepinephrine 32 mg in dextrose 5 % 250 mL infusion Continuous    ondansetron injection 4 mg Q8H PRN     potassium chloride 40 mEq in 100 mL IVPB (FOR CENTRAL LINE ADMINISTRATION ONLY) Once    TPN ADULT CENTRAL LINE CUSTOM Continuous    TPN ADULT CENTRAL LINE CUSTOM Continuous    white petrolatum-mineral oil 57.3-42.5% ophthalmic ointment QHS       Objective:     Vital Signs (Most Recent):  Temp: 97.7 °F (36.5 °C) (05/14/22 1200)  Pulse: (!) 128 (05/14/22 1500)  Resp: 20 (05/14/22 1300)  BP: (!) 144/75 (05/14/22 1336)  SpO2: 100 % (05/14/22 1500)  O2 Device (Oxygen Therapy): ventilator (05/14/22 1500) Vital Signs (24h Range):  Temp:  [97.3 °F (36.3 °C)-98.2 °F (36.8 °C)] 97.7 °F (36.5 °C)  Pulse:  [] 128  Resp:  [14-23] 20  SpO2:  [100 %] 100 %  BP: (100-144)/(63-92) 144/75  Arterial Line BP: ()/(49-83) 106/60     Weight: 115.8 kg (255 lb 4.7 oz) (05/12/22 0620)  Body mass index is 37.7 kg/m².  Body surface area is 2.37 meters squared.    I/O last 3 completed shifts:  In: 3635.5 [I.V.:1512.1; NG/GT:295; IV Piggyback:419.7]  Out: 3804 [Urine:2849; Drains:805; Stool:150]    Physical Exam  Constitutional:       Appearance: Normal appearance.   HENT:      Head: Normocephalic and atraumatic.      Mouth/Throat:      Comments: ET tube in place  Eyes:      General: No scleral icterus.     Extraocular Movements: Extraocular movements intact.      Pupils: Pupils are equal, round, and reactive to light.   Cardiovascular:      Rate and Rhythm: Normal rate and regular rhythm.   Pulmonary:      Effort: Pulmonary effort is normal.      Comments: Intubated  Musculoskeletal:      Right lower leg: No edema.      Left lower leg: No edema.   Skin:     General: Skin is warm and dry.   Neurological:      General: No focal deficit present.      Mental Status: He is alert and oriented to person, place, and time.   Psychiatric:         Mood and Affect: Mood normal.         Behavior: Behavior normal.         Significant Labs:sure  BMP:   Recent Labs   Lab 05/14/22  0439 05/14/22  1243   * 283*    143   K 3.0* 3.5    CL 98 99   CO2 27 27   * 102*   CREATININE 3.4* 3.2*   CALCIUM 7.4* 7.5*   MG 1.9  --      CMP:   Recent Labs   Lab 05/14/22  0439 05/14/22  1243   * 283*   CALCIUM 7.4* 7.5*   ALBUMIN 1.1*  --    PROT 4.3*  --     143   K 3.0* 3.5   CO2 27 27   CL 98 99   * 102*   CREATININE 3.4* 3.2*   ALKPHOS 114  --    ALT 82*  --    AST 57*  --    BILITOT 0.8  --      All labs within the past 24 hours have been reviewed.    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 Acute kidney injury:  Secondary to sepsis with ATN.  Creatinine continues to improve.  Down to 3.4 from 4.0.  Urine output is also improving with 1959 cc reported.    Baseline creatinine appears to run around 0.7.    2. Electrolytes are stable:  Potassium is 3.4.  Bicarbonate is 25. Sodium is 141.     Discussed with Critical Care Medicine.    Approximately 35 minutes was spent in face-to-face conversation, chart review and coordination of care with other providers.      Thank you for your consult.     Jordan Renee,  MD  Nephrology  [facility] - Intensive Care ([facility])

## 2022-05-14 NOTE — ASSESSMENT & PLAN NOTE
5/4 Expl Lap and SB excision with washout and AB thera wound vac for bowel left in discontinuity  5/7 washout and wound vac replacement  hx Diverticulosis but cecal mass and suspected metastatic lesions found in exploration  5/11 ABD WASHOUT, RT HEMICOLECTOMY, ILEOSTOMY, LIVER BIOPSY, AND ABD WALL CLOSURE  IVAB for peritonitis/sepsis  Cont TPN and trickle TF per surgery  Pathology still pending

## 2022-05-14 NOTE — ASSESSMENT & PLAN NOTE
New onset 5/6  on amiodarone infusion; maintain until clear for enteral route  Continue cardiac monitoring  Cont Heparin infusion till cleared for enteral route [facility]

## 2022-05-14 NOTE — PROGRESS NOTES
[facility] - Intensive Care ([facility] Medicine  Progress Note    Patient Name: Franky Masters  MRN: [id number]  Patient Class: IP- Inpatient   Admission Date: 5/4/2022  Length of Stay: 10 days  Attending Physician: Elvie Parker DO  Primary Care Provider: HOLLY ROSEN        Subjective:     Principal Problem:Septic shock        HPI:  Mr Masters is a 65 yo male POD#0 s/p SB perforation with surgical intervention demonstrating a large cecal mass and 3l feculant fluid in the abdominal cavity. Additional findings of a perforated ileum and a liver mass. Patient tolerated the Exlap with Washout and small bowel excision. Patient had a wound vac placed, is currently intubated, sedated and recovering in the ICU. Patient received 4 units PRBC and remains on pressor support. Patient is a DNR and HM consulted with assistance with medical management. Daughter at bedside. Patient discussed with care team.          Overview/Hospital Course:  Patient continues to require pressors, remains intubated, sedated. Patient urine o/p declining and concerning. Discussed POC in detail at bedside with daughter POA. She expressed her fathers wish to not undergo treatments  like perm HD, where he has a diminished quality of life. We spoke frankly about issues and concerns and she will be communicating with the family as his case evolves. Comfort care was discussed and the goals of care will develop consistent with the patients expressed wishes. Potential for another surgery likely Saturday with a washout and possible biopsy vs resection are on the horizon. This possible intervention will be covered in detail by surgery sharing the risks and benefits of that approach. Case discussed with the primary service - surgery, and critical care team.    5/6- Pt seen and examined in the ICU plus discussed with ICU team and the pt's daughter/ POA: Remains critically ill, intubated, sedated on Vent, on Pressors- Levo plus Vaso- JOSE with oliguria  getting worse- Cr rising to 3.6, becoming severely acidotic- requiring Ertapenem, Zyvox, Micafungin as well as on Bicarb and Fentanyl gtt. He has massive fluid overload and generalized anasarca.  Possible return to OR tomorrow 5/7 if patient is more stable for right hemicolectomy, possible ileostomy, liver biopsy, abdominal wall closure. Dw Pt's daughter.       5/7- remains Intubated, sedated on Vent- Went into Afib w RVR- hence added Amio to Levo and Vasopressin- back in NSR. Getting Invanz and Zyvox plus Micafungin. Dr. Parker took him back to OR for for abdominal washout ( 3-3.5 L feculent fluid with food particles suctioned out in the OR, small bowel appeared better) and replaced Abthera- still has bowel discontinuity. His WBC, Lactate and Cr have all increased. Family updated about his critical condition and poor prognosis and JOSE/ATN- they are considering Comfort measures.     5/8- Day 5 on Vent- family at bedside, remains intubated on Vent with 2 Pressors- still on Amiodarone gtt for Afib, Still has Abthera wound vac to open Abdomen with small bowel discontinuity. Remains oliguric with generalized anasarca- almost 24 L fluid positive. Cr increased to 4.6. WBC down to 12, family does not want any HD/CRRT, so getting an IV Lasix trial to improve the urine output.     5/9- Day 6 on vent- remains the same, remains intubated, on vent with Abthera Wound vac and bowel discontinuity. Put out about 3 L urine since yesterday with IV Lasix, family still does not want any HD, WBC down to 10.2, H/H 7.8/24, still on 2 Pressors and Amio gtt. Family still deciding about comfort care.     5/10- Appreciate all- Day 7- remains same in septic shock on 2 vasopressors, intubated and sedated, still in afib. JOSE has remained stable at 4.7 but urine output improved with IV lasix. Abthera wound vac in place. No surgery today- put out about 2.5 L yesterday, given lasix again today.    5/11- Seen Pre op- looks better, less swollen,  remains intubated, sedated on Vent, on 1 Pressor- on Levophed. Going for OR today for Laparotomy and washout and abd wound closure. Good response to Lasix. Still Afib on Amiodarone gtt. Bun/Cr 98/4.4, WBC down to 17, H/H 8.6/26.     5/12- Day 8 on Vent- looks much better, remains on Vent with Levophed and Amio gtts. Had extensive surgery yesterday and did very well post op- Reopening of recent Laparotomy, Abdominal washout, R Hemicolectomy with Liver Bx, TAP block, Abdominal wall closure, Creation, end Ileostomy. POD 1- looks better, still on Levophed and Amio gtt for Afib, started on TPN, ID stopped Zyvox and continued Merrem/Mycafungin.          Interval History: Marginally more stable but still requiring intermittent pressors.  Abdomen closed.  Weaning sedation for awakening trials.  Continuing antibiotics and coordinating care with General Surgery and Critical Care Medicine.    Review of Systems   Unable to perform ROS: Intubated   Objective:     Vital Signs (Most Recent):  Temp: 97.8 °F (36.6 °C) (05/14/22 1600)  Pulse: (!) 123 (05/14/22 1800)  Resp: 20 (05/14/22 1600)  BP: 121/66 (05/14/22 1707)  SpO2: 100 % (05/14/22 1800)   Vital Signs (24h Range):  Temp:  [97.7 °F (36.5 °C)-98.2 °F (36.8 °C)] 97.8 °F (36.6 °C)  Pulse:  [] 123  Resp:  [20-22] 20  SpO2:  [100 %] 100 %  BP: (100-144)/(57-92) 121/66  Arterial Line BP: ()/(49-83) 114/60     Weight: 115.8 kg (255 lb 4.7 oz)  Body mass index is 37.7 kg/m².    Intake/Output Summary (Last 24 hours) at 5/14/2022 1853  Last data filed at 5/14/2022 1815  Gross per 24 hour   Intake 2254.88 ml   Output 2765 ml   Net -510.12 ml        Physical Exam  Vitals and nursing note reviewed.   Constitutional:       General: He is not in acute distress.     Appearance: He is well-developed. He is obese. He is ill-appearing. He is not toxic-appearing or diaphoretic.      Interventions: He is sedated, intubated and restrained.   HENT:      Head: Atraumatic.      Nose:         Mouth/Throat:      Mouth: Mucous membranes are moist.   Eyes:      General: No scleral icterus.     Conjunctiva/sclera: Conjunctivae normal.      Pupils: Pupils are equal, round, and reactive to light.   Neck:      Vascular: No JVD.     Cardiovascular:      Rate and Rhythm: Tachycardia present. Rhythm irregular.      Pulses:           Radial pulses are 1+ on the right side and 1+ on the left side.        Dorsalis pedis pulses are 1+ on the right side and 1+ on the left side.      Heart sounds: Heart sounds are distant.   Pulmonary:      Effort: No accessory muscle usage or respiratory distress. He is intubated.      Breath sounds: Decreased breath sounds present. No wheezing or rhonchi.   Chest:      Chest wall: No deformity or tenderness.   Abdominal:      General: Bowel sounds are absent. There is no distension.       Genitourinary:     Comments: Doherty in place  Musculoskeletal:      Cervical back: Neck supple.      Right lower leg: 3+ Pitting Edema present.      Left lower leg: 3+ Pitting Edema present.      Right foot: No deformity.      Left foot: No deformity.   Lymphadenopathy:      Cervical: No cervical adenopathy.   Skin:     General: Skin is warm.      Capillary Refill: Capillary refill takes 2 to 3 seconds.          Neurological:      Comments: Heavily sedated     Flow (L/min): 4  Vent Mode: Spont  Oxygen Concentration (%):  [30] 30  Resp Rate Total:  [20 br/min-28 br/min] 27 br/min  Vt Set:  [450 mL] 450 mL  PEEP/CPAP:  [5 cmH20] 5 cmH20  Pressure Support:  [0 cmH20-8 cmH20] 8 cmH20  Mean Airway Pressure:  [8.6 cmH20-10 cmH20] 8.6 cmH20  Temp:  [97.7 °F (36.5 °C)-98.2 °F (36.8 °C)] 97.8 °F (36.6 °C)  Pulse:  [] 123  Resp:  [20-22] 20  SpO2:  [100 %] 100 %  BP: (100-144)/(57-92) 121/66  Arterial Line BP: ()/(49-83) 114/60   Art pH/pCO2/pO2/HCO3:  7.497/41.8/86/32.4 (05/14 4346)  Lab Results   Component Value Date    IIH89FRSTENQ Negative 05/11/2022    [id number] Negative 05/04/2022     [id number] Positive (A) 08/05/2021        Recent Labs   Lab 05/10/22  0406 05/11/22  0416 05/12/22  0419 05/13/22  0350 05/14/22  0439 05/14/22  1243 05/14/22  1625   LACTATE 3.5*  --   --   --   --   --   --    *   < > 137 138 141 143  --    WBC 13.35*   < > 14.17* 16.29* 13.93*  --  13.47*   GRAN 87.8*  11.7*   < > 92.1*  13.1* 89.9*  14.7* 85.3*  11.9*  --  83.3*  11.2*   LYMPH 6.3*  0.8*   < > 3.0*  0.4* 4.2*  0.7* 5.8*  0.8*  --  7.4*  1.0   HGB 8.6*   < > 8.6* 8.2* 7.8*  --  7.7*   HCT 28.0*   < > 28.8* 27.3* 25.5*  --  25.1*   BUN 87*   < > 101* 107* 111* 102*  --    CREATININE 4.8*   < > 4.3* 4.0* 3.4* 3.2*  --    ESTGFRAFRICA 13*   < > 15* 17* 20* 22*  --    EGFRNONAA 12*   < > 13* 14* 18* 19*  --    K 5.0   < > 4.1 3.4* 3.0* 3.5  --    CL 90*   < > 94* 95 98 99  --    CO2 19*   < > 21* 25 27 27  --    PHOS  --    < > 8.9* 8.1* 5.5*  --   --    MG 2.0   < > 1.9 2.0 1.9  --   --     < > = values in this interval not displayed.       Microbiology Results (last 7 days)       Procedure Component Value Units Date/Time    Blood culture [[id number]] Collected: 05/04/22 1428    Order Status: Completed Specimen: Blood from Line, Arterial, Left Updated: 05/10/22 0612     Blood Culture, Routine No growth after 5 days.    Blood culture [[id number]] Collected: 05/04/22 1429    Order Status: Completed Specimen: Blood from Line, Jugular, Internal Right Updated: 05/10/22 0612     Blood Culture, Routine No growth after 5 days.          Antibiotics (From admission, onward)                Start     Stop Route Frequency Ordered    05/09/22 2200  ertapenem (INVANZ) 500 mg in sodium chloride 0.9% 100 mL IVPB         05/19 2359 IV Every 24 hours (non-standard times) 05/09/22 1624          Anticoagulants       Ordered     Route Frequency Start Stop    05/12/22 1408  heparin (PORCINE)  (LOW INTENSITY heparin infusion nomogram - [facility] (Recommended Indications: Acute Coronary Syndrome, Atrial Fibrillation, Prophylaxis  in Prosthetic Heart Valves, and other indication where full anticoagulation is desired, bleeding risk is moderate, antiplatelet agents have been utilized, and time to therapeutic can be delayed minimizing the increased bleeding risk))         IV As needed (PRN) 05/12/22 1508 --    05/12/22 1408  heparin (PORCINE)  (LOW INTENSITY heparin infusion nomogram - [facility] (Recommended Indications: Acute Coronary Syndrome, Atrial Fibrillation, Prophylaxis in Prosthetic Heart Valves, and other indication where full anticoagulation is desired, bleeding risk is moderate, antiplatelet agents have been utilized, and time to therapeutic can be delayed minimizing the increased bleeding risk))         IV As needed (PRN) 05/12/22 1508 --    05/12/22 1408  heparin (porcine) in D5W  (LOW INTENSITY heparin infusion nomogram - [facility] (Recommended Indications: Acute Coronary Syndrome, Atrial Fibrillation, Prophylaxis in Prosthetic Heart Valves, and other indication where full anticoagulation is desired, bleeding risk is moderate, antiplatelet agents have been utilized, and time to therapeutic can be delayed minimizing the increased bleeding risk))         IV Continuous 05/12/22 1415 --          X-Ray Chest 1 View  Narrative: EXAMINATION:  XR CHEST 1 VIEW    CLINICAL HISTORY:  resp failure;    TECHNIQUE:  Single frontal view of the chest was performed.    COMPARISON:  05/09/2022    FINDINGS:  Tubes and lines are stable. Small left pleural effusion with bibasilar patchy infiltrates.  Prominence of the keila again noted.  Right-sided infrahilar lesion or infiltrate suspected.  Recommend follow-up to resolution which may include cross-sectional imaging such is noncontrast chest CT.  In comparison to the prior study, there is no adverse interval changes.  Impression: In comparison to the prior study, there is no adverse interval changes    Electronically signed by: Philippe Horton MD  Date:    05/13/2022  Time:    07:22   Significant Labs: All pertinent  labs within the past 24 hours have been reviewed.    Significant Imaging: I have reviewed all pertinent imaging results/findings within the past 24 hours.      Assessment/Plan:      * Septic shock sec to Peritonitis from SB perforation  Pressors  Abx - Zosyn / Micafungin  ID on consult    Remains in severe Septic Shock complicated by JOSE/ATN- Anuria and Resp Failure on Vent  ICU team has d/w daughter about HD if and when needed- she does not appear to be in favor of HD at present  Prognosis poor    Day 4 in Septic Shock and on vent  Continue Levo and Vaso plus IV Abx  Prognosis poor    Day 5- Continues same on Vent, WBC better but remains in renal shut down due to shock plus 2 Pressors    Day 6- Continue present supportive care    Day 7- gradually improving, now on 1 pressor and WBC down to 17  Cont present care  Going for surgery today    Day 8- improving, s/p extensive surgery yesterday  Will try to wean off Levophed    Day 9:  Marginally more stable.    14 May:  Less vasopressor requirement.  Weaning sedation for awakening trials.    Atrial fibrillation with RVR  Converted to NSR with Amio gtt    Cont Amio gtt          JOSE (acute kidney injury)  Worsening  Trend  Cr 2.2 today    Cr now 3.8- Oliguric- heading towards Anuria and ATN/ May need HD vs CRRT- she has massive fluid Overload.     Cr now 4.5-  Remains anuric  POA/ Family not in favor of HD    Remains Oliguric due to Septic Shock on 2 Pressors  Some urine output with lasix but no Polyuria     5/10 Urine Out put improved with diuresis while renal functions remains stable    Improving, Cr coming down, good urine output    On mechanically assisted ventilation  Wean as tolerated  CC Team  Pulmonary    Cont vent support    Expect further improvement with diuresis    Cont Vent support  Day 7    Day 8- will start weaning vent soon      Mass of colon  Based on Surgery  Potential interventions  Goals of care  Biopsy as indicated  Likely Oncologic process with  mets  Heme Onc as indicated    See above    Possible resection    resected    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 Parker- pt too unstable for right hemicolectomy, end ileostomy, liver biopsy today s/p abdominal washout with Abthera replacement today.  5/8- POD 3 with s/p Laparotomy and bowel resection and subsequent laparoscopic washout- persistent bowel discontinuity and abthera wound vac closure   5/9- persistent bowel discontinuity- await further decision by family    Await further input from surgery    Await surgery today- going for closure today    S/p- Reopening of recent Laparotomy, Abdominal washout, R Hemicolectomy with Liver Bx, TAP block, Abdominal wall closure, Creation, end Ileostomy. POD 1- looks better, still on Levophed and Amio gtt for Afib, started on TPN, ID stopped Zyvox and continued Merrem/Mycafungin.     Obesity (BMI 30.0-34.9)  Diet  Nutrition on recovery      Acute on chronic anemia  Hgb 10.3 s/p Transfusion 4 units Prbc  Transfuse for Hgb <7  Monitor    5/5  Improved  Hgb 11.4 today  Transfuse as indicated    5/10- H/H 8.6/28- likely dilutional from ATN- improving      Acute hypoxemic respiratory failure on mechanical vent  Patient with Hypoxic Respiratory failure which is Acute.  he is not on home oxygen. Supplemental oxygen was provided and noted- Vent Mode: Spont  Oxygen Concentration (%):  [35-40] 35  Resp Rate Total:  [18 br/min-30 br/min] 20 br/min  Vt Set:  [450 mL] 450 mL  PEEP/CPAP:  [5 cmH20] 5 cmH20  Pressure Support:  [0 cmH20-10 cmH20] 10 cmH20  Mean Airway Pressure:  [8.6 skP85-76 cmH20] 8.8 cmH20.   Signs/symptoms of respiratory failure include- tachypnea. Contributing diagnoses includes - Obesity Hypoventilation Labs and images were reviewed. Patient Has recent ABG, which has been  reviewed. Will treat underlying causes and adjust management of respiratory failure as indicated. Pulmonary CC on board  Day 2 on Vent- remains intubated on vent  Cont vent support    Day 4 on Vent    Day 5 on vent- adequate O2, sats    Day 6- continue supportive care, await family's decision about comfort care    Day 7- continue support- trying to diurese     Day 8- minimal vent support- start weaning soon        VTE Risk Mitigation (From admission, onward)         Ordered     heparin 25,000 units in dextrose 5% (100 units/ml) IV bolus from bag - ADDITIONAL PRN BOLUS - 60 units/kg  As needed (PRN)        Question:  Heparin Infusion Adjustment (DO NOT MODIFY ANSWER)  Answer:  \\[email or web address]\epic\Images\Pharmacy\HeparinInfusions\heparin LOW INTENSITY nomogram for [facility] [id number].pdf    05/12/22 1408     heparin 25,000 units in dextrose 5% (100 units/ml) IV bolus from bag - ADDITIONAL PRN BOLUS - 30 units/kg  As needed (PRN)        Question:  Heparin Infusion Adjustment (DO NOT MODIFY ANSWER)  Answer:  \\[email or web address]\epic\Images\Pharmacy\HeparinInfusions\heparin LOW INTENSITY nomogram for [facility] [id number].pdf    05/12/22 1408     heparin 25,000 units in dextrose 5% 250 mL (100 units/mL) infusion LOW INTENSITY nomogram - [facility]  Continuous        Question Answer Comment   Heparin Infusion Adjustment (DO NOT MODIFY ANSWER) \\[email or web address]\epic\Images\Pharmacy\HeparinInfusions\heparin LOW INTENSITY nomogram for [facility] [id number].pdf    Begin at (in units/kg/hr) 12        05/12/22 1408     IP VTE HIGH RISK PATIENT  Once         05/04/22 1253     Place sequential compression device  Until discontinued         05/04/22 1253                Discharge Planning   ELLIOT:      Code Status: DNR   Is the patient medically ready for discharge?:     Reason for patient still in hospital (select all that apply): Patient unstable  Discharge Plan A: Comfort care/withdrawal            Critical care time spent on the evaluation and treatment of severe organ  dysfunction, review of pertinent labs and imaging studies, discussions with consulting providers and discussions with patient/family: 30 minutes.      Alexandro Kebede MD  Department of Hospital Medicine   '[place] - Intensive Care ([facility])

## 2022-05-14 NOTE — PROGRESS NOTES
[facility] - Intensive Care ([facility])  General Surgery  Progress Note    Subjective:     History of Present Illness:  No notes on file    Post-Op Info:  Procedure(s) (LRB):  CREATION, ILEOSTOMY (N/A)  HEMICOLECTOMY, RIGHT (N/A)  BIOPSY, LIVER (Right)   3 Days Post-Op     Interval History:  Minimal vent settings, remains on pressors    Medications:  Continuous Infusions:   amiodarone in dextrose 5% 0.5 mg/min (05/14/22 1400)    dextrose 10 % in water (D10W)      heparin (porcine) in D5W 14 Units/kg/hr (05/14/22 1400)    NORepinephrine bitartrate-D5W Stopped (05/14/22 1347)    TPN ADULT CENTRAL LINE CUSTOM 36 mL/hr at 05/14/22 1400    TPN ADULT CENTRAL LINE CUSTOM       Scheduled Meds:   furosemide (LASIX) injection  40 mg Intravenous Q12H    And    albumin human 25%  12.5 g Intravenous BID    chlorhexidine  15 mL Mouth/Throat BID    ertapenem (INVANZ) IVPB  500 mg Intravenous Q24H    famotidine (PF)  20 mg Intravenous Daily    metoprolol  2.5 mg Intravenous Q4H    micafungin (MYCAMINE) IVPB  100 mg Intravenous Q24H    potassium chloride in water  40 mEq Intravenous Once    white petrolatum-mineral oil 57.3-42.5%   Both Eyes [facility]HS     PRN Meds:sodium chloride, sodium chloride 0.9%, acetaminophen, dextrose 10 % in water (D10W), dextrose 10%, fentaNYL, glucagon (human recombinant), heparin (PORCINE), heparin (PORCINE), insulin aspart U-100, lorazepam, ondansetron     Review of patient's allergies indicates:  No Known Allergies  Objective:     Vital Signs (Most Recent):  Temp: 97.7 °F (36.5 °C) (05/14/22 1200)  Pulse: (!) 128 (05/14/22 1500)  Resp: 20 (05/14/22 1300)  BP: (!) 144/75 (05/14/22 1336)  SpO2: 100 % (05/14/22 1500) Vital Signs (24h Range):  Temp:  [97.3 °F (36.3 °C)-98.2 °F (36.8 °C)] 97.7 °F (36.5 °C)  Pulse:  [] 128  Resp:  [14-23] 20  SpO2:  [100 %] 100 %  BP: (100-144)/(63-92) 144/75  Arterial Line BP: ()/(49-83) 106/60     Weight: 115.8 kg (255 lb 4.7 oz)  Body mass index is 37.7  kg/m².    Intake/Output - Last 3 Shifts         05/12 0700 05/13 0659 05/13 0700 05/14 0659 05/14 0700  05/15 0659    I.V. (mL/kg) 999.5 (8.6) 988.3 (8.5) 266.7 (2.3)    NG/ 210 60    IV Piggyback 221.3 296.4 99.9    .3 801.7 287.8    Total Intake(mL/kg) 2296.2 (19.8) 2296.3 (19.8) 714.4 (6.2)    Urine (mL/kg/hr) 1680 (0.6) 2074 (0.7) 680 (0.7)    Drains 855 400 40    Other       Stool 55 125     Blood       Total Output 2590 2599 720    Net -293.9 -302.7 -5.6           Stool Occurrence  1 x             Physical Exam  Vitals and nursing note reviewed.   Constitutional:       Appearance: He is obese. He is ill-appearing.   Cardiovascular:      Rate and Rhythm: Rhythm irregular.   Pulmonary:      Comments: Mechanical breath sounds  Abdominal:      Palpations: Abdomen is soft.      Comments: Midline incision with staples and packing in place. Right-sided end ileostomy is red with some liquid stool output. ADDIS drains are both serous.   Genitourinary:     Comments: Doherty in place  Musculoskeletal:      Right lower leg: Edema present.      Left lower leg: Edema present.   Neurological:      Comments: Arousable but not following commands or purposeful responses        Significant Labs:  I have reviewed all pertinent lab results within the past 24 hours.  CBC:   Recent Labs   Lab 05/14/22 0439   WBC 13.93*   RBC 3.57*   HGB 7.8*   HCT 25.5*   PLT 92*   MCV 71*   MCH 21.8*   MCHC 30.6*       CMP:   Recent Labs   Lab 05/14/22  0439 05/14/22  1243   * 283*   CALCIUM 7.4* 7.5*   ALBUMIN 1.1*  --    PROT 4.3*  --     143   K 3.0* 3.5   CO2 27 27   CL 98 99   * 102*   CREATININE 3.4* 3.2*   ALKPHOS 114  --    ALT 82*  --    AST 57*  --    BILITOT 0.8  --        ABGs:   Recent Labs   Lab 05/14/22 0459   PH 7.497*   PCO2 41.8   PO2 86   HCO3 32.4*   POCSATURATED 97   BE 9         Significant Diagnostics:  I have reviewed all pertinent imaging results/findings within the past 24  hours.    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  - Continue ICU management. Currently still requiring vasopressor support. On heparin and amio gtt for afib.  - Ostomy nurse consult  - Strict I/Os  - TPN  - Medical and ICU management per hospital/ICU team    Discussed with ICU nurse and daughter at bedside.    Acute on chronic anemia  Management per medicine/critical care    Acute hypoxemic respiratory failure on mechanical vent  Management per medicine/critical care        Percy Joseph MD  General Surgery  [facility] - Intensive Care ([facility])

## 2022-05-14 NOTE — PROGRESS NOTES
[facility] - [facility]ensive [facility] ([facility])  Critical Care Medicine  Progress Note    Patient Name: Franky Masters  MRN: [id number]  Admission Date: 5/4/2022  Hospital Length of Stay: 10 days  Code Status: DNR  Attending Provider: Elvie Parker DO  Primary Care Provider: HOLLY ROSEN   Principal Problem: Septic shock    Subjective:     HPI:  Ms Masters is a 67 yo obese male with a PMH of diverticulosis and hx of hospitalization in Aug 2021 with COVID PNA and Hypoxic Resp Failure but did not require ICU or intubation.  She presented early this AM to [facility] ED about 0515 hr via EMS with complaint of abd pain X 2 hours that awakened her from sleep and had associated N/V/D.  In ED BP 86/46, RA SAT 92%, LA 8, Hgb 7.2 and CT Abd with suspected bowel perforation and free air.  General Surgery consulted and taken to OR this AM revealing SB perf with 3 L feculent fluid and food in cavity and large obstructing cecal mass with perf ileum and palpable liver mass.  Had Expl Lap with washout and excision of SB with wound vac placement admitted post op to ICU intubated on mech ventilation.  Received 2 units PRBCs in ED and another 2 units in OR also on Levophed and Vasopressin infusions.  Before surgery patient was insistent he be DNR post op but consented to surgery and invasive mech ventilation but no ACLS post op in event of cardiac arrest and no prolonged mech ventilation. Reportedly patient does not routinely follow with practitioner as outpt.       Hospital/ICU Course:  5/4 - Admitted to ICU sedated and intubated on Levophed and Vasopressin infusions in no distress  5/5 - remains intubated and sedated on mechanical vent and pressor support; scant urine output overnight and creatinine rise to 2.2 with fluid balance +8.9L  5/6 - remains intubated and sedated on mechanical vent with decreasing pressor demand; still oliguric with creatinine up to 3.6, K 5.2; fluid balance +13L; now with bigeminy and cardiac rhythm changes, K  stable on abg but iCa 0.78  5/7 - remains intubated and sedated with open abdomen to abthera wound vac and pressor support; overnight atrial fib with RVR, now on amio infusion with SR 68 on monitor; plan to OR for washout and vac replacement today  5/8 - remains intubated, sedated with ab thera wound vac to open abdomen, mechanical ventilation, and 2 pressor support. SR on monitor, on amiodarone infusion. Tmax 100.2, wbc down at 12.3k, creatinine rising 4.6,urine output scant, K 5.1  5/9 - remains intubated and sedated with ab thera wound vac to open abdomen, mech vent, and 2 pressor support. Remains SR on monitor with amio infusion. Tmax 98.9, wbc down 10.6k, creatinine holding at 4.7, K 4.6 after lasix trial with 1.2L urinary output  5/10 - remains intubated and sedated with ab thera wound vac to open abdomen, mech vent, on pressor support. Atrial fib on monitor, rate 90s with occasional non sustained elevation. Urine output adequate since lasix trial but creatinine, K, BUN unchanged and remains 22L positive fluid status since admit  5/11 - return from OR late this evening after ABD WASHOUT, RT HEMICOLECTOMY, ILEOSTOMY, LIVER BIOPSY, AND ABD WALL CLOSURE. Remains atrial fib 80-100s on monitor on levophed support.  5/12 - semi erect in bed intubated on mech ventilation in no distress still in A-fib rate 113 bpm sedated heavily still on Fentanyl infusion.  Also still on Amiodarone and Levophed infusions.  Receiving TPN.  S/P abd washout and closure yesterday.    5/13 - semi erect in bed intubated on mech ventilation and sedated in no distress on Precedex, Heparin, Amiodarone, Fentanyl and Levophed infusions.  Also on TPN.  BP labile on pressor.  Still in A-Fib rate 113 bpm.  5/14 - semi erect intubated on mech ventilation in no distress sedated on Precedex infusion.  Also still on Levophed, Amiodarone and Heparin infusions with TPN.  BP still labile and still in A-Fib w/ rate controlled.  Pulm edema pink froth today  copious suctioned from OET.       Review of Systems   Unable to perform ROS: Intubated       Objective:     Vital Signs (Most Recent):  Temp: 97.7 °F (36.5 °C) (05/14/22 1000)  Pulse: (!) 125 (05/14/22 1100)  Resp: 20 (05/14/22 1100)  BP: 115/75 (05/14/22 1100)  SpO2: 100 % (05/14/22 1100)   Vital Signs (24h Range):  Temp:  [97.3 °F (36.3 °C)-98.78 °F (37.1 °C)] 97.7 °F (36.5 °C)  Pulse:  [] 125  Resp:  [13-23] 20  SpO2:  [100 %] 100 %  BP: (100-143)/(63-92) 115/75  Arterial Line BP: ()/(49-83) 113/66     Weight: 115.8 kg (255 lb 4.7 oz)  Body mass index is 37.7 kg/m².      Intake/Output Summary (Last 24 hours) at 5/14/2022 1111  Last data filed at 5/14/2022 1100  Gross per 24 hour   Intake 2204.1 ml   Output 2459 ml   Net -254.9 ml       Physical Exam  Vitals and nursing note reviewed.   Constitutional:       General: He is not in acute distress.     Appearance: He is well-developed. He is obese. He is ill-appearing. He is not toxic-appearing or diaphoretic.      Interventions: He is sedated, intubated and restrained.   HENT:      Head: Normocephalic and atraumatic.      Nose:        Mouth/Throat:      Mouth: Mucous membranes are moist.   Eyes:      General: No scleral icterus.     Conjunctiva/sclera: Conjunctivae normal.      Pupils: Pupils are equal, round, and reactive to light.   Neck:      Vascular: No JVD.     Cardiovascular:      Rate and Rhythm: Normal rate. Rhythm irregular.      Pulses:           Radial pulses are 1+ on the right side and 1+ on the left side.        Dorsalis pedis pulses are 1+ on the right side and 1+ on the left side.      Heart sounds: Heart sounds are distant.   Pulmonary:      Effort: No tachypnea, accessory muscle usage or respiratory distress. He is intubated.      Breath sounds: Decreased breath sounds and rhonchi present. No wheezing.   Chest:      Chest wall: No deformity or tenderness.   Abdominal:      General: Bowel sounds are absent. There is no distension.       Palpations: Abdomen is soft.          Comments: Some loose brown stool in colostomy   Genitourinary:     Testes:         Right: Swelling present.         Left: Swelling present.      Comments: Doherty in place.  Significant scrotal edema  Musculoskeletal:      Cervical back: Neck supple.      Right lower leg: 3+ Pitting Edema present.      Left lower leg: 3+ Pitting Edema present.      Right foot: No deformity.      Left foot: No deformity.   Lymphadenopathy:      Cervical: No cervical adenopathy.   Skin:     General: Skin is warm.      Capillary Refill: Capillary refill takes 2 to 3 seconds.          Neurological:      Comments: Still somnolent on SAT       Vents:  Vent Mode: A/C (05/14/22 0914)  Ventilator Initiated: Yes (05/04/22 1258)  Set Rate: 20 BPM (05/14/22 0914)  Vt Set: 450 mL (05/14/22 0914)  Pressure Support: 0 cmH20 (05/14/22 0914)  PEEP/CPAP: 5 cmH20 (05/14/22 0914)  Oxygen Concentration (%): 30 (05/14/22 0914)  Peak Airway Pressure: 14 cmH2O (05/14/22 0914)  Plateau Pressure: 17 cmH20 (05/14/22 0914)  Total Ve: 11.8 mL (05/14/22 0914)  F/VT Ratio<105 (RSBI): (!) 44.78 (05/14/22 0532)    Lines/Drains/Airways       Central Venous Catheter Line  Duration             Percutaneous Central Line Insertion/Assessment - Triple Lumen  05/04/22 1200 right internal jugular 9 days              Drain  Duration                  Urethral Catheter 05/04/22 1105 Straight-tip;Silicone 16 Fr. 10 days         Ileostomy 05/11/22 Standard (Comfort, end) RUQ 3 days         Closed/Suction Drain 05/11/22 1735 LUQ Bulb 19 Fr. 2 days         Closed/Suction Drain 05/11/22 1735 RLQ Bulb 19 Fr. 2 days         NG/OG Tube 05/11/22 1715 Trimble sump 18 Fr. Left nostril 2 days              Airway  Duration                  Airway - Non-Surgical 05/04/22 1051 Endotracheal Tube 10 days              Arterial Line  Duration             Arterial Line 05/07/22 1330 Right Radial 6 days              Peripheral Intravenous Line  Duration                   Midline Catheter Insertion/Assessment  - Single Lumen 05/12/22 1730 Left basilic vein (medial side of arm) 1 day                    Significant Labs:    CBC/Anemia Profile:  Recent Labs   Lab 05/13/22  0350 05/14/22 0439   WBC 16.29* 13.93*   HGB 8.2* 7.8*   HCT 27.3* 25.5*   * 92*   MCV 74* 71*   RDW 25.9* 26.5*        Chemistries:  Recent Labs   Lab 05/13/22  0350 05/14/22 0439    141   K 3.4* 3.0*   CL 95 98   CO2 25 27   * 111*   CREATININE 4.0* 3.4*   CALCIUM 7.2* 7.4*   ALBUMIN 1.1* 1.1*   PROT 4.3* 4.3*   BILITOT 0.8 0.8   ALKPHOS 102 114   * 82*   AST 52* 57*   MG 2.0 1.9   PHOS 8.1* 5.5*       ABGs:   Recent Labs   Lab 05/14/22 0459   PH 7.497*   PCO2 41.8   HCO3 32.4*   POCSATURATED 97   BE 9     Coagulation:   Recent Labs   Lab 05/12/22  1451 05/12/22  2057 05/14/22 0439   INR 1.1  --   --    APTT 34.1*   < > 39.6*    < > = values in this interval not displayed.     POCT Glucose:   Recent Labs   Lab 05/14/22  0135 05/14/22  0442 05/14/22  0821   POCTGLUCOSE 157* 158* 170*     All pertinent labs within the past 24 hours have been reviewed.        ABG  Recent Labs   Lab 05/14/22 0459   PH 7.497*   PO2 86   PCO2 41.8   HCO3 32.4*   BE 9     Assessment/Plan:     Pulmonary  Acute hypoxemic respiratory failure on mechanical vent  Vent Day # 11  Vent settings reviewed and adjusted to optimize gas exchange  VAP prophylaxis  ABG daily and prn to assess response to therapy  Weaning sedation as tolerated and daily SAT  SBT once more alert   Changed Propofol to Precedex infusion 5/12 for vent weaning    Cardiac/Vascular  Atrial fibrillation with RVR  New onset 5/6  on amiodarone infusion; maintain until clear for enteral route  Continue cardiac monitoring  Cont Heparin infusion till cleared for enteral route OAC    Renal/  JOSE (acute kidney injury)  S/t septic shock  Adequate volume resuscitation +21L I/O balance  Avoid nephrotoxins  Strict I/O  No acute indication for  dialysis but high potential for continued decline, daughter(TAY) will not pursue dialysis if decline per her father's wishes  Nephrology following  Fair UOP    ID  * Septic shock sec to Peritonitis from SB perforation  Secondary to Peritonitis from bowel perforation with major fecal contamination  Blood and sputum cultures 5/4 Neg  S/P Zyvox  Continue Invanz and Micafungin per ID following  continue to titrate pressor for MAP > 75, BP labile  ICU hemodynamic monitoring  Follow fever curve and WBC    Oncology  Acute on chronic anemia  Received 2 units PRBCs in ED and 2 more in OR on 5/4  CBC daily  Conservative transfusion protocol  Monitor closely with adding Heparin infusion for A-fib     Endocrine  Obesity (BMI 30.0-34.9)  Will encourage weight loss once/if survives and extubated and fully awake/alert    GI  Mass of colon  Found on exploration along with palpable liver mass  Sent for pathology 5/11 results still pending  High concern for metastatic malignancy    Small bowel perforation with large Cecal mass   5/4 Expl Lap and SB excision with washout and AB thera wound vac for bowel left in discontinuity  5/7 washout and wound vac replacement  hx Diverticulosis but cecal mass and suspected metastatic lesions found in exploration  5/11 ABD WASHOUT, RT HEMICOLECTOMY, ILEOSTOMY, LIVER BIOPSY, AND ABD WALL CLOSURE  IVAB for peritonitis/sepsis  Cont TPN and trickle TF per surgery  Pathology still pending    Palliative Care  Pt is DNR, discussed at length with surgeon prior to surgery and he consented to intubation for surgery with understanding of likely need for prolonged vent support post op until bowel/abdomen closed. He was clear that he would not desire long term vent support past that necessary for immediate post op recovery.   Daughter Claudia is SDM and is aware and supportive of his wishes. IF at any point his survival chances become poor or prolonged life support is anticipated she would honor his wish and  transition to comfort focused care.  5/6 - discussed status with daughter. She expressed again understanding of her father's wishes for very limited life support and further stated today that after time to reflect on current status, in the event of continued decline she would not want to pursue potential dialysis but if kidneys fail and indications for RRT exist she would at that time desire transition to a full comfort care focus. She would hope that he could be extubated to comfort focus and have opportunity to interact with family but verbalizes understanding that this may not be possible given open abdomen and comfort goal.  Continue daily and prn updates        Preventive Measures and Monitoring:   Stress Ulcer: Pepcid  Nutrition: TPN and trickle TF  Glucose control: SSI  Bowel prophylaxis: S/P colon surgery  DVT prophylaxis: Heparin infusion  Hx CAD on B-Blocker: no hx CAD  Head of Bed/Reposition: Elevate HOB and turn Q1-2 hours   Early Mobility: bed rest  SAT/SBT: SBT pending awakening from SAT  RASS goal: -1  Vent Day: #11  NG Day: #4  Central Line Right IJ Day: #11  Doherty Day: #11  IVAB Day: #11  Code Status: DNR      Counseling/Consultation:I have discussed the care of this patient in detail with the bedside nursing staff and Dr. Whelan and Dr. Parker     Patient assessed.  Soft bilat wrist restraints renewed due to risk of pulling lines, tubes and/or climbing OOB.     Critical Care Time: 58 minutes  Critical secondary to Patient has a condition that poses threat to life and bodily function: Acute Renal Failure and Septic Shock and Acute Hypoxic Resp [facility]  Patient is currently on drug therapy requiring intensive monitoring for toxicity: Amiodarone, Heparin and Levophed infusions  Patient is currently receiving parenteral controlled substances: Fentanyl and Precedex infusions      Critical care was time spent personally by me on the following activities: development of  treatment plan with patient or surrogate and bedside caregivers, discussions with consultants, evaluation of patient's response to treatment, examination of patient, ordering and performing treatments and interventions, ordering and review of laboratory studies, ordering and review of radiographic studies, pulse oximetry, re-evaluation of patient's condition. This critical care time did not overlap with that of any other provider or involve time for any procedures.     Rai Hernandez NP  Critical Care Medicine  [facility] - Intensive Care [facility])

## 2022-05-14 NOTE — PLAN OF CARE
Pt remains intubated, SBT majority of day tolerated well. Fentanyl weaned off and precedex added this AM, then precedex turned off from approx 13:00-18:00 in order to arouse and wake up more. Once vent switched back to AC/VC this evening, pt sbecame dyssynchronous and started lifting up BUEs restlessly, thus precedex restarted at low dose to maintain RASS -1. Arouses and withdraws to repeated touch and pain. Afib on monitor, HR 90s-120s. Levophed titrated to maintain MAP>75, amio gtt cont. Afebrile. UOP approx 950ml this shift. RLQ ADDIS drain 60ml serosang output, LLQ ADDIS drain with light brown creamy output. 50ml stool output via ileostomy. Heparin gtt cont, 2nd therapeutic PTT obtained, now monitoring with daily labs. Turned/repositioned with wedge and pillows q2h, heels floated in boots. BSWRs in place, no injuries noted. Both daughters, spouse and son at bedside today, all updated on POC and VU

## 2022-05-14 NOTE — NURSING
2100: Spoke to Dr. Garcia. Per day Rn Pt able to tolerate no sedation until pt was placed back on a rate on the vent. Day Rn restarted precedex. Night RN attempted to turn precedex off with shift assessment. Pt did not tolerate no sedation. PT Hr 150's, 's and breathing in the 30 on the vent. Precedex was restarted @ 0.2 and eICU called for instruction. Per MD- continue precedex and can titrate up to maintain a RASS of -2 to -3 over the night.

## 2022-05-14 NOTE — PLAN OF CARE
Problem: Adult Inpatient Plan of Care  Goal: Plan of Care Review  5/13/2022 2041 by Fallon Vu RN  Outcome: Ongoing, Progressing  5/13/2022 2040 by Fallon Vu RN  Outcome: Ongoing, Progressing  Goal: Patient-Specific Goal (Individualized)  5/13/2022 2041 by Fallon Vu RN  Outcome: Ongoing, Progressing  5/13/2022 2040 by Fallon Vu RN  Outcome: Ongoing, Progressing  Goal: Absence of Hospital-Acquired Illness or Injury  5/13/2022 2041 by Fallon Vu RN  Outcome: Ongoing, Progressing  5/13/2022 2040 by Fallon Vu RN  Outcome: Ongoing, Progressing  Goal: Optimal Comfort and Wellbeing  5/13/2022 2041 by Fallon Vu RN  Outcome: Ongoing, Progressing  5/13/2022 2040 by Fallon Vu RN  Outcome: Ongoing, Progressing  Goal: Readiness for Transition of Care  5/13/2022 2041 by Fallon Vu RN  Outcome: Ongoing, Progressing  5/13/2022 2040 by Fallon Vu RN  Outcome: Ongoing, Progressing     Problem: Infection  Goal: Absence of Infection Signs and Symptoms  5/13/2022 2041 by Fallon Vu RN  Outcome: Ongoing, Progressing  5/13/2022 2040 by Fallon Vu RN  Outcome: Ongoing, Progressing     Problem: Adjustment to Illness (Sepsis/Septic Shock)  Goal: Optimal Coping  5/13/2022 2041 by Fallon Vu RN  Outcome: Ongoing, Progressing  5/13/2022 2040 by Fallon Vu RN  Outcome: Ongoing, Progressing     Problem: Bleeding (Sepsis/Septic Shock)  Goal: Absence of Bleeding  5/13/2022 2041 by Fallon Vu RN  Outcome: Ongoing, Progressing  5/13/2022 2040 by Fallon Vu RN  Outcome: Ongoing, Progressing     Problem: Glycemic Control Impaired (Sepsis/Septic Shock)  Goal: Blood Glucose Level Within Desired Range  5/13/2022 2041 by Fallon Vu RN  Outcome: Ongoing, Progressing  5/13/2022 2040 by Fallon Vu RN  Outcome: Ongoing, Progressing     Problem: Infection Progression (Sepsis/Septic Shock)  Goal: Absence of Infection Signs and Symptoms  5/13/2022 2041 by Fallon Vu RN  Outcome:  Referred by: Diomedes Rangel DO; Medical Diagnosis (from order):    Diagnosis Information      Diagnosis    715.16 (ICD-9-CM) - M17.11 (ICD-10-CM) - Unilateral primary osteoarthritis, right knee    715.16 (ICD-9-CM) - M17.12 (ICD-10-CM) - Unilateral primary osteoarthritis, left knee                Physical Therapy -  Daily Treatment Note    Visit:  2     SUBJECTIVE                                                                                                             Patient reports he feels pretty good physically today, however not so well mentally. Patient reports he feels that he lifted his hips to high and arched back with bridging exercise, felt soreness in back and hamstrings. OBJECTIVE                                                                                                                        TREATMENT                                                                                                                  Therapeutic Exercise:  Seated calf stretch with towel: 3x20 sec  Quad sets:  SLR: 2x10 - cues for dorsiflexion  Bridges: reviewed for mechanics  Sidelying hip abduction: x15   SAQ with 2# weights: 2x15   Standing heel raises: 2x15  NuStep L4: x5 minutes    Skilled input: verbal instruction/cues    Writer verbally educated and received verbal consent for hand placement, positioning of patient, and techniques to be performed today from patient for therapist position for techniques as described above and how they are pertinent to the patient's plan of care. Home Exercise Program: Access Code: [email or web address]   URL: [email or web address]   Date: 11/20/2020   Prepared by: Zachary Jean      Exercises Supine Transversus Abdominis Bracing - Hands on Stomach- 10 reps- 1 sets- 5 hold- 1x daily- 7x weekly   Supine Active Straight Leg Raise- 10 reps- 3 sets- 1x daily- 7x weekly   Sidelying Hip Abduction- 10 reps- 2 sets- 1x daily- 7x weekly   Heel Toe Raises with Counter Support- 10 reps- 3 sets- 1x daily- 7x weekly        ASSESSMENT                                                                                                             Discussed speaking with therapist about mental state, patient reports since March he hasn't because of Covid. Patient did well with exercises today, initiated abdominal bracing to aid in bridging exercise. Patient Education:   Results of above outlined education: Verbalizes understanding and Needs reinforcement      PLAN                                                                                                                           Suggestions for next session as indicated: Progress per plan of care          Procedures and total treatment time documented Time Entry flowsheet. Ongoing, Progressing  5/13/2022 2040 by Fallon Vu RN  Outcome: Ongoing, Progressing     Problem: Nutrition Impaired (Sepsis/Septic Shock)  Goal: Optimal Nutrition Intake  5/13/2022 2041 by Fallon Vu RN  Outcome: Ongoing, Progressing  5/13/2022 2040 by Fallon Vu RN  Outcome: Ongoing, Progressing     Problem: Fall Injury Risk  Goal: Absence of Fall and Fall-Related Injury  5/13/2022 2041 by Fallon Vu RN  Outcome: Ongoing, Progressing  5/13/2022 2040 by Fallon Vu RN  Outcome: Ongoing, Progressing     Problem: Restraint, Nonbehavioral (Nonviolent)  Goal: Absence of Harm or Injury  5/13/2022 2041 by Fallon Vu RN  Outcome: Ongoing, Progressing  5/13/2022 2040 by Fallon Vu RN  Outcome: Ongoing, Progressing     Problem: Communication Impairment (Mechanical Ventilation, Invasive)  Goal: Effective Communication  5/13/2022 2041 by Fallon Vu RN  Outcome: Ongoing, Progressing  5/13/2022 2040 by Fallon Vu RN  Outcome: Ongoing, Progressing     Problem: Device-Related Complication Risk (Mechanical Ventilation, Invasive)  Goal: Optimal Device Function  5/13/2022 2041 by Fallon Vu RN  Outcome: Ongoing, Progressing  5/13/2022 2040 by Fallon Vu RN  Outcome: Ongoing, Progressing     Problem: Inability to Wean (Mechanical Ventilation, Invasive)  Goal: Mechanical Ventilation Liberation  5/13/2022 2041 by Fallon Vu RN  Outcome: Ongoing, Progressing  5/13/2022 2040 by Fallon Vu RN  Outcome: Ongoing, Progressing     Problem: Nutrition Impairment (Mechanical Ventilation, Invasive)  Goal: Optimal Nutrition Delivery  5/13/2022 2041 by Fallon Vu RN  Outcome: Ongoing, Progressing  5/13/2022 2040 by Fallon Vu RN  Outcome: Ongoing, Progressing     Problem: Skin and Tissue Injury (Mechanical Ventilation, Invasive)  Goal: Absence of Device-Related Skin and Tissue Injury  5/13/2022 2041 by Fallon Vu RN  Outcome: Ongoing, Progressing  5/13/2022 2040 by Fallon Vu  RN  Outcome: Ongoing, Progressing     Problem: Ventilator-Induced Lung Injury (Mechanical Ventilation, Invasive)  Goal: Absence of Ventilator-Induced Lung Injury  5/13/2022 2041 by Fallon Vu RN  Outcome: Ongoing, Progressing  5/13/2022 2040 by Fallon Vu RN  Outcome: Ongoing, Progressing     Problem: Communication Impairment (Artificial Airway)  Goal: Effective Communication  5/13/2022 2041 by Fallon Vu RN  Outcome: Ongoing, Progressing  5/13/2022 2040 by Fallon Vu RN  Outcome: Ongoing, Progressing     Problem: Device-Related Complication Risk (Artificial Airway)  Goal: Optimal Device Function  5/13/2022 2041 by Fallon Vu RN  Outcome: Ongoing, Progressing  5/13/2022 2040 by Fallon Vu RN  Outcome: Ongoing, Progressing     Problem: Skin and Tissue Injury (Artificial Airway)  Goal: Absence of Device-Related Skin or Tissue Injury  5/13/2022 2041 by Fallon Vu RN  Outcome: Ongoing, Progressing  5/13/2022 2040 by Fallon Vu RN  Outcome: Ongoing, Progressing     Problem: Noninvasive Ventilation Acute  Goal: Effective Unassisted Ventilation and Oxygenation  5/13/2022 2041 by Fallon Vu RN  Outcome: Ongoing, Progressing  5/13/2022 2040 by Fallon Vu RN  Outcome: Ongoing, Progressing     Problem: Fluid Imbalance (Pneumonia)  Goal: Fluid Balance  5/13/2022 2041 by Fallon Vu RN  Outcome: Ongoing, Progressing  5/13/2022 2040 by Fallon Vu RN  Outcome: Ongoing, Progressing     Problem: Infection (Pneumonia)  Goal: Resolution of Infection Signs and Symptoms  5/13/2022 2041 by Fallon Vu RN  Outcome: Ongoing, Progressing  5/13/2022 2040 by Fallon Vu RN  Outcome: Ongoing, Progressing     Problem: Respiratory Compromise (Pneumonia)  Goal: Effective Oxygenation and Ventilation  5/13/2022 2041 by Fallon Vu RN  Outcome: Ongoing, Progressing  5/13/2022 2040 by Fallon Vu RN  Outcome: Ongoing, Progressing     Problem: Skin Injury Risk Increased  Goal: Skin Health and  Integrity  5/13/2022 2041 by Fallon Vu RN  Outcome: Ongoing, Progressing  5/13/2022 2040 by Fallon Vu RN  Outcome: Ongoing, Progressing     Problem: Fluid and Electrolyte Imbalance (Acute Kidney Injury/Impairment)  Goal: Fluid and Electrolyte Balance  5/13/2022 2041 by Fallon Vu RN  Outcome: Ongoing, Progressing  5/13/2022 2040 by Fallon Vu RN  Outcome: Ongoing, Progressing     Problem: Oral Intake Inadequate (Acute Kidney Injury/Impairment)  Goal: Optimal Nutrition Intake  5/13/2022 2041 by Fallon Vu RN  Outcome: Ongoing, Progressing  5/13/2022 2040 by Fallon Vu RN  Outcome: Ongoing, Progressing     Problem: Renal Function Impairment (Acute Kidney Injury/Impairment)  Goal: Effective Renal Function  5/13/2022 2041 by Fallon Vu RN  Outcome: Ongoing, Progressing  5/13/2022 2040 by Fallon Vu RN  Outcome: Ongoing, Progressing

## 2022-05-14 NOTE — PLAN OF CARE
Pt intubated on ventilator. Sedation held for neuro assessment. Pt following commands. Afib on cardiac monitor. Amio and heparin gtt continued per order. Hemodynamically unstable intermittently requiring pressors. Afebrile. TPN and Tube feeding continued per order. Pt tolerating well. Doherty in place with adequate urine output. Family updated at bedside. Emergency equipment at bedside. All alarms active and audible. Will continue to monitor.  Problem: Adult Inpatient Plan of Care  Goal: Plan of Care Review  Outcome: Ongoing, Progressing     Problem: Adult Inpatient Plan of Care  Goal: Patient-Specific Goal (Individualized)  Outcome: Ongoing, Progressing     Problem: Adult Inpatient Plan of Care  Goal: Readiness for Transition of Care  Outcome: Ongoing, Progressing     Problem: Infection  Goal: Absence of Infection Signs and Symptoms  Outcome: Ongoing, Progressing

## 2022-05-15 LAB
ACANTHOCYTES BLD QL SMEAR: PRESENT
ALBUMIN SERPL BCP-MCNC: 1.3 G/DL (ref 3.5–5.2)
ALLENS TEST: ABNORMAL
ALP SERPL-CCNC: 141 U/L (ref 55–135)
ALT SERPL W/O P-5'-P-CCNC: 74 U/L (ref 10–44)
ANION GAP SERPL CALC-SCNC: 13 MMOL/L (ref 8–16)
ANISOCYTOSIS BLD QL SMEAR: SLIGHT
APTT BLDCRRT: 36.7 SEC (ref 21–32)
APTT BLDCRRT: 38.7 SEC (ref 21–32)
APTT BLDCRRT: 43.3 SEC (ref 21–32)
AST SERPL-CCNC: 84 U/L (ref 10–40)
BASOPHILS # BLD AUTO: 0.05 K/UL (ref 0–0.2)
BASOPHILS NFR BLD: 0.4 % (ref 0–1.9)
BILIRUB SERPL-MCNC: 1 MG/DL (ref 0.1–1)
BUN SERPL-MCNC: 104 MG/DL (ref 8–23)
CALCIUM SERPL-MCNC: 7.8 MG/DL (ref 8.7–10.5)
CHLORIDE SERPL-SCNC: 100 MMOL/L (ref 95–110)
CO2 SERPL-SCNC: 31 MMOL/L (ref 23–29)
CREAT SERPL-MCNC: 3.1 MG/DL (ref 0.5–1.4)
DACRYOCYTES BLD QL SMEAR: ABNORMAL
DELSYS: ABNORMAL
DIFFERENTIAL METHOD: ABNORMAL
EOSINOPHIL # BLD AUTO: 0.1 K/UL (ref 0–0.5)
EOSINOPHIL NFR BLD: 0.7 % (ref 0–8)
ERYTHROCYTE [DISTWIDTH] IN BLOOD BY AUTOMATED COUNT: 26.9 % (ref 11.5–14.5)
EST. GFR  (AFRICAN AMERICAN): 23 ML/MIN/1.73 M^2
EST. GFR  (NON AFRICAN AMERICAN): 20 ML/MIN/1.73 M^2
FIO2: 40
GLUCOSE SERPL-MCNC: 137 MG/DL (ref 70–110)
HCO3 UR-SCNC: 34.1 MMOL/L (ref 24–28)
HCT VFR BLD AUTO: 25.2 % (ref 40–54)
HGB BLD-MCNC: 7.7 G/DL (ref 14–18)
HYPOCHROMIA BLD QL SMEAR: ABNORMAL
IMM GRANULOCYTES # BLD AUTO: 0.14 K/UL (ref 0–0.04)
IMM GRANULOCYTES NFR BLD AUTO: 1.2 % (ref 0–0.5)
LYMPHOCYTES # BLD AUTO: 1 K/UL (ref 1–4.8)
LYMPHOCYTES NFR BLD: 8 % (ref 18–48)
MAGNESIUM SERPL-MCNC: 1.8 MG/DL (ref 1.6–2.6)
MCH RBC QN AUTO: 21.7 PG (ref 27–31)
MCHC RBC AUTO-ENTMCNC: 30.6 G/DL (ref 32–36)
MCV RBC AUTO: 71 FL (ref 82–98)
MODE: ABNORMAL
MONOCYTES # BLD AUTO: 1.2 K/UL (ref 0.3–1)
MONOCYTES NFR BLD: 10.3 % (ref 4–15)
NEUTROPHILS # BLD AUTO: 9.5 K/UL (ref 1.8–7.7)
NEUTROPHILS NFR BLD: 79.4 % (ref 38–73)
NRBC BLD-RTO: 0 /100 WBC
OVALOCYTES BLD QL SMEAR: ABNORMAL
PCO2 BLDA: 44.7 MMHG (ref 35–45)
PEEP: 5
PH SMN: 7.49 [PH] (ref 7.35–7.45)
PHOSPHATE SERPL-MCNC: 4.2 MG/DL (ref 2.7–4.5)
PLATELET # BLD AUTO: 152 K/UL (ref 150–450)
PLATELET BLD QL SMEAR: ABNORMAL
PMV BLD AUTO: ABNORMAL FL (ref 9.2–12.9)
PO2 BLDA: 114 MMHG (ref 80–100)
POC BE: 11 MMOL/L
POC SATURATED O2: 99 % (ref 95–100)
POCT GLUCOSE: 140 MG/DL (ref 70–110)
POCT GLUCOSE: 140 MG/DL (ref 70–110)
POCT GLUCOSE: 142 MG/DL (ref 70–110)
POCT GLUCOSE: 148 MG/DL (ref 70–110)
POCT GLUCOSE: 150 MG/DL (ref 70–110)
POCT GLUCOSE: 154 MG/DL (ref 70–110)
POIKILOCYTOSIS BLD QL SMEAR: SLIGHT
POLYCHROMASIA BLD QL SMEAR: ABNORMAL
POTASSIUM SERPL-SCNC: 3.1 MMOL/L (ref 3.5–5.1)
PROT SERPL-MCNC: 4.5 G/DL (ref 6–8.4)
PS: 8
RBC # BLD AUTO: 3.55 M/UL (ref 4.6–6.2)
SAMPLE: ABNORMAL
SCHISTOCYTES BLD QL SMEAR: PRESENT
SICKLE CELLS BLD QL SMEAR: ABNORMAL
SITE: ABNORMAL
SODIUM SERPL-SCNC: 144 MMOL/L (ref 136–145)
TARGETS BLD QL SMEAR: ABNORMAL
WBC # BLD AUTO: 11.97 K/UL (ref 3.9–12.7)

## 2022-05-15 PROCEDURE — 63600175 PHARM REV CODE 636 W HCPCS: Performed by: NURSE PRACTITIONER

## 2022-05-15 PROCEDURE — 99232 SBSQ HOSP IP/OBS MODERATE 35: CPT | Mod: ,,, | Performed by: INTERNAL MEDICINE

## 2022-05-15 PROCEDURE — 63600175 PHARM REV CODE 636 W HCPCS: Performed by: SURGERY

## 2022-05-15 PROCEDURE — 94003 VENT MGMT INPAT SUBQ DAY: CPT

## 2022-05-15 PROCEDURE — 25000003 PHARM REV CODE 250: Performed by: INTERNAL MEDICINE

## 2022-05-15 PROCEDURE — A4217 STERILE WATER/SALINE, 500 ML: HCPCS | Performed by: NURSE PRACTITIONER

## 2022-05-15 PROCEDURE — 99900035 HC TECH TIME PER 15 MIN (STAT)

## 2022-05-15 PROCEDURE — 82803 BLOOD GASES ANY COMBINATION: CPT

## 2022-05-15 PROCEDURE — 37799 UNLISTED PX VASCULAR SURGERY: CPT

## 2022-05-15 PROCEDURE — P9047 ALBUMIN (HUMAN), 25%, 50ML: HCPCS | Mod: JG | Performed by: NURSE PRACTITIONER

## 2022-05-15 PROCEDURE — 85730 THROMBOPLASTIN TIME PARTIAL: CPT | Mod: 91 | Performed by: SURGERY

## 2022-05-15 PROCEDURE — 25000003 PHARM REV CODE 250: Performed by: NURSE PRACTITIONER

## 2022-05-15 PROCEDURE — 83735 ASSAY OF MAGNESIUM: CPT | Performed by: SURGERY

## 2022-05-15 PROCEDURE — 99291 CRITICAL CARE FIRST HOUR: CPT | Mod: ,,, | Performed by: NURSE PRACTITIONER

## 2022-05-15 PROCEDURE — 80053 COMPREHEN METABOLIC PANEL: CPT | Performed by: SURGERY

## 2022-05-15 PROCEDURE — 99900026 HC AIRWAY MAINTENANCE (STAT)

## 2022-05-15 PROCEDURE — 99291 PR CRITICAL CARE, E/M 30-74 MINUTES: ICD-10-PCS | Mod: ,,, | Performed by: NURSE PRACTITIONER

## 2022-05-15 PROCEDURE — 63600175 PHARM REV CODE 636 W HCPCS: Performed by: INTERNAL MEDICINE

## 2022-05-15 PROCEDURE — 85025 COMPLETE CBC W/AUTO DIFF WBC: CPT | Performed by: SURGERY

## 2022-05-15 PROCEDURE — 27000221 HC OXYGEN, UP TO 24 HOURS

## 2022-05-15 PROCEDURE — 25000003 PHARM REV CODE 250: Performed by: SURGERY

## 2022-05-15 PROCEDURE — 85730 THROMBOPLASTIN TIME PARTIAL: CPT | Performed by: NURSE PRACTITIONER

## 2022-05-15 PROCEDURE — B4185 PARENTERAL SOL 10 GM LIPIDS: HCPCS | Performed by: NURSE PRACTITIONER

## 2022-05-15 PROCEDURE — 20000000 HC ICU ROOM

## 2022-05-15 PROCEDURE — 99232 PR SUBSEQUENT HOSPITAL CARE,LEVL II: ICD-10-PCS | Mod: ,,, | Performed by: INTERNAL MEDICINE

## 2022-05-15 PROCEDURE — 84100 ASSAY OF PHOSPHORUS: CPT | Performed by: SURGERY

## 2022-05-15 RX ORDER — POTASSIUM CHLORIDE 29.8 MG/ML
40 INJECTION INTRAVENOUS ONCE
Status: COMPLETED | OUTPATIENT
Start: 2022-05-15 | End: 2022-05-15

## 2022-05-15 RX ADMIN — MINERAL OIL, PETROLATUM: 425; 573 OINTMENT OPHTHALMIC at 09:05

## 2022-05-15 RX ADMIN — ERTAPENEM 500 MG: 1 INJECTION INTRAMUSCULAR; INTRAVENOUS at 10:05

## 2022-05-15 RX ADMIN — LORAZEPAM 2 MG: 2 INJECTION INTRAMUSCULAR; INTRAVENOUS at 07:05

## 2022-05-15 RX ADMIN — METOPROLOL TARTRATE 2.5 MG: 1 INJECTION, SOLUTION INTRAVENOUS at 05:05

## 2022-05-15 RX ADMIN — METOPROLOL TARTRATE 2.5 MG: 1 INJECTION, SOLUTION INTRAVENOUS at 01:05

## 2022-05-15 RX ADMIN — CHLORHEXIDINE GLUCONATE 0.12% ORAL RINSE 15 ML: 1.2 LIQUID ORAL at 09:05

## 2022-05-15 RX ADMIN — HEPARIN SODIUM 18 UNITS/KG/HR: 5000 INJECTION INTRAVENOUS; SUBCUTANEOUS at 09:05

## 2022-05-15 RX ADMIN — CHLORHEXIDINE GLUCONATE 0.12% ORAL RINSE 15 ML: 1.2 LIQUID ORAL at 08:05

## 2022-05-15 RX ADMIN — METOPROLOL TARTRATE 2.5 MG: 1 INJECTION, SOLUTION INTRAVENOUS at 09:05

## 2022-05-15 RX ADMIN — FAMOTIDINE 20 MG: 10 INJECTION INTRAVENOUS at 08:05

## 2022-05-15 RX ADMIN — METOPROLOL TARTRATE 2.5 MG: 1 INJECTION, SOLUTION INTRAVENOUS at 02:05

## 2022-05-15 RX ADMIN — FENTANYL CITRATE 50 MCG: 50 INJECTION INTRAMUSCULAR; INTRAVENOUS at 07:05

## 2022-05-15 RX ADMIN — ALBUMIN (HUMAN) 12.5 G: 12.5 SOLUTION INTRAVENOUS at 08:05

## 2022-05-15 RX ADMIN — FUROSEMIDE 40 MG: 10 INJECTION, SOLUTION INTRAMUSCULAR; INTRAVENOUS at 02:05

## 2022-05-15 RX ADMIN — SMOFLIPID 250 ML: 6; 6; 5; 3 INJECTION, EMULSION INTRAVENOUS at 04:05

## 2022-05-15 RX ADMIN — INSULIN ASPART 1 UNITS: 100 INJECTION, SOLUTION INTRAVENOUS; SUBCUTANEOUS at 12:05

## 2022-05-15 RX ADMIN — SODIUM CHLORIDE: 234 INJECTION, SOLUTION INTRAVENOUS at 04:05

## 2022-05-15 RX ADMIN — ALBUMIN (HUMAN) 12.5 G: 12.5 SOLUTION INTRAVENOUS at 09:05

## 2022-05-15 RX ADMIN — POTASSIUM CHLORIDE 40 MEQ: 29.8 INJECTION, SOLUTION INTRAVENOUS at 07:05

## 2022-05-15 RX ADMIN — MICAFUNGIN SODIUM 100 MG: 100 INJECTION, POWDER, LYOPHILIZED, FOR SOLUTION INTRAVENOUS at 04:05

## 2022-05-15 RX ADMIN — FUROSEMIDE 40 MG: 10 INJECTION, SOLUTION INTRAMUSCULAR; INTRAVENOUS at 01:05

## 2022-05-15 RX ADMIN — AMIODARONE HYDROCHLORIDE 0.5 MG/MIN: 1.8 INJECTION, SOLUTION INTRAVENOUS at 02:05

## 2022-05-15 RX ADMIN — AMIODARONE HYDROCHLORIDE 0.5 MG/MIN: 1.8 INJECTION, SOLUTION INTRAVENOUS at 07:05

## 2022-05-15 RX ADMIN — AMIODARONE HYDROCHLORIDE 0.5 MG/MIN: 1.8 INJECTION, SOLUTION INTRAVENOUS at 05:05

## 2022-05-15 NOTE — EICU
eICU Physician Virtual/Remote Brief Evaluation Note      Telephone call RN  Patient receiving tube feeds and found to have bilious fluid in his mouth  On ventilator.  There did not appear to be aspiration and saturation ans ventilator parameters were unchanged  Chart reviewed, discussed with RN  DC tube feeds and gastric tube placed to wall suction  KUB ordered      PITO Lomas MD  eICU Attending  [phone]    This report has been created through the use of M-Modal dictation software. Typographical and content errors may occur with this process. While efforts are made to detect and correct such errors, in some cases errors will persist. For this reason, wording in this document should be considered in the proper context and not strictly verbatim

## 2022-05-15 NOTE — PROGRESS NOTES
[facility] ([facility])  Critical Care Medicine  Progress Note    Patient Name: Franky Masters  MRN: [id number]  Admission Date: 5/4/2022  Hospital Length of Stay: 11 days  Code Status: DNR  Attending Provider: Elvie Parker DO  Primary Care Provider: HOLLY ROSEN   Principal Problem: Septic shock    Subjective:     HPI:  Ms Masters is a 67 yo obese male with a PMH of diverticulosis and hx of hospitalization in Aug 2021 with COVID PNA and Hypoxic Resp Failure but did not require ICU or intubation.  She presented early this AM to [facility] ED about 0515 hr via EMS with complaint of abd pain X 2 hours that awakened her from sleep and had associated N/V/D.  In ED BP 86/46, RA SAT 92%, LA 8, Hgb 7.2 and CT Abd with suspected bowel perforation and free air.  General Surgery consulted and taken to OR this AM revealing SB perf with 3 L feculent fluid and food in cavity and large obstructing cecal mass with perf ileum and palpable liver mass.  Had Expl Lap with washout and excision of SB with wound vac placement admitted post op to ICU intubated on mech ventilation.  Received 2 units PRBCs in ED and another 2 units in OR also on Levophed and Vasopressin infusions.  Before surgery patient was insistent he be DNR post op but consented to surgery and invasive mech ventilation but no ACLS post op in event of cardiac arrest and no prolonged mech ventilation. Reportedly patient does not routinely follow with practitioner as outpt.       Hospital/ICU Course:  5/4 - Admitted to ICU sedated and intubated on Levophed and Vasopressin infusions in no distress  5/5 - remains intubated and sedated on mechanical vent and pressor support; scant urine output overnight and creatinine rise to 2.2 with fluid balance +8.9L  5/6 - remains intubated and sedated on mechanical vent with decreasing pressor demand; still oliguric with creatinine up to 3.6, K 5.2; fluid balance +13L; now with bigeminy and cardiac rhythm changes, K  stable on abg but iCa 0.78  5/7 - remains intubated and sedated with open abdomen to abthera wound vac and pressor support; overnight atrial fib with RVR, now on amio infusion with SR 68 on monitor; plan to OR for washout and vac replacement today  5/8 - remains intubated, sedated with ab thera wound vac to open abdomen, mechanical ventilation, and 2 pressor support. SR on monitor, on amiodarone infusion. Tmax 100.2, wbc down at 12.3k, creatinine rising 4.6,urine output scant, K 5.1  5/9 - remains intubated and sedated with ab thera wound vac to open abdomen, mech vent, and 2 pressor support. Remains SR on monitor with amio infusion. Tmax 98.9, wbc down 10.6k, creatinine holding at 4.7, K 4.6 after lasix trial with 1.2L urinary output  5/10 - remains intubated and sedated with ab thera wound vac to open abdomen, mech vent, on pressor support. Atrial fib on monitor, rate 90s with occasional non sustained elevation. Urine output adequate since lasix trial but creatinine, K, BUN unchanged and remains 22L positive fluid status since admit  5/11 - return from OR late this evening after ABD WASHOUT, RT HEMICOLECTOMY, ILEOSTOMY, LIVER BIOPSY, AND ABD WALL CLOSURE. Remains atrial fib 80-100s on monitor on levophed support.  5/12 - semi erect in bed intubated on mech ventilation in no distress still in A-fib rate 113 bpm sedated heavily still on Fentanyl infusion.  Also still on Amiodarone and Levophed infusions.  Receiving TPN.  S/P abd washout and closure yesterday.    5/13 - semi erect in bed intubated on mech ventilation and sedated in no distress on Precedex, Heparin, Amiodarone, Fentanyl and Levophed infusions.  Also on TPN.  BP labile on pressor.  Still in A-Fib rate 113 bpm.  5/14 - semi erect intubated on mech ventilation in no distress sedated on Precedex infusion.  Also still on Levophed, Amiodarone and Heparin infusions with TPN.  BP still labile and still in A-Fib w/ rate controlled.  Pulm edema pink froth today  copious suctioned from OET.   5/15 - still lethargic off sedation and int[facility] ventilation in no distress tolerating SBT but very weak unable to lift head off pillow.  Had N/V TF overnight and NG output to suction 320 ml overnight green bile.  Weaned off Levophed infusion at 0300 hr this AM.  Still on TPN as well as Amiodarone and Heparin infusions.       Review of Systems   Unable to perform ROS: Intubated       Objective:     Vital Signs (Most Recent):  Temp: 98.7 °F (37.1 °C) (05/15/22 0715)  Pulse: (!) 132 (05/15/22 0915)  Resp: (!) 25 (05/15/22 0915)  BP: 112/76 (05/15/22 0539)  SpO2: 100 % (05/15/22 0915)   Vital Signs (24h Range):  Temp:  [97.7 °F (36.5 °C)-98.7 °F (37.1 °C)] 98.7 °F (37.1 °C)  Pulse:  [101-141] 132  Resp:  [20-35] 25  SpO2:  [97 %-100 %] 100 %  BP: (102-154)/(56-80) 112/76  Arterial Line BP: ()/(50-83) 112/64     Weight: 115.8 kg (255 lb 4.7 oz)  Body mass index is 37.7 kg/m².      Intake/Output Summary (Last 24 hours) at 5/15/2022 0939  Last data filed at 5/15/2022 0800  Gross per 24 hour   Intake 2055.72 ml   Output 3675 ml   Net -1619.28 ml       Physical Exam  Vitals and nursing note reviewed.   Constitutional:       General: He is not in acute distress.     Appearance: He is well-developed. He is obese. He is ill-appearing. He is not toxic-appearing or diaphoretic.      Interventions: He is intubated and restrained.   HENT:      Head: Normocephalic and atraumatic.      Nose:        Mouth/Throat:      Mouth: Mucous membranes are moist.   Eyes:      General: No scleral icterus.     Conjunctiva/sclera: Conjunctivae normal.      Pupils: Pupils are equal, round, and reactive to light.   Neck:      Vascular: No JVD.     Cardiovascular:      Rate and Rhythm: Tachycardia present. Rhythm irregular.      Pulses:           Radial pulses are 1+ on the right side and 1+ on the left side.        Dorsalis pedis pulses are 1+ on the right side and 1+ on the left side.      Heart sounds:  Heart sounds are distant.   Pulmonary:      Effort: No tachypnea, accessory muscle usage or respiratory distress. He is intubated.      Breath sounds: Examination of the right-lower field reveals decreased breath sounds. Examination of the left-lower field reveals decreased breath sounds. Decreased breath sounds and rales present.   Chest:      Chest wall: No deformity or tenderness.   Abdominal:      General: Bowel sounds are absent. There is no distension.      Palpations: Abdomen is soft.          Comments: Some loose brown stool in colostomy   Genitourinary:     Testes:         Right: Swelling present.         Left: Swelling present.      Comments: Doherty in place.  Significant scrotal edema  Musculoskeletal:      Cervical back: Neck supple.      Right lower leg: 3+ Pitting Edema present.      Left lower leg: 3+ Pitting Edema present.      Right foot: No deformity.      Left foot: No deformity.   Lymphadenopathy:      Cervical: No cervical adenopathy.   Skin:     General: Skin is warm.      Capillary Refill: Capillary refill takes 2 to 3 seconds.          Neurological:      Comments: Still somnolent on SAT.  Follows simple one-step commands but not consistent and very weak       Vents:  Vent Mode: Spont (05/15/22 0752)  Ventilator Initiated: Yes (05/04/22 1258)  Set Rate: 0 BPM (05/15/22 0752)  Vt Set: 450 mL (05/15/22 0752)  Pressure Support: 8 cmH20 (05/15/22 0752)  PEEP/CPAP: 5 cmH20 (05/15/22 0752)  Oxygen Concentration (%): 35 (05/15/22 0915)  Peak Airway Pressure: 13 cmH2O (05/15/22 0752)  Plateau Pressure: 17 cmH20 (05/15/22 0752)  Total Ve: 10.6 mL (05/15/22 0752)  F/VT Ratio<105 (RSBI): (!) 50.21 (05/15/22 0528)    Lines/Drains/Airways       Central Venous Catheter Line  Duration             Percutaneous Central Line Insertion/Assessment - Triple Lumen  05/04/22 1200 right internal jugular 10 days              Drain  Duration                  Urethral Catheter 05/04/22 1105 Straight-tip;Silicone 16 Fr.  10 days         Ileostomy 05/11/22 Standard (christian Moyer) RUQ 4 days         Closed/Suction Drain 05/11/22 1735 LUQ Bulb 19 Fr. 3 days         Closed/Suction Drain 05/11/22 1735 RLQ Bulb 19 Fr. 3 days         NG/OG Tube 05/11/22 1715 Capron sump 18 Fr. Left nostril 3 days              Airway  Duration                  Airway - Non-Surgical 05/04/22 1051 Endotracheal Tube 10 days              Arterial Line  Duration             Arterial Line 05/07/22 1330 Right Radial 7 days              Peripheral Intravenous Line  Duration                  Midline Catheter Insertion/Assessment  - Single Lumen 05/12/22 1730 Left basilic vein (medial side of arm) 2 days                    Significant Labs:    CBC/Anemia Profile:  Recent Labs   Lab 05/14/22  0439 05/14/22  1625 05/15/22  0422   WBC 13.93* 13.47* 11.97   HGB 7.8* 7.7* 7.7*   HCT 25.5* 25.1* 25.2*   PLT 92* 130* 152   MCV 71* 71* 71*   RDW 26.5* 27.0* 26.9*        Chemistries:  Recent Labs   Lab 05/14/22  0439 05/14/22  1243 05/15/22  0422    143 144   K 3.0* 3.5 3.1*   CL 98 99 100   CO2 27 27 31*   * 102* 104*   CREATININE 3.4* 3.2* 3.1*   CALCIUM 7.4* 7.5* 7.8*   ALBUMIN 1.1*  --  1.3*   PROT 4.3*  --  4.5*   BILITOT 0.8  --  1.0   ALKPHOS 114  --  141*   ALT 82*  --  74*   AST 57*  --  84*   MG 1.9  --  1.8   PHOS 5.5*  --  4.2       ABGs:   Recent Labs   Lab 05/15/22  0459   PH 7.490*   PCO2 44.7   HCO3 34.1*   POCSATURATED 99   BE 11     Coagulation:   Recent Labs   Lab 05/15/22  0422   APTT 38.7*     POCT Glucose:   Recent Labs   Lab 05/15/22  0024 05/15/22  0432 05/15/22  0722   POCTGLUCOSE 154* 140* 140*     All pertinent labs within the past 24 hours have been reviewed.        ABG  Recent Labs   Lab 05/15/22  0459   PH 7.490*   PO2 114*   PCO2 44.7   HCO3 34.1*   BE 11     Assessment/Plan:     Pulmonary  Acute hypoxemic respiratory failure on mechanical vent  Vent Day # 12  Vent settings reviewed and adjusted to optimize gas exchange  VAP  prophylaxis  ABG daily and prn to assess response to therapy  Off sedation and tolerating SBT but still very weak unable to lift head off pillow.  FVC 1.5 L w/ cough and best NIF -21 cm H2O.  Concern with ability to protect airway with extubation given N/V.    Cardiac/Vascular  Atrial fibrillation with RVR  New onset 5/6  on amiodarone infusion and IVP Lopressor  Continue cardiac monitoring  Cont Heparin infusion till cleared for enteral route OAC    Renal/  JOSE (acute kidney injury)  S/t septic shock  Adequate volume resuscitation +18.9 L I/O balance  Avoid nephrotoxins  Strict I/O  No acute indication for dialysis but high potential for continued decline, daughter([email or web address]) will not pursue dialysis if decline per her father's wishes  Nephrology following  Fair UOP  Lasix/Albumin for anasarca/pulm edema X 4 doses    ID  * Septic shock sec to Peritonitis from SB perforation  Secondary to Peritonitis from bowel perforation with major fecal contamination  Blood and sputum cultures 5/4 Neg  S/P Zyvox  Continue Invanz and Micafungin per ID following  Weaned off Levophed infusion at 0300 hr this AM  ICU hemodynamic monitoring  Follow fever curve and WBC    Oncology  Acute on chronic anemia  Received 2 units PRBCs in ED and 2 more in OR on 5/4  CBC daily  Conservative transfusion protocol  Monitor closely with Heparin infusion for A-fib     Endocrine  Obesity (BMI 30.0-34.9)  Will encourage weight loss once/if survives and extubated and fully awake/alert    GI  Mass of colon  Found on exploration along with palpable liver mass  Sent for pathology 5/11 results still pending  High concern for metastatic malignancy    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  Abd film pending    Palliative Care  Pt is DNR, discussed at length with surgeon prior to surgery and he consented to intubation for surgery with understanding of likely need for prolonged vent support post op until bowel/abdomen closed. He was clear that he would not desire long term vent support past that necessary for immediate post op recovery.   Daughter Claudia is SDM and is aware and supportive of his wishes. IF at any point his survival chances become poor or prolonged life support is anticipated she would honor his wish and transition to comfort focused care.  5/6 - discussed status with daughter. She expressed again understanding of her father's wishes for very limited life support and further stated today that after time to reflect on current status, in the event of continued decline she would not want to pursue potential dialysis but if kidneys fail and indications for RRT exist she would at that time desire transition to a full comfort care focus. She would hope that he could be extubated to comfort focus and have opportunity to interact with family but verbalizes understanding that this may not be possible given open abdomen and comfort goal.  Continue daily and prn updates        Preventive Measures and Monitoring:   Stress Ulcer: Pepcid  Nutrition: TPN   Glucose control: SSI  Bowel prophylaxis: S/P colon surgery  DVT prophylaxis: Heparin infusion  Hx CAD on B-Blocker: no hx CAD  Head of Bed/Reposition: Elevate HOB and turn Q1-2 hours   Early Mobility: bed rest  SBT: SBT tolerating fair but still very weak and lethargic  RASS goal: 0  Vent Day: #12  NG Day: #5  Central Line Right IJ Day: #12  Doherty Day: #12  IVAB Day: #12  Code Status: DNR      Counseling/Consultation:I have discussed the  care of this patient in detail with the bedside nursing staff and Dr. Whelan and Dr. Joseph    Patient assessed.  Soft bilat wrist restraints renewed due to risk of pulling lines, tubes and/or climbing OOB.    Critical Care Time: 52 minutes  Critical secondary to Patient has a condition that poses threat to life and bodily function: Acute Renal Failure and Septic Shock and Acute Hypoxic Resp Failure intubated on mech ventilation  Patient is currently on drug therapy requiring intensive monitoring for toxicity: Amiodarone, and Heparin infusions     Critical care was time spent personally by me on the following activities: development of treatment plan with patient or surrogate and bedside caregivers, discussions with consultants, evaluation of patient's response to treatment, examination of patient, ordering and performing treatments and interventions, ordering and review of laboratory studies, ordering and review of radiographic studies, pulse oximetry, re-evaluation of patient's condition. This critical care time did not overlap with that of any other provider or involve time for any procedures.     Rai Hernandez NP  Critical Care Medicine  Formerly [facility] - Intensive Care [facility])

## 2022-05-15 NOTE — PROGRESS NOTES
[facility] - Intensive Care ([facility])  General Surgery  Progress Note    Subjective:     History of Present Illness:  No notes on file    Post-Op Info:  Procedure(s) (LRB):  CREATION, ILEOSTOMY (N/A)  HEMICOLECTOMY, RIGHT (N/A)  BIOPSY, LIVER (Right)   4 Days Post-Op     Interval History:  Patient on spontaneous breathing trial, awake and following commands but weak, NG tube placed to suction due to intolerance of tube feeds, minimal ostomy output    Medications:  Continuous Infusions:   amiodarone in dextrose 5% 0.5 mg/min (05/15/22 1100)    dextrose 10 % in water (D10W)      heparin (porcine) in D5W 16 Units/kg/hr (05/15/22 1100)    NORepinephrine bitartrate-D5W Stopped (05/15/22 0212)    TPN ADULT CENTRAL LINE CUSTOM 36 mL/hr at 05/15/22 1100     Scheduled Meds:   furosemide (LASIX) injection  40 mg Intravenous Q12H    And    albumin human 25%  12.5 g Intravenous BID    chlorhexidine  15 mL Mouth/Throat BID    ertapenem (INVANZ) IVPB  500 mg Intravenous Q24H    famotidine (PF)  20 mg Intravenous Daily    metoprolol  2.5 mg Intravenous Q4H    micafungin (MYCAMINE) IVPB  100 mg Intravenous Q24H    white petrolatum-mineral oil 57.3-42.5%   Both Eyes QHS     PRN Meds:sodium chloride, sodium chloride 0.9%, acetaminophen, dextrose 10 % in water (D10W), dextrose 10%, fentaNYL, glucagon (human recombinant), heparin (PORCINE), heparin (PORCINE), insulin aspart U-100, lorazepam, ondansetron     Review of patient's allergies indicates:  No Known Allergies  Objective:     Vital Signs (Most Recent):  Temp: 98.7 °F (37.1 °C) (05/15/22 0715)  Pulse: (!) 117 (05/15/22 1100)  Resp: (!) 23 (05/15/22 1100)  BP: 124/70 (05/15/22 0945)  SpO2: 100 % (05/15/22 1100) Vital Signs (24h Range):  Temp:  [97.7 °F (36.5 °C)-98.7 °F (37.1 °C)] 98.7 °F (37.1 °C)  Pulse:  [101-141] 117  Resp:  [20-35] 23  SpO2:  [97 %-100 %] 100 %  BP: (102-154)/(56-80) 124/70  Arterial Line BP: ()/(50-83) 112/59     Weight: 115.8 kg (255 lb 4.7  oz)  Body mass index is 37.7 kg/m².    Intake/Output - Last 3 Shifts         05/13 0700 05/14 0659 05/14 0700  05/15 0659 05/15 0700 05/16 0659    I.V. (mL/kg) 988.3 (8.5) 892.7 (7.7) 216.5 (1.9)    NG/ 90     IV Piggyback 296.4 497.7 111.5    .7 849.8 179.8    Total Intake(mL/kg) 2296.3 (19.8) 2330.2 (20.1) 507.7 (4.4)    Urine (mL/kg/hr) 2074 (0.7) 2670 (1) 725 (1.4)    Drains 400 820 30    Stool 125 125     Total Output 2599 3615 755    Net -302.7 -1284.8 -247.3           Stool Occurrence 1 x 1 x             Physical Exam  Vitals and nursing note reviewed.   Constitutional:       Appearance: He is obese. He is ill-appearing.   Cardiovascular:      Rate and Rhythm: Rhythm irregular.   Pulmonary:      Comments: Mechanical breath sounds  Abdominal:      Palpations: Abdomen is soft.      Comments: Midline incision with staples and packing in place. Right-sided end ileostomy is red with some liquid stool output. ADDIS drains are both serous.   Genitourinary:     Comments: Doherty in place  Musculoskeletal:      Right lower leg: Edema present.      Left lower leg: Edema present.   Neurological:      Comments: Arousable follows commands but weak       Significant Labs:  I have reviewed all pertinent lab results within the past 24 hours.  CBC:   Recent Labs   Lab 05/15/22  0422   WBC 11.97   RBC 3.55*   HGB 7.7*   HCT 25.2*      MCV 71*   MCH 21.7*   MCHC 30.6*       CMP:   Recent Labs   Lab 05/15/22  0422   *   CALCIUM 7.8*   ALBUMIN 1.3*   PROT 4.5*      K 3.1*   CO2 31*      *   CREATININE 3.1*   ALKPHOS 141*   ALT 74*   AST 84*   BILITOT 1.0       ABGs:   Recent Labs   Lab 05/15/22  0456   PH 7.490*   PCO2 44.7   PO2 114*   HCO3 34.1*   POCSATURATED 99   BE 11         Significant Diagnostics:  I have reviewed all pertinent imaging results/findings within the past 24 hours.    Assessment/Plan:     Atrial fibrillation with RVR  Management per medicine/critical care    JOSE  (acute kidney injury)  Management per medicine/critical care    On mechanically assisted ventilation  Management per medicine/critical care    Small bowel perforation with large Cecal mass   S/p exploratory laparotomy, abdominal washout, segmental small bowel resection (left in discontinuity), placement of Abthera vac 5/4/22  S/p reopening of recent laparotomy, abdominal washout, replacement of Abthera vac 5/7/22  S/p reopening of recent laparotomy, abdominal washout, right hemicolectomy, liver biopsy, end ileostomy, TAP block, abdominal wall closure 5/11/22    - did not tolerate tube feeds well, NG tube currently on suction, bowel function likely sluggish due to ileus from inflammation/infection  -discussed CT scan with Gastrografin contrast for further evaluation as 1 ADDIS drain still with grayish purulent output  - Continue ICU management. Currently still requiring vasopressor support. On heparin and amio gtt for afib.  - Ostomy nurse consult  - Strict I/Os  - TPN  - Medical and ICU management per hospital/ICU team    Discussed with ICU nurse and daughter at bedside.    Acute on chronic anemia  Management per medicine/critical care    Acute hypoxemic respiratory failure on mechanical vent  Management per medicine/critical care        Percy Joseph MD  General Surgery  [facility] - [facility]ensive [facility] ([facility])

## 2022-05-15 NOTE — EICU
eICU Physician Virtual/Remote Brief Evaluation Note      Telephone call RN  Prior to giving metoprolol blood pressure dropped to 80 systolic and Levophed restarted  Now labile varying between 97 and 120 systolic  Still heart rate 130s atrial fibrillation  Metoprolol 5 mg canceled  Rebolus amiodarone and raise infusion to 1 milligram/minute      PITO Lomas MD  [facility] Attending  [phone]    This report has been created through the use of M-Modal dictation software. Typographical and content errors may occur with this process. While efforts are made to detect and correct such errors, in some cases errors will persist. For this reason, wording in this document should be considered in the proper context and not strictly verbatim

## 2022-05-15 NOTE — ASSESSMENT & PLAN NOTE
Secondary to Peritonitis from bowel perforation with major fecal contamination  Blood and sputum cultures 5/4 Neg  S/P Zyvox  Continue Invanz and Micafungin per ID following  Weaned off Levophed infusion at 0300 hr this AM  ICU hemodynamic monitoring  Follow fever curve and WBC

## 2022-05-15 NOTE — ASSESSMENT & PLAN NOTE
5/4 Expl Lap and SB excision with washout and AB thera wound vac for bowel left in discontinuity  5/7 washout and wound vac replacement  hx Diverticulosis but cecal mass and suspected metastatic lesions found in exploration  5/11 ABD WASHOUT, RT HEMICOLECTOMY, ILEOSTOMY, LIVER BIOPSY, AND ABD WALL CLOSURE  IVAB for peritonitis/sepsis  Cont TPN and trickle TF stopped duet to N/V overnight  Pathology still pending  Surgery following, possible Ileus given N/V and NG output.  Abd film pending

## 2022-05-15 NOTE — SUBJECTIVE & OBJECTIVE
Review of Systems   Unable to perform ROS: Intubated       Objective:     Vital Signs (Most Recent):  Temp: 98.7 °F (37.1 °C) (05/15/22 0715)  Pulse: (!) 132 (05/15/22 0915)  Resp: (!) 25 (05/15/22 0915)  BP: 112/76 (05/15/22 0539)  SpO2: 100 % (05/15/22 0915)   Vital Signs (24h Range):  Temp:  [97.7 °F (36.5 °C)-98.7 °F (37.1 °C)] 98.7 °F (37.1 °C)  Pulse:  [101-141] 132  Resp:  [20-35] 25  SpO2:  [97 %-100 %] 100 %  BP: (102-154)/(56-80) 112/76  Arterial Line BP: ()/(50-83) 112/64     Weight: 115.8 kg (255 lb 4.7 oz)  Body mass index is 37.7 kg/m².      Intake/Output Summary (Last 24 hours) at 5/15/2022 0939  Last data filed at 5/15/2022 0800  Gross per 24 hour   Intake 2055.72 ml   Output 3675 ml   Net -1619.28 ml       Physical Exam  Vitals and nursing note reviewed.   Constitutional:       General: He is not in acute distress.     Appearance: He is well-developed. He is obese. He is ill-appearing. He is not toxic-appearing or diaphoretic.      Interventions: He is intubated and restrained.   HENT:      Head: Normocephalic and atraumatic.      Nose:        Mouth/Throat:      Mouth: Mucous membranes are moist.   Eyes:      General: No scleral icterus.     Conjunctiva/sclera: Conjunctivae normal.      Pupils: Pupils are equal, round, and reactive to light.   Neck:      Vascular: No JVD.     Cardiovascular:      Rate and Rhythm: Tachycardia present. Rhythm irregular.      Pulses:           Radial pulses are 1+ on the right side and 1+ on the left side.        Dorsalis pedis pulses are 1+ on the right side and 1+ on the left side.      Heart sounds: Heart sounds are distant.   Pulmonary:      Effort: No tachypnea, accessory muscle usage or respiratory distress. He is intubated.      Breath sounds: Examination of the right-lower field reveals decreased breath sounds. Examination of the left-lower field reveals decreased breath sounds. Decreased breath sounds and rales present.   Chest:      Chest wall: No  deformity or tenderness.   Abdominal:      General: Bowel sounds are absent. There is no distension.      Palpations: Abdomen is soft.          Comments: Some loose brown stool in colostomy   Genitourinary:     Testes:         Right: Swelling present.         Left: Swelling present.      Comments: Doherty in place.  Significant scrotal edema  Musculoskeletal:      Cervical back: Neck supple.      Right lower leg: 3+ Pitting Edema present.      Left lower leg: 3+ Pitting Edema present.      Right foot: No deformity.      Left foot: No deformity.   Lymphadenopathy:      Cervical: No cervical adenopathy.   Skin:     General: Skin is warm.      Capillary Refill: Capillary refill takes 2 to 3 seconds.          Neurological:      Comments: Still somnolent on SAT.  Follows simple one-step commands but not consistent and very weak       Vents:  Vent Mode: Spont (05/15/22 0752)  Ventilator Initiated: Yes (05/04/22 1258)  Set Rate: 0 BPM (05/15/22 0752)  Vt Set: 450 mL (05/15/22 0752)  Pressure Support: 8 cmH20 (05/15/22 0752)  PEEP/CPAP: 5 cmH20 (05/15/22 0752)  Oxygen Concentration (%): 35 (05/15/22 0915)  Peak Airway Pressure: 13 cmH2O (05/15/22 0752)  Plateau Pressure: 17 cmH20 (05/15/22 0752)  Total Ve: 10.6 mL (05/15/22 0752)  F/VT Ratio<105 (RSBI): (!) 50.21 (05/15/22 0528)    Lines/Drains/Airways       Central Venous Catheter Line  Duration             Percutaneous Central Line Insertion/Assessment - Triple Lumen  05/04/22 1200 right internal jugular 10 days              Drain  Duration                  Urethral Catheter 05/04/22 1105 Straight-tip;Silicone 16 Fr. 10 days         Ileostomy 05/11/22 Standard (Comfort, christian) RUQ 4 days         Closed/Suction Drain 05/11/22 1735 LUQ Bulb 19 Fr. 3 days         Closed/Suction Drain 05/11/22 1735 RLQ Bulb 19 Fr. 3 days         NG/OG Tube 05/11/22 1715 Lyon sump 18 Fr. Left nostril 3 days              Airway  Duration                  Airway - Non-Surgical 05/04/22 1051  Endotracheal Tube 10 days              Arterial Line  Duration             Arterial Line 05/07/22 1330 Right Radial 7 days              Peripheral Intravenous Line  Duration                  Midline Catheter Insertion/Assessment  - Single Lumen 05/12/22 1730 Left basilic vein (medial side of arm) 2 days                    Significant Labs:    CBC/Anemia Profile:  Recent Labs   Lab 05/14/22 0439 05/14/22  1625 05/15/22  0422   WBC 13.93* 13.47* 11.97   HGB 7.8* 7.7* 7.7*   HCT 25.5* 25.1* 25.2*   PLT 92* 130* 152   MCV 71* 71* 71*   RDW 26.5* 27.0* 26.9*        Chemistries:  Recent Labs   Lab 05/14/22 0439 05/14/22  1243 05/15/22  0422    143 144   K 3.0* 3.5 3.1*   CL 98 99 100   CO2 27 27 31*   * 102* 104*   CREATININE 3.4* 3.2* 3.1*   CALCIUM 7.4* 7.5* 7.8*   ALBUMIN 1.1*  --  1.3*   PROT 4.3*  --  4.5*   BILITOT 0.8  --  1.0   ALKPHOS 114  --  141*   ALT 82*  --  74*   AST 57*  --  84*   MG 1.9  --  1.8   PHOS 5.5*  --  4.2       ABGs:   Recent Labs   Lab 05/15/22  0459   PH 7.490*   PCO2 44.7   HCO3 34.1*   POCSATURATED 99   BE 11     Coagulation:   Recent Labs   Lab 05/15/22  0422   APTT 38.7*     POCT Glucose:   Recent Labs   Lab 05/15/22  0024 05/15/22  0432 05/15/22  0722   POCTGLUCOSE 154* 140* 140*     All pertinent labs within the past 24 hours have been reviewed.

## 2022-05-15 NOTE — ASSESSMENT & PLAN NOTE
Patient with Hypoxic Respiratory failure which is Acute.  he is not on home oxygen. Supplemental oxygen was provided and noted- Vent Mode: Spont  Oxygen Concentration (%):  [30-50] 35  Resp Rate Total:  [20 br/min-35 br/min] 25 br/min  Vt Set:  [450 mL] 450 mL  PEEP/CPAP:  [5 cmH20] 5 cmH20  Pressure Support:  [0 cmH20-8 cmH20] 8 cmH20  Mean Airway Pressure:  [8.3 kkB78-80 cmH20] 8.4 cmH20.   Signs/symptoms of respiratory failure include- tachypnea. Contributing diagnoses includes - Obesity Hypoventilation Labs and images were reviewed. Patient Has recent ABG, which has been reviewed. Will treat underlying causes and adjust management of respiratory failure as indicated. Pulmonary CC on board  Day 2 on Vent- remains intubated on vent  Cont vent support    Day 4 on Vent    Day 5 on vent- adequate O2, sats    Day 6- continue supportive care, await family's decision about comfort care    Day 7- continue support- trying to diurese     Day 8- minimal vent support- start weaning soon    15 May:  SAT/SBT as tolerated

## 2022-05-15 NOTE — ASSESSMENT & PLAN NOTE
New onset 5/6  on amiodarone infusion and IVP Lopressor  Continue cardiac monitoring  Cont Heparin infusion till cleared for enteral route OAC

## 2022-05-15 NOTE — SUBJECTIVE & OBJECTIVE
Interval History:  Patient on spontaneous breathing trial, awake and following commands but weak, NG tube placed to suction due to intolerance of tube feeds, minimal ostomy output    Medications:  Continuous Infusions:   amiodarone in dextrose 5% 0.5 mg/min (05/15/22 1100)    dextrose 10 % in water (D10W)      heparin (porcine) in D5W 16 Units/kg/hr (05/15/22 1100)    NORepinephrine bitartrate-D5W Stopped (05/15/22 0212)    TPN ADULT CENTRAL LINE CUSTOM 36 mL/hr at 05/15/22 1100     Scheduled Meds:   furosemide (LASIX) injection  40 mg Intravenous Q12H    And    albumin human 25%  12.5 g Intravenous BID    chlorhexidine  15 mL Mouth/Throat BID    ertapenem (INVANZ) IVPB  500 mg Intravenous Q24H    famotidine (PF)  20 mg Intravenous Daily    metoprolol  2.5 mg Intravenous Q4H    micafungin (MYCAMINE) IVPB  100 mg Intravenous Q24H    white petrolatum-mineral oil 57.3-42.5%   Both Eyes QHS     PRN Meds:sodium chloride, sodium chloride 0.9%, acetaminophen, dextrose 10 % in water (D10W), dextrose 10%, fentaNYL, glucagon (human recombinant), heparin (PORCINE), heparin (PORCINE), insulin aspart U-100, lorazepam, ondansetron     Review of patient's allergies indicates:  No Known Allergies  Objective:     Vital Signs (Most Recent):  Temp: 98.7 °F (37.1 °C) (05/15/22 0715)  Pulse: (!) 117 (05/15/22 1100)  Resp: (!) 23 (05/15/22 1100)  BP: 124/70 (05/15/22 0945)  SpO2: 100 % (05/15/22 1100) Vital Signs (24h Range):  Temp:  [97.7 °F (36.5 °C)-98.7 °F (37.1 °C)] 98.7 °F (37.1 °C)  Pulse:  [101-141] 117  Resp:  [20-35] 23  SpO2:  [97 %-100 %] 100 %  BP: (102-154)/(56-80) 124/70  Arterial Line BP: ()/(50-83) 112/59     Weight: 115.8 kg (255 lb 4.7 oz)  Body mass index is 37.7 kg/m².    Intake/Output - Last 3 Shifts         05/13 0700 05/14 0659 05/14 0700  05/15 0659 05/15 0700  05/16 0659    I.V. (mL/kg) 988.3 (8.5) 892.7 (7.7) 216.5 (1.9)    NG/ 90     IV Piggyback 296.4 497.7 111.5    .7 849.8 179.8    Total  Intake(mL/kg) 2296.3 (19.8) 2330.2 (20.1) 507.7 (4.4)    Urine (mL/kg/hr) 2074 (0.7) 2670 (1) 725 (1.4)    Drains 400 820 30    Stool 125 125     Total Output 2599 3615 755    Net -302.7 -1284.8 -247.3           Stool Occurrence 1 x 1 x             Physical Exam  Vitals and nursing note reviewed.   Constitutional:       Appearance: He is obese. He is ill-appearing.   Cardiovascular:      Rate and Rhythm: Rhythm irregular.   Pulmonary:      Comments: Mechanical breath sounds  Abdominal:      Palpations: Abdomen is soft.      Comments: Midline incision with staples and packing in place. Right-sided end ileostomy is red with some liquid stool output. ADDIS drains are both serous.   Genitourinary:     Comments: Doherty in place  Musculoskeletal:      Right lower leg: Edema present.      Left lower leg: Edema present.   Neurological:      Comments: Arousable follows commands but weak       Significant Labs:  I have reviewed all pertinent lab results within the past 24 hours.  CBC:   Recent Labs   Lab 05/15/22  0422   WBC 11.97   RBC 3.55*   HGB 7.7*   HCT 25.2*      MCV 71*   MCH 21.7*   MCHC 30.6*       CMP:   Recent Labs   Lab 05/15/22  0422   *   CALCIUM 7.8*   ALBUMIN 1.3*   PROT 4.5*      K 3.1*   CO2 31*      *   CREATININE 3.1*   ALKPHOS 141*   ALT 74*   AST 84*   BILITOT 1.0       ABGs:   Recent Labs   Lab 05/15/22  0455   PH 7.490*   PCO2 44.7   PO2 114*   HCO3 34.1*   POCSATURATED 99   BE 11         Significant Diagnostics:  I have reviewed all pertinent imaging results/findings within the past 24 hours.

## 2022-05-15 NOTE — PLAN OF CARE
Pt drowsy. Does follow simple commands. Afib on cardiac monitor. Hemodynamically stable off pressors. Afebrile. SBT during shift with no sign of respiratory distress. Tube feed held per pt vomiting. NGT to low wall suction with 800mL output during shift. GI team notified. Plan for CT of abdomen. Wound care and dressing change done. Turned q2 on wedge, Heel boots in place. Doherty in place with good urine output. Family updated at bedside. All alarms active and audible. Emergency equipment remains at bedside. Will continue to monitor.   Problem: Adult Inpatient Plan of Care  Goal: Plan of Care Review  Outcome: Ongoing, Progressing     Problem: Adult Inpatient Plan of Care  Goal: Readiness for Transition of Care  Outcome: Ongoing, Progressing     Problem: Infection  Goal: Absence of Infection Signs and Symptoms  Outcome: Ongoing, Progressing     Problem: Adjustment to Illness (Sepsis/Septic Shock)  Goal: Optimal Coping  Outcome: Ongoing, Progressing

## 2022-05-15 NOTE — EICU
eICU Physician Virtual/Remote Brief Evaluation Note      Telephone call RN  Heart rate 140s to 150s  Patient not agitated  On amiodarone drip at 0.5 mg per hour  He has been tolerating ventilator on pressure support all day and is usually put back on supportive ventilation at night but when this happens he tends to become agitated  Had been on norepinephrine but it is now off  Chart reviewed, patient observed, discussed with RN  /73, P 141-AFib, RR 25, O2 sat 98% on pressure support 8 over 5, 30 % FiO2  Sedated and resting comfortably on ventilator  On metoprolol 2.5 mg IV q.4h  Will attempt to get off amiodarone  Metoprolol 5 mg IV Q 15 minutes x3 p.r.n. for SBP greater than 120  If he becomes hypotensive or heart rate does not come down will half to rebolus kadie Lomas MD  [facility] Attending  [phone]    This report has been created through the use of M-Modal dictation software. Typographical and content errors may occur with this process. While efforts are made to detect and correct such errors, in some cases errors will persist. For this reason, wording in this document should be considered in the proper context and not strictly verbatim

## 2022-05-15 NOTE — ASSESSMENT & PLAN NOTE
S/t septic shock  Adequate volume resuscitation +18.9 L I/O balance  Avoid nephrotoxins  Strict I/O  No acute indication for dialysis but high potential for continued decline, daughter(HCPOA) will not pursue dialysis if decline per her father's wishes  Nephrology following  Fair UOP  Lasix/Albumin for anasarca/pulm edema X 4 doses

## 2022-05-15 NOTE — SUBJECTIVE & OBJECTIVE
Interval History: Awakening trials good and breathing adequately on spontaneous.  Following commands but very weak.  Unable to lift his head off the pillow.  Also suctioning thick secretions through ET tube.  Not ready for extubation.  Continuing antibiotics and coordinating care with General Surgery and Critical Care Medicine.    Review of Systems   Unable to perform ROS: Intubated   Objective:     Vital Signs (Most Recent):  Temp: 98.8 °F (37.1 °C) (05/15/22 1600)  Pulse: (!) 138 (05/15/22 1815)  Resp: (!) 25 (05/15/22 1815)  BP: 136/80 (05/15/22 1710)  SpO2: 100 % (05/15/22 1815)   Vital Signs (24h Range):  Temp:  [97.7 °F (36.5 °C)-98.8 °F (37.1 °C)] 98.8 °F (37.1 °C)  Pulse:  [108-141] 138  Resp:  [18-35] 25  SpO2:  [95 %-100 %] 100 %  BP: (102-154)/(56-80) 136/80  Arterial Line BP: ()/(55-81) 95/64     Weight: 115.8 kg (255 lb 4.7 oz)  Body mass index is 37.7 kg/m².    Intake/Output Summary (Last 24 hours) at 5/15/2022 1836  Last data filed at 5/15/2022 1800  Gross per 24 hour   Intake 2275.39 ml   Output 4870 ml   Net -2594.61 ml        Physical Exam  Vitals and nursing note reviewed.   Constitutional:       General: He is not in acute distress.     Appearance: He is well-developed. He is obese. He is ill-appearing. He is not toxic-appearing or diaphoretic.      Interventions: He is sedated, intubated and restrained.   HENT:      Head: Atraumatic.      Nose:        Mouth/Throat:      Mouth: Mucous membranes are moist.   Eyes:      General: No scleral icterus.     Conjunctiva/sclera: Conjunctivae normal.      Pupils: Pupils are equal, round, and reactive to light.   Neck:      Vascular: No JVD.     Cardiovascular:      Rate and Rhythm: Tachycardia present. Rhythm irregular.      Pulses:           Radial pulses are 1+ on the right side and 1+ on the left side.        Dorsalis pedis pulses are 1+ on the right side and 1+ on the left side.      Heart sounds: Heart sounds are distant.   Pulmonary:       Effort: No accessory muscle usage or respiratory distress. He is intubated.      Breath sounds: Decreased breath sounds present. No wheezing or rhonchi.   Chest:      Chest wall: No deformity or tenderness.   Abdominal:      General: Bowel sounds are absent. There is no distension.       Genitourinary:     Comments: Doherty in place  Musculoskeletal:      Cervical back: Neck supple.      Right lower leg: 3+ Pitting Edema present.      Left lower leg: 3+ Pitting Edema present.      Right foot: No deformity.      Left foot: No deformity.   Lymphadenopathy:      Cervical: No cervical adenopathy.   Skin:     General: Skin is warm.      Capillary Refill: Capillary refill takes 2 to 3 seconds.          Neurological:      Comments: Heavily sedated     Flow (L/min): 4  Vent Mode: Spont  Oxygen Concentration (%):  [30-50] 35  Resp Rate Total:  [20 br/min-35 br/min] 25 br/min  Vt Set:  [450 mL] 450 mL  PEEP/CPAP:  [5 cmH20] 5 cmH20  Pressure Support:  [0 cmH20-8 cmH20] 8 cmH20  Mean Airway Pressure:  [8.3 qaD69-73 cmH20] 8.4 cmH20  Temp:  [97.7 °F (36.5 °C)-98.8 °F (37.1 °C)] 98.8 °F (37.1 °C)  Pulse:  [108-141] 138  Resp:  [18-35] 25  SpO2:  [95 %-100 %] 100 %  BP: (102-154)/(56-80) 136/80  Arterial Line BP: ()/(55-81) 95/64   Art pH/pCO2/pO2/HCO3:  7.490/44.7/114/34.1 (05/15 0459)  Lab Results   Component Value Date    [id number] Negative 05/11/2022    [id number] Negative 05/04/2022    [id number] Positive (A) 08/05/2021        Recent Labs   Lab 05/10/22  0406 05/11/22  0416 05/13/22  0350 05/14/22  0439 05/14/22  1243 05/14/22  1625 05/15/22  0422   LACTATE 3.5*  --   --   --   --   --   --    *   < > 138 141 143  --  144   WBC 13.35*   < > 16.29* 13.93*  --  13.47* 11.97   GRAN 87.8*  11.7*   < > 89.9*  14.7* 85.3*  11.9*  --  83.3*  11.2* 79.4*  9.5*   LYMPH 6.3*  0.8*   < > 4.2*  0.7* 5.8*  0.8*  --  7.4*  1.0 8.0*  1.0   HGB 8.6*   < > 8.2* 7.8*  --  7.7* 7.7*   HCT 28.0*   < > 27.3* 25.5*  --   25.1* 25.2*   BUN 87*   < > 107* 111* 102*  --  104*   CREATININE 4.8*   < > 4.0* 3.4* 3.2*  --  3.1*   ESTGFRAFRICA 13*   < > 17* 20* 22*  --  23*   EGFRNONAA 12*   < > 14* 18* 19*  --  20*   K 5.0   < > 3.4* 3.0* 3.5  --  3.1*   CL 90*   < > 95 98 99  --  100   CO2 19*   < > 25 27 27  --  31*   PHOS  --    < > 8.1* 5.5*  --   --  4.2   MG 2.0   < > 2.0 1.9  --   --  1.8    < > = values in this interval not displayed.       Microbiology Results (last 7 days)       Procedure Component Value Units Date/Time    Blood culture [[id number]] Collected: 05/04/22 1428    Order Status: Completed Specimen: Blood from Line, Arterial, Left Updated: 05/10/22 0612     Blood Culture, Routine No growth after 5 days.    Blood culture [[id number]] Collected: 05/04/22 1429    Order Status: Completed Specimen: Blood from Line, Jugular, Internal Right Updated: 05/10/22 0612     Blood Culture, Routine No growth after 5 days.          Antibiotics (From admission, onward)                Start     Stop Route Frequency Ordered    05/09/22 2200  ertapenem (INVANZ) 500 mg in sodium chloride 0.9% 100 mL IVPB         05/19 2359 IV Every 24 hours (non-standard times) 05/09/22 1624          Anticoagulants       Ordered     Route Frequency Start Stop    05/12/22 1408  heparin (PORCINE)  (LOW INTENSITY heparin infusion nomogram - [facility] (Recommended Indications: Acute Coronary Syndrome, Atrial Fibrillation, Prophylaxis in Prosthetic Heart Valves, and other indication where full anticoagulation is desired, bleeding risk is moderate, antiplatelet agents have been utilized, and time to therapeutic can be delayed minimizing the increased bleeding risk))         IV As needed (PRN) 05/12/22 1508 --    05/12/22 1408  heparin (PORCINE)  (LOW INTENSITY heparin infusion nomogram - [facility] (Recommended Indications: Acute Coronary Syndrome, Atrial Fibrillation, Prophylaxis in Prosthetic Heart Valves, and other indication where full anticoagulation is desired, bleeding  risk is moderate, antiplatelet agents have been utilized, and time to therapeutic can be delayed minimizing the increased bleeding risk))         IV As needed (PRN) 05/12/22 1508 --    05/12/22 1408  heparin (porcine) in D5W  (LOW INTENSITY heparin infusion nomogram - [facility] (Recommended Indications: Acute Coronary Syndrome, Atrial Fibrillation, Prophylaxis in Prosthetic Heart Valves, and other indication where full anticoagulation is desired, bleeding risk is moderate, antiplatelet agents have been utilized, and time to therapeutic can be delayed minimizing the increased bleeding risk))         IV Continuous 05/12/22 1415 --          X-Ray Abdomen AP 1 View  Narrative: EXAMINATION:  XR ABDOMEN AP 1 VIEW    CLINICAL HISTORY:  Vomiting.;    COMPARISON:  05/04/2022    FINDINGS:  There has been interval placement of an NG tube.  Its tip is in the stomach.  There has been interval placement of surgical skin staples projected over the left side of the abdomen and pelvis.  There is no dilated loop of bowel visualized.  The bony structures appear intact.  Impression: 1. There has been interval placement of an NG tube. Its tip is in the stomach.  There is no dilated loop of bowel visualized.  2. There has been interval placement of surgical skin staples projected over the left side of the abdomen and pelvis.    Electronically signed by: Triston Cabrera MD  Date:    05/15/2022  Time:    10:26   Significant Labs: All pertinent labs within the past 24 hours have been reviewed.    Significant Imaging: I have reviewed all pertinent imaging results/findings within the past 24 hours.

## 2022-05-15 NOTE — ASSESSMENT & PLAN NOTE
S/p exploratory laparotomy, abdominal washout, segmental small bowel resection (left in discontinuity), placement of Abthera vac 5/4/22  S/p reopening of recent laparotomy, abdominal washout, replacement of Abthera vac 5/7/22  S/p reopening of recent laparotomy, abdominal washout, right hemicolectomy, liver biopsy, end ileostomy, TAP block, abdominal wall closure 5/11/22    - did not tolerate tube feeds well, NG tube currently on suction, bowel function likely sluggish due to ileus from inflammation/infection  -discussed CT scan with Gastrografin contrast for further evaluation as 1 ADDIS drain still with grayish purulent output  - Continue ICU management. Currently still requiring vasopressor support. On heparin and amio gtt for afib.  - Ostomy nurse consult  - Strict I/Os  - TPN  - Medical and ICU management per hospital/ICU team    Discussed with ICU nurse and daughter at bedside.

## 2022-05-15 NOTE — ASSESSMENT & PLAN NOTE
Received 2 units PRBCs in ED and 2 more in OR on 5/4  CBC daily  Conservative transfusion protocol  Monitor closely with Heparin infusion for A-fib

## 2022-05-15 NOTE — PROGRESS NOTES
[facility] - Intensive Care ([facility] Medicine  Progress Note    Patient Name: Franky Masters  MRN: [id number]  Patient Class: IP- Inpatient   Admission Date: 5/4/2022  Length of Stay: 11 days  Attending Physician: Elvie Parker DO  Primary Care Provider: HOLLY ROSEN        Subjective:     Principal Problem:Septic shock        HPI:  Mr Masters is a 65 yo male POD#0 s/p SB perforation with surgical intervention demonstrating a large cecal mass and 3l feculant fluid in the abdominal cavity. Additional findings of a perforated ileum and a liver mass. Patient tolerated the Exlap with Washout and small bowel excision. Patient had a wound vac placed, is currently intubated, sedated and recovering in the ICU. Patient received 4 units PRBC and remains on pressor support. Patient is a DNR and HM consulted with assistance with medical management. Daughter at bedside. Patient discussed with care team.          Overview/Hospital Course:  Patient continues to require pressors, remains intubated, sedated. Patient urine o/p declining and concerning. Discussed POC in detail at bedside with daughter POA. She expressed her fathers wish to not undergo treatments  like perm HD, where he has a diminished quality of life. We spoke frankly about issues and concerns and she will be communicating with the family as his case evolves. Comfort care was discussed and the goals of care will develop consistent with the patients expressed wishes. Potential for another surgery likely Saturday with a washout and possible biopsy vs resection are on the horizon. This possible intervention will be covered in detail by surgery sharing the risks and benefits of that approach. Case discussed with the primary service - surgery, and critical care team.    5/6- Pt seen and examined in the ICU plus discussed with ICU team and the pt's daughter/ POA: Remains critically ill, intubated, sedated on Vent, on Pressors- Levo plus Vaso- JOSE with oliguria  getting worse- Cr rising to 3.6, becoming severely acidotic- requiring Ertapenem, Zyvox, Micafungin as well as on Bicarb and Fentanyl gtt. He has massive fluid overload and generalized anasarca.  Possible return to OR tomorrow 5/7 if patient is more stable for right hemicolectomy, possible ileostomy, liver biopsy, abdominal wall closure. Dw Pt's daughter.       5/7- remains Intubated, sedated on Vent- Went into Afib w RVR- hence added Amio to Levo and Vasopressin- back in NSR. Getting Invanz and Zyvox plus Micafungin. Dr. Parker took him back to OR for for abdominal washout ( 3-3.5 L feculent fluid with food particles suctioned out in the OR, small bowel appeared better) and replaced Abthera- still has bowel discontinuity. His WBC, Lactate and Cr have all increased. Family updated about his critical condition and poor prognosis and JOSE/ATN- they are considering Comfort measures.     5/8- Day 5 on Vent- family at bedside, remains intubated on Vent with 2 Pressors- still on Amiodarone gtt for Afib, Still has Abthera wound vac to open Abdomen with small bowel discontinuity. Remains oliguric with generalized anasarca- almost 24 L fluid positive. Cr increased to 4.6. WBC down to 12, family does not want any HD/CRRT, so getting an IV Lasix trial to improve the urine output.     5/9- Day 6 on vent- remains the same, remains intubated, on vent with Abthera Wound vac and bowel discontinuity. Put out about 3 L urine since yesterday with IV Lasix, family still does not want any HD, WBC down to 10.2, H/H 7.8/24, still on 2 Pressors and Amio gtt. Family still deciding about comfort care.     5/10- Appreciate all- Day 7- remains same in septic shock on 2 vasopressors, intubated and sedated, still in afib. JOSE has remained stable at 4.7 but urine output improved with IV lasix. Abthera wound vac in place. No surgery today- put out about 2.5 L yesterday, given lasix again today.    5/11- Seen Pre op- looks better, less swollen,  remains intubated, sedated on Vent, on 1 Pressor- on Levophed. Going for OR today for Laparotomy and washout and abd wound closure. Good response to Lasix. Still Afib on Amiodarone gtt. Bun/Cr 98/4.4, WBC down to 17, H/H 8.6/26.     5/12- Day 8 on Vent- looks much better, remains on Vent with Levophed and Amio gtts. Had extensive surgery yesterday and did very well post op- Reopening of recent Laparotomy, Abdominal washout, R Hemicolectomy with Liver Bx, TAP block, Abdominal wall closure, Creation, end Ileostomy. POD 1- looks better, still on Levophed and Amio gtt for Afib, started on TPN, ID stopped Zyvox and continued Merrem/Mycafungin.          Interval History: Awakening trials good and breathing adequately on spontaneous.  Following commands but very weak.  Unable to lift his head off the pillow.  Also suctioning thick secretions through ET tube.  Not ready for extubation.  Continuing antibiotics and coordinating care with General Surgery and Critical Care Medicine.    Review of Systems   Unable to perform ROS: Intubated   Objective:     Vital Signs (Most Recent):  Temp: 98.8 °F (37.1 °C) (05/15/22 1600)  Pulse: (!) 138 (05/15/22 1815)  Resp: (!) 25 (05/15/22 1815)  BP: 136/80 (05/15/22 1710)  SpO2: 100 % (05/15/22 1815)   Vital Signs (24h Range):  Temp:  [97.7 °F (36.5 °C)-98.8 °F (37.1 °C)] 98.8 °F (37.1 °C)  Pulse:  [108-141] 138  Resp:  [18-35] 25  SpO2:  [95 %-100 %] 100 %  BP: (102-154)/(56-80) 136/80  Arterial Line BP: ()/(55-81) 95/64     Weight: 115.8 kg (255 lb 4.7 oz)  Body mass index is 37.7 kg/m².    Intake/Output Summary (Last 24 hours) at 5/15/2022 1836  Last data filed at 5/15/2022 1800  Gross per 24 hour   Intake 2275.39 ml   Output 4870 ml   Net -2594.61 ml        Physical Exam  Vitals and nursing note reviewed.   Constitutional:       General: He is not in acute distress.     Appearance: He is well-developed. He is obese. He is ill-appearing. He is not toxic-appearing or diaphoretic.       Interventions: He is sedated, intubated and restrained.   HENT:      Head: Atraumatic.      Nose:        Mouth/Throat:      Mouth: Mucous membranes are moist.   Eyes:      General: No scleral icterus.     Conjunctiva/sclera: Conjunctivae normal.      Pupils: Pupils are equal, round, and reactive to light.   Neck:      Vascular: No JVD.     Cardiovascular:      Rate and Rhythm: Tachycardia present. Rhythm irregular.      Pulses:           Radial pulses are 1+ on the right side and 1+ on the left side.        Dorsalis pedis pulses are 1+ on the right side and 1+ on the left side.      Heart sounds: Heart sounds are distant.   Pulmonary:      Effort: No accessory muscle usage or respiratory distress. He is intubated.      Breath sounds: Decreased breath sounds present. No wheezing or rhonchi.   Chest:      Chest wall: No deformity or tenderness.   Abdominal:      General: Bowel sounds are absent. There is no distension.       Genitourinary:     Comments: Doherty in place  Musculoskeletal:      Cervical back: Neck supple.      Right lower leg: 3+ Pitting Edema present.      Left lower leg: 3+ Pitting Edema present.      Right foot: No deformity.      Left foot: No deformity.   Lymphadenopathy:      Cervical: No cervical adenopathy.   Skin:     General: Skin is warm.      Capillary Refill: Capillary refill takes 2 to 3 seconds.          Neurological:      Comments: Heavily sedated     Flow (L/min): 4  Vent Mode: Spont  Oxygen Concentration (%):  [30-50] 35  Resp Rate Total:  [20 br/min-35 br/min] 25 br/min  Vt Set:  [450 mL] 450 mL  PEEP/CPAP:  [5 cmH20] 5 cmH20  Pressure Support:  [0 cmH20-8 cmH20] 8 cmH20  Mean Airway Pressure:  [8.3 tjK77-44 cmH20] 8.4 cmH20  Temp:  [97.7 °F (36.5 °C)-98.8 °F (37.1 °C)] 98.8 °F (37.1 °C)  Pulse:  [108-141] 138  Resp:  [18-35] 25  SpO2:  [95 %-100 %] 100 %  BP: (102-154)/(56-80) 136/80  Arterial Line BP: ()/(55-81) 95/64   Art pH/pCO2/pO2/HCO3:  7.490/44.7/114/34.1 (05/15  0459)  Lab Results   Component Value Date    [id number] Negative 05/11/2022    [id number] Negative 05/04/2022    [id number] Positive (A) 08/05/2021        Recent Labs   Lab 05/10/22  0406 05/11/22  0416 05/13/22  0350 05/14/22  0439 05/14/22  1243 05/14/22  1625 05/15/22  0422   LACTATE 3.5*  --   --   --   --   --   --    *   < > 138 141 143  --  144   WBC 13.35*   < > 16.29* 13.93*  --  13.47* 11.97   GRAN 87.8*  11.7*   < > 89.9*  14.7* 85.3*  11.9*  --  83.3*  11.2* 79.4*  9.5*   LYMPH 6.3*  0.8*   < > 4.2*  0.7* 5.8*  0.8*  --  7.4*  1.0 8.0*  1.0   HGB 8.6*   < > 8.2* 7.8*  --  7.7* 7.7*   HCT 28.0*   < > 27.3* 25.5*  --  25.1* 25.2*   BUN 87*   < > 107* 111* 102*  --  104*   CREATININE 4.8*   < > 4.0* 3.4* 3.2*  --  3.1*   ESTGFRAFRICA 13*   < > 17* 20* 22*  --  23*   EGFRNONAA 12*   < > 14* 18* 19*  --  20*   K 5.0   < > 3.4* 3.0* 3.5  --  3.1*   CL 90*   < > 95 98 99  --  100   CO2 19*   < > 25 27 27  --  31*   PHOS  --    < > 8.1* 5.5*  --   --  4.2   MG 2.0   < > 2.0 1.9  --   --  1.8    < > = values in this interval not displayed.       Microbiology Results (last 7 days)       Procedure Component Value Units Date/Time    Blood culture [[id number]] Collected: 05/04/22 1428    Order Status: Completed Specimen: Blood from Line, Arterial, Left Updated: 05/10/22 0612     Blood Culture, Routine No growth after 5 days.    Blood culture [[id number]] Collected: 05/04/22 1429    Order Status: Completed Specimen: Blood from Line, Jugular, Internal Right Updated: 05/10/22 0612     Blood Culture, Routine No growth after 5 days.          Antibiotics (From admission, onward)                Start     Stop Route Frequency Ordered    05/09/22 2200  ertapenem (INVANZ) 500 mg in sodium chloride 0.9% 100 mL IVPB         05/19 2359 IV Every 24 hours (non-standard times) 05/09/22 1624          Anticoagulants       Ordered     Route Frequency Start Stop    05/12/22 1408  heparin (PORCINE)  (LOW INTENSITY  heparin infusion nomogram - [facility] (Recommended Indications: Acute Coronary Syndrome, Atrial Fibrillation, Prophylaxis in Prosthetic Heart Valves, and other indication where full anticoagulation is desired, bleeding risk is moderate, antiplatelet agents have been utilized, and time to therapeutic can be delayed minimizing the increased bleeding risk))         IV As needed (PRN) 05/12/22 1508 --    05/12/22 1408  heparin (PORCINE)  (LOW INTENSITY heparin infusion nomogram - [facility] (Recommended Indications: Acute Coronary Syndrome, Atrial Fibrillation, Prophylaxis in Prosthetic Heart Valves, and other indication where full anticoagulation is desired, bleeding risk is moderate, antiplatelet agents have been utilized, and time to therapeutic can be delayed minimizing the increased bleeding risk))         IV As needed (PRN) 05/12/22 1508 --    05/12/22 1408  heparin (porcine) in D5W  (LOW INTENSITY heparin infusion nomogram - [facility] (Recommended Indications: Acute Coronary Syndrome, Atrial Fibrillation, Prophylaxis in Prosthetic Heart Valves, and other indication where full anticoagulation is desired, bleeding risk is moderate, antiplatelet agents have been utilized, and time to therapeutic can be delayed minimizing the increased bleeding risk))         IV Continuous 05/12/22 1415 --          X-Ray Abdomen AP 1 View  Narrative: EXAMINATION:  XR ABDOMEN AP 1 VIEW    CLINICAL HISTORY:  Vomiting.;    COMPARISON:  05/04/2022    FINDINGS:  There has been interval placement of an NG tube.  Its tip is in the stomach.  There has been interval placement of surgical skin staples projected over the left side of the abdomen and pelvis.  There is no dilated loop of bowel visualized.  The bony structures appear intact.  Impression: 1. There has been interval placement of an NG tube. Its tip is in the stomach.  There is no dilated loop of bowel visualized.  2. There has been interval placement of surgical skin staples projected over the left  side of the abdomen and pelvis.    Electronically signed by: Triston Cabrera MD  Date:    05/15/2022  Time:    10:26   Significant Labs: All pertinent labs within the past 24 hours have been reviewed.    Significant Imaging: I have reviewed all pertinent imaging results/findings within the past 24 hours.      Assessment/Plan:      * Septic shock sec to Peritonitis from SB perforation  Pressors  Abx - Zosyn / Micafungin  ID on consult    Remains in severe Septic Shock complicated by JOSE/ATN- Anuria and Resp Failure on Vent  ICU team has d/w daughter about HD if and when needed- she does not appear to be in favor of HD at present  Prognosis poor    Day 4 in Septic Shock and on vent  Continue Levo and Vaso plus IV Abx  Prognosis poor    Day 5- Continues same on Vent, WBC better but remains in renal shut down due to shock plus 2 Pressors    Day 6- Continue present supportive care    Day 7- gradually improving, now on 1 pressor and WBC down to 17  Cont present care  Going for surgery today    Day 8- improving, s/p extensive surgery yesterday  Will try to wean off Levophed    Day 9:  Marginally more stable.    14 May:  Less vasopressor requirement.  Weaning sedation for awakening trials.    Atrial fibrillation with RVR  Converted to NSR with Amio gtt    Cont Amio gtt          OJSE (acute kidney injury)  Worsening  Trend  Cr 2.2 today    Cr now 3.8- Oliguric- heading towards Anuria and ATN/ May need HD vs CRRT- she has massive fluid Overload.     Cr now 4.5-  Remains anuric  POA/ Family not in favor of HD    Remains Oliguric due to Septic Shock on 2 Pressors  Some urine output with lasix but no Polyuria     5/10 Urine Out put improved with diuresis while renal functions remains stable    Improving, Cr coming down, good urine output    On mechanically assisted ventilation  Wean as tolerated  CC Team  Pulmonary    Cont vent support    Expect further improvement with diuresis    Cont Vent support  Day 7    Day 8- will start  weaning vent soon      Mass of colon  Based on Surgery  Potential interventions  Goals of care  Biopsy as indicated  Likely Oncologic process with mets  Heme Onc as indicated    See above    Possible resection    resected    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 Parker- pt too unstable for right hemicolectomy, end ileostomy, liver biopsy today s/p abdominal washout with Abthera replacement today.  5/8- POD 3 with s/p Laparotomy and bowel resection and subsequent laparoscopic washout- persistent bowel discontinuity and abthera wound vac closure   5/9- persistent bowel discontinuity- await further decision by family    Await further input from surgery    Await surgery today- going for closure today    S/p- Reopening of recent Laparotomy, Abdominal washout, R Hemicolectomy with Liver Bx, TAP block, Abdominal wall closure, Creation, end Ileostomy. POD 1- looks better, still on Levophed and Amio gtt for Afib, started on TPN, ID stopped Zyvox and continued Merrem/Mycafungin.     Obesity (BMI 30.0-34.9)  Diet  Nutrition on recovery      Acute on chronic anemia  Hgb 10.3 s/p Transfusion 4 units Prbc  Transfuse for Hgb <7  Monitor    5/5  Improved  Hgb 11.4 today  Transfuse as indicated    5/10- H/H 8.6/28- likely dilutional from ATN- improving      Acute hypoxemic respiratory failure on mechanical vent  Patient with Hypoxic Respiratory failure which is Acute.  he is not on home oxygen. Supplemental oxygen was provided and noted- Vent Mode: Spont  Oxygen Concentration (%):  [30-50] 35  Resp Rate Total:  [20 br/min-35 br/min] 25 br/min  Vt Set:  [450 mL] 450 mL  PEEP/CPAP:  [5 cmH20] 5 cmH20  Pressure Support:  [0 cmH20-8 cmH20] 8 cmH20  Mean Airway Pressure:  [8.3 pvF98-71 cmH20] 8.4 cmH20.   Signs/symptoms of respiratory failure  include- tachypnea. Contributing diagnoses includes - Obesity Hypoventilation Labs and images were reviewed. Patient Has recent ABG, which has been reviewed. Will treat underlying causes and adjust management of respiratory failure as indicated. Pulmonary CC on board  Day 2 on Vent- remains intubated on vent  Cont vent support    Day 4 on Vent    Day 5 on vent- adequate O2, sats    Day 6- continue supportive care, await family's decision about comfort care    Day 7- continue support- trying to diurese     Day 8- minimal vent support- start weaning soon    15 May:  SAT/SBT as tolerated      VTE Risk Mitigation (From admission, onward)         Ordered     heparin 25,000 units in dextrose 5% (100 units/ml) IV bolus from bag - ADDITIONAL PRN BOLUS - 60 units/kg  As needed (PRN)        Question:  Heparin Infusion Adjustment (DO NOT MODIFY ANSWER)  Answer:  \\[email or web address]\epic\Images\Pharmacy\HeparinInfusions\heparin LOW INTENSITY nomogram for [facility] [id number]    05/12/22 1408     heparin 25,000 units in dextrose 5% (100 units/ml) IV bolus from bag - ADDITIONAL PRN BOLUS - 30 units/kg  As needed (PRN)        Question:  Heparin Infusion Adjustment (DO NOT MODIFY ANSWER)  Answer:  \\[email or web address]\epic\Images\Pharmacy\HeparinInfusions\heparin LOW INTENSITY nomogram for [facility] [id number]    05/12/22 1408     heparin 25,000 units in dextrose 5% 250 mL (100 units/mL) infusion LOW INTENSITY nomogram - [facility]  Continuous        Question Answer Comment   Heparin Infusion Adjustment (DO NOT MODIFY ANSWER) \\[email or web address]\epic\Images\Pharmacy\HeparinInfusions\heparin LOW INTENSITY nomogram for [facility] [id number]    Begin at (in units/kg/hr) 12        05/12/22 1408     IP VTE HIGH RISK PATIENT  Once         05/04/22 1253     Place sequential compression device  Until discontinued         05/04/22 1253                Discharge Planning   ELLIOT:      Code Status: DNR   Is the patient medically ready for discharge?:     Reason for patient still in  hospital (select all that apply): Patient unstable, Patient trending condition and Treatment  Discharge Plan A: Comfort care/withdrawal            Critical care time spent on the evaluation and treatment of severe organ dysfunction, review of pertinent labs and imaging studies, discussions with consulting providers and discussions with patient/family: 30 minutes.      Alexandro Kebede MD  Department of Hospital Medicine   [facility] - Intensive Care ([facility])

## 2022-05-15 NOTE — PROGRESS NOTES
[facility] - Intensive Care ([facility])  Nephrology  Progress Note    Patient Name: Franky Masters  MRN: [id number]  Admission Date: 5/4/2022  Hospital Length of Stay: 11 days  Attending Provider: Elvie Parker DO   Primary Care Physician: HOLLY ROSEN  Principal Problem:Septic shock    Consults  Subjective:     Interval History:     Patient was seen in the intensive care unit.  Remains intubated.  A family member was at the bedside.  All nephrology related questions were answered to her satisfaction.    Review of systems:  Not obtainable    Review of patient's allergies indicates:  No Known Allergies  Current Facility-Administered Medications   Medication Frequency    0.9%  NaCl infusion (for blood administration) Q24H PRN    0.9%  NaCl infusion PRN    acetaminophen suppository 650 mg Q6H PRN    furosemide injection 40 mg Q12H    And    albumin human 25% bottle 12.5 g BID    amiodarone 360 mg/200 mL (1.8 mg/mL) infusion Continuous    chlorhexidine 0.12 % solution 15 mL BID    dextrose 10 % infusion Continuous PRN    dextrose 10% bolus 125 mL PRN    ertapenem (INVANZ) 500 mg in sodium chloride 0.9% 100 mL IVPB Q24H    famotidine (PF) injection 20 mg Daily    fentaNYL 50 mcg/mL injection 50 mcg Q2H PRN    glucagon (human recombinant) injection 1 mg PRN    heparin 25,000 units in dextrose 5% (100 units/ml) IV bolus from bag - ADDITIONAL PRN BOLUS - 30 units/kg PRN    heparin 25,000 units in dextrose 5% (100 units/ml) IV bolus from bag - ADDITIONAL PRN BOLUS - 60 units/kg PRN    heparin 25,000 units in dextrose 5% 250 mL (100 units/mL) infusion LOW INTENSITY nomogram - [facility] Continuous    insulin aspart U-100 pen 1-10 Units Q4H PRN    lipid (SMOFLIPID) (SMOFLIPID) 20 % infusion 250 mL Daily    lorazepam injection 2 mg Q4H PRN    metoprolol injection 2.5 mg Q4H    micafungin 100 mg in sodium chloride 0.9 % 100 mL IVPB (ready to mix system) Q24H    NORepinephrine 32 mg in dextrose 5 % 250 mL infusion  Continuous    ondansetron injection 4 mg Q8H PRN    TPN ADULT CENTRAL LINE CUSTOM Continuous    TPN ADULT CENTRAL LINE CUSTOM Continuous    white petrolatum-mineral oil 57.3-42.5% ophthalmic ointment QHS       Objective:     Vital Signs (Most Recent):  Temp: 98.7 °F (37.1 °C) (05/15/22 0715)  Pulse: (!) 117 (05/15/22 1100)  Resp: (!) 23 (05/15/22 1100)  BP: 124/70 (05/15/22 0945)  SpO2: 100 % (05/15/22 1100)  O2 Device (Oxygen Therapy): ventilator (05/15/22 0752) Vital Signs (24h Range):  Temp:  [97.7 °F (36.5 °C)-98.7 °F (37.1 °C)] 98.7 °F (37.1 °C)  Pulse:  [101-141] 117  Resp:  [20-35] 23  SpO2:  [97 %-100 %] 100 %  BP: (102-154)/(56-80) 124/70  Arterial Line BP: ()/(50-83) 112/59     Weight: 115.8 kg (255 lb 4.7 oz) (05/12/22 0620)  Body mass index is 37.7 kg/m².  Body surface area is 2.37 meters squared.    I/O last 3 completed shifts:  In: 3454.5 [I.V.:1404.1; NG/GT:180; IV Piggyback:620.6]  Out: 5075 [Urine:3895; Drains:980; Stool:200]    Physical Exam  Constitutional:       Appearance: Normal appearance.   HENT:      Head: Normocephalic and atraumatic.      Mouth/Throat:      Comments: ET tube in place  Eyes:      General: No scleral icterus.     Extraocular Movements: Extraocular movements intact.      Pupils: Pupils are equal, round, and reactive to light.   Cardiovascular:      Rate and Rhythm: Normal rate and regular rhythm.   Pulmonary:      Effort: Pulmonary effort is normal.      Comments: Intubated  Musculoskeletal:      Right lower leg: No edema.      Left lower leg: No edema.   Skin:     General: Skin is warm and dry.   Neurological:      General: No focal deficit present.      Mental Status: He is alert and oriented to person, place, and time.   Psychiatric:         Mood and Affect: Mood normal.         Behavior: Behavior normal.         Significant Labs:sure  BMP:   Recent Labs   Lab 05/15/22  0422   *      K 3.1*      CO2 31*   *   CREATININE 3.1*   CALCIUM  7.8*   MG 1.8     CMP:   Recent Labs   Lab 05/15/22  0422   *   CALCIUM 7.8*   ALBUMIN 1.3*   PROT 4.5*      K 3.1*   CO2 31*      *   CREATININE 3.1*   ALKPHOS 141*   ALT 74*   AST 84*   BILITOT 1.0     All labs within the past 24 hours have been reviewed.    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 Acute kidney injury:  Secondary to sepsis with ATN.  Creatinine is continuing to improve.  Down to 2.9 today.    Excellent urine output, 3420 cc    Baseline creatinine appears to run around 0.7.    2. Electrolytes are stable:  Potassium is 3.1.  Bicarbonate is 31.     3. Hypernatremia:  Sodium is increased to 152. 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  Would increase electrolyte free water.  This is usually a self-limiting process it should correct within a few days.    Urine studies to calculate electrolyte free water clearance have been sent.  Currently pending.    Discussed with Critical Care Medicine.    Approximately 35 minutes was  spent in face-to-face conversation, chart review and coordination of care with other providers.      Thank you for your consult.     Jordan Renee MD  Nephrology  [facility] - Intensive Care ([facility])

## 2022-05-16 PROBLEM — R65.20 SEVERE SEPSIS: Status: ACTIVE | Noted: 2022-05-04

## 2022-05-16 PROBLEM — E87.8 ELECTROLYTE IMBALANCE: Status: ACTIVE | Noted: 2022-05-16

## 2022-05-16 LAB
ABO + RH BLD: NORMAL
ALBUMIN SERPL BCP-MCNC: 1.4 G/DL (ref 3.5–5.2)
ALLENS TEST: ABNORMAL
ALP SERPL-CCNC: 126 U/L (ref 55–135)
ALT SERPL W/O P-5'-P-CCNC: 56 U/L (ref 10–44)
ANION GAP SERPL CALC-SCNC: 15 MMOL/L (ref 8–16)
ANION GAP SERPL CALC-SCNC: 17 MMOL/L (ref 8–16)
APTT BLDCRRT: 41.3 SEC (ref 21–32)
APTT BLDCRRT: 49.4 SEC (ref 21–32)
AST SERPL-CCNC: 59 U/L (ref 10–40)
BASOPHILS # BLD AUTO: 0.07 K/UL (ref 0–0.2)
BASOPHILS NFR BLD: 0.6 % (ref 0–1.9)
BILIRUB SERPL-MCNC: 1.1 MG/DL (ref 0.1–1)
BLD GP AB SCN CELLS X3 SERPL QL: NORMAL
BLD PROD TYP BPU: NORMAL
BLOOD UNIT EXPIRATION DATE: NORMAL
BLOOD UNIT TYPE CODE: 6200
BLOOD UNIT TYPE: NORMAL
BUN SERPL-MCNC: 93 MG/DL (ref 8–23)
BUN SERPL-MCNC: 97 MG/DL (ref 8–23)
CALCIUM SERPL-MCNC: 7.9 MG/DL (ref 8.7–10.5)
CALCIUM SERPL-MCNC: 8.1 MG/DL (ref 8.7–10.5)
CHLORIDE SERPL-SCNC: 105 MMOL/L (ref 95–110)
CHLORIDE SERPL-SCNC: 107 MMOL/L (ref 95–110)
CO2 SERPL-SCNC: 29 MMOL/L (ref 23–29)
CO2 SERPL-SCNC: 32 MMOL/L (ref 23–29)
CODING SYSTEM: NORMAL
CREAT SERPL-MCNC: 2.7 MG/DL (ref 0.5–1.4)
CREAT SERPL-MCNC: 2.9 MG/DL (ref 0.5–1.4)
DELSYS: ABNORMAL
DIFFERENTIAL METHOD: ABNORMAL
DISPENSE STATUS: NORMAL
EOSINOPHIL # BLD AUTO: 0.2 K/UL (ref 0–0.5)
EOSINOPHIL NFR BLD: 1.7 % (ref 0–8)
ERYTHROCYTE [DISTWIDTH] IN BLOOD BY AUTOMATED COUNT: 27.7 % (ref 11.5–14.5)
EST. GFR  (AFRICAN AMERICAN): 25 ML/MIN/1.73 M^2
EST. GFR  (AFRICAN AMERICAN): 27 ML/MIN/1.73 M^2
EST. GFR  (NON AFRICAN AMERICAN): 21 ML/MIN/1.73 M^2
EST. GFR  (NON AFRICAN AMERICAN): 23 ML/MIN/1.73 M^2
FIO2: 35
GLUCOSE SERPL-MCNC: 142 MG/DL (ref 70–110)
GLUCOSE SERPL-MCNC: 148 MG/DL (ref 70–110)
HCO3 UR-SCNC: 36.5 MMOL/L (ref 24–28)
HCT VFR BLD AUTO: 23.8 % (ref 40–54)
HCT VFR BLD AUTO: 27.5 % (ref 40–54)
HGB BLD-MCNC: 6.9 G/DL (ref 14–18)
HGB BLD-MCNC: 8.1 G/DL (ref 14–18)
IMM GRANULOCYTES # BLD AUTO: 0.25 K/UL (ref 0–0.04)
IMM GRANULOCYTES NFR BLD AUTO: 2 % (ref 0–0.5)
LYMPHOCYTES # BLD AUTO: 0.9 K/UL (ref 1–4.8)
LYMPHOCYTES NFR BLD: 7.3 % (ref 18–48)
MAGNESIUM SERPL-MCNC: 1.9 MG/DL (ref 1.6–2.6)
MCH RBC QN AUTO: 21.4 PG (ref 27–31)
MCHC RBC AUTO-ENTMCNC: 29 G/DL (ref 32–36)
MCV RBC AUTO: 74 FL (ref 82–98)
MODE: ABNORMAL
MONOCYTES # BLD AUTO: 0.9 K/UL (ref 0.3–1)
MONOCYTES NFR BLD: 7.4 % (ref 4–15)
NEUTROPHILS # BLD AUTO: 10.2 K/UL (ref 1.8–7.7)
NEUTROPHILS NFR BLD: 81 % (ref 38–73)
NRBC BLD-RTO: 0 /100 WBC
NUM UNITS TRANS PACKED RBC: NORMAL
PCO2 BLDA: 44.6 MMHG (ref 35–45)
PH SMN: 7.52 [PH] (ref 7.35–7.45)
PHOSPHATE SERPL-MCNC: 3.8 MG/DL (ref 2.7–4.5)
PLATELET # BLD AUTO: 213 K/UL (ref 150–450)
PLATELET BLD QL SMEAR: ABNORMAL
PMV BLD AUTO: ABNORMAL FL (ref 9.2–12.9)
PO2 BLDA: 132 MMHG (ref 80–100)
POC BE: 14 MMOL/L
POC SATURATED O2: 99 % (ref 95–100)
POCT GLUCOSE: 138 MG/DL (ref 70–110)
POCT GLUCOSE: 142 MG/DL (ref 70–110)
POCT GLUCOSE: 146 MG/DL (ref 70–110)
POCT GLUCOSE: 151 MG/DL (ref 70–110)
POCT GLUCOSE: 151 MG/DL (ref 70–110)
POCT GLUCOSE: 155 MG/DL (ref 70–110)
POCT GLUCOSE: 160 MG/DL (ref 70–110)
POTASSIUM SERPL-SCNC: 3.2 MMOL/L (ref 3.5–5.1)
POTASSIUM SERPL-SCNC: 3.5 MMOL/L (ref 3.5–5.1)
POTASSIUM UR-SCNC: 26 MMOL/L (ref 15–95)
PROT SERPL-MCNC: 4.4 G/DL (ref 6–8.4)
RBC # BLD AUTO: 3.22 M/UL (ref 4.6–6.2)
SAMPLE: ABNORMAL
SITE: ABNORMAL
SODIUM SERPL-SCNC: 152 MMOL/L (ref 136–145)
SODIUM SERPL-SCNC: 153 MMOL/L (ref 136–145)
SODIUM UR-SCNC: 30 MMOL/L (ref 20–250)
WBC # BLD AUTO: 12.58 K/UL (ref 3.9–12.7)

## 2022-05-16 PROCEDURE — B4185 PARENTERAL SOL 10 GM LIPIDS: HCPCS | Performed by: SURGERY

## 2022-05-16 PROCEDURE — 82803 BLOOD GASES ANY COMBINATION: CPT

## 2022-05-16 PROCEDURE — 99900035 HC TECH TIME PER 15 MIN (STAT)

## 2022-05-16 PROCEDURE — 80053 COMPREHEN METABOLIC PANEL: CPT | Performed by: SURGERY

## 2022-05-16 PROCEDURE — 94761 N-INVAS EAR/PLS OXIMETRY MLT: CPT

## 2022-05-16 PROCEDURE — 83735 ASSAY OF MAGNESIUM: CPT | Performed by: SURGERY

## 2022-05-16 PROCEDURE — 37799 UNLISTED PX VASCULAR SURGERY: CPT

## 2022-05-16 PROCEDURE — 85730 THROMBOPLASTIN TIME PARTIAL: CPT | Mod: 91 | Performed by: SURGERY

## 2022-05-16 PROCEDURE — A4217 STERILE WATER/SALINE, 500 ML: HCPCS | Performed by: SURGERY

## 2022-05-16 PROCEDURE — 25000003 PHARM REV CODE 250: Performed by: INTERNAL MEDICINE

## 2022-05-16 PROCEDURE — 99233 SBSQ HOSP IP/OBS HIGH 50: CPT | Mod: ,,, | Performed by: INTERNAL MEDICINE

## 2022-05-16 PROCEDURE — 25000003 PHARM REV CODE 250: Performed by: SURGERY

## 2022-05-16 PROCEDURE — 63600175 PHARM REV CODE 636 W HCPCS: Performed by: INTERNAL MEDICINE

## 2022-05-16 PROCEDURE — 99900026 HC AIRWAY MAINTENANCE (STAT)

## 2022-05-16 PROCEDURE — 86901 BLOOD TYPING SEROLOGIC RH(D): CPT | Performed by: INTERNAL MEDICINE

## 2022-05-16 PROCEDURE — 85014 HEMATOCRIT: CPT | Performed by: INTERNAL MEDICINE

## 2022-05-16 PROCEDURE — 20000000 HC ICU ROOM

## 2022-05-16 PROCEDURE — 85025 COMPLETE CBC W/AUTO DIFF WBC: CPT | Performed by: SURGERY

## 2022-05-16 PROCEDURE — 80048 BASIC METABOLIC PNL TOTAL CA: CPT | Mod: XB | Performed by: NURSE PRACTITIONER

## 2022-05-16 PROCEDURE — 84300 ASSAY OF URINE SODIUM: CPT | Performed by: INTERNAL MEDICINE

## 2022-05-16 PROCEDURE — 94003 VENT MGMT INPAT SUBQ DAY: CPT

## 2022-05-16 PROCEDURE — 99291 PR CRITICAL CARE, E/M 30-74 MINUTES: ICD-10-PCS | Mod: ,,, | Performed by: NURSE PRACTITIONER

## 2022-05-16 PROCEDURE — 36430 TRANSFUSION BLD/BLD COMPNT: CPT

## 2022-05-16 PROCEDURE — 84133 ASSAY OF URINE POTASSIUM: CPT | Performed by: INTERNAL MEDICINE

## 2022-05-16 PROCEDURE — 63600175 PHARM REV CODE 636 W HCPCS: Performed by: SURGERY

## 2022-05-16 PROCEDURE — P9016 RBC LEUKOCYTES REDUCED: HCPCS | Performed by: INTERNAL MEDICINE

## 2022-05-16 PROCEDURE — 85730 THROMBOPLASTIN TIME PARTIAL: CPT | Performed by: NURSE PRACTITIONER

## 2022-05-16 PROCEDURE — 63600175 PHARM REV CODE 636 W HCPCS: Mod: JG | Performed by: INTERNAL MEDICINE

## 2022-05-16 PROCEDURE — 27000221 HC OXYGEN, UP TO 24 HOURS

## 2022-05-16 PROCEDURE — 85018 HEMOGLOBIN: CPT | Performed by: INTERNAL MEDICINE

## 2022-05-16 PROCEDURE — 86920 COMPATIBILITY TEST SPIN: CPT | Performed by: INTERNAL MEDICINE

## 2022-05-16 PROCEDURE — 99291 CRITICAL CARE FIRST HOUR: CPT | Mod: ,,, | Performed by: NURSE PRACTITIONER

## 2022-05-16 PROCEDURE — 25000003 PHARM REV CODE 250: Performed by: NURSE PRACTITIONER

## 2022-05-16 PROCEDURE — 84100 ASSAY OF PHOSPHORUS: CPT | Performed by: SURGERY

## 2022-05-16 PROCEDURE — 63600175 PHARM REV CODE 636 W HCPCS: Performed by: NURSE PRACTITIONER

## 2022-05-16 PROCEDURE — 99233 PR SUBSEQUENT HOSPITAL CARE,LEVL III: ICD-10-PCS | Mod: ,,, | Performed by: INTERNAL MEDICINE

## 2022-05-16 RX ORDER — HYDROCODONE BITARTRATE AND ACETAMINOPHEN 500; 5 MG/1; MG/1
TABLET ORAL
Status: DISCONTINUED | OUTPATIENT
Start: 2022-05-16 | End: 2022-05-18

## 2022-05-16 RX ORDER — POTASSIUM CHLORIDE 29.8 MG/ML
40 INJECTION INTRAVENOUS ONCE
Status: COMPLETED | OUTPATIENT
Start: 2022-05-16 | End: 2022-05-16

## 2022-05-16 RX ORDER — POTASSIUM CHLORIDE 14.9 MG/ML
20 INJECTION INTRAVENOUS ONCE
Status: COMPLETED | OUTPATIENT
Start: 2022-05-16 | End: 2022-05-16

## 2022-05-16 RX ORDER — DEXTROSE MONOHYDRATE 50 MG/ML
INJECTION, SOLUTION INTRAVENOUS CONTINUOUS
Status: DISCONTINUED | OUTPATIENT
Start: 2022-05-16 | End: 2022-05-24

## 2022-05-16 RX ADMIN — POTASSIUM CHLORIDE 40 MEQ: 29.8 INJECTION, SOLUTION INTRAVENOUS at 04:05

## 2022-05-16 RX ADMIN — ERTAPENEM 500 MG: 1 INJECTION INTRAMUSCULAR; INTRAVENOUS at 09:05

## 2022-05-16 RX ADMIN — FENTANYL CITRATE 50 MCG: 50 INJECTION INTRAMUSCULAR; INTRAVENOUS at 09:05

## 2022-05-16 RX ADMIN — SODIUM CHLORIDE: 234 INJECTION, SOLUTION INTRAVENOUS at 03:05

## 2022-05-16 RX ADMIN — SMOFLIPID 250 ML: 6; 6; 5; 3 INJECTION, EMULSION INTRAVENOUS at 03:05

## 2022-05-16 RX ADMIN — HEPARIN SODIUM 18 UNITS/KG/HR: 5000 INJECTION INTRAVENOUS; SUBCUTANEOUS at 01:05

## 2022-05-16 RX ADMIN — INSULIN ASPART 2 UNITS: 100 INJECTION, SOLUTION INTRAVENOUS; SUBCUTANEOUS at 08:05

## 2022-05-16 RX ADMIN — FAMOTIDINE 20 MG: 10 INJECTION INTRAVENOUS at 08:05

## 2022-05-16 RX ADMIN — DEXTROSE: 5 SOLUTION INTRAVENOUS at 07:05

## 2022-05-16 RX ADMIN — INSULIN ASPART 1 UNITS: 100 INJECTION, SOLUTION INTRAVENOUS; SUBCUTANEOUS at 12:05

## 2022-05-16 RX ADMIN — FUROSEMIDE 40 MG: 10 INJECTION, SOLUTION INTRAMUSCULAR; INTRAVENOUS at 02:05

## 2022-05-16 RX ADMIN — INSULIN ASPART 2 UNITS: 100 INJECTION, SOLUTION INTRAVENOUS; SUBCUTANEOUS at 04:05

## 2022-05-16 RX ADMIN — AMIODARONE HYDROCHLORIDE 0.5 MG/MIN: 1.8 INJECTION, SOLUTION INTRAVENOUS at 03:05

## 2022-05-16 RX ADMIN — MICAFUNGIN SODIUM 100 MG: 100 INJECTION, POWDER, LYOPHILIZED, FOR SOLUTION INTRAVENOUS at 03:05

## 2022-05-16 RX ADMIN — INSULIN ASPART 1 UNITS: 100 INJECTION, SOLUTION INTRAVENOUS; SUBCUTANEOUS at 03:05

## 2022-05-16 RX ADMIN — POTASSIUM CHLORIDE 20 MEQ: 200 INJECTION, SOLUTION INTRAVENOUS at 05:05

## 2022-05-16 RX ADMIN — MINERAL OIL, PETROLATUM: 425; 573 OINTMENT OPHTHALMIC at 09:05

## 2022-05-16 RX ADMIN — CHLORHEXIDINE GLUCONATE 0.12% ORAL RINSE 15 ML: 1.2 LIQUID ORAL at 09:05

## 2022-05-16 RX ADMIN — CHLORHEXIDINE GLUCONATE 0.12% ORAL RINSE 15 ML: 1.2 LIQUID ORAL at 08:05

## 2022-05-16 RX ADMIN — AMIODARONE HYDROCHLORIDE 0.5 MG/MIN: 1.8 INJECTION, SOLUTION INTRAVENOUS at 04:05

## 2022-05-16 NOTE — PROGRESS NOTES
[facility] - Intensive Care ([facility])  General Surgery  Progress Note    Subjective:     History of Present Illness:  No notes on file    Post-Op Info:  Procedure(s) (LRB):  CREATION, ILEOSTOMY (N/A)  HEMICOLECTOMY, RIGHT (N/A)  BIOPSY, LIVER (Right)   5 Days Post-Op     Interval History: On ventilator, possible extubation trial tomorrow.    Medications:  Continuous Infusions:   amiodarone in dextrose 5% 0.5 mg/min (05/16/22 1602)    dextrose 10 % in water (D10W)      dextrose 5 % 50 mL/hr at 05/16/22 1500    heparin (porcine) in D5W 18 Units/kg/hr (05/16/22 1500)    NORepinephrine bitartrate-D5W Stopped (05/15/22 2300)    TPN ADULT CENTRAL LINE CUSTOM 36 mL/hr at 05/16/22 1524     Scheduled Meds:   chlorhexidine  15 mL Mouth/Throat BID    ertapenem (INVANZ) IVPB  500 mg Intravenous Q24H    famotidine (PF)  20 mg Intravenous Daily    lipid (SMOFLIPID)  250 mL Intravenous Daily    micafungin (MYCAMINE) IVPB  100 mg Intravenous Q24H    potassium chloride in water  40 mEq Intravenous Once    white petrolatum-mineral oil 57.3-42.5%   Both Eyes QHS     PRN Meds:sodium chloride, sodium chloride 0.9%, acetaminophen, dextrose 10 % in water (D10W), dextrose 10%, fentaNYL, glucagon (human recombinant), heparin (PORCINE), heparin (PORCINE), insulin aspart U-100, iohexol, lorazepam, ondansetron     Review of patient's allergies indicates:  No Known Allergies  Objective:     Vital Signs (Most Recent):  Temp: 97.9 °F (36.6 °C) (05/16/22 1200)  Pulse: 110 (05/16/22 1608)  Resp: (!) 27 (05/16/22 1608)  BP: (!) 95/45 (05/15/22 2112)  SpO2: 100 % (05/16/22 1608)   Vital Signs (24h Range):  Temp:  [97.7 °F (36.5 °C)-98.7 °F (37.1 °C)] 97.9 °F (36.6 °C)  Pulse:  [] 110  Resp:  [17-37] 27  SpO2:  [98 %-100 %] 100 %  BP: ()/(45-80) 95/45  Arterial Line BP: ()/(48-82) 125/72     Weight: 109.4 kg (241 lb 2.9 oz)  Body mass index is 35.62 kg/m².    Intake/Output - Last 3 Shifts         05/14 0700  05/15 0659  05/15 0700  05/16 0659 05/16 0700  05/17 0659    I.V. (mL/kg) 892.7 (7.7) 890.9 (7.7) 565.4 (5.2)    Blood   375    NG/GT 90      IV Piggyback 497.7 369.7 80.4    .8 1027.1 323.8    Total Intake(mL/kg) 2330.2 (20.1) 2287.7 (19.8) 1344.6 (12.3)    Urine (mL/kg/hr) 2670 (1) 3420 (1.2) 1015 (1)    Drains 820 1410 95    Stool 125 200     Total Output 3615 5030 1110    Net -1284.8 -2742.3 +234.6           Stool Occurrence 1 x              Physical Exam  Vitals and nursing note reviewed.   Constitutional:       Appearance: He is obese. He is ill-appearing.   Cardiovascular:      Rate and Rhythm: Rhythm irregular.   Pulmonary:      Comments: Mechanical breath sounds  Abdominal:      Palpations: Abdomen is soft.      Comments: Midline incision with staples--no purulent drainage or erythema. Right-sided end ileostomy is red with some liquid stool output. ADDIS drain on right is serous; left is murky brown.   Genitourinary:     Comments: Doherty in place  Musculoskeletal:      Right lower leg: Edema present.      Left lower leg: Edema present.   Neurological:      Comments: Arousable follows commands but weak       Significant Labs:  I have reviewed all pertinent lab results within the past 24 hours.  CBC:   Recent Labs   Lab 05/16/22  0328 05/16/22  1424   WBC 12.58  --    RBC 3.22*  --    HGB 6.9* 8.1*   HCT 23.8* 27.5*     --    MCV 74*  --    MCH 21.4*  --    MCHC 29.0*  --      CMP:   Recent Labs   Lab 05/16/22  0328 05/16/22  1424   * 148*   CALCIUM 7.9* 8.1*   ALBUMIN 1.4*  --    PROT 4.4*  --    * 153*   K 3.2* 3.5   CO2 32* 29    107   BUN 93* 97*   CREATININE 2.9* 2.7*   ALKPHOS 126  --    ALT 56*  --    AST 59*  --    BILITOT 1.1*  --      ABGs:   Recent Labs   Lab 05/16/22  0321   PH 7.521*   PCO2 44.6   PO2 132*   HCO3 36.5*   POCSATURATED 99   BE 14       Significant Diagnostics:  I have reviewed all pertinent imaging results/findings within the past 24 hours.    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   S/p exploratory laparotomy, abdominal washout, segmental small bowel resection (left in discontinuity), placement of Abthera vac 5/4/22  S/p reopening of recent laparotomy, abdominal washout, replacement of Abthera vac 5/7/22  S/p reopening of recent laparotomy, abdominal washout, right hemicolectomy, liver biopsy, end ileostomy, TAP block, abdominal wall closure 5/11/22    - Will ask IR to place additional drain into LUQ abscess for additional source control  - did not tolerate tube feeds well, NG tube currently on suction, bowel function likely sluggish due to ileus from inflammation/infection  - Continue ICU management. On heparin and amio gtt for afib.  - Ostomy nurse consult  - Strict I/Os  - TPN  - Medical and ICU management per hospital/ICU team    Acute on chronic anemia  Management per medicine/critical care    Acute hypoxemic respiratory failure on mechanical vent  Management per medicine/critical care        Elvie Parker, DO  General Surgery  [facility] - Intensive Care ([facility])

## 2022-05-16 NOTE — NURSING
1:00- Pt HR usually sustains 120-150. Hr drop to 50's after CT. MD notified. MD order to DC lopressor IV scheduled.

## 2022-05-16 NOTE — ASSESSMENT & PLAN NOTE
Pressors  Abx - Zosyn / Micafungin  ID on consult    Remains in severe Septic Shock complicated by JOSE/ATN- Anuria and Resp Failure on Vent  ICU team has d/w daughter about HD if and when needed- she does not appear to be in favor of HD at present  Prognosis poor    Day 4 in Septic Shock and on vent  Continue Levo and Vaso plus IV Abx  Prognosis poor    Day 5- Continues same on Vent, WBC better but remains in renal shut down due to shock plus 2 Pressors    Day 6- Continue present supportive care    Day 7- gradually improving, now on 1 pressor and WBC down to 17  Cont present care  Going for surgery today      15 May:  No longer on vasopressor.  Weaning sedation for awakening trials.  Left side intra-abdominal fluid collection/abscess draining into ADDIS.

## 2022-05-16 NOTE — SUBJECTIVE & OBJECTIVE
Interval History: On ventilator, possible extubation trial tomorrow.    Medications:  Continuous Infusions:   amiodarone in dextrose 5% 0.5 mg/min (05/16/22 1602)    dextrose 10 % in water (D10W)      dextrose 5 % 50 mL/hr at 05/16/22 1500    heparin (porcine) in D5W 18 Units/kg/hr (05/16/22 1500)    NORepinephrine bitartrate-D5W Stopped (05/15/22 2300)    TPN ADULT CENTRAL LINE CUSTOM 36 mL/hr at 05/16/22 1524     Scheduled Meds:   chlorhexidine  15 mL Mouth/Throat BID    ertapenem (INVANZ) IVPB  500 mg Intravenous Q24H    famotidine (PF)  20 mg Intravenous Daily    lipid (SMOFLIPID)  250 mL Intravenous Daily    micafungin (MYCAMINE) IVPB  100 mg Intravenous Q24H    potassium chloride in water  40 mEq Intravenous Once    white petrolatum-mineral oil 57.3-42.5%   Both Eyes QHS     PRN Meds:sodium chloride, sodium chloride 0.9%, acetaminophen, dextrose 10 % in water (D10W), dextrose 10%, fentaNYL, glucagon (human recombinant), heparin (PORCINE), heparin (PORCINE), insulin aspart U-100, iohexol, lorazepam, ondansetron     Review of patient's allergies indicates:  No Known Allergies  Objective:     Vital Signs (Most Recent):  Temp: 97.9 °F (36.6 °C) (05/16/22 1200)  Pulse: 110 (05/16/22 1608)  Resp: (!) 27 (05/16/22 1608)  BP: (!) 95/45 (05/15/22 2112)  SpO2: 100 % (05/16/22 1608)   Vital Signs (24h Range):  Temp:  [97.7 °F (36.5 °C)-98.7 °F (37.1 °C)] 97.9 °F (36.6 °C)  Pulse:  [] 110  Resp:  [17-37] 27  SpO2:  [98 %-100 %] 100 %  BP: ()/(45-80) 95/45  Arterial Line BP: ()/(48-82) 125/72     Weight: 109.4 kg (241 lb 2.9 oz)  Body mass index is 35.62 kg/m².    Intake/Output - Last 3 Shifts         05/14 0700  05/15 0659 05/15 0700  05/16 0659 05/16 0700 05/17 0659    I.V. (mL/kg) 892.7 (7.7) 890.9 (7.7) 565.4 (5.2)    Blood   375    NG/GT 90      IV Piggyback 497.7 369.7 80.4    .8 1027.1 323.8    Total Intake(mL/kg) 2330.2 (20.1) 2287.7 (19.8) 1344.6 (12.3)    Urine (mL/kg/hr) 2670 (1) 3420  (1.2) 1015 (1)    Drains 820 1410 95    Stool 125 200     Total Output 3615 5030 1110    Net -1284.8 -2742.3 +234.6           Stool Occurrence 1 x              Physical Exam  Vitals and nursing note reviewed.   Constitutional:       Appearance: He is obese. He is ill-appearing.   Cardiovascular:      Rate and Rhythm: Rhythm irregular.   Pulmonary:      Comments: Mechanical breath sounds  Abdominal:      Palpations: Abdomen is soft.      Comments: Midline incision with staples--no purulent drainage or erythema. Right-sided end ileostomy is red with some liquid stool output. ADDIS drain on right is serous; left is murky brown.   Genitourinary:     Comments: Doherty in place  Musculoskeletal:      Right lower leg: Edema present.      Left lower leg: Edema present.   Neurological:      Comments: Arousable follows commands but weak       Significant Labs:  I have reviewed all pertinent lab results within the past 24 hours.  CBC:   Recent Labs   Lab 05/16/22 0328 05/16/22  1424   WBC 12.58  --    RBC 3.22*  --    HGB 6.9* 8.1*   HCT 23.8* 27.5*     --    MCV 74*  --    MCH 21.4*  --    MCHC 29.0*  --      CMP:   Recent Labs   Lab 05/16/22 0328 05/16/22  1424   * 148*   CALCIUM 7.9* 8.1*   ALBUMIN 1.4*  --    PROT 4.4*  --    * 153*   K 3.2* 3.5   CO2 32* 29    107   BUN 93* 97*   CREATININE 2.9* 2.7*   ALKPHOS 126  --    ALT 56*  --    AST 59*  --    BILITOT 1.1*  --      ABGs:   Recent Labs   Lab 05/16/22  0321   PH 7.521*   PCO2 44.6   PO2 132*   HCO3 36.5*   POCSATURATED 99   BE 14       Significant Diagnostics:  I have reviewed all pertinent imaging results/findings within the past 24 hours.

## 2022-05-16 NOTE — PROGRESS NOTES
[facility] - Intensive [facility] ([facility] Medicine  Progress Note    Patient Name: Franky Masters  MRN: [id number]  Patient Class: IP- Inpatient   Admission Date: 5/4/2022  Length of Stay: 12 days  Attending Physician: Elvie Parker DO  Primary Care Provider: HOLLY ROSEN        Subjective:     Principal Problem:Severe sepsis        HPI:  Mr Masters is a 65 yo male POD#0 s/p SB perforation with surgical intervention demonstrating a large cecal mass and 3l feculant fluid in the abdominal cavity. Additional findings of a perforated ileum and a liver mass. Patient tolerated the Exlap with Washout and small bowel excision. Patient had a wound vac placed, is currently intubated, sedated and recovering in the ICU. Patient received 4 units PRBC and remains on pressor support. Patient is a DNR and HM consulted with assistance with medical management. Daughter at bedside. Patient discussed with care team.          Overview/Hospital Course:  Patient continues to require pressors, remains intubated, sedated. Patient urine o/p declining and concerning. Discussed POC in detail at bedside with daughter POA. She expressed her fathers wish to not undergo treatments  like perm HD, where he has a diminished quality of life. We spoke frankly about issues and concerns and she will be communicating with the family as his case evolves. Comfort care was discussed and the goals of care will develop consistent with the patients expressed wishes. Potential for another surgery likely Saturday with a washout and possible biopsy vs resection are on the horizon. This possible intervention will be covered in detail by surgery sharing the risks and benefits of that approach. Case discussed with the primary service - surgery, and critical care team.    5/6- Pt seen and examined in the ICU plus discussed with ICU team and the pt's daughter/ POA: Remains critically ill, intubated, sedated on Vent, on Pressors- Levo plus Vaso- JOSE with  oliguria getting worse- Cr rising to 3.6, becoming severely acidotic- requiring Ertapenem, Zyvox, Micafungin as well as on Bicarb and Fentanyl gtt. He has massive fluid overload and generalized anasarca.  Possible return to OR tomorrow 5/7 if patient is more stable for right hemicolectomy, possible ileostomy, liver biopsy, abdominal wall closure. Dw Pt's daughter.       5/7- remains Intubated, sedated on Vent- Went into Afib w RVR- hence added Amio to Levo and Vasopressin- back in NSR. Getting Invanz and Zyvox plus Micafungin. Dr. Parker took him back to OR for for abdominal washout ( 3-3.5 L feculent fluid with food particles suctioned out in the OR, small bowel appeared better) and replaced Abthera- still has bowel discontinuity. His WBC, Lactate and Cr have all increased. Family updated about his critical condition and poor prognosis and JOSE/ATN- they are considering Comfort measures.     5/8- Day 5 on Vent- family at bedside, remains intubated on Vent with 2 Pressors- still on Amiodarone gtt for Afib, Still has Abthera wound vac to open Abdomen with small bowel discontinuity. Remains oliguric with generalized anasarca- almost 24 L fluid positive. Cr increased to 4.6. WBC down to 12, family does not want any HD/CRRT, so getting an IV Lasix trial to improve the urine output.     5/9- Day 6 on vent- remains the same, remains intubated, on vent with Abthera Wound vac and bowel discontinuity. Put out about 3 L urine since yesterday with IV Lasix, family still does not want any HD, WBC down to 10.2, H/H 7.8/24, still on 2 Pressors and Amio gtt. Family still deciding about comfort care.     5/10- Appreciate all- Day 7- remains same in septic shock on 2 vasopressors, intubated and sedated, still in afib. JOSE has remained stable at 4.7 but urine output improved with IV lasix. Abthera wound vac in place. No surgery today- put out about 2.5 L yesterday, given lasix again today.    5/11- Seen Pre op- looks better, less  swollen, remains intubated, sedated on Vent, on 1 Pressor- on Levophed. Going for OR today for Laparotomy and washout and abd wound closure. Good response to Lasix. Still Afib on Amiodarone gtt. Bun/Cr 98/4.4, WBC down to 17, H/H 8.6/26.     5/12- Day 8 on Vent- looks much better, remains on Vent with Levophed and Amio gtts. Had extensive surgery yesterday and did very well post op- Reopening of recent Laparotomy, Abdominal washout, R Hemicolectomy with Liver Bx, TAP block, Abdominal wall closure, Creation, end Ileostomy. POD 1- looks better, still on Levophed and Amio gtt for Afib, started on TPN, ID stopped Zyvox and continued Merrem/Mycafungin.          Interval History: Awakening trials good and breathing adequately on spontaneous.  Remains very weak.  Unable to lift his head off the pillow.  CT of the abdomen/pelvis shows a fluid collection left upper quadrant descending along the left flank.  Left side drain is collecting dirty dishwater-like fluid.  Discussed with General Surgery and will continue with current drain and may request additional or replacement drain by IR.  Continuing antibiotics and coordinating care with Critical Care Medicine.    Review of Systems   Unable to perform ROS: Intubated   Objective:     Vital Signs (Most Recent):  Temp: 97.9 °F (36.6 °C) (05/16/22 1515)  Pulse: (!) 122 (05/16/22 1809)  Resp: (!) 26 (05/16/22 1809)  BP: (!) 95/45 (05/15/22 2112)  SpO2: 100 % (05/16/22 1809)   Vital Signs (24h Range):  Temp:  [97.7 °F (36.5 °C)-98.7 °F (37.1 °C)] 97.9 °F (36.6 °C)  Pulse:  [] 122  Resp:  [17-37] 26  SpO2:  [98 %-100 %] 100 %  BP: (95)/(45) 95/45  Arterial Line BP: ()/(48-89) 162/88     Weight: 109.4 kg (241 lb 2.9 oz)  Body mass index is 35.62 kg/m².    Intake/Output Summary (Last 24 hours) at 5/16/2022 1840  Last data filed at 5/16/2022 1800  Gross per 24 hour   Intake 2963.52 ml   Output 3992 ml   Net -1028.48 ml        Physical Exam  Vitals and nursing note reviewed.    Constitutional:       General: He is not in acute distress.     Appearance: He is well-developed. He is obese. He is ill-appearing. He is not toxic-appearing or diaphoretic.      Interventions: He is sedated, intubated and restrained.   HENT:      Head: Atraumatic.      Nose:        Mouth/Throat:      Mouth: Mucous membranes are moist.   Eyes:      General: No scleral icterus.     Conjunctiva/sclera: Conjunctivae normal.      Pupils: Pupils are equal, round, and reactive to light.   Neck:      Vascular: No JVD.     Cardiovascular:      Rate and Rhythm: Tachycardia present. Rhythm irregular.      Pulses:           Radial pulses are 1+ on the right side and 1+ on the left side.        Dorsalis pedis pulses are 1+ on the right side and 1+ on the left side.      Heart sounds: Heart sounds are distant.   Pulmonary:      Effort: No accessory muscle usage or respiratory distress. He is intubated.      Breath sounds: Decreased breath sounds present. No wheezing or rhonchi.   Chest:      Chest wall: No deformity or tenderness.   Abdominal:      General: Bowel sounds are absent. There is no distension.       Genitourinary:     Comments: Doherty in place  Musculoskeletal:      Cervical back: Neck supple.      Right lower leg: 3+ Pitting Edema present.      Left lower leg: 3+ Pitting Edema present.      Right foot: No deformity.      Left foot: No deformity.   Lymphadenopathy:      Cervical: No cervical adenopathy.   Skin:     General: Skin is warm.      Capillary Refill: Capillary refill takes 2 to 3 seconds.          Neurological:      Comments: Heavily sedated     Flow (L/min): 4  Vent Mode: Spont  Oxygen Concentration (%):  [35] 35  Resp Rate Total:  [17 br/min-28 br/min] 26 br/min  Vt Set:  [450 mL] 450 mL  PEEP/CPAP:  [5 cmH20] 5 cmH20  Pressure Support:  [8 cmH20] 8 cmH20  Mean Airway Pressure:  [7.7 cmH20-8.6 cmH20] 8.3 cmH20  Temp:  [97.7 °F (36.5 °C)-98.7 °F (37.1 °C)] 97.9 °F (36.6 °C)  Pulse:  [] 122  Resp:   [17-37] 26  SpO2:  [98 %-100 %] 100 %  BP: (95)/(45) 95/45  Arterial Line BP: ()/(48-89) 162/88   Art pH/pCO2/pO2/HCO3:  7.521/44.6/132/36.5 (05/16 0321)  Lab Results   Component Value Date    [id number] Negative 05/11/2022    [id number] Negative 05/04/2022    [id number] Positive (A) 08/05/2021        Recent Labs   Lab 05/10/22  0406 05/11/22  0416 05/14/22  0439 05/14/22  1243 05/14/22  1625 05/15/22  0422 05/16/22  0328 05/16/22  1424   LACTATE 3.5*  --   --   --   --   --   --   --    *   < > 141   < >  --  144 152* 153*   WBC 13.35*   < > 13.93*  --  13.47* 11.97 12.58  --    GRAN 87.8*  11.7*   < > 85.3*  11.9*  --  83.3*  11.2* 79.4*  9.5* 81.0*  10.2*  --    LYMPH 6.3*  0.8*   < > 5.8*  0.8*  --  7.4*  1.0 8.0*  1.0 7.3*  0.9*  --    HGB 8.6*   < > 7.8*  --  7.7* 7.7* 6.9* 8.1*   HCT 28.0*   < > 25.5*  --  25.1* 25.2* 23.8* 27.5*   BUN 87*   < > 111*   < >  --  104* 93* 97*   CREATININE 4.8*   < > 3.4*   < >  --  3.1* 2.9* 2.7*   ESTGFRAFRICA 13*   < > 20*   < >  --  23* 25* 27*   EGFRNONAA 12*   < > 18*   < >  --  20* 21* 23*   K 5.0   < > 3.0*   < >  --  3.1* 3.2* 3.5   CL 90*   < > 98   < >  --  100 105 107   CO2 19*   < > 27   < >  --  31* 32* 29   PHOS  --    < > 5.5*  --   --  4.2 3.8  --    MG 2.0   < > 1.9  --   --  1.8 1.9  --     < > = values in this interval not displayed.       Microbiology Results (last 7 days)       Procedure Component Value Units Date/Time    Blood culture [[id number]] Collected: 05/04/22 1428    Order Status: Completed Specimen: Blood from Line, Arterial, Left Updated: 05/10/22 0612     Blood Culture, Routine No growth after 5 days.    Blood culture [[id number]] Collected: 05/04/22 1429    Order Status: Completed Specimen: Blood from Line, Jugular, Internal Right Updated: 05/10/22 0612     Blood Culture, Routine No growth after 5 days.          Antibiotics (From admission, onward)                Start     Stop Route Frequency Ordered    05/09/22 2205   ertapenem (INVANZ) 500 mg in sodium chloride 0.9% 100 mL IVPB         05/19 8242 IV Every 24 hours (non-standard times) 05/09/22 1624          Anticoagulants       Ordered     Route Frequency Start Stop    05/12/22 1408  heparin (PORCINE)  (LOW INTENSITY heparin infusion nomogram - [facility] (Recommended Indications: Acute Coronary Syndrome, Atrial Fibrillation, Prophylaxis in Prosthetic Heart Valves, and other indication where full anticoagulation is desired, bleeding risk is moderate, antiplatelet agents have been utilized, and time to therapeutic can be delayed minimizing the increased bleeding risk))         IV As needed (PRN) 05/12/22 1508 --    05/12/22 1408  heparin (PORCINE)  (LOW INTENSITY heparin infusion nomogram - [facility] (Recommended Indications: Acute Coronary Syndrome, Atrial Fibrillation, Prophylaxis in Prosthetic Heart Valves, and other indication where full anticoagulation is desired, bleeding risk is moderate, antiplatelet agents have been utilized, and time to therapeutic can be delayed minimizing the increased bleeding risk))         IV As needed (PRN) 05/12/22 1508 --    05/12/22 1408  heparin (porcine) in D5W  (LOW INTENSITY heparin infusion nomogram - [facility] (Recommended Indications: Acute Coronary Syndrome, Atrial Fibrillation, Prophylaxis in Prosthetic Heart Valves, and other indication where full anticoagulation is desired, bleeding risk is moderate, antiplatelet agents have been utilized, and time to therapeutic can be delayed minimizing the increased bleeding risk))         IV Continuous 05/12/22 1415 --          X-Ray Chest 1 View  Narrative: EXAMINATION:  XR CHEST 1 VIEW    CLINICAL HISTORY:  pulm edema;    TECHNIQUE:  Single frontal view of the chest was performed.    COMPARISON:  05/13/2022.    FINDINGS:  Tubes and lines are stable.   In comparison to the prior study, there is no adverse interval changes.  Impression: In comparison to the prior study, there is no adverse interval  changes    Electronically signed by: Philippe Horton MD  Date:    05/16/2022  Time:    07:36   Significant Labs: All pertinent labs within the past 24 hours have been reviewed.    Significant Imaging: I have reviewed all pertinent imaging results/findings within the past 24 hours.      Assessment/Plan:      * Severe sepsis secondary to Peritonitis from bowel perforation  Pressors  Abx - Zosyn / Micafungin  ID on consult    Remains in severe Septic Shock complicated by JOSE/ATN- Anuria and Resp Failure on Vent  ICU team has d/w daughter about HD if and when needed- she does not appear to be in favor of HD at present  Prognosis poor    Day 4 in Septic Shock and on vent  Continue Levo and Vaso plus IV Abx  Prognosis poor    Day 5- Continues same on Vent, WBC better but remains in renal shut down due to shock plus 2 Pressors    Day 6- Continue present supportive care    Day 7- gradually improving, now on 1 pressor and WBC down to 17  Cont present care  Going for surgery today      15 May:  No longer on vasopressor.  Weaning sedation for awakening trials.  Left side intra-abdominal fluid collection/abscess draining into ADDIS.    Atrial fibrillation with RVR  Converted to NSR with Amio gtt    Cont Amio gtt          JOSE (acute kidney injury)  Worsening  Trend  Cr 2.2 today    Cr now 3.8- Oliguric- heading towards Anuria and ATN/ May need HD vs CRRT- she has massive fluid Overload.     Cr now 4.5-  Remains anuric  POA/ Family not in favor of HD    Remains Oliguric due to Septic Shock on 2 Pressors  Some urine output with lasix but no Polyuria     5/10 Urine Out put improved with diuresis while renal functions remains stable    Improving, Cr coming down, good urine output    On mechanically assisted ventilation  Wean as tolerated  CC Team  Pulmonary    Cont vent support    Expect further improvement with diuresis    Cont Vent support  Day 7    Day 8- will start weaning vent soon      Mass of colon  Based on Surgery  Potential  interventions  Goals of care  Biopsy as indicated  Likely Oncologic process with mets  Heme Onc as indicated    See above    Possible resection    resected    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 Parker- pt too unstable for right hemicolectomy, end ileostomy, liver biopsy today s/p abdominal washout with Abthera replacement today.  5/8- POD 3 with s/p Laparotomy and bowel resection and subsequent laparoscopic washout- persistent bowel discontinuity and abthera wound vac closure   5/9- persistent bowel discontinuity- await further decision by family    Await further input from surgery    Await surgery today- going for closure today    S/p- Reopening of recent Laparotomy, Abdominal washout, R Hemicolectomy with Liver Bx, TAP block, Abdominal wall closure, Creation, end Ileostomy. POD 1- looks better, still on Levophed and Amio gtt for Afib, started on TPN, ID stopped Zyvox and continued Merrem/Mycafungin.     Obesity (BMI 30.0-34.9)  Diet  Nutrition on recovery      Acute on chronic anemia  Hgb 10.3 s/p Transfusion 4 units Prbc  Transfuse for Hgb <7  Monitor    5/5  Improved  Hgb 11.4 today  Transfuse as indicated    5/10- H/H 8.6/28- likely dilutional from ATN- improving      Acute hypoxemic respiratory failure on mechanical vent  Patient with Hypoxic Respiratory failure which is Acute.  he is not on home oxygen. Supplemental oxygen was provided and noted- Vent Mode: Spont  Oxygen Concentration (%):  [30-50] 35  Resp Rate Total:  [20 br/min-35 br/min] 25 br/min  Vt Set:  [450 mL] 450 mL  PEEP/CPAP:  [5 cmH20] 5 cmH20  Pressure Support:  [0 cmH20-8 cmH20] 8 cmH20  Mean Airway Pressure:  [8.3 blS48-94 cmH20] 8.4 cmH20.   Signs/symptoms of respiratory failure include- tachypnea. Contributing diagnoses includes - Obesity  Hypoventilation Labs and images were reviewed. Patient Has recent ABG, which has been reviewed. Will treat underlying causes and adjust management of respiratory failure as indicated. Pulmonary CC on board  Day 2 on Vent- remains intubated on vent  Cont vent support    Day 4 on Vent    Day 5 on vent- adequate O2, sats    Day 6- continue supportive care, await family's decision about comfort care    Day 7- continue support- trying to diurese     Day 8- minimal vent support- start weaning soon    15 May:  SAT/SBT as tolerated      VTE Risk Mitigation (From admission, onward)         Ordered     heparin 25,000 units in dextrose 5% (100 units/ml) IV bolus from bag - ADDITIONAL PRN BOLUS - 60 units/kg  As needed (PRN)        Question:  Heparin Infusion Adjustment (DO NOT MODIFY ANSWER)  Answer:  \[email or web address]\[email or web address]\Images\Pharmacy\HeparinInfusions\heparin LOW INTENSITY nomogram for [facility] [id number].[email or web address]    05/12/22 1408     heparin 25,000 units in dextrose 5% (100 units/ml) IV bolus from bag - ADDITIONAL PRN BOLUS - 30 units/kg  As needed (PRN)        Question:  Heparin Infusion Adjustment (DO NOT MODIFY ANSWER)  Answer:  [email or web address]\epic\Images\Pharmacy\HeparinInfusions\heparin LOW INTENSITY nomogram for [facility] [id number]    05/12/22 1408     heparin 25,000 units in dextrose 5% 250 mL (100 units/mL) infusion LOW INTENSITY nomogram - [facility]  Continuous        Question Answer Comment   Heparin Infusion Adjustment (DO NOT MODIFY ANSWER) [email or web address]\epic\Images\Pharmacy\HeparinInfusions\heparin LOW INTENSITY nomogram for [facility] [id number].pdf    Begin at (in units/kg/hr) 12        05/12/22 1408     IP VTE HIGH RISK PATIENT  Once         05/04/22 1253     Place sequential compression device  Until discontinued         05/04/22 1253                Discharge Planning   ELLIOT:      Code Status: DNR   Is the patient medically ready for discharge?:     Reason for patient still in hospital (select all that apply): Patient unstable, Patient new  problem, Patient trending condition, Treatment and Consult recommendations  Discharge Plan A: Comfort care/withdrawal            Critical care time spent on the evaluation and treatment of severe organ dysfunction, review of pertinent labs and imaging studies, discussions with consulting providers and discussions with patient/family: 30 minutes.      Alexandro Kebede MD  Department of Hospital Medicine   [facility] - Intensive Care [facility])

## 2022-05-16 NOTE — PROGRESS NOTES
[facility] ([facility])  Critical Care Medicine  Progress Note    Patient Name: Franky Masters  MRN: [id number]  Admission Date: 5/4/2022  Hospital Length of Stay: 12 days  Code Status: DNR  Attending Provider: Elvie Parker DO  Primary Care Provider: HOLLY ROSEN   Principal Problem: Severe sepsis    Subjective:     HPI:  Ms Masters is a 67 yo obese male with a PMH of diverticulosis and hx of hospitalization in Aug 2021 with COVID PNA and Hypoxic Resp Failure but did not require ICU or intubation.  She presented early this AM to [facility] ED about 0515 hr via EMS with complaint of abd pain X 2 hours that awakened her from sleep and had associated N/V/D.  In ED BP 86/46, RA SAT 92%, LA 8, Hgb 7.2 and CT Abd with suspected bowel perforation and free air.  General Surgery consulted and taken to OR this AM revealing SB perf with 3 L feculent fluid and food in cavity and large obstructing cecal mass with perf ileum and palpable liver mass.  Had Expl Lap with washout and excision of SB with wound vac placement admitted post op to ICU intubated on mech ventilation.  Received 2 units PRBCs in ED and another 2 units in OR also on Levophed and Vasopressin infusions.  Before surgery patient was insistent he be DNR post op but consented to surgery and invasive mech ventilation but no ACLS post op in event of cardiac arrest and no prolonged mech ventilation. Reportedly patient does not routinely follow with practitioner as outpt.       Hospital/ICU Course:  5/4 - Admitted to ICU sedated and intubated on Levophed and Vasopressin infusions in no distress  5/5 - remains intubated and sedated on mechanical vent and pressor support; scant urine output overnight and creatinine rise to 2.2 with fluid balance +8.9L  5/6 - remains intubated and sedated on mechanical vent with decreasing pressor demand; still oliguric with creatinine up to 3.6, K 5.2; fluid balance +13L; now with bigeminy and cardiac rhythm changes, K  stable on abg but iCa 0.78  5/7 - remains intubated and sedated with open abdomen to abthera wound vac and pressor support; overnight atrial fib with RVR, now on amio infusion with SR 68 on monitor; plan to OR for washout and vac replacement today  5/8 - remains intubated, sedated with ab thera wound vac to open abdomen, mechanical ventilation, and 2 pressor support. SR on monitor, on amiodarone infusion. Tmax 100.2, wbc down at 12.3k, creatinine rising 4.6,urine output scant, K 5.1  5/9 - remains intubated and sedated with ab thera wound vac to open abdomen, mech vent, and 2 pressor support. Remains SR on monitor with amio infusion. Tmax 98.9, wbc down 10.6k, creatinine holding at 4.7, K 4.6 after lasix trial with 1.2L urinary output  5/10 - remains intubated and sedated with ab thera wound vac to open abdomen, mech vent, on pressor support. Atrial fib on monitor, rate 90s with occasional non sustained elevation. Urine output adequate since lasix trial but creatinine, K, BUN unchanged and remains 22L positive fluid status since admit  5/11 - return from OR late this evening after ABD WASHOUT, RT HEMICOLECTOMY, ILEOSTOMY, LIVER BIOPSY, AND ABD WALL CLOSURE. Remains atrial fib 80-100s on monitor on levophed support.  5/12 - semi erect in bed intubated on mech ventilation in no distress still in A-fib rate 113 bpm sedated heavily still on Fentanyl infusion.  Also still on Amiodarone and Levophed infusions.  Receiving TPN.  S/P abd washout and closure yesterday.    5/13 - semi erect in bed intubated on mech ventilation and sedated in no distress on Precedex, Heparin, Amiodarone, Fentanyl and Levophed infusions.  Also on TPN.  BP labile on pressor.  Still in A-Fib rate 113 bpm.  5/14 - semi erect intubated on mech ventilation in no distress sedated on Precedex infusion.  Also still on Levophed, Amiodarone and Heparin infusions with TPN.  BP still labile and still in A-Fib w/ rate controlled.  Pulm edema pink froth today  copious suctioned from OET.   5/15 - still lethargic off sedation and intubated on mech ventilation in no distress tolerating SBT but very weak unable to lift head off pillow.  Had N/V TF overnight and NG output to suction 320 ml overnight green bile.  Weaned off Levophed infusion at 0300 hr this AM.  Still on TPN as well as Amiodarone and Heparin infusions.   5/16 - Supine in bed off sedation awakens with stimuli and follows simple commands but slowly and still lethargic, intubated on mech ventilation.  Still on TPN, Heparin infusion and Amiodarone infusion.  Hgb drop to 6.9 with PRBC transfusion pending.  Tolerated press support ventilation overnight.  Had conversion to SB at 50 bpm overnight and Lopressor stopped now back in A-fib at 108 bpm.  350 ml NG output overnight and 1 liter bile dumped in colostomy overnight.       Review of Systems   Unable to perform ROS: Intubated       Objective:     Vital Signs (Most Recent):  Temp: 97.7 °F (36.5 °C) (05/16/22 0715)  Pulse: 105 (05/16/22 0932)  Resp: 20 (05/16/22 0932)  BP: (!) 95/45 (05/15/22 2112)  SpO2: 100 % (05/16/22 0932)   Vital Signs (24h Range):  Temp:  [97.7 °F (36.5 °C)-98.8 °F (37.1 °C)] 97.7 °F (36.5 °C)  Pulse:  [] 105  Resp:  [17-37] 20  SpO2:  [95 %-100 %] 100 %  BP: ()/(45-80) 95/45  Arterial Line BP: ()/(48-81) 127/71     Weight: 109.4 kg (241 lb 2.9 oz)  Body mass index is 35.62 kg/m².      Intake/Output Summary (Last 24 hours) at 5/16/2022 1017  Last data filed at 5/16/2022 0800  Gross per 24 hour   Intake 2134.75 ml   Output 4825 ml   Net -2690.25 ml       Physical Exam  Vitals and nursing note reviewed.   Constitutional:       General: He is not in acute distress.     Appearance: He is well-developed. He is obese. He is ill-appearing. He is not toxic-appearing or diaphoretic.      Interventions: He is intubated and restrained.   HENT:      Head: Normocephalic and atraumatic.      Nose:        Mouth/Throat:      Mouth: Mucous  membranes are moist.   Eyes:      General: No scleral icterus.     Conjunctiva/sclera: Conjunctivae normal.      Pupils: Pupils are equal, round, and reactive to light.   Neck:      Vascular: No JVD.     Cardiovascular:      Rate and Rhythm: Tachycardia present. Rhythm irregular.      Pulses:           Radial pulses are 2+ on the right side and 2+ on the left side.        Dorsalis pedis pulses are 2+ on the right side and 2+ on the left side.      Heart sounds: Heart sounds are distant.   Pulmonary:      Effort: No tachypnea, accessory muscle usage or respiratory distress. He is intubated.      Breath sounds: Examination of the right-lower field reveals decreased breath sounds. Examination of the left-lower field reveals decreased breath sounds. Decreased breath sounds and rhonchi (improved post OET suctioning) present.   Chest:      Chest wall: No deformity or tenderness.   Abdominal:      General: Bowel sounds are decreased. There is no distension.      Palpations: Abdomen is soft.          Comments: Some loose brown stool in colostomy   Genitourinary:     Testes:         Right: Swelling present.         Left: Swelling present.      Comments: Doherty in place.  Significant scrotal edema  Musculoskeletal:      Cervical back: Neck supple.      Right lower leg: 3+ Pitting Edema present.      Left lower leg: 3+ Pitting Edema present.      Right foot: No deformity.      Left foot: No deformity.   Lymphadenopathy:      Cervical: No cervical adenopathy.   Skin:     General: Skin is warm.      Capillary Refill: Capillary refill takes 2 to 3 seconds.          Neurological:      Mental Status: He is lethargic.      Comments: Still somnolent on SAT.  Follows simple one-step commands but not consistent and very weak       Vents:  Vent Mode: Spont (05/16/22 0932)  Ventilator Initiated: Yes (05/04/22 1258)  Set Rate: 0 BPM (05/16/22 0932)  Vt Set: 450 mL (05/16/22 0932)  Pressure Support: 8 cmH20 (05/16/22 0932)  PEEP/CPAP: 5  cmH20 (05/16/22 0932)  Oxygen Concentration (%): 35 (05/16/22 0932)  Peak Airway Pressure: 14 cmH2O (05/16/22 0932)  Plateau Pressure: 0 cmH20 (05/16/22 0932)  Total Ve: 9.19 mL (05/16/22 0932)  F/VT Ratio<105 (RSBI): (!) 44.64 (05/16/22 0932)    Lines/Drains/Airways       Central Venous Catheter Line  Duration             Percutaneous Central Line Insertion/Assessment - Triple Lumen  05/04/22 1200 right internal jugular 11 days              Drain  Duration                  Urethral Catheter 05/04/22 1105 Straight-tip;Silicone 16 Fr. 11 days         Ileostomy 05/11/22 Standard (Comfort, christian) RUQ 5 days         Closed/Suction Drain 05/11/22 1735 LUQ Bulb 19 Fr. 4 days         Closed/Suction Drain 05/11/22 1735 RLQ Bulb 19 Fr. 4 days         NG/OG Tube 05/11/22 1715 [place] sump 18 Fr. Left nostril 4 days              Airway  Duration                  Airway - Non-Surgical 05/04/22 1051 Endotracheal Tube 11 days              Arterial Line  Duration             Arterial Line 05/07/22 1330 Right Radial 8 days              Peripheral Intravenous Line  Duration                  Midline Catheter Insertion/Assessment  - Single Lumen 05/12/22 1730 Left basilic vein (medial side of arm) 3 days                    Significant Labs:    CBC/Anemia Profile:  Recent Labs   Lab 05/14/22  1625 05/15/22  0422 05/16/22  0328   WBC 13.47* 11.97 12.58   HGB 7.7* 7.7* 6.9*   HCT 25.1* 25.2* 23.8*   * 152 213   MCV 71* 71* 74*   RDW 27.0* 26.9* 27.7*        Chemistries:  Recent Labs   Lab 05/14/22  1243 05/15/22  0422 05/16/22  0328    144 152*   K 3.5 3.1* 3.2*   CL 99 100 105   CO2 27 31* 32*   * 104* 93*   CREATININE 3.2* 3.1* 2.9*   CALCIUM 7.5* 7.8* 7.9*   ALBUMIN  --  1.3* 1.4*   PROT  --  4.5* 4.4*   BILITOT  --  1.0 1.1*   ALKPHOS  --  141* 126   ALT  --  74* 56*   AST  --  84* 59*   MG  --  1.8 1.9   PHOS  --  4.2 3.8       ABGs:   Recent Labs   Lab 05/16/22  0321   PH 7.521*   PCO2 44.6   HCO3 36.5*    POCSATURATED 99   BE 14     Coagulation:   Recent Labs   Lab 05/16/22  0328   APTT 49.4*     POCT Glucose:   Recent Labs   Lab 05/16/22  0013 05/16/22  0337 05/16/22  0835   POCTGLUCOSE 155* 151* 160*     All pertinent labs within the past 24 hours have been reviewed.    Significant Imaging:  CXR: no significant change from prior film  CT Abd: Loculated fluid density along the left upper abdomen and anterior left mid abdomen may reflect infectious hemorrhagic or seroma fluid collection.        ABG  Recent Labs   Lab 05/16/22  0321   PH 7.521*   PO2 132*   PCO2 44.6   HCO3 36.5*   BE 14     Assessment/Plan:     Pulmonary  Acute hypoxemic respiratory failure on mechanical vent  Vent Day # 13  Vent settings reviewed and adjusted to optimize gas exchange  VAP prophylaxis  ABG daily and prn to assess response to therapy  Off sedation and tolerating SBT but still very weak unable to lift head off pillow.  FVC 1.5 L w/ cough and best NIF -21 cm H2O.  Concern with ability to protect airway with extubation given N/V and possible ileus.    Cardiac/Vascular  Atrial fibrillation with RVR  New onset 5/6  on amiodarone infusion   Lopressor stopped overnight due to SB overnight but now back in A-fib  Continue cardiac monitoring  Cont Heparin infusion till cleared for enteral route OAC    Renal/  Electrolyte imbalance  Replaced K+  Add D5W for Na increase to 152  Renal following  Urine Na pending  Follow up labs later today and daily  ? DI    JOSE (acute kidney injury)  S/t septic shock  Adequate volume resuscitation +16 L I/O balance  Avoid nephrotoxins  Strict I/O  No acute indication for dialysis but high potential for continued decline, daughter(HCPOA) will not pursue dialysis if decline per her father's wishes  Nephrology following  Good UOP  Lasix/Albumin for anasarca/pulm edema X 4 doses completed    ID  * Severe sepsis secondary to Peritonitis from bowel perforation  Secondary to Peritonitis from bowel perforation with  major fecal contamination  Blood and sputum cultures 5/4 Neg  S/P Zyvox  Continue Invanz and Micafungin per ID following  Weaned off Levophed infusion 5/15  ICU hemodynamic monitoring  Follow fever curve and WBC  Will change CL next 24-48 hours    Oncology  Acute on chronic anemia  Received 2 units PRBCs in ED and 2 more in OR on 5/4  CBC daily  No active bleeding  Hgb down to 6.9 this AM will transfuse 1 unit PRBC  Monitor closely with Heparin infusion for A-fib     Endocrine  Obesity (BMI 30.0-34.9)  Will encourage weight loss once/if survives and extubated and fully awake/alert    GI  Mass of colon  Found on exploration along with palpable liver mass  Sent for pathology 5/11 results still pending  High concern for metastatic malignancy    Small bowel perforation with large Cecal mass   5/4 Expl Lap and SB excision with washout and AB thera wound vac for bowel left in discontinuity  5/7 washout and wound vac replacement  hx Diverticulosis but cecal mass and suspected metastatic lesions found in exploration  5/11 ABD WASHOUT, RT HEMICOLECTOMY, ILEOSTOMY, LIVER BIOPSY, AND ABD WALL CLOSURE  IVAB for peritonitis/sepsis  Cont TPN, trickle TF stopped 5/15 due to N/V overnight continues high NG output 350 ml last 12 hours  Pathology still pending  Surgery following, possible Ileus given N/V and NG output.    CT Abd: Loculated fluid density along the left upper abdomen and anterior left mid abdomen may reflect infectious hemorrhagic or seroma fluid collection.      Palliative Care  Pt is DNR, discussed at length with surgeon prior to surgery and he consented to intubation for surgery with understanding of likely need for prolonged vent support post op until bowel/abdomen closed. He was clear that he would not desire long term vent support past that necessary for immediate post op recovery.   Daughter Claudia is SDM and is aware and supportive of his wishes. IF at any point his survival chances become poor or prolonged  life support is anticipated she would honor his wish and transition to comfort focused care.  5/6 - discussed status with daughter. She expressed again understanding of her father's wishes for very limited life support and further stated today that after time to reflect on current status, in the event of continued decline she would not want to pursue potential dialysis but if kidneys fail and indications for RRT exist she would at that time desire transition to a full comfort care focus. She would hope that he could be extubated to comfort focus and have opportunity to interact with family but verbalizes understanding that this may not be possible given open abdomen and comfort goal.  Continue daily and prn updates          Preventive Measures and Monitoring:   Stress Ulcer: Pepcid  Nutrition: TPN   Glucose control: SSI  Bowel prophylaxis: S/P colon surgery  DVT prophylaxis: Heparin infusion  Hx CAD on B-Blocker: no hx CAD  Head of Bed/Reposition: Elevate HOB and turn Q1-2 hours   Early Mobility: bed rest  SBT tolerating fair but still very weak and lethargic  RASS goal: 0  Vent Day: #13  NG Day: #6  Central Line Right IJ Day: #13  Doherty Day: #13  IVAB Day: #13  Code Status: DNR      Counseling/Consultation:I have discussed the care of this patient in detail with the bedside nursing staff and Dr. Huynh and Dr. Parker     Patient assessed.  Soft bilat wrist restraints renewed due to risk of pulling lines, tubes and/or climbing OOB.     Critical Care Time: 50 minutes  Critical secondary to Patient has a condition that poses threat to life and bodily function: Acute Renal Failure and Septic Shock and Acute Hypoxic Resp Failure intubated on [facility] ventilation  Patient is currently on drug therapy requiring intensive monitoring for toxicity: Amiodarone, and Heparin infusions and TPN     Critical care was time spent personally by me on the following activities: development of treatment plan with patient or surrogate and  bedside caregivers, discussions with consultants, evaluation of patient's response to treatment, examination of patient, ordering and performing treatments and interventions, ordering and review of laboratory studies, ordering and review of radiographic studies, pulse oximetry, re-evaluation of patient's condition. This critical care time did not overlap with that of any other provider or involve time for any procedures.     Rai Hernandez, NP  Critical Care Medicine  [facility] - Intensive Care [facility])

## 2022-05-16 NOTE — SUBJECTIVE & OBJECTIVE
Review of Systems   Unable to perform ROS: Intubated       Objective:     Vital Signs (Most Recent):  Temp: 97.7 °F (36.5 °C) (05/16/22 0715)  Pulse: 105 (05/16/22 0932)  Resp: 20 (05/16/22 0932)  BP: (!) 95/45 (05/15/22 2112)  SpO2: 100 % (05/16/22 0932)   Vital Signs (24h Range):  Temp:  [97.7 °F (36.5 °C)-98.8 °F (37.1 °C)] 97.7 °F (36.5 °C)  Pulse:  [] 105  Resp:  [17-37] 20  SpO2:  [95 %-100 %] 100 %  BP: ()/(45-80) 95/45  Arterial Line BP: ()/(48-81) 127/71     Weight: 109.4 kg (241 lb 2.9 oz)  Body mass index is 35.62 kg/m².      Intake/Output Summary (Last 24 hours) at 5/16/2022 1017  Last data filed at 5/16/2022 0800  Gross per 24 hour   Intake 2134.75 ml   Output 4825 ml   Net -2690.25 ml       Physical Exam  Vitals and nursing note reviewed.   Constitutional:       General: He is not in acute distress.     Appearance: He is well-developed. He is obese. He is ill-appearing. He is not toxic-appearing or diaphoretic.      Interventions: He is intubated and restrained.   HENT:      Head: Normocephalic and atraumatic.      Nose:        Mouth/Throat:      Mouth: Mucous membranes are moist.   Eyes:      General: No scleral icterus.     Conjunctiva/sclera: Conjunctivae normal.      Pupils: Pupils are equal, round, and reactive to light.   Neck:      Vascular: No JVD.     Cardiovascular:      Rate and Rhythm: Tachycardia present. Rhythm irregular.      Pulses:           Radial pulses are 2+ on the right side and 2+ on the left side.        Dorsalis pedis pulses are 2+ on the right side and 2+ on the left side.      Heart sounds: Heart sounds are distant.   Pulmonary:      Effort: No tachypnea, accessory muscle usage or respiratory distress. He is intubated.      Breath sounds: Examination of the right-lower field reveals decreased breath sounds. Examination of the left-lower field reveals decreased breath sounds. Decreased breath sounds and rhonchi (improved post OET suctioning) present.   Chest:       Chest wall: No deformity or tenderness.   Abdominal:      General: Bowel sounds are decreased. There is no distension.      Palpations: Abdomen is soft.          Comments: Some loose brown stool in colostomy   Genitourinary:     Testes:         Right: Swelling present.         Left: Swelling present.      Comments: Doherty in place.  Significant scrotal edema  Musculoskeletal:      Cervical back: Neck supple.      Right lower leg: 3+ Pitting Edema present.      Left lower leg: 3+ Pitting Edema present.      Right foot: No deformity.      Left foot: No deformity.   Lymphadenopathy:      Cervical: No cervical adenopathy.   Skin:     General: Skin is warm.      Capillary Refill: Capillary refill takes 2 to 3 seconds.          Neurological:      Mental Status: He is lethargic.      Comments: Still somnolent on SAT.  Follows simple one-step commands but not consistent and very weak       Vents:  Vent Mode: Spont (05/16/22 0932)  Ventilator Initiated: Yes (05/04/22 1258)  Set Rate: 0 BPM (05/16/22 0932)  Vt Set: 450 mL (05/16/22 0932)  Pressure Support: 8 cmH20 (05/16/22 0932)  PEEP/CPAP: 5 cmH20 (05/16/22 0932)  Oxygen Concentration (%): 35 (05/16/22 0932)  Peak Airway Pressure: 14 cmH2O (05/16/22 0932)  Plateau Pressure: 0 cmH20 (05/16/22 0932)  Total Ve: 9.19 mL (05/16/22 0932)  F/VT Ratio<105 (RSBI): (!) 44.64 (05/16/22 0932)    Lines/Drains/Airways       Central Venous Catheter Line  Duration             Percutaneous Central Line Insertion/Assessment - Triple Lumen  05/04/22 1200 right internal jugular 11 days              Drain  Duration                  Urethral Catheter 05/04/22 1105 Straight-tip;Silicone 16 Fr. 11 days         Ileostomy 05/11/22 Standard (Comfort, christian) RUQ 5 days         Closed/Suction Drain 05/11/22 1735 LUQ Bulb 19 Fr. 4 days         Closed/Suction Drain 05/11/22 1735 RLQ Bulb 19 Fr. 4 days         NG/OG Tube 05/11/22 1715 [place] sump 18 Fr. Left nostril 4 days              Airway  Duration                   Airway - Non-Surgical 05/04/22 1051 Endotracheal Tube 11 days              Arterial Line  Duration             Arterial Line 05/07/22 1330 Right Radial 8 days              Peripheral Intravenous Line  Duration                  Midline Catheter Insertion/Assessment  - Single Lumen 05/12/22 1730 Left basilic vein (medial side of arm) 3 days                    Significant Labs:    CBC/Anemia Profile:  Recent Labs   Lab 05/14/22  1625 05/15/22  0422 05/16/22  0328   WBC 13.47* 11.97 12.58   HGB 7.7* 7.7* 6.9*   HCT 25.1* 25.2* 23.8*   * 152 213   MCV 71* 71* 74*   RDW 27.0* 26.9* 27.7*        Chemistries:  Recent Labs   Lab 05/14/22  1243 05/15/22  0422 05/16/22  0328    144 152*   K 3.5 3.1* 3.2*   CL 99 100 105   CO2 27 31* 32*   * 104* 93*   CREATININE 3.2* 3.1* 2.9*   CALCIUM 7.5* 7.8* 7.9*   ALBUMIN  --  1.3* 1.4*   PROT  --  4.5* 4.4*   BILITOT  --  1.0 1.1*   ALKPHOS  --  141* 126   ALT  --  74* 56*   AST  --  84* 59*   MG  --  1.8 1.9   PHOS  --  4.2 3.8       ABGs:   Recent Labs   Lab 05/16/22  0321   PH 7.521*   PCO2 44.6   HCO3 36.5*   POCSATURATED 99   BE 14     Coagulation:   Recent Labs   Lab 05/16/22  0328   APTT 49.4*     POCT Glucose:   Recent Labs   Lab 05/16/22  0013 05/16/22  0337 05/16/22  0835   POCTGLUCOSE 155* 151* 160*     All pertinent labs within the past 24 hours have been reviewed.    Significant Imaging:  CXR: no significant change from prior film  CT Abd: Loculated fluid density along the left upper abdomen and anterior left mid abdomen may reflect infectious hemorrhagic or seroma fluid collection.

## 2022-05-16 NOTE — SUBJECTIVE & OBJECTIVE
Interval History: Awakening trials good and breathing adequately on spontaneous.  Remains very weak.  Unable to lift his head off the pillow.  CT of the abdomen/pelvis shows a fluid collection left upper quadrant descending along the left flank.  Left side drain is collecting dirty dishwater-like fluid.  Discussed with General Surgery and will continue with current drain and may request additional or replacement drain by IR.  Continuing antibiotics and coordinating care with Critical Care Medicine.    Review of Systems   Unable to perform ROS: Intubated   Objective:     Vital Signs (Most Recent):  Temp: 97.9 °F (36.6 °C) (05/16/22 1515)  Pulse: (!) 122 (05/16/22 1809)  Resp: (!) 26 (05/16/22 1809)  BP: (!) 95/45 (05/15/22 2112)  SpO2: 100 % (05/16/22 1809)   Vital Signs (24h Range):  Temp:  [97.7 °F (36.5 °C)-98.7 °F (37.1 °C)] 97.9 °F (36.6 °C)  Pulse:  [] 122  Resp:  [17-37] 26  SpO2:  [98 %-100 %] 100 %  BP: (95)/(45) 95/45  Arterial Line BP: ()/(48-89) 162/88     Weight: 109.4 kg (241 lb 2.9 oz)  Body mass index is 35.62 kg/m².    Intake/Output Summary (Last 24 hours) at 5/16/2022 1840  Last data filed at 5/16/2022 1800  Gross per 24 hour   Intake 2963.52 ml   Output 3992 ml   Net -1028.48 ml        Physical Exam  Vitals and nursing note reviewed.   Constitutional:       General: He is not in acute distress.     Appearance: He is well-developed. He is obese. He is ill-appearing. He is not toxic-appearing or diaphoretic.      Interventions: He is sedated, intubated and restrained.   HENT:      Head: Atraumatic.      Nose:        Mouth/Throat:      Mouth: Mucous membranes are moist.   Eyes:      General: No scleral icterus.     Conjunctiva/sclera: Conjunctivae normal.      Pupils: Pupils are equal, round, and reactive to light.   Neck:      Vascular: No JVD.     Cardiovascular:      Rate and Rhythm: Tachycardia present. Rhythm irregular.      Pulses:           Radial pulses are 1+ on the right side and  1+ on the left side.        Dorsalis pedis pulses are 1+ on the right side and 1+ on the left side.      Heart sounds: Heart sounds are distant.   Pulmonary:      Effort: No accessory muscle usage or respiratory distress. He is intubated.      Breath sounds: Decreased breath sounds present. No wheezing or rhonchi.   Chest:      Chest wall: No deformity or tenderness.   Abdominal:      General: Bowel sounds are absent. There is no distension.       Genitourinary:     Comments: Doherty in place  Musculoskeletal:      Cervical back: Neck supple.      Right lower leg: 3+ Pitting Edema present.      Left lower leg: 3+ Pitting Edema present.      Right foot: No deformity.      Left foot: No deformity.   Lymphadenopathy:      Cervical: No cervical adenopathy.   Skin:     General: Skin is warm.      Capillary Refill: Capillary refill takes 2 to 3 seconds.          Neurological:      Comments: Heavily sedated     Flow (L/min): 4  Vent Mode: Spont  Oxygen Concentration (%):  [35] 35  Resp Rate Total:  [17 br/min-28 br/min] 26 br/min  Vt Set:  [450 mL] 450 mL  PEEP/CPAP:  [5 cmH20] 5 cmH20  Pressure Support:  [8 cmH20] 8 cmH20  Mean Airway Pressure:  [7.7 cmH20-8.6 cmH20] 8.3 cmH20  Temp:  [97.7 °F (36.5 °C)-98.7 °F (37.1 °C)] 97.9 °F (36.6 °C)  Pulse:  [] 122  Resp:  [17-37] 26  SpO2:  [98 %-100 %] 100 %  BP: (95)/(45) 95/45  Arterial Line BP: ()/(48-89) 162/88   Art pH/pCO2/pO2/HCO3:  7.521/44.6/132/36.5 (05/16 0321)  Lab Results   Component Value Date    [id number] Negative 05/11/2022    [id number] Negative 05/04/2022    [id number] Positive (A) 08/05/2021        Recent Labs   Lab 05/10/22  0406 05/11/22  0416 05/14/22  0439 05/14/22  1243 05/14/22  1625 05/15/22  0422 05/16/22  0328 05/16/22  1424   LACTATE 3.5*  --   --   --   --   --   --   --    *   < > 141   < >  --  144 152* 153*   WBC 13.35*   < > 13.93*  --  13.47* 11.97 12.58  --    GRAN 87.8*  11.7*   < > 85.3*  11.9*  --  83.3*  11.2*  79.4*  9.5* 81.0*  10.2*  --    LYMPH 6.3*  0.8*   < > 5.8*  0.8*  --  7.4*  1.0 8.0*  1.0 7.3*  0.9*  --    HGB 8.6*   < > 7.8*  --  7.7* 7.7* 6.9* 8.1*   HCT 28.0*   < > 25.5*  --  25.1* 25.2* 23.8* 27.5*   BUN 87*   < > 111*   < >  --  104* 93* 97*   CREATININE 4.8*   < > 3.4*   < >  --  3.1* 2.9* 2.7*   ESTGFRAFRICA 13*   < > 20*   < >  --  23* 25* 27*   EGFRNONAA 12*   < > 18*   < >  --  20* 21* 23*   K 5.0   < > 3.0*   < >  --  3.1* 3.2* 3.5   CL 90*   < > 98   < >  --  100 105 107   CO2 19*   < > 27   < >  --  31* 32* 29   PHOS  --    < > 5.5*  --   --  4.2 3.8  --    MG 2.0   < > 1.9  --   --  1.8 1.9  --     < > = values in this interval not displayed.       Microbiology Results (last 7 days)       Procedure Component Value Units Date/Time    Blood culture [[id number]] Collected: 05/04/22 1428    Order Status: Completed Specimen: Blood from Line, Arterial, Left Updated: 05/10/22 0612     Blood Culture, Routine No growth after 5 days.    Blood culture [[id number]] Collected: 05/04/22 1429    Order Status: Completed Specimen: Blood from Line, Jugular, Internal Right Updated: 05/10/22 0612     Blood Culture, Routine No growth after 5 days.          Antibiotics (From admission, onward)                Start     Stop Route Frequency Ordered    05/09/22 2200  ertapenem (INVANZ) 500 mg in sodium chloride 0.9% 100 mL IVPB         05/19 3219 IV Every 24 hours (non-standard times) 05/09/22 1624          Anticoagulants       Ordered     Route Frequency Start Stop    05/12/22 1408  heparin (PORCINE)  (LOW INTENSITY heparin infusion nomogram - [facility] (Recommended Indications: Acute Coronary Syndrome, Atrial Fibrillation, Prophylaxis in Prosthetic Heart Valves, and other indication where full anticoagulation is desired, bleeding risk is moderate, antiplatelet agents have been utilized, and time to therapeutic can be delayed minimizing the increased bleeding risk))         IV As needed (PRN) 05/12/22 1508 --    05/12/22  1408  heparin (PORCINE)  (LOW INTENSITY heparin infusion nomogram - [facility] (Recommended Indications: Acute Coronary Syndrome, Atrial Fibrillation, Prophylaxis in Prosthetic Heart Valves, and other indication where full anticoagulation is desired, bleeding risk is moderate, antiplatelet agents have been utilized, and time to therapeutic can be delayed minimizing the increased bleeding risk))         IV As needed (PRN) 05/12/22 1508 --    05/12/22 1408  heparin (porcine) in D5W  (LOW INTENSITY heparin infusion nomogram - [facility] (Recommended Indications: Acute Coronary Syndrome, Atrial Fibrillation, Prophylaxis in Prosthetic Heart Valves, and other indication where full anticoagulation is desired, bleeding risk is moderate, antiplatelet agents have been utilized, and time to therapeutic can be delayed minimizing the increased bleeding risk))         IV Continuous 05/12/22 1415 --          X-Ray Chest 1 View  Narrative: EXAMINATION:  XR CHEST 1 VIEW    CLINICAL HISTORY:  pulm edema;    TECHNIQUE:  Single frontal view of the chest was performed.    COMPARISON:  05/13/2022.    FINDINGS:  Tubes and lines are stable.   In comparison to the prior study, there is no adverse interval changes.  Impression: In comparison to the prior study, there is no adverse interval changes    Electronically signed by: Philippe Horton MD  Date:    05/16/2022  Time:    07:36   Significant Labs: All pertinent labs within the past 24 hours have been reviewed.    Significant Imaging: I have reviewed all pertinent imaging results/findings within the past 24 hours.

## 2022-05-16 NOTE — ASSESSMENT & PLAN NOTE
Received 2 units PRBCs in ED and 2 more in OR on 5/4  CBC daily  No active bleeding  Hgb down to 6.9 this AM will transfuse 1 unit PRBC  Monitor closely with Heparin infusion for A-fib

## 2022-05-16 NOTE — ASSESSMENT & PLAN NOTE
Vent Day # 13  Vent settings reviewed and adjusted to optimize gas exchange  VAP prophylaxis  ABG daily and prn to assess response to therapy  Off sedation and tolerating SBT but still very weak unable to lift head off pillow.  FVC 1.5 L w/ cough and best NIF -21 cm H2O.  Concern with ability to protect airway with extubation given N/V and possible ileus.

## 2022-05-16 NOTE — ASSESSMENT & PLAN NOTE
Replaced K+  Add D5W for Na increase to 152  Renal following  Urine Na pending  Follow up labs later today and daily  ? DI

## 2022-05-16 NOTE — ASSESSMENT & PLAN NOTE
New onset 5/6  on amiodarone infusion   Lopressor stopped overnight due to SB overnight but now back in A-fib  Continue cardiac monitoring  Cont Heparin infusion till cleared for enteral route OAC

## 2022-05-16 NOTE — NURSING
4:00- Pt HR now in afib HR between . Spoke to Dr. Zayas. No interventions at this times. Call if HR sustains in the 120's. Will continue to monitor.

## 2022-05-16 NOTE — ASSESSMENT & PLAN NOTE
S/p exploratory laparotomy, abdominal washout, segmental small bowel resection (left in discontinuity), placement of Abthera vac 5/4/22  S/p reopening of recent laparotomy, abdominal washout, replacement of Abthera vac 5/7/22  S/p reopening of recent laparotomy, abdominal washout, right hemicolectomy, liver biopsy, end ileostomy, TAP block, abdominal wall closure 5/11/22    - Will ask IR to place additional drain into LUQ abscess for additional source control  - did not tolerate tube feeds well, NG tube currently on suction, bowel function likely sluggish due to ileus from inflammation/infection  - Continue ICU management. On heparin and amio gtt for afib.  - Ostomy nurse consult  - Strict I/Os  - TPN  - Medical and ICU management per hospital/ICU team

## 2022-05-16 NOTE — PLAN OF CARE
Neuro:  Drowsy, follows commands (squeezes hands).  Respiratory:  Remains on SBT - no respiratory distress noted.  GI:  Faint BS in LUQ; no BM.  NGT remains to LIWS. :  Keily colored urine, approximately 100 cc/hr. Remains on amiodarone & heparin for rhythm control, TPN & lipids.  Received 1 unit of PRBC for low H&H.  Rechecked H&H (8.1/27.5).  Replaced Potassium with another 40 mEq.  Plans for AM to consult IR for LUQ abscess.

## 2022-05-16 NOTE — EICU
EICU FOLLOWUP NOTE:    Called for:  Abnormal labs    Medical records including notes, labs and imaging were reviewed.    DISCUSSED with bedside nurse     ASSESSMENT AND PLAN:    1. Hypokalemia.  Potassium 3.2.-----Replaced  2.  Hyponatremia-----sodium increased to 152  On TPN.  Pharmacy to adjust free water/electrolytes in TPN tomorrow    3.  Hemoglobin 6.9.  No obvious signs of active bleed.  Loculated fluid density in the left upper abdomen, Per report of CT abdomen.?  Hemorrhagic  --Surgery following  --Transfuse 1 unit of packed RBC  --Posttransfusion H&H    Thank You for allowing EICU to participate in the care of the patient. Please call as needed    Daquan Zayas MD  [facility]  [phone]

## 2022-05-16 NOTE — ASSESSMENT & PLAN NOTE
5/4 Expl Lap and SB excision with washout and AB thera wound vac for bowel left in discontinuity  5/7 washout and wound vac replacement  hx Diverticulosis but cecal mass and suspected metastatic lesions found in exploration  5/11 ABD WASHOUT, RT HEMICOLECTOMY, ILEOSTOMY, LIVER BIOPSY, AND ABD WALL CLOSURE  IVAB for peritonitis/sepsis  Cont TPN, trickle TF stopped 5/15 due to N/V overnight continues high NG output 350 ml last 12 hours  Pathology still pending  Surgery following, possible Ileus given N/V and NG output.    CT Abd: Loculated fluid density along the left upper abdomen and anterior left mid abdomen may reflect infectious hemorrhagic or seroma fluid collection.

## 2022-05-16 NOTE — NURSING
00:30- Pt change in output from ileostomy and ng. 400ml  in Ostomy output over 2HR. Dark green/bile. Ng output only 400ml since start of shift. Dark green bile. Dr. Joseph with Gen Sx notified. No new orders. Will continue to monitor.

## 2022-05-16 NOTE — ASSESSMENT & PLAN NOTE
S/t septic shock  Adequate volume resuscitation +16 L I/O balance  Avoid nephrotoxins  Strict I/O  No acute indication for dialysis but high potential for continued decline, daughter(HCPOA) will not pursue dialysis if decline per her father's wishes  Nephrology following  Good UOP  Lasix/Albumin for anasarca/pulm edema X 4 doses completed

## 2022-05-16 NOTE — PROGRESS NOTES
F/U visit with Mr. Masters for ongoing Ileostomy management. He remains intubated, on IV pressor therapy. NGT to LIWS with bilious effluent. Right sided Ilestomy again noted, surgical pouch remains intact with no leak. Bilious effluent in pouch, no stool currently. Stoma remains moist and red. Daughter at bedside. Recommend continued care, will wait until patient is extubated and alert to complete education. Will follow.

## 2022-05-17 LAB
ALBUMIN SERPL BCP-MCNC: 1.4 G/DL (ref 3.5–5.2)
ALLENS TEST: ABNORMAL
ALLENS TEST: ABNORMAL
ALP SERPL-CCNC: 114 U/L (ref 55–135)
ALT SERPL W/O P-5'-P-CCNC: 44 U/L (ref 10–44)
ANION GAP SERPL CALC-SCNC: 11 MMOL/L (ref 8–16)
APTT BLDCRRT: 41.7 SEC (ref 21–32)
APTT BLDCRRT: 45.3 SEC (ref 21–32)
AST SERPL-CCNC: 36 U/L (ref 10–40)
BASOPHILS # BLD AUTO: 0.09 K/UL (ref 0–0.2)
BASOPHILS NFR BLD: 0.6 % (ref 0–1.9)
BILIRUB SERPL-MCNC: 1.1 MG/DL (ref 0.1–1)
BUN SERPL-MCNC: 84 MG/DL (ref 8–23)
CALCIUM SERPL-MCNC: 8.2 MG/DL (ref 8.7–10.5)
CHLORIDE SERPL-SCNC: 109 MMOL/L (ref 95–110)
CO2 SERPL-SCNC: 32 MMOL/L (ref 23–29)
CREAT SERPL-MCNC: 2.5 MG/DL (ref 0.5–1.4)
DELSYS: ABNORMAL
DELSYS: ABNORMAL
DIFFERENTIAL METHOD: ABNORMAL
EOSINOPHIL # BLD AUTO: 0.3 K/UL (ref 0–0.5)
EOSINOPHIL NFR BLD: 2.2 % (ref 0–8)
ERYTHROCYTE [DISTWIDTH] IN BLOOD BY AUTOMATED COUNT: 27.5 % (ref 11.5–14.5)
EST. GFR  (AFRICAN AMERICAN): 29 ML/MIN/1.73 M^2
EST. GFR  (NON AFRICAN AMERICAN): 25 ML/MIN/1.73 M^2
FIO2: 35
GLUCOSE SERPL-MCNC: 139 MG/DL (ref 70–110)
HCO3 UR-SCNC: 35.4 MMOL/L (ref 24–28)
HCO3 UR-SCNC: 36.4 MMOL/L (ref 24–28)
HCT VFR BLD AUTO: 27.3 % (ref 40–54)
HGB BLD-MCNC: 7.9 G/DL (ref 14–18)
IMM GRANULOCYTES # BLD AUTO: 0.3 K/UL (ref 0–0.04)
IMM GRANULOCYTES NFR BLD AUTO: 2.2 % (ref 0–0.5)
LYMPHOCYTES # BLD AUTO: 1.3 K/UL (ref 1–4.8)
LYMPHOCYTES NFR BLD: 9.2 % (ref 18–48)
MAGNESIUM SERPL-MCNC: 1.8 MG/DL (ref 1.6–2.6)
MCH RBC QN AUTO: 22.8 PG (ref 27–31)
MCHC RBC AUTO-ENTMCNC: 28.9 G/DL (ref 32–36)
MCV RBC AUTO: 79 FL (ref 82–98)
MODE: ABNORMAL
MODE: ABNORMAL
MONOCYTES # BLD AUTO: 1.1 K/UL (ref 0.3–1)
MONOCYTES NFR BLD: 8 % (ref 4–15)
NEUTROPHILS # BLD AUTO: 10.8 K/UL (ref 1.8–7.7)
NEUTROPHILS NFR BLD: 77.8 % (ref 38–73)
NRBC BLD-RTO: 0 /100 WBC
PCO2 BLDA: 46.8 MMHG (ref 35–45)
PCO2 BLDA: 50.4 MMHG (ref 35–45)
PEEP: 5
PEEP: 5
PH SMN: 7.47 [PH] (ref 7.35–7.45)
PH SMN: 7.49 [PH] (ref 7.35–7.45)
PHOSPHATE SERPL-MCNC: 3.7 MG/DL (ref 2.7–4.5)
PLATELET # BLD AUTO: 316 K/UL (ref 150–450)
PLATELET BLD QL SMEAR: ABNORMAL
PMV BLD AUTO: ABNORMAL FL (ref 9.2–12.9)
PO2 BLDA: 105 MMHG (ref 80–100)
PO2 BLDA: 214 MMHG (ref 80–100)
POC BE: 12 MMOL/L
POC BE: 13 MMOL/L
POC SATURATED O2: 100 % (ref 95–100)
POC SATURATED O2: 98 % (ref 95–100)
POCT GLUCOSE: 140 MG/DL (ref 70–110)
POCT GLUCOSE: 143 MG/DL (ref 70–110)
POCT GLUCOSE: 145 MG/DL (ref 70–110)
POCT GLUCOSE: 146 MG/DL (ref 70–110)
POCT GLUCOSE: 147 MG/DL (ref 70–110)
POCT GLUCOSE: 161 MG/DL (ref 70–110)
POTASSIUM SERPL-SCNC: 3.8 MMOL/L (ref 3.5–5.1)
PROT SERPL-MCNC: 4.6 G/DL (ref 6–8.4)
PS: 15
PS: 8
RBC # BLD AUTO: 3.46 M/UL (ref 4.6–6.2)
SAMPLE: ABNORMAL
SAMPLE: ABNORMAL
SITE: ABNORMAL
SITE: ABNORMAL
SODIUM SERPL-SCNC: 152 MMOL/L (ref 136–145)
SODIUM UR-SCNC: 32 MMOL/L (ref 20–250)
WBC # BLD AUTO: 13.92 K/UL (ref 3.9–12.7)

## 2022-05-17 PROCEDURE — 83735 ASSAY OF MAGNESIUM: CPT | Performed by: SURGERY

## 2022-05-17 PROCEDURE — 63600175 PHARM REV CODE 636 W HCPCS: Performed by: NURSE PRACTITIONER

## 2022-05-17 PROCEDURE — 25000003 PHARM REV CODE 250: Performed by: SURGERY

## 2022-05-17 PROCEDURE — 99291 CRITICAL CARE FIRST HOUR: CPT | Mod: ,,, | Performed by: NURSE PRACTITIONER

## 2022-05-17 PROCEDURE — 80053 COMPREHEN METABOLIC PANEL: CPT | Performed by: SURGERY

## 2022-05-17 PROCEDURE — A4217 STERILE WATER/SALINE, 500 ML: HCPCS | Performed by: SURGERY

## 2022-05-17 PROCEDURE — 37799 UNLISTED PX VASCULAR SURGERY: CPT

## 2022-05-17 PROCEDURE — 85025 COMPLETE CBC W/AUTO DIFF WBC: CPT | Performed by: SURGERY

## 2022-05-17 PROCEDURE — 94003 VENT MGMT INPAT SUBQ DAY: CPT

## 2022-05-17 PROCEDURE — 27000221 HC OXYGEN, UP TO 24 HOURS

## 2022-05-17 PROCEDURE — 20000000 HC ICU ROOM

## 2022-05-17 PROCEDURE — 25000003 PHARM REV CODE 250: Performed by: INTERNAL MEDICINE

## 2022-05-17 PROCEDURE — 84300 ASSAY OF URINE SODIUM: CPT | Performed by: INTERNAL MEDICINE

## 2022-05-17 PROCEDURE — 84100 ASSAY OF PHOSPHORUS: CPT | Performed by: SURGERY

## 2022-05-17 PROCEDURE — 82803 BLOOD GASES ANY COMBINATION: CPT

## 2022-05-17 PROCEDURE — 99291 PR CRITICAL CARE, E/M 30-74 MINUTES: ICD-10-PCS | Mod: ,,, | Performed by: NURSE PRACTITIONER

## 2022-05-17 PROCEDURE — 99900035 HC TECH TIME PER 15 MIN (STAT)

## 2022-05-17 PROCEDURE — 63600175 PHARM REV CODE 636 W HCPCS: Performed by: SURGERY

## 2022-05-17 PROCEDURE — 99900026 HC AIRWAY MAINTENANCE (STAT)

## 2022-05-17 PROCEDURE — 87075 CULTR BACTERIA EXCEPT BLOOD: CPT | Performed by: STUDENT IN AN ORGANIZED HEALTH CARE EDUCATION/TRAINING PROGRAM

## 2022-05-17 PROCEDURE — B4185 PARENTERAL SOL 10 GM LIPIDS: HCPCS | Performed by: SURGERY

## 2022-05-17 PROCEDURE — 99233 PR SUBSEQUENT HOSPITAL CARE,LEVL III: ICD-10-PCS | Mod: ,,, | Performed by: INTERNAL MEDICINE

## 2022-05-17 PROCEDURE — 25000003 PHARM REV CODE 250: Performed by: NURSE PRACTITIONER

## 2022-05-17 PROCEDURE — 87070 CULTURE OTHR SPECIMN AEROBIC: CPT | Performed by: STUDENT IN AN ORGANIZED HEALTH CARE EDUCATION/TRAINING PROGRAM

## 2022-05-17 PROCEDURE — 85730 THROMBOPLASTIN TIME PARTIAL: CPT | Performed by: NURSE PRACTITIONER

## 2022-05-17 PROCEDURE — 94761 N-INVAS EAR/PLS OXIMETRY MLT: CPT

## 2022-05-17 PROCEDURE — 63600175 PHARM REV CODE 636 W HCPCS: Mod: JG | Performed by: INTERNAL MEDICINE

## 2022-05-17 PROCEDURE — 99233 SBSQ HOSP IP/OBS HIGH 50: CPT | Mod: ,,, | Performed by: INTERNAL MEDICINE

## 2022-05-17 PROCEDURE — 87205 SMEAR GRAM STAIN: CPT | Performed by: STUDENT IN AN ORGANIZED HEALTH CARE EDUCATION/TRAINING PROGRAM

## 2022-05-17 PROCEDURE — 85730 THROMBOPLASTIN TIME PARTIAL: CPT | Mod: 91 | Performed by: SURGERY

## 2022-05-17 RX ORDER — LORAZEPAM 2 MG/ML
1 INJECTION INTRAMUSCULAR EVERY 4 HOURS PRN
Status: DISCONTINUED | OUTPATIENT
Start: 2022-05-17 | End: 2022-05-20

## 2022-05-17 RX ORDER — HYDRALAZINE HYDROCHLORIDE 20 MG/ML
20 INJECTION INTRAMUSCULAR; INTRAVENOUS EVERY 8 HOURS PRN
Status: DISCONTINUED | OUTPATIENT
Start: 2022-05-17 | End: 2022-05-26 | Stop reason: HOSPADM

## 2022-05-17 RX ORDER — FENTANYL CITRATE 50 UG/ML
25 INJECTION, SOLUTION INTRAMUSCULAR; INTRAVENOUS EVERY 4 HOURS PRN
Status: DISCONTINUED | OUTPATIENT
Start: 2022-05-17 | End: 2022-05-20

## 2022-05-17 RX ORDER — MAGNESIUM SULFATE 1 G/100ML
1 INJECTION INTRAVENOUS ONCE
Status: COMPLETED | OUTPATIENT
Start: 2022-05-17 | End: 2022-05-17

## 2022-05-17 RX ADMIN — INSULIN ASPART 2 UNITS: 100 INJECTION, SOLUTION INTRAVENOUS; SUBCUTANEOUS at 08:05

## 2022-05-17 RX ADMIN — SMOFLIPID 250 ML: 6; 6; 5; 3 INJECTION, EMULSION INTRAVENOUS at 03:05

## 2022-05-17 RX ADMIN — FENTANYL CITRATE 50 MCG: 50 INJECTION INTRAMUSCULAR; INTRAVENOUS at 02:05

## 2022-05-17 RX ADMIN — HYDRALAZINE HYDROCHLORIDE 20 MG: 20 INJECTION, SOLUTION INTRAMUSCULAR; INTRAVENOUS at 10:05

## 2022-05-17 RX ADMIN — ERTAPENEM 500 MG: 1 INJECTION INTRAMUSCULAR; INTRAVENOUS at 09:05

## 2022-05-17 RX ADMIN — CHLORHEXIDINE GLUCONATE 0.12% ORAL RINSE 15 ML: 1.2 LIQUID ORAL at 09:05

## 2022-05-17 RX ADMIN — CHLORHEXIDINE GLUCONATE 0.12% ORAL RINSE 15 ML: 1.2 LIQUID ORAL at 08:05

## 2022-05-17 RX ADMIN — MINERAL OIL, PETROLATUM: 425; 573 OINTMENT OPHTHALMIC at 09:05

## 2022-05-17 RX ADMIN — AMIODARONE HYDROCHLORIDE 0.5 MG/MIN: 1.8 INJECTION, SOLUTION INTRAVENOUS at 04:05

## 2022-05-17 RX ADMIN — FENTANYL CITRATE 50 MCG: 50 INJECTION INTRAMUSCULAR; INTRAVENOUS at 05:05

## 2022-05-17 RX ADMIN — HEPARIN SODIUM 18 UNITS/KG/HR: 5000 INJECTION INTRAVENOUS; SUBCUTANEOUS at 08:05

## 2022-05-17 RX ADMIN — MAGNESIUM SULFATE 1 G: 1 INJECTION INTRAVENOUS at 07:05

## 2022-05-17 RX ADMIN — AMIODARONE HYDROCHLORIDE 0.5 MG/MIN: 1.8 INJECTION, SOLUTION INTRAVENOUS at 06:05

## 2022-05-17 RX ADMIN — SODIUM CHLORIDE: 234 INJECTION, SOLUTION INTRAVENOUS at 04:05

## 2022-05-17 RX ADMIN — MICAFUNGIN SODIUM 100 MG: 100 INJECTION, POWDER, LYOPHILIZED, FOR SOLUTION INTRAVENOUS at 03:05

## 2022-05-17 RX ADMIN — FAMOTIDINE 20 MG: 10 INJECTION INTRAVENOUS at 08:05

## 2022-05-17 NOTE — ASSESSMENT & PLAN NOTE
5/4 Expl Lap and SB excision with washout and AB thera wound vac for bowel left in discontinuity  5/7 washout and wound vac replacement  hx Diverticulosis but cecal mass and suspected metastatic lesions found in exploration  5/11 ABD WASHOUT, RT HEMICOLECTOMY, ILEOSTOMY, LIVER BIOPSY, AND ABD WALL CLOSURE  IVAB for peritonitis/sepsis  Cont TPN, trickle TF stopped 5/15 due to N/V overnight continues high NG output 350 ml last 12 hours  Pathology still pending  CT Abd 5/46: Loculated fluid density along the left upper abdomen and anterior left mid abdomen may reflect infectious hemorrhagic or seroma fluid collection.    IR to place additional drain to LUQ Abd abscess today

## 2022-05-17 NOTE — ASSESSMENT & PLAN NOTE
Secondary to Peritonitis from bowel perforation with major fecal contamination on admit  Blood and sputum cultures 5/4 Neg  S/P Zyvox  Continue Invanz and Micafungin per ID following  Weaned off Levophed infusion 5/15  ICU hemodynamic monitoring  Follow fever curve and WBC  Will change CL next soon  Change Doherty today

## 2022-05-17 NOTE — ASSESSMENT & PLAN NOTE
Received 2 units PRBCs in ED and 2 more in OR on 5/4  1 unit PRBC given 5/16  CBC daily  No active bleeding  Monitor closely with Heparin infusion for A-fib

## 2022-05-17 NOTE — SEDATION DOCUMENTATION
ICU nurse and RT present throughout procedure. Pt safely transferred from CT table to bed and transported via bed to ICU-16 by ICU RN and RT. Dressing and drain remain CDI. Pt stable at time of transfer.

## 2022-05-17 NOTE — CONSULTS
Chart reviewed by Dr. Nichols.       ASSESSMENT/PLAN:    LUQ abscess    The order for a CT drainage has been placed and the procedure will be performed asap.          Thank you for the consult.

## 2022-05-17 NOTE — PROGRESS NOTES
[facility] ([facility])  Critical Care Medicine  Progress Note    Patient Name: Franky Masters  MRN: [id number]  Admission Date: 5/4/2022  Hospital Length of Stay: 13 days  Code Status: DNR  Attending Provider: Elvie Parker DO  Primary Care Provider: HOLLY ROSEN   Principal Problem: Severe sepsis    Subjective:     HPI:  Ms Masters is a 65 yo obese male with a PMH of diverticulosis and hx of hospitalization in Aug 2021 with COVID PNA and Hypoxic Resp Failure but did not require ICU or intubation.  She presented early this AM to [facility] ED about 0515 hr via EMS with complaint of abd pain X 2 hours that awakened her from sleep and had associated N/V/D.  In ED BP 86/46, RA SAT 92%, LA 8, Hgb 7.2 and CT Abd with suspected bowel perforation and free air.  General Surgery consulted and taken to OR this AM revealing SB perf with 3 L feculent fluid and food in cavity and large obstructing cecal mass with perf ileum and palpable liver mass.  Had Expl Lap with washout and excision of SB with wound vac placement admitted post op to ICU intubated on mech ventilation.  Received 2 units PRBCs in ED and another 2 units in OR also on Levophed and Vasopressin infusions.  Before surgery patient was insistent he be DNR post op but consented to surgery and invasive mech ventilation but no ACLS post op in event of cardiac arrest and no prolonged mech ventilation. Reportedly patient does not routinely follow with practitioner as outpt.       Hospital/ICU Course:  5/4 - Admitted to ICU sedated and intubated on Levophed and Vasopressin infusions in no distress  5/5 - remains intubated and sedated on mechanical vent and pressor support; scant urine output overnight and creatinine rise to 2.2 with fluid balance +8.9L  5/6 - remains intubated and sedated on mechanical vent with decreasing pressor demand; still oliguric with creatinine up to 3.6, K 5.2; fluid balance +13L; now with bigeminy and cardiac rhythm changes, K  stable on abg but iCa 0.78  5/7 - remains intubated and sedated with open abdomen to abthera wound vac and pressor support; overnight atrial fib with RVR, now on amio infusion with SR 68 on monitor; plan to OR for washout and vac replacement today  5/8 - remains intubated, sedated with ab thera wound vac to open abdomen, mechanical ventilation, and 2 pressor support. SR on monitor, on amiodarone infusion. Tmax 100.2, wbc down at 12.3k, creatinine rising 4.6,urine output scant, K 5.1  5/9 - remains intubated and sedated with ab thera wound vac to open abdomen, mech vent, and 2 pressor support. Remains SR on monitor with amio infusion. Tmax 98.9, wbc down 10.6k, creatinine holding at 4.7, K 4.6 after lasix trial with 1.2L urinary output  5/10 - remains intubated and sedated with ab thera wound vac to open abdomen, mech vent, on pressor support. Atrial fib on monitor, rate 90s with occasional non sustained elevation. Urine output adequate since lasix trial but creatinine, K, BUN unchanged and remains 22L positive fluid status since admit  5/11 - return from OR late this evening after ABD WASHOUT, RT HEMICOLECTOMY, ILEOSTOMY, LIVER BIOPSY, AND ABD WALL CLOSURE. Remains atrial fib 80-100s on monitor on levophed support.  5/12 - semi erect in bed intubated on mech ventilation in no distress still in A-fib rate 113 bpm sedated heavily still on Fentanyl infusion.  Also still on Amiodarone and Levophed infusions.  Receiving TPN.  S/P abd washout and closure yesterday.    5/13 - semi erect in bed intubated on mech ventilation and sedated in no distress on Precedex, Heparin, Amiodarone, Fentanyl and Levophed infusions.  Also on TPN.  BP labile on pressor.  Still in A-Fib rate 113 bpm.  5/14 - semi erect intubated on mech ventilation in no distress sedated on Precedex infusion.  Also still on Levophed, Amiodarone and Heparin infusions with TPN.  BP still labile and still in A-Fib w/ rate controlled.  Pulm edema pink froth today  copious suctioned from OET.   5/15 - still lethargic off sedation and intubated on mech ventilation in no distress tolerating SBT but very weak unable to lift head off pillow.  Had N/V TF overnight and NG output to suction 320 ml overnight green bile.  Weaned off Levophed infusion at 0300 hr this AM.  Still on TPN as well as Amiodarone and Heparin infusions.   5/16 - Supine in bed off sedation awakens with stimuli and follows simple commands but slowly and still lethargic, intubated on mech ventilation.  Still on TPN, Heparin infusion and Amiodarone infusion.  Hgb drop to 6.9 with PRBC transfusion pending.  Tolerated press support ventilation overnight.  Had conversion to SB at 50 bpm overnight and Lopressor stopped now back in A-fib at 108 bpm.  350 ml NG output overnight and 1 liter bile dumped in colostomy overnight.   5/17 - Sleepy this AM on vent post receiving Fentanyl IVP overnight still tolerating SBT.  Still on Amiodarone, Heparin and TPN infusions.        Review of Systems   Unable to perform ROS: Intubated       Objective:     Vital Signs (Most Recent):  Temp: 99.2 °F (37.3 °C) (05/17/22 0715)  Pulse: 66 (05/17/22 1015)  Resp: 17 (05/17/22 1015)  BP: (!) 95/45 (05/15/22 2112)  SpO2: 100 % (05/17/22 1015)   Vital Signs (24h Range):  Temp:  [97.6 °F (36.4 °C)-99.2 °F (37.3 °C)] 99.2 °F (37.3 °C)  Pulse:  [] 66  Resp:  [14-31] 17  SpO2:  [100 %] 100 %  Arterial Line BP: (109-176)/(44-89) 159/65     Weight: 109.4 kg (241 lb 2.9 oz)  Body mass index is 35.62 kg/m².      Intake/Output Summary (Last 24 hours) at 5/17/2022 1039  Last data filed at 5/17/2022 1000  Gross per 24 hour   Intake 3757.93 ml   Output 3047 ml   Net 710.93 ml       Physical Exam  Vitals and nursing note reviewed.   Constitutional:       General: He is not in acute distress.     Appearance: He is well-developed. He is obese. He is ill-appearing. He is not toxic-appearing or diaphoretic.      Interventions: He is intubated and  restrained.   HENT:      Head: Normocephalic and atraumatic.      Nose:        Mouth/Throat:      Mouth: Mucous membranes are moist.   Eyes:      General: No scleral icterus.     Conjunctiva/sclera: Conjunctivae normal.      Pupils: Pupils are equal, round, and reactive to light.   Neck:      Vascular: No JVD.     Cardiovascular:      Rate and Rhythm: Normal rate and regular rhythm.      Pulses:           Radial pulses are 2+ on the right side and 2+ on the left side.        Dorsalis pedis pulses are 2+ on the right side and 2+ on the left side.      Heart sounds: Heart sounds are distant.   Pulmonary:      Effort: No tachypnea, accessory muscle usage or respiratory distress. He is intubated.      Breath sounds: Examination of the right-lower field reveals decreased breath sounds. Examination of the left-lower field reveals decreased breath sounds. Decreased breath sounds and rhonchi (improved post OET suctioning) present.   Chest:      Chest wall: No deformity or tenderness.   Abdominal:      General: Bowel sounds are normal. There is no distension.      Palpations: Abdomen is soft.          Comments: Some loose brown stool in colostomy   Genitourinary:     Testes:         Right: Swelling present.         Left: Swelling present.      Comments: Doherty in place.  Significant scrotal edema  Musculoskeletal:      Cervical back: Neck supple.      Right lower le+ Pitting Edema present.      Left lower le+ Pitting Edema present.      Right foot: No deformity.      Left foot: No deformity.   Lymphadenopathy:      Cervical: No cervical adenopathy.   Skin:     General: Skin is warm.      Capillary Refill: Capillary refill takes 2 to 3 seconds.          Neurological:      Mental Status: He is lethargic.      Comments: Slowly becoming more alert this AM and starting to follow commands again and nod head to questions       Vents:  Vent Mode: Spont (22 0935)  Ventilator Initiated: Yes (22 6618)  Set Rate: 0  BPM (05/17/22 0935)  Vt Set: 450 mL (05/17/22 0935)  Pressure Support: 15 cmH20 (05/17/22 0935)  PEEP/CPAP: 5 cmH20 (05/17/22 0935)  Oxygen Concentration (%): 35 (05/17/22 1015)  Peak Airway Pressure: 20 cmH2O (05/17/22 0935)  Plateau Pressure: 0 cmH20 (05/17/22 0935)  Total Ve: 7.9 mL (05/17/22 0935)  F/VT Ratio<105 (RSBI): (!) 25.82 (05/17/22 0935)    Lines/Drains/Airways       Central Venous Catheter Line  Duration             Percutaneous Central Line Insertion/Assessment - Triple Lumen  05/04/22 1200 right internal jugular 12 days              Drain  Duration                  Urethral Catheter 05/04/22 1105 Straight-tip;Silicone 16 Fr. 12 days         Ileostomy 05/11/22 Standard (Comfort, end) RUQ 6 days         Closed/Suction Drain 05/11/22 1735 LUQ Bulb 19 Fr. 5 days         Closed/Suction Drain 05/11/22 1735 RLQ Bulb 19 Fr. 5 days         NG/OG Tube 05/11/22 1715 [place] sump 18 Fr. Left nostril 5 days              Airway  Duration                  Airway - Non-Surgical 05/04/22 1051 Endotracheal Tube 12 days              Arterial Line  Duration             Arterial Line 05/07/22 1330 Right Radial 9 days              Peripheral Intravenous Line  Duration                  Midline Catheter Insertion/Assessment  - Single Lumen 05/12/22 1730 Left basilic vein (medial side of arm) 4 days                    Significant Labs:    CBC/Anemia Profile:  Recent Labs   Lab 05/16/22 0328 05/16/22 1424 05/17/22  0422   WBC 12.58  --  13.92*   HGB 6.9* 8.1* 7.9*   HCT 23.8* 27.5* 27.3*     --  316   MCV 74*  --  79*   RDW 27.7*  --  27.5*        Chemistries:  Recent Labs   Lab 05/16/22 0328 05/16/22 1424 05/17/22  0422   * 153* 152*   K 3.2* 3.5 3.8    107 109   CO2 32* 29 32*   BUN 93* 97* 84*   CREATININE 2.9* 2.7* 2.5*   CALCIUM 7.9* 8.1* 8.2*   ALBUMIN 1.4*  --  1.4*   PROT 4.4*  --  4.6*   BILITOT 1.1*  --  1.1*   ALKPHOS 126  --  114   ALT 56*  --  44   AST 59*  --  36   MG 1.9  --  1.8   PHOS 3.8   --  3.7       ABGs:   Recent Labs   Lab 05/17/22  0944   PH 7.486*   PCO2 46.8*   HCO3 35.4*   POCSATURATED 98   BE 12     Coagulation:   Recent Labs   Lab 05/17/22  0422   APTT 41.7*     POCT Glucose:   Recent Labs   Lab 05/16/22  2116 05/16/22  2332 05/17/22  0447   POCTGLUCOSE 142* 138* 143*     All pertinent labs within the past 24 hours have been reviewed.        ABG  Recent Labs   Lab 05/17/22  0944   PH 7.486*   PO2 105*   PCO2 46.8*   HCO3 35.4*   BE 12     Assessment/Plan:     Pulmonary  Acute hypoxemic respiratory failure on mechanical vent  Vent Day # 14  VAP prophylaxis  ABG daily and prn to assess response to therapy  Off sedation and tolerating SBT but still weak unable to lift head off pillow.  FVC 1.5 L w/ cough and best NIF -22 cm H2O.  Concern with ability to protect airway with extubation  Claudia is daughter and POA and discussed extubation likely today but put on hold this AM due to lethargy from Fentanyl overnight and now planning IR drain placement mid day.  Claudia does confirm and agree with patient decision on admit for no long term support.  Therefore in event of extubation failure he is not to be re-intubated and if fails any non-invasive support or HFNC then will need transition to comfort care and all family members including KAY Taylor, agree with this decision.      Cardiac/Vascular  Atrial fibrillation with RVR  New onset 5/6  on amiodarone and Heparin infusions  Cont Cardiac monitoring  In NSR this AM    Renal/  Electrolyte imbalance  Increase D5W with Na still at 152  Renal following    JOSE (acute kidney injury)  S/t septic shock  Adequate volume resuscitation +16 L I/O balance  Avoid nephrotoxins  Strict I/O  No acute indication for dialysis but high potential for continued decline, daughter(HCPOA) will not pursue dialysis if decline per her father's wishes  Nephrology following  Good UOP  Lasix/Albumin for anasarca/pulm edema X 4 doses completed    ID  * Severe sepsis  secondary to Peritonitis from bowel perforation  Secondary to Peritonitis from bowel perforation with major fecal contamination on admit  Blood and sputum cultures 5/4 Neg  S/P Zyvox  Continue Invanz and Micafungin per ID following  Weaned off Levophed infusion 5/15  ICU hemodynamic monitoring  Follow fever curve and WBC  Will change CL next soon  Change Doherty today    Oncology  Acute on chronic anemia  Received 2 units PRBCs in ED and 2 more in OR on 5/4  1 unit PRBC given 5/16  CBC daily  No active bleeding  Monitor closely with Heparin infusion for A-fib     Endocrine  Obesity (BMI 30.0-34.9)  Will encourage weight loss once/if survives and extubated and fully awake/alert    GI  Mass of colon  Found on exploration along with palpable liver mass  Sent for pathology 5/11 results still pending  High concern for metastatic malignancy    Small bowel perforation with large Cecal mass   5/4 Expl Lap and SB excision with washout and AB thera wound vac for bowel left in discontinuity  5/7 washout and wound vac replacement  hx Diverticulosis but cecal mass and suspected metastatic lesions found in exploration  5/11 ABD WASHOUT, RT HEMICOLECTOMY, ILEOSTOMY, LIVER BIOPSY, AND ABD WALL CLOSURE  IVAB for peritonitis/sepsis  Cont TPN, trickle TF stopped 5/15 due to N/V overnight continues high NG output 350 ml last 12 hours  Pathology still pending  CT Abd 5/46: Loculated fluid density along the left upper abdomen and anterior left mid abdomen may reflect infectious hemorrhagic or seroma fluid collection.    IR to place additional drain to LUQ Abd abscess today    Palliative Care  Pt is DNR, discussed at length with surgeon prior to surgery and he consented to intubation for surgery with understanding of likely need for prolonged vent support post op until bowel/abdomen closed. He was clear that he would not desire long term vent support past that necessary for immediate post op recovery.   Daughter Claudia is SDM and is aware  and supportive of his wishes. IF at any point his survival chances become poor or prolonged life support is anticipated she would honor his wish and transition to comfort focused care.  5/6 - discussed status with daughter. She expressed again understanding of her father's wishes for very limited life support and further stated today that after time to reflect on current status, in the event of continued decline she would not want to pursue potential dialysis but if kidneys fail and indications for RRT exist she would at that time desire transition to a full comfort care focus. She would hope that he could be extubated to comfort focus and have opportunity to interact with family but verbalizes understanding that this may not be possible given open abdomen and comfort goal.  Continue daily and prn updates      Preventive Measures and Monitoring:   Stress Ulcer: Pepcid  Nutrition: TPN   Glucose control: SSI  Bowel prophylaxis: S/P colon surgery  DVT prophylaxis: Heparin infusion  Hx CAD on B-Blocker: no hx CAD  Head of Bed/Reposition: Elevate HOB and turn Q1-2 hours   Early Mobility: bed rest  SBT tolerating fair but still weak, plan extubation today  RASS goal: 0  Vent Day: #14  NG Day: #7  Central Line Right IJ Day: #14  Doherty Day: #14  IVAB Day: #14  Code Status: DNR      Counseling/Consultation:I have discussed the care of this patient in detail with the bedside nursing staff and Dr. Huynh and Dr. Parker and Dr. Kebede     Patient assessed.  Soft bilat wrist restraints renewed due to risk of pulling lines, tubes and/or climbing OOB.     Critical Care Time: 52 minutes  Critical secondary to Patient has a condition that poses threat to life and bodily function: Acute Renal Failure and Septic Shock and Acute Hypoxic Resp Failure intubated on [facility] ventilation  Patient is currently on drug therapy requiring intensive monitoring for toxicity: Amiodarone and Heparin infusions and TPN     Critical care was time  spent personally by me on the following activities: development of treatment plan with patient or surrogate and bedside caregivers, discussions with consultants, evaluation of patient's response to treatment, examination of patient, ordering and performing treatments and interventions, ordering and review of laboratory studies, ordering and review of radiographic studies, pulse oximetry, re-evaluation of patient's condition. This critical care time did not overlap with that of any other provider or involve time for any procedures.     Rai Hernandez, NP  Critical Care Medicine  [facility] - Intensive Care ([facility])

## 2022-05-17 NOTE — ASSESSMENT & PLAN NOTE
S/p exploratory laparotomy, abdominal washout, segmental small bowel resection (left in discontinuity), placement of Abthera vac 5/4/22  S/p reopening of recent laparotomy, abdominal washout, replacement of Abthera vac 5/7/22  S/p reopening of recent laparotomy, abdominal washout, right hemicolectomy, liver biopsy, end ileostomy, TAP block, abdominal wall closure 5/11/22  Left-sided drain placed by IR for LUQ abscess 5/17/22    - Monitor drain and ileostomy output.  - If NG tube output low, would like to begin trickle tube feeds again tomorrow  - Speech therapy  - Begin increasing activity  - Continue ICU management. On heparin and amio gtt for afib.  - Ostomy nurse consult  - Strict I/Os  - TPN  - Medical and ICU management per hospital/ICU team

## 2022-05-17 NOTE — PROGRESS NOTES
[facility] - [facility] ([facility])  Nephrology  Progress Note    Patient Name: Franky Masters  MRN: [id number]  Admission Date: 5/4/2022  Hospital Length of Stay: 13 days  Attending Provider: Elvie Parker DO   Primary Care Physician: HOLLY ROSEN  Principal Problem:Severe sepsis    Consults  Subjective:     Interval History:     Patient was seen in the intensive care unit.  Remains intubated.  A family member was present.  All nephrology related questions were answered to their satisfaction.    Review of systems:  Not obtainable    Review of patient's allergies indicates:  No Known Allergies  Current Facility-Administered Medications   Medication Frequency    0.9%  NaCl infusion (for blood administration) Q24H PRN    0.9%  NaCl infusion PRN    acetaminophen suppository 650 mg Q6H PRN    amiodarone 360 mg/200 mL (1.8 mg/mL) infusion Continuous    chlorhexidine 0.12 % solution 15 mL BID    dextrose 10 % infusion Continuous PRN    dextrose 10% bolus 125 mL PRN    dextrose 5 % infusion Continuous    ertapenem (INVANZ) 500 mg in sodium chloride 0.9% 100 mL IVPB Q24H    famotidine (PF) injection 20 mg Daily    fentaNYL 50 mcg/mL injection 50 mcg Q2H PRN    glucagon (human recombinant) injection 1 mg PRN    heparin 25,000 units in dextrose 5% (100 units/ml) IV bolus from bag - ADDITIONAL PRN BOLUS - 30 units/kg PRN    heparin 25,000 units in dextrose 5% (100 units/ml) IV bolus from bag - ADDITIONAL PRN BOLUS - 60 units/kg PRN    heparin 25,000 units in dextrose 5% 250 mL (100 units/mL) infusion LOW INTENSITY nomogram - [facility] Continuous    hydrALAZINE injection 20 mg Q8H PRN    insulin aspart U-100 pen 1-10 Units Q4H PRN    lipid (SMOFLIPID) (SMOFLIPID) 20 % infusion 250 mL Daily    lorazepam injection 2 mg Q4H PRN    micafungin 100 mg in sodium chloride 0.9 % 100 mL IVPB (ready to mix system) Q24H    ondansetron injection 4 mg Q8H PRN    TPN ADULT CENTRAL LINE CUSTOM Continuous    TPN ADULT  CENTRAL LINE CUSTOM Continuous    white petrolatum-mineral oil 57.3-42.5% ophthalmic ointment QHS       Objective:     Vital Signs (Most Recent):  Temp: 99.2 °F (37.3 °C) (05/17/22 0715)  Pulse: 74 (05/17/22 1100)  Resp: (!) 33 (05/17/22 1100)  BP: (!) 95/45 (05/15/22 2112)  SpO2: 100 % (05/17/22 1100)  O2 Device (Oxygen Therapy): ventilator (05/17/22 0935) Vital Signs (24h Range):  Temp:  [97.6 °F (36.4 °C)-99.2 °F (37.3 °C)] 99.2 °F (37.3 °C)  Pulse:  [] 74  Resp:  [14-33] 33  SpO2:  [100 %] 100 %  Arterial Line BP: (109-192)/(44-89) 192/79     Weight: 109.4 kg (241 lb 2.9 oz) (05/16/22 0800)  Body mass index is 35.62 kg/m².  Body surface area is 2.31 meters squared.    I/O last 3 completed shifts:  In: 4866.3 [I.V.:2310.8; Blood:375; IV Piggyback:522.7]  Out: 5422 [Urine:3852; Drains:1270; Stool:300]    Physical Exam  Constitutional:       Appearance: Normal appearance.   HENT:      Head: Normocephalic and atraumatic.      Mouth/Throat:      Comments: ET tube in place  Eyes:      General: No scleral icterus.     Extraocular Movements: Extraocular movements intact.      Pupils: Pupils are equal, round, and reactive to light.   Cardiovascular:      Rate and Rhythm: Normal rate and regular rhythm.   Pulmonary:      Effort: Pulmonary effort is normal.      Comments: Intubated  Musculoskeletal:      Right lower leg: No edema.      Left lower leg: No edema.   Skin:     General: Skin is warm and dry.   Neurological:      General: No focal deficit present.      Mental Status: He is alert and oriented to person, place, and time.   Psychiatric:         Mood and Affect: Mood normal.         Behavior: Behavior normal.         Significant Labs:sure  BMP:   Recent Labs   Lab 05/17/22  0422   *   *   K 3.8      CO2 32*   BUN 84*   CREATININE 2.5*   CALCIUM 8.2*   MG 1.8     CMP:   Recent Labs   Lab 05/17/22  0422   *   CALCIUM 8.2*   ALBUMIN 1.4*   PROT 4.6*   *   K 3.8   CO2 32*       BUN 84*   CREATININE 2.5*   ALKPHOS 114   ALT 44   AST 36   BILITOT 1.1*     All labs within the past 24 hours have been reviewed.    Significant Imaging:  Labs: Reviewed  Reviewed    Assessment/Plan:     Active Diagnoses:    Diagnosis Date Noted POA    PRINCIPAL PROBLEM:  Severe sepsis secondary to Peritonitis from bowel perforation [A41.9, R65.20] 05/04/2022 Yes    Electrolyte imbalance [E87.8] 05/16/2022 No    Atrial fibrillation with RVR [I48.91] 05/06/2022 No    On mechanically assisted ventilation [Z99.11] 05/05/2022 Not Applicable    JOSE (acute kidney injury) [N17.9] 05/05/2022 Yes    Small bowel perforation with large Cecal mass  [K63.1] 05/04/2022 Yes    Mass of colon [K63.89] 05/04/2022 Yes    Acute on chronic anemia [D64.9] 08/05/2021 Yes    Acute hypoxemic respiratory failure on mechanical vent [J96.01] 08/05/2021 Yes    Obesity (BMI 30.0-34.9) [E66.9] 08/05/2021 Yes     Chronic      Problems Resolved During this Admission:    Diagnosis Date Noted Date Resolved POA    Hyperglycemia, unspecified [R73.9] 05/04/2022 05/11/2022 Yes    Pneumonia of left lower lobe due to infectious organism [J18.9] 05/04/2022 05/10/2022 Yes       1. Acute kidney injury:  Secondary to sepsis with ATN.  Creatinine continues to improve, down to 2.5 this morning.  Good urine output reported at 2162 cc.   Baseline creatinine appears to run around 0.7.    2. Hypernatremia:  Sodium is stable at 152. Electrolyte free water clearance shows approximately 60% urine output to be free water.  Would continue free water replacement.    3. Potassium is stable at 3.8.      Discussed with Critical Care Medicine.    Approximately 35 minutes was spent in face-to-face conversation, chart review and coordination of care with other providers.      Thank you for your consult.     Jordan Renee MD  Nephrology  [facility] - Intensive [facility] ([facility])

## 2022-05-17 NOTE — PROGRESS NOTES
BSWRs removed at 15:30 after extubation. Pt with hoarse voice, but mouthing/nodding/gesturing appropriately. Luis within reach. Daughter at bedside

## 2022-05-17 NOTE — INTERVAL H&P NOTE
The patient has been examined and the H&P has been reviewed:    I concur with the findings and no changes have occurred since H&P was written.        Active Hospital Problems    Diagnosis  POA    *Severe sepsis secondary to Peritonitis from bowel perforation [A41.9, R65.20]  Yes    Electrolyte imbalance [E87.8]  No    Atrial fibrillation with RVR [I48.91]  No    On mechanically assisted ventilation [Z99.11]  Not Applicable    JOSE (acute kidney injury) [N17.9]  Yes    Small bowel perforation with large Cecal mass  [K63.1]  Yes    Mass of colon [K63.89]  Yes    Acute on chronic anemia [D64.9]  Yes    Acute hypoxemic respiratory failure on mechanical vent [J96.01]  Yes    Obesity (BMI 30.0-34.9) [E66.9]  Yes     Chronic      Resolved Hospital Problems    Diagnosis Date Resolved POA    Hyperglycemia, unspecified [R73.9] 05/11/2022 Yes    Pneumonia of left lower lobe due to infectious organism [J18.9] 05/10/2022 Yes

## 2022-05-17 NOTE — PROGRESS NOTES
[facility] - Intensive Care ([facility])  Adult Nutrition  Consult Note    SUMMARY     Recommendations    Recommendation/Intervention:   1. Recommend to continue Custom TPN:   -Custom Macronutrients AA- 72g; CHO- 200g.   -GIR: 1.19.   -Standard lipids pending triglycerides.     2. Weekly weights per RD follow up.    Goals:   1. Patient will meet >65% of estimated energy needs by RD follow up.     Nutrition Goal Status: goal met     Assessment and Plan    Nutrition Problem  Inadequate oral intake     Related to (etiology):   Decreased ability to consume sufficient energy     Signs and Symptoms (as evidenced by):   NPO, intubated     Interventions/Recommendations (treatment strategy):  TPN   Collaboration with Medical Providers   Weekly Weights     Nutrition Diagnosis Status:   Continues        Malnutrition Assessment    Pt does not meet at least 2 ASPEN criteria for malnutrition at this time.  Will continue to monitor.       Reason for Assessment    Reason For Assessment: follow up   Diagnosis: infection/sepsis  Relevant Medical History: diverticulitis  Interdisciplinary Rounds: did not attend  General Information Comments:     5/10:RD consulted for TPN rec's. Patient is currently NPO and intubated. Patient had surgery on 5/4. Patient had a laparotomy, washout, wound vac, and small intestine excision. Patient has 3+ moderate edema to feet, scrotum. Patient has 4+ severe edema to hands. Patient does not want dialysis per patient wishes. Patients last BM 5/3. NFPE not appropriate at this time. Will continue to monitor.    5/17: Patient seen for follow up. Patient is NPO and intubated. Patient is not receiving any propofol. Patient has been on TPN and tolerating. Patient has 3+ moderate dependent edema and 4+ severe generalized edema. Patients last BM 5/17. Will continue to monitor.     Nutrition Discharge Planning: pending medical course    Nutrition Risk Screen    Nutrition Risk Screen: tube feeding or parenteral  "nutrition    Nutrition/Diet History    Patient Reported Diet/Restrictions/Preferences:  (unknown)  Spiritual, Cultural Beliefs, Mormonism Practices, Values that Affect Care: no  Food Allergies: NKFA  Factors Affecting Nutritional Intake: NPO, on mechanical ventilation    Anthropometrics    Temp: 99.5 °F (37.5 °C)  Height: 5' 9" (175.3 cm)  Height (inches): 69 in  Weight Method: Bed Scale  Weight: 109.4 kg (241 lb 2.9 oz)  Weight (lb): 241.19 lb  Ideal Body Weight (IBW), Male: 160 lb  % Ideal Body Weight, Male (lb): 160.53 %  BMI (Calculated): 35.6  BMI Grade: 35 - 39.9 - obesity - grade II       Lab/Procedures/Meds  BMP  Lab Results   Component Value Date     (H) 05/17/2022    K 3.8 05/17/2022     05/17/2022    CO2 32 (H) 05/17/2022    BUN 84 (H) 05/17/2022    CREATININE 2.5 (H) 05/17/2022    CALCIUM 8.2 (L) 05/17/2022    ANIONGAP 11 05/17/2022    ESTGFRAFRICA 29 (A) 05/17/2022    EGFRNONAA 25 (A) 05/17/2022     Lab Results   Component Value Date     (H) 05/17/2022    K 3.8 05/17/2022     05/17/2022    CO2 32 (H) 05/17/2022     Lab Results   Component Value Date    LABPROT 11.9 05/12/2022    ALBUMIN 1.4 (L) 05/17/2022     Lab Results   Component Value Date    CALCIUM 8.2 (L) 05/17/2022    PHOS 3.7 05/17/2022     Pertinent Labs Reviewed: reviewed  Pertinent Medications Reviewed: reviewed  Scheduled Meds:   chlorhexidine  15 mL Mouth/Throat BID    ertapenem (INVANZ) IVPB  500 mg Intravenous Q24H    famotidine (PF)  20 mg Intravenous Daily    lipid (SMOFLIPID)  250 mL Intravenous Daily    micafungin (MYCAMINE) IVPB  100 mg Intravenous Q24H    white petrolatum-mineral oil 57.3-42.5%   Both Eyes QHS     Continuous Infusions:   amiodarone in dextrose 5% 0.5 mg/min (05/17/22 1200)    dextrose 10 % in water (D10W)      dextrose 5 % 75 mL/hr at 05/17/22 1200    heparin (porcine) in D5W Stopped (05/17/22 0954)    TPN ADULT CENTRAL LINE CUSTOM 36 mL/hr at 05/17/22 1200    TPN ADULT CENTRAL " LINE CUSTOM       PRN Meds:.sodium chloride, sodium chloride 0.9%, acetaminophen, dextrose 10 % in water (D10W), dextrose 10%, fentaNYL, glucagon (human recombinant), heparin (PORCINE), heparin (PORCINE), hydrALAZINE, insulin aspart U-100, lorazepam, ondansetron    Physical Findings/Assessment         Estimated/Assessed Needs    Weight Used For Calorie Calculations: 116.5 kg (256 lb 13.4 oz)  Energy Calorie Requirements (kcal): 4908-7168 (7818-8331)  Energy Need Method: Kcal/kg  Protein Requirements: 58-72 (0.8-1.0g/kg, renal labs)  Weight Used For Protein Calculations: 72.7 kg (160 lb 4.4 oz) (IBW)  Fluid Requirements (mL): 9507-2245  Estimated Fluid Requirement Method: RDA Method  RDA Method (mL): 1281  CHO Requirement: 160-203      Nutrition Prescription Ordered    Current Diet Order: NPO    Evaluation of Received Nutrient/Fluid Intake    Parenteral Calories: 968  Parenteral Protein: 72g  Lipids: 500 calories   Total Calories: 1468  % Kcal Needs: 100%  % Protein Needs: 100%  Energy Calories Required: meeting needs  Protein Required: meeting needs  Tolerance:  (Patient NPO)  % Intake of Estimated Energy Needs: 75 - 100 %  % Meal Intake: NPO    Nutrition Risk    Level of Risk/Frequency of Follow-up: moderate        Monitor and Evaluation    Food and Nutrient Intake: energy intake, parenteral nutrition intake  Food and Nutrient Adminstration: enteral and parenteral nutrition administration  Anthropometric Measurements: weight, weight change, body mass index  Biochemical Data, Medical Tests and Procedures: electrolyte and renal panel, gastrointestinal profile, inflammatory profile, glucose/endocrine profile, lipid profile  Nutrition-Focused Physical Findings: overall appearance       Nutrition Follow-Up    RD Follow-up?: Yes   Nanda Holman RD,LDN

## 2022-05-17 NOTE — PROGRESS NOTES
F/U on Mr. Masters. He remains in ICU, intubated. Right sided Ileostomy with bilious effluent, surgical pouch remains intact. Will continue to follow and plan for education once awake and alert and able to participate.

## 2022-05-17 NOTE — OP NOTE
Pre Op Diagnosis: LUQ fluid collection     Post Op Diagnosis: same     Procedure:  drainage     Procedure performed by: Simone KHANNA, Noelle SALOMON     Written Informed Consent Obtained: Yes     Specimen Removed:  abdominal fluid (type of tissue to be determined by lab analysis)     Estimated Blood Loss:  minimal     Findings: Local anesthesia and moderate sedation were used.     The patient tolerated the procedure well and there were no complications.      Sterile technique was performed in the LUQ, lidocaine was used as a local anesthetic.  8.3 fr drainage catheter placed and secured with suture and tegaderm dressing.  Approx 480 ccs of mucous like  purulent fluid removed and sent to lab.  Pt tolerated the procedure well without immediate complications.  Please see radiologist report for details. F/u with PCP and/or ordering physician.

## 2022-05-17 NOTE — NURSING
Pt drowsy, but arouses to voice. HAGAN and follows commands. Tolerating SBT. VSS. NSR. Ammio gtt @0.5, heparin gtt PTT q6h. NGT to LWS-green/bile output. Ostomy output with green/bile. JPX2 to thumb compression. TPN and Lipids infusing. Doherty with good UO. Afebrile. BG q4h. Turned q2h, heels elevated off bed.

## 2022-05-17 NOTE — PROGRESS NOTES
Pt back in ICU 16 s/p CT guided drainage of LUQ abscess - 8 FR pigtail drain in place to ADDIS bulb suction.  Attempted to replace norwood catheter per NP order - pt still with scrotal and penile edema - unable to clearly visualize urethra, therefore unable to replace norwood catheter in sterile manner. JOSETTE Hernandez, NP notified, will re-assess tomorrow

## 2022-05-17 NOTE — PLAN OF CARE
Pt slowly progressing overall; drowsy this AM after pain med early this AM, more alert and cooperative this afternoon so pt successfully extubated to 2L NC around 15:30, moderate cough present with thick productive sputum. SR/SB on monitor with PACs; BP stable, required 1x dose of prn hydralazine for SBP>175. Taken down with IR today for CT guided LUQ abscess drainage and drain placement. Both LUQ and LLQ abd ADDIS drains with light brown, thick mucoid output, RLQ ADDIS drain with serosang output. Ileostomy with scant liquid green/brown stool. L nare NGT to low suction per order, draining green bile. UOP approx 1L per norwood. TPN and lipids continued. Blood glucose monitored/managed q4h per SSI. Turned/repositioned with wedge and pillows q2h, heels floated on boots. BSWRs removed, no injuries noted. Both daughters, son and spouse at bedside throughout day, all updated on POC and VU. Family in high spirits with progress made today

## 2022-05-17 NOTE — SUBJECTIVE & OBJECTIVE
Review of Systems   Unable to perform ROS: Intubated       Objective:     Vital Signs (Most Recent):  Temp: 99.2 °F (37.3 °C) (05/17/22 0715)  Pulse: 66 (05/17/22 1015)  Resp: 17 (05/17/22 1015)  BP: (!) 95/45 (05/15/22 2112)  SpO2: 100 % (05/17/22 1015)   Vital Signs (24h Range):  Temp:  [97.6 °F (36.4 °C)-99.2 °F (37.3 °C)] 99.2 °F (37.3 °C)  Pulse:  [] 66  Resp:  [14-31] 17  SpO2:  [100 %] 100 %  Arterial Line BP: (109-176)/(44-89) 159/65     Weight: 109.4 kg (241 lb 2.9 oz)  Body mass index is 35.62 kg/m².      Intake/Output Summary (Last 24 hours) at 5/17/2022 1039  Last data filed at 5/17/2022 1000  Gross per 24 hour   Intake 3757.93 ml   Output 3047 ml   Net 710.93 ml       Physical Exam  Vitals and nursing note reviewed.   Constitutional:       General: He is not in acute distress.     Appearance: He is well-developed. He is obese. He is ill-appearing. He is not toxic-appearing or diaphoretic.      Interventions: He is intubated and restrained.   HENT:      Head: Normocephalic and atraumatic.      Nose:        Mouth/Throat:      Mouth: Mucous membranes are moist.   Eyes:      General: No scleral icterus.     Conjunctiva/sclera: Conjunctivae normal.      Pupils: Pupils are equal, round, and reactive to light.   Neck:      Vascular: No JVD.     Cardiovascular:      Rate and Rhythm: Normal rate and regular rhythm.      Pulses:           Radial pulses are 2+ on the right side and 2+ on the left side.        Dorsalis pedis pulses are 2+ on the right side and 2+ on the left side.      Heart sounds: Heart sounds are distant.   Pulmonary:      Effort: No tachypnea, accessory muscle usage or respiratory distress. He is intubated.      Breath sounds: Examination of the right-lower field reveals decreased breath sounds. Examination of the left-lower field reveals decreased breath sounds. Decreased breath sounds and rhonchi (improved post OET suctioning) present.   Chest:      Chest wall: No deformity or  tenderness.   Abdominal:      General: Bowel sounds are normal. There is no distension.      Palpations: Abdomen is soft.          Comments: Some loose brown stool in colostomy   Genitourinary:     Testes:         Right: Swelling present.         Left: Swelling present.      Comments: Doherty in place.  Significant scrotal edema  Musculoskeletal:      Cervical back: Neck supple.      Right lower le+ Pitting Edema present.      Left lower le+ Pitting Edema present.      Right foot: No deformity.      Left foot: No deformity.   Lymphadenopathy:      Cervical: No cervical adenopathy.   Skin:     General: Skin is warm.      Capillary Refill: Capillary refill takes 2 to 3 seconds.          Neurological:      Mental Status: He is lethargic.      Comments: Slowly becoming more alert this AM and starting to follow commands again and nod head to questions       Vents:  Vent Mode: Spont (22)  Ventilator Initiated: Yes (22 1258)  Set Rate: 0 BPM (22)  Vt Set: 450 mL (22)  Pressure Support: 15 cmH20 (22)  PEEP/CPAP: 5 cmH20 (22)  Oxygen Concentration (%): 35 (22 1015)  Peak Airway Pressure: 20 cmH2O (22)  Plateau Pressure: 0 cmH20 (22)  Total Ve: 7.9 mL (22)  F/VT Ratio<105 (RSBI): (!) 25.82 (22)    Lines/Drains/Airways       Central Venous Catheter Line  Duration             Percutaneous Central Line Insertion/Assessment - Triple Lumen  22 1200 right internal jugular 12 days              Drain  Duration                  Urethral Catheter 22 1105 Straight-tip;Silicone 16 Fr. 12 days         Ileostomy 22 Standard (Comfort, christian) RUQ 6 days         Closed/Suction Drain 22 1735 LUQ Bulb 19 Fr. 5 days         Closed/Suction Drain 22 1735 RLQ Bulb 19 Fr. 5 days         NG/OG Tube 22 1715 [place] sump 18 Fr. Left nostril 5 days              Airway  Duration                  Airway -  Non-Surgical 05/04/22 1051 Endotracheal Tube 12 days              Arterial Line  Duration             Arterial Line 05/07/22 1330 Right Radial 9 days              Peripheral Intravenous Line  Duration                  Midline Catheter Insertion/Assessment  - Single Lumen 05/12/22 1730 Left basilic vein (medial side of arm) 4 days                    Significant Labs:    CBC/Anemia Profile:  Recent Labs   Lab 05/16/22 0328 05/16/22 1424 05/17/22 0422   WBC 12.58  --  13.92*   HGB 6.9* 8.1* 7.9*   HCT 23.8* 27.5* 27.3*     --  316   MCV 74*  --  79*   RDW 27.7*  --  27.5*        Chemistries:  Recent Labs   Lab 05/16/22 0328 05/16/22 1424 05/17/22 0422   * 153* 152*   K 3.2* 3.5 3.8    107 109   CO2 32* 29 32*   BUN 93* 97* 84*   CREATININE 2.9* 2.7* 2.5*   CALCIUM 7.9* 8.1* 8.2*   ALBUMIN 1.4*  --  1.4*   PROT 4.4*  --  4.6*   BILITOT 1.1*  --  1.1*   ALKPHOS 126  --  114   ALT 56*  --  44   AST 59*  --  36   MG 1.9  --  1.8   PHOS 3.8  --  3.7       ABGs:   Recent Labs   Lab 05/17/22  0944   PH 7.486*   PCO2 46.8*   HCO3 35.4*   POCSATURATED 98   BE 12     Coagulation:   Recent Labs   Lab 05/17/22 0422   APTT 41.7*     POCT Glucose:   Recent Labs   Lab 05/16/22 2116 05/16/22  2332 05/17/22  0447   POCTGLUCOSE 142* 138* 143*     All pertinent labs within the past 24 hours have been reviewed.

## 2022-05-17 NOTE — PLAN OF CARE
Recommendation/Intervention: 5/17  1. Recommend to continue Custom TPN:   -Custom Macronutrients AA- 72g; CHO- 200g.   -GIR: 1.19.   -Standard lipids pending triglycerides.      2. Weekly weights per RD follow up.    Nanda Holman RD,LDN

## 2022-05-17 NOTE — SUBJECTIVE & OBJECTIVE
Interval History: Currently extubated. Able to nod head appropriately to some questions. Underwent CT-guided drain placement with IR today.    Medications:  Continuous Infusions:   amiodarone in dextrose 5% 0.5 mg/min (05/17/22 1600)    dextrose 10 % in water (D10W)      dextrose 5 % 75 mL/hr at 05/17/22 1600    heparin (porcine) in D5W Stopped (05/17/22 0954)    TPN ADULT CENTRAL LINE CUSTOM 36 mL/hr at 05/17/22 1623     Scheduled Meds:   chlorhexidine  15 mL Mouth/Throat BID    ertapenem (INVANZ) IVPB  500 mg Intravenous Q24H    famotidine (PF)  20 mg Intravenous Daily    lipid (SMOFLIPID)  250 mL Intravenous Daily    micafungin (MYCAMINE) IVPB  100 mg Intravenous Q24H    white petrolatum-mineral oil 57.3-42.5%   Both Eyes QHS     PRN Meds:sodium chloride, sodium chloride 0.9%, acetaminophen, dextrose 10 % in water (D10W), dextrose 10%, fentaNYL, glucagon (human recombinant), heparin (PORCINE), heparin (PORCINE), hydrALAZINE, insulin aspart U-100, lorazepam, ondansetron     Review of patient's allergies indicates:  No Known Allergies  Objective:     Vital Signs (Most Recent):  Temp: 98.3 °F (36.8 °C) (05/17/22 1530)  Pulse: 67 (05/17/22 1645)  Resp: (!) 23 (05/17/22 1645)  BP: (!) 137/52 (per art line) (05/17/22 1530)  SpO2: 97 % (05/17/22 1645)   Vital Signs (24h Range):  Temp:  [97.6 °F (36.4 °C)-99.5 °F (37.5 °C)] 98.3 °F (36.8 °C)  Pulse:  [] 67  Resp:  [14-35] 23  SpO2:  [94 %-100 %] 97 %  BP: ()/(46-69) 137/52  Arterial Line BP: (109-192)/(44-88) 153/54     Weight: 109.4 kg (241 lb 2.9 oz)  Body mass index is 35.62 kg/m².    Intake/Output - Last 3 Shifts         05/15 0700 05/16 0659 05/16 0700 05/17 0659 05/17 0700 05/18 0659    I.V. (mL/kg) 890.9 (7.7) 1799.3 (16.4) 899.3 (8.2)    Blood  375     NG/GT       IV Piggyback 369.7 376.5 32.8    TPN 1027.1 1110.1 306.9    Total Intake(mL/kg) 2287.7 (19.8) 3660.9 (33.5) 1239 (11.3)    Urine (mL/kg/hr) 3420 (1.2) 2162 (0.8) 1055 (0.9)    Drains 1410  780 140    Stool 200 100     Total Output 5030 3042 1195    Net -2742.3 +618.9 +44                   Physical Exam  Vitals and nursing note reviewed.   Constitutional:       Appearance: He is obese. He is ill-appearing.   HENT:      Mouth/Throat:      Mouth: Mucous membranes are dry.   Cardiovascular:      Rate and Rhythm: Rhythm irregular.   Pulmonary:      Comments: Diminished bilaterally. Poor inspiratory effort.  Abdominal:      Palpations: Abdomen is soft.      Comments: Midline incision with staples--no purulent drainage or erythema. Right-sided end ileostomy is red with some liquid stool output. ADDIS drain on right is serous; left is murky brown.   Genitourinary:     Comments: Doherty in place  Musculoskeletal:      Right lower leg: Edema present.      Left lower leg: Edema present.   Neurological:      Comments: Awake but lethargic       Significant Labs:  I have reviewed all pertinent lab results within the past 24 hours.  CBC:   Recent Labs   Lab 05/17/22  0422   WBC 13.92*   RBC 3.46*   HGB 7.9*   HCT 27.3*      MCV 79*   MCH 22.8*   MCHC 28.9*     CMP:   Recent Labs   Lab 05/17/22  0422   *   CALCIUM 8.2*   ALBUMIN 1.4*   PROT 4.6*   *   K 3.8   CO2 32*      BUN 84*   CREATININE 2.5*   ALKPHOS 114   ALT 44   AST 36   BILITOT 1.1*     ABGs:   Recent Labs   Lab 05/17/22  0944   PH 7.486*   PCO2 46.8*   PO2 105*   HCO3 35.4*   POCSATURATED 98   BE 12       Significant Diagnostics:  I have reviewed all pertinent imaging results/findings within the past 24 hours.

## 2022-05-17 NOTE — ASSESSMENT & PLAN NOTE
Vent Day # 14  VAP prophylaxis  ABG daily and prn to assess response to therapy  Off sedation and tolerating SBT but still weak unable to lift head off pillow.  FVC 1.5 L w/ cough and best NIF -22 cm H2O.  Concern with ability to protect airway with extubation  Claudia is daughter and POA and discussed extubation likely today but put on hold this AM due to lethargy from Fentanyl overnight and now planning IR drain placement mid day.  Claudia does confirm and agree with patient decision on admit for no long term support.  Therefore in event of extubation failure he is not to be re-intubated and if fails any non-invasive support or HFNC then will need transition to comfort care and all family members including KAY Taylor, agree with this decision.

## 2022-05-17 NOTE — SEDATION DOCUMENTATION
Procedure completed at this time. Pt tolerated well. ADDIS drain secured in place to LUQ with sutures, securement device, and tegaderm. LOUISE. CAITIE.

## 2022-05-17 NOTE — PROGRESS NOTES
Formerly [facility] - Intensive Care ([facility])  General Surgery  Progress Note    Subjective:     History of Present Illness:  No notes on file    Post-Op Info:  Procedure(s) (LRB):  CREATION, ILEOSTOMY (N/A)  HEMICOLECTOMY, RIGHT (N/A)  BIOPSY, LIVER (Right)   6 Days Post-Op     Interval History: Currently extubated. Able to nod head appropriately to some questions. Underwent CT-guided drain placement with IR today.    Medications:  Continuous Infusions:   amiodarone in dextrose 5% 0.5 mg/min (05/17/22 1600)    dextrose 10 % in water (D10W)      dextrose 5 % 75 mL/hr at 05/17/22 1600    heparin (porcine) in D5W Stopped (05/17/22 0954)    TPN ADULT CENTRAL LINE CUSTOM 36 mL/hr at 05/17/22 1623     Scheduled Meds:   chlorhexidine  15 mL Mouth/Throat BID    ertapenem (INVANZ) IVPB  500 mg Intravenous Q24H    famotidine (PF)  20 mg Intravenous Daily    lipid (SMOFLIPID)  250 mL Intravenous Daily    micafungin (MYCAMINE) IVPB  100 mg Intravenous Q24H    white petrolatum-mineral oil 57.3-42.5%   Both Eyes QHS     PRN Meds:sodium chloride, sodium chloride 0.9%, acetaminophen, dextrose 10 % in water (D10W), dextrose 10%, fentaNYL, glucagon (human recombinant), heparin (PORCINE), heparin (PORCINE), hydrALAZINE, insulin aspart U-100, lorazepam, ondansetron     Review of patient's allergies indicates:  No Known Allergies  Objective:     Vital Signs (Most Recent):  Temp: 98.3 °F (36.8 °C) (05/17/22 1530)  Pulse: 67 (05/17/22 1645)  Resp: (!) 23 (05/17/22 1645)  BP: (!) 137/52 (per art line) (05/17/22 1530)  SpO2: 97 % (05/17/22 1645)   Vital Signs (24h Range):  Temp:  [97.6 °F (36.4 °C)-99.5 °F (37.5 °C)] 98.3 °F (36.8 °C)  Pulse:  [] 67  Resp:  [14-35] 23  SpO2:  [94 %-100 %] 97 %  BP: ()/(46-69) 137/52  Arterial Line BP: (109-192)/(44-88) 153/54     Weight: 109.4 kg (241 lb 2.9 oz)  Body mass index is 35.62 kg/m².    Intake/Output - Last 3 Shifts         05/15 0700  05/16 0659 05/16 0700 05/17 0659 05/17  0700  05/18 0659    I.V. (mL/kg) 890.9 (7.7) 1799.3 (16.4) 899.3 (8.2)    Blood  375     NG/GT       IV Piggyback 369.7 376.5 32.8    TPN 1027.1 1110.1 306.9    Total Intake(mL/kg) 2287.7 (19.8) 3660.9 (33.5) 1239 (11.3)    Urine (mL/kg/hr) 3420 (1.2) 2162 (0.8) 1055 (0.9)    Drains 1410 780 140    Stool 200 100     Total Output 5030 3042 1195    Net -2742.3 +618.9 +44                   Physical Exam  Vitals and nursing note reviewed.   Constitutional:       Appearance: He is obese. He is ill-appearing.   HENT:      Mouth/Throat:      Mouth: Mucous membranes are dry.   Cardiovascular:      Rate and Rhythm: Rhythm irregular.   Pulmonary:      Comments: Diminished bilaterally. Poor inspiratory effort.  Abdominal:      Palpations: Abdomen is soft.      Comments: Midline incision with staples--no purulent drainage or erythema. Right-sided end ileostomy is red with some liquid stool output. ADDIS drain on right is serous; left is murky brown.   Genitourinary:     Comments: Scrotal edema  Musculoskeletal:      Right lower leg: Edema present.      Left lower leg: Edema present.   Neurological:      Comments: Awake but lethargic       Significant Labs:  I have reviewed all pertinent lab results within the past 24 hours.  CBC:   Recent Labs   Lab 05/17/22  0422   WBC 13.92*   RBC 3.46*   HGB 7.9*   HCT 27.3*      MCV 79*   MCH 22.8*   MCHC 28.9*     CMP:   Recent Labs   Lab 05/17/22  0422   *   CALCIUM 8.2*   ALBUMIN 1.4*   PROT 4.6*   *   K 3.8   CO2 32*      BUN 84*   CREATININE 2.5*   ALKPHOS 114   ALT 44   AST 36   BILITOT 1.1*     ABGs:   Recent Labs   Lab 05/17/22  0944   PH 7.486*   PCO2 46.8*   PO2 105*   HCO3 35.4*   POCSATURATED 98   BE 12       Significant Diagnostics:  I have reviewed all pertinent imaging results/findings within the past 24 hours.    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  - If NG tube output low, would like to begin trickle tube feeds again tomorrow  - Speech therapy  - Begin increasing activity  - Continue ICU management. On heparin and amio gtt for afib.  - Ostomy nurse consult  - Strict I/Os  - TPN  - Medical and ICU management per hospital/ICU team    Acute on chronic anemia  Management per medicine/critical care    Acute hypoxemic respiratory failure on mechanical vent  Management per medicine/critical care        Elvie Parker, DO  General Surgery  [facility] - Intensive Care (Hospital)

## 2022-05-18 PROBLEM — I48.0 PAROXYSMAL ATRIAL FIBRILLATION: Status: ACTIVE | Noted: 2022-05-06

## 2022-05-18 PROBLEM — R13.12 OROPHARYNGEAL DYSPHAGIA: Status: ACTIVE | Noted: 2022-05-18

## 2022-05-18 PROBLEM — E46 HYPOALBUMINEMIA DUE TO PROTEIN-CALORIE MALNUTRITION: Status: ACTIVE | Noted: 2021-08-06

## 2022-05-18 LAB
ALBUMIN SERPL BCP-MCNC: 1.4 G/DL (ref 3.5–5.2)
ALP SERPL-CCNC: 102 U/L (ref 55–135)
ALT SERPL W/O P-5'-P-CCNC: 31 U/L (ref 10–44)
AMMONIA PLAS-SCNC: 21 UMOL/L (ref 10–50)
ANION GAP SERPL CALC-SCNC: 12 MMOL/L (ref 8–16)
APTT BLDCRRT: 41.3 SEC (ref 21–32)
AST SERPL-CCNC: 33 U/L (ref 10–40)
BASOPHILS # BLD AUTO: 0.06 K/UL (ref 0–0.2)
BASOPHILS NFR BLD: 0.5 % (ref 0–1.9)
BILIRUB SERPL-MCNC: 1.5 MG/DL (ref 0.1–1)
BUN SERPL-MCNC: 75 MG/DL (ref 8–23)
CALCIUM SERPL-MCNC: 8.2 MG/DL (ref 8.7–10.5)
CHLORIDE SERPL-SCNC: 110 MMOL/L (ref 95–110)
CO2 SERPL-SCNC: 28 MMOL/L (ref 23–29)
CREAT SERPL-MCNC: 2.1 MG/DL (ref 0.5–1.4)
DIFFERENTIAL METHOD: ABNORMAL
EOSINOPHIL # BLD AUTO: 0.3 K/UL (ref 0–0.5)
EOSINOPHIL NFR BLD: 2.6 % (ref 0–8)
ERYTHROCYTE [DISTWIDTH] IN BLOOD BY AUTOMATED COUNT: 28.4 % (ref 11.5–14.5)
EST. GFR  (AFRICAN AMERICAN): 36 ML/MIN/1.73 M^2
EST. GFR  (NON AFRICAN AMERICAN): 31 ML/MIN/1.73 M^2
GLUCOSE SERPL-MCNC: 144 MG/DL (ref 70–110)
GRAM STN SPEC: NORMAL
GRAM STN SPEC: NORMAL
HCT VFR BLD AUTO: 26.7 % (ref 40–54)
HGB BLD-MCNC: 7.7 G/DL (ref 14–18)
IMM GRANULOCYTES # BLD AUTO: 0.27 K/UL (ref 0–0.04)
IMM GRANULOCYTES NFR BLD AUTO: 2.3 % (ref 0–0.5)
LYMPHOCYTES # BLD AUTO: 1.2 K/UL (ref 1–4.8)
LYMPHOCYTES NFR BLD: 10.3 % (ref 18–48)
MAGNESIUM SERPL-MCNC: 1.9 MG/DL (ref 1.6–2.6)
MCH RBC QN AUTO: 22.3 PG (ref 27–31)
MCHC RBC AUTO-ENTMCNC: 28.8 G/DL (ref 32–36)
MCV RBC AUTO: 77 FL (ref 82–98)
MONOCYTES # BLD AUTO: 0.9 K/UL (ref 0.3–1)
MONOCYTES NFR BLD: 7.9 % (ref 4–15)
NEUTROPHILS # BLD AUTO: 9 K/UL (ref 1.8–7.7)
NEUTROPHILS NFR BLD: 76.4 % (ref 38–73)
NRBC BLD-RTO: 0 /100 WBC
PHOSPHATE SERPL-MCNC: 2.7 MG/DL (ref 2.7–4.5)
PLATELET # BLD AUTO: 380 K/UL (ref 150–450)
PMV BLD AUTO: ABNORMAL FL (ref 9.2–12.9)
POCT GLUCOSE: 119 MG/DL (ref 70–110)
POCT GLUCOSE: 132 MG/DL (ref 70–110)
POCT GLUCOSE: 140 MG/DL (ref 70–110)
POCT GLUCOSE: 142 MG/DL (ref 70–110)
POCT GLUCOSE: 146 MG/DL (ref 70–110)
POCT GLUCOSE: 156 MG/DL (ref 70–110)
POTASSIUM SERPL-SCNC: 3.6 MMOL/L (ref 3.5–5.1)
PROT SERPL-MCNC: 4.6 G/DL (ref 6–8.4)
RBC # BLD AUTO: 3.45 M/UL (ref 4.6–6.2)
SODIUM SERPL-SCNC: 150 MMOL/L (ref 136–145)
TRIGL SERPL-MCNC: 69 MG/DL (ref 30–150)
WBC # BLD AUTO: 11.82 K/UL (ref 3.9–12.7)

## 2022-05-18 PROCEDURE — 84478 ASSAY OF TRIGLYCERIDES: CPT | Performed by: SURGERY

## 2022-05-18 PROCEDURE — 25000003 PHARM REV CODE 250: Performed by: SURGERY

## 2022-05-18 PROCEDURE — 99291 PR CRITICAL CARE, E/M 30-74 MINUTES: ICD-10-PCS | Mod: ,,, | Performed by: NURSE PRACTITIONER

## 2022-05-18 PROCEDURE — 80053 COMPREHEN METABOLIC PANEL: CPT | Performed by: SURGERY

## 2022-05-18 PROCEDURE — 63600175 PHARM REV CODE 636 W HCPCS: Mod: JG | Performed by: INTERNAL MEDICINE

## 2022-05-18 PROCEDURE — 99900035 HC TECH TIME PER 15 MIN (STAT)

## 2022-05-18 PROCEDURE — 84100 ASSAY OF PHOSPHORUS: CPT | Performed by: SURGERY

## 2022-05-18 PROCEDURE — B4185 PARENTERAL SOL 10 GM LIPIDS: HCPCS | Performed by: NURSE PRACTITIONER

## 2022-05-18 PROCEDURE — 63600175 PHARM REV CODE 636 W HCPCS: Performed by: NURSE PRACTITIONER

## 2022-05-18 PROCEDURE — 85025 COMPLETE CBC W/AUTO DIFF WBC: CPT | Performed by: SURGERY

## 2022-05-18 PROCEDURE — 25000003 PHARM REV CODE 250: Performed by: NURSE PRACTITIONER

## 2022-05-18 PROCEDURE — A4217 STERILE WATER/SALINE, 500 ML: HCPCS | Performed by: NURSE PRACTITIONER

## 2022-05-18 PROCEDURE — 97530 THERAPEUTIC ACTIVITIES: CPT

## 2022-05-18 PROCEDURE — 99232 SBSQ HOSP IP/OBS MODERATE 35: CPT | Mod: ,,, | Performed by: INTERNAL MEDICINE

## 2022-05-18 PROCEDURE — 20000000 HC ICU ROOM

## 2022-05-18 PROCEDURE — 36569 INSJ PICC 5 YR+ W/O IMAGING: CPT

## 2022-05-18 PROCEDURE — 99291 CRITICAL CARE FIRST HOUR: CPT | Mod: ,,, | Performed by: NURSE PRACTITIONER

## 2022-05-18 PROCEDURE — 51702 INSERT TEMP BLADDER CATH: CPT

## 2022-05-18 PROCEDURE — 97163 PT EVAL HIGH COMPLEX 45 MIN: CPT

## 2022-05-18 PROCEDURE — 27000221 HC OXYGEN, UP TO 24 HOURS

## 2022-05-18 PROCEDURE — 83735 ASSAY OF MAGNESIUM: CPT | Performed by: SURGERY

## 2022-05-18 PROCEDURE — 82140 ASSAY OF AMMONIA: CPT | Performed by: NURSE PRACTITIONER

## 2022-05-18 PROCEDURE — 85730 THROMBOPLASTIN TIME PARTIAL: CPT | Performed by: NURSE PRACTITIONER

## 2022-05-18 PROCEDURE — 63600175 PHARM REV CODE 636 W HCPCS: Performed by: SURGERY

## 2022-05-18 PROCEDURE — C1751 CATH, INF, PER/CENT/MIDLINE: HCPCS

## 2022-05-18 PROCEDURE — 94799 UNLISTED PULMONARY SVC/PX: CPT

## 2022-05-18 PROCEDURE — 99232 PR SUBSEQUENT HOSPITAL CARE,LEVL II: ICD-10-PCS | Mod: ,,, | Performed by: INTERNAL MEDICINE

## 2022-05-18 PROCEDURE — 31720 CLEARANCE OF AIRWAYS: CPT

## 2022-05-18 PROCEDURE — 25000003 PHARM REV CODE 250: Performed by: INTERNAL MEDICINE

## 2022-05-18 PROCEDURE — 97166 OT EVAL MOD COMPLEX 45 MIN: CPT

## 2022-05-18 RX ORDER — POTASSIUM CHLORIDE 29.8 MG/ML
40 INJECTION INTRAVENOUS ONCE
Status: COMPLETED | OUTPATIENT
Start: 2022-05-18 | End: 2022-05-18

## 2022-05-18 RX ADMIN — DEXTROSE: 5 SOLUTION INTRAVENOUS at 11:05

## 2022-05-18 RX ADMIN — ERTAPENEM 1 G: 1 INJECTION INTRAMUSCULAR; INTRAVENOUS at 09:05

## 2022-05-18 RX ADMIN — POTASSIUM CHLORIDE 40 MEQ: 29.8 INJECTION, SOLUTION INTRAVENOUS at 08:05

## 2022-05-18 RX ADMIN — CHLORHEXIDINE GLUCONATE 0.12% ORAL RINSE 15 ML: 1.2 LIQUID ORAL at 08:05

## 2022-05-18 RX ADMIN — ASCORBIC ACID, VITAMIN A PALMITATE, CHOLECALCIFEROL, THIAMINE HYDROCHLORIDE, RIBOFLAVIN-5 PHOSPHATE SODIUM, PYRIDOXINE HYDROCHLORIDE, NIACINAMIDE, DEXPANTHENOL, ALPHA-TOCOPHEROL ACETATE, VITAMIN K1, FOLIC ACID, BIOTIN, CYANOCOBALAMIN: 200; 3300; 200; 6; 3.6; 6; 40; 15; 10; 150; 600; 60; 5 INJECTION, SOLUTION INTRAVENOUS at 04:05

## 2022-05-18 RX ADMIN — FAMOTIDINE 20 MG: 10 INJECTION INTRAVENOUS at 08:05

## 2022-05-18 RX ADMIN — AMIODARONE HYDROCHLORIDE 0.5 MG/MIN: 1.8 INJECTION, SOLUTION INTRAVENOUS at 05:05

## 2022-05-18 RX ADMIN — MICAFUNGIN SODIUM 100 MG: 100 INJECTION, POWDER, LYOPHILIZED, FOR SOLUTION INTRAVENOUS at 03:05

## 2022-05-18 RX ADMIN — MINERAL OIL, PETROLATUM: 425; 573 OINTMENT OPHTHALMIC at 09:05

## 2022-05-18 RX ADMIN — AMIODARONE HYDROCHLORIDE 0.5 MG/MIN: 1.8 INJECTION, SOLUTION INTRAVENOUS at 03:05

## 2022-05-18 RX ADMIN — POTASSIUM PHOSPHATE, MONOBASIC AND POTASSIUM PHOSPHATE, DIBASIC 15 MMOL: 224; 236 INJECTION, SOLUTION, CONCENTRATE INTRAVENOUS at 12:05

## 2022-05-18 RX ADMIN — SMOFLIPID 250 ML: 6; 6; 5; 3 INJECTION, EMULSION INTRAVENOUS at 05:05

## 2022-05-18 RX ADMIN — HEPARIN SODIUM 18 UNITS/KG/HR: 5000 INJECTION INTRAVENOUS; SUBCUTANEOUS at 10:05

## 2022-05-18 NOTE — ASSESSMENT & PLAN NOTE
S/p exploratory laparotomy, abdominal washout, segmental small bowel resection (left in discontinuity), placement of Abthera vac 5/4/22  S/p reopening of recent laparotomy, abdominal washout, replacement of Abthera vac 5/7/22  S/p reopening of recent laparotomy, abdominal washout, right hemicolectomy, liver biopsy, end ileostomy, TAP block, abdominal wall closure 5/11/22  Left-sided drain placed by IR for LUQ abscess 5/17/22    - Monitor drain and ileostomy output.  - Will hold off on tube feeds for an additional day due to risk of aspiration. Keep NG tube to LIS.  - Speech therapy  - Begin increasing activity  - Continue ICU management. On heparin and amio gtt for afib.  - Ostomy nurse consult  - Strict I/Os  - TPN  - Medical and ICU management per hospital/ICU team

## 2022-05-18 NOTE — ASSESSMENT & PLAN NOTE
Pressors  Abx - Zosyn / Micafungin  ID on consult    Remains in severe Septic Shock complicated by JOSE/ATN- Anuria and Resp Failure on Vent  ICU team has d/w daughter about HD if and when needed- she does not appear to be in favor of HD at present  Prognosis poor    Day 4 in Septic Shock and on vent  Continue Levo and Vaso plus IV Abx  Prognosis poor    Day 5- Continues same on Vent, WBC better but remains in renal shut down due to shock plus 2 Pressors    Day 6- Continue present supportive care    Day 7- gradually improving, now on 1 pressor and WBC down to 17  Cont present care  Going for surgery today      15 May:  No longer on vasopressor.  Weaning sedation for awakening trials.  Left side intra-abdominal fluid collection/abscess draining into ADDIS.  17 May:  Additional drain placed.

## 2022-05-18 NOTE — ASSESSMENT & PLAN NOTE
S/t septic shock  Adequate volume resuscitation +16 L I/O balance  Avoid nephrotoxins  Strict I/O  No acute indication for dialysis but high potential for continued decline, daughter(HCPOA) will not pursue dialysis if decline per her father's wishes  Nephrology following  Good UOP and creatinine slowly improving daily  Lasix/Albumin for anasarca/pulm edema X 4 doses 5/16

## 2022-05-18 NOTE — PLAN OF CARE
OT parisa completed. Sup>sit max A of 2, static sitting with min A to moments of CGA x8 mins, sit>sup max A of 2. Recommending SNF at d/c.

## 2022-05-18 NOTE — PT/OT/SLP EVAL
Physical Therapy Evaluation    Patient Name:  Franky Masters   MRN:  [id number]    Recommendations:     Discharge Recommendations:  nursing facility, skilled   Discharge Equipment Recommendations:  (TBD)   Barriers to discharge: Decreased caregiver support    Assessment:     Franky Masters is a 67 y.o. male admitted with a medical diagnosis of Severe sepsis.  He presents with the following impairments/functional limitations:  weakness, impaired endurance, impaired self care skills, impaired functional mobilty, gait instability, impaired balance, edema, decreased safety awareness, decreased lower extremity function, decreased upper extremity function, decreased coordination, decreased ROM, impaired fine motor .    Rehab Prognosis: Good; patient would benefit from acute skilled PT services to address these deficits and reach maximum level of function.    Recent Surgery: Procedure(s) (LRB):  CREATION, ILEOSTOMY (N/A)  HEMICOLECTOMY, RIGHT (N/A)  BIOPSY, LIVER (Right) 7 Days Post-Op    Plan:     During this hospitalization, patient to be seen 3 x/week to address the identified rehab impairments via gait training, therapeutic exercises, therapeutic activities and progress toward the following goals:    · Plan of Care Expires:  06/01/22    Subjective     Chief Complaint: WEAKNESS  Patient/Family Comments/goals: INC STRENGTH   Pain/Comfort:  · Pain Rating 1: 0/10  · Pain Rating Post-Intervention 1: 0/10    Patients cultural, spiritual, Anabaptism conflicts given the current situation:      Living Environment:   PT LIVES IN A 2ND FLOOR APT WITH WIFE AND HAS 1 FLIGHT OF STEPS WITH RAILING TO ENTER   Prior to admission, patients level of function was IND AND DRIVES .  Equipment used at home: none.  DME owned (not currently used): none.  Upon discharge, patient will have assistance from UNKNOWN .    Objective:     Communicated with NURSE BRANCH AND Epic CHART REVIEW  prior to session.  Patient found HOB elevated with peripheral  IV, telemetry, pulse ox (continuous), arterial line, blood pressure cuff, norwood catheter, NG tube  upon PT entry to room.    General Precautions: Standard, fall, respiratory   Orthopedic Precautions:N/A   Braces: N/A  Respiratory Status: Nasal cannula, flow 2 L/min    Exams:  · RLE ROM: IMPAIRED  · RLE Strength: 2-/5  · LLE ROM: IMPAIRED  · LLE Strength: 2-/5    Functional Mobility:  · Bed Mobility:     · Rolling Left:  maximal assistance and of 2 persons  · Supine to Sit: maximal assistance and of 2 persons  · Sit to Supine: maximal assistance and of 2 persons    Therapeutic Activities and Exercises:   PT SEATED EOB WITH MAX A PROGRESSING TO CGA. PT WITH LIMITED ROM OF B LE AND UE WITH INC SWELLING     AM-PAC 6 CLICK MOBILITY  Total Score:      Patient left HOB elevated with all lines intact and call button in reach.    GOALS:   Multidisciplinary Problems     Physical Therapy Goals        Problem: Physical Therapy    Goal Priority Disciplines Outcome Goal Variances Interventions   Physical Therapy Goal     PT, PT/OT      Description: LT22  1. PT WILL COMPLETE BED MOBILITY WITH MOD A  2. PT WILL STAND WITH RW AND MAX A FOR STAND PIVOT T/F TO CHAIR   3. PT WILL GT TRAIN X 25' WITH RW AND MAX A                   History:     Past Medical History:   Diagnosis Date    Diverticulitis     Supplemental oxygen dependent        Past Surgical History:   Procedure Laterality Date    ABDOMINAL WASHOUT  2022    Procedure: LAVAGE, PERITONEAL, THERAPEUTIC;  Surgeon: Elvie Parker DO;  Location: [facility] OR;  Service: General;;    ABDOMINAL WASHOUT  2022    Procedure: ;  Surgeon: Elvie Parker DO;  Location: [facility] OR;  Service: General;;    APPLICATION OF WOUND VACUUM-ASSISTED CLOSURE DEVICE N/A 2022    Procedure: APPLICATION, WOUND VAC;  Surgeon: Elvie Parker DO;  Location: [facility] OR;  Service: General;  Laterality: N/A;    ILEOSTOMY N/A 2022    Procedure: CREATION, ILEOSTOMY;  Surgeon:  Elvie Parker DO;  Location: [facility] OR;  Service: General;  Laterality: N/A;    LAPAROTOMY N/A 5/7/2022    Procedure: LAPAROTOMY;  Surgeon: Elvie Parker DO;  Location: [facility] OR;  Service: General;  Laterality: N/A;    LIVER BIOPSY Right 5/11/2022    Procedure: BIOPSY, LIVER;  Surgeon: Elvie Parker DO;  Location: [facility] OR;  Service: General;  Laterality: Right;    RIGHT HEMICOLECTOMY N/A 5/11/2022    Procedure: HEMICOLECTOMY, RIGHT;  Surgeon: Elvie Parker DO;  Location: [facility] OR;  Service: General;  Laterality: N/A;       Time Tracking:     PT Received On: 05/18/22  PT Start Time: 1130     PT Stop Time: 1150  PT Total Time (min): 20 min     Billable Minutes: Evaluation 20 05/18/2022

## 2022-05-18 NOTE — ASSESSMENT & PLAN NOTE
Secondary to Peritonitis from bowel perforation with major fecal contamination on admit  Blood and sputum cultures 5/4 Neg  S/P Zyvox  Continue Invanz and Micafungin per ID following  Weaned off Levophed infusion 5/15  ICU hemodynamic monitoring  Follow fever curve and WBC  Doherty and CL changed today  Has Left Abd Abscess with IR placed drain 5/17 and culture pending NGTD

## 2022-05-18 NOTE — ASSESSMENT & PLAN NOTE
Extubated 5/17 on Vent day #14  Cont low flow NC O2  Patient declared on admit and family confirmed on extubation that if fails extubation there will be no elective re-intubation and will be transitioned to comfort care if no improvement with NPPV or HFNC  Patient is DNR  Oxygenating and ventilating well today but larger concern if airway protection and secretion mobility  Cont NT suction PRN  CXR in AM  Aspiration precautions

## 2022-05-18 NOTE — PROGRESS NOTES
[facility] - Intensive [facility] ([facility] Medicine  Progress Note    Patient Name: Franky Masters  MRN: [id number]  Patient Class: IP- Inpatient   Admission Date: 5/4/2022  Length of Stay: 14 days  Attending Physician: Elvie Parker DO  Primary Care Provider: HOLLY ROSEN        Subjective:     Principal Problem:Severe sepsis        HPI:  Mr Masters is a 65 yo male POD#0 s/p SB perforation with surgical intervention demonstrating a large cecal mass and 3l feculant fluid in the abdominal cavity. Additional findings of a perforated ileum and a liver mass. Patient tolerated the Exlap with Washout and small bowel excision. Patient had a wound vac placed, is currently intubated, sedated and recovering in the ICU. Patient received 4 units PRBC and remains on pressor support. Patient is a DNR and HM consulted with assistance with medical management. Daughter at bedside. Patient discussed with care team.          Overview/Hospital Course:  Patient continues to require pressors, remains intubated, sedated. Patient urine o/p declining and concerning. Discussed POC in detail at bedside with daughter POA. She expressed her fathers wish to not undergo treatments  like perm HD, where he has a diminished quality of life. We spoke frankly about issues and concerns and she will be communicating with the family as his case evolves. Comfort care was discussed and the goals of care will develop consistent with the patients expressed wishes. Potential for another surgery likely Saturday with a washout and possible biopsy vs resection are on the horizon. This possible intervention will be covered in detail by surgery sharing the risks and benefits of that approach. Case discussed with the primary service - surgery, and critical care team.    5/6- Pt seen and examined in the ICU plus discussed with ICU team and the pt's daughter/ POA: Remains critically ill, intubated, sedated on Vent, on Pressors- Levo plus Vaso- JOSE with  oliguria getting worse- Cr rising to 3.6, becoming severely acidotic- requiring Ertapenem, Zyvox, Micafungin as well as on Bicarb and Fentanyl gtt. He has massive fluid overload and generalized anasarca.  Possible return to OR tomorrow 5/7 if patient is more stable for right hemicolectomy, possible ileostomy, liver biopsy, abdominal wall closure. Dw Pt's daughter.       5/7- remains Intubated, sedated on Vent- Went into Afib w RVR- hence added Amio to Levo and Vasopressin- back in NSR. Getting Invanz and Zyvox plus Micafungin. Dr. Parker took him back to OR for for abdominal washout ( 3-3.5 L feculent fluid with food particles suctioned out in the OR, small bowel appeared better) and replaced Abthera- still has bowel discontinuity. His WBC, Lactate and Cr have all increased. Family updated about his critical condition and poor prognosis and JOSE/ATN- they are considering Comfort measures.     5/8- Day 5 on Vent- family at bedside, remains intubated on Vent with 2 Pressors- still on Amiodarone gtt for Afib, Still has Abthera wound vac to open Abdomen with small bowel discontinuity. Remains oliguric with generalized anasarca- almost 24 L fluid positive. Cr increased to 4.6. WBC down to 12, family does not want any HD/CRRT, so getting an IV Lasix trial to improve the urine output.     5/9- Day 6 on vent- remains the same, remains intubated, on vent with Abthera Wound vac and bowel discontinuity. Put out about 3 L urine since yesterday with IV Lasix, family still does not want any HD, WBC down to 10.2, H/H 7.8/24, still on 2 Pressors and Amio gtt. Family still deciding about comfort care.     5/10- Appreciate all- Day 7- remains same in septic shock on 2 vasopressors, intubated and sedated, still in afib. JOSE has remained stable at 4.7 but urine output improved with IV lasix. Abthera wound vac in place. No surgery today- put out about 2.5 L yesterday, given lasix again today.    5/11- Seen Pre op- looks better, less  swollen, remains intubated, sedated on Vent, on 1 Pressor- on Levophed. Going for OR today for Laparotomy and washout and abd wound closure. Good response to Lasix. Still Afib on Amiodarone gtt. Bun/Cr 98/4.4, WBC down to 17, H/H 8.6/26.     5/12- Day 8 on Vent- looks much better, remains on Vent with Levophed and Amio gtts. Had extensive surgery yesterday and did very well post op- Reopening of recent Laparotomy, Abdominal washout, R Hemicolectomy with Liver Bx, TAP block, Abdominal wall closure, Creation, end Ileostomy. POD 1- looks better, still on Levophed and Amio gtt for Afib, started on TPN, ID stopped Zyvox and continued Merrem/Mycafungin.     5/17 - Successfully extubated.  Additional left side drain placed by Interventional Radiology.        Interval History: Alert and responding appropriately to questions and commands but slow and weak.  Remained hemodynamically and respiratory stable since extubation yesterday.  Anticipate starting tube feedings tomorrow.    Review of Systems   Unable to perform ROS: Acuity of condition   Objective:     Vital Signs (Most Recent):  Temp: 98.4 °F (36.9 °C) (05/18/22 0715)  Pulse: 62 (05/18/22 1115)  Resp: (!) 29 (05/18/22 1115)  BP: (!) 137/52 (per art line) (05/17/22 1530)  SpO2: (!) 87 % (05/18/22 1115)   Vital Signs (24h Range):  Temp:  [98.2 °F (36.8 °C)-98.4 °F (36.9 °C)] 98.4 °F (36.9 °C)  Pulse:  [56-93] 62  Resp:  [16-35] 29  SpO2:  [87 %-100 %] 87 %  BP: ()/(46-61) 137/52  Arterial Line BP: (126-169)/(46-68) 135/49     Weight: 109.4 kg (241 lb 2.9 oz)  Body mass index is 35.62 kg/m².    Intake/Output Summary (Last 24 hours) at 5/18/2022 1120  Last data filed at 5/18/2022 1000  Gross per 24 hour   Intake 3458.06 ml   Output 2870 ml   Net 588.06 ml        Physical Exam  Vitals and nursing note reviewed.   Constitutional:       General: He is not in acute distress.     Appearance: He is well-developed. He is obese. He is ill-appearing. He is not toxic-appearing  or diaphoretic.      Interventions: He is sedated, intubated and restrained.   HENT:      Head: Atraumatic.      Nose:        Mouth/Throat:      Mouth: Mucous membranes are moist.   Eyes:      General: No scleral icterus.     Conjunctiva/sclera: Conjunctivae normal.      Pupils: Pupils are equal, round, and reactive to light.   Neck:      Vascular: No JVD.     Cardiovascular:      Rate and Rhythm: Tachycardia present. Rhythm irregular.      Pulses:           Radial pulses are 1+ on the right side and 1+ on the left side.        Dorsalis pedis pulses are 1+ on the right side and 1+ on the left side.      Heart sounds: Heart sounds are distant.   Pulmonary:      Effort: No accessory muscle usage or respiratory distress. He is intubated.      Breath sounds: Decreased breath sounds present. No wheezing or rhonchi.   Chest:      Chest wall: No deformity or tenderness.   Abdominal:      General: Bowel sounds are absent. There is no distension.          Comments: Right side ADDIS drain with serous fluid.  Two drains on left side with grey purulent fluid more thin than yesterday.   Genitourinary:     Comments: Doherty in place  Musculoskeletal:      Cervical back: Neck supple.      Right lower leg: 3+ Pitting Edema present.      Left lower leg: 3+ Pitting Edema present.      Right foot: No deformity.      Left foot: No deformity.   Lymphadenopathy:      Cervical: No cervical adenopathy.   Skin:     General: Skin is warm.      Capillary Refill: Capillary refill takes 2 to 3 seconds.          Neurological:      Comments: Weak and fatigued but moves all four extremities spontaneously and to command.     Flow (L/min): 2  Vent Mode: Spont  Oxygen Concentration (%):  [28-35] 28  Resp Rate Total:  [27 br/min-35 br/min] 30 br/min  Vt Set:  [450 mL] 450 mL  PEEP/CPAP:  [5 cmH20] 5 cmH20  Pressure Support:  [10 cmH20] 10 cmH20  Mean Airway Pressure:  [8.3 cmH20] 8.3 cmH20  Temp:  [98.2 °F (36.8 °C)-98.4 °F (36.9 °C)] 98.4 °F (36.9  °C)  Pulse:  [56-93] 62  Resp:  [16-35] 29  SpO2:  [87 %-100 %] 87 %  BP: ()/(46-61) 137/52  Arterial Line BP: (126-169)/(46-68) 135/49      Lab Results   Component Value Date    WAG36ONYGAYD Negative 05/11/2022    KTA61HVBOFLZ Negative 05/04/2022    QFX81FPPBIET Positive (A) 08/05/2021        Recent Labs   Lab 05/16/22  0328 05/16/22  1424 05/17/22  0422 05/18/22  0411   * 153* 152* 150*   WBC 12.58  --  13.92* 11.82   GRAN 81.0*  10.2*  --  77.8*  10.8* 76.4*  9.0*   LYMPH 7.3*  0.9*  --  9.2*  1.3 10.3*  1.2   HGB 6.9* 8.1* 7.9* 7.7*   HCT 23.8* 27.5* 27.3* 26.7*   BUN 93* 97* 84* 75*   CREATININE 2.9* 2.7* 2.5* 2.1*   ESTGFRAFRICA 25* 27* 29* 36*   EGFRNONAA 21* 23* 25* 31*   K 3.2* 3.5 3.8 3.6    107 109 110   CO2 32* 29 32* 28   PHOS 3.8  --  3.7 2.7   MG 1.9  --  1.8 1.9       Microbiology Results (last 7 days)       Procedure Component Value Units Date/Time    Culture, Anaerobe [[id number]] Collected: 05/17/22 1400    Order Status: Completed Specimen: Abscess from Abdomen Updated: 05/18/22 0753     Anaerobic Culture Culture in progress    Gram stain [[id number]] Collected: 05/17/22 1400    Order Status: Completed Specimen: Abscess from Abdomen Updated: 05/18/22 0001     Gram Stain Result No WBC's      No organisms seen    Aerobic culture [[id number]] Collected: 05/17/22 1400    Order Status: Sent Specimen: Abscess from Abdomen Updated: 05/17/22 2037          Antibiotics (From admission, onward)                Start     Stop Route Frequency Ordered    05/09/22 2200  ertapenem (INVANZ) 500 mg in sodium chloride 0.9% 100 mL IVPB         05/19 6316 IV Every 24 hours (non-standard times) 05/09/22 1624          Anticoagulants       Ordered     Route Frequency Start Stop    05/12/22 1408  heparin (PORCINE)  (LOW INTENSITY heparin infusion nomogram - [facility] (Recommended Indications: Acute Coronary Syndrome, Atrial Fibrillation, Prophylaxis in Prosthetic Heart Valves, and other indication where full  anticoagulation is desired, bleeding risk is moderate, antiplatelet agents have been utilized, and time to therapeutic can be delayed minimizing the increased bleeding risk))         IV As needed (PRN) 05/12/22 1508 --    05/12/22 1408  heparin (PORCINE)  (LOW INTENSITY heparin infusion nomogram - [facility] (Recommended Indications: Acute Coronary Syndrome, Atrial Fibrillation, Prophylaxis in Prosthetic Heart Valves, and other indication where full anticoagulation is desired, bleeding risk is moderate, antiplatelet agents have been utilized, and time to therapeutic can be delayed minimizing the increased bleeding risk))         IV As needed (PRN) 05/12/22 1508 --    05/12/22 1408  heparin (porcine) in D5W  (LOW INTENSITY heparin infusion nomogram - [facility] (Recommended Indications: Acute Coronary Syndrome, Atrial Fibrillation, Prophylaxis in Prosthetic Heart Valves, and other indication where full anticoagulation is desired, bleeding risk is moderate, antiplatelet agents have been utilized, and time to therapeutic can be delayed minimizing the increased bleeding risk))         IV Continuous 05/12/22 1415 --          X-Ray Chest AP Portable  Narrative: EXAMINATION:  XR CHEST AP PORTABLE    CLINICAL HISTORY:  picc placement;    TECHNIQUE:  Single frontal view of the chest was performed.    COMPARISON:  Chest radiograph 05/16/2022    FINDINGS:  ECG monitoring leads overlie the chest.  There has been interval placement of a right PICC line, which terminates over the superior cavoatrial junction.  Stable positioning of a right internal jugular central venous catheter.  An enteric tube descends over the expected course of the esophagus and below the diaphragm, terminating outside the field of view.    There is persistent nodular right hilar opacity.  There is increasing volume left pleural fluid, likely with superimposed consolidation.  The right lung demonstrates mild basilar atelectatic change or infiltrate.  No pneumothorax.   Cardiomediastinal silhouette appears stable.  No acute osseous abnormality.  Impression: Interval placement of a right PICC line without evidence for complication.    Increasing volume left pleural fluid.  Remaining pulmonary findings are unchanged as described above.  Follow-up to resolution recommended, which may include noncontrast chest CT.    Electronically signed by: Frances Nichols  Date:    05/18/2022  Time:    10:08   Significant Labs: All pertinent labs within the past 24 hours have been reviewed.    Significant Imaging: I have reviewed all pertinent imaging results/findings within the past 24 hours.      Assessment/Plan:      * Severe sepsis secondary to Peritonitis from bowel perforation  15 May:  No longer on vasopressor.  Weaning sedation for awakening trials.  Left side intra-abdominal fluid collection/abscess draining into ADDIS.  17 May:  Additional drain placed.  Extubated.  18 May:  Remaining stable and off ventilator.  Starting tube feedings soon.    Atrial fibrillation with RVR  Converted to NSR with Amio gtt    Cont Amio gtt          JOSE (acute kidney injury)  Worsening  Trend  Cr 2.2 today    Cr now 3.8- Oliguric- heading towards Anuria and ATN/ May need HD vs CRRT- she has massive fluid Overload.     Cr now 4.5-  Remains anuric  POA/ Family not in favor of HD    Remains Oliguric due to Septic Shock on 2 Pressors  Some urine output with lasix but no Polyuria     5/10 Urine Out put improved with diuresis while renal functions remains stable    Improving, Cr coming down, good urine output    On mechanically assisted ventilation  Wean as tolerated  CC Team  Pulmonary    Cont vent support    Expect further improvement with diuresis    Cont Vent support  Day 7    Day 8- will start weaning vent soon      Mass of colon  Based on Surgery  Potential interventions  Goals of care  Biopsy as indicated  Likely Oncologic process with mets  Heme Onc as indicated    See above    Possible  resection    resected    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 Parker- pt too unstable for right hemicolectomy, end ileostomy, liver biopsy today s/p abdominal washout with Abthera replacement today.  5/8- POD 3 with s/p Laparotomy and bowel resection and subsequent laparoscopic washout- persistent bowel discontinuity and abthera wound vac closure   5/9- persistent bowel discontinuity- await further decision by family    Await further input from surgery    Await surgery today- going for closure today    S/p- Reopening of recent Laparotomy, Abdominal washout, R Hemicolectomy with Liver Bx, TAP block, Abdominal wall closure, Creation, end Ileostomy. POD 1- looks better, still on Levophed and Amio gtt for Afib, started on TPN, ID stopped Zyvox and continued Merrem/Mycafungin.     Obesity (BMI 30.0-34.9)  Diet  Nutrition on recovery      Acute on chronic anemia  Hgb 10.3 s/p Transfusion 4 units Prbc  Transfuse for Hgb <7  Monitor    5/5  Improved  Hgb 11.4 today  Transfuse as indicated    5/10- H/H 8.6/28- likely dilutional from ATN- improving      Acute hypoxemic respiratory failure on mechanical vent  Patient with Hypoxic Respiratory failure which is Acute.  he is not on home oxygen. Supplemental oxygen was provided and noted- Vent Mode: Spont  Oxygen Concentration (%):  [28-35] 28  Resp Rate Total:  [14 br/min-35 br/min] 30 br/min  Vt Set:  [450 mL] 450 mL  PEEP/CPAP:  [5 cmH20] 5 cmH20  Pressure Support:  [8 cmH20-15 cmH20] 10 cmH20  Mean Airway Pressure:  [8.2 cmH20-10 cmH20] 8.3 cmH20.   Signs/symptoms of respiratory failure include- tachypnea. Contributing diagnoses includes - Obesity Hypoventilation Labs and images were reviewed. Patient Has recent ABG, which has been reviewed. Will treat underlying causes and adjust  management of respiratory failure as indicated. Pulmonary CC on board  Day 2 on Vent- remains intubated on vent  Cont vent support    Day 4 on Vent    Day 5 on vent- adequate O2, sats    Day 6- continue supportive care, await family's decision about comfort care    Day 7- continue support- trying to diurese     Day 8- minimal vent support- start weaning soon    15 May:  SAT/SBT as tolerated  17 May:  Successfully extubated.      VTE Risk Mitigation (From admission, onward)         Ordered     heparin 25,000 units in dextrose 5% (100 units/ml) IV bolus from bag - ADDITIONAL PRN BOLUS - 60 units/kg  As needed (PRN)        Question:  Heparin Infusion Adjustment (DO NOT MODIFY ANSWER)  Answer:  \[email or web address]\[email or web address]\Images\Pharmacy\HeparinInfusions\heparin LOW INTENSITY nomogram for [facility] [id number]    05/12/22 1408     heparin 25,000 units in dextrose 5% (100 units/ml) IV bolus from bag - ADDITIONAL PRN BOLUS - 30 units/kg  As needed (PRN)        Question:  Heparin Infusion Adjustment (DO NOT MODIFY ANSWER)  Answer:  \\[email or web address]\Images\Pharmacy\HeparinInfusions\heparin LOW INTENSITY nomogram for [facility] [id number].pdf    05/12/22 1408     heparin 25,000 units in dextrose 5% 250 mL (100 units/mL) infusion LOW INTENSITY nomogram - [facility]  Continuous        Question Answer Comment   Heparin Infusion Adjustment (DO NOT MODIFY ANSWER) \\[email or web address]\Images\Pharmacy\HeparinInfusions\heparin LOW INTENSITY nomogram for [facility] [id number]    Begin at (in units/kg/hr) 12        05/12/22 1408     IP VTE HIGH RISK PATIENT  Once         05/04/22 1253     Place sequential compression device  Until discontinued         05/04/22 1253                Discharge Planning   ELLIOT:      Code Status: DNR   Is the patient medically ready for discharge?:     Reason for patient still in hospital (select all that apply): Treatment  Discharge Plan A: Comfort care/withdrawal            Critical care time spent on the evaluation and treatment of severe organ  dysfunction, review of pertinent labs and imaging studies, discussions with consulting providers and discussions with patient/family: 30 minutes.      Alexandro Kebede MD  Department of Hospital Medicine   [facility] - Intensive Care ([facility])

## 2022-05-18 NOTE — PROCEDURES
"Franky Masters is a 67 y.o. male patient.    Temp: 98.4 °F (36.9 °C) (05/18/22 0715)  Pulse: (!) 56 (05/18/22 0915)  Resp: (!) 25 (05/18/22 0915)  BP: (!) 137/52 (per art line) (05/17/22 1530)  SpO2: 96 % (05/18/22 0915)  Weight: 109.4 kg (241 lb 2.9 oz) (05/16/22 0800)  Height: 5' 9" (175.3 cm) (05/10/22 1018)    PICC  Performed by: Jeff Izquierdo RN  Supervising provider: Jeff Izquierdo RN  Consent Done: Yes  Indications: med administration and vascular access  Anesthesia: local infiltration  Local anesthetic: lidocaine 1% without epinephrine  Anesthetic Total (mL): 3  Preparation: skin prepped with ChloraPrep  Skin prep agent dried: skin prep agent completely dried prior to procedure  Sterile barriers: all five maximum sterile barriers used - cap, mask, sterile gown, sterile gloves, and large sterile sheet  Hand hygiene: hand hygiene performed prior to central venous catheter insertion  Location details: right basilic  Catheter type: double lumen  Catheter size: 4 Fr  Catheter Length: 38cm    Ultrasound guidance: yes  Vessel Caliber: medium and patent, compressibility normal  Vascular Doppler: not done  Needle advanced into vessel with real time Ultrasound guidance.  Guidewire confirmed in vessel.  Sterile sheath used.  no esophageal manometryNumber of attempts: 1  Post-procedure: blood return through all ports, chlorhexidine patch and sterile dressing applied  Estimated blood loss (mL): 0  Specimens: No  Implants: No          Name DAVIE Aguilar  5/18/2022  "

## 2022-05-18 NOTE — SUBJECTIVE & OBJECTIVE
Review of Systems   Unable to perform ROS: Mental acuity       Objective:     Vital Signs (Most Recent):  Temp: 97.7 °F (36.5 °C) (05/18/22 1145)  Pulse: 64 (05/18/22 1145)  Resp: (!) 33 (05/18/22 1145)  BP: (!) 137/52 (per art line) (05/17/22 1530)  SpO2: 98 % (05/18/22 1145)   Vital Signs (24h Range):  Temp:  [97.7 °F (36.5 °C)-98.4 °F (36.9 °C)] 97.7 °F (36.5 °C)  Pulse:  [56-85] 64  Resp:  [16-34] 33  SpO2:  [87 %-100 %] 98 %  BP: ()/(46-61) 137/52  Arterial Line BP: (126-169)/(46-68) 169/59     Weight: 109.4 kg (241 lb 2.9 oz)  Body mass index is 35.62 kg/m².      Intake/Output Summary (Last 24 hours) at 5/18/2022 1219  Last data filed at 5/18/2022 1100  Gross per 24 hour   Intake 3533.14 ml   Output 2760 ml   Net 773.14 ml       Physical Exam  Vitals and nursing note reviewed.   Constitutional:       General: He is not in acute distress.     Appearance: He is well-developed. He is obese. He is ill-appearing. He is not toxic-appearing or diaphoretic.      Interventions: Nasal cannula in place.   HENT:      Head: Normocephalic and atraumatic.      Nose:        Mouth/Throat:      Mouth: Mucous membranes are moist.   Eyes:      General: No scleral icterus.     Conjunctiva/sclera: Conjunctivae normal.      Pupils: Pupils are equal, round, and reactive to light.   Neck:      Vascular: No JVD.   Cardiovascular:      Rate and Rhythm: Regular rhythm. Bradycardia present.      Pulses:           Radial pulses are 2+ on the right side and 2+ on the left side.        Dorsalis pedis pulses are 2+ on the right side and 2+ on the left side.      Heart sounds: Heart sounds are distant.   Pulmonary:      Effort: No tachypnea, accessory muscle usage or respiratory distress.      Breath sounds: Examination of the right-lower field reveals decreased breath sounds. Examination of the left-lower field reveals decreased breath sounds. Decreased breath sounds and rhonchi present.      Comments: Weak loose NPC  Chest:      Chest  wall: No deformity or tenderness.   Abdominal:      General: Bowel sounds are decreased. There is no distension.      Palpations: Abdomen is soft.          Comments: Some loose brown stool in colostomy   Genitourinary:     Testes:         Right: Swelling present.         Left: Swelling present.      Comments: Doherty in place.  Significant scrotal edema  Musculoskeletal:      Cervical back: Neck supple.      Right lower le+ Pitting Edema present.      Left lower le+ Pitting Edema present.      Right foot: No deformity.      Left foot: No deformity.      Comments: Diffuse muscle atrophy   Lymphadenopathy:      Cervical: No cervical adenopathy.   Skin:     General: Skin is warm.      Capillary Refill: Capillary refill takes 2 to 3 seconds.          Neurological:      Mental Status: He is lethargic.      GCS: GCS eye subscore is 4. GCS verbal subscore is 4. GCS motor subscore is 6.      Comments: Slowly becoming more alert.  Mumbles name but not conversive.  Does follow simple one-step commands.  Diffuse significant weakness.    Psychiatric:         Speech: Speech is delayed.         Behavior: Behavior is slowed.       Vents:  Vent Mode: Spont (22 112)  Ventilator Initiated: Yes (22 1258)  Set Rate: 0 BPM (22)  Vt Set: 450 mL (22 112)  Pressure Support: 10 cmH20 (22 112)  PEEP/CPAP: 5 cmH20 (22)  Oxygen Concentration (%): 28 (22 1526)  Peak Airway Pressure: 16 cmH2O (22 112)  Plateau Pressure: 0 cmH20 (22 112)  Total Ve: 12.4 mL (22 1128)  F/VT Ratio<105 (RSBI): (!) 52.73 (22 112)    Lines/Drains/Airways       Peripherally Inserted Central Catheter Line  Duration             PICC Double Lumen 22 0922 right basilic <1 day              Drain  Duration                  Urethral Catheter 22 1105 Straight-tip;Silicone 16 Fr. 14 days         Ileostomy 22 Standard (Comfort, christian) RUQ 7 days         Closed/Suction Drain  05/11/22 1735 LUQ Bulb 19 Fr. 6 days         Closed/Suction Drain 05/11/22 1735 RLQ Bulb 19 Fr. 6 days         NG/OG Tube 05/11/22 1715 Silex sump 18 Fr. Left nostril 6 days         Closed/Suction Drain 05/17/22 1412 LUQ Bulb 8.3 Fr. <1 day              Arterial Line  Duration             Arterial Line 05/07/22 1330 Right Radial 10 days              Peripheral Intravenous Line  Duration                  Midline Catheter Insertion/Assessment  - Single Lumen 05/12/22 1730 Left basilic vein (medial side of arm) 5 days                    Significant Labs:    CBC/Anemia Profile:  Recent Labs   Lab 05/16/22  1424 05/17/22  0422 05/18/22  0411   WBC  --  13.92* 11.82   HGB 8.1* 7.9* 7.7*   HCT 27.5* 27.3* 26.7*   PLT  --  316 380   MCV  --  79* 77*   RDW  --  27.5* 28.4*        Chemistries:  Recent Labs   Lab 05/16/22  1424 05/17/22  0422 05/18/22  0411   * 152* 150*   K 3.5 3.8 3.6    109 110   CO2 29 32* 28   BUN 97* 84* 75*   CREATININE 2.7* 2.5* 2.1*   CALCIUM 8.1* 8.2* 8.2*   ALBUMIN  --  1.4* 1.4*   PROT  --  4.6* 4.6*   BILITOT  --  1.1* 1.5*   ALKPHOS  --  114 102   ALT  --  44 31   AST  --  36 33   MG  --  1.8 1.9   PHOS  --  3.7 2.7       POCT Glucose:   Recent Labs   Lab 05/18/22  0413 05/18/22  0819 05/18/22  1158   POCTGLUCOSE 142* 156* 140*     All pertinent labs within the past 24 hours have been reviewed.

## 2022-05-18 NOTE — PLAN OF CARE
Problem: Adult Inpatient Plan of Care  Goal: Plan of Care Review  Outcome: Ongoing, Progressing  Goal: Patient-Specific Goal (Individualized)  Outcome: Ongoing, Progressing  Goal: Absence of Hospital-Acquired Illness or Injury  Outcome: Ongoing, Progressing  Goal: Optimal Comfort and Wellbeing  Outcome: Ongoing, Progressing  Goal: Readiness for Transition of Care  Outcome: Ongoing, Progressing     Problem: Infection  Goal: Absence of Infection Signs and Symptoms  Outcome: Ongoing, Progressing     Problem: Adjustment to Illness (Sepsis/Septic Shock)  Goal: Optimal Coping  Outcome: Ongoing, Progressing     Problem: Bleeding (Sepsis/Septic Shock)  Goal: Absence of Bleeding  Outcome: Ongoing, Progressing     Problem: Glycemic Control Impaired (Sepsis/Septic Shock)  Goal: Blood Glucose Level Within Desired Range  Outcome: Ongoing, Progressing     Problem: Infection Progression (Sepsis/Septic Shock)  Goal: Absence of Infection Signs and Symptoms  Outcome: Ongoing, Progressing     Problem: Nutrition Impaired (Sepsis/Septic Shock)  Goal: Optimal Nutrition Intake  Outcome: Ongoing, Progressing     Problem: Fall Injury Risk  Goal: Absence of Fall and Fall-Related Injury  Outcome: Ongoing, Progressing     Problem: Noninvasive Ventilation Acute  Goal: Effective Unassisted Ventilation and Oxygenation  Outcome: Ongoing, Progressing     Problem: Fluid Imbalance (Pneumonia)  Goal: Fluid Balance  Outcome: Ongoing, Progressing     Problem: Infection (Pneumonia)  Goal: Resolution of Infection Signs and Symptoms  Outcome: Ongoing, Progressing     Problem: Respiratory Compromise (Pneumonia)  Goal: Effective Oxygenation and Ventilation  Outcome: Ongoing, Progressing     Problem: Skin Injury Risk Increased  Goal: Skin Health and Integrity  Outcome: Ongoing, Progressing     Problem: Fluid and Electrolyte Imbalance (Acute Kidney Injury/Impairment)  Goal: Fluid and Electrolyte Balance  Outcome: Ongoing, Progressing     Problem: Oral Intake  Inadequate (Acute Kidney Injury/Impairment)  Goal: Optimal Nutrition Intake  Outcome: Ongoing, Progressing     Problem: Renal Function Impairment (Acute Kidney Injury/Impairment)  Goal: Effective Renal Function  Outcome: Ongoing, Progressing

## 2022-05-18 NOTE — PROGRESS NOTES
[facility] ([facility])  Critical Care Medicine  Progress Note    Patient Name: Franky Masters  MRN: [id number]  Admission Date: 5/4/2022  Hospital Length of Stay: 14 days  Code Status: DNR  Attending Provider: Elvie Parker DO  Primary Care Provider: HOLLY ROSEN   Principal Problem: Severe sepsis    Subjective:     HPI:  Ms Masters is a 67 yo obese male with a PMH of diverticulosis and hx of hospitalization in Aug 2021 with COVID PNA and Hypoxic Resp Failure but did not require ICU or intubation.  She presented early this AM to [facility] ED about 0515 hr via EMS with complaint of abd pain X 2 hours that awakened her from sleep and had associated N/V/D.  In ED BP 86/46, RA SAT 92%, LA 8, Hgb 7.2 and CT Abd with suspected bowel perforation and free air.  General Surgery consulted and taken to OR this AM revealing SB perf with 3 L feculent fluid and food in cavity and large obstructing cecal mass with perf ileum and palpable liver mass.  Had Expl Lap with washout and excision of SB with wound vac placement admitted post op to ICU intubated on mech ventilation.  Received 2 units PRBCs in ED and another 2 units in OR also on Levophed and Vasopressin infusions.  Before surgery patient was insistent he be DNR post op but consented to surgery and invasive mech ventilation but no ACLS post op in event of cardiac arrest and no prolonged mech ventilation. Reportedly patient does not routinely follow with practitioner as outpt.       Hospital/ICU Course:  5/4 - Admitted to ICU sedated and intubated on Levophed and Vasopressin infusions in no distress  5/5 - remains intubated and sedated on mechanical vent and pressor support; scant urine output overnight and creatinine rise to 2.2 with fluid balance +8.9L  5/6 - remains intubated and sedated on mechanical vent with decreasing pressor demand; still oliguric with creatinine up to 3.6, K 5.2; fluid balance +13L; now with bigeminy and cardiac rhythm changes, K  stable on abg but iCa 0.78  5/7 - remains intubated and sedated with open abdomen to abthera wound vac and pressor support; overnight atrial fib with RVR, now on amio infusion with SR 68 on monitor; plan to OR for washout and vac replacement today  5/8 - remains intubated, sedated with ab thera wound vac to open abdomen, mechanical ventilation, and 2 pressor support. SR on monitor, on amiodarone infusion. Tmax 100.2, wbc down at 12.3k, creatinine rising 4.6,urine output scant, K 5.1  5/9 - remains intubated and sedated with ab thera wound vac to open abdomen, mech vent, and 2 pressor support. Remains SR on monitor with amio infusion. Tmax 98.9, wbc down 10.6k, creatinine holding at 4.7, K 4.6 after lasix trial with 1.2L urinary output  5/10 - remains intubated and sedated with ab thera wound vac to open abdomen, mech vent, on pressor support. Atrial fib on monitor, rate 90s with occasional non sustained elevation. Urine output adequate since lasix trial but creatinine, K, BUN unchanged and remains 22L positive fluid status since admit  5/11 - return from OR late this evening after ABD WASHOUT, RT HEMICOLECTOMY, ILEOSTOMY, LIVER BIOPSY, AND ABD WALL CLOSURE. Remains atrial fib 80-100s on monitor on levophed support.  5/12 - semi erect in bed intubated on mech ventilation in no distress still in A-fib rate 113 bpm sedated heavily still on Fentanyl infusion.  Also still on Amiodarone and Levophed infusions.  Receiving TPN.  S/P abd washout and closure yesterday.    5/13 - semi erect in bed intubated on mech ventilation and sedated in no distress on Precedex, Heparin, Amiodarone, Fentanyl and Levophed infusions.  Also on TPN.  BP labile on pressor.  Still in A-Fib rate 113 bpm.  5/14 - semi erect intubated on mech ventilation in no distress sedated on Precedex infusion.  Also still on Levophed, Amiodarone and Heparin infusions with TPN.  BP still labile and still in A-Fib w/ rate controlled.  Pulm edema pink froth today  copious suctioned from OET.   5/15 - still lethargic off sedation and intubated on mech ventilation in no distress tolerating SBT but very weak unable to lift head off pillow.  Had N/V TF overnight and NG output to suction 320 ml overnight green bile.  Weaned off Levophed infusion at 0300 hr this AM.  Still on TPN as well as Amiodarone and Heparin infusions.   5/16 - Supine in bed off sedation awakens with stimuli and follows simple commands but slowly and still lethargic, intubated on mech ventilation.  Still on TPN, Heparin infusion and Amiodarone infusion.  Hgb drop to 6.9 with PRBC transfusion pending.  Tolerated press support ventilation overnight.  Had conversion to SB at 50 bpm overnight and Lopressor stopped now back in A-fib at 108 bpm.  350 ml NG output overnight and 1 liter bile dumped in colostomy overnight.   5/17 - Sleepy this AM on vent post receiving Fentanyl IVP overnight still tolerating SBT.  Still on Amiodarone, Heparin and TPN infusions.    5/18 - Extubated yesterday and tolerating fairly well. Still very weak but slightly more alert today also has very weak cough with poor secretion mobility.  Still on Amiodarone, Heparin and TPN infusions.  125 ml NG output overnight.  IR placed drain to Abd abscess yesterday with initial 480 ml removed and 30 ml output overnight.        Review of Systems   Unable to perform ROS: Mental acuity       Objective:     Vital Signs (Most Recent):  Temp: 97.7 °F (36.5 °C) (05/18/22 1145)  Pulse: 64 (05/18/22 1145)  Resp: (!) 33 (05/18/22 1145)  BP: (!) 137/52 (per art line) (05/17/22 1530)  SpO2: 98 % (05/18/22 1145)   Vital Signs (24h Range):  Temp:  [97.7 °F (36.5 °C)-98.4 °F (36.9 °C)] 97.7 °F (36.5 °C)  Pulse:  [56-85] 64  Resp:  [16-34] 33  SpO2:  [87 %-100 %] 98 %  BP: ()/(46-61) 137/52  Arterial Line BP: (126-169)/(46-68) 169/59     Weight: 109.4 kg (241 lb 2.9 oz)  Body mass index is 35.62 kg/m².      Intake/Output Summary (Last 24 hours) at 5/18/2022  1219  Last data filed at 2022 1100  Gross per 24 hour   Intake 3533.14 ml   Output 2760 ml   Net 773.14 ml       Physical Exam  Vitals and nursing note reviewed.   Constitutional:       General: He is not in acute distress.     Appearance: He is well-developed. He is obese. He is ill-appearing. He is not toxic-appearing or diaphoretic.      Interventions: Nasal cannula in place.   HENT:      Head: Normocephalic and atraumatic.      Nose:        Mouth/Throat:      Mouth: Mucous membranes are moist.   Eyes:      General: No scleral icterus.     Conjunctiva/sclera: Conjunctivae normal.      Pupils: Pupils are equal, round, and reactive to light.   Neck:      Vascular: No JVD.   Cardiovascular:      Rate and Rhythm: Regular rhythm. Bradycardia present.      Pulses:           Radial pulses are 2+ on the right side and 2+ on the left side.        Dorsalis pedis pulses are 2+ on the right side and 2+ on the left side.      Heart sounds: Heart sounds are distant.   Pulmonary:      Effort: No tachypnea, accessory muscle usage or respiratory distress.      Breath sounds: Examination of the right-lower field reveals decreased breath sounds. Examination of the left-lower field reveals decreased breath sounds. Decreased breath sounds and rhonchi present.      Comments: Weak loose NPC  Chest:      Chest wall: No deformity or tenderness.   Abdominal:      General: Bowel sounds are decreased. There is no distension.      Palpations: Abdomen is soft.          Comments: Some loose brown stool in colostomy   Genitourinary:     Testes:         Right: Swelling present.         Left: Swelling present.      Comments: Doherty in place.  Significant scrotal edema  Musculoskeletal:      Cervical back: Neck supple.      Right lower le+ Pitting Edema present.      Left lower le+ Pitting Edema present.      Right foot: No deformity.      Left foot: No deformity.      Comments: Diffuse muscle atrophy   Lymphadenopathy:      Cervical:  No cervical adenopathy.   Skin:     General: Skin is warm.      Capillary Refill: Capillary refill takes 2 to 3 seconds.          Neurological:      Mental Status: He is lethargic.      GCS: GCS eye subscore is 4. GCS verbal subscore is 4. GCS motor subscore is 6.      Comments: Slowly becoming more alert.  Mumbles name but not conversive.  Does follow simple one-step commands.  Diffuse significant weakness.    Psychiatric:         Speech: Speech is delayed.         Behavior: Behavior is slowed.       Vents:  Vent Mode: Spont (05/17/22 1128)  Ventilator Initiated: Yes (05/04/22 1258)  Set Rate: 0 BPM (05/17/22 1128)  Vt Set: 450 mL (05/17/22 1128)  Pressure Support: 10 cmH20 (05/17/22 1128)  PEEP/CPAP: 5 cmH20 (05/17/22 1128)  Oxygen Concentration (%): 28 (05/17/22 1526)  Peak Airway Pressure: 16 cmH2O (05/17/22 1128)  Plateau Pressure: 0 cmH20 (05/17/22 1128)  Total Ve: 12.4 mL (05/17/22 1128)  F/VT Ratio<105 (RSBI): (!) 52.73 (05/17/22 1128)    Lines/Drains/Airways       Peripherally Inserted Central Catheter Line  Duration             PICC Double Lumen 05/18/22 0922 right basilic <1 day              Drain  Duration                  Urethral Catheter 05/04/22 1105 Straight-tip;Silicone 16 Fr. 14 days         Ileostomy 05/11/22 Standard (Comfort, end) RUQ 7 days         Closed/Suction Drain 05/11/22 1735 LUQ Bulb 19 Fr. 6 days         Closed/Suction Drain 05/11/22 1735 RLQ Bulb 19 Fr. 6 days         NG/OG Tube 05/11/22 1715 Major sump 18 Fr. Left nostril 6 days         Closed/Suction Drain 05/17/22 1412 LUQ Bulb 8.3 Fr. <1 day              Arterial Line  Duration             Arterial Line 05/07/22 1330 Right Radial 10 days              Peripheral Intravenous Line  Duration                  Midline Catheter Insertion/Assessment  - Single Lumen 05/12/22 1730 Left basilic vein (medial side of arm) 5 days                    Significant Labs:    CBC/Anemia Profile:  Recent Labs   Lab 05/16/22  1424 05/17/22  0422  05/18/22  0411   WBC  --  13.92* 11.82   HGB 8.1* 7.9* 7.7*   HCT 27.5* 27.3* 26.7*   PLT  --  316 380   MCV  --  79* 77*   RDW  --  27.5* 28.4*        Chemistries:  Recent Labs   Lab 05/16/22  1424 05/17/22  0422 05/18/22  0411   * 152* 150*   K 3.5 3.8 3.6    109 110   CO2 29 32* 28   BUN 97* 84* 75*   CREATININE 2.7* 2.5* 2.1*   CALCIUM 8.1* 8.2* 8.2*   ALBUMIN  --  1.4* 1.4*   PROT  --  4.6* 4.6*   BILITOT  --  1.1* 1.5*   ALKPHOS  --  114 102   ALT  --  44 31   AST  --  36 33   MG  --  1.8 1.9   PHOS  --  3.7 2.7       POCT Glucose:   Recent Labs   Lab 05/18/22  0413 05/18/22  0819 05/18/22  1158   POCTGLUCOSE 142* 156* 140*     All pertinent labs within the past 24 hours have been reviewed.        ABG  Recent Labs   Lab 05/17/22  0944   PH 7.486*   PO2 105*   PCO2 46.8*   HCO3 35.4*   BE 12     Assessment/Plan:     Pulmonary  Acute hypoxemic respiratory failure   Extubated 5/17 on Vent day #14  Cont low flow NC O2  Patient declared on admit and family confirmed on extubation that if fails extubation there will be no elective re-intubation and will be transitioned to comfort care if no improvement with NPPV or HFNC  Patient is DNR  Oxygenating and ventilating well today but larger concern if airway protection and secretion mobility  Cont NT suction PRN  CXR in AM  Aspiration precautions      Cardiac/Vascular  Paroxysmal atrial fibrillation  New onset 5/6  on amiodarone and Heparin infusions  Cont Cardiac monitoring  In SB this AM    Renal/  Electrolyte imbalance  Cont D5W  Replaced K+  Renal following  Daily labs    JOSE (acute kidney injury)  S/t septic shock  Adequate volume resuscitation +16 L I/O balance  Avoid nephrotoxins  Strict I/O  No acute indication for dialysis but high potential for continued decline, daughter(HCPOA) will not pursue dialysis if decline per her father's wishes  Nephrology following  Good UOP and creatinine slowly improving daily  Lasix/Albumin for anasarca/pulm edema X  4 doses 5/16    ID  * Severe sepsis secondary to Peritonitis from bowel perforation  Secondary to Peritonitis from bowel perforation with major fecal contamination on admit  Blood and sputum cultures 5/4 Neg  S/P Zyvox  Continue Invanz and Micafungin per ID following  Weaned off Levophed infusion 5/15  ICU hemodynamic monitoring  Follow fever curve and WBC  Doherty and CL changed today  Has Left Abd Abscess with IR placed drain 5/17 and culture pending NGTD    Oncology  Acute on chronic anemia  Received 2 units PRBCs in ED and 2 more in OR on 5/4  1 unit PRBC given 5/16  CBC daily  No active bleeding  Monitor closely with Heparin infusion for A-fib     Endocrine  Obesity (BMI 30.0-34.9)  Will encourage weight loss once fully awake/alert and stronger    GI  Oropharyngeal dysphagia with overall muscle deconditioning and weakness  NPO  ST/PT/OT consulted  Aspiration precautions  PRN NT suctioning    Mass of colon  Found on exploration along with palpable liver mass  Sent for pathology 5/11 results still pending  High concern for metastatic malignancy    Small bowel perforation with large Cecal mass   5/4 Expl Lap and SB excision with washout and AB thera wound vac for bowel left in discontinuity  5/7 washout and wound vac replacement  hx Diverticulosis but cecal mass and suspected metastatic lesions found in exploration  5/11 ABD WASHOUT, RT HEMICOLECTOMY, ILEOSTOMY, LIVER BIOPSY, AND ABD WALL CLOSURE  IVAB for peritonitis/sepsis  Cont TPN, trickle TF stopped 5/15 due to N/V overnight continues high NG output 350 ml last 12 hours  Pathology still pending  CT Abd 5/46: Loculated fluid density along the left upper abdomen and anterior left mid abdomen may reflect infectious hemorrhagic or seroma fluid collection.    IR placed additional drain to LUQ Abd abscess 5/18    Palliative Care  Pt is DNR, discussed at length with surgeon prior to surgery and he consented to intubation for surgery with understanding of likely need  for prolonged vent support post op until bowel/abdomen closed. He was clear that he would not desire long term vent support past that necessary for immediate post op recovery.   Daughter Claudia is SDM and is aware and supportive of his wishes. IF at any point his survival chances become poor or prolonged life support is anticipated she would honor his wish and transition to comfort focused care.  5/6 - discussed status with daughter. She expressed again understanding of her father's wishes for very limited life support and further stated today that after time to reflect on current status, in the event of continued decline she would not want to pursue potential dialysis but if kidneys fail and indications for RRT exist she would at that time desire transition to a full comfort care focus. She would hope that he could be extubated to comfort focus and have opportunity to interact with family but verbalizes understanding that this may not be possible given open abdomen and comfort goal.  Continue daily and prn updates    Other  Hypoalbuminemia due to protein-calorie malnutrition  Cont TPN  Re-attempt trickle TF in AM per surgery        Preventive Measures and Monitoring:   Stress Ulcer: Pepcid  Nutrition: TPN   Glucose control: SSI  Bowel prophylaxis: S/P colon surgery  DVT prophylaxis: Heparin infusion  Hx CAD on B-Blocker: no hx CAD  Head of Bed/Reposition: Elevate HOB and turn Q1-2 hours   Early Mobility: bed rest  NG Day: #8  Central Line Right PICC Day: #1  Doherty Day: #1  IVAB Day: #15  Code Status: DNR      Counseling/Consultation:I have discussed the care of this patient in detail with the bedside nursing staff and Dr. Huynh and Dr. Parker and Dr. Kebede     Critical Care Time: 48 minutes  Critical secondary to Patient has a condition that poses threat to life and bodily function: Acute Renal Failure and and Acute Hypoxic Resp Failure   Patient is currently on drug therapy requiring intensive monitoring  for toxicity: Amiodarone and Heparin infusions and TPN     Critical care was time spent personally by me on the following activities: development of treatment plan with patient or surrogate and bedside caregivers, discussions with consultants, evaluation of patient's response to treatment, examination of patient, ordering and performing treatments and interventions, ordering and review of laboratory studies, ordering and review of radiographic studies, pulse oximetry, re-evaluation of patient's condition. This critical care time did not overlap with that of any other provider or involve time for any procedures.     Rai Hernandez NP  Critical Care Medicine  '[facility] - [facility] ([facility])

## 2022-05-18 NOTE — PROGRESS NOTES
Pharmacist Renal Dose Adjustment Note    Franky Masters is a 67 y.o. male being treated with the medication Ertapenem.    Patient Data:    Vital Signs (Most Recent):  Temp: 97.7 °F (36.5 °C) (05/18/22 1145)  Pulse: 60 (05/18/22 1330)  Resp: (!) 25 (05/18/22 1330)  BP: (!) 135/49 (05/18/22 1115)  SpO2: 98 % (05/18/22 1330)   Vital Signs (72h Range):  Temp:  [97.6 °F (36.4 °C)-99.5 °F (37.5 °C)]   Pulse:  []   Resp:  [14-37]   BP: ()/(45-80)   SpO2:  [87 %-100 %]   Arterial Line BP: ()/(44-89)      Recent Labs   Lab 05/16/22  1424 05/17/22  0422 05/18/22  0411   CREATININE 2.7* 2.5* 2.1*     Serum creatinine: 2.1 mg/dL (H) 05/18/22 0411  Estimated creatinine clearance: 41 mL/min (A)    Medication: Ertapenem 500 mg IV Q24h will be changed Ertapenem 1 g IV Q24h per pharmacy protocol.    Pharmacist's Name: Alie Jeff, PharmD  Pharmacist's Extension: [phone]

## 2022-05-18 NOTE — PROGRESS NOTES
[facility] - [facility] ([facility])  Nephrology  Progress Note    Patient Name: Franky Masters  MRN: [id number]  Admission Date: 5/4/2022  Hospital Length of Stay: 14 days  Attending Provider: Elvie Parker DO   Primary Care Physician: HOLLY ROSEN  Principal Problem:Severe sepsis    Consults  Subjective:     Interval History:     Patient was seen in the intensive care unit.  Extubated yesterday.  Having a midline placed.    Review of systems:  Not obtainable    Review of patient's allergies indicates:  No Known Allergies  Current Facility-Administered Medications   Medication Frequency    0.9%  NaCl infusion (for blood administration) Q24H PRN    0.9%  NaCl infusion PRN    acetaminophen suppository 650 mg Q6H PRN    amiodarone 360 mg/200 mL (1.8 mg/mL) infusion Continuous    dextrose 10 % infusion Continuous PRN    dextrose 10% bolus 125 mL PRN    dextrose 5 % infusion Continuous    ertapenem (INVANZ) 500 mg in sodium chloride 0.9% 100 mL IVPB Q24H    famotidine (PF) injection 20 mg Daily    fentaNYL 50 mcg/mL injection 25 mcg Q4H PRN    glucagon (human recombinant) injection 1 mg PRN    heparin 25,000 units in dextrose 5% (100 units/ml) IV bolus from bag - ADDITIONAL PRN BOLUS - 30 units/kg PRN    heparin 25,000 units in dextrose 5% (100 units/ml) IV bolus from bag - ADDITIONAL PRN BOLUS - 60 units/kg PRN    heparin 25,000 units in dextrose 5% 250 mL (100 units/mL) infusion LOW INTENSITY nomogram - [facility] Continuous    hydrALAZINE injection 20 mg Q8H PRN    insulin aspart U-100 pen 1-10 Units Q4H PRN    lorazepam injection 1 mg Q4H PRN    micafungin 100 mg in sodium chloride 0.9 % 100 mL IVPB (ready to mix system) Q24H    ondansetron injection 4 mg Q8H PRN    TPN ADULT CENTRAL LINE CUSTOM Continuous    white petrolatum-mineral oil 57.3-42.5% ophthalmic ointment QHS       Objective:     Vital Signs (Most Recent):  Temp: 98.4 °F (36.9 °C) (05/18/22 0715)  Pulse: (!) 56 (05/18/22 0915)  Resp:  (!) 25 (05/18/22 0915)  BP: (!) 137/52 (per art line) (05/17/22 1530)  SpO2: 96 % (05/18/22 0915)  O2 Device (Oxygen Therapy): nasal cannula (05/17/22 1952) Vital Signs (24h Range):  Temp:  [98.2 °F (36.8 °C)-99.5 °F (37.5 °C)] 98.4 °F (36.9 °C)  Pulse:  [56-93] 56  Resp:  [16-35] 25  SpO2:  [90 %-100 %] 96 %  BP: ()/(46-61) 137/52  Arterial Line BP: (126-192)/(46-79) 157/65     Weight: 109.4 kg (241 lb 2.9 oz) (05/16/22 0800)  Body mass index is 35.62 kg/m².  Body surface area is 2.31 meters squared.    I/O last 3 completed shifts:  In: 5396.4 [I.V.:3408.7; IV Piggyback:333.6]  Out: 4460 [Urine:2880; Drains:1430; Stool:150]    Physical Exam  Constitutional:       Appearance: Normal appearance.   HENT:      Head: Normocephalic and atraumatic.      Mouth/Throat:      Comments: ET tube in place  Eyes:      General: No scleral icterus.     Extraocular Movements: Extraocular movements intact.      Pupils: Pupils are equal, round, and reactive to light.   Cardiovascular:      Rate and Rhythm: Normal rate and regular rhythm.   Pulmonary:      Effort: Pulmonary effort is normal.      Comments: Intubated  Musculoskeletal:      Right lower leg: No edema.      Left lower leg: No edema.   Skin:     General: Skin is warm and dry.   Neurological:      General: No focal deficit present.      Mental Status: He is alert.   Psychiatric:      Comments: Unable to evaluate.           Significant Labs:sure  BMP:   Recent Labs   Lab 05/18/22  0411   *   *   K 3.6      CO2 28   BUN 75*   CREATININE 2.1*   CALCIUM 8.2*   MG 1.9     CMP:   Recent Labs   Lab 05/18/22 0411   *   CALCIUM 8.2*   ALBUMIN 1.4*   PROT 4.6*   *   K 3.6   CO2 28      BUN 75*   CREATININE 2.1*   ALKPHOS 102   ALT 31   AST 33   BILITOT 1.5*     All labs within the past 24 hours have been reviewed.    Significant Imaging:  Labs: Reviewed  Reviewed    Assessment/Plan:     Active Diagnoses:    Diagnosis Date Noted POA     PRINCIPAL PROBLEM:  Severe sepsis secondary to Peritonitis from bowel perforation [A41.9, R65.20] 05/04/2022 Yes    Electrolyte imbalance [E87.8] 05/16/2022 No    Atrial fibrillation with RVR [I48.91] 05/06/2022 No    On mechanically assisted ventilation [Z99.11] 05/05/2022 Not Applicable    JOSE (acute kidney injury) [N17.9] 05/05/2022 Yes    Small bowel perforation with large Cecal mass  [K63.1] 05/04/2022 Yes    Mass of colon [K63.89] 05/04/2022 Yes    Acute on chronic anemia [D64.9] 08/05/2021 Yes    Acute hypoxemic respiratory failure on mechanical vent [J96.01] 08/05/2021 Yes    Obesity (BMI 30.0-34.9) [E66.9] 08/05/2021 Yes     Chronic      Problems Resolved During this Admission:    Diagnosis Date Noted Date Resolved POA    Hyperglycemia, unspecified [R73.9] 05/04/2022 05/11/2022 Yes    Pneumonia of left lower lobe due to infectious organism [J18.9] 05/04/2022 05/10/2022 Yes       1. Acute kidney injury:  Secondary to sepsis with ATN.  Creatinine continues to improve, down to 2.1 this morning.  Good urine output reported at approximately 2000 cc.     Baseline creatinine appears to run around 0.7.    2. Hypernatremia:  Sodium is stable at 150. Electrolyte free water clearance shows approximately 60% urine output to be free water.  Continues on D5W.    3. Potassium is stable at 3.6.          Approximately 25 minutes was spent in face-to-face conversation, chart review and coordination of care with other providers.      Thank you for your consult.     Jordan Renee MD  Nephrology  [facility] ([facility])

## 2022-05-18 NOTE — SUBJECTIVE & OBJECTIVE
Interval History: Alert and responding appropriately to questions and commands but slow and weak.  Remained hemodynamically and respiratory stable since extubation yesterday.  Anticipate starting tube feedings tomorrow.    Review of Systems   Unable to perform ROS: Acuity of condition   Objective:     Vital Signs (Most Recent):  Temp: 98.4 °F (36.9 °C) (05/18/22 0715)  Pulse: 62 (05/18/22 1115)  Resp: (!) 29 (05/18/22 1115)  BP: (!) 137/52 (per art line) (05/17/22 1530)  SpO2: (!) 87 % (05/18/22 1115)   Vital Signs (24h Range):  Temp:  [98.2 °F (36.8 °C)-98.4 °F (36.9 °C)] 98.4 °F (36.9 °C)  Pulse:  [56-93] 62  Resp:  [16-35] 29  SpO2:  [87 %-100 %] 87 %  BP: ()/(46-61) 137/52  Arterial Line BP: (126-169)/(46-68) 135/49     Weight: 109.4 kg (241 lb 2.9 oz)  Body mass index is 35.62 kg/m².    Intake/Output Summary (Last 24 hours) at 5/18/2022 1120  Last data filed at 5/18/2022 1000  Gross per 24 hour   Intake 3458.06 ml   Output 2870 ml   Net 588.06 ml        Physical Exam  Vitals and nursing note reviewed.   Constitutional:       General: He is not in acute distress.     Appearance: He is well-developed. He is obese. He is ill-appearing. He is not toxic-appearing or diaphoretic.      Interventions: He is sedated, intubated and restrained.   HENT:      Head: Atraumatic.      Nose:        Mouth/Throat:      Mouth: Mucous membranes are moist.   Eyes:      General: No scleral icterus.     Conjunctiva/sclera: Conjunctivae normal.      Pupils: Pupils are equal, round, and reactive to light.   Neck:      Vascular: No JVD.     Cardiovascular:      Rate and Rhythm: Tachycardia present. Rhythm irregular.      Pulses:           Radial pulses are 1+ on the right side and 1+ on the left side.        Dorsalis pedis pulses are 1+ on the right side and 1+ on the left side.      Heart sounds: Heart sounds are distant.   Pulmonary:      Effort: No accessory muscle usage or respiratory distress. He is intubated.      Breath  sounds: Decreased breath sounds present. No wheezing or rhonchi.   Chest:      Chest wall: No deformity or tenderness.   Abdominal:      General: Bowel sounds are absent. There is no distension.          Comments: Right side ADDIS drain with serous fluid.  Two drains on left side with grey purulent fluid more thin than yesterday.   Genitourinary:     Comments: Doherty in place  Musculoskeletal:      Cervical back: Neck supple.      Right lower leg: 3+ Pitting Edema present.      Left lower leg: 3+ Pitting Edema present.      Right foot: No deformity.      Left foot: No deformity.   Lymphadenopathy:      Cervical: No cervical adenopathy.   Skin:     General: Skin is warm.      Capillary Refill: Capillary refill takes 2 to 3 seconds.          Neurological:      Comments: Weak and fatigued but moves all four extremities spontaneously and to command.     Flow (L/min): 2  Vent Mode: Spont  Oxygen Concentration (%):  [28-35] 28  Resp Rate Total:  [27 br/min-35 br/min] 30 br/min  Vt Set:  [450 mL] 450 mL  PEEP/CPAP:  [5 cmH20] 5 cmH20  Pressure Support:  [10 cmH20] 10 cmH20  Mean Airway Pressure:  [8.3 cmH20] 8.3 cmH20  Temp:  [98.2 °F (36.8 °C)-98.4 °F (36.9 °C)] 98.4 °F (36.9 °C)  Pulse:  [56-93] 62  Resp:  [16-35] 29  SpO2:  [87 %-100 %] 87 %  BP: ()/(46-61) 137/52  Arterial Line BP: (126-169)/(46-68) 135/49      Lab Results   Component Value Date    [id number] Negative 05/11/2022    [id number] Negative 05/04/2022    [id number] Positive (A) 08/05/2021        Recent Labs   Lab 05/16/22  0328 05/16/22  1424 05/17/22  0422 05/18/22  0411   * 153* 152* 150*   WBC 12.58  --  13.92* 11.82   GRAN 81.0*  10.2*  --  77.8*  10.8* 76.4*  9.0*   LYMPH 7.3*  0.9*  --  9.2*  1.3 10.3*  1.2   HGB 6.9* 8.1* 7.9* 7.7*   HCT 23.8* 27.5* 27.3* 26.7*   BUN 93* 97* 84* 75*   CREATININE 2.9* 2.7* 2.5* 2.1*   ESTGFRAFRICA 25* 27* 29* 36*   EGFRNONAA 21* 23* 25* 31*   K 3.2* 3.5 3.8 3.6    107 109 110   CO2 32* 29 32*  28   PHOS 3.8  --  3.7 2.7   MG 1.9  --  1.8 1.9       Microbiology Results (last 7 days)       Procedure Component Value Units Date/Time    Culture, Anaerobe [[id number]] Collected: 05/17/22 1400    Order Status: Completed Specimen: Abscess from Abdomen Updated: 05/18/22 0753     Anaerobic Culture Culture in progress    Gram stain [[id number]] Collected: 05/17/22 1400    Order Status: Completed Specimen: Abscess from Abdomen Updated: 05/18/22 0001     Gram Stain Result No WBC's      No organisms seen    Aerobic culture [[id number]] Collected: 05/17/22 1400    Order Status: Sent Specimen: Abscess from Abdomen Updated: 05/17/22 2037          Antibiotics (From admission, onward)                Start     Stop Route Frequency Ordered    05/09/22 2200  ertapenem (INVANZ) 500 mg in sodium chloride 0.9% 100 mL IVPB         05/19 2359 IV Every 24 hours (non-standard times) 05/09/22 1624          Anticoagulants       Ordered     Route Frequency Start Stop    05/12/22 1408  heparin (PORCINE)  (LOW INTENSITY heparin infusion nomogram - [facility] (Recommended Indications: Acute Coronary Syndrome, Atrial Fibrillation, Prophylaxis in Prosthetic Heart Valves, and other indication where full anticoagulation is desired, bleeding risk is moderate, antiplatelet agents have been utilized, and time to therapeutic can be delayed minimizing the increased bleeding risk))         IV As needed (PRN) 05/12/22 1508 --    05/12/22 1408  heparin (PORCINE)  (LOW INTENSITY heparin infusion nomogram - [facility] (Recommended Indications: Acute Coronary Syndrome, Atrial Fibrillation, Prophylaxis in Prosthetic Heart Valves, and other indication where full anticoagulation is desired, bleeding risk is moderate, antiplatelet agents have been utilized, and time to therapeutic can be delayed minimizing the increased bleeding risk))         IV As needed (PRN) 05/12/22 1508 --    05/12/22 1408  heparin (porcine) in D5W  (LOW INTENSITY heparin infusion nomogram - [facility]  (Recommended Indications: Acute Coronary Syndrome, Atrial Fibrillation, Prophylaxis in Prosthetic Heart Valves, and other indication where full anticoagulation is desired, bleeding risk is moderate, antiplatelet agents have been utilized, and time to therapeutic can be delayed minimizing the increased bleeding risk))         IV Continuous 05/12/22 1415 --          X-Ray Chest AP Portable  Narrative: EXAMINATION:  XR CHEST AP PORTABLE    CLINICAL HISTORY:  picc placement;    TECHNIQUE:  Single frontal view of the chest was performed.    COMPARISON:  Chest radiograph 05/16/2022    FINDINGS:  ECG monitoring leads overlie the chest.  There has been interval placement of a right PICC line, which terminates over the superior cavoatrial junction.  Stable positioning of a right internal jugular central venous catheter.  An enteric tube descends over the expected course of the esophagus and below the diaphragm, terminating outside the field of view.    There is persistent nodular right hilar opacity.  There is increasing volume left pleural fluid, likely with superimposed consolidation.  The right lung demonstrates mild basilar atelectatic change or infiltrate.  No pneumothorax.  Cardiomediastinal silhouette appears stable.  No acute osseous abnormality.  Impression: Interval placement of a right PICC line without evidence for complication.    Increasing volume left pleural fluid.  Remaining pulmonary findings are unchanged as described above.  Follow-up to resolution recommended, which may include noncontrast chest CT.    Electronically signed by: Frances Nichols  Date:    05/18/2022  Time:    10:08   Significant Labs: All pertinent labs within the past 24 hours have been reviewed.    Significant Imaging: I have reviewed all pertinent imaging results/findings within the past 24 hours.

## 2022-05-18 NOTE — NURSING
Pt awake and alert in bed. Did not get mush rest over night. Follows commands x4 extremities. Nods/gestures appropriately. incompreshensible speech d/t voice hoarse post extubation. On 2L nc. Copious amount of thick white secretions. Cough is weak and has trouble to clear without assistance. NT suction PRN provides for airway clearance. VSS. Pt remain hemodynamically stable in SR/SB. On ammio gtt at 0.5, heparin @18u/kg/hr. PTT q6H. Abd soft. LUQ and LLQ ADDIS drain with light brown, thick mucoid output. RLQ ADDIS with serosanguinous output. Ostomy and NG output green bile.  NGT to LWS. Doherty in place with adequate UO. Afebrile. Accu checks q4h. Turned q2h, bilateral heel protectors, wheels locked, bed alarm on. Labs this Am. All alarms on and appropriate.

## 2022-05-18 NOTE — ASSESSMENT & PLAN NOTE
Patient with Hypoxic Respiratory failure which is Acute.  he is not on home oxygen. Supplemental oxygen was provided and noted- Vent Mode: Spont  Oxygen Concentration (%):  [28-35] 28  Resp Rate Total:  [14 br/min-35 br/min] 30 br/min  Vt Set:  [450 mL] 450 mL  PEEP/CPAP:  [5 cmH20] 5 cmH20  Pressure Support:  [8 cmH20-15 cmH20] 10 cmH20  Mean Airway Pressure:  [8.2 cmH20-10 cmH20] 8.3 cmH20.   Signs/symptoms of respiratory failure include- tachypnea. Contributing diagnoses includes - Obesity Hypoventilation Labs and images were reviewed. Patient Has recent ABG, which has been reviewed. Will treat underlying causes and adjust management of respiratory failure as indicated. Pulmonary CC on board  Day 2 on Vent- remains intubated on vent  Cont vent support    Day 4 on Vent    Day 5 on vent- adequate O2, sats    Day 6- continue supportive care, await family's decision about comfort care    Day 7- continue support- trying to diurese     Day 8- minimal vent support- start weaning soon    15 May:  SAT/SBT as tolerated  17 May:  Successfully extubated.

## 2022-05-18 NOTE — PROGRESS NOTES
[facility] - Intensive Care ([facility]  Progress Note    Patient Name: Franky Masters  MRN: [id number]  Patient Class: IP- Inpatient   Admission Date: 5/4/2022  Length of Stay: 13 days  Attending Physician: Elvie Parker DO  Primary Care Provider: HOLLY ROSEN        Subjective:     Principal Problem:Severe sepsis        HPI:  Mr Masters is a 65 yo male POD#0 s/p SB perforation with surgical intervention demonstrating a large cecal mass and 3l feculant fluid in the abdominal cavity. Additional findings of a perforated ileum and a liver mass. Patient tolerated the Exlap with Washout and small bowel excision. Patient had a wound vac placed, is currently intubated, sedated and recovering in the ICU. Patient received 4 units PRBC and remains on pressor support. Patient is a DNR and HM consulted with assistance with medical management. Daughter at bedside. Patient discussed with care team.          Overview/Hospital Course:  Patient continues to require pressors, remains intubated, sedated. Patient urine o/p declining and concerning. Discussed POC in detail at bedside with daughter POA. She expressed her fathers wish to not undergo treatments  like perm HD, where he has a diminished quality of life. We spoke frankly about issues and concerns and she will be communicating with the family as his case evolves. Comfort care was discussed and the goals of care will develop consistent with the patients expressed wishes. Potential for another surgery likely Saturday with a washout and possible biopsy vs resection are on the horizon. This possible intervention will be covered in detail by surgery sharing the risks and benefits of that approach. Case discussed with the primary service - surgery, and critical care team.    5/6- Pt seen and examined in the ICU plus discussed with ICU team and the pt's daughter/ POA: Remains critically ill, intubated, sedated on Vent, on Pressors- Levo plus Vaso- JOSE with  oliguria getting worse- Cr rising to 3.6, becoming severely acidotic- requiring Ertapenem, Zyvox, Micafungin as well as on Bicarb and Fentanyl gtt. He has massive fluid overload and generalized anasarca.  Possible return to OR tomorrow 5/7 if patient is more stable for right hemicolectomy, possible ileostomy, liver biopsy, abdominal wall closure. Dw Pt's daughter.       5/7- remains Intubated, sedated on Vent- Went into Afib w RVR- hence added Amio to Levo and Vasopressin- back in NSR. Getting Invanz and Zyvox plus Micafungin. Dr. Parker took him back to OR for for abdominal washout ( 3-3.5 L feculent fluid with food particles suctioned out in the OR, small bowel appeared better) and replaced Abthera- still has bowel discontinuity. His WBC, Lactate and Cr have all increased. Family updated about his critical condition and poor prognosis and JOSE/ATN- they are considering Comfort measures.     5/8- Day 5 on Vent- family at bedside, remains intubated on Vent with 2 Pressors- still on Amiodarone gtt for Afib, Still has Abthera wound vac to open Abdomen with small bowel discontinuity. Remains oliguric with generalized anasarca- almost 24 L fluid positive. Cr increased to 4.6. WBC down to 12, family does not want any HD/CRRT, so getting an IV Lasix trial to improve the urine output.     5/9- Day 6 on vent- remains the same, remains intubated, on vent with Abthera Wound vac and bowel discontinuity. Put out about 3 L urine since yesterday with IV Lasix, family still does not want any HD, WBC down to 10.2, H/H 7.8/24, still on 2 Pressors and Amio gtt. Family still deciding about comfort care.     5/10- Appreciate all- Day 7- remains same in septic shock on 2 vasopressors, intubated and sedated, still in afib. JOSE has remained stable at 4.7 but urine output improved with IV lasix. Abthera wound vac in place. No surgery today- put out about 2.5 L yesterday, given lasix again today.    5/11- Seen Pre op- looks better, less  swollen, remains intubated, sedated on Vent, on 1 Pressor- on Levophed. Going for OR today for Laparotomy and washout and abd wound closure. Good response to Lasix. Still Afib on Amiodarone gtt. Bun/Cr 98/4.4, WBC down to 17, H/H 8.6/26.     5/12- Day 8 on Vent- looks much better, remains on Vent with Levophed and Amio gtts. Had extensive surgery yesterday and did very well post op- Reopening of recent Laparotomy, Abdominal washout, R Hemicolectomy with Liver Bx, TAP block, Abdominal wall closure, Creation, end Ileostomy. POD 1- looks better, still on Levophed and Amio gtt for Afib, started on TPN, ID stopped Zyvox and continued Merrem/Mycafungin.          Interval History: Awakening trials good and breathing adequately on spontaneous.  Additional drain placed by Interventional Radiology into the left side of the abdomen.  Drained over 400 mL grey pus.  Pigtail drain continuing to drain pus.  Successfully extubated and remaining weak but stable.    Review of Systems   Unable to perform ROS: Acuity of condition   Objective:     Vital Signs (Most Recent):  Temp: 98.3 °F (36.8 °C) (05/17/22 1530)  Pulse: 69 (05/17/22 1952)  Resp: (!) 31 (05/17/22 1952)  BP: (!) 137/52 (per art line) (05/17/22 1530)  SpO2: (!) 91 % (05/17/22 1952)   Vital Signs (24h Range):  Temp:  [97.8 °F (36.6 °C)-99.5 °F (37.5 °C)] 98.3 °F (36.8 °C)  Pulse:  [57-93] 69  Resp:  [14-35] 31  SpO2:  [91 %-100 %] 91 %  BP: ()/(46-69) 137/52  Arterial Line BP: (109-192)/(44-79) 166/58     Weight: 109.4 kg (241 lb 2.9 oz)  Body mass index is 35.62 kg/m².    Intake/Output Summary (Last 24 hours) at 5/17/2022 2252  Last data filed at 5/17/2022 1800  Gross per 24 hour   Intake 2685.92 ml   Output 2650 ml   Net 35.92 ml        Physical Exam  Vitals and nursing note reviewed.   Constitutional:       General: He is not in acute distress.     Appearance: He is well-developed. He is obese. He is ill-appearing. He is not toxic-appearing or diaphoretic.       Interventions: He is sedated, intubated and restrained.   HENT:      Head: Atraumatic.      Nose:        Mouth/Throat:      Mouth: Mucous membranes are moist.   Eyes:      General: No scleral icterus.     Conjunctiva/sclera: Conjunctivae normal.      Pupils: Pupils are equal, round, and reactive to light.   Neck:      Vascular: No JVD.     Cardiovascular:      Rate and Rhythm: Tachycardia present. Rhythm irregular.      Pulses:           Radial pulses are 1+ on the right side and 1+ on the left side.        Dorsalis pedis pulses are 1+ on the right side and 1+ on the left side.      Heart sounds: Heart sounds are distant.   Pulmonary:      Effort: No accessory muscle usage or respiratory distress. He is intubated.      Breath sounds: Decreased breath sounds present. No wheezing or rhonchi.   Chest:      Chest wall: No deformity or tenderness.   Abdominal:      General: Bowel sounds are absent. There is no distension.       Genitourinary:     Comments: Doherty in place  Musculoskeletal:      Cervical back: Neck supple.      Right lower leg: 3+ Pitting Edema present.      Left lower leg: 3+ Pitting Edema present.      Right foot: No deformity.      Left foot: No deformity.   Lymphadenopathy:      Cervical: No cervical adenopathy.   Skin:     General: Skin is warm.      Capillary Refill: Capillary refill takes 2 to 3 seconds.          Neurological:      Comments: Heavily sedated     Flow (L/min): 2  Vent Mode: Spont  Oxygen Concentration (%):  [28-35] 28  Resp Rate Total:  [14 br/min-35 br/min] 30 br/min  Vt Set:  [450 mL] 450 mL  PEEP/CPAP:  [5 cmH20] 5 cmH20  Pressure Support:  [8 cmH20-15 cmH20] 10 cmH20  Mean Airway Pressure:  [8.2 cmH20-10 cmH20] 8.3 cmH20  Temp:  [97.8 °F (36.6 °C)-99.5 °F (37.5 °C)] 98.3 °F (36.8 °C)  Pulse:  [57-93] 69  Resp:  [14-35] 31  SpO2:  [91 %-100 %] 91 %  BP: ()/(46-69) 137/52  Arterial Line BP: (109-192)/(44-79) 166/58   Art pH/pCO2/pO2/HCO3:  7.486/46.8/105/35.4 (05/17 5615)  Lab  Results   Component Value Date    ZVT05YQJCQYG Negative 05/11/2022    [id number] Negative 05/04/2022    YUP52QWOJVYQ Positive (A) 08/05/2021        Recent Labs   Lab 05/15/22  0422 05/16/22  0328 05/16/22  1424 05/17/22  0422    152* 153* 152*   WBC 11.97 12.58  --  13.92*   GRAN 79.4*  9.5* 81.0*  10.2*  --  77.8*  10.8*   LYMPH 8.0*  1.0 7.3*  0.9*  --  9.2*  1.3   HGB 7.7* 6.9* 8.1* 7.9*   HCT 25.2* 23.8* 27.5* 27.3*   * 93* 97* 84*   CREATININE 3.1* 2.9* 2.7* 2.5*   ESTGFRAFRICA 23* 25* 27* 29*   EGFRNONAA 20* 21* 23* 25*   K 3.1* 3.2* 3.5 3.8    105 107 109   CO2 31* 32* 29 32*   PHOS 4.2 3.8  --  3.7   MG 1.8 1.9  --  1.8       Microbiology Results (last 7 days)       Procedure Component Value Units Date/Time    Culture, Anaerobe [[id number]] Collected: 05/17/22 1400    Order Status: Sent Specimen: Abscess from Abdomen Updated: 05/17/22 2037    Aerobic culture [[id number]] Collected: 05/17/22 1400    Order Status: Sent Specimen: Abscess from Abdomen Updated: 05/17/22 2037    Gram stain [[id number]] Collected: 05/17/22 1400    Order Status: Sent Specimen: Abscess from Abdomen Updated: 05/17/22 2036          Antibiotics (From admission, onward)                Start     Stop Route Frequency Ordered    05/09/22 2200  ertapenem (INVANZ) 500 mg in sodium chloride 0.9% 100 mL IVPB         05/19 2359 IV Every 24 hours (non-standard times) 05/09/22 1624          Anticoagulants       Ordered     Route Frequency Start Stop    05/12/22 1408  heparin (PORCINE)  (LOW INTENSITY heparin infusion nomogram - [facility] (Recommended Indications: Acute Coronary Syndrome, Atrial Fibrillation, Prophylaxis in Prosthetic Heart Valves, and other indication where full anticoagulation is desired, bleeding risk is moderate, antiplatelet agents have been utilized, and time to therapeutic can be delayed minimizing the increased bleeding risk))         IV As needed (PRN) 05/12/22 1508 --    05/12/22 1408  heparin (PORCINE)   (LOW INTENSITY heparin infusion nomogram - [facility] (Recommended Indications: Acute Coronary Syndrome, Atrial Fibrillation, Prophylaxis in Prosthetic Heart Valves, and other indication where full anticoagulation is desired, bleeding risk is moderate, antiplatelet agents have been utilized, and time to therapeutic can be delayed minimizing the increased bleeding risk))         IV As needed (PRN) 05/12/22 1508 --    05/12/22 1408  heparin (porcine) in D5W  (LOW INTENSITY heparin infusion nomogram - [facility] (Recommended Indications: Acute Coronary Syndrome, Atrial Fibrillation, Prophylaxis in Prosthetic Heart Valves, and other indication where full anticoagulation is desired, bleeding risk is moderate, antiplatelet agents have been utilized, and time to therapeutic can be delayed minimizing the increased bleeding risk))         IV Continuous 05/12/22 1415 --          CT Guided Needle Placement  Narrative: EXAMINATION:  Drainage catheter placement    Procedural Personnel    Attending physician(s): Frances Nichols    Fellow physician(s): None    Resident physician(s): None    Advanced practice provider(s): Blue CALDERA    Pre-procedure diagnosis: Left hemiabdomen fluid collection    Post-procedure diagnosis: Same    Indication: Post-operative fluid collection    Additional clinical history: Status post small bowel obstruction with perforation due to cecal mass    Complications: No immediate complications.    PROCEDURAL SUMMARY:  - Intraperitoneal drainage catheter placement under CT guidance    PROCEDURE:  Pre-procedure:    Consent: Informed consent for the procedure was obtained and time-out was performed prior to the procedure.    Preparation: The site was prepared and draped using maximal sterile barrier technique including cutaneous antisepsis.    Antibiotic/antifungal administered: Scheduled dose more than 1 hour from procedure start time  or more than 2 hours    Anesthesia/sedation:    Level of anesthesia/sedation:  Moderate sedation (conscious sedation)    Anesthesia/sedation administered by: Independent trained observer under attending supervision with continuous monitoring of the patient's level of consciousness and physiologic status as the sole assigned responsibility    Total intra-service sedation time (minutes): 20    Drainage catheter placement:    The patient was positioned supine. Initial imaging was performed. Local anesthesia was administered. The fluid collection was accessed using an access needle followed by wire insertion and serial dilation and a drainage catheter was placed. Position of the drainage catheter within the fluid collection was confirmed.    - Initial imaging findings: Large fluid collection within the left anterior hemiabdomen    - Drainage catheter placed: Multipurpose drainage catheter    - External catheter securement: Non-absorbable suture and adhesive anchoring device    - Post-drainage imaging findings: Near-complete drainage of the fluid collection    Contrast:    Contrast agent: None    Contrast volume (mL): 0    Radiation Dose:    CT dose length product ( mGy-cm): 798.16    Additional Details:    Additional description of procedure: None    Equipment details: None    Specimens removed: Aspirated fluid was sent for analysis.    Estimated blood loss (mL): Less than 10    Standardized report: [email or web address]  Impression: Percutaneous placement of a 8 [place] drainage catheter into a left abdominal collection, yielding 480 mL of purulent mucoid fluid.    Plan:    Follow-up laboratory analysis    Attestation:    Signer name: Frances Nichols    I attest that I was present for the entire procedure. I reviewed the stored images and agree with the report as written.    Electronically signed by: Frances Nichols  Date:    05/17/2022  Time:    15:02   Significant Labs: All pertinent labs within the past 24 hours have been reviewed.    Significant Imaging: I have reviewed all pertinent  imaging results/findings within the past 24 hours.      Assessment/Plan:      * Severe sepsis secondary to Peritonitis from bowel perforation  Pressors  Abx - Zosyn / Micafungin  ID on consult    Remains in severe Septic Shock complicated by JOSE/ATN- Anuria and Resp Failure on Vent  ICU team has d/w daughter about HD if and when needed- she does not appear to be in favor of HD at present  Prognosis poor    Day 4 in Septic Shock and on vent  Continue Levo and Vaso plus IV Abx  Prognosis poor    Day 5- Continues same on Vent, WBC better but remains in renal shut down due to shock plus 2 Pressors    Day 6- Continue present supportive care    Day 7- gradually improving, now on 1 pressor and WBC down to 17  Cont present care  Going for surgery today      15 May:  No longer on vasopressor.  Weaning sedation for awakening trials.  Left side intra-abdominal fluid collection/abscess draining into ADDIS.  17 May:  Additional drain placed.    Atrial fibrillation with RVR  Converted to NSR with Amio gtt    Cont Amio gtt          JOSE (acute kidney injury)  Worsening  Trend  Cr 2.2 today    Cr now 3.8- Oliguric- heading towards Anuria and ATN/ May need HD vs CRRT- she has massive fluid Overload.     Cr now 4.5-  Remains anuric  POA/ Family not in favor of HD    Remains Oliguric due to Septic Shock on 2 Pressors  Some urine output with lasix but no Polyuria     5/10 Urine Out put improved with diuresis while renal functions remains stable    Improving, Cr coming down, good urine output    On mechanically assisted ventilation  Wean as tolerated  CC Team  Pulmonary    Cont vent support    Expect further improvement with diuresis    Cont Vent support  Day 7    Day 8- will start weaning vent soon      Mass of colon  Based on Surgery  Potential interventions  Goals of care  Biopsy as indicated  Likely Oncologic process with mets  Heme Onc as indicated    See above    Possible resection    resected    Small bowel perforation with large  Cecal mass   Patient stable s/p sx POD#1  Plan for washout, etc per primary service  Wound vac  Goals of care assessment  DNR    S/p SB resection- may go to surgery again tomorrow    5/7- Per Dr. Parker- pt too unstable for right hemicolectomy, end ileostomy, liver biopsy today s/p abdominal washout with Abthera replacement today.  5/8- POD 3 with s/p Laparotomy and bowel resection and subsequent laparoscopic washout- persistent bowel discontinuity and abthera wound vac closure   5/9- persistent bowel discontinuity- await further decision by family    Await further input from surgery    Await surgery today- going for closure today    S/p- Reopening of recent Laparotomy, Abdominal washout, R Hemicolectomy with Liver Bx, TAP block, Abdominal wall closure, Creation, end Ileostomy. POD 1- looks better, still on Levophed and Amio gtt for Afib, started on TPN, ID stopped Zyvox and continued Merrem/Mycafungin.     Obesity (BMI 30.0-34.9)  Diet  Nutrition on recovery      Acute on chronic anemia  Hgb 10.3 s/p Transfusion 4 units Prbc  Transfuse for Hgb <7  Monitor    5/5  Improved  Hgb 11.4 today  Transfuse as indicated    5/10- H/H 8.6/28- likely dilutional from ATN- improving      Acute hypoxemic respiratory failure on mechanical vent  Patient with Hypoxic Respiratory failure which is Acute.  he is not on home oxygen. Supplemental oxygen was provided and noted- Vent Mode: Spont  Oxygen Concentration (%):  [28-35] 28  Resp Rate Total:  [14 br/min-35 br/min] 30 br/min  Vt Set:  [450 mL] 450 mL  PEEP/CPAP:  [5 cmH20] 5 cmH20  Pressure Support:  [8 cmH20-15 cmH20] 10 cmH20  Mean Airway Pressure:  [8.2 cmH20-10 cmH20] 8.3 cmH20.   Signs/symptoms of respiratory failure include- tachypnea. Contributing diagnoses includes - Obesity Hypoventilation Labs and images were reviewed. Patient Has recent ABG, which has been reviewed. Will treat underlying causes and adjust management of respiratory failure as indicated. Pulmonary CC on  board  Day 2 on Vent- remains intubated on vent  Cont vent support    Day 4 on Vent    Day 5 on vent- adequate O2, sats    Day 6- continue supportive care, await family's decision about comfort care    Day 7- continue support- trying to diurese     Day 8- minimal vent support- start weaning soon    15 May:  SAT/SBT as tolerated  17 May:  Successfully extubated.      VTE Risk Mitigation (From admission, onward)         Ordered     heparin 25,000 units in dextrose 5% (100 units/ml) IV bolus from bag - ADDITIONAL PRN BOLUS - 60 units/kg  As needed (PRN)        Question:  Heparin Infusion Adjustment (DO NOT MODIFY ANSWER)  Answer:  \\[email or web address]\epic\Images\Pharmacy\HeparinInfusions\heparin LOW INTENSITY nomogram for [facility] [id number]    05/12/22 1408     heparin 25,000 units in dextrose 5% (100 units/ml) IV bolus from bag - ADDITIONAL PRN BOLUS - 30 units/kg  As needed (PRN)        Question:  Heparin Infusion Adjustment (DO NOT MODIFY ANSWER)  Answer:  \\[email or web address]\epic\Images\Pharmacy\HeparinInfusions\heparin LOW INTENSITY nomogram for [facility] [id number].pdf    05/12/22 1408     heparin 25,000 units in dextrose 5% 250 mL (100 units/mL) infusion LOW INTENSITY nomogram - [facility]  Continuous        Question Answer Comment   Heparin Infusion Adjustment (DO NOT MODIFY ANSWER) \\[email or web address]\epic\Images\Pharmacy\HeparinInfusions\heparin LOW INTENSITY nomogram for [facility] [id number].pdf    Begin at (in units/kg/hr) 12        05/12/22 1408     IP VTE HIGH RISK PATIENT  Once         05/04/22 1253     Place sequential compression device  Until discontinued         05/04/22 1253                Discharge Planning   ELLIOT:      Code Status: DNR   Is the patient medically ready for discharge?:     Reason for patient still in hospital (select all that apply): Treatment  Discharge Plan A: Comfort care/withdrawal            Critical care time spent on the evaluation and treatment of severe organ dysfunction, review of pertinent labs and imaging studies,  discussions with consulting providers and discussions with patient/family: 30 minutes.      Alexandro Kebede MD  Department of Hospital Medicine   '[place] - Intensive Care ([facility])

## 2022-05-18 NOTE — PHYSICIAN QUERY
"PT Name: Franky Masters  MR #: [id number]    DOCUMENTATION CLARIFICATION      CDS: Ashley COLINDRES RN  Contact information: [email or web address]      This form is a permanent document in the medical record.    Query Date: May 18, 2022    By submitting this query, we are merely seeking further clarification of documentation. Please utilize your independent clinical judgment when addressing the question(s) below.    The medical record contains the following:   Indicators   Supporting Clinical Findings Location in Medical Record   X "Pulmonary Edema" 5/14 - semi erect intubated on mech ventilation in no distress sedated on Precedex infusion.  Also still on Levophed, Amiodarone and Heparin infusions with TPN.  BP still labile and still in A-Fib w/ rate controlled.  Pulm edema pink froth today copious suctioned from OET.    Lasix/Albumin for anasarca/pulm edema X 4 doses completed PN Pulmonology 5/14/2022            PN Pulmonology 5/16/2022    Wheezing, Productive cough, SOB, LIM, Use of accessory muscles     X Radiology Findings Moderate mild bilateral pleural effusions and atelectasis/consolidation in both lung bases CT Abd Pelvis 5/15/2022    CHF documented/Respiratory Failure documented     X Respiratory condition Ms Masters is a 67 yo obese male with a PMH of diverticulosis and hx of hospitalization in Aug 2021 with COVID PNA and Hypoxic Resp Failure but did not require ICU or intubation.    Acute hypoxemic respiratory failure on mechanical vent PN Pulmonology 5/17/2022        PN Pulmonology 5/16/2022   X RR                  PaO2                  PaCO2                     O2 sat  05/14/22 04:59 05/15/22 04:59 05/16/22 03:21   POC PCO2 41.8 44.7 44.6   POC PO2 86 114 (H) 132 (H)   POC SATURATED O2 97 99 99    Labs 5/4-5/16   X Treatment: Lasix/Albumin for anasarca/pulm edema X 4 doses completed PN Pulmonology 5/16/2022    Supplemental O2:     X Oxygen dependence Awakening trials good and breathing adequately on " spontaneous.  Following commands but very weak.  Unable to lift his head off the pillow.  Also suctioning thick secretions through ET tube.  Not ready for extubation.  PN Hosp Med 5/15/2022    Other: Cr now 3.8- Oliguric- heading towards Anuria and ATN/ May need HD vs CRRT- she has massive fluid Overload. PN Hosp Med 5/15/2022       Provider, please specify diagnosis or diagnoses associated with above clinical findings.    [   X ] Acute pulmonary edema, non-cardiac cause (please specify causative condition): ___________________   [    ] Acute pulmonary edema, unspecified cause   [    ] Acute and/on chronic pulmonary edema, non-cardiac cause (please specify causative condition): ___________________   [    ] Acute and/on chronic pulmonary edema, unspecified cause   [    ] Chronic pulmonary edema, non-cardiac cause (please specify causative condition): ___________________   [    ] Chronic pulmonary edema, unspecified cause   [    ] Other respiratory diagnosis (please specify): _________________________________    [   ] Clinically Undetermined     Present on admission (POA) status:  [   ] Yes (Y)                          [   ] Clinically Undetermined (W)  [   ] No (N)                            [   ] Documentation insufficient to determine if condition is POA (U)       Please document in your progress notes daily for the duration of treatment, until resolved, and include in your discharge summary.    Etiology of non cardiogenic pulm edema is malnutrition and hypoalbuminemia causing capillary leak and interstitial edema

## 2022-05-18 NOTE — SUBJECTIVE & OBJECTIVE
Interval History: Awakening trials good and breathing adequately on spontaneous.  Additional drain placed by Interventional Radiology into the left side of the abdomen.  Drained over 400 mL grey pus.  Pigtail drain continuing to drain pus.  Successfully extubated and remaining weak but stable.    Review of Systems   Unable to perform ROS: Acuity of condition   Objective:     Vital Signs (Most Recent):  Temp: 98.3 °F (36.8 °C) (05/17/22 1530)  Pulse: 69 (05/17/22 1952)  Resp: (!) 31 (05/17/22 1952)  BP: (!) 137/52 (per art line) (05/17/22 1530)  SpO2: (!) 91 % (05/17/22 1952)   Vital Signs (24h Range):  Temp:  [97.8 °F (36.6 °C)-99.5 °F (37.5 °C)] 98.3 °F (36.8 °C)  Pulse:  [57-93] 69  Resp:  [14-35] 31  SpO2:  [91 %-100 %] 91 %  BP: ()/(46-69) 137/52  Arterial Line BP: (109-192)/(44-79) 166/58     Weight: 109.4 kg (241 lb 2.9 oz)  Body mass index is 35.62 kg/m².    Intake/Output Summary (Last 24 hours) at 5/17/2022 2252  Last data filed at 5/17/2022 1800  Gross per 24 hour   Intake 2685.92 ml   Output 2650 ml   Net 35.92 ml        Physical Exam  Vitals and nursing note reviewed.   Constitutional:       General: He is not in acute distress.     Appearance: He is well-developed. He is obese. He is ill-appearing. He is not toxic-appearing or diaphoretic.      Interventions: He is sedated, intubated and restrained.   HENT:      Head: Atraumatic.      Nose:        Mouth/Throat:      Mouth: Mucous membranes are moist.   Eyes:      General: No scleral icterus.     Conjunctiva/sclera: Conjunctivae normal.      Pupils: Pupils are equal, round, and reactive to light.   Neck:      Vascular: No JVD.     Cardiovascular:      Rate and Rhythm: Tachycardia present. Rhythm irregular.      Pulses:           Radial pulses are 1+ on the right side and 1+ on the left side.        Dorsalis pedis pulses are 1+ on the right side and 1+ on the left side.      Heart sounds: Heart sounds are distant.   Pulmonary:      Effort: No  accessory muscle usage or respiratory distress. He is intubated.      Breath sounds: Decreased breath sounds present. No wheezing or rhonchi.   Chest:      Chest wall: No deformity or tenderness.   Abdominal:      General: Bowel sounds are absent. There is no distension.       Genitourinary:     Comments: Doherty in place  Musculoskeletal:      Cervical back: Neck supple.      Right lower leg: 3+ Pitting Edema present.      Left lower leg: 3+ Pitting Edema present.      Right foot: No deformity.      Left foot: No deformity.   Lymphadenopathy:      Cervical: No cervical adenopathy.   Skin:     General: Skin is warm.      Capillary Refill: Capillary refill takes 2 to 3 seconds.          Neurological:      Comments: Heavily sedated     Flow (L/min): 2  Vent Mode: Spont  Oxygen Concentration (%):  [28-35] 28  Resp Rate Total:  [14 br/min-35 br/min] 30 br/min  Vt Set:  [450 mL] 450 mL  PEEP/CPAP:  [5 cmH20] 5 cmH20  Pressure Support:  [8 cmH20-15 cmH20] 10 cmH20  Mean Airway Pressure:  [8.2 cmH20-10 cmH20] 8.3 cmH20  Temp:  [97.8 °F (36.6 °C)-99.5 °F (37.5 °C)] 98.3 °F (36.8 °C)  Pulse:  [57-93] 69  Resp:  [14-35] 31  SpO2:  [91 %-100 %] 91 %  BP: ()/(46-69) 137/52  Arterial Line BP: (109-192)/(44-79) 166/58   Art pH/pCO2/pO2/HCO3:  7.486/46.8/105/35.4 (05/17 0944)  Lab Results   Component Value Date    IKW60CQOMLXE Negative 05/11/2022    DUO70CKVOKPQ Negative 05/04/2022    [id number] Positive (A) 08/05/2021        Recent Labs   Lab 05/15/22  0422 05/16/22  0328 05/16/22  1424 05/17/22  0422    152* 153* 152*   WBC 11.97 12.58  --  13.92*   GRAN 79.4*  9.5* 81.0*  10.2*  --  77.8*  10.8*   LYMPH 8.0*  1.0 7.3*  0.9*  --  9.2*  1.3   HGB 7.7* 6.9* 8.1* 7.9*   HCT 25.2* 23.8* 27.5* 27.3*   * 93* 97* 84*   CREATININE 3.1* 2.9* 2.7* 2.5*   ESTGFRAFRICA 23* 25* 27* 29*   EGFRNONAA 20* 21* 23* 25*   K 3.1* 3.2* 3.5 3.8    105 107 109   CO2 31* 32* 29 32*   PHOS 4.2 3.8  --  3.7   MG 1.8 1.9  --   1.8       Microbiology Results (last 7 days)       Procedure Component Value Units Date/Time    Culture, Anaerobe [[id number]] Collected: 05/17/22 1400    Order Status: Sent Specimen: Abscess from Abdomen Updated: 05/17/22 2037    Aerobic culture [[id number]] Collected: 05/17/22 1400    Order Status: Sent Specimen: Abscess from Abdomen Updated: 05/17/22 2037    Gram stain [[id number]] Collected: 05/17/22 1400    Order Status: Sent Specimen: Abscess from Abdomen Updated: 05/17/22 2036          Antibiotics (From admission, onward)                Start     Stop Route Frequency Ordered    05/09/22 2200  ertapenem (INVANZ) 500 mg in sodium chloride 0.9% 100 mL IVPB         05/19 2359 IV Every 24 hours (non-standard times) 05/09/22 1624          Anticoagulants       Ordered     Route Frequency Start Stop    05/12/22 1408  heparin (PORCINE)  (LOW INTENSITY heparin infusion nomogram - [facility] (Recommended Indications: Acute Coronary Syndrome, Atrial Fibrillation, Prophylaxis in Prosthetic Heart Valves, and other indication where full anticoagulation is desired, bleeding risk is moderate, antiplatelet agents have been utilized, and time to therapeutic can be delayed minimizing the increased bleeding risk))         IV As needed (PRN) 05/12/22 1508 --    05/12/22 1408  heparin (PORCINE)  (LOW INTENSITY heparin infusion nomogram - [facility] (Recommended Indications: Acute Coronary Syndrome, Atrial Fibrillation, Prophylaxis in Prosthetic Heart Valves, and other indication where full anticoagulation is desired, bleeding risk is moderate, antiplatelet agents have been utilized, and time to therapeutic can be delayed minimizing the increased bleeding risk))         IV As needed (PRN) 05/12/22 1508 --    05/12/22 1408  heparin (porcine) in D5W  (LOW INTENSITY heparin infusion nomogram - [facility] (Recommended Indications: Acute Coronary Syndrome, Atrial Fibrillation, Prophylaxis in Prosthetic Heart Valves, and other indication where full  anticoagulation is desired, bleeding risk is moderate, antiplatelet agents have been utilized, and time to therapeutic can be delayed minimizing the increased bleeding risk))         IV Continuous 05/12/22 1415 --          CT Guided Needle Placement  Narrative: EXAMINATION:  Drainage catheter placement    Procedural Personnel    Attending physician(s): Frances Nichols    Fellow physician(s): None    Resident physician(s): None    Advanced practice provider(s): Blue CALDERA    Pre-procedure diagnosis: Left hemiabdomen fluid collection    Post-procedure diagnosis: Same    Indication: Post-operative fluid collection    Additional clinical history: Status post small bowel obstruction with perforation due to cecal mass    Complications: No immediate complications.    PROCEDURAL SUMMARY:  - Intraperitoneal drainage catheter placement under CT guidance    PROCEDURE:  Pre-procedure:    Consent: Informed consent for the procedure was obtained and time-out was performed prior to the procedure.    Preparation: The site was prepared and draped using maximal sterile barrier technique including cutaneous antisepsis.    Antibiotic/antifungal administered: Scheduled dose more than 1 hour from procedure start time  or more than 2 hours    Anesthesia/sedation:    Level of anesthesia/sedation: Moderate sedation (conscious sedation)    Anesthesia/sedation administered by: Independent trained observer under attending supervision with continuous monitoring of the patient's level of consciousness and physiologic status as the sole assigned responsibility    Total intra-service sedation time (minutes): 20    Drainage catheter placement:    The patient was positioned supine. Initial imaging was performed. Local anesthesia was administered. The fluid collection was accessed using an access needle followed by wire insertion and serial dilation and a drainage catheter was placed. Position of the drainage catheter within the fluid  collection was confirmed.    - Initial imaging findings: Large fluid collection within the left anterior hemiabdomen    - Drainage catheter placed: Multipurpose drainage catheter    - External catheter securement: Non-absorbable suture and adhesive anchoring device    - Post-drainage imaging findings: Near-complete drainage of the fluid collection    Contrast:    Contrast agent: None    Contrast volume (mL): 0    Radiation Dose:    CT dose length product ( mGy-cm): 798.16    Additional Details:    Additional description of procedure: None    Equipment details: None    Specimens removed: Aspirated fluid was sent for analysis.    Estimated blood loss (mL): Less than 10    Standardized report: [email or web address]  Impression: Percutaneous placement of a 8 Romansh drainage catheter into a left abdominal collection, yielding 480 mL of purulent mucoid fluid.    Plan:    Follow-up laboratory analysis    Attestation:    Signer name: Frances Nichols    I attest that I was present for the entire procedure. I reviewed the stored images and agree with the report as written.    Electronically signed by: Frances Nichols  Date:    05/17/2022  Time:    15:02   Significant Labs: All pertinent labs within the past 24 hours have been reviewed.    Significant Imaging: I have reviewed all pertinent imaging results/findings within the past 24 hours.

## 2022-05-18 NOTE — PT/OT/SLP EVAL
"Occupational Therapy   Evaluation    Name: Franky Masters  MRN: [id number]  Admitting Diagnosis:  Severe sepsis  Recent Surgery: Procedure(s) (LRB):  CREATION, ILEOSTOMY (N/A)  HEMICOLECTOMY, RIGHT (N/A)  BIOPSY, LIVER (Right) 7 Days Post-Op    Recommendations:     Discharge Recommendations: nursing facility, skilled  Discharge Equipment Recommendations:   (TBD)  Barriers to discharge:  None    Assessment:     Franky Masters is a 67 y.o. male with a medical diagnosis of Severe sepsis.  He presents with the following performance deficits affecting function: weakness, impaired endurance, impaired self care skills, impaired functional mobilty, impaired balance, decreased upper extremity function, decreased lower extremity function, impaired fine motor, impaired coordination, edema, impaired cardiopulmonary response to activity.      Rehab Prognosis: Good and Fair; patient would benefit from acute skilled OT services to address these deficits and reach maximum level of function.       Plan:     Patient to be seen 2 x/week to address the above listed problems via self-care/home management, therapeutic activities, therapeutic exercises  · Plan of Care Expires: 06/01/22  · Plan of Care Reviewed with: patient, family    Subjective     Chief Complaint: Reported "I am dizzy."  Patient/Family Comments/goals: get stronger    Occupational Profile:  Living Environment: Patient resides in a 2nd floor apartment with a full flight of steps to enter, with his wife.  Previous level of function: Prior to admission he was independent with ADLs and ambulation. He was an active .   Roles and Routines: n/a  Equipment Used at Home:  none  Assistance upon Discharge: family    Pain/Comfort:  · Pain Rating 1: 0/10    Objective:     Communicated with: NurseLouise, prior to session.  Patient found supine with arterial line, blood pressure cuff, norwood catheter, ADDIS drain, oxygen, PICC line, peripheral IV, pulse ox (continuous), telemetry, NG " tube upon OT entry to room.    General Precautions: Standard, fall, respiratory   Orthopedic Precautions:N/A   Braces: N/A  Respiratory Status: Nasal cannula, flow 5 L/min    Bed Mobility:    · Patient completed Supine to Sit with maximal assistance and 2 persons  · Patient completed Sit to Supine with maximal assistance and 2 persons    Functional Mobility/Transfers:  · Not appropriate for functional transfer at this time    Activities of Daily Living:  · Upper Body Dressing: total assistance .  · Lower Body Dressing: total assistance .    Cognitive/Visual Perceptual:  Cognitive/Psychosocial Skills:     -       Oriented to: Person and Place   -       Follows Commands/attention:Follows one-step commands    Physical Exam:  Balance:    -       sitting: poor +  Edema:  Severe B UE  Upper Extremity Range of Motion:     -       Right Upper Extremity: PROM WFL  -       Left Upper Extremity: PROM WFL  Upper Extremity Strength: -       Right Upper Extremity: Deficits: grossly 2/5  -       Left Upper Extremity: Deficits: grossly 2/5   Strength:    -       Right Upper Extremity: Deficits: poor  -       Left Upper Extremity: Deficits: poor    AMPAC 6 Click ADL:  AMPAC Total Score: 6    Treatment & Education:  Patient educated on role of OT in acute setting and benefits of participation. Educated on safe mobility techniques to use to increase independence and decrease fall risk. Tolerated x8 mins EOB progressing from max A of 2 to maintain static sitting balance to min with moments of CGA using B UE for support. Initially dizziness that resolved while sitting. Vitals stable throughout.  Education:    Patient left supine with all lines intact, call button in reach and aide and son present    GOALS:   Multidisciplinary Problems     Occupational Therapy Goals        Problem: Occupational Therapy    Goal Priority Disciplines Outcome Interventions   Occupational Therapy Goal     OT, PT/OT     Description: Goals to be met by:  6/1/22     Patient will increase functional independence with ADLs by performing:    Sitting at edge of bed x15 minutes with Stand-by Assistance.  Supine to sit with Minimal Assistance.  Increased functional strength grossly by 1/2 MM grade.                     History:     Past Medical History:   Diagnosis Date    Diverticulitis     Supplemental oxygen dependent        Past Surgical History:   Procedure Laterality Date    ABDOMINAL WASHOUT  5/7/2022    Procedure: LAVAGE, PERITONEAL, THERAPEUTIC;  Surgeon: Elvie Parker DO;  Location: [facility] OR;  Service: General;;    ABDOMINAL WASHOUT  5/7/2022    Procedure: ;  Surgeon: Elvie Parker DO;  Location: [facility] OR;  Service: General;;    APPLICATION OF WOUND VACUUM-ASSISTED CLOSURE DEVICE N/A 5/4/2022    Procedure: APPLICATION, WOUND VAC;  Surgeon: Elvie Parker DO;  Location: [facility] OR;  Service: General;  Laterality: N/A;    ILEOSTOMY N/A 5/11/2022    Procedure: CREATION, ILEOSTOMY;  Surgeon: Elvie Parker DO;  Location: [facility] OR;  Service: General;  Laterality: N/A;    LAPAROTOMY N/A 5/7/2022    Procedure: LAPAROTOMY;  Surgeon: Elvie Parker DO;  Location: [facility] OR;  Service: General;  Laterality: N/A;    LIVER BIOPSY Right 5/11/2022    Procedure: BIOPSY, LIVER;  Surgeon: Elvie Parker DO;  Location: [facility] OR;  Service: General;  Laterality: Right;    RIGHT HEMICOLECTOMY N/A 5/11/2022    Procedure: HEMICOLECTOMY, RIGHT;  Surgeon: Elvie Parker DO;  Location: [facility] OR;  Service: General;  Laterality: N/A;       Time Tracking:     OT Date of Treatment: 05/18/22  OT Start Time: 1045  OT Stop Time: 1110  OT Total Time (min): 25 min    Billable Minutes:Evaluation 15  Therapeutic Activity 10    5/18/2022

## 2022-05-18 NOTE — ASSESSMENT & PLAN NOTE
5/4 Expl Lap and SB excision with washout and AB thera wound vac for bowel left in discontinuity  5/7 washout and wound vac replacement  hx Diverticulosis but cecal mass and suspected metastatic lesions found in exploration  5/11 ABD WASHOUT, RT HEMICOLECTOMY, ILEOSTOMY, LIVER BIOPSY, AND ABD WALL CLOSURE  IVAB for peritonitis/sepsis  Cont TPN, trickle TF stopped 5/15 due to N/V overnight continues high NG output 350 ml last 12 hours  Pathology still pending  CT Abd 5/46: Loculated fluid density along the left upper abdomen and anterior left mid abdomen may reflect infectious hemorrhagic or seroma fluid collection.    IR placed additional drain to LUQ Abd abscess 5/18

## 2022-05-18 NOTE — SUBJECTIVE & OBJECTIVE
Interval History: Awake and alert but weak. Able to nod head appropriately.    Medications:  Continuous Infusions:   amiodarone in dextrose 5% 0.5 mg/min (05/18/22 1000)    dextrose 10 % in water (D10W)      dextrose 5 % 75 mL/hr at 05/18/22 1000    heparin (porcine) in D5W 18 Units/kg/hr (05/18/22 1000)    TPN ADULT CENTRAL LINE CUSTOM 36 mL/hr at 05/18/22 0900     Scheduled Meds:   ertapenem (INVANZ) IVPB  500 mg Intravenous Q24H    famotidine (PF)  20 mg Intravenous Daily    micafungin (MYCAMINE) IVPB  100 mg Intravenous Q24H    white petrolatum-mineral oil 57.3-42.5%   Both Eyes QHS     PRN Meds:sodium chloride, sodium chloride 0.9%, acetaminophen, dextrose 10 % in water (D10W), dextrose 10%, fentaNYL, glucagon (human recombinant), heparin (PORCINE), heparin (PORCINE), hydrALAZINE, insulin aspart U-100, lorazepam, ondansetron     Review of patient's allergies indicates:  No Known Allergies  Objective:     Vital Signs (Most Recent):  Temp: 98.4 °F (36.9 °C) (05/18/22 0715)  Pulse: (!) 56 (05/18/22 0915)  Resp: (!) 25 (05/18/22 0915)  BP: (!) 137/52 (per art line) (05/17/22 1530)  SpO2: 96 % (05/18/22 0915)   Vital Signs (24h Range):  Temp:  [98.2 °F (36.8 °C)-99.5 °F (37.5 °C)] 98.4 °F (36.9 °C)  Pulse:  [56-93] 56  Resp:  [16-35] 25  SpO2:  [90 %-100 %] 96 %  BP: ()/(46-61) 137/52  Arterial Line BP: (126-192)/(46-79) 157/65     Weight: 109.4 kg (241 lb 2.9 oz)  Body mass index is 35.62 kg/m².    Intake/Output - Last 3 Shifts         05/16 0700 05/17 0659 05/17 0700 05/18 0659 05/18 0700  05/19 0659    I.V. (mL/kg) 1799.3 (16.4) 2397.6 (21.9) 426.5 (3.9)    Blood 375      IV Piggyback 376.5 198.7 40.1    TPN 1110.1 1047.9 107.9    Total Intake(mL/kg) 3660.9 (33.5) 3644.3 (33.3) 574.6 (5.3)    Urine (mL/kg/hr) 2162 (0.8) 2055 (0.8) 360 (1)    Drains 780 965 100    Stool 100 100     Total Output 3042 3120 460    Net +618.9 +524.3 +114.6                   Physical Exam  Vitals and nursing note  reviewed.   Constitutional:       Appearance: He is obese. He is ill-appearing.   HENT:      Mouth/Throat:      Mouth: Mucous membranes are dry.   Eyes:      Extraocular Movements: Extraocular movements intact.   Cardiovascular:      Rate and Rhythm: Normal rate.   Pulmonary:      Comments: Diminished bilaterally. Poor inspiratory effort.  Abdominal:      Palpations: Abdomen is soft.      Comments: Midline incision with staples--no purulent drainage or erythema. Right-sided end ileostomy is red with some liquid stool output. ADDIS drain on right is serous; left-sided drains are murky brown.   Genitourinary:     Comments: Doherty in place  Musculoskeletal:      Right lower leg: Edema present.      Left lower leg: Edema present.   Neurological:      Comments: Awake and alert       Significant Labs:  I have reviewed all pertinent lab results within the past 24 hours.  CBC:   Recent Labs   Lab 05/18/22 0411   WBC 11.82   RBC 3.45*   HGB 7.7*   HCT 26.7*      MCV 77*   MCH 22.3*   MCHC 28.8*     CMP:   Recent Labs   Lab 05/18/22 0411   *   CALCIUM 8.2*   ALBUMIN 1.4*   PROT 4.6*   *   K 3.6   CO2 28      BUN 75*   CREATININE 2.1*   ALKPHOS 102   ALT 31   AST 33   BILITOT 1.5*     Coagulation:   Recent Labs   Lab 05/12/22  1451 05/12/22  2057 05/18/22 0411   LABPROT 11.9  --   --    INR 1.1  --   --    APTT 34.1*   < > 41.3*    < > = values in this interval not displayed.     ABGs:   Recent Labs   Lab 05/17/22  0944   PH 7.486*   PCO2 46.8*   PO2 105*   HCO3 35.4*   POCSATURATED 98   BE 12       Significant Diagnostics:  I have reviewed all pertinent imaging results/findings within the past 24 hours.

## 2022-05-18 NOTE — PHYSICIAN QUERY
PT Name: Franky Masters  MR #: [id number]    DOCUMENTATION CLARIFICATION     CDS: Ashley COLINDRES RN  Contact information: [email or web address]    This form is a permanent document in the medical record.     Query Date: May 18, 2022    Dear Provider,  By submitting this query, we are merely seeking further clarification of documentation. Please utilize your independent clinical judgment when addressing the question(s) below.    The medical record contains the following:  Supporting Clinical Findings Location in Medical Record   Small bowel perforation with large Cecal mass   5/4 Expl Lap and SB excision with washout and AB thera wound vac for bowel left in discontinuity  5/7 washout and wound vac replacement  hx Diverticulosis but cecal mass and suspected metastatic lesions found in exploration  5/11 ABD WASHOUT, RT HEMICOLECTOMY, ILEOSTOMY, LIVER BIOPSY, AND ABD WALL CLOSURE  IVAB for peritonitis/sepsis    Two 19 [place] Stan drains were placed, one on the right side of the abdomen extending down into the pelvis, and one on the left side of the abdomen extending up into the left upper quadrant.     CT Abd: Loculated fluid density along the left upper abdomen and anterior left mid abdomen may reflect infectious hemorrhagic or seroma fluid collection.      CT of the abdomen/pelvis shows a fluid collection left upper quadrant descending along the left flank.  Left side drain is collecting dirty dishwater-like fluid.  Discussed with General Surgery and will continue with current drain and may request additional or replacement drain by IR.  Continuing antibiotics and coordinating care with Critical Care Medicine.    Pre Op Diagnosis: LUQ fluid collection  Post Op Diagnosis: same  Procedure:  drainage  Sterile technique was performed in the LUQ, lidocaine was used as a local anesthetic.  8.3 fr drainage catheter placed and secured with suture and tegaderm dressing.  Approx 480 ccs of mucous like  purulent fluid removed  and sent to lab.    Additional drain placed by Interventional Radiology into the left side of the abdomen.  Drained over 400 mL grey pus.  Pigtail drain continuing to drain pus.   PN Pulmonology 5/16/2022                  Op Note 5/11/2022        PN Pulmonology 5/16/2022      PN Hosp Med 5/16/2022            Op Note Radiology 5/17/2022              PN Hosp Med 5/17/2022       Please clarify if ____LUQ fluid collection_____ (as it relates to __Exploratory laparotomy___) is:      [  ] Inherent/Integral to the procedure   [  ] Complication of the procedure   [ X ] Present, but not a complication of the procedure   [  ] Incidental finding, not clinically significant   [  ] Intended/required to complete procedure   [  ] Other (please specify): __________________   [  ] Clinically Undetermined       Please document in your progress notes daily for the duration of treatment until resolved and include in your discharge summary.

## 2022-05-18 NOTE — ASSESSMENT & PLAN NOTE
15 May:  No longer on vasopressor.  Weaning sedation for awakening trials.  Left side intra-abdominal fluid collection/abscess draining into ADDIS.  17 May:  Additional drain placed.  Extubated.  18 May:  Remaining stable and off ventilator.  Starting tube feedings soon.

## 2022-05-18 NOTE — PROGRESS NOTES
[facility] - Intensive Care ([facility])  General Surgery  Progress Note    Subjective:     History of Present Illness:  No notes on file    Post-Op Info:  Procedure(s) (LRB):  CREATION, ILEOSTOMY (N/A)  HEMICOLECTOMY, RIGHT (N/A)  BIOPSY, LIVER (Right)   7 Days Post-Op     Interval History: Awake and alert but weak. Able to nod head appropriately.    Medications:  Continuous Infusions:   amiodarone in dextrose 5% 0.5 mg/min (05/18/22 1000)    dextrose 10 % in water (D10W)      dextrose 5 % 75 mL/hr at 05/18/22 1000    heparin (porcine) in D5W 18 Units/kg/hr (05/18/22 1000)    TPN ADULT CENTRAL LINE CUSTOM 36 mL/hr at 05/18/22 0900     Scheduled Meds:   ertapenem (INVANZ) IVPB  500 mg Intravenous Q24H    famotidine (PF)  20 mg Intravenous Daily    micafungin (MYCAMINE) IVPB  100 mg Intravenous Q24H    white petrolatum-mineral oil 57.3-42.5%   Both Eyes QHS     PRN Meds:sodium chloride, sodium chloride 0.9%, acetaminophen, dextrose 10 % in water (D10W), dextrose 10%, fentaNYL, glucagon (human recombinant), heparin (PORCINE), heparin (PORCINE), hydrALAZINE, insulin aspart U-100, lorazepam, ondansetron     Review of patient's allergies indicates:  No Known Allergies  Objective:     Vital Signs (Most Recent):  Temp: 98.4 °F (36.9 °C) (05/18/22 0715)  Pulse: (!) 56 (05/18/22 0915)  Resp: (!) 25 (05/18/22 0915)  BP: (!) 137/52 (per art line) (05/17/22 1530)  SpO2: 96 % (05/18/22 0915)   Vital Signs (24h Range):  Temp:  [98.2 °F (36.8 °C)-99.5 °F (37.5 °C)] 98.4 °F (36.9 °C)  Pulse:  [56-93] 56  Resp:  [16-35] 25  SpO2:  [90 %-100 %] 96 %  BP: ()/(46-61) 137/52  Arterial Line BP: (126-192)/(46-79) 157/65     Weight: 109.4 kg (241 lb 2.9 oz)  Body mass index is 35.62 kg/m².    Intake/Output - Last 3 Shifts         05/16 0700  05/17 0659 05/17 0700 05/18 0659 05/18 0700  05/19 0659    I.V. (mL/kg) 1799.3 (16.4) 2397.6 (21.9) 426.5 (3.9)    Blood 375      IV Piggyback 376.5 198.7 40.1    TPN 1110.1 1047.9 107.9     Total Intake(mL/kg) 3660.9 (33.5) 3644.3 (33.3) 574.6 (5.3)    Urine (mL/kg/hr) 2162 (0.8) 2055 (0.8) 360 (1)    Drains 780 965 100    Stool 100 100     Total Output 3042 3120 460    Net +618.9 +524.3 +114.6                   Physical Exam  Vitals and nursing note reviewed.   Constitutional:       Appearance: He is obese. He is ill-appearing.   HENT:      Mouth/Throat:      Mouth: Mucous membranes are dry.   Eyes:      Extraocular Movements: Extraocular movements intact.   Cardiovascular:      Rate and Rhythm: Normal rate.   Pulmonary:      Comments: Diminished bilaterally. Poor inspiratory effort.  Abdominal:      Palpations: Abdomen is soft.      Comments: Midline incision with staples--no purulent drainage or erythema. Right-sided end ileostomy is red with some liquid stool output. ADDIS drain on right is serous; left-sided drains are murky brown.   Genitourinary:     Comments: Doherty in place  Musculoskeletal:      Right lower leg: Edema present.      Left lower leg: Edema present.   Neurological:      Comments: Awake and alert       Significant Labs:  I have reviewed all pertinent lab results within the past 24 hours.  CBC:   Recent Labs   Lab 05/18/22 0411   WBC 11.82   RBC 3.45*   HGB 7.7*   HCT 26.7*      MCV 77*   MCH 22.3*   MCHC 28.8*     CMP:   Recent Labs   Lab 05/18/22 0411   *   CALCIUM 8.2*   ALBUMIN 1.4*   PROT 4.6*   *   K 3.6   CO2 28      BUN 75*   CREATININE 2.1*   ALKPHOS 102   ALT 31   AST 33   BILITOT 1.5*     Coagulation:   Recent Labs   Lab 05/12/22  1451 05/12/22 2057 05/18/22  0411   LABPROT 11.9  --   --    INR 1.1  --   --    APTT 34.1*   < > 41.3*    < > = values in this interval not displayed.     ABGs:   Recent Labs   Lab 05/17/22  0944   PH 7.486*   PCO2 46.8*   PO2 105*   HCO3 35.4*   POCSATURATED 98   BE 12       Significant Diagnostics:  I have reviewed all pertinent imaging results/findings within the past 24 hours.    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  - Will hold off on tube feeds for an additional day due to risk of aspiration. Keep NG tube to LIS.  - Speech therapy  - Begin increasing activity  - Continue ICU management. On heparin and amio gtt for afib.  - Ostomy nurse consult  - Strict I/Os  - TPN  - Medical and ICU management per hospital/ICU team    Acute on chronic anemia  Management per medicine/critical care    Acute hypoxemic respiratory failure on mechanical vent  Management per medicine/critical care        Elvie Parker DO  General Surgery  [facility] - Intensive Care ([facility])

## 2022-05-18 NOTE — PHYSICIAN QUERY
"PT Name: Franky Masters  MR #: [id number]    DOCUMENTATION CLARIFICATION      CDS: Ashley COLINDRES RN  Contact information: [email or web address]      This form is a permanent document in the medical record.    Query Date: May 18, 2022    By submitting this query, we are merely seeking further clarification of documentation. Please utilize your independent clinical judgment when addressing the question(s) below.    The medical record contains the following:   Indicators   Supporting Clinical Findings Location in Medical Record   X "Pulmonary Edema" 5/14 - semi erect intubated on mech ventilation in no distress sedated on Precedex infusion.  Also still on Levophed, Amiodarone and Heparin infusions with TPN.  BP still labile and still in A-Fib w/ rate controlled.  Pulm edema pink froth today copious suctioned from OET.    Lasix/Albumin for anasarca/pulm edema X 4 doses completed PN Pulmonology 5/14/2022            PN Pulmonology 5/16/2022    Wheezing, Productive cough, SOB, LIM, Use of accessory muscles     X Radiology Findings Moderate mild bilateral pleural effusions and atelectasis/consolidation in both lung bases CT Abd Pelvis 5/15/2022    CHF documented/Respiratory Failure documented     X Respiratory condition Ms Masters is a 67 yo obese male with a PMH of diverticulosis and hx of hospitalization in Aug 2021 with COVID PNA and Hypoxic Resp Failure but did not require ICU or intubation.    Acute hypoxemic respiratory failure on mechanical vent PN Pulmonology 5/17/2022        PN Pulmonology 5/16/2022   X RR                  PaO2                  PaCO2                     O2 sat  05/14/22 04:59 05/15/22 04:59 05/16/22 03:21   POC PCO2 41.8 44.7 44.6   POC PO2 86 114 (H) 132 (H)   POC SATURATED O2 97 99 99    Labs 5/4-5/16   X Treatment: Lasix/Albumin for anasarca/pulm edema X 4 doses completed PN Pulmonology 5/16/2022    Supplemental O2:     X Oxygen dependence Awakening trials good and breathing adequately on " spontaneous.  Following commands but very weak.  Unable to lift his head off the pillow.  Also suctioning thick secretions through ET tube.  Not ready for extubation.  PN Hosp Med 5/15/2022    Other: Cr now 3.8- Oliguric- heading towards Anuria and ATN/ May need HD vs CRRT- she has massive fluid Overload. PN Hosp Med 5/15/2022       Provider, please specify diagnosis or diagnoses associated with above clinical findings.    [    ] Acute pulmonary edema, non-cardiac cause (please specify causative condition): ___________________   [    ] Acute pulmonary edema, unspecified cause   [    ] Acute and/on chronic pulmonary edema, non-cardiac cause (please specify causative condition): ___________________   [    ] Acute and/on chronic pulmonary edema, unspecified cause   [    ] Chronic pulmonary edema, non-cardiac cause (please specify causative condition): ___________________   [    ] Chronic pulmonary edema, unspecified cause   [    ] Other respiratory diagnosis (please specify): _________________________________    [   ] Clinically Undetermined     Present on admission (POA) status:  [   ] Yes (Y)                          [   ] Clinically Undetermined (W)  [   ] No (N)                            [   ] Documentation insufficient to determine if condition is POA (U)       Please document in your progress notes daily for the duration of treatment, until resolved, and include in your discharge summary.      PLEASE SEND TO MEDICINE OR CRITICAL CARE TEAM

## 2022-05-19 LAB
ALBUMIN SERPL BCP-MCNC: 1.4 G/DL (ref 3.5–5.2)
ALP SERPL-CCNC: 103 U/L (ref 55–135)
ALT SERPL W/O P-5'-P-CCNC: 32 U/L (ref 10–44)
ANION GAP SERPL CALC-SCNC: 13 MMOL/L (ref 8–16)
APTT BLDCRRT: 42 SEC (ref 21–32)
AST SERPL-CCNC: 35 U/L (ref 10–40)
BASOPHILS # BLD AUTO: 0.06 K/UL (ref 0–0.2)
BASOPHILS NFR BLD: 0.5 % (ref 0–1.9)
BILIRUB SERPL-MCNC: 1.6 MG/DL (ref 0.1–1)
BUN SERPL-MCNC: 62 MG/DL (ref 8–23)
CALCIUM SERPL-MCNC: 8.1 MG/DL (ref 8.7–10.5)
CHLORIDE SERPL-SCNC: 110 MMOL/L (ref 95–110)
CO2 SERPL-SCNC: 25 MMOL/L (ref 23–29)
CREAT SERPL-MCNC: 1.7 MG/DL (ref 0.5–1.4)
DIFFERENTIAL METHOD: ABNORMAL
EOSINOPHIL # BLD AUTO: 0.4 K/UL (ref 0–0.5)
EOSINOPHIL NFR BLD: 3.2 % (ref 0–8)
ERYTHROCYTE [DISTWIDTH] IN BLOOD BY AUTOMATED COUNT: 29.6 % (ref 11.5–14.5)
EST. GFR  (AFRICAN AMERICAN): 47 ML/MIN/1.73 M^2
EST. GFR  (NON AFRICAN AMERICAN): 41 ML/MIN/1.73 M^2
GLUCOSE SERPL-MCNC: 131 MG/DL (ref 70–110)
HCT VFR BLD AUTO: 27 % (ref 40–54)
HGB BLD-MCNC: 7.8 G/DL (ref 14–18)
IMM GRANULOCYTES # BLD AUTO: 0.27 K/UL (ref 0–0.04)
IMM GRANULOCYTES NFR BLD AUTO: 2.2 % (ref 0–0.5)
LYMPHOCYTES # BLD AUTO: 1.2 K/UL (ref 1–4.8)
LYMPHOCYTES NFR BLD: 9.6 % (ref 18–48)
MAGNESIUM SERPL-MCNC: 1.8 MG/DL (ref 1.6–2.6)
MCH RBC QN AUTO: 22.5 PG (ref 27–31)
MCHC RBC AUTO-ENTMCNC: 28.9 G/DL (ref 32–36)
MCV RBC AUTO: 78 FL (ref 82–98)
MONOCYTES # BLD AUTO: 0.9 K/UL (ref 0.3–1)
MONOCYTES NFR BLD: 7.6 % (ref 4–15)
NEUTROPHILS # BLD AUTO: 9.5 K/UL (ref 1.8–7.7)
NEUTROPHILS NFR BLD: 76.9 % (ref 38–73)
NRBC BLD-RTO: 0 /100 WBC
PHOSPHATE SERPL-MCNC: 3.6 MG/DL (ref 2.7–4.5)
PLATELET # BLD AUTO: 423 K/UL (ref 150–450)
PMV BLD AUTO: ABNORMAL FL (ref 9.2–12.9)
POCT GLUCOSE: 117 MG/DL (ref 70–110)
POCT GLUCOSE: 129 MG/DL (ref 70–110)
POCT GLUCOSE: 140 MG/DL (ref 70–110)
POCT GLUCOSE: 150 MG/DL (ref 70–110)
POTASSIUM SERPL-SCNC: 3.8 MMOL/L (ref 3.5–5.1)
PROT SERPL-MCNC: 4.8 G/DL (ref 6–8.4)
RBC # BLD AUTO: 3.46 M/UL (ref 4.6–6.2)
SODIUM SERPL-SCNC: 148 MMOL/L (ref 136–145)
WBC # BLD AUTO: 12.3 K/UL (ref 3.9–12.7)

## 2022-05-19 PROCEDURE — 97110 THERAPEUTIC EXERCISES: CPT

## 2022-05-19 PROCEDURE — 97530 THERAPEUTIC ACTIVITIES: CPT | Mod: CQ

## 2022-05-19 PROCEDURE — 25000003 PHARM REV CODE 250: Performed by: SURGERY

## 2022-05-19 PROCEDURE — 63600175 PHARM REV CODE 636 W HCPCS: Performed by: SURGERY

## 2022-05-19 PROCEDURE — 84100 ASSAY OF PHOSPHORUS: CPT | Performed by: SURGERY

## 2022-05-19 PROCEDURE — 63600175 PHARM REV CODE 636 W HCPCS: Performed by: NURSE PRACTITIONER

## 2022-05-19 PROCEDURE — 99232 SBSQ HOSP IP/OBS MODERATE 35: CPT | Mod: ,,, | Performed by: INTERNAL MEDICINE

## 2022-05-19 PROCEDURE — 99232 PR SUBSEQUENT HOSPITAL CARE,LEVL II: ICD-10-PCS | Mod: ,,, | Performed by: INTERNAL MEDICINE

## 2022-05-19 PROCEDURE — 99900035 HC TECH TIME PER 15 MIN (STAT)

## 2022-05-19 PROCEDURE — 97530 THERAPEUTIC ACTIVITIES: CPT

## 2022-05-19 PROCEDURE — 83735 ASSAY OF MAGNESIUM: CPT | Performed by: SURGERY

## 2022-05-19 PROCEDURE — 99291 PR CRITICAL CARE, E/M 30-74 MINUTES: ICD-10-PCS | Mod: ,,, | Performed by: NURSE PRACTITIONER

## 2022-05-19 PROCEDURE — 80053 COMPREHEN METABOLIC PANEL: CPT | Performed by: SURGERY

## 2022-05-19 PROCEDURE — 85730 THROMBOPLASTIN TIME PARTIAL: CPT | Performed by: NURSE PRACTITIONER

## 2022-05-19 PROCEDURE — 85025 COMPLETE CBC W/AUTO DIFF WBC: CPT | Performed by: SURGERY

## 2022-05-19 PROCEDURE — 27000221 HC OXYGEN, UP TO 24 HOURS

## 2022-05-19 PROCEDURE — A4217 STERILE WATER/SALINE, 500 ML: HCPCS | Performed by: SURGERY

## 2022-05-19 PROCEDURE — 20000000 HC ICU ROOM

## 2022-05-19 PROCEDURE — 99291 CRITICAL CARE FIRST HOUR: CPT | Mod: ,,, | Performed by: NURSE PRACTITIONER

## 2022-05-19 PROCEDURE — 94799 UNLISTED PULMONARY SVC/PX: CPT

## 2022-05-19 PROCEDURE — B4185 PARENTERAL SOL 10 GM LIPIDS: HCPCS | Performed by: SURGERY

## 2022-05-19 PROCEDURE — 97110 THERAPEUTIC EXERCISES: CPT | Mod: CQ

## 2022-05-19 PROCEDURE — 25000003 PHARM REV CODE 250: Performed by: NURSE PRACTITIONER

## 2022-05-19 RX ORDER — FAMOTIDINE 10 MG/ML
20 INJECTION INTRAVENOUS 2 TIMES DAILY
Status: DISCONTINUED | OUTPATIENT
Start: 2022-05-19 | End: 2022-05-20

## 2022-05-19 RX ADMIN — AMIODARONE HYDROCHLORIDE 0.5 MG/MIN: 1.8 INJECTION, SOLUTION INTRAVENOUS at 05:05

## 2022-05-19 RX ADMIN — ERTAPENEM 1 G: 1 INJECTION INTRAMUSCULAR; INTRAVENOUS at 09:05

## 2022-05-19 RX ADMIN — DEXTROSE: 5 SOLUTION INTRAVENOUS at 03:05

## 2022-05-19 RX ADMIN — FAMOTIDINE 20 MG: 10 INJECTION, SOLUTION INTRAVENOUS at 09:05

## 2022-05-19 RX ADMIN — DEXTROSE: 5 SOLUTION INTRAVENOUS at 08:05

## 2022-05-19 RX ADMIN — MAGNESIUM SULFATE HEPTAHYDRATE: 500 INJECTION, SOLUTION INTRAMUSCULAR; INTRAVENOUS at 05:05

## 2022-05-19 RX ADMIN — SMOFLIPID 250 ML: 6; 6; 5; 3 INJECTION, EMULSION INTRAVENOUS at 05:05

## 2022-05-19 RX ADMIN — FAMOTIDINE 20 MG: 10 INJECTION, SOLUTION INTRAVENOUS at 08:05

## 2022-05-19 RX ADMIN — MINERAL OIL, PETROLATUM: 425; 573 OINTMENT OPHTHALMIC at 09:05

## 2022-05-19 RX ADMIN — HEPARIN SODIUM 18 UNITS/KG/HR: 5000 INJECTION INTRAVENOUS; SUBCUTANEOUS at 03:05

## 2022-05-19 RX ADMIN — DEXTROSE: 5 SOLUTION INTRAVENOUS at 11:05

## 2022-05-19 NOTE — ASSESSMENT & PLAN NOTE
Extubated 5/17 on Vent day #14  Cont low flow NC O2  Patient declared on admit and family confirmed on extubation that if fails extubation there will be no elective re-intubation and will be transitioned to comfort care if no improvement with NPPV or HFNC  Patient is DNR  Oxygenating and ventilating well but remain concerned if airway protection and secretion mobility  Cont NT suction PRN  Aspiration precautions  Encourage TCDB, mobilization

## 2022-05-19 NOTE — ASSESSMENT & PLAN NOTE
New onset 5/6  on amiodarone and Heparin infusions until enteral route safe  Cont Cardiac monitoring

## 2022-05-19 NOTE — ASSESSMENT & PLAN NOTE
Secondary to Peritonitis from bowel perforation with major fecal contamination on admit  Blood and sputum cultures 5/4 Neg  S/P Zyvox and mycamine therapy  Continue Invanz per ID following  Weaned off Levophed infusion 5/15  Follow fever curve and WBC  Doherty and CL changed 5/18  Has Left Abd Abscess (IR placed drain 5/17) culture pending NGTD

## 2022-05-19 NOTE — SUBJECTIVE & OBJECTIVE
Review of Systems   Unable to perform ROSReason unable to perform ROS: minimal verbalization.   HENT:          Very weak phonation   Respiratory:  Positive for cough. Negative for shortness of breath.         Unable to expectorate sputum   Cardiovascular:  Negative for chest pain.     Objective:     Vital Signs (Most Recent):  Temp: 98 °F (36.7 °C) (05/19/22 1115)  Pulse: (!) 59 (05/19/22 1115)  Resp: (!) 26 (05/19/22 1115)  BP: (!) 162/72 (05/19/22 0800)  SpO2: 100 % (05/19/22 1115)   Vital Signs (24h Range):  Temp:  [97.6 °F (36.4 °C)-99.1 °F (37.3 °C)] 98 °F (36.7 °C)  Pulse:  [54-67] 59  Resp:  [19-37] 26  SpO2:  [97 %-100 %] 100 %  BP: (102-162)/(46-72) 162/72  Arterial Line BP: (143-173)/(53-72) 163/67     Weight: 113.2 kg (249 lb 9 oz)  Body mass index is 36.85 kg/m².      Intake/Output Summary (Last 24 hours) at 5/19/2022 1137  Last data filed at 5/19/2022 1100  Gross per 24 hour   Intake 4127.62 ml   Output 3685 ml   Net 442.62 ml        amiodarone in dextrose 5% 0.5 mg/min (05/19/22 1100)    dextrose 10 % in water (D10W)      dextrose 5 % 75 mL/hr at 05/19/22 1100    heparin (porcine) in D5W 18 Units/kg/hr (05/19/22 1100)    TPN ADULT CENTRAL LINE CUSTOM 36 mL/hr at 05/19/22 1100    TPN ADULT CENTRAL LINE CUSTOM         Physical Exam  Vitals and nursing note reviewed.   Constitutional:       General: He is not in acute distress.     Appearance: He is obese. He is ill-appearing.      Interventions: He is not intubated.Nasal cannula in place.   HENT:      Head: Atraumatic.   Eyes:      General: No scleral icterus.     Conjunctiva/sclera: Conjunctivae normal.   Neck:      Vascular: No JVD.   Cardiovascular:      Rate and Rhythm: Normal rate and regular rhythm.      Pulses:           Radial pulses are 1+ on the right side and 1+ on the left side.        Dorsalis pedis pulses are 1+ on the right side and 1+ on the left side.   Pulmonary:      Effort: No respiratory distress. He is not intubated.      Breath  sounds: Decreased breath sounds and rhonchi present. No wheezing.   Abdominal:      General: Bowel sounds are decreased.          Comments: Additional percutaneous drain with scant serous drainage   Musculoskeletal:      Right lower le+ Pitting Edema present.      Left lower le+ Pitting Edema present.   Skin:     General: Skin is warm and dry.      Capillary Refill: Capillary refill takes less than 2 seconds.   Neurological:      GCS: GCS eye subscore is 4. GCS verbal subscore is 4. GCS motor subscore is 6.       Vents:  Vent Mode: Spont (22 112)  Ventilator Initiated: Yes (22 1258)  Set Rate: 0 BPM (22)  Vt Set: 450 mL (22)  Pressure Support: 10 cmH20 (22)  PEEP/CPAP: 5 cmH20 (22)  Oxygen Concentration (%): 28 (22 0831)  Peak Airway Pressure: 16 cmH2O (22)  Plateau Pressure: 0 cmH20 (22)  Total Ve: 12.4 mL (22)  F/VT Ratio<105 (RSBI): (!) 52.73 (22)    Lines/Drains/Airways       Peripherally Inserted Central Catheter Line  Duration             PICC Double Lumen 22 0922 right basilic 1 day              Drain  Duration                  Ileostomy 22 Standard (Comfort, end) RUQ 8 days         Closed/Suction Drain 22 1735 LUQ Bulb 19 Fr. 7 days         Closed/Suction Drain 22 1735 RLQ Bulb 19 Fr. 7 days         NG/OG Tube 22 1715 [place] sump 18 Fr. Left nostril 7 days         Closed/Suction Drain 22 1412 LUQ Bulb 8.3 Fr. 1 day         Urethral Catheter 22 1315 Latex 16 Fr. <1 day              Arterial Line  Duration             Arterial Line 22 1330 Right Radial 11 days              Peripheral Intravenous Line  Duration                  Midline Catheter Insertion/Assessment  - Single Lumen 22 1730 Left basilic vein (medial side of arm) 6 days                    Significant Labs:    CBC/Anemia Profile:  Recent Labs   Lab 22  0411 22  0411   WBC  11.82 12.30   HGB 7.7* 7.8*   HCT 26.7* 27.0*    423   MCV 77* 78*   RDW 28.4* 29.6*          Chemistries:  Recent Labs   Lab 05/18/22 0411 05/19/22  0411   * 148*   K 3.6 3.8    110   CO2 28 25   BUN 75* 62*   CREATININE 2.1* 1.7*   CALCIUM 8.2* 8.1*   ALBUMIN 1.4* 1.4*   PROT 4.6* 4.8*   BILITOT 1.5* 1.6*   ALKPHOS 102 103   ALT 31 32   AST 33 35   MG 1.9 1.8   PHOS 2.7 3.6         All pertinent labs within the past 24 hours have been reviewed.    Significant Imaging:  I have reviewed all pertinent imaging results/findings within the past 24 hours.

## 2022-05-19 NOTE — PROGRESS NOTES
Pharmacist Renal Dose Adjustment Note    Franky Masters is a 67 y.o. male being treated with the medication Famotidine    Patient Data:    Vital Signs (Most Recent):  Temp: 98.9 °F (37.2 °C) (05/19/22 0315)  Pulse: (!) 56 (05/19/22 0745)  Resp: (!) 26 (05/19/22 0745)  BP: (!) 102/46 (05/19/22 0400)  SpO2: 100 % (05/19/22 0745)   Vital Signs (72h Range):  Temp:  [97.6 °F (36.4 °C)-99.5 °F (37.5 °C)]   Pulse:  []   Resp:  [14-37]   BP: ()/(46-69)   SpO2:  [87 %-100 %]   Arterial Line BP: ()/(44-89)      Recent Labs   Lab 05/17/22  0422 05/18/22  0411 05/19/22 0411   CREATININE 2.5* 2.1* 1.7*     Serum creatinine: 1.7 mg/dL (H) 05/19/22 0411  Estimated creatinine clearance: 51.6 mL/min (A)    Medication:Famotidine dose: 20mg frequency daily will be changed to medication:Famotidine dose:20mg frequency:bid    Pharmacist's Name: Emily Camacho  Pharmacist's Extension: [phone]

## 2022-05-19 NOTE — PLAN OF CARE
Pt tolerating 2L NC, Awake alert, oriented to self. PICC ling placed upper right arm, central line removed. New Doherty placed this afternoon. TPN continued per order. NG tube to low intermittent suction. Wound care consult place for DTI on sacrum. Amio gtt continued. SB-NSR monitor. Heparin gtt continued. Plan for possible trickle TF tomorrow.

## 2022-05-19 NOTE — PLAN OF CARE
Patient has Medicare Part A only with no secondary or supplemental insurance. This will cover inpatient hospitalization but limits post acute resources. Patient does not have coverage for any medications. Messaged [facility] to screen patient for medicaid for secondary insurance. Patient would like daughter to assist with medicaid application. Contacted daughter Claudia to advise of the info listed above. Discussed post acute discharge possibilities of LTAC vs SNF (per PT/OT recs) and which facility is covered under medicare benefit. Claudia states once her father is able to ambulate, he would like to go home instead of a SNF or LTAC. Advised without additional coverage, home health being covered under insurance is not an option as of now. Discharge plan depends of hospital progress and patient functionality. Claudia will assist with medicaid application.

## 2022-05-19 NOTE — CONSULTS
Recommendations for trickle feed:     Peptamen 1.5 with prebio trickle feeds:   @ 10mL/hr.  -Provides 360 calories, 16g protein, and 185mL H2O    Peptamen 1.5 with prebio trickle feeds:   @ 20mL/hr.   -720 calories, 32g protein, and 370mL H2O    Thanks,   Nanda Holman RD,LDN

## 2022-05-19 NOTE — PT/OT/SLP PROGRESS
"Physical Therapy  Treatment    Franky Masters   MRN: [id number]   Admitting Diagnosis: Severe sepsis    PT Received On: 05/19/22  PT Start Time: 1240     PT Stop Time: 1305    PT Total Time (min): 25 min       Billable Minutes:  Therapeutic Activity 15 and Therapeutic Exercise 10    Treatment Type: Treatment  PT/PTA: PTA     PTA Visit Number: 1       General Precautions: Standard, fall, respiratory  Orthopedic Precautions: N/A   Braces: N/A  Respiratory Status: Nasal cannula, flow 2 L/min         Subjective:  Communicated with patient's nurse, Louise, and completed Epic chart review prior to session.  Patient agreed to PT session with some encouragement.  "I want to go home."    Pain/Comfort  Pain Rating 1: 0/10  Pain Rating Post-Intervention 1: 0/10    Objective:   Patient found with: arterial line, blood pressure cuff, norwood catheter, ADDIS drain, NG tube, oxygen, pressure relief boots, peripheral IV, pulse ox (continuous), telemetry    Functional Mobility:  Planned to attempt supine> sit transition but did not attempt due to noting significant scrotal edema with potential for skin sheering.  Completed AROM TE to BLE x10 reps each: knee flexion/extension, hip flexion/extension/ ankle PF/DF, glute sets (attempted bridging but patient unable)    Supine scoot towards HOB:Total A of 2    Positioning of patient in upright position (chair position) with pillows under BUE to promote edema management.    Educated patient on importance of increased tolerance to upright position to promote CV endurance. Patient nodded in agreement.    AM-PAC 6 CLICK MOBILITY  How much help from another person does this patient currently need?   1 = Unable, Total/Dependent Assistance  2 = A lot, Maximum/Moderate Assistance  3 = A little, Minimum/Contact Guard/Supervision  4 = None, Modified [place]/Independent    Turning over in bed (including adjusting bedclothes, sheets and blankets)?: 2  Sitting down on and standing up from a chair with " arms (e.g., wheelchair, bedside commode, etc.): 1  Moving from lying on back to sitting on the side of the bed?: 2  Moving to and from a bed to a chair (including a wheelchair)?: 1  Need to walk in hospital room?: 1  Climbing 3-5 steps with a railing?: 1  Basic Mobility Total Score: 8    AM-PAC Raw Score CMS G-Code Modifier Level of Impairment Assistance   6 % Total / Unable   7 - 9 CM 80 - 100% Maximal Assist   10 - 14 CL 60 - 80% Moderate Assist   15 - 19 CK 40 - 60% Moderate Assist   20 - 22 CJ 20 - 40% Minimal Assist   23 CI 1-20% SBA / CGA   24 CH 0% Independent/ Mod I     Patient left with bed in chair position with all lines intact and call button in reach.    Assessment:  Franky Masters is a 67 y.o. male with a medical diagnosis of Severe sepsis and presents with overall decline in functional mobility. Patient would continue to benefit from skilled PT to address functional limitations listed below in order to return to PLOF/decrease caregiver burden.     Rehab identified problem list/impairments: Rehab identified problem list/impairments: weakness, impaired endurance, impaired sensation, impaired self care skills, impaired functional mobilty, impaired balance, gait instability, decreased coordination, decreased upper extremity function, decreased lower extremity function, decreased ROM, impaired coordination, edema, impaired cardiopulmonary response to activity    Rehab potential is fair.    Activity tolerance: Poor    Discharge recommendations: Discharge Facility/Level of Care Needs: nursing facility, skilled, LTACH (long-term acute care hospital) (SPOKE WITH [facility] PT WHO AGREED TO CHANGE IN D/C REC TO LTAC VS SNF)     Barriers to discharge:      Equipment recommendations: Equipment Needed After Discharge: other (see comments) (TBD)     GOALS:   Multidisciplinary Problems     Physical Therapy Goals        Problem: Physical Therapy    Goal Priority Disciplines Outcome Goal Variances Interventions    Physical Therapy Goal     PT, PT/OT      Description: LT22  1. PT WILL COMPLETE BED MOBILITY WITH MOD A  2. PT WILL STAND WITH RW AND MAX A FOR STAND PIVOT T/F TO CHAIR   3. PT WILL GT TRAIN X 25' WITH RW AND MAX A                   PLAN:    Patient to be seen 3 x/week  to address the above listed problems via gait training, therapeutic activities, therapeutic exercises  Plan of Care expires: 22  Plan of Care reviewed with: patient         2022

## 2022-05-19 NOTE — ASSESSMENT & PLAN NOTE
Pt is DNR, discussed at length with surgeon prior to surgery and he consented to intubation for surgery with understanding of likely need for prolonged vent support post op until bowel/abdomen closed. He was clear that he would not desire long term vent support past that necessary for immediate post op recovery.   Daughter Claudia is SDM and is aware and supportive of his wishes. IF at any point his survival chances become poor or prolonged life support is anticipated she would honor his wish and transition to comfort focused care.  5/6 - discussed status with daughter. She expressed again understanding of her father's wishes for very limited life support and further stated today that after time to reflect on current status, in the event of continued decline she would not want to pursue potential dialysis but if kidneys fail and indications for RRT exist she would at that time desire transition to a full comfort care focus. She would hope that he could be extubated to comfort focus and have opportunity to interact with family but verbalizes understanding that this may not be possible given open abdomen and comfort goal.  Continue daily and prn updates  5/19- now extubated but concern for airway protection; continue aggressive supportive care but NO REINTUBATION IF Fails to protect airway or decline

## 2022-05-19 NOTE — ASSESSMENT & PLAN NOTE
Received 2 units PRBCs in ED and 2 more in OR on 5/4  1 unit PRBC given 5/16  No active bleeding  Monitor closely with Heparin infusion for A-fib

## 2022-05-19 NOTE — PROGRESS NOTES
[facility] ([facility])  Nephrology  Progress Note    Patient Name: Franky Masters  MRN: [id number]  Admission Date: 5/4/2022  Hospital Length of Stay: 15 days  Attending Provider: Elvie Parker DO   Primary Care Physician: HOLLY ROSEN  Principal Problem:Severe sepsis    Consults  Subjective:     Interval History:     Patient was seen in the intensive care unit.  Extubated yesterday.  A family member was at the bedside.  All nephrology related questions were answered to their satisfaction.    Review of systems:  He shakes his head yes and no.  Denies feeling short of breath.  No chest discomfort.  No nausea vomiting.  No fevers or chills.  Still has quite a bit of swelling.    Review of patient's allergies indicates:  No Known Allergies  Current Facility-Administered Medications   Medication Frequency    0.9%  NaCl infusion PRN    acetaminophen suppository 650 mg Q6H PRN    amiodarone 360 mg/200 mL (1.8 mg/mL) infusion Continuous    dextrose 10 % infusion Continuous PRN    dextrose 10% bolus 125 mL PRN    dextrose 5 % infusion Continuous    ertapenem (INVANZ) 1 g in sodium chloride 0.9 % 100 mL IVPB (ready to mix system) Q24H    famotidine (PF) injection 20 mg BID    fentaNYL 50 mcg/mL injection 25 mcg Q4H PRN    glucagon (human recombinant) injection 1 mg PRN    heparin 25,000 units in dextrose 5% (100 units/ml) IV bolus from bag - ADDITIONAL PRN BOLUS - 30 units/kg PRN    heparin 25,000 units in dextrose 5% (100 units/ml) IV bolus from bag - ADDITIONAL PRN BOLUS - 60 units/kg PRN    heparin 25,000 units in dextrose 5% 250 mL (100 units/mL) infusion LOW INTENSITY nomogram - [facility] Continuous    hydrALAZINE injection 20 mg Q8H PRN    insulin aspart U-100 pen 1-10 Units Q4H PRN    lipid (SMOFLIPID) (SMOFLIPID) 20 % infusion 250 mL Daily    lorazepam injection 1 mg Q4H PRN    ondansetron injection 4 mg Q8H PRN    TPN ADULT CENTRAL LINE CUSTOM Continuous    TPN ADULT CENTRAL LINE  CUSTOM Continuous    white petrolatum-mineral oil 57.3-42.5% ophthalmic ointment QHS       Objective:     Vital Signs (Most Recent):  Temp: 97.6 °F (36.4 °C) (05/19/22 0715)  Pulse: (!) 57 (05/19/22 1100)  Resp: (!) 25 (05/19/22 1100)  BP: (!) 162/72 (05/19/22 0800)  SpO2: 100 % (05/19/22 1100)  O2 Device (Oxygen Therapy): nasal cannula (05/19/22 1100) Vital Signs (24h Range):  Temp:  [97.6 °F (36.4 °C)-99.1 °F (37.3 °C)] 97.6 °F (36.4 °C)  Pulse:  [54-67] 57  Resp:  [19-37] 25  SpO2:  [87 %-100 %] 100 %  BP: (102-162)/(46-72) 162/72  Arterial Line BP: (135-173)/(49-72) 159/65     Weight: 113.2 kg (249 lb 9 oz) (05/19/22 0500)  Body mass index is 36.85 kg/m².  Body surface area is 2.35 meters squared.    I/O last 3 completed shifts:  In: 6186.3 [I.V.:3847.7; IV Piggyback:646.5]  Out: 4850 [Urine:3335; Drains:1140; Stool:375]    Physical Exam  Constitutional:       Appearance: Normal appearance.   HENT:      Head: Normocephalic and atraumatic.      Mouth/Throat:      Comments: ET tube in place  Eyes:      General: No scleral icterus.     Extraocular Movements: Extraocular movements intact.      Pupils: Pupils are equal, round, and reactive to light.   Cardiovascular:      Rate and Rhythm: Normal rate and regular rhythm.   Pulmonary:      Effort: Pulmonary effort is normal.      Comments: Intubated  Musculoskeletal:      Right lower leg: No edema.      Left lower leg: No edema.   Skin:     General: Skin is warm and dry.   Neurological:      General: No focal deficit present.      Mental Status: He is alert.   Psychiatric:      Comments: Unable to evaluate.           Significant Labs:sure  BMP:   Recent Labs   Lab 05/19/22  0411   *   *   K 3.8      CO2 25   BUN 62*   CREATININE 1.7*   CALCIUM 8.1*   MG 1.8     CMP:   Recent Labs   Lab 05/19/22  0411   *   CALCIUM 8.1*   ALBUMIN 1.4*   PROT 4.8*   *   K 3.8   CO2 25      BUN 62*   CREATININE 1.7*   ALKPHOS 103   ALT 32   AST 35    BILITOT 1.6*     All labs within the past 24 hours have been reviewed.    Significant Imaging:  Labs: Reviewed  Reviewed    Assessment/Plan:     Active Diagnoses:    Diagnosis Date Noted POA    PRINCIPAL PROBLEM:  Severe sepsis secondary to Peritonitis from bowel perforation [A41.9, R65.20] 05/04/2022 Yes    Oropharyngeal dysphagia with overall muscle deconditioning and weakness [R13.12] 05/18/2022 No    Electrolyte imbalance [E87.8] 05/16/2022 No    Paroxysmal atrial fibrillation [I48.0] 05/06/2022 No    On mechanically assisted ventilation [Z99.11] 05/05/2022 Not Applicable    JOSE (acute kidney injury) [N17.9] 05/05/2022 Yes    Small bowel perforation with large Cecal mass  [K63.1] 05/04/2022 Yes    Mass of colon [K63.89] 05/04/2022 Yes    Hypoalbuminemia due to protein-calorie malnutrition [E88.09, E46] 08/06/2021 No    Acute on chronic anemia [D64.9] 08/05/2021 Yes    Acute hypoxemic respiratory failure  [J96.01] 08/05/2021 Yes    Obesity (BMI 30.0-34.9) [E66.9] 08/05/2021 Yes     Chronic      Problems Resolved During this Admission:    Diagnosis Date Noted Date Resolved POA    Hyperglycemia, unspecified [R73.9] 05/04/2022 05/11/2022 Yes    Pneumonia of left lower lobe due to infectious organism [J18.9] 05/04/2022 05/10/2022 Yes       1. Acute kidney injury:  Secondary to sepsis with ATN.  Creatinine continues to improve, down to 1.7 this morning.  Good urine output reported at approximately 2460 cc.     Baseline creatinine appears to run around 0.7.    2. Hypernatremia:  Sodium has improved, down to 148. Electrolyte free water clearance shows approximately 60% urine output to be free water.  Continues on D5W.    3. Potassium is stable at 3.8.     Approximate 25 minutes was spent in face-to-face conversation, examining the patient and chart review.        Patient is improving from a renal standpoint.  Will sign off.  Please recall if needed.    Thank you for your consult.     Jordan Renee,  MD  Nephrology  [facility] - [facility] ([facility])

## 2022-05-19 NOTE — PROGRESS NOTES
[facility] ([facility])  Critical Care Medicine  Progress Note    Patient Name: Franky Masters  MRN: [id number]  Admission Date: 5/4/2022  Hospital Length of Stay: 15 days  Code Status: DNR  Attending Provider: Elvie Parker DO  Primary Care Provider: HOLLY ROSEN   Principal Problem: Severe sepsis    Subjective:     HPI:  Ms Masters is a 67 yo obese male with a PMH of diverticulosis and hx of hospitalization in Aug 2021 with COVID PNA and Hypoxic Resp Failure but did not require ICU or intubation.  She presented early this AM to [facility] ED about 0515 hr via EMS with complaint of abd pain X 2 hours that awakened her from sleep and had associated N/V/D.  In ED BP 86/46, RA SAT 92%, LA 8, Hgb 7.2 and CT Abd with suspected bowel perforation and free air.  General Surgery consulted and taken to OR this AM revealing SB perf with 3 L feculent fluid and food in cavity and large obstructing cecal mass with perf ileum and palpable liver mass.  Had Expl Lap with washout and excision of SB with wound vac placement admitted post op to ICU intubated on mech ventilation.  Received 2 units PRBCs in ED and another 2 units in OR also on Levophed and Vasopressin infusions.  Before surgery patient was insistent he be DNR post op but consented to surgery and invasive mech ventilation but no ACLS post op in event of cardiac arrest and no prolonged mech ventilation. Reportedly patient does not routinely follow with practitioner as outpt.       Hospital/ICU Course:  5/4 - Admitted to ICU sedated and intubated on Levophed and Vasopressin infusions in no distress  5/5 - remains intubated and sedated on mechanical vent and pressor support; scant urine output overnight and creatinine rise to 2.2 with fluid balance +8.9L  5/6 - remains intubated and sedated on mechanical vent with decreasing pressor demand; still oliguric with creatinine up to 3.6, K 5.2; fluid balance +13L; now with bigeminy and cardiac rhythm changes, K  stable on abg but iCa 0.78  5/7 - remains intubated and sedated with open abdomen to abthera wound vac and pressor support; overnight atrial fib with RVR, now on amio infusion with SR 68 on monitor; plan to OR for washout and vac replacement today  5/8 - remains intubated, sedated with ab thera wound vac to open abdomen, mechanical ventilation, and 2 pressor support. SR on monitor, on amiodarone infusion. Tmax 100.2, wbc down at 12.3k, creatinine rising 4.6,urine output scant, K 5.1  5/9 - remains intubated and sedated with ab thera wound vac to open abdomen, mech vent, and 2 pressor support. Remains SR on monitor with amio infusion. Tmax 98.9, wbc down 10.6k, creatinine holding at 4.7, K 4.6 after lasix trial with 1.2L urinary output  5/10 - remains intubated and sedated with ab thera wound vac to open abdomen, mech vent, on pressor support. Atrial fib on monitor, rate 90s with occasional non sustained elevation. Urine output adequate since lasix trial but creatinine, K, BUN unchanged and remains 22L positive fluid status since admit  5/11 - return from OR late this evening after ABD WASHOUT, RT HEMICOLECTOMY, ILEOSTOMY, LIVER BIOPSY, AND ABD WALL CLOSURE. Remains atrial fib 80-100s on monitor on levophed support.  5/12 - semi erect in bed intubated on mech ventilation in no distress still in A-fib rate 113 bpm sedated heavily still on Fentanyl infusion.  Also still on Amiodarone and Levophed infusions.  Receiving TPN.  S/P abd washout and closure yesterday.    5/13 - semi erect in bed intubated on mech ventilation and sedated in no distress on Precedex, Heparin, Amiodarone, Fentanyl and Levophed infusions.  Also on TPN.  BP labile on pressor.  Still in A-Fib rate 113 bpm.  5/14 - semi erect intubated on mech ventilation in no distress sedated on Precedex infusion.  Also still on Levophed, Amiodarone and Heparin infusions with TPN.  BP still labile and still in A-Fib w/ rate controlled.  Pulm edema pink froth today  copious suctioned from OET.   5/15 - still lethargic off sedation and intubated on mech ventilation in no distress tolerating SBT but very weak unable to lift head off pillow.  Had N/V TF overnight and NG output to suction 320 ml overnight green bile.  Weaned off Levophed infusion at 0300 hr this AM.  Still on TPN as well as Amiodarone and Heparin infusions.   5/16 - Supine in bed off sedation awakens with stimuli and follows simple commands but slowly and still lethargic, intubated on mech ventilation.  Still on TPN, Heparin infusion and Amiodarone infusion.  Hgb drop to 6.9 with PRBC transfusion pending.  Tolerated press support ventilation overnight.  Had conversion to SB at 50 bpm overnight and Lopressor stopped now back in A-fib at 108 bpm.  350 ml NG output overnight and 1 liter bile dumped in colostomy overnight.   5/17 - Sleepy this AM on vent post receiving Fentanyl IVP overnight still tolerating SBT.  Still on Amiodarone, Heparin and TPN infusions.    5/18 - Extubated yesterday and tolerating fairly well. Still very weak but slightly more alert today also has very weak cough with poor secretion mobility.  Still on Amiodarone, Heparin and TPN infusions.  125 ml NG output overnight.  IR placed drain to Abd abscess yesterday with initial 480 ml removed and 30 ml output overnight.    5/19 - remains extubated on NC oxygen support. Cough very weak but per bedside RN, is improved from yesterday. Very weak phonation.      Review of Systems   Unable to perform ROSReason unable to perform ROS: minimal verbalization.   HENT:          Very weak phonation   Respiratory:  Positive for cough. Negative for shortness of breath.         Unable to expectorate sputum   Cardiovascular:  Negative for chest pain.     Objective:     Vital Signs (Most Recent):  Temp: 98 °F (36.7 °C) (05/19/22 1115)  Pulse: (!) 59 (05/19/22 1115)  Resp: (!) 26 (05/19/22 1115)  BP: (!) 162/72 (05/19/22 0800)  SpO2: 100 % (05/19/22 1115)   Vital  Signs (24h Range):  Temp:  [97.6 °F (36.4 °C)-99.1 °F (37.3 °C)] 98 °F (36.7 °C)  Pulse:  [54-67] 59  Resp:  [19-37] 26  SpO2:  [97 %-100 %] 100 %  BP: (102-162)/(46-72) 162/72  Arterial Line BP: (143-173)/(53-72) 163/67     Weight: 113.2 kg (249 lb 9 oz)  Body mass index is 36.85 kg/m².      Intake/Output Summary (Last 24 hours) at 2022 1137  Last data filed at 2022 1100  Gross per 24 hour   Intake 4127.62 ml   Output 3685 ml   Net 442.62 ml        amiodarone in dextrose 5% 0.5 mg/min (22 1100)    dextrose 10 % in water (D10W)      dextrose 5 % 75 mL/hr at 22 1100    heparin (porcine) in D5W 18 Units/kg/hr (22 1100)    TPN ADULT CENTRAL LINE CUSTOM 36 mL/hr at 22 1100    TPN ADULT CENTRAL LINE CUSTOM         Physical Exam  Vitals and nursing note reviewed.   Constitutional:       General: He is not in acute distress.     Appearance: He is obese. He is ill-appearing.      Interventions: He is not intubated.Nasal cannula in place.   HENT:      Head: Atraumatic.   Eyes:      General: No scleral icterus.     Conjunctiva/sclera: Conjunctivae normal.   Neck:      Vascular: No JVD.   Cardiovascular:      Rate and Rhythm: Normal rate and regular rhythm.      Pulses:           Radial pulses are 1+ on the right side and 1+ on the left side.        Dorsalis pedis pulses are 1+ on the right side and 1+ on the left side.   Pulmonary:      Effort: No respiratory distress. He is not intubated.      Breath sounds: Decreased breath sounds and rhonchi present. No wheezing.   Abdominal:      General: Bowel sounds are decreased.          Comments: Additional percutaneous drain with scant serous drainage   Musculoskeletal:      Right lower le+ Pitting Edema present.      Left lower le+ Pitting Edema present.   Skin:     General: Skin is warm and dry.      Capillary Refill: Capillary refill takes less than 2 seconds.   Neurological:      GCS: GCS eye subscore is 4. GCS verbal subscore is  4. GCS motor subscore is 6.       Vents:  Vent Mode: Spont (05/17/22 1128)  Ventilator Initiated: Yes (05/04/22 1258)  Set Rate: 0 BPM (05/17/22 1128)  Vt Set: 450 mL (05/17/22 1128)  Pressure Support: 10 cmH20 (05/17/22 1128)  PEEP/CPAP: 5 cmH20 (05/17/22 1128)  Oxygen Concentration (%): 28 (05/19/22 0831)  Peak Airway Pressure: 16 cmH2O (05/17/22 1128)  Plateau Pressure: 0 cmH20 (05/17/22 1128)  Total Ve: 12.4 mL (05/17/22 1128)  F/VT Ratio<105 (RSBI): (!) 52.73 (05/17/22 1128)    Lines/Drains/Airways       Peripherally Inserted Central Catheter Line  Duration             PICC Double Lumen 05/18/22 0922 right basilic 1 day              Drain  Duration                  Ileostomy 05/11/22 Standard (Comfort, end) RUQ 8 days         Closed/Suction Drain 05/11/22 1735 LUQ Bulb 19 Fr. 7 days         Closed/Suction Drain 05/11/22 1735 RLQ Bulb 19 Fr. 7 days         NG/OG Tube 05/11/22 1715 [place]on sump 18 Fr. Left nostril 7 days         Closed/Suction Drain 05/17/22 1412 LUQ Bulb 8.3 Fr. 1 day         Urethral Catheter 05/18/22 1315 Latex 16 Fr. <1 day              Arterial Line  Duration             Arterial Line 05/07/22 1330 Right Radial 11 days              Peripheral Intravenous Line  Duration                  Midline Catheter Insertion/Assessment  - Single Lumen 05/12/22 1730 Left basilic vein (medial side of arm) 6 days                    Significant Labs:    CBC/Anemia Profile:  Recent Labs   Lab 05/18/22  0411 05/19/22  0411   WBC 11.82 12.30   HGB 7.7* 7.8*   HCT 26.7* 27.0*    423   MCV 77* 78*   RDW 28.4* 29.6*          Chemistries:  Recent Labs   Lab 05/18/22  0411 05/19/22  0411   * 148*   K 3.6 3.8    110   CO2 28 25   BUN 75* 62*   CREATININE 2.1* 1.7*   CALCIUM 8.2* 8.1*   ALBUMIN 1.4* 1.4*   PROT 4.6* 4.8*   BILITOT 1.5* 1.6*   ALKPHOS 102 103   ALT 31 32   AST 33 35   MG 1.9 1.8   PHOS 2.7 3.6         All pertinent labs within the past 24 hours have been reviewed.    Significant  Imaging:  I have reviewed all pertinent imaging results/findings within the past 24 hours.      ABG  Recent Labs   Lab 05/17/22  0944   PH 7.486*   PO2 105*   PCO2 46.8*   HCO3 35.4*   BE 12     Assessment/Plan:     Pulmonary  Acute hypoxemic respiratory failure   Extubated 5/17 on Vent day #14  Cont low flow NC O2  Patient declared on admit and family confirmed on extubation that if fails extubation there will be no elective re-intubation and will be transitioned to comfort care if no improvement with NPPV or HFNC  Patient is DNR  Oxygenating and ventilating well but remain concerned if airway protection and secretion mobility  Cont NT suction PRN  Aspiration precautions  Encourage TCDB, mobilization       Cardiac/Vascular  Paroxysmal atrial fibrillation  New onset 5/6  on amiodarone and Heparin infusions until enteral route safe  Cont Cardiac monitoring    Renal/  Electrolyte imbalance  Cont D5W  Replace K+  Monitor chemistry    JOSE (acute kidney injury)  S/t septic shock  Adequate volume resuscitation +18 L I/O balance  Avoid nephrotoxins  Strict I/O  Nephrology following  Good UOP and creatinine slowly improving daily after Lasix/Albumin for anasarca/pulm edema X 4 doses 5/16    ID  * Severe sepsis secondary to Peritonitis from bowel perforation  Secondary to Peritonitis from bowel perforation with major fecal contamination on admit  Blood and sputum cultures 5/4 Neg  S/P Zyvox and mycamine therapy  Continue Invanz per ID following  Weaned off Levophed infusion 5/15  Follow fever curve and WBC  Doherty and CL changed 5/18  Has Left Abd Abscess (IR placed drain 5/17) culture pending NGTD    Oncology  Acute on chronic anemia  Received 2 units PRBCs in ED and 2 more in OR on 5/4  1 unit PRBC given 5/16  No active bleeding  Monitor closely with Heparin infusion for A-fib     Endocrine  Obesity (BMI 30.0-34.9)  Will encourage weight loss once fully awake/alert and stronger    GI  Oropharyngeal dysphagia with overall  muscle deconditioning and weakness  NPO  ST/PT/OT following  PRN NT suctioning    Mass of colon  Found on exploration along with palpable liver mass  Sent for pathology 5/11 results still pending  High concern for metastatic malignancy    Small bowel perforation with large Cecal mass   5/4 Expl Lap and SB excision with washout and AB thera wound vac for bowel left in discontinuity  5/7 washout and wound vac replacement  hx Diverticulosis but cecal mass and suspected metastatic lesions found in exploration  5/11 ABD WASHOUT, RT HEMICOLECTOMY, ILEOSTOMY, LIVER BIOPSY, AND ABD WALL CLOSURE  CT Abd 5/46: Loculated fluid density along the left upper abdomen and anterior left mid abdomen may reflect infectious hemorrhagic or seroma fluid collection  IR placed drain to LUQ Abd abscess 5/18  trickle TF stopped 5/15 due to N/V & high NG   Cont TPN today but will repeat trial of TF per RD recs    Palliative Care  Pt is DNR, discussed at length with surgeon prior to surgery and he consented to intubation for surgery with understanding of likely need for prolonged vent support post op until bowel/abdomen closed. He was clear that he would not desire long term vent support past that necessary for immediate post op recovery.   Daughter Claudia is SDM and is aware and supportive of his wishes. IF at any point his survival chances become poor or prolonged life support is anticipated she would honor his wish and transition to comfort focused care.  5/6 - discussed status with daughter. She expressed again understanding of her father's wishes for very limited life support and further stated today that after time to reflect on current status, in the event of continued decline she would not want to pursue potential dialysis but if kidneys fail and indications for RRT exist she would at that time desire transition to a full comfort care focus. She would hope that he could be extubated to comfort focus and have opportunity to interact  with family but verbalizes understanding that this may not be possible given open abdomen and comfort goal.  Continue daily and prn updates  5/19- now extubated but concern for airway protection; continue aggressive supportive care but NO REINTUBATION IF Fails to protect airway or decline    Other  Hypoalbuminemia due to protein-calorie malnutrition  Cont TPN  Re-attempt trickle TF today     Critical Care Daily Checklist:    A: Awake: RASS Goal/Actual Goal: RASS Goal: 0-->alert and calm  Actual: German Agitation Sedation Scale (RASS): Alert and calm   B: Spontaneous Breathing Trial Performed? Spon. Breathing Trial Initiated?: Initiated (05/16/22 0737)   C: SAT & SBT Coordinated?  Extubated on 5/17                     D: Delirium: CAM-ICU Overall CAM-ICU: Positive   E: Early Mobility Performed? Yes   F: Feeding Goal: Goals: 1. Parenteral Nutrition Initiation within 24 hours.  Status: Nutrition Goal Status: new   Current Diet Order   Procedures    Diet NPO      AS: Analgesia/Sedation Prn analgesia   T: Thromboembolic Prophylaxis heparin   H: HOB > 300 Yes   U: Stress Ulcer Prophylaxis (if needed) pepcid   G: Glucose Control As above   B: Bowel Function Stool Occurrence:  (small ostomy output)   I: Indwelling Catheter (Lines & Doherty) Necessity reviewed   D: De-escalation of Antimicrobials/Pharmacotherapies reviewed    Plan for the day/ETD Supportive care as above    Code Status:  Family/Goals of Care: DNR  Goal home on discharge pending therapy needs   I have discussed case and plan of care in detail with Dr Huynh; Status and plan of care were discussed with team on multidisciplinary rounds.    Critical Care Time: 40 minutes  Critical secondary to hypoxemic respiratory failure  Critical care was time spent personally by me on the following activities: development of treatment plan with patient or surrogate and bedside caregivers, discussions with consultants, evaluation of patient's response to treatment,  examination of patient, ordering and performing treatments and interventions, ordering and review of laboratory studies, ordering and review of radiographic studies, pulse oximetry, re-evaluation of patient's condition. This critical care time did not overlap with that of any other provider or involve time for any procedures.     KATHARINE Miranda-BC  Critical Care Medicine  [facility] - [facility] ([facility])

## 2022-05-19 NOTE — PLAN OF CARE
POC reviewed with pt. Pt has had no significant events over night. Pts cough continues to increase in strength and minimal NT suctioning required this shift. O2 sats holding and no issues noted. Also less PVCs noted compared to day shift and no episodes of afib noted. Pt allowed turns this shift and was turned q2h. Report to be given tod vanessa shift RN who will assume care.

## 2022-05-19 NOTE — ASSESSMENT & PLAN NOTE
Found on exploration along with palpable liver mass  Sent for pathology 5/11 results still pending  High concern for metastatic malignancy   no

## 2022-05-19 NOTE — ASSESSMENT & PLAN NOTE
5/4 Expl Lap and SB excision with washout and AB thera wound vac for bowel left in discontinuity  5/7 washout and wound vac replacement  hx Diverticulosis but cecal mass and suspected metastatic lesions found in exploration  5/11 ABD WASHOUT, RT HEMICOLECTOMY, ILEOSTOMY, LIVER BIOPSY, AND ABD WALL CLOSURE  CT Abd 5/46: Loculated fluid density along the left upper abdomen and anterior left mid abdomen may reflect infectious hemorrhagic or seroma fluid collection  IR placed drain to LUQ Abd abscess 5/18  trickle TF stopped 5/15 due to N/V & high NG   Cont TPN today but will repeat trial of TF per RD recs

## 2022-05-19 NOTE — ASSESSMENT & PLAN NOTE
S/t septic shock  Adequate volume resuscitation +18 L I/O balance  Avoid nephrotoxins  Strict I/O  Nephrology following  Good UOP and creatinine slowly improving daily after Lasix/Albumin for anasarca/pulm edema X 4 doses 5/16

## 2022-05-20 LAB
ALBUMIN SERPL BCP-MCNC: 1.3 G/DL (ref 3.5–5.2)
ALP SERPL-CCNC: 108 U/L (ref 55–135)
ALT SERPL W/O P-5'-P-CCNC: 31 U/L (ref 10–44)
ANION GAP SERPL CALC-SCNC: 10 MMOL/L (ref 8–16)
APTT BLDCRRT: 45.7 SEC (ref 21–32)
AST SERPL-CCNC: 37 U/L (ref 10–40)
BASOPHILS # BLD AUTO: 0.1 K/UL (ref 0–0.2)
BASOPHILS NFR BLD: 0.9 % (ref 0–1.9)
BILIRUB SERPL-MCNC: 1.3 MG/DL (ref 0.1–1)
BUN SERPL-MCNC: 50 MG/DL (ref 8–23)
CALCIUM SERPL-MCNC: 7.9 MG/DL (ref 8.7–10.5)
CHLORIDE SERPL-SCNC: 109 MMOL/L (ref 95–110)
CO2 SERPL-SCNC: 26 MMOL/L (ref 23–29)
CREAT SERPL-MCNC: 1.5 MG/DL (ref 0.5–1.4)
DIFFERENTIAL METHOD: ABNORMAL
EOSINOPHIL # BLD AUTO: 0.4 K/UL (ref 0–0.5)
EOSINOPHIL NFR BLD: 3 % (ref 0–8)
ERYTHROCYTE [DISTWIDTH] IN BLOOD BY AUTOMATED COUNT: 29.9 % (ref 11.5–14.5)
EST. GFR  (AFRICAN AMERICAN): 55 ML/MIN/1.73 M^2
EST. GFR  (NON AFRICAN AMERICAN): 47 ML/MIN/1.73 M^2
GLUCOSE SERPL-MCNC: 127 MG/DL (ref 70–110)
HCT VFR BLD AUTO: 26.8 % (ref 40–54)
HGB BLD-MCNC: 7.7 G/DL (ref 14–18)
IMM GRANULOCYTES # BLD AUTO: 0.24 K/UL (ref 0–0.04)
IMM GRANULOCYTES NFR BLD AUTO: 2.1 % (ref 0–0.5)
LYMPHOCYTES # BLD AUTO: 1.3 K/UL (ref 1–4.8)
LYMPHOCYTES NFR BLD: 11.1 % (ref 18–48)
MAGNESIUM SERPL-MCNC: 1.7 MG/DL (ref 1.6–2.6)
MCH RBC QN AUTO: 22.8 PG (ref 27–31)
MCHC RBC AUTO-ENTMCNC: 28.7 G/DL (ref 32–36)
MCV RBC AUTO: 79 FL (ref 82–98)
MONOCYTES # BLD AUTO: 1 K/UL (ref 0.3–1)
MONOCYTES NFR BLD: 8.5 % (ref 4–15)
NEUTROPHILS # BLD AUTO: 8.7 K/UL (ref 1.8–7.7)
NEUTROPHILS NFR BLD: 74.4 % (ref 38–73)
NRBC BLD-RTO: 0 /100 WBC
PHOSPHATE SERPL-MCNC: 3.5 MG/DL (ref 2.7–4.5)
PLATELET # BLD AUTO: 439 K/UL (ref 150–450)
PMV BLD AUTO: ABNORMAL FL (ref 9.2–12.9)
POCT GLUCOSE: 104 MG/DL (ref 70–110)
POCT GLUCOSE: 117 MG/DL (ref 70–110)
POCT GLUCOSE: 120 MG/DL (ref 70–110)
POCT GLUCOSE: 120 MG/DL (ref 70–110)
POCT GLUCOSE: 131 MG/DL (ref 70–110)
POTASSIUM SERPL-SCNC: 3.8 MMOL/L (ref 3.5–5.1)
PROT SERPL-MCNC: 4.7 G/DL (ref 6–8.4)
RBC # BLD AUTO: 3.38 M/UL (ref 4.6–6.2)
SODIUM SERPL-SCNC: 145 MMOL/L (ref 136–145)
WBC # BLD AUTO: 11.67 K/UL (ref 3.9–12.7)

## 2022-05-20 PROCEDURE — A4217 STERILE WATER/SALINE, 500 ML: HCPCS | Performed by: NURSE PRACTITIONER

## 2022-05-20 PROCEDURE — 25000003 PHARM REV CODE 250: Performed by: NURSE PRACTITIONER

## 2022-05-20 PROCEDURE — 85730 THROMBOPLASTIN TIME PARTIAL: CPT | Performed by: NURSE PRACTITIONER

## 2022-05-20 PROCEDURE — 83735 ASSAY OF MAGNESIUM: CPT | Performed by: SURGERY

## 2022-05-20 PROCEDURE — 92610 EVALUATE SWALLOWING FUNCTION: CPT

## 2022-05-20 PROCEDURE — 21400001 HC TELEMETRY ROOM

## 2022-05-20 PROCEDURE — 63600175 PHARM REV CODE 636 W HCPCS: Performed by: SURGERY

## 2022-05-20 PROCEDURE — 63600175 PHARM REV CODE 636 W HCPCS: Performed by: NURSE PRACTITIONER

## 2022-05-20 PROCEDURE — 85025 COMPLETE CBC W/AUTO DIFF WBC: CPT | Performed by: SURGERY

## 2022-05-20 PROCEDURE — 99233 PR SUBSEQUENT HOSPITAL CARE,LEVL III: ICD-10-PCS | Mod: ,,, | Performed by: NURSE PRACTITIONER

## 2022-05-20 PROCEDURE — 80053 COMPREHEN METABOLIC PANEL: CPT | Performed by: SURGERY

## 2022-05-20 PROCEDURE — 99900035 HC TECH TIME PER 15 MIN (STAT)

## 2022-05-20 PROCEDURE — 25000003 PHARM REV CODE 250: Performed by: SURGERY

## 2022-05-20 PROCEDURE — 84100 ASSAY OF PHOSPHORUS: CPT | Performed by: SURGERY

## 2022-05-20 PROCEDURE — 99233 SBSQ HOSP IP/OBS HIGH 50: CPT | Mod: ,,, | Performed by: NURSE PRACTITIONER

## 2022-05-20 PROCEDURE — 97530 THERAPEUTIC ACTIVITIES: CPT | Mod: CQ

## 2022-05-20 PROCEDURE — 11000001 HC ACUTE MED/SURG PRIVATE ROOM

## 2022-05-20 PROCEDURE — 27000221 HC OXYGEN, UP TO 24 HOURS

## 2022-05-20 PROCEDURE — 97530 THERAPEUTIC ACTIVITIES: CPT

## 2022-05-20 PROCEDURE — 97112 NEUROMUSCULAR REEDUCATION: CPT | Mod: CQ

## 2022-05-20 RX ORDER — MAGNESIUM SULFATE HEPTAHYDRATE 40 MG/ML
2 INJECTION, SOLUTION INTRAVENOUS ONCE
Status: COMPLETED | OUTPATIENT
Start: 2022-05-20 | End: 2022-05-20

## 2022-05-20 RX ORDER — ASCORBIC ACID 500 MG
500 TABLET ORAL 2 TIMES DAILY
Status: DISCONTINUED | OUTPATIENT
Start: 2022-05-20 | End: 2022-05-24

## 2022-05-20 RX ORDER — POTASSIUM CHLORIDE 29.8 MG/ML
40 INJECTION INTRAVENOUS ONCE
Status: COMPLETED | OUTPATIENT
Start: 2022-05-20 | End: 2022-05-20

## 2022-05-20 RX ORDER — AMIODARONE HYDROCHLORIDE 200 MG/1
200 TABLET ORAL 2 TIMES DAILY
Status: DISCONTINUED | OUTPATIENT
Start: 2022-05-20 | End: 2022-05-24

## 2022-05-20 RX ORDER — AMIODARONE HYDROCHLORIDE 200 MG/1
200 TABLET ORAL 2 TIMES DAILY
Status: DISCONTINUED | OUTPATIENT
Start: 2022-05-20 | End: 2022-05-20

## 2022-05-20 RX ORDER — FAMOTIDINE 40 MG/5ML
40 POWDER, FOR SUSPENSION ORAL DAILY
Status: DISCONTINUED | OUTPATIENT
Start: 2022-05-21 | End: 2022-05-24

## 2022-05-20 RX ADMIN — DEXTROSE: 5 SOLUTION INTRAVENOUS at 06:05

## 2022-05-20 RX ADMIN — APIXABAN 5 MG: 2.5 TABLET, FILM COATED ORAL at 10:05

## 2022-05-20 RX ADMIN — AMIODARONE HYDROCHLORIDE 200 MG: 200 TABLET ORAL at 09:05

## 2022-05-20 RX ADMIN — POTASSIUM CHLORIDE 40 MEQ: 29.8 INJECTION, SOLUTION INTRAVENOUS at 11:05

## 2022-05-20 RX ADMIN — AMIODARONE HYDROCHLORIDE 0.5 MG/MIN: 1.8 INJECTION, SOLUTION INTRAVENOUS at 04:05

## 2022-05-20 RX ADMIN — AMIODARONE HYDROCHLORIDE 200 MG: 200 TABLET ORAL at 10:05

## 2022-05-20 RX ADMIN — APIXABAN 5 MG: 2.5 TABLET, FILM COATED ORAL at 09:05

## 2022-05-20 RX ADMIN — HEPARIN SODIUM 18 UNITS/KG/HR: 5000 INJECTION INTRAVENOUS; SUBCUTANEOUS at 09:05

## 2022-05-20 RX ADMIN — DEXTROSE: 5 SOLUTION INTRAVENOUS at 01:05

## 2022-05-20 RX ADMIN — MAGNESIUM SULFATE HEPTAHYDRATE: 500 INJECTION, SOLUTION INTRAMUSCULAR; INTRAVENOUS at 06:05

## 2022-05-20 RX ADMIN — MINERAL OIL, PETROLATUM: 425; 573 OINTMENT OPHTHALMIC at 10:05

## 2022-05-20 RX ADMIN — FAMOTIDINE 20 MG: 10 INJECTION, SOLUTION INTRAVENOUS at 09:05

## 2022-05-20 RX ADMIN — MAGNESIUM SULFATE 2 G: 2 INJECTION INTRAVENOUS at 07:05

## 2022-05-20 RX ADMIN — OXYCODONE HYDROCHLORIDE AND ACETAMINOPHEN 500 MG: 500 TABLET ORAL at 09:05

## 2022-05-20 RX ADMIN — DEXTROSE: 5 SOLUTION INTRAVENOUS at 09:05

## 2022-05-20 NOTE — PROGRESS NOTES
[facility] - Intensive Care ([facility])  General Surgery  Progress Note    Subjective:     History of Present Illness:  No notes on file    Post-Op Info:  Procedure(s) (LRB):  CREATION, ILEOSTOMY (N/A)  HEMICOLECTOMY, RIGHT (N/A)  BIOPSY, LIVER (Right)   9 Days Post-Op     Interval History: Awake and alert. Getting stronger gradually. Denies pain or nausea.    Medications:  Continuous Infusions:   dextrose 10 % in water (D10W)      dextrose 5 % 75 mL/hr at 05/20/22 1400    TPN ADULT CENTRAL LINE CUSTOM 18 mL/hr at 05/20/22 1400    TPN ADULT CENTRAL LINE CUSTOM       Scheduled Meds:   amiodarone  200 mg Per NG tube BID    apixaban  5 mg Per NG tube BID    [START ON 5/21/2022] famotidine  40 mg Per NG tube Daily    white petrolatum-mineral oil 57.3-42.5%   Both Eyes QHS     PRN Meds:sodium chloride 0.9%, acetaminophen, dextrose 10 % in water (D10W), dextrose 10%, glucagon (human recombinant), hydrALAZINE, insulin aspart U-100, ondansetron     Review of patient's allergies indicates:  No Known Allergies  Objective:     Vital Signs (Most Recent):  Temp: 98.4 °F (36.9 °C) (05/20/22 1115)  Pulse: 62 (05/20/22 1345)  Resp: (!) 28 (05/20/22 1345)  BP: (!) 129/59 (05/20/22 1300)  SpO2: (!) 94 % (05/20/22 1345)   Vital Signs (24h Range):  Temp:  [98.3 °F (36.8 °C)-99 °F (37.2 °C)] 98.4 °F (36.9 °C)  Pulse:  [52-69] 62  Resp:  [17-33] 28  SpO2:  [91 %-100 %] 94 %  BP: ()/(41-69) 129/59  Arterial Line BP: (145-175)/(56-68) 148/62     Weight: 112.2 kg (247 lb 5.7 oz)  Body mass index is 36.53 kg/m².    Intake/Output - Last 3 Shifts         05/18 0700  05/19 0659 05/19 0700 05/20 0659 05/20 0700 05/21 0659    I.V. (mL/kg) 2559.1 (22.6) 2562.9 (22.8) 818.4 (7.3)    NG/GT  160 105    IV Piggyback 547.8 99.5 64.7    TPN 1083.6 1080.5 225.9    Total Intake(mL/kg) 4190.4 (37) 3902.8 (34.8) 1213.9 (10.8)    Urine (mL/kg/hr) 2460 (0.9) 2560 (1) 685 (0.9)    Drains 705 535 240    Stool 300 1225     Total Output 3465 4320 925     Net +725.4 -417.2 +288.9                   Physical Exam  Vitals and nursing note reviewed.   Constitutional:       Appearance: He is obese.   HENT:      Mouth/Throat:      Mouth: Mucous membranes are dry.   Eyes:      Extraocular Movements: Extraocular movements intact.   Cardiovascular:      Rate and Rhythm: Normal rate.   Pulmonary:      Comments: Diminished bilaterally. Poor inspiratory effort.  Abdominal:      Palpations: Abdomen is soft.      Comments: Midline incision with staples--no purulent drainage or erythema. Right-sided end ileostomy is red with some liquid stool output. ADDIS drain on right is serous; left-sided drains are murky brown.   Genitourinary:     Comments: Doherty in place  Musculoskeletal:      Right lower leg: Edema present.      Left lower leg: Edema present.   Neurological:      Comments: Awake and alert       Significant Labs:  I have reviewed all pertinent lab results within the past 24 hours.  CBC:   Recent Labs   Lab 05/20/22  0408   WBC 11.67   RBC 3.38*   HGB 7.7*   HCT 26.8*      MCV 79*   MCH 22.8*   MCHC 28.7*     CMP:   Recent Labs   Lab 05/20/22  0408   *   CALCIUM 7.9*   ALBUMIN 1.3*   PROT 4.7*      K 3.8   CO2 26      BUN 50*   CREATININE 1.5*   ALKPHOS 108   ALT 31   AST 37   BILITOT 1.3*       Significant Diagnostics:  I have reviewed all pertinent imaging results/findings within the past 24 hours.    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 Advance slowly to goal.  - Monitor drain and ileostomy output.  - Speech therapy  - Begin increasing activity  - Continue ICU management.  - Ostomy nurse consult  - Strict I/Os  - TPN  - Medical and ICU management per hospital/ICU team    Acute on chronic anemia  Management per medicine/critical care    Acute hypoxemic respiratory failure   Management per medicine/critical care        Elvie Parker, DO  General Surgery  [facility] - Intensive Care ([facility])

## 2022-05-20 NOTE — PLAN OF CARE
Received to room 545 from ICU. HOB elevated 30 degrees. Repositioned for comfort and pressure reduction.NGT w/ tube feeds in progress, tolerating. TPN infusing. Patient awake, alert, not oriented to place or situation. Family at bedside. Will monitor.

## 2022-05-20 NOTE — ASSESSMENT & PLAN NOTE
Secondary to Peritonitis from bowel perforation with major fecal contamination on admit  Blood and sputum cultures 5/4 Neg  S/P Zyvox and mycamine therapy  Invanz 15d course completed on 5/19  Weaned off Levophed infusion 5/15  Follow fever curve and WBC  Doherty and CL changed 5/18  Has Left Abd Abscess (IR placed drain 5/17) culture pending NGTD

## 2022-05-20 NOTE — ASSESSMENT & PLAN NOTE
15 May:  No longer on vasopressor.  Weaning sedation for awakening trials.  Left side intra-abdominal fluid collection/abscess draining into ADDIS.  17 May:  Additional drain placed.  Extubated.  18 May:  Remaining stable and off ventilator.  Starting tube feedings soon.  19 May:  Purulent drainage form two left side drains and right side drain serous.  Starting tube feedings at low rate.

## 2022-05-20 NOTE — ASSESSMENT & PLAN NOTE
Wean as tolerated  CC Team  Pulmonary    Cont vent support    Expect further improvement with diuresis    Cont Vent support  Day 7    Day 8- will start weaning vent soon    Extubated 5/18

## 2022-05-20 NOTE — SUBJECTIVE & OBJECTIVE
Interval History: Awake and alert. Getting stronger gradually. Denies pain or nausea.    Medications:  Continuous Infusions:   dextrose 10 % in water (D10W)      dextrose 5 % 75 mL/hr at 05/20/22 1400    TPN ADULT CENTRAL LINE CUSTOM 18 mL/hr at 05/20/22 1400    TPN ADULT CENTRAL LINE CUSTOM       Scheduled Meds:   amiodarone  200 mg Per NG tube BID    apixaban  5 mg Per NG tube BID    [START ON 5/21/2022] famotidine  40 mg Per NG tube Daily    white petrolatum-mineral oil 57.3-42.5%   Both Eyes QHS     PRN Meds:sodium chloride 0.9%, acetaminophen, dextrose 10 % in water (D10W), dextrose 10%, glucagon (human recombinant), hydrALAZINE, insulin aspart U-100, ondansetron     Review of patient's allergies indicates:  No Known Allergies  Objective:     Vital Signs (Most Recent):  Temp: 98.4 °F (36.9 °C) (05/20/22 1115)  Pulse: 62 (05/20/22 1345)  Resp: (!) 28 (05/20/22 1345)  BP: (!) 129/59 (05/20/22 1300)  SpO2: (!) 94 % (05/20/22 1345)   Vital Signs (24h Range):  Temp:  [98.3 °F (36.8 °C)-99 °F (37.2 °C)] 98.4 °F (36.9 °C)  Pulse:  [52-69] 62  Resp:  [17-33] 28  SpO2:  [91 %-100 %] 94 %  BP: ()/(41-69) 129/59  Arterial Line BP: (145-175)/(56-68) 148/62     Weight: 112.2 kg (247 lb 5.7 oz)  Body mass index is 36.53 kg/m².    Intake/Output - Last 3 Shifts         05/18 0700 05/19 0659 05/19 0700 05/20 0659 05/20 0700  05/21 0659    I.V. (mL/kg) 2559.1 (22.6) 2562.9 (22.8) 818.4 (7.3)    NG/GT  160 105    IV Piggyback 547.8 99.5 64.7    TPN 1083.6 1080.5 225.9    Total Intake(mL/kg) 4190.4 (37) 3902.8 (34.8) 1213.9 (10.8)    Urine (mL/kg/hr) 2460 (0.9) 2560 (1) 685 (0.9)    Drains 705 535 240    Stool 300 1225     Total Output 3465 4320 925    Net +725.4 -417.2 +288.9                   Physical Exam  Vitals and nursing note reviewed.   Constitutional:       Appearance: He is obese.   HENT:      Mouth/Throat:      Mouth: Mucous membranes are dry.   Eyes:      Extraocular Movements: Extraocular movements intact.    Cardiovascular:      Rate and Rhythm: Normal rate.   Pulmonary:      Comments: Diminished bilaterally. Poor inspiratory effort.  Abdominal:      Palpations: Abdomen is soft.      Comments: Midline incision with staples--no purulent drainage or erythema. Right-sided end ileostomy is red with some liquid stool output. ADDIS drain on right is serous; left-sided drains are murky brown.   Genitourinary:     Comments: Doherty in place  Musculoskeletal:      Right lower leg: Edema present.      Left lower leg: Edema present.   Neurological:      Comments: Awake and alert       Significant Labs:  I have reviewed all pertinent lab results within the past 24 hours.  CBC:   Recent Labs   Lab 05/20/22  0408   WBC 11.67   RBC 3.38*   HGB 7.7*   HCT 26.8*      MCV 79*   MCH 22.8*   MCHC 28.7*     CMP:   Recent Labs   Lab 05/20/22  0408   *   CALCIUM 7.9*   ALBUMIN 1.3*   PROT 4.7*      K 3.8   CO2 26      BUN 50*   CREATININE 1.5*   ALKPHOS 108   ALT 31   AST 37   BILITOT 1.3*       Significant Diagnostics:  I have reviewed all pertinent imaging results/findings within the past 24 hours.

## 2022-05-20 NOTE — ASSESSMENT & PLAN NOTE
Patient with Hypoxic Respiratory failure which is Acute.  he is not on home oxygen. Supplemental oxygen was provided and noted- Oxygen Concentration (%):  [21-28] 28.   Signs/symptoms of respiratory failure include- tachypnea. Contributing diagnoses includes - Obesity Hypoventilation Labs and images were reviewed. Patient Has recent ABG, which has been reviewed. Will treat underlying causes and adjust management of respiratory failure as indicated. Pulmonary CC on board  Day 2 on Vent- remains intubated on vent  Cont vent support    Day 4 on Vent    Day 5 on vent- adequate O2, sats    Day 6- continue supportive care, await family's decision about comfort care    Day 7- continue support- trying to diurese     Day 8- minimal vent support- start weaning soon    15 May:  SAT/SBT as tolerated  17 May:  Successfully extubated.    Remains on RA, 94-98%

## 2022-05-20 NOTE — ASSESSMENT & PLAN NOTE
15 May:  No longer on vasopressor.  Weaning sedation for awakening trials.  Left side intra-abdominal fluid collection/abscess draining into ADDIS.  17 May:  Additional drain placed.  Extubated.  18 May:  Remaining stable and off ventilator.  Starting tube feedings soon.  19 May:  Purulent drainage form two left side drains and right side drain serous.  Starting tube feedings at low rate.  5/20- s/p POD 9 s/p R hemicolectomy and Ileostomy- s/p large Abdominal abscess drainage by IR on 5/18 with over 500 cc pus drained- all Cx remain NGTD.

## 2022-05-20 NOTE — PT/OT/SLP PROGRESS
Occupational Therapy   Treatment    Name: Franky Masters  MRN: [id number]  Admitting Diagnosis:  Severe sepsis  9 Days Post-Op    Recommendations:     Discharge Recommendations: nursing facility, skilled  Discharge Equipment Recommendations:   (TBD)  Barriers to discharge:  None    Assessment:     Franky Masters is a 67 y.o. male with a medical diagnosis of Severe sepsis.  He presents with the following performance deficits affecting function are weakness, impaired endurance, impaired self care skills, impaired functional mobilty, impaired balance, decreased upper extremity function, decreased lower extremity function, pain, impaired fine motor, impaired coordination, decreased ROM, impaired skin, edema, impaired cardiopulmonary response to activity.     Rehab Prognosis:  Good and Fair; patient would benefit from acute skilled OT services to address these deficits and reach maximum level of function.       Plan:     Patient to be seen 2 x/week to address the above listed problems via self-care/home management, therapeutic activities, therapeutic exercises  · Plan of Care Expires: 06/01/22  · Plan of Care Reviewed with: patient, family    Subjective     Pain/Comfort:  · Pain Rating 1:  (grimacing with movement that resolves with rest)  · Pain Addressed 1:  (activity pacing)    Objective:     Communicated with: NurseLouise, prior to session.  Patient found supine with colostomy, norwood catheter, ADDIS drain, NG tube, blood pressure cuff, pressure relief boots, peripheral IV, PICC line, pulse ox (continuous), telemetry upon OT entry to room.    General Precautions: Standard, fall, respiratory, NPO   Orthopedic Precautions:N/A   Braces: N/A  Respiratory Status: Room air    Bed Mobility:    · Patient completed Supine to Sit with total assistance and 2 persons  · Patient completed Sit to Supine with total assistance and 2 persons     Functional Mobility/Transfers:  · Not appropriate for functional transfer at this  time    [facility] 6 Click ADL: 6    Treatment & Education:  Patient tolerated treatment fair this date, noted to have rapid fatigue with EOB sitting. Transitioned to EOB with increased time and cueing for technique to allow for maximal patient engagement. Sat EOB x10 mins initially requiring max A, but improved to CGA with decreased time. Using B UE for support on EOB. Due to height of bed, unable to place B feet on floor. Patient remains with significant edema to B UE, therefore, continued with pillow propping in supine. Placed patient in chair position using bed with education on rationale.    Patient left with bed in chair position with all lines intact, call button in reach, bed alarm on and family presentEducation:      GOALS:   Multidisciplinary Problems     Occupational Therapy Goals        Problem: Occupational Therapy    Goal Priority Disciplines Outcome Interventions   Occupational Therapy Goal     OT, PT/OT Ongoing, Progressing    Description: Goals to be met by: 6/1/22     Patient will increase functional independence with ADLs by performing:    Sitting at edge of bed x15 minutes with Stand-by Assistance.  Supine to sit with Minimal Assistance.  Increased functional strength grossly by 1/2 MM grade.                     Time Tracking:     OT Date of Treatment: 05/20/22  OT Start Time: 0940  OT Stop Time: 1005  OT Total Time (min): 25 min    Billable Minutes:Therapeutic Activity 25    5/20/2022

## 2022-05-20 NOTE — PT/OT/SLP PROGRESS
"Occupational Therapy  Treatment    Franky Masters   MRN: [id number]   Admitting Diagnosis: Severe sepsis    OT Date of Treatment: 05/19/22   OT Start Time: 1240  OT Stop Time: 1305  OT Total Time (min): 25 min    Billable Minutes:  Therapeutic Activity 10 minutes and Therapeutic Exercise 15 minutes    OT/KRYSTAL: OT          General Precautions: Standard, fall, respiratory  Orthopedic Precautions: N/A  Braces: N/A         Subjective:  Communicated with nurse and epic chart review prior to session.  Pain/Comfort  Pain Rating 1: 0/10    Objective:  Patient found with: arterial line, blood pressure cuff, norwood catheter, ADDIS drain, NG tube, oxygen, pressure relief boots, peripheral IV, pulse ox (continuous)     Functional Mobility:  Bed Mobility:  Total a x 2 with scooting supine   Total x 2 with bridging     Transfers:     na  Functional Ambulation: na    Activities of Daily Living:  Total a with le dressing  Total a with toileting    Balance:     Therapeutic Activities and Exercises:*    AM-PAC 6 CLICK ADL   How much help from another person does this patient currently need?   1 = Unable, Total/Dependent Assistance  2 = A lot, Maximum/Moderate Assistance  3 = A little, Minimum/Contact Guard/Supervision  4 = None, Modified [place]/Independent    Putting on and taking off regular lower body clothing? : 1  Bathing (including washing, rinsing, drying)?: 1  Toileting, which includes using toilet, bedpan, or urinal? : 1  Putting on and taking off regular upper body clothing?: 1  Taking care of personal grooming such as brushing teeth?: 1  Eating meals?: 1  Daily Activity Total Score: 6     AM-PAC Raw Score CMS "G-Code Modifier Level of Impairment Assistance   6 % Total / Unable   7 - 8 CM 80 - 100% Maximal Assist   9-13 CL 60 - 80% Moderate Assist   14 - 19 CK 40 - 60% Moderate Assist   20 - 22 CJ 20 - 40% Minimal Assist   23 CI 1-20% SBA / CGA   24 CH 0% Independent/ Mod I       Patient left HOB elevated with all " lines intact and nurse notified    ASSESSMENT:  Franky Masters is a 67 y.o. male with a medical diagnosis of Severe sepsis and presents with debility and generalized weakness.    Rehab identified problem list/impairments: Rehab identified problem list/impairments: weakness, impaired sensation, impaired functional mobilty, impaired balance, decreased upper extremity function, decreased safety awareness, impaired endurance, impaired self care skills, gait instability, decreased lower extremity function, decreased ROM    Rehab potential is good.    Activity tolerance: Good    Discharge recommendations: Discharge Facility/Level of Care Needs: nursing facility, skilled     Barriers to discharge:      Equipment recommendations:  (tbd)     GOALS:   Multidisciplinary Problems     Occupational Therapy Goals        Problem: Occupational Therapy    Goal Priority Disciplines Outcome Interventions   Occupational Therapy Goal     OT, PT/OT     Description: Goals to be met by: 6/1/22     Patient will increase functional independence with ADLs by performing:    Sitting at edge of bed x15 minutes with Stand-by Assistance.  Supine to sit with Minimal Assistance.  Increased functional strength grossly by 1/2 MM grade.                     Plan:  Patient to be seen 2 x/week to address the above listed problems via self-care/home management, therapeutic activities, therapeutic exercises  Plan of Care expires: 06/01/22  Plan of Care reviewed with: patient, daughter         05/19/2022

## 2022-05-20 NOTE — PROGRESS NOTES
[facility] - Int[facility] Care ([facility] Medicine  Progress Note    Patient Name: Franky Masters  MRN: [id number]  Patient Class: IP- Inpatient   Admission Date: 5/4/2022  Length of Stay: 15 days  Attending Physician: Elvie Parker DO  Primary Care Provider: HOLLY ROSEN        Subjective:     Principal Problem:Severe sepsis        HPI:  Mr Masters is a 67 yo male POD#0 s/p SB perforation with surgical intervention demonstrating a large cecal mass and 3l feculant fluid in the abdominal cavity. Additional findings of a perforated ileum and a liver mass. Patient tolerated the Exlap with Washout and small bowel excision. Patient had a wound vac placed, is currently intubated, sedated and recovering in the ICU. Patient received 4 units PRBC and remains on pressor support. Patient is a DNR and HM consulted with assistance with medical management. Daughter at bedside. Patient discussed with care team.          Overview/Hospital Course:  Patient continues to require pressors, remains intubated, sedated. Patient urine o/p declining and concerning. Discussed POC in detail at bedside with daughter POA. She expressed her fathers wish to not undergo treatments  like perm HD, where he has a diminished quality of life. We spoke frankly about issues and concerns and she will be communicating with the family as his case evolves. Comfort care was discussed and the goals of care will develop consistent with the patients expressed wishes. Potential for another surgery likely Saturday with a washout and possible biopsy vs resection are on the horizon. This possible intervention will be covered in detail by surgery sharing the risks and benefits of that approach. Case discussed with the primary service - surgery, and critical care team.    5/6- Pt seen and examined in the ICU plus discussed with ICU team and the pt's daughter/ POA: Remains critically ill, intubated, sedated on Vent, on Pressors- Levo plus Vaso- JOSE with  oliguria getting worse- Cr rising to 3.6, becoming severely acidotic- requiring Ertapenem, Zyvox, Micafungin as well as on Bicarb and Fentanyl gtt. He has massive fluid overload and generalized anasarca.  Possible return to OR tomorrow 5/7 if patient is more stable for right hemicolectomy, possible ileostomy, liver biopsy, abdominal wall closure. Dw Pt's daughter.       5/7- remains Intubated, sedated on Vent- Went into Afib w RVR- hence added Amio to Levo and Vasopressin- back in NSR. Getting Invanz and Zyvox plus Micafungin. Dr. Parker took him back to OR for for abdominal washout ( 3-3.5 L feculent fluid with food particles suctioned out in the OR, small bowel appeared better) and replaced Abthera- still has bowel discontinuity. His WBC, Lactate and Cr have all increased. Family updated about his critical condition and poor prognosis and JOSE/ATN- they are considering Comfort measures.     5/8- Day 5 on Vent- family at bedside, remains intubated on Vent with 2 Pressors- still on Amiodarone gtt for Afib, Still has Abthera wound vac to open Abdomen with small bowel discontinuity. Remains oliguric with generalized anasarca- almost 24 L fluid positive. Cr increased to 4.6. WBC down to 12, family does not want any HD/CRRT, so getting an IV Lasix trial to improve the urine output.     5/9- Day 6 on vent- remains the same, remains intubated, on vent with Abthera Wound vac and bowel discontinuity. Put out about 3 L urine since yesterday with IV Lasix, family still does not want any HD, WBC down to 10.2, H/H 7.8/24, still on 2 Pressors and Amio gtt. Family still deciding about comfort care.     5/10- Appreciate all- Day 7- remains same in septic shock on 2 vasopressors, intubated and sedated, still in afib. JOSE has remained stable at 4.7 but urine output improved with IV lasix. Abthera wound vac in place. No surgery today- put out about 2.5 L yesterday, given lasix again today.    5/11- Seen Pre op- looks better, less  swollen, remains intubated, sedated on Vent, on 1 Pressor- on Levophed. Going for OR today for Laparotomy and washout and abd wound closure. Good response to Lasix. Still Afib on Amiodarone gtt. Bun/Cr 98/4.4, WBC down to 17, H/H 8.6/26.     5/12- Day 8 on Vent- looks much better, remains on Vent with Levophed and Amio gtts. Had extensive surgery yesterday and did very well post op- Reopening of recent Laparotomy, Abdominal washout, R Hemicolectomy with Liver Bx, TAP block, Abdominal wall closure, Creation, end Ileostomy. POD 1- looks better, still on Levophed and Amio gtt for Afib, started on TPN, ID stopped Zyvox and continued Merrem/Mycafungin.     5/17 - Successfully extubated.  Additional left side drain placed by Interventional Radiology.    5/18 - Purulent drainage from left side drains.  Remained stable.      Interval History:  Remains weak but improved.  Remained hemodynamically and respiratory stable since extubation yesterday.  Starting low rate tube feedings.      Review of Systems   Unable to perform ROS: Acuity of condition   Objective:     Vital Signs (Most Recent):  Temp: 99 °F (37.2 °C) (05/19/22 2301)  Pulse: 61 (05/19/22 2301)  Resp: (!) 22 (05/19/22 2301)  BP: (!) 131/49 (05/19/22 2301)  SpO2: 100 % (05/19/22 2301)   Vital Signs (24h Range):  Temp:  [97.6 °F (36.4 °C)-99 °F (37.2 °C)] 99 °F (37.2 °C)  Pulse:  [52-69] 61  Resp:  [18-37] 22  SpO2:  [91 %-100 %] 100 %  BP: (102-162)/(46-72) 131/49  Arterial Line BP: (143-175)/(55-72) 148/62     Weight: 113.2 kg (249 lb 9 oz)  Body mass index is 36.85 kg/m².    Intake/Output Summary (Last 24 hours) at 5/19/2022 2308  Last data filed at 5/19/2022 2200  Gross per 24 hour   Intake 3629.93 ml   Output 3905 ml   Net -275.07 ml        Physical Exam  Vitals and nursing note reviewed.   Constitutional:       General: He is not in acute distress.     Appearance: He is well-developed. He is obese. He is ill-appearing. He is not toxic-appearing or diaphoretic.       Interventions: He is sedated, intubated and restrained.   HENT:      Head: Atraumatic.      Nose:        Mouth/Throat:      Mouth: Mucous membranes are moist.   Eyes:      General: No scleral icterus.     Conjunctiva/sclera: Conjunctivae normal.      Pupils: Pupils are equal, round, and reactive to light.   Neck:      Vascular: No JVD.     Cardiovascular:      Rate and Rhythm: Tachycardia present. Rhythm irregular.      Pulses:           Radial pulses are 1+ on the right side and 1+ on the left side.        Dorsalis pedis pulses are 1+ on the right side and 1+ on the left side.      Heart sounds: Heart sounds are distant.   Pulmonary:      Effort: No accessory muscle usage or respiratory distress. He is intubated.      Breath sounds: Decreased breath sounds present. No wheezing or rhonchi.   Chest:      Chest wall: No deformity or tenderness.   Abdominal:      General: Bowel sounds are absent. There is no distension.          Comments: Right side ADDIS drain with serous fluid.  Two drains on left side with grey purulent fluid more thin than yesterday.   Genitourinary:     Comments: Doherty in place  Musculoskeletal:      Cervical back: Neck supple.      Right lower leg: 3+ Pitting Edema present.      Left lower leg: 3+ Pitting Edema present.      Right foot: No deformity.      Left foot: No deformity.   Lymphadenopathy:      Cervical: No cervical adenopathy.   Skin:     General: Skin is warm.      Capillary Refill: Capillary refill takes 2 to 3 seconds.          Neurological:      Comments: Weak and fatigued but moves all four extremities spontaneously and to command.     Flow (L/min): 2  Oxygen Concentration (%):  [21-28] 21  Temp:  [97.6 °F (36.4 °C)-99 °F (37.2 °C)] 99 °F (37.2 °C)  Pulse:  [52-69] 61  Resp:  [18-37] 22  SpO2:  [91 %-100 %] 100 %  BP: (102-162)/(46-72) 131/49  Arterial Line BP: (143-175)/(55-72) 148/62      Lab Results   Component Value Date    [id number] Negative 05/11/2022    [id number]  Negative 05/04/2022    [id number] Positive (A) 08/05/2021        Recent Labs   Lab 05/17/22  0422 05/18/22  0411 05/19/22  0411   * 150* 148*   WBC 13.92* 11.82 12.30   GRAN 77.8*  10.8* 76.4*  9.0* 76.9*  9.5*   LYMPH 9.2*  1.3 10.3*  1.2 9.6*  1.2   HGB 7.9* 7.7* 7.8*   HCT 27.3* 26.7* 27.0*   BUN 84* 75* 62*   CREATININE 2.5* 2.1* 1.7*   ESTGFRAFRICA 29* 36* 47*   EGFRNONAA 25* 31* 41*   K 3.8 3.6 3.8    110 110   CO2 32* 28 25   PHOS 3.7 2.7 3.6   MG 1.8 1.9 1.8       Microbiology Results (last 7 days)       Procedure Component Value Units Date/Time    Aerobic culture [[id number]] Collected: 05/17/22 1400    Order Status: Completed Specimen: Abscess from Abdomen Updated: 05/19/22 0752     Aerobic Bacterial Culture No growth    Culture, Anaerobe [[id number]] Collected: 05/17/22 1400    Order Status: Completed Specimen: Abscess from Abdomen Updated: 05/18/22 0753     Anaerobic Culture Culture in progress    Gram stain [[id number]] Collected: 05/17/22 1400    Order Status: Completed Specimen: Abscess from Abdomen Updated: 05/18/22 0001     Gram Stain Result No WBC's      No organisms seen          Antibiotics (From admission, onward)                None          Anticoagulants       Ordered     Route Frequency Start Stop    05/12/22 1408  heparin (PORCINE)  (LOW INTENSITY heparin infusion nomogram - [facility] (Recommended Indications: Acute Coronary Syndrome, Atrial Fibrillation, Prophylaxis in Prosthetic Heart Valves, and other indication where full anticoagulation is desired, bleeding risk is moderate, antiplatelet agents have been utilized, and time to therapeutic can be delayed minimizing the increased bleeding risk))         IV As needed (PRN) 05/12/22 1508 --    05/12/22 1408  heparin (PORCINE)  (LOW INTENSITY heparin infusion nomogram - [facility] (Recommended Indications: Acute Coronary Syndrome, Atrial Fibrillation, Prophylaxis in Prosthetic Heart Valves, and other indication where full anticoagulation  is desired, bleeding risk is moderate, antiplatelet agents have been utilized, and time to therapeutic can be delayed minimizing the increased bleeding risk))         IV As needed (PRN) 05/12/22 1508 --    05/12/22 1408  heparin (porcine) in D5W  (LOW INTENSITY heparin infusion nomogram - [facility] (Recommended Indications: Acute Coronary Syndrome, Atrial Fibrillation, Prophylaxis in Prosthetic Heart Valves, and other indication where full anticoagulation is desired, bleeding risk is moderate, antiplatelet agents have been utilized, and time to therapeutic can be delayed minimizing the increased bleeding risk))         IV Continuous 05/12/22 1415 --          X-Ray Chest 1 View  Narrative: EXAMINATION:  XR CHEST 1 VIEW    CLINICAL HISTORY:  aspiration;    TECHNIQUE:  Single frontal view of the chest was performed.    FINDINGS:  Tubes and lines are stable. ECG monitoring leads overlie the chest.  Stable    nodular right hilar opacity.  Stable volume left pleural fluid, likely with superimposed consolidation.  The right lung demonstrates mild basilar atelectatic change or infiltrate.  No pneumothorax.  Cardiomediastinal silhouette appears enlarged but stable.  No acute osseous abnormality.  In comparison to the prior study, there is no adverse interval changes.  Impression: In comparison to the prior study, there is no adverse interval changes    Electronically signed by: Philippe Horton MD  Date:    05/19/2022  Time:    08:11   Significant Labs: All pertinent labs within the past 24 hours have been reviewed.    Significant Imaging: I have reviewed all pertinent imaging results/findings within the past 24 hours.      Assessment/Plan:      * Severe sepsis secondary to Peritonitis from bowel perforation  15 May:  No longer on vasopressor.  Weaning sedation for awakening trials.  Left side intra-abdominal fluid collection/abscess draining into ADDIS.  17 May:  Additional drain placed.  Extubated.  18 May:  Remaining stable and off  ventilator.  Starting tube feedings soon.  19 May:  Purulent drainage form two left side drains and right side drain serous.  Starting tube feedings at low rate.    Paroxysmal atrial fibrillation  Converted to NSR with Amio gtt    Cont Amio gtt          JOSE (acute kidney injury)  Worsening  Trend  Cr 2.2 today    Cr now 3.8- Oliguric- heading towards Anuria and ATN/ May need HD vs CRRT- she has massive fluid Overload.     Cr now 4.5-  Remains anuric  POA/ Family not in favor of HD    Remains Oliguric due to Septic Shock on 2 Pressors  Some urine output with lasix but no Polyuria     5/10 Urine Out put improved with diuresis while renal functions remains stable    Improving, Cr coming down, good urine output    On mechanically assisted ventilation  Wean as tolerated  CC Team  Pulmonary    Cont vent support    Expect further improvement with diuresis    Cont Vent support  Day 7    Day 8- will start weaning vent soon      Mass of colon  Based on Surgery  Potential interventions  Goals of care  Biopsy as indicated  Likely Oncologic process with mets  Heme Onc as indicated    See above    Possible resection    resected    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 Parker- pt too unstable for right hemicolectomy, end ileostomy, liver biopsy today s/p abdominal washout with Abthera replacement today.  5/8- POD 3 with s/p Laparotomy and bowel resection and subsequent laparoscopic washout- persistent bowel discontinuity and abthera wound vac closure   5/9- persistent bowel discontinuity- await further decision by family    Await further input from surgery    Await surgery today- going for closure today    S/p- Reopening of recent Laparotomy, Abdominal washout, R Hemicolectomy with Liver Bx, TAP block, Abdominal wall closure, Creation, end  Ileostomy. POD 1- looks better, still on Levophed and Amio gtt for Afib, started on TPN, ID stopped Zyvox and continued Merrem/Mycafungin.     Obesity (BMI 30.0-34.9)  Diet  Nutrition on recovery      Acute on chronic anemia  Hgb 10.3 s/p Transfusion 4 units Prbc  Transfuse for Hgb <7  Monitor    5/5  Improved  Hgb 11.4 today  Transfuse as indicated    5/10- H/H 8.6/28- likely dilutional from ATN- improving      Acute hypoxemic respiratory failure   Patient with Hypoxic Respiratory failure which is Acute.  he is not on home oxygen. Supplemental oxygen was provided and noted- Vent Mode: Spont  Oxygen Concentration (%):  [28-35] 28  Resp Rate Total:  [14 br/min-35 br/min] 30 br/min  Vt Set:  [450 mL] 450 mL  PEEP/CPAP:  [5 cmH20] 5 cmH20  Pressure Support:  [8 cmH20-15 cmH20] 10 cmH20  Mean Airway Pressure:  [8.2 cmH20-10 cmH20] 8.3 cmH20.   Signs/symptoms of respiratory failure include- tachypnea. Contributing diagnoses includes - Obesity Hypoventilation Labs and images were reviewed. Patient Has recent ABG, which has been reviewed. Will treat underlying causes and adjust management of respiratory failure as indicated. Pulmonary CC on board  Day 2 on Vent- remains intubated on vent  Cont vent support    Day 4 on Vent    Day 5 on vent- adequate O2, sats    Day 6- continue supportive care, await family's decision about comfort care    Day 7- continue support- trying to diurese     Day 8- minimal vent support- start weaning soon    15 May:  SAT/SBT as tolerated  17 May:  Successfully extubated.      VTE Risk Mitigation (From admission, onward)         Ordered     heparin 25,000 units in dextrose 5% (100 units/ml) IV bolus from bag - ADDITIONAL PRN BOLUS - 60 units/kg  As needed (PRN)        Question:  Heparin Infusion Adjustment (DO NOT MODIFY ANSWER)  Answer:  [email or web address]\epic\Images\Pharmacy\HeparinInfusions\heparin LOW INTENSITY nomogram for [facility] [id number].pdf    05/12/22 1057     heparin 25,000 units in dextrose 5%  (100 units/ml) IV bolus from bag - ADDITIONAL PRN BOLUS - 30 units/kg  As needed (PRN)        Question:  Heparin Infusion Adjustment (DO NOT MODIFY ANSWER)  Answer:  [email or web address]\epic\Images\Pharmacy\HeparinInfusions\heparin LOW INTENSITY nomogram for [facility] [id number].pdf    05/12/22 1408     heparin 25,000 units in dextrose 5% 250 mL (100 units/mL) infusion LOW INTENSITY nomogram - [facility]  Continuous        Question Answer Comment   Heparin Infusion Adjustment (DO NOT MODIFY ANSWER) [email or web address]\epic\Images\Pharmacy\HeparinInfusions\heparin LOW INTENSITY nomogram for [facility] [id number].pdf    Begin at (in units/kg/hr) 12        05/12/22 1408     IP VTE HIGH RISK PATIENT  Once         05/04/22 1253     Place sequential compression device  Until discontinued         05/04/22 1253                Discharge Planning   ELLIOT:      Code Status: DNR   Is the patient medically ready for discharge?:     Reason for patient still in hospital (select all that apply): Patient trending condition and Treatment  Discharge Plan A: Comfort care/withdrawal            Critical care time spent on the evaluation and treatment of severe organ dysfunction, review of pertinent labs and imaging studies, discussions with consulting providers and discussions with patient/family: 30 minutes.      Alexandro Kebede MD  Department of Hospital Medicine   [facility] - Intensive Care ([facility])

## 2022-05-20 NOTE — SUBJECTIVE & OBJECTIVE
Review of Systems   Unable to perform ROSReason unable to perform ROS: minimal verbalization.   HENT:          Weak phonation   Respiratory:  Positive for cough. Negative for shortness of breath.         Unable to fully expectorate sputum   Cardiovascular:  Negative for chest pain.     Objective:     Vital Signs (Most Recent):  Temp: 98.8 °F (37.1 °C) (05/20/22 0303)  Pulse: 60 (05/20/22 0630)  Resp: 20 (05/20/22 0630)  BP: 125/60 (05/20/22 0600)  SpO2: 97 % (05/20/22 0630)   Vital Signs (24h Range):  Temp:  [98 °F (36.7 °C)-99 °F (37.2 °C)] 98.8 °F (37.1 °C)  Pulse:  [52-69] 60  Resp:  [18-31] 20  SpO2:  [91 %-100 %] 97 %  BP: ()/(41-72) 125/60  Arterial Line BP: (144-175)/(56-72) 148/62     Weight: 112.2 kg (247 lb 5.7 oz)  Body mass index is 36.53 kg/m².      Intake/Output Summary (Last 24 hours) at 5/20/2022 0722  Last data filed at 5/20/2022 0600  Gross per 24 hour   Intake 3758.91 ml   Output 4220 ml   Net -461.09 ml        amiodarone in dextrose 5% 0.5 mg/min (05/20/22 0600)    dextrose 10 % in water (D10W)      dextrose 5 % 75 mL/hr at 05/20/22 0655    heparin (porcine) in D5W 18 Units/kg/hr (05/20/22 0600)    TPN ADULT CENTRAL LINE CUSTOM 36 mL/hr at 05/20/22 0600       Physical Exam  Vitals and nursing note reviewed.   Constitutional:       General: He is not in acute distress.     Appearance: He is obese. He is ill-appearing.      Interventions: He is not intubated.Nasal cannula in place.   HENT:      Head: Atraumatic.   Eyes:      General: No scleral icterus.     Conjunctiva/sclera: Conjunctivae normal.   Neck:      Vascular: No JVD.   Cardiovascular:      Rate and Rhythm: Normal rate and regular rhythm.      Pulses:           Radial pulses are 1+ on the right side and 1+ on the left side.        Dorsalis pedis pulses are 1+ on the right side and 1+ on the left side.   Pulmonary:      Effort: No respiratory distress. He is not intubated.      Breath sounds: Decreased breath sounds and rhonchi  present. No wheezing.   Abdominal:      General: Bowel sounds are decreased.          Comments: Additional percutaneous drain with scant serous drainage   Musculoskeletal:      Right lower le+ Pitting Edema present.      Left lower le+ Pitting Edema present.   Skin:     General: Skin is warm and dry.      Capillary Refill: Capillary refill takes less than 2 seconds.   Neurological:      GCS: GCS eye subscore is 4. GCS verbal subscore is 4. GCS motor subscore is 6.       Vents:  Vent Mode: Spont (22 112)  Ventilator Initiated: Yes (22 1258)  Set Rate: 0 BPM (22)  Vt Set: 450 mL (22)  Pressure Support: 10 cmH20 (22)  PEEP/CPAP: 5 cmH20 (22)  Oxygen Concentration (%): 21 (22 1919)  Peak Airway Pressure: 16 cmH2O (22)  Plateau Pressure: 0 cmH20 (22)  Total Ve: 12.4 mL (22)  F/VT Ratio<105 (RSBI): (!) 52.73 (22)    Lines/Drains/Airways       Peripherally Inserted Central Catheter Line  Duration             PICC Double Lumen 22 0922 right basilic 1 day              Drain  Duration                  Ileostomy 22 Standard (Comfort, end) RUQ 9 days         Closed/Suction Drain 22 1735 LUQ Bulb 19 Fr. 8 days         Closed/Suction Drain 22 1735 RLQ Bulb 19 Fr. 8 days         NG/OG Tube 22 1715 [place] sump 18 Fr. Left nostril 8 days         Closed/Suction Drain 22 1412 LUQ Bulb 8.3 Fr. 2 days         Urethral Catheter 22 1315 Latex 16 Fr. 1 day              Peripheral Intravenous Line  Duration                  Midline Catheter Insertion/Assessment  - Single Lumen 22 1730 Left basilic vein (medial side of arm) 7 days                    Significant Labs:    CBC/Anemia Profile:  Recent Labs   Lab 22  0411 22  0408   WBC 12.30 11.67   HGB 7.8* 7.7*   HCT 27.0* 26.8*    439   MCV 78* 79*   RDW 29.6* 29.9*          Chemistries:  Recent Labs   Lab  05/19/22  0411 05/20/22  0408   * 145   K 3.8 3.8    109   CO2 25 26   BUN 62* 50*   CREATININE 1.7* 1.5*   CALCIUM 8.1* 7.9*   ALBUMIN 1.4* 1.3*   PROT 4.8* 4.7*   BILITOT 1.6* 1.3*   ALKPHOS 103 108   ALT 32 31   AST 35 37   MG 1.8 1.7   PHOS 3.6 3.5         All pertinent labs within the past 24 hours have been reviewed.    Significant Imaging:  I have reviewed all pertinent imaging results/findings within the past 24 hours.

## 2022-05-20 NOTE — PT/OT/SLP PROGRESS
Physical Therapy  Treatment    Franky Masters   MRN: [id number]   Admitting Diagnosis: Severe sepsis    PT Received On: 05/20/22  PT Start Time: 0935     PT Stop Time: 1005    PT Total Time (min): 30 min       Billable Minutes:  Therapeutic Activity 15 and Neuromuscular Re-education 15    Treatment Type: Treatment  PT/PTA: PTA     PTA Visit Number: 2       General Precautions: Standard, aspiration, fall, NPO  Orthopedic Precautions: N/A   Braces: N/A  Respiratory Status: Room air (newly on room air per nurse)         Subjective:  Communicated with patient's nurse, Nanda, and completed prior to session.  Patient agreed to PT session.     Pain/Comfort  Pain Rating 1: 0/10  Pain Rating Post-Intervention 1: 0/10    Objective:   Patient found with: blood pressure cuff, colostomy, norwood catheter, ADDIS drain, NG tube, pressure relief boots, peripheral IV, pulse ox (continuous), telemetry    Functional Mobility:  Supine > sit EOB: Total A of 2 (increased time to complete due to multi line mgmt)    Forward scoot towards EOB: Total A of 2    Seated EOB x10 min total focusing on increased tolerance to upright position, core stability, trunk control and CV endurance. Encouraged BUE self support throughout.  Max A > CGA to maintain sitting balance which improved with increased time and assistance with midline re orientation.    Sit > supine: Total A of 2 (increased time to complete due to multi line mgmt)    Supine scoot towards HOB: Total A of 2    Educated patient and family member on importance of increased time in upright position to improve CV endurance. Encouraged HOB to remain elevated into chair position until patient is able to safely complete T/F to chair. Patient nodded understanding and visitor expressed verbal understanding.     SpO2 remained WNL throughout session on room air.     AM-PAC 6 CLICK MOBILITY  How much help from another person does this patient currently need?   1 = Unable, Total/Dependent Assistance  2 = A  lot, Maximum/Moderate Assistance  3 = A little, Minimum/Contact Guard/Supervision  4 = None, Modified [place]/Independent    Turning over in bed (including adjusting bedclothes, sheets and blankets)?: 2  Sitting down on and standing up from a chair with arms (e.g., wheelchair, bedside commode, etc.): 1  Moving from lying on back to sitting on the side of the bed?: 2  Moving to and from a bed to a chair (including a wheelchair)?: 1  Need to walk in hospital room?: 1  Climbing 3-5 steps with a railing?: 1  Basic Mobility Total Score: 8    AM-PAC Raw Score CMS G-Code Modifier Level of Impairment Assistance   6 % Total / Unable   7 - 9 CM 80 - 100% Maximal Assist   10 - 14 CL 60 - 80% Moderate Assist   15 - 19 CK 40 - 60% Moderate Assist   20 - 22 CJ 20 - 40% Minimal Assist   23 CI 1-20% SBA / CGA   24 CH 0% Independent/ Mod I     Patient left with bed in chair position with all lines intact, call button in reach, nurse notified and visitor present.    Assessment:  Franky Masters is a 67 y.o. male with a medical diagnosis of Severe sepsis and presents with overall decline in functional mobility. Patient would continue to benefit from skilled PT to address functional limitations listed below in order to return to PLOF/decrease caregiver burden.    Rehab identified problem list/impairments: Rehab identified problem list/impairments: weakness, impaired endurance, impaired sensation, impaired self care skills, impaired functional mobilty, gait instability, impaired balance, decreased coordination, decreased upper extremity function, decreased lower extremity function, decreased safety awareness, decreased ROM, impaired coordination, edema, impaired cardiopulmonary response to activity    Rehab potential is fair.    Activity tolerance: Fair    Discharge recommendations: Discharge Facility/Level of Care Needs: rehabilitation facility     Barriers to discharge:      Equipment recommendations: Equipment Needed  After Discharge: other (see comments) (TBD)     GOALS:   Multidisciplinary Problems     Physical Therapy Goals        Problem: Physical Therapy    Goal Priority Disciplines Outcome Goal Variances Interventions   Physical Therapy Goal     PT, PT/OT      Description: LT22  1. PT WILL COMPLETE BED MOBILITY WITH MOD A  2. PT WILL STAND WITH RW AND MAX A FOR STAND PIVOT T/F TO CHAIR   3. PT WILL GT TRAIN X 25' WITH RW AND MAX A                   PLAN:    Patient to be seen 3 x/week  to address the above listed problems via gait training, therapeutic activities, therapeutic exercises  Plan of Care expires: 22  Plan of Care reviewed with: patient, daughter         2022

## 2022-05-20 NOTE — ASSESSMENT & PLAN NOTE
5/4 Expl Lap and SB excision with washout and AB thera wound vac for bowel left in discontinuity  5/7 washout and wound vac replacement  hx Diverticulosis but cecal mass and suspected metastatic lesions found in exploration  5/11 ABD WASHOUT, RT HEMICOLECTOMY, ILEOSTOMY, LIVER BIOPSY, AND ABD WALL CLOSURE  CT Abd 5/46: Loculated fluid density along the left upper abdomen and anterior left mid abdomen may reflect infectious hemorrhagic or seroma fluid collection  IR placed drain to LUQ Abd abscess 5/18  Tolerating TF last 24 hours will advance and pharm to begin de-escalating TPN

## 2022-05-20 NOTE — PROGRESS NOTES
[facility]  General Surgery  Progress Note    Subjective:     History of Present Illness:  No notes on file    Post-Op Info:  Procedure(s) (LRB):  CREATION, ILEOSTOMY (N/A)  HEMICOLECTOMY, RIGHT (N/A)  BIOPSY, LIVER (Right)   9 Days Post-Op     Interval History: Denies pain or nausea. Being transferred out of ICU today.    Medications:  Continuous Infusions:   dextrose 10 % in water (D10W)      dextrose 5 % 75 mL/hr at 05/20/22 1400    TPN ADULT CENTRAL LINE CUSTOM 18 mL/hr at 05/20/22 1400    TPN ADULT CENTRAL LINE CUSTOM       Scheduled Meds:   amiodarone  200 mg Per NG tube BID    apixaban  5 mg Per NG tube BID    [START ON 5/21/2022] famotidine  40 mg Per NG tube Daily    white petrolatum-mineral oil 57.3-42.5%   Both Eyes QHS     PRN Meds:sodium chloride 0.9%, acetaminophen, dextrose 10 % in water (D10W), dextrose 10%, glucagon (human recombinant), hydrALAZINE, insulin aspart U-100, ondansetron     Review of patient's allergies indicates:  No Known Allergies  Objective:     Vital Signs (Most Recent):  Temp: 98.4 °F (36.9 °C) (05/20/22 1115)  Pulse: 62 (05/20/22 1345)  Resp: (!) 28 (05/20/22 1345)  BP: (!) 129/59 (05/20/22 1300)  SpO2: (!) 94 % (05/20/22 1345)   Vital Signs (24h Range):  Temp:  [98.3 °F (36.8 °C)-99 °F (37.2 °C)] 98.4 °F (36.9 °C)  Pulse:  [52-69] 62  Resp:  [17-33] 28  SpO2:  [91 %-100 %] 94 %  BP: ()/(41-69) 129/59  Arterial Line BP: (145-175)/(56-68) 148/62     Weight: 112.2 kg (247 lb 5.7 oz)  Body mass index is 36.53 kg/m².    Intake/Output - Last 3 Shifts         05/18 0700  05/19 0659 05/19 0700  05/20 0659 05/20 0700 05/21 0659    I.V. (mL/kg) 2559.1 (22.6) 2562.9 (22.8) 818.4 (7.3)    NG/GT  160 105    IV Piggyback 547.8 99.5 64.7    TPN 1083.6 1080.5 225.9    Total Intake(mL/kg) 4190.4 (37) 3902.8 (34.8) 1213.9 (10.8)    Urine (mL/kg/hr) 2460 (0.9) 2560 (1) 685 (0.8)    Drains 705 535 240    Stool 300 1225     Total Output 3465 4320 925    Net  +725.4 -417.2 +288.9                   Physical Exam  Vitals and nursing note reviewed.   Constitutional:       Appearance: He is obese.   HENT:      Mouth/Throat:      Mouth: Mucous membranes are dry.   Eyes:      Extraocular Movements: Extraocular movements intact.   Cardiovascular:      Rate and Rhythm: Normal rate.   Pulmonary:      Comments: Diminished bilaterally. Poor inspiratory effort.  Abdominal:      Palpations: Abdomen is soft.      Comments: Midline incision with staples--no purulent drainage or erythema. Right-sided end ileostomy is red with some liquid stool output. ADDIS drain on right is serous; left-sided drains are murky yellow.   Genitourinary:     Comments: Doherty in place  Musculoskeletal:      Right lower leg: Edema present.      Left lower leg: Edema present.   Neurological:      Comments: Awake and alert       Significant Labs:  I have reviewed all pertinent lab results within the past 24 hours.  CBC:   Recent Labs   Lab 05/20/22  0408   WBC 11.67   RBC 3.38*   HGB 7.7*   HCT 26.8*      MCV 79*   MCH 22.8*   MCHC 28.7*     CMP:   Recent Labs   Lab 05/20/22  0408   *   CALCIUM 7.9*   ALBUMIN 1.3*   PROT 4.7*      K 3.8   CO2 26      BUN 50*   CREATININE 1.5*   ALKPHOS 108   ALT 31   AST 37   BILITOT 1.3*       Significant Diagnostics:  I have reviewed all pertinent imaging results/findings within the past 24 hours.    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  - Monitor drain and ileostomy output.  - Speech therapy  - Begin increasing activity  - Continue ICU management.  - Ostomy nurse consult  - Strict I/Os  - TPN  - Medical and ICU management per hospital/ICU team    Acute on chronic anemia  Management per medicine/critical care    Acute hypoxemic respiratory failure   Management per medicine/critical care        Elvie Parker, DO  General Surgery  [facility] - Intensive Care ([facility])

## 2022-05-20 NOTE — PROGRESS NOTES
[facility] Medicine  Progress Note    Patient Name: Franky Masters  MRN: [id number]  Patient Class: IP- Inpatient   Admission Date: 5/4/2022  Length of Stay: 16 days  Attending Physician: Elvie Parker DO  Primary Care Provider: HOLLY ROSEN        Subjective:     Principal Problem:Severe sepsis        HPI:  Mr Masters is a 65 yo male POD#0 s/p SB perforation with surgical intervention demonstrating a large cecal mass and 3l feculant fluid in the abdominal cavity. Additional findings of a perforated ileum and a liver mass. Patient tolerated the Exlap with Washout and small bowel excision. Patient had a wound vac placed, is currently intubated, sedated and recovering in the ICU. Patient received 4 units PRBC and remains on pressor support. Patient is a DNR and HM consulted with assistance with medical management. Daughter at bedside. Patient discussed with care team.          Overview/Hospital Course:  Patient continues to require pressors, remains intubated, sedated. Patient urine o/p declining and concerning. Discussed POC in detail at bedside with daughter POA. She expressed her fathers wish to not undergo treatments  like perm HD, where he has a diminished quality of life. We spoke frankly about issues and concerns and she will be communicating with the family as his case evolves. Comfort care was discussed and the goals of care will develop consistent with the patients expressed wishes. Potential for another surgery likely Saturday with a washout and possible biopsy vs resection are on the horizon. This possible intervention will be covered in detail by surgery sharing the risks and benefits of that approach. Case discussed with the primary service - surgery, and critical care team.    5/6- Pt seen and examined in the ICU plus discussed with ICU team and the pt's daughter/ POA: Remains critically ill, intubated, sedated on Vent, on Pressors- Levo plus Vaso- JOSE with oliguria getting worse-  Cr rising to 3.6, becoming severely acidotic- requiring Ertapenem, Zyvox, Micafungin as well as on Bicarb and Fentanyl gtt. He has massive fluid overload and generalized anasarca.  Possible return to OR tomorrow 5/7 if patient is more stable for right hemicolectomy, possible ileostomy, liver biopsy, abdominal wall closure. Dw Pt's daughter.       5/7- remains Intubated, sedated on Vent- Went into Afib w RVR- hence added Amio to Levo and Vasopressin- back in NSR. Getting Invanz and Zyvox plus Micafungin. Dr. Parker took him back to OR for for abdominal washout ( 3-3.5 L feculent fluid with food particles suctioned out in the OR, small bowel appeared better) and replaced Abthera- still has bowel discontinuity. His WBC, Lactate and Cr have all increased. Family updated about his critical condition and poor prognosis and JOSE/ATN- they are considering Comfort measures.     5/8- Day 5 on Vent- family at bedside, remains intubated on Vent with 2 Pressors- still on Amiodarone gtt for Afib, Still has Abthera wound vac to open Abdomen with small bowel discontinuity. Remains oliguric with generalized anasarca- almost 24 L fluid positive. Cr increased to 4.6. WBC down to 12, family does not want any HD/CRRT, so getting an IV Lasix trial to improve the urine output.     5/9- Day 6 on vent- remains the same, remains intubated, on vent with Abthera Wound vac and bowel discontinuity. Put out about 3 L urine since yesterday with IV Lasix, family still does not want any HD, WBC down to 10.2, H/H 7.8/24, still on 2 Pressors and Amio gtt. Family still deciding about comfort care.     5/10- Appreciate all- Day 7- remains same in septic shock on 2 vasopressors, intubated and sedated, still in afib. JOSE has remained stable at 4.7 but urine output improved with IV lasix. Abthera wound vac in place. No surgery today- put out about 2.5 L yesterday, given lasix again today.    5/11- Seen Pre op- looks better, less swollen, remains intubated,  sedated on Vent, on 1 Pressor- on Levophed. Going for OR today for Laparotomy and washout and abd wound closure. Good response to Lasix. Still Afib on Amiodarone gtt. Bun/Cr 98/4.4, WBC down to 17, H/H 8.6/26.     5/12- Day 8 on Vent- looks much better, remains on Vent with Levophed and Amio gtts. Had extensive surgery yesterday and did very well post op- Reopening of recent Laparotomy, Abdominal washout, R Hemicolectomy with Liver Bx, TAP block, Abdominal wall closure, Creation, end Ileostomy. POD 1- looks better, still on Levophed and Amio gtt for Afib, started on TPN, ID stopped Zyvox and continued Merrem/Mycafungin.     5/17 - Successfully extubated.  Additional left side drain placed by Interventional Radiology.    5/18 - Purulent drainage from left side drains.  Remained stable.  5/20 - POD 9 s/p R hemicolectomy and Ileostomy- extubated 5/18 and also underwent large Abdominal abscess by IR on 5/18 with over 500 cc pus drained- all Cx remain NGTD. No fever or chills, no leukocytosis, AAOx2, remains on RA, very weak, malnourished. Getting TPN, Albumin 1.4. Hemodynamically stable- hence transferred out of ICU to floor, getting PT/OT.       Interval History:  POD 9 s/p R hemicolectomy and Ileostomy- extubated 5/18 and also underwent large Abdominal abscess by IR on 5/18 with over 500 cc pus drained- all Cx remain NGTD. No fever or chills, no leukocytosis, AAOx2, remains on RA, very weak, malnourished. Getting TPN, Albumin 1.4. Hemodynamically stable- hence transferred out of ICU to floor, getting PT/OT.     Review of Systems   Unable to perform ROS: Acuity of condition   Objective:     Vital Signs (Most Recent):  Temp: 98.1 °F (36.7 °C) (05/20/22 1502)  Pulse: 65 (05/20/22 1502)  Resp: 16 (05/20/22 1502)  BP: 133/73 (05/20/22 1502)  SpO2: 98 % (05/20/22 1502) Vital Signs (24h Range):  Temp:  [98.1 °F (36.7 °C)-99 °F (37.2 °C)] 98.1 °F (36.7 °C)  Pulse:  [52-69] 65  Resp:  [16-33] 16  SpO2:  [91 %-100 %] 98 %  BP:  ()/(41-73) 133/73  Arterial Line BP: (148-166)/(62-66) 148/62     Weight: 112.2 kg (247 lb 5.7 oz)  Body mass index is 36.53 kg/m².    Intake/Output Summary (Last 24 hours) at 5/20/2022 1611  Last data filed at 5/20/2022 1400  Gross per 24 hour   Intake 3684.79 ml   Output 4005 ml   Net -320.21 ml      Physical Exam  Vitals and nursing note reviewed.   Constitutional:       Appearance: He is obese.   HENT:      Mouth/Throat:      Mouth: Mucous membranes are dry.   Eyes:      Extraocular Movements: Extraocular movements intact.   Cardiovascular:      Rate and Rhythm: Normal rate.   Pulmonary:      Comments: Diminished bilaterally. Poor inspiratory effort.  Abdominal:      Palpations: Abdomen is soft.      Comments: Midline incision with staples--no purulent drainage or erythema. Right-sided end ileostomy is red with some liquid stool output. ADDIS drain on right is serous; left-sided drains are murky yellow.   Genitourinary:     Comments: Doherty in place  Musculoskeletal:      Right lower leg: Edema present.      Left lower leg: Edema present.   Neurological:      Comments: Awake and alert     Flow (L/min): 2  Oxygen Concentration (%):  [21-28] 28  Temp:  [98.1 °F (36.7 °C)-99 °F (37.2 °C)] 98.1 °F (36.7 °C)  Pulse:  [52-69] 65  Resp:  [16-33] 16  SpO2:  [91 %-100 %] 98 %  BP: ()/(41-73) 133/73  Arterial Line BP: (148-166)/(62-66) 148/62      Lab Results   Component Value Date    [id number] Negative 05/11/2022    [id number] Negative 05/04/2022    [id number] Positive (A) 08/05/2021      Recent Labs   Lab 05/18/22 0411 05/19/22  0411 05/20/22  0408   * 148* 145   WBC 11.82 12.30 11.67   GRAN 76.4*  9.0* 76.9*  9.5* 74.4*  8.7*   LYMPH 10.3*  1.2 9.6*  1.2 11.1*  1.3   HGB 7.7* 7.8* 7.7*   HCT 26.7* 27.0* 26.8*   BUN 75* 62* 50*   CREATININE 2.1* 1.7* 1.5*   ESTGFRAFRICA 36* 47* 55*   EGFRNONAA 31* 41* 47*   K 3.6 3.8 3.8    110 109   CO2 28 25 26   PHOS 2.7 3.6 3.5   MG 1.9 1.8 1.7      Microbiology Results (last 7 days)       Procedure Component Value Units Date/Time    Culture, Anaerobe [[id number]] Collected: 05/17/22 1400    Order Status: Completed Specimen: Abscess from Abdomen Updated: 05/20/22 1039     Anaerobic Culture Culture in progress    Aerobic culture [[id number]] Collected: 05/17/22 1400    Order Status: Completed Specimen: Abscess from Abdomen Updated: 05/19/22 0752     Aerobic Bacterial Culture No growth    Gram stain [[id number]] Collected: 05/17/22 1400    Order Status: Completed Specimen: Abscess from Abdomen Updated: 05/18/22 0001     Gram Stain Result No WBC's      No organisms seen          Antibiotics (From admission, onward)                None          Anticoagulants       Ordered     Route Frequency Start Stop    05/20/22 1004  Eliquis         PER NG TUBE 2 times daily 05/20/22 1015 --          X-Ray Chest 1 View  Narrative: EXAMINATION:  XR CHEST 1 VIEW    CLINICAL HISTORY:  aspiration;    TECHNIQUE:  Single frontal view of the chest was performed.    FINDINGS:  Tubes and lines are stable. ECG monitoring leads overlie the chest.  Stable    nodular right hilar opacity.  Stable volume left pleural fluid, likely with superimposed consolidation.  The right lung demonstrates mild basilar atelectatic change or infiltrate.  No pneumothorax.  Cardiomediastinal silhouette appears enlarged but stable.  No acute osseous abnormality.  In comparison to the prior study, there is no adverse interval changes.  Impression: In comparison to the prior study, there is no adverse interval changes    Electronically signed by: Philippe Horton MD  Date:    05/19/2022  Time:    08:11   Significant Labs: All pertinent labs within the past 24 hours have been reviewed.    Significant Imaging: I have reviewed all pertinent imaging results/findings within the past 24 hours.      Assessment/Plan:      * Severe sepsis secondary to Peritonitis from bowel perforation  15 May:  No longer on vasopressor.   Weaning sedation for awakening trials.  Left side intra-abdominal fluid collection/abscess draining into ADDIS.  17 May:  Additional drain placed.  Extubated.  18 May:  Remaining stable and off ventilator.  Starting tube feedings soon.  19 May:  Purulent drainage form two left side drains and right side drain serous.  Starting tube feedings at low rate.  5/20- s/p POD 9 s/p R hemicolectomy and Ileostomy- s/p large Abdominal abscess drainage by IR on 5/18 with over 500 cc pus drained- all Cx remain NGTD.      Acute hypoxemic respiratory failure   Patient with Hypoxic Respiratory failure which is Acute.  he is not on home oxygen. Supplemental oxygen was provided and noted- Oxygen Concentration (%):  [21-28] 28.   Signs/symptoms of respiratory failure include- tachypnea. Contributing diagnoses includes - Obesity Hypoventilation Labs and images were reviewed. Patient Has recent ABG, which has been reviewed. Will treat underlying causes and adjust management of respiratory failure as indicated. Pulmonary CC on board  Day 2 on Vent- remains intubated on vent  Cont vent support    Day 4 on Vent    Day 5 on vent- adequate O2, sats    Day 6- continue supportive care, await family's decision about comfort care    Day 7- continue support- trying to diurese     Day 8- minimal vent support- start weaning soon    15 May:  SAT/SBT as tolerated  17 May:  Successfully extubated.    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 Parker- pt too unstable for right hemicolectomy, end ileostomy, liver biopsy today s/p abdominal washout with Abthera replacement today.  5/8- POD 3 with s/p Laparotomy and bowel resection and subsequent laparoscopic washout- persistent bowel discontinuity and abthera wound vac closure   5/9- persistent  bowel discontinuity- await further decision by family    Await further input from surgery    Await surgery today- going for closure today    S/p- Reopening of recent Laparotomy, Abdominal washout, R Hemicolectomy with Liver Bx, TAP block, Abdominal wall closure, Creation, end Ileostomy. POD 1- looks better, still on Levophed and Amio gtt for Afib, started on TPN, ID stopped Zyvox and continued Merrem/Mycafungin.     5/20- POD 9 s/p R hemicolectomy and Ileostomy- extubated 5/18 and also Large Abdominal Abscess drainage by IR on 5/18 transferred out of ICU to floor, getting PT/OT.     Mass of colon  Based on Surgery  Potential interventions  Goals of care  Biopsy as indicated  Likely Oncologic process with mets  Heme Onc as indicated    See above    Possible resection    resected    On mechanically assisted ventilation  Wean as tolerated  CC Team  Pulmonary    Cont vent support    Expect further improvement with diuresis    Cont Vent support  Day 7    Day 8- will start weaning vent soon    Extubated 5/18    JOSE (acute kidney injury)  Worsening  Trend  Cr 2.2 today    Cr now 3.8- Oliguric- heading towards Anuria and ATN/ May need HD vs CRRT- she has massive fluid Overload.     Cr now 4.5-  Remains anuric  POA/ Family not in favor of HD    Remains Oliguric due to Septic Shock on 2 Pressors  Some urine output with lasix but no Polyuria     5/10 Urine Out put improved with diuresis while renal functions remains stable    Improving, Cr coming down, good urine output    improved    Paroxysmal atrial fibrillation  Converted to NSR with Amio gtt    Cont Amio gtt          Obesity (BMI 30.0-34.9)  Diet  Nutrition on recovery      Acute on chronic anemia  Hgb 10.3 s/p Transfusion 4 units Prbc  Transfuse for Hgb <7  Monitor    5/5  Improved  Hgb 11.4 today  Transfuse as indicated    5/10- H/H 8.6/28- likely dilutional from ATN- improving        VTE Risk Mitigation (From admission, onward)         Ordered     apixaban tablet 5 mg   2 times daily         05/20/22 1004     IP VTE HIGH RISK PATIENT  Once         05/04/22 1253     Place sequential compression device  Until discontinued         05/04/22 1253                Discharge Planning   ELLIOT:      Code Status: DNR   Is the patient medically ready for discharge?:     Reason for patient still in hospital (select all that apply): Patient trending condition, Laboratory test, Treatment, Imaging, Consult recommendations and PT / OT recommendations  Discharge Plan A: Comfort care/withdrawal        Megha Mejia MD  Department of Hospital Medicine   [facility]

## 2022-05-20 NOTE — ASSESSMENT & PLAN NOTE
New onset 5/6  on amiodarone and Heparin infusions; transition to enteral AC and amiodarone  Cont Cardiac monitoring

## 2022-05-20 NOTE — SUBJECTIVE & OBJECTIVE
Interval History:  Remains weak but improved.  Remained hemodynamically and respiratory stable since extubation yesterday.  Starting low rate tube feedings.      Review of Systems   Unable to perform ROS: Acuity of condition   Objective:     Vital Signs (Most Recent):  Temp: 99 °F (37.2 °C) (05/19/22 2301)  Pulse: 61 (05/19/22 2301)  Resp: (!) 22 (05/19/22 2301)  BP: (!) 131/49 (05/19/22 2301)  SpO2: 100 % (05/19/22 2301)   Vital Signs (24h Range):  Temp:  [97.6 °F (36.4 °C)-99 °F (37.2 °C)] 99 °F (37.2 °C)  Pulse:  [52-69] 61  Resp:  [18-37] 22  SpO2:  [91 %-100 %] 100 %  BP: (102-162)/(46-72) 131/49  Arterial Line BP: (143-175)/(55-72) 148/62     Weight: 113.2 kg (249 lb 9 oz)  Body mass index is 36.85 kg/m².    Intake/Output Summary (Last 24 hours) at 5/19/2022 2308  Last data filed at 5/19/2022 2200  Gross per 24 hour   Intake 3629.93 ml   Output 3905 ml   Net -275.07 ml        Physical Exam  Vitals and nursing note reviewed.   Constitutional:       General: He is not in acute distress.     Appearance: He is well-developed. He is obese. He is ill-appearing. He is not toxic-appearing or diaphoretic.      Interventions: He is sedated, intubated and restrained.   HENT:      Head: Atraumatic.      Nose:        Mouth/Throat:      Mouth: Mucous membranes are moist.   Eyes:      General: No scleral icterus.     Conjunctiva/sclera: Conjunctivae normal.      Pupils: Pupils are equal, round, and reactive to light.   Neck:      Vascular: No JVD.     Cardiovascular:      Rate and Rhythm: Tachycardia present. Rhythm irregular.      Pulses:           Radial pulses are 1+ on the right side and 1+ on the left side.        Dorsalis pedis pulses are 1+ on the right side and 1+ on the left side.      Heart sounds: Heart sounds are distant.   Pulmonary:      Effort: No accessory muscle usage or respiratory distress. He is intubated.      Breath sounds: Decreased breath sounds present. No wheezing or rhonchi.   Chest:      Chest  wall: No deformity or tenderness.   Abdominal:      General: Bowel sounds are absent. There is no distension.          Comments: Right side ADDIS drain with serous fluid.  Two drains on left side with grey purulent fluid more thin than yesterday.   Genitourinary:     Comments: Doherty in place  Musculoskeletal:      Cervical back: Neck supple.      Right lower leg: 3+ Pitting Edema present.      Left lower leg: 3+ Pitting Edema present.      Right foot: No deformity.      Left foot: No deformity.   Lymphadenopathy:      Cervical: No cervical adenopathy.   Skin:     General: Skin is warm.      Capillary Refill: Capillary refill takes 2 to 3 seconds.          Neurological:      Comments: Weak and fatigued but moves all four extremities spontaneously and to command.     Flow (L/min): 2  Oxygen Concentration (%):  [21-28] 21  Temp:  [97.6 °F (36.4 °C)-99 °F (37.2 °C)] 99 °F (37.2 °C)  Pulse:  [52-69] 61  Resp:  [18-37] 22  SpO2:  [91 %-100 %] 100 %  BP: (102-162)/(46-72) 131/49  Arterial Line BP: (143-175)/(55-72) 148/62      Lab Results   Component Value Date    [id number] Negative 05/11/2022    [id number] Negative 05/04/2022    [id number] Positive (A) 08/05/2021        Recent Labs   Lab 05/17/22  0422 05/18/22  0411 05/19/22  0411   * 150* 148*   WBC 13.92* 11.82 12.30   GRAN 77.8*  10.8* 76.4*  9.0* 76.9*  9.5*   LYMPH 9.2*  1.3 10.3*  1.2 9.6*  1.2   HGB 7.9* 7.7* 7.8*   HCT 27.3* 26.7* 27.0*   BUN 84* 75* 62*   CREATININE 2.5* 2.1* 1.7*   ESTGFRAFRICA 29* 36* 47*   EGFRNONAA 25* 31* 41*   K 3.8 3.6 3.8    110 110   CO2 32* 28 25   PHOS 3.7 2.7 3.6   MG 1.8 1.9 1.8       Microbiology Results (last 7 days)       Procedure Component Value Units Date/Time    Aerobic culture [[id number]] Collected: 05/17/22 1400    Order Status: Completed Specimen: Abscess from Abdomen Updated: 05/19/22 0752     Aerobic Bacterial Culture No growth    Culture, Anaerobe [[id number]] Collected: 05/17/22 1400    Order  Status: Completed Specimen: Abscess from Abdomen Updated: 05/18/22 0753     Anaerobic Culture Culture in progress    Gram stain [[id number]] Collected: 05/17/22 1400    Order Status: Completed Specimen: Abscess from Abdomen Updated: 05/18/22 0001     Gram Stain Result No WBC's      No organisms seen          Antibiotics (From admission, onward)                None          Anticoagulants       Ordered     Route Frequency Start Stop    05/12/22 1408  heparin (PORCINE)  (LOW INTENSITY heparin infusion nomogram - [facility] (Recommended Indications: Acute Coronary Syndrome, Atrial Fibrillation, Prophylaxis in Prosthetic Heart Valves, and other indication where full anticoagulation is desired, bleeding risk is moderate, antiplatelet agents have been utilized, and time to therapeutic can be delayed minimizing the increased bleeding risk))         IV As needed (PRN) 05/12/22 1508 --    05/12/22 1408  heparin (PORCINE)  (LOW INTENSITY heparin infusion nomogram - [facility] (Recommended Indications: Acute Coronary Syndrome, Atrial Fibrillation, Prophylaxis in Prosthetic Heart Valves, and other indication where full anticoagulation is desired, bleeding risk is moderate, antiplatelet agents have been utilized, and time to therapeutic can be delayed minimizing the increased bleeding risk))         IV As needed (PRN) 05/12/22 1508 --    05/12/22 1408  heparin (porcine) in D5W  (LOW INTENSITY heparin infusion nomogram - [facility] (Recommended Indications: Acute Coronary Syndrome, Atrial Fibrillation, Prophylaxis in Prosthetic Heart Valves, and other indication where full anticoagulation is desired, bleeding risk is moderate, antiplatelet agents have been utilized, and time to therapeutic can be delayed minimizing the increased bleeding risk))         IV Continuous 05/12/22 1415 --          X-Ray Chest 1 View  Narrative: EXAMINATION:  XR CHEST 1 VIEW    CLINICAL HISTORY:  aspiration;    TECHNIQUE:  Single frontal view of the chest was  performed.    FINDINGS:  Tubes and lines are stable. ECG monitoring leads overlie the chest.  Stable    nodular right hilar opacity.  Stable volume left pleural fluid, likely with superimposed consolidation.  The right lung demonstrates mild basilar atelectatic change or infiltrate.  No pneumothorax.  Cardiomediastinal silhouette appears enlarged but stable.  No acute osseous abnormality.  In comparison to the prior study, there is no adverse interval changes.  Impression: In comparison to the prior study, there is no adverse interval changes    Electronically signed by: Philippe Horton MD  Date:    05/19/2022  Time:    08:11   Significant Labs: All pertinent labs within the past 24 hours have been reviewed.    Significant Imaging: I have reviewed all pertinent imaging results/findings within the past 24 hours.

## 2022-05-20 NOTE — PLAN OF CARE
Pt awake and alert oriented to self. SB - NSR on monitor HR 50-60. Tolerating room air - 2L. Trickle TF started this afternoon, tolerating @ 10 mL/hr. Educated pt and family on importance of exercises in bed. Art line removed. Dr. Parker at bedside this evening changed abd dressing. Family at bedside updated with POC.

## 2022-05-20 NOTE — PROGRESS NOTES
[facility] ([facility])  Critical Care Medicine  Progress Note    Patient Name: Franky Masters  MRN: [id number]  Admission Date: 5/4/2022  Hospital Length of Stay: 16 days  Code Status: DNR  Attending Provider: Elvie Parker DO  Primary Care Provider: HOLLY ROSEN   Principal Problem: Severe sepsis    Subjective:     HPI:  Ms Masters is a 65 yo obese male with a PMH of diverticulosis and hx of hospitalization in Aug 2021 with COVID PNA and Hypoxic Resp Failure but did not require ICU or intubation.  She presented early this AM to [facility] ED about 0515 hr via EMS with complaint of abd pain X 2 hours that awakened her from sleep and had associated N/V/D.  In ED BP 86/46, RA SAT 92%, LA 8, Hgb 7.2 and CT Abd with suspected bowel perforation and free air.  General Surgery consulted and taken to OR this AM revealing SB perf with 3 L feculent fluid and food in cavity and large obstructing cecal mass with perf ileum and palpable liver mass.  Had Expl Lap with washout and excision of SB with wound vac placement admitted post op to ICU intubated on mech ventilation.  Received 2 units PRBCs in ED and another 2 units in OR also on Levophed and Vasopressin infusions.  Before surgery patient was insistent he be DNR post op but consented to surgery and invasive mech ventilation but no ACLS post op in event of cardiac arrest and no prolonged mech ventilation. Reportedly patient does not routinely follow with practitioner as outpt.       Hospital/ICU Course:  5/4 - Admitted to ICU sedated and intubated on Levophed and Vasopressin infusions in no distress  5/5 - remains intubated and sedated on mechanical vent and pressor support; scant urine output overnight and creatinine rise to 2.2 with fluid balance +8.9L  5/6 - remains intubated and sedated on mechanical vent with decreasing pressor demand; still oliguric with creatinine up to 3.6, K 5.2; fluid balance +13L; now with bigeminy and cardiac rhythm changes, K  stable on abg but iCa 0.78  5/7 - remains intubated and sedated with open abdomen to abthera wound vac and pressor support; overnight atrial fib with RVR, now on amio infusion with SR 68 on monitor; plan to OR for washout and vac replacement today  5/8 - remains intubated, sedated with ab thera wound vac to open abdomen, mechanical ventilation, and 2 pressor support. SR on monitor, on amiodarone infusion. Tmax 100.2, wbc down at 12.3k, creatinine rising 4.6,urine output scant, K 5.1  5/9 - remains intubated and sedated with ab thera wound vac to open abdomen, mech vent, and 2 pressor support. Remains SR on monitor with amio infusion. Tmax 98.9, wbc down 10.6k, creatinine holding at 4.7, K 4.6 after lasix trial with 1.2L urinary output  5/10 - remains intubated and sedated with ab thera wound vac to open abdomen, mech vent, on pressor support. Atrial fib on monitor, rate 90s with occasional non sustained elevation. Urine output adequate since lasix trial but creatinine, K, BUN unchanged and remains 22L positive fluid status since admit  5/11 - return from OR late this evening after ABD WASHOUT, RT HEMICOLECTOMY, ILEOSTOMY, LIVER BIOPSY, AND ABD WALL CLOSURE. Remains atrial fib 80-100s on monitor on levophed support.  5/12 - semi erect in bed intubated on mech ventilation in no distress still in A-fib rate 113 bpm sedated heavily still on Fentanyl infusion.  Also still on Amiodarone and Levophed infusions.  Receiving TPN.  S/P abd washout and closure yesterday.    5/13 - semi erect in bed intubated on mech ventilation and sedated in no distress on Precedex, Heparin, Amiodarone, Fentanyl and Levophed infusions.  Also on TPN.  BP labile on pressor.  Still in A-Fib rate 113 bpm.  5/14 - semi erect intubated on mech ventilation in no distress sedated on Precedex infusion.  Also still on Levophed, Amiodarone and Heparin infusions with TPN.  BP still labile and still in A-Fib w/ rate controlled.  Pulm edema pink froth today  copious suctioned from OET.   5/15 - still lethargic off sedation and intubated on mech ventilation in no distress tolerating SBT but very weak unable to lift head off pillow.  Had N/V TF overnight and NG output to suction 320 ml overnight green bile.  Weaned off Levophed infusion at 0300 hr this AM.  Still on TPN as well as Amiodarone and Heparin infusions.   5/16 - Supine in bed off sedation awakens with stimuli and follows simple commands but slowly and still lethargic, intubated on mech ventilation.  Still on TPN, Heparin infusion and Amiodarone infusion.  Hgb drop to 6.9 with PRBC transfusion pending.  Tolerated press support ventilation overnight.  Had conversion to SB at 50 bpm overnight and Lopressor stopped now back in A-fib at 108 bpm.  350 ml NG output overnight and 1 liter bile dumped in colostomy overnight.   5/17 - Sleepy this AM on vent post receiving Fentanyl IVP overnight still tolerating SBT.  Still on Amiodarone, Heparin and TPN infusions.    5/18 - Extubated yesterday and tolerating fairly well. Still very weak but slightly more alert today also has very weak cough with poor secretion mobility.  Still on Amiodarone, Heparin and TPN infusions.  125 ml NG output overnight.  IR placed drain to Abd abscess yesterday with initial 480 ml removed and 30 ml output overnight.    5/19 - remains extubated on NC oxygen support. Cough very weak but per bedside RN, is improved from yesterday. Very weak phonation.  5/20 - awake and alert this am, phonation and cough improving. Remains 2L NC oxygen support      Review of Systems   Unable to perform ROSReason unable to perform ROS: minimal verbalization.   HENT:          Weak phonation   Respiratory:  Positive for cough. Negative for shortness of breath.         Unable to fully expectorate sputum   Cardiovascular:  Negative for chest pain.     Objective:     Vital Signs (Most Recent):  Temp: 98.8 °F (37.1 °C) (05/20/22 0303)  Pulse: 60 (05/20/22 0630)  Resp: 20  (22)  BP: 125/60 (22)  SpO2: 97 % (22)   Vital Signs (24h Range):  Temp:  [98 °F (36.7 °C)-99 °F (37.2 °C)] 98.8 °F (37.1 °C)  Pulse:  [52-69] 60  Resp:  [18-31] 20  SpO2:  [91 %-100 %] 97 %  BP: ()/(41-72) 125/60  Arterial Line BP: (144-175)/(56-72) 148/62     Weight: 112.2 kg (247 lb 5.7 oz)  Body mass index is 36.53 kg/m².      Intake/Output Summary (Last 24 hours) at 2022  Last data filed at 2022  Gross per 24 hour   Intake 3758.91 ml   Output 4220 ml   Net -461.09 ml        amiodarone in dextrose 5% 0.5 mg/min (22)    dextrose 10 % in water (D10W)      dextrose 5 % 75 mL/hr at 22    heparin (porcine) in D5W 18 Units/kg/hr (22)    TPN ADULT CENTRAL LINE CUSTOM 36 mL/hr at 22       Physical Exam  Vitals and nursing note reviewed.   Constitutional:       General: He is not in acute distress.     Appearance: He is obese. He is ill-appearing.      Interventions: He is not intubated.Nasal cannula in place.   HENT:      Head: Atraumatic.   Eyes:      General: No scleral icterus.     Conjunctiva/sclera: Conjunctivae normal.   Neck:      Vascular: No JVD.   Cardiovascular:      Rate and Rhythm: Normal rate and regular rhythm.      Pulses:           Radial pulses are 1+ on the right side and 1+ on the left side.        Dorsalis pedis pulses are 1+ on the right side and 1+ on the left side.   Pulmonary:      Effort: No respiratory distress. He is not intubated.      Breath sounds: Decreased breath sounds and rhonchi present. No wheezing.   Abdominal:      General: Bowel sounds are decreased.          Comments: Additional percutaneous drain with scant serous drainage   Musculoskeletal:      Right lower le+ Pitting Edema present.      Left lower le+ Pitting Edema present.   Skin:     General: Skin is warm and dry.      Capillary Refill: Capillary refill takes less than 2 seconds.   Neurological:      GCS: GCS  eye subscore is 4. GCS verbal subscore is 4. GCS motor subscore is 6.       Vents:  Vent Mode: Spont (05/17/22 1128)  Ventilator Initiated: Yes (05/04/22 1258)  Set Rate: 0 BPM (05/17/22 1128)  Vt Set: 450 mL (05/17/22 1128)  Pressure Support: 10 cmH20 (05/17/22 1128)  PEEP/CPAP: 5 cmH20 (05/17/22 1128)  Oxygen Concentration (%): 21 (05/19/22 1919)  Peak Airway Pressure: 16 cmH2O (05/17/22 1128)  Plateau Pressure: 0 cmH20 (05/17/22 1128)  Total Ve: 12.4 mL (05/17/22 1128)  F/VT Ratio<105 (RSBI): (!) 52.73 (05/17/22 1128)    Lines/Drains/Airways       Peripherally Inserted Central Catheter Line  Duration             PICC Double Lumen 05/18/22 0922 right basilic 1 day              Drain  Duration                  Ileostomy 05/11/22 Standard (Comfort, end) RUQ 9 days         Closed/Suction Drain 05/11/22 1735 LUQ Bulb 19 Fr. 8 days         Closed/Suction Drain 05/11/22 1735 RLQ Bulb 19 Fr. 8 days         NG/OG Tube 05/11/22 1715 [place] sump 18 Fr. Left nostril 8 days         Closed/Suction Drain 05/17/22 1412 LUQ Bulb 8.3 Fr. 2 days         Urethral Catheter 05/18/22 1315 Latex 16 Fr. 1 day              Peripheral Intravenous Line  Duration                  Midline Catheter Insertion/Assessment  - Single Lumen 05/12/22 1730 Left basilic vein (medial side of arm) 7 days                    Significant Labs:    CBC/Anemia Profile:  Recent Labs   Lab 05/19/22  0411 05/20/22  0408   WBC 12.30 11.67   HGB 7.8* 7.7*   HCT 27.0* 26.8*    439   MCV 78* 79*   RDW 29.6* 29.9*          Chemistries:  Recent Labs   Lab 05/19/22  0411 05/20/22  0408   * 145   K 3.8 3.8    109   CO2 25 26   BUN 62* 50*   CREATININE 1.7* 1.5*   CALCIUM 8.1* 7.9*   ALBUMIN 1.4* 1.3*   PROT 4.8* 4.7*   BILITOT 1.6* 1.3*   ALKPHOS 103 108   ALT 32 31   AST 35 37   MG 1.8 1.7   PHOS 3.6 3.5         All pertinent labs within the past 24 hours have been reviewed.    Significant Imaging:  I have reviewed all pertinent imaging results/findings  within the past 24 hours.      ABG  Recent Labs   Lab 05/17/22  0944   PH 7.486*   PO2 105*   PCO2 46.8*   HCO3 35.4*   BE 12     Assessment/Plan:     Pulmonary  Acute hypoxemic respiratory failure   Extubated 5/17 on Vent day #14  Cont low flow NC O2  Patient declared on admit and family confirmed on extubation that if fails extubation there will be no elective re-intubation and will be transitioned to comfort care if no improvement with NPPV or HFNC  Patient is DNR  Oxygenating and ventilating well and secretion mobility is slowly improving  Cont NT suction PRN  Aspiration precautions  Encourage TCDB, mobilization   Wean off supplemental oxygen for goal sat > 92    Cardiac/Vascular  Paroxysmal atrial fibrillation  New onset 5/6  on amiodarone and Heparin infusions; transition to enteral AC and amiodarone  Cont Cardiac monitoring    Renal/  Electrolyte imbalance  Cont D5W, eval daily  Replace K+  Monitor chemistry    JOSE (acute kidney injury)  S/t septic shock  Adequate volume resuscitation +17.7L I/O balance  Avoid nephrotoxins  Strict I/O  Nephrology following  Good UOP and creatinine slowly improving     ID  * Severe sepsis secondary to Peritonitis from bowel perforation  Secondary to Peritonitis from bowel perforation with major fecal contamination on admit  Blood and sputum cultures 5/4 Neg  S/P Zyvox and mycamine therapy  Invanz 15d course completed on 5/19  Weaned off Levophed infusion 5/15  Follow fever curve and WBC  Doherty and CL changed 5/18  Has Left Abd Abscess (IR placed drain 5/17) culture pending NGTD    Oncology  Acute on chronic anemia  Received 2 units PRBCs in ED and 2 more in OR on 5/4  1 unit PRBC given 5/16  No active bleeding  Monitor closely with anticoagulation for A-fib     Endocrine  Obesity (BMI 30.0-34.9)  Will encourage weight loss once fully awake/alert and stronger    GI  Oropharyngeal dysphagia with overall muscle deconditioning and weakness  NPO  ST/PT/OT following  PRN NT  suctioning    Mass of colon  Found on exploration along with palpable liver mass  Sent for pathology 5/11 results still pending  High concern for metastatic malignancy    Small bowel perforation with large Cecal mass   5/4 Expl Lap and SB excision with washout and AB thera wound vac for bowel left in discontinuity  5/7 washout and wound vac replacement  hx Diverticulosis but cecal mass and suspected metastatic lesions found in exploration  5/11 ABD WASHOUT, RT HEMICOLECTOMY, ILEOSTOMY, LIVER BIOPSY, AND ABD WALL CLOSURE  CT Abd 5/46: Loculated fluid density along the left upper abdomen and anterior left mid abdomen may reflect infectious hemorrhagic or seroma fluid collection  IR placed drain to LUQ Abd abscess 5/18  Tolerating TF last 24 hours will advance and pharm to begin de-escalating TPN    Palliative Care  Pt is DNR, discussed at length with surgeon prior to surgery and he consented to intubation for surgery with understanding of likely need for prolonged vent support post op until bowel/abdomen closed. He was clear that he would not desire long term vent support past that necessary for immediate post op recovery.   Daughter Claudia is SDM and is aware and supportive of his wishes. IF at any point his survival chances become poor or prolonged life support is anticipated she would honor his wish and transition to comfort focused care.  5/6 - discussed status with daughter. She expressed again understanding of her father's wishes for very limited life support and further stated today that after time to reflect on current status, in the event of continued decline she would not want to pursue potential dialysis but if kidneys fail and indications for RRT exist she would at that time desire transition to a full comfort care focus. She would hope that he could be extubated to comfort focus and have opportunity to interact with family but verbalizes understanding that this may not be possible given open abdomen and  comfort goal.  Continue daily and prn updates  5/19- now extubated but concern for airway protection; continue aggressive supportive care but NO REINTUBATION IF Fails to protect airway or decline    Other  Hypoalbuminemia due to protein-calorie malnutrition  Increase trickle feed, decrease TPN; eval daily     Critical Care Daily Checklist:    A: Awake: RASS Goal/Actual Goal: RASS Goal: 0-->alert and calm  Actual: German Agitation Sedation Scale (RASS): Alert and calm   B: Spontaneous Breathing Trial Performed? Spon. Breathing Trial Initiated?: Initiated (05/16/22 0737)   C: SAT & SBT Coordinated?  Extubated on 5/17                     D: Delirium: CAM-ICU Overall CAM-ICU: Positive   E: Early Mobility Performed? Yes   F: Feeding Goal: Goals: 1. Parenteral Nutrition Initiation within 24 hours.  Status: Nutrition Goal Status: new   Current Diet Order   Procedures    Diet NPO      AS: Analgesia/Sedation Prn analgesia   T: Thromboembolic Prophylaxis Heparin transition to eliquis   H: HOB > 300 Yes   U: Stress Ulcer Prophylaxis (if needed) pepcid   G: Glucose Control As above   B: Bowel Function Stool Occurrence:  (small ostomy output)   I: Indwelling Catheter (Lines & Doherty) Necessity reviewed   D: De-escalation of Antimicrobials/Pharmacotherapies reviewed    Plan for the day/ETD Advance TF, decrease TPN, and transfer out of ICU    Code Status:  Family/Goals of Care: DNR  Goal home on discharge pending therapy needs   I have discussed case and plan of care in detail with Dr Huynh; Status and plan of care were discussed with team on multidisciplinary rounds.    Pulm/Critical Care team will sign off with transfer out of ICU; please call if we can be of additional assistance.     KATHARINE Miranda-BC  Critical Care Medicine  [facility] ([facility])

## 2022-05-20 NOTE — ASSESSMENT & PLAN NOTE
S/p exploratory laparotomy, abdominal washout, segmental small bowel resection (left in discontinuity), placement of Abthera vac 5/4/22  S/p reopening of recent laparotomy, abdominal washout, replacement of Abthera vac 5/7/22  S/p reopening of recent laparotomy, abdominal washout, right hemicolectomy, liver biopsy, end ileostomy, TAP block, abdominal wall closure 5/11/22  Left-sided drain placed by IR for LUQ abscess 5/17/22    - Advance tube feeds to goal and wean off TPN.  - Monitor drain and ileostomy output.  - Speech therapy  - Begin increasing activity  - Continue ICU management.  - Ostomy nurse consult  - Strict I/Os  - TPN  - Medical and ICU management per hospital/ICU team

## 2022-05-20 NOTE — ASSESSMENT & PLAN NOTE
Received 2 units PRBCs in ED and 2 more in OR on 5/4  1 unit PRBC given 5/16  No active bleeding  Monitor closely with anticoagulation for A-fib

## 2022-05-20 NOTE — SUBJECTIVE & OBJECTIVE
Interval History: Denies pain or nausea. Being transferred out of ICU today.    Medications:  Continuous Infusions:   dextrose 10 % in water (D10W)      dextrose 5 % 75 mL/hr at 05/20/22 1400    TPN ADULT CENTRAL LINE CUSTOM 18 mL/hr at 05/20/22 1400    TPN ADULT CENTRAL LINE CUSTOM       Scheduled Meds:   amiodarone  200 mg Per NG tube BID    apixaban  5 mg Per NG tube BID    [START ON 5/21/2022] famotidine  40 mg Per NG tube Daily    white petrolatum-mineral oil 57.3-42.5%   Both Eyes QHS     PRN Meds:sodium chloride 0.9%, acetaminophen, dextrose 10 % in water (D10W), dextrose 10%, glucagon (human recombinant), hydrALAZINE, insulin aspart U-100, ondansetron     Review of patient's allergies indicates:  No Known Allergies  Objective:     Vital Signs (Most Recent):  Temp: 98.4 °F (36.9 °C) (05/20/22 1115)  Pulse: 62 (05/20/22 1345)  Resp: (!) 28 (05/20/22 1345)  BP: (!) 129/59 (05/20/22 1300)  SpO2: (!) 94 % (05/20/22 1345)   Vital Signs (24h Range):  Temp:  [98.3 °F (36.8 °C)-99 °F (37.2 °C)] 98.4 °F (36.9 °C)  Pulse:  [52-69] 62  Resp:  [17-33] 28  SpO2:  [91 %-100 %] 94 %  BP: ()/(41-69) 129/59  Arterial Line BP: (145-175)/(56-68) 148/62     Weight: 112.2 kg (247 lb 5.7 oz)  Body mass index is 36.53 kg/m².    Intake/Output - Last 3 Shifts         05/18 0700 05/19 0659 05/19 0700 05/20 0659 05/20 0700  05/21 0659    I.V. (mL/kg) 2559.1 (22.6) 2562.9 (22.8) 818.4 (7.3)    NG/GT  160 105    IV Piggyback 547.8 99.5 64.7    TPN 1083.6 1080.5 225.9    Total Intake(mL/kg) 4190.4 (37) 3902.8 (34.8) 1213.9 (10.8)    Urine (mL/kg/hr) 2460 (0.9) 2560 (1) 685 (0.8)    Drains 705 535 240    Stool 300 1225     Total Output 3465 4320 925    Net +725.4 -417.2 +288.9                   Physical Exam  Vitals and nursing note reviewed.   Constitutional:       Appearance: He is obese.   HENT:      Mouth/Throat:      Mouth: Mucous membranes are dry.   Eyes:      Extraocular Movements: Extraocular movements intact.    Cardiovascular:      Rate and Rhythm: Normal rate.   Pulmonary:      Comments: Diminished bilaterally. Poor inspiratory effort.  Abdominal:      Palpations: Abdomen is soft.      Comments: Midline incision with staples--no purulent drainage or erythema. Right-sided end ileostomy is red with some liquid stool output. ADDIS drain on right is serous; left-sided drains are murky yellow.   Genitourinary:     Comments: Doherty in place  Musculoskeletal:      Right lower leg: Edema present.      Left lower leg: Edema present.   Neurological:      Comments: Awake and alert       Significant Labs:  I have reviewed all pertinent lab results within the past 24 hours.  CBC:   Recent Labs   Lab 05/20/22  0408   WBC 11.67   RBC 3.38*   HGB 7.7*   HCT 26.8*      MCV 79*   MCH 22.8*   MCHC 28.7*     CMP:   Recent Labs   Lab 05/20/22  0408   *   CALCIUM 7.9*   ALBUMIN 1.3*   PROT 4.7*      K 3.8   CO2 26      BUN 50*   CREATININE 1.5*   ALKPHOS 108   ALT 31   AST 37   BILITOT 1.3*       Significant Diagnostics:  I have reviewed all pertinent imaging results/findings within the past 24 hours.

## 2022-05-20 NOTE — PLAN OF CARE
Patient VSS throughout shift. All gtts continued at ordered doses/rates, see documentation. TF at goal rate, tolerating at this time. Ileostomy with liquid brown output. ADDIS drain outputs measured and documented. Patient cough becoming stronger, able to cough up thick white secretions, suction provided as well. 2L NC placed on patient overnight, O2 sats stable. Patient turned q2 with wedge, heels elevated on heel boots. Will monitor.     Problem: Adult Inpatient Plan of Care  Goal: Plan of Care Review  Outcome: Ongoing, Progressing  Goal: Patient-Specific Goal (Individualized)  Outcome: Ongoing, Progressing  Goal: Absence of Hospital-Acquired Illness or Injury  Outcome: Ongoing, Progressing  Goal: Optimal Comfort and Wellbeing  Outcome: Ongoing, Progressing  Goal: Readiness for Transition of Care  Outcome: Ongoing, Progressing

## 2022-05-20 NOTE — ASSESSMENT & PLAN NOTE
S/p exploratory laparotomy, abdominal washout, segmental small bowel resection (left in discontinuity), placement of Abthera vac 5/4/22  S/p reopening of recent laparotomy, abdominal washout, replacement of Abthera vac 5/7/22  S/p reopening of recent laparotomy, abdominal washout, right hemicolectomy, liver biopsy, end ileostomy, TAP block, abdominal wall closure 5/11/22  Left-sided drain placed by IR for LUQ abscess 5/17/22    - Trickle tube feeds initiated. Advance slowly to goal.  - Monitor drain and ileostomy output.  - Speech therapy  - Begin increasing activity  - Continue ICU management.  - Ostomy nurse consult  - Strict I/Os  - TPN  - Medical and ICU management per hospital/ICU team

## 2022-05-20 NOTE — ASSESSMENT & PLAN NOTE
Patient stable s/p sx POD#1  Plan for washout, etc per primary service  Wound vac  Goals of care assessment  DNR    S/p SB resection- may go to surgery again tomorrow    5/7- Per Dr. Parker- pt too unstable for right hemicolectomy, end ileostomy, liver biopsy today s/p abdominal washout with Abthera replacement today.  5/8- POD 3 with s/p Laparotomy and bowel resection and subsequent laparoscopic washout- persistent bowel discontinuity and abthera wound vac closure   5/9- persistent bowel discontinuity- await further decision by family    Await further input from surgery    Await surgery today- going for closure today    S/p- Reopening of recent Laparotomy, Abdominal washout, R Hemicolectomy with Liver Bx, TAP block, Abdominal wall closure, Creation, end Ileostomy. POD 1- looks better, still on Levophed and Amio gtt for Afib, started on TPN, ID stopped Zyvox and continued Merrem/Mycafungin.     5/20- POD 9 s/p R hemicolectomy and Ileostomy- extubated 5/18 and also Large Abdominal Abscess drainage by IR on 5/18 transferred out of ICU to floor, getting PT/OT.

## 2022-05-20 NOTE — PLAN OF CARE
Pt Awake and Alert, oriented to self. SB-NSR on monitor. Afebrile. VSS. Tolerating TF @ 20 mL/hr. Wound care assessed and changed ostomy dressing this morning, see note. PT/OT at bedside this morning. Heparin gtt d/c'd, started on eliquis. Amio gtt d/c'd, started on PO amio via NG tube. Dr. Parker at bedside changed surgical dressing.     Adequate for step down to room 545. Transported via bed with pt belongings. Family at bedside. Nurse DAVIE Gay met in room.

## 2022-05-20 NOTE — ASSESSMENT & PLAN NOTE
Extubated 5/17 on Vent day #14  Cont low flow NC O2  Patient declared on admit and family confirmed on extubation that if fails extubation there will be no elective re-intubation and will be transitioned to comfort care if no improvement with NPPV or HFNC  Patient is DNR  Oxygenating and ventilating well and secretion mobility is slowly improving  Cont NT suction PRN  Aspiration precautions  Encourage TCDB, mobilization   Wean off supplemental oxygen for goal sat > 92

## 2022-05-20 NOTE — ASSESSMENT & PLAN NOTE
Worsening  Trend  Cr 2.2 today    Cr now 3.8- Oliguric- heading towards Anuria and ATN/ May need HD vs CRRT- she has massive fluid Overload.     Cr now 4.5-  Remains anuric  POA/ Family not in favor of HD    Remains Oliguric due to Septic Shock on 2 Pressors  Some urine output with lasix but no Polyuria     5/10 Urine Out put improved with diuresis while renal functions remains stable    Improving, Cr coming down, good urine output    improved

## 2022-05-20 NOTE — PT/OT/SLP EVAL
Speech Language Pathology Evaluation  Bedside Swallow    Patient Name:  Franky Masters   MRN:  [id number]  Admitting Diagnosis: Severe sepsis    Recommendations:                 General Recommendations:  Dysphagia therapy, Cognitive-linguistic evaluation and Modified barium swallow study  Diet recommendations:  NPO, NPO (ice chips sparingly with adequate oral care)   Aspiration Precautions: Frequent oral care, Ice chips sparingly and Strict aspiration precautions   General Precautions: Standard, aspiration  Communication strategies:  provide increased time to answer    History:     Past Medical History:   Diagnosis Date    Diverticulitis     Supplemental oxygen dependent        Past Surgical History:   Procedure Laterality Date    ABDOMINAL WASHOUT  5/7/2022    Procedure: LAVAGE, PERITONEAL, THERAPEUTIC;  Surgeon: Elvie Parker DO;  Location: [facility] OR;  Service: General;;    ABDOMINAL WASHOUT  5/7/2022    Procedure: ;  Surgeon: Elvie Parker DO;  Location: [facility] OR;  Service: General;;    APPLICATION OF WOUND VACUUM-ASSISTED CLOSURE DEVICE N/A 5/4/2022    Procedure: APPLICATION, WOUND VAC;  Surgeon: Elvie Parker DO;  Location: [facility] OR;  Service: General;  Laterality: N/A;    ILEOSTOMY N/A 5/11/2022    Procedure: CREATION, ILEOSTOMY;  Surgeon: Elvie Parker DO;  Location: [facility] OR;  Service: General;  Laterality: N/A;    LAPAROTOMY N/A 5/7/2022    Procedure: LAPAROTOMY;  Surgeon: Elvie Parker DO;  Location: [facility] OR;  Service: General;  Laterality: N/A;    LIVER BIOPSY Right 5/11/2022    Procedure: BIOPSY, LIVER;  Surgeon: Elvie Parker DO;  Location: [facility] OR;  Service: General;  Laterality: Right;    RIGHT HEMICOLECTOMY N/A 5/11/2022    Procedure: HEMICOLECTOMY, RIGHT;  Surgeon: Elvie Parker DO;  Location: [facility] OR;  Service: General;  Laterality: N/A;       Social History: Patient lives with family.  Independent with ADL's, including a practicing Chiropractor.    Prior  "Intubation HX:  Remained intubated post-op 5/4/22 & extubated 5/17/22.  See medical chart.    Chest X-Rays: See medical chart.    Prior diet: Pt consumes regular consistency diet at home.  No hx dysphagia.    Subjective     Pt seen bedside for ST swallowing evaluation in ICU.  Sleeping upon arrival but easily arousable.  Disoriented.  Pt's daughter present.  Patient goals: To eat/drink.    Pain/Comfort:  · Pain Rating 1: 0/10  · Pain Rating Post-Intervention 1: 0/10  · Pain Rating 2: 0/10  · Pain Rating Post-Intervention 2: 0/10    Respiratory Status: Nasal Cannula    Objective:     Oral Musculature Evaluation  · Oral Musculature: WFL  · Dentition:  (edentulous maxillary arch, natural dentition remaining in mandibular arch.  Dentures not present during evaluation.)  · Secretion Management:  (decreased secretion management with associated wet vocal quality--improved with cough/throat clear & pt ability to orally excrete phlegm via yankauer. Open mouth posture/breathing noted while pt asleep.)  · Mucosal Quality: good  · Mandibular Strength and Mobility: WFL  · Oral Labial Strength and Mobility: WFL  · Lingual Strength and Mobility: WFL  · Velar Elevation: WFL  · Buccal Strength and Mobility: WFL  · Volitional Cough: present, productive  · Volitional Swallow: present  · Voice Prior to PO Intake: Improving post-extubation (intubated 5/4-5/17/22); however, remains hoarse with intermittent audible wetness to quality.  Decreased intensity, but pt's family reported typically "soft spoken."  Functional breathing-speech coordination.    Bedside Swallow Eval:   Consistencies Assessed:  · Pt consumed thin liquids (ice chips and water) during initial bedside swallowing assessment.  Of note:  Surgeon, Dr. Parker approved trials of all PO consistencies (liquids and solids).    Oral Phase:   · Slow oral transit time    Pharyngeal Phase:   · coughing/choking  · decreased hyolaryngeal excursion to palpation  · delayed swallow " initation  · throat clearing  · wet vocal quality after swallow    Compensatory Strategies  · None    Treatment: Pt recommended to remain NPO with ice chips sparingly/pleasure given adequate daily oral care.  ST recommended for ongoing swallowing assessment and cognitive/communicative evaluation once mentation improves.    Assessment:     Franky Masters is a 67 y.o. male with an SLP diagnosis of Dysphagia s/p prolonged intubation (5/4-5/17/22).  He presents with a hoarse and intermittently wet vocal quality, in which improved with a cued productive cough.  Bedside swallowing assessment initiated & s/s of dysphagia present with thin liquid trials as stated above.  Pt recommended to remain NPO (okay for pleasure/ice chips with oral care) at this time with ongoing swallowing assessment and cognitive evaluation once mentation improves.  Pt will benefit from ST intervention post-acute.    Goals:   Multidisciplinary Problems     SLP Goals        Problem: SLP    Goal Priority Disciplines Outcome   SLP Goal     SLP    Description: 1.  Pt will consume least restrictive PO diet without s/s of dysphagia.  1a. Ongoing bedside swallowing assessment  1b. MBSS if clinically indicated    2.  Recommended cognitive-communicative evaluation                   Plan:     · Patient to be seen:  daily (Rec: daily to address swallowing impairment with 2-3x/week following initiation of PO diet.)   · Plan of Care expires:  05/27/22  · Plan of Care reviewed with:  patient, daughter   · SLP Follow-Up:  Yes       Discharge recommendations:  rehabilitation facility   Barriers to Discharge:  None    Time Tracking:     SLP Treatment Date:   05/20/22  Speech Start Time:  1100  Speech Stop Time:  1130     Speech Total Time (min):  30 min    Billable Minutes: Eval Swallow and Oral Function 30 minutes    05/20/2022

## 2022-05-20 NOTE — SUBJECTIVE & OBJECTIVE
Interval History:  POD 9 s/p R hemicolectomy and Ileostomy- extubated 5/18 and also underwent large Abdominal abscess by IR on 5/18 with over 500 cc pus drained- all Cx remain NGTD. No fever or chills, no leukocytosis, AAOx2, remains on RA, very weak, malnourished. Getting TPN, Albumin 1.4. Hemodynamically stable- hence transferred out of ICU to floor, getting PT/OT.     Review of Systems   Unable to perform ROS: Acuity of condition   Objective:     Vital Signs (Most Recent):  Temp: 98.1 °F (36.7 °C) (05/20/22 1502)  Pulse: 65 (05/20/22 1502)  Resp: 16 (05/20/22 1502)  BP: 133/73 (05/20/22 1502)  SpO2: 98 % (05/20/22 1502) Vital Signs (24h Range):  Temp:  [98.1 °F (36.7 °C)-99 °F (37.2 °C)] 98.1 °F (36.7 °C)  Pulse:  [52-69] 65  Resp:  [16-33] 16  SpO2:  [91 %-100 %] 98 %  BP: ()/(41-73) 133/73  Arterial Line BP: (148-166)/(62-66) 148/62     Weight: 112.2 kg (247 lb 5.7 oz)  Body mass index is 36.53 kg/m².    Intake/Output Summary (Last 24 hours) at 5/20/2022 1611  Last data filed at 5/20/2022 1400  Gross per 24 hour   Intake 3684.79 ml   Output 4005 ml   Net -320.21 ml      Physical Exam  Vitals and nursing note reviewed.   Constitutional:       Appearance: He is obese.   HENT:      Mouth/Throat:      Mouth: Mucous membranes are dry.   Eyes:      Extraocular Movements: Extraocular movements intact.   Cardiovascular:      Rate and Rhythm: Normal rate.   Pulmonary:      Comments: Diminished bilaterally. Poor inspiratory effort.  Abdominal:      Palpations: Abdomen is soft.      Comments: Midline incision with staples--no purulent drainage or erythema. Right-sided end ileostomy is red with some liquid stool output. ADDIS drain on right is serous; left-sided drains are murky yellow.   Genitourinary:     Comments: Doherty in place  Musculoskeletal:      Right lower leg: Edema present.      Left lower leg: Edema present.   Neurological:      Comments: Awake and alert     Flow (L/min): 2  Oxygen Concentration (%):   [21-28] 28  Temp:  [98.1 °F (36.7 °C)-99 °F (37.2 °C)] 98.1 °F (36.7 °C)  Pulse:  [52-69] 65  Resp:  [16-33] 16  SpO2:  [91 %-100 %] 98 %  BP: ()/(41-73) 133/73  Arterial Line BP: (148-166)/(62-66) 148/62      Lab Results   Component Value Date    [id number] Negative 05/11/2022    [id number] Negative 05/04/2022    [id number] Positive (A) 08/05/2021      Recent Labs   Lab 05/18/22  0411 05/19/22  0411 05/20/22  0408   * 148* 145   WBC 11.82 12.30 11.67   GRAN 76.4*  9.0* 76.9*  9.5* 74.4*  8.7*   LYMPH 10.3*  1.2 9.6*  1.2 11.1*  1.3   HGB 7.7* 7.8* 7.7*   HCT 26.7* 27.0* 26.8*   BUN 75* 62* 50*   CREATININE 2.1* 1.7* 1.5*   ESTGFRAFRICA 36* 47* 55*   EGFRNONAA 31* 41* 47*   K 3.6 3.8 3.8    110 109   CO2 28 25 26   PHOS 2.7 3.6 3.5   MG 1.9 1.8 1.7     Microbiology Results (last 7 days)       Procedure Component Value Units Date/Time    Culture, Anaerobe [[id number]] Collected: 05/17/22 1400    Order Status: Completed Specimen: Abscess from Abdomen Updated: 05/20/22 1039     Anaerobic Culture Culture in progress    Aerobic culture [[id number]] Collected: 05/17/22 1400    Order Status: Completed Specimen: Abscess from Abdomen Updated: 05/19/22 0752     Aerobic Bacterial Culture No growth    Gram stain [[id number]] Collected: 05/17/22 1400    Order Status: Completed Specimen: Abscess from Abdomen Updated: 05/18/22 0001     Gram Stain Result No WBC's      No organisms seen          Antibiotics (From admission, onward)                None          Anticoagulants       Ordered     Route Frequency Start Stop    05/20/22 1004  Eliquis         PER NG TUBE 2 times daily 05/20/22 1015 --          X-Ray Chest 1 View  Narrative: EXAMINATION:  XR CHEST 1 VIEW    CLINICAL HISTORY:  aspiration;    TECHNIQUE:  Single frontal view of the chest was performed.    FINDINGS:  Tubes and lines are stable. ECG monitoring leads overlie the chest.  Stable    nodular right hilar opacity.  Stable volume left pleural  fluid, likely with superimposed consolidation.  The right lung demonstrates mild basilar atelectatic change or infiltrate.  No pneumothorax.  Cardiomediastinal silhouette appears enlarged but stable.  No acute osseous abnormality.  In comparison to the prior study, there is no adverse interval changes.  Impression: In comparison to the prior study, there is no adverse interval changes    Electronically signed by: Philippe Horton MD  Date:    05/19/2022  Time:    08:11   Significant Labs: All pertinent labs within the past 24 hours have been reviewed.    Significant Imaging: I have reviewed all pertinent imaging results/findings within the past 24 hours.

## 2022-05-20 NOTE — ASSESSMENT & PLAN NOTE
S/t septic shock  Adequate volume resuscitation +17.7L I/O balance  Avoid nephrotoxins  Strict I/O  Nephrology following  Good UOP and creatinine slowly improving

## 2022-05-21 PROBLEM — K63.89 MASS OF COLON: Status: RESOLVED | Noted: 2022-05-04 | Resolved: 2022-05-21

## 2022-05-21 PROBLEM — E87.8 ELECTROLYTE IMBALANCE: Status: RESOLVED | Noted: 2022-05-16 | Resolved: 2022-05-21

## 2022-05-21 PROBLEM — I48.0 PAROXYSMAL ATRIAL FIBRILLATION: Status: RESOLVED | Noted: 2022-05-06 | Resolved: 2022-05-21

## 2022-05-21 PROBLEM — Z99.11 ON MECHANICALLY ASSISTED VENTILATION: Status: RESOLVED | Noted: 2022-05-05 | Resolved: 2022-05-21

## 2022-05-21 LAB
ACANTHOCYTES BLD QL SMEAR: PRESENT
ALBUMIN SERPL BCP-MCNC: 1.4 G/DL (ref 3.5–5.2)
ALP SERPL-CCNC: 112 U/L (ref 55–135)
ALT SERPL W/O P-5'-P-CCNC: 35 U/L (ref 10–44)
ANION GAP SERPL CALC-SCNC: 8 MMOL/L (ref 8–16)
ANISOCYTOSIS BLD QL SMEAR: SLIGHT
AST SERPL-CCNC: 37 U/L (ref 10–40)
BACTERIA SPEC AEROBE CULT: NO GROWTH
BASOPHILS # BLD AUTO: 0.1 K/UL (ref 0–0.2)
BASOPHILS NFR BLD: 0.8 % (ref 0–1.9)
BILIRUB SERPL-MCNC: 1.6 MG/DL (ref 0.1–1)
BUN SERPL-MCNC: 46 MG/DL (ref 8–23)
CALCIUM SERPL-MCNC: 7.7 MG/DL (ref 8.7–10.5)
CHLORIDE SERPL-SCNC: 109 MMOL/L (ref 95–110)
CO2 SERPL-SCNC: 26 MMOL/L (ref 23–29)
CREAT SERPL-MCNC: 1.4 MG/DL (ref 0.5–1.4)
DIFFERENTIAL METHOD: ABNORMAL
EOSINOPHIL # BLD AUTO: 0.3 K/UL (ref 0–0.5)
EOSINOPHIL NFR BLD: 2.2 % (ref 0–8)
ERYTHROCYTE [DISTWIDTH] IN BLOOD BY AUTOMATED COUNT: 30.6 % (ref 11.5–14.5)
EST. GFR  (AFRICAN AMERICAN): 59 ML/MIN/1.73 M^2
EST. GFR  (NON AFRICAN AMERICAN): 51 ML/MIN/1.73 M^2
GLUCOSE SERPL-MCNC: 109 MG/DL (ref 70–110)
HCT VFR BLD AUTO: 27.3 % (ref 40–54)
HGB BLD-MCNC: 7.7 G/DL (ref 14–18)
HYPOCHROMIA BLD QL SMEAR: ABNORMAL
IMM GRANULOCYTES # BLD AUTO: 0.2 K/UL (ref 0–0.04)
IMM GRANULOCYTES NFR BLD AUTO: 1.7 % (ref 0–0.5)
LYMPHOCYTES # BLD AUTO: 1.4 K/UL (ref 1–4.8)
LYMPHOCYTES NFR BLD: 11.9 % (ref 18–48)
MAGNESIUM SERPL-MCNC: 2.1 MG/DL (ref 1.6–2.6)
MCH RBC QN AUTO: 22.8 PG (ref 27–31)
MCHC RBC AUTO-ENTMCNC: 28.2 G/DL (ref 32–36)
MCV RBC AUTO: 81 FL (ref 82–98)
MONOCYTES # BLD AUTO: 1.1 K/UL (ref 0.3–1)
MONOCYTES NFR BLD: 9 % (ref 4–15)
NEUTROPHILS # BLD AUTO: 8.8 K/UL (ref 1.8–7.7)
NEUTROPHILS NFR BLD: 74.4 % (ref 38–73)
NRBC BLD-RTO: 0 /100 WBC
OVALOCYTES BLD QL SMEAR: ABNORMAL
PHOSPHATE SERPL-MCNC: 4.3 MG/DL (ref 2.7–4.5)
PLATELET # BLD AUTO: 468 K/UL (ref 150–450)
PLATELET BLD QL SMEAR: ABNORMAL
PMV BLD AUTO: ABNORMAL FL (ref 9.2–12.9)
POCT GLUCOSE: 103 MG/DL (ref 70–110)
POCT GLUCOSE: 107 MG/DL (ref 70–110)
POCT GLUCOSE: 128 MG/DL (ref 70–110)
POCT GLUCOSE: 92 MG/DL (ref 70–110)
POCT GLUCOSE: 98 MG/DL (ref 70–110)
POIKILOCYTOSIS BLD QL SMEAR: SLIGHT
POLYCHROMASIA BLD QL SMEAR: ABNORMAL
POTASSIUM SERPL-SCNC: 4.9 MMOL/L (ref 3.5–5.1)
PROT SERPL-MCNC: 4.2 G/DL (ref 6–8.4)
RBC # BLD AUTO: 3.38 M/UL (ref 4.6–6.2)
SCHISTOCYTES BLD QL SMEAR: PRESENT
SODIUM SERPL-SCNC: 143 MMOL/L (ref 136–145)
TARGETS BLD QL SMEAR: ABNORMAL
WBC # BLD AUTO: 11.81 K/UL (ref 3.9–12.7)

## 2022-05-21 PROCEDURE — 25000003 PHARM REV CODE 250: Performed by: NURSE PRACTITIONER

## 2022-05-21 PROCEDURE — 85025 COMPLETE CBC W/AUTO DIFF WBC: CPT | Performed by: NURSE PRACTITIONER

## 2022-05-21 PROCEDURE — 97530 THERAPEUTIC ACTIVITIES: CPT | Mod: CQ

## 2022-05-21 PROCEDURE — 80053 COMPREHEN METABOLIC PANEL: CPT | Performed by: NURSE PRACTITIONER

## 2022-05-21 PROCEDURE — 63600175 PHARM REV CODE 636 W HCPCS: Performed by: EMERGENCY MEDICINE

## 2022-05-21 PROCEDURE — 11000001 HC ACUTE MED/SURG PRIVATE ROOM

## 2022-05-21 PROCEDURE — 92526 ORAL FUNCTION THERAPY: CPT

## 2022-05-21 PROCEDURE — 96372 THER/PROPH/DIAG INJ SC/IM: CPT

## 2022-05-21 PROCEDURE — 25000003 PHARM REV CODE 250: Performed by: SURGERY

## 2022-05-21 PROCEDURE — 21400001 HC TELEMETRY ROOM

## 2022-05-21 PROCEDURE — 84100 ASSAY OF PHOSPHORUS: CPT | Performed by: NURSE PRACTITIONER

## 2022-05-21 PROCEDURE — 97112 NEUROMUSCULAR REEDUCATION: CPT | Mod: CQ

## 2022-05-21 PROCEDURE — 83735 ASSAY OF MAGNESIUM: CPT | Performed by: NURSE PRACTITIONER

## 2022-05-21 RX ORDER — CYANOCOBALAMIN 1000 UG/ML
1000 INJECTION, SOLUTION INTRAMUSCULAR; SUBCUTANEOUS DAILY
Status: COMPLETED | OUTPATIENT
Start: 2022-05-21 | End: 2022-05-23

## 2022-05-21 RX ADMIN — AMIODARONE HYDROCHLORIDE 200 MG: 200 TABLET ORAL at 09:05

## 2022-05-21 RX ADMIN — FAMOTIDINE 40 MG: 40 POWDER, FOR SUSPENSION ORAL at 09:05

## 2022-05-21 RX ADMIN — MINERAL OIL, PETROLATUM: 425; 573 OINTMENT OPHTHALMIC at 08:05

## 2022-05-21 RX ADMIN — OXYCODONE HYDROCHLORIDE AND ACETAMINOPHEN 500 MG: 500 TABLET ORAL at 09:05

## 2022-05-21 RX ADMIN — AMIODARONE HYDROCHLORIDE 200 MG: 200 TABLET ORAL at 08:05

## 2022-05-21 RX ADMIN — APIXABAN 5 MG: 2.5 TABLET, FILM COATED ORAL at 08:05

## 2022-05-21 RX ADMIN — APIXABAN 5 MG: 2.5 TABLET, FILM COATED ORAL at 09:05

## 2022-05-21 RX ADMIN — IRON SUCROSE 200 MG: 20 INJECTION, SOLUTION INTRAVENOUS at 05:05

## 2022-05-21 RX ADMIN — DEXTROSE: 5 SOLUTION INTRAVENOUS at 10:05

## 2022-05-21 RX ADMIN — OXYCODONE HYDROCHLORIDE AND ACETAMINOPHEN 500 MG: 500 TABLET ORAL at 08:05

## 2022-05-21 RX ADMIN — CYANOCOBALAMIN 1000 MCG: 1000 INJECTION, SOLUTION INTRAMUSCULAR at 05:05

## 2022-05-21 NOTE — PLAN OF CARE
Pt seen by S.T. for swallowing and language/speech.  Pt is recommended to remain NPO at this time due to concerns for aspiration/decreased tolerance of oral secretions. Education provided to pt and family and S.T. will continue to follow.

## 2022-05-21 NOTE — SUBJECTIVE & OBJECTIVE
Interval History:    looks lot better, more alert and responsive, following commands, moving all 4 ext well, generalized anasarca improving. Bun/Cr further down to 46/1.4, H/h stable at 7.7/23- ordered IV Venofer plis inj B12 IM. Tolerating TF well. Will continue PT/OT.   Review of Systems   Unable to perform ROS: Acuity of condition   Constitutional:  Positive for activity change, appetite change and fatigue.   HENT:  Positive for voice change.    Eyes: Negative.    Cardiovascular:  Positive for leg swelling.   Gastrointestinal: Negative.    Genitourinary: Negative.    Musculoskeletal:  Positive for gait problem.   Skin:  Positive for pallor.   Neurological:  Positive for weakness.   Psychiatric/Behavioral:  Positive for decreased concentration.    Objective:     Vital Signs (Most Recent):  Temp: 97.6 °F (36.4 °C) (05/21/22 1618)  Pulse: 66 (05/21/22 1618)  Resp: 18 (05/21/22 1618)  BP: 113/67 (05/21/22 1618)  SpO2: 96 % (05/21/22 1618) Vital Signs (24h Range):  Temp:  [97.5 °F (36.4 °C)-98.2 °F (36.8 °C)] 97.6 °F (36.4 °C)  Pulse:  [53-66] 66  Resp:  [16-18] 18  SpO2:  [96 %-98 %] 96 %  BP: (113-141)/(59-67) 113/67     Weight: 112.2 kg (247 lb 5.7 oz)  Body mass index is 36.53 kg/m².    Intake/Output Summary (Last 24 hours) at 5/21/2022 1753  Last data filed at 5/21/2022 0824  Gross per 24 hour   Intake 180 ml   Output 4085 ml   Net -3905 ml      Physical Exam  Vitals reviewed.   Constitutional:       General: He is not in acute distress.     Appearance: He is ill-appearing.   HENT:      Nose: Nose normal.      Mouth/Throat:      Mouth: Mucous membranes are moist.   Cardiovascular:      Rate and Rhythm: Normal rate and regular rhythm.   Pulmonary:      Breath sounds: Normal breath sounds.   Abdominal:      General: Bowel sounds are normal. There is no distension.      Palpations: Abdomen is soft.      Comments: Drains in place   Skin:     General: Skin is warm and dry.   Neurological:      Mental Status: He is  alert.   Psychiatric:         Mood and Affect: Mood normal.         Thought Content: Thought content normal.         Judgment: Judgment normal.       Significant Labs: All pertinent labs within the past 24 hours have been reviewed.  BMP:   Recent Labs   Lab 05/21/22  0537         K 4.9      CO2 26   BUN 46*   CREATININE 1.4   CALCIUM 7.7*   MG 2.1     CBC:   Recent Labs   Lab 05/20/22  0408 05/21/22  0537   WBC 11.67 11.81   HGB 7.7* 7.7*   HCT 26.8* 27.3*    468*       Significant Imaging: I have reviewed all pertinent imaging results/findings within the past 24 hours.

## 2022-05-21 NOTE — PLAN OF CARE
[facility] - Med Surg  Initial Discharge Assessment       Primary Care Provider: HOLLY ROSEN    Admission Diagnosis: Lactic acidosis [E87.2]  Bowel perforation [K63.1]  Abdominal pain [R10.9]  Abdominal pain, acute, generalized [R10.84]  Intractable vomiting with nausea, unspecified vomiting type [R11.2]  Acute on chronic anemia [D64.9]    Admission Date: 5/4/2022  Expected Discharge Date:     Discharge Barriers Identified: None    Payor: MEDICARE / Plan: MEDICARE A ONLY / Product Type: Government /     Extended Emergency Contact Information  Primary Emergency Contact: Holly Manley  Mobile Phone: [phone]  Relation: Daughter  Secondary Emergency Contact: Anel Kirkpatrick  Mobile Phone: [phone]  Relation: Daughter    Discharge Plan A: Home  Discharge Plan B: Home with family      [id number]  [place], [place] - [address] [facility] [address]  [address] [facility] [address]  Phone: [phone] Fax: [phone]      Initial Assessment (most recent)     Adult Discharge Assessment - 05/21/22 1105        Discharge Assessment    Assessment Type Discharge Planning Assessment     Confirmed/corrected address, phone number and insurance Yes     Confirmed Demographics Correct on Facesheet     Source of Information family     Reason For Admission Issues after a perforated bowel     Lives With spouse     Do you expect to return to your current living situation? Yes     Do you have help at home or someone to help you manage your care at home? Yes     Who are your caregiver(s) and their phone number(s)? Holly Manley [phone] & Anel Kirkpatrick [phone]     Prior to hospitilization cognitive status: Alert/Oriented     Current cognitive status: Alert/Oriented     Walking or Climbing Stairs Difficulty none     Dressing/Bathing Difficulty none     Home Layout Bathroom on 2nd floor     Equipment Currently Used at Home none     Readmission within 30 days? No      Patient currently being followed by outpatient case management? No     Do you currently have service(s) that help you manage your care at home? No     Do you take prescription medications? Yes     Do you have prescription coverage? Yes     Coverage Medicare     Do you have any problems affording any of your prescribed medications? No     Is the patient taking medications as prescribed? yes     Who is going to help you get home at discharge? Umer Taylor     How do you get to doctors appointments? car, drives self;family or friend will provide     Are you on dialysis? No     Do you take coumadin? No     Discharge Plan A Home     Discharge Plan B Home with family     DME Needed Upon Discharge  none     Discharge Plan discussed with: Patient;Adult children     Discharge Barriers Identified None        Relationship/Environment    Name(s) of Who Lives With Patient Mrs. Franky De La Rosa met with pt, spouse and daughter Claudia at the bedside to complete discharge assessment. Daughter reports no needs at this time and she is available once pt discharges.  CM provided a transitional care folder, information on advanced directives, information on pharmacy bedside delivery, and discharge planning begins on admission with contact information for any needs/questions.

## 2022-05-21 NOTE — ASSESSMENT & PLAN NOTE
15 May:  No longer on vasopressor.  Weaning sedation for awakening trials.  Left side intra-abdominal fluid collection/abscess draining into ADDIS.  17 May:  Additional drain placed.  Extubated.  18 May:  Remaining stable and off ventilator.  Starting tube feedings soon.  19 May:  Purulent drainage form two left side drains and right side drain serous.  Starting tube feedings at low rate.  5/20- s/p POD 9 s/p R hemicolectomy and Ileostomy- s/p large Abdominal abscess drainage by IR on 5/18 with over 500 cc pus drained- all Cx remain NGTD.      Appears resolved

## 2022-05-21 NOTE — ASSESSMENT & PLAN NOTE
Patient with Hypoxic Respiratory failure which is Acute.  he is not on home oxygen. Supplemental oxygen was provided and noted-  .   Signs/symptoms of respiratory failure include- tachypnea. Contributing diagnoses includes - Obesity Hypoventilation Labs and images were reviewed. Patient Has recent ABG, which has been reviewed. Will treat underlying causes and adjust management of respiratory failure as indicated. Pulmonary CC on board  Day 2 on Vent- remains intubated on vent  Cont vent support    Day 4 on Vent    Day 5 on vent- adequate O2, sats    Day 6- continue supportive care, await family's decision about comfort care    Day 7- continue support- trying to diurese     Day 8- minimal vent support- start weaning soon    15 May:  SAT/SBT as tolerated  17 May:  Successfully extubated.    Remains on RA, 94-98%    Appears resolved

## 2022-05-21 NOTE — ASSESSMENT & PLAN NOTE
Patient seen and examined with Dr. Wolff. Patient desires Nexplanon for contraception but, would like this placed by Dr. Breaux. Please see Dr. Wolff's discharge summary for details.   Kristine Junior MD  4/22/18   Patient stable s/p sx POD#1  Plan for washout, etc per primary service  Wound vac  Goals of care assessment  DNR    S/p SB resection- may go to surgery again tomorrow    5/7- Per Dr. Parker- pt too unstable for right hemicolectomy, end ileostomy, liver biopsy today s/p abdominal washout with Abthera replacement today.  5/8- POD 3 with s/p Laparotomy and bowel resection and subsequent laparoscopic washout- persistent bowel discontinuity and abthera wound vac closure   5/9- persistent bowel discontinuity- await further decision by family    Await further input from surgery    Await surgery today- going for closure today    S/p- Reopening of recent Laparotomy, Abdominal washout, R Hemicolectomy with Liver Bx, TAP block, Abdominal wall closure, Creation, end Ileostomy. POD 1- looks better, still on Levophed and Amio gtt for Afib, started on TPN, ID stopped Zyvox and continued Merrem/Mycafungin.     5/20- POD 9 s/p R hemicolectomy and Ileostomy- extubated 5/18 and also Large Abdominal Abscess drainage by IR on 5/18 transferred out of ICU to floor, getting PT/OT.     5/21- Tolerating TF well, Colostomy also working well.

## 2022-05-21 NOTE — PT/OT/SLP PROGRESS
Speech Language Pathology Treatment    Patient Name:  Frnaky Masters   MRN:  [id number]  Admitting Diagnosis: Severe sepsis    Recommendations:                 General Recommendations:  NPO   Diet recommendations:  NPO, Liquid Diet Level: NPO (ice chips sparingly with adequate oral care)   Aspiration Precautions: In place  General Precautions: Standard, aspiration  Communication strategies:  verbal    Subjective     Pt receiving NG tube feedings at this time and in regular room   Patient goals: pt wanting to eat but also reported he did not feel like he was ready yet     Pain/Comfort:  ·  0/10    Respiratory Status: see medical chart     Objective:     Has the patient been evaluated by SLP for swallowing?    yes  Keep patient NPO?   yes  Current Respiratory Status:    See medical chart     Pt seen by S.T. and oral care provided. Pt completed 10 reps of oral stim with iced toothette with 10/10 swallows but with delays and effort.  Pt given 2 small ice chips with coughing x1.  Pt also completed 15 reps of oral motor, tongue base retraction and laryngeal elevation, cough/clear tasks.  Pt currently with weak cough/clear but improved with effort.  Pt was oriented to person, place and needed cues for time.      Assessment:     Franky Masters is a 67 y.o. male with an SLP diagnosis of Dysphagia and decreased cognition.  Pt is recommended to remain NPO at this time due to risks of aspiration and wet vocal quality with secretions.   Education completed with pt and family on goals of therapy and recommendations.  S.T. reviewed with pt/family about ice chips and water.  They reported that they have been giving pt small amounts of water and ice chips.  S.T. recommended to family to NOT give pt water and to only give very occasional small ice chips throughout the day and to d/c if having any coughing or increased lung congestion.   Family was encouraged to instead provide pt with oral care with iced toothette.   S.T. to continue  to address swallowing and cognition/communication.    Goals:   Multidisciplinary Problems     SLP Goals        Problem: SLP    Goal Priority Disciplines Outcome   SLP Goal     SLP Ongoing, Progressing   Description: 1.  Pt will consume least restrictive PO diet without s/s of dysphagia.  1a. Ongoing bedside swallowing assessment  1b. MBSS if clinically indicated    2.  Recommended cognitive-communicative evaluation                   Plan:     · Patient to be seen:  daily (Rec: daily to address swallowing impairment with 2-3x/week following initiation of PO diet.)   · Plan of Care expires:  05/27/22  · Plan of Care reviewed with:  patient, daughter   · SLP Follow-Up:  Yes       Discharge recommendations:  rehabilitation facility   Barriers to Discharge:  May need assistance     Time Tracking:     SLP Treatment Date:    05/21/22  Speech Start Time:   1100  Speech Stop Time:    1130    Speech Total Time (min):   30    Billable Minutes: 30 minutes     05/21/2022

## 2022-05-21 NOTE — CONSULTS
Follow up on Mr Masters. He is awake and alert. NG tube in place to left nare, skin intact. His daughter is at the bedside. LLQ colostomy with surgical pouch intact. Removed for teaching. Stoma is pink and moist, measures 40mm. Cleansed peristomal area with gauze and water. Patted dry then prepped with cavilon. Bravo moldable ring applied around stoma then 1 piece sensura tomas pouch cut to fit and applied to patient.  All done while verbalizing steps to patient and daughter. She and his other daughter will be helping with his care. Extra supplies left at the bedside. Patient then turned to right side with assist. Linear maroon discoloration noted within gluteal cleft. Was on pressors. Pressure injury can not be ruled out but will follow. Foam dressing in place. Bilateral heels intact with no redness. HALIMA ICU bed in use and heel boots in place. Will follow closely.

## 2022-05-21 NOTE — PLAN OF CARE
Remains injury free. Denies c/o pain. Tube feeding rate increased this morning to 30ml/hr, tolerating. TPN discontinued. Repositioned for comfort and pressure reduction. No s/s acute distress. Will monitor.

## 2022-05-21 NOTE — ASSESSMENT & PLAN NOTE
Hgb 10.3 s/p Transfusion 4 units Prbc  Transfuse for Hgb <7  Monitor    5/5  Improved  Hgb 11.4 today  Transfuse as indicated    5/10- H/H 8.6/28- likely dilutional from ATN- improving    5/21- inj B12 IM plus Venofer given, may benefit from Procrit

## 2022-05-21 NOTE — SUBJECTIVE & OBJECTIVE
Interval History:   5/21/2022 patient evaluated by speech therapy.  Recommend continuing NPO for now.  Patient tolerating tube feeds.  Liquid stool in bag.  Drains in place.  Medications:  Continuous Infusions:   dextrose 5 % 75 mL/hr at 05/21/22 1042     Scheduled Meds:   amiodarone  200 mg Per NG tube BID    apixaban  5 mg Per NG tube BID    ascorbic acid (vitamin C)  500 mg Per NG tube BID    cyanocobalamin  1,000 mcg Intramuscular Daily    famotidine  40 mg Per NG tube Daily    iron sucrose  200 mg Intravenous Once    white petrolatum-mineral oil 57.3-42.5%   Both Eyes QHS     PRN Meds:sodium chloride 0.9%, acetaminophen, dextrose 10%, glucagon (human recombinant), hydrALAZINE, insulin aspart U-100, ondansetron     Review of patient's allergies indicates:  No Known Allergies  Objective:     Vital Signs (Most Recent):  Temp: 97.6 °F (36.4 °C) (05/21/22 1618)  Pulse: 66 (05/21/22 1618)  Resp: 18 (05/21/22 1618)  BP: 113/67 (05/21/22 1618)  SpO2: 96 % (05/21/22 1618) Vital Signs (24h Range):  Temp:  [97.5 °F (36.4 °C)-98.2 °F (36.8 °C)] 97.6 °F (36.4 °C)  Pulse:  [53-66] 66  Resp:  [16-18] 18  SpO2:  [96 %-98 %] 96 %  BP: (113-141)/(59-67) 113/67     Weight: 112.2 kg (247 lb 5.7 oz)  Body mass index is 36.53 kg/m².    Intake/Output - Last 3 Shifts         05/19 0700 05/20 0659 05/20 0700 05/21 0659 05/21 0700  05/22 0659    P.O.  0     I.V. (mL/kg) 2562.9 (22.8) 818.4 (7.3)     NG/ 285 60    IV Piggyback 99.5 64.7     TPN 1080.5 225.9     Total Intake(mL/kg) 3902.8 (34.8) 1393.9 (12.4) 60 (0.5)    Urine (mL/kg/hr) 2560 (1) 2385 (0.9) 1200 (1.1)    Drains 535 325     Stool 1225 975 250    Total Output 4320 3685 1450    Net -417.2 -2291.1 -1390                   Physical Exam  Vitals reviewed.   Constitutional:       General: He is not in acute distress.     Appearance: He is ill-appearing.   HENT:      Nose: Nose normal.      Mouth/Throat:      Mouth: Mucous membranes are moist.   Cardiovascular:      Rate  and Rhythm: Normal rate and regular rhythm.   Pulmonary:      Breath sounds: Normal breath sounds.   Abdominal:      General: Bowel sounds are normal. There is no distension.      Palpations: Abdomen is soft.      Comments: Drains in place   Skin:     General: Skin is warm and dry.   Neurological:      Mental Status: He is alert.   Psychiatric:         Mood and Affect: Mood normal.         Thought Content: Thought content normal.         Judgment: Judgment normal.       Significant Labs:  I have reviewed all pertinent lab results within the past 24 hours.  CBC:   Recent Labs   Lab 05/21/22  0537   WBC 11.81   RBC 3.38*   HGB 7.7*   HCT 27.3*   *   MCV 81*   MCH 22.8*   MCHC 28.2*     BMP:   Recent Labs   Lab 05/21/22  0537         K 4.9      CO2 26   BUN 46*   CREATININE 1.4   CALCIUM 7.7*   MG 2.1     CMP:   Recent Labs   Lab 05/21/22  0537      CALCIUM 7.7*   ALBUMIN 1.4*   PROT 4.2*      K 4.9   CO2 26      BUN 46*   CREATININE 1.4   ALKPHOS 112   ALT 35   AST 37   BILITOT 1.6*     LFTs:   Recent Labs   Lab 05/21/22  0537   ALT 35   AST 37   ALKPHOS 112   BILITOT 1.6*   PROT 4.2*   ALBUMIN 1.4*       Significant Diagnostics:  I have reviewed all pertinent imaging results/findings within the past 24 hours.

## 2022-05-21 NOTE — PT/OT/SLP PROGRESS
Physical Therapy  Treatment    Franky Masters   MRN: [id number]   Admitting Diagnosis: Severe sepsis    PT Received On: 05/21/22  PT Start Time: 0830     PT Stop Time: 0900    PT Total Time (min): 30 min       Billable Minutes:  Therapeutic Activity 30    Treatment Type: Treatment  PT/PTA: PTA     PTA Visit Number: 3       General Precautions: Standard, fall, NPO, respiratory  Orthopedic Precautions: N/A   Braces: N/A         Subjective:  Communicated with patient's nurse, Deidra, and completed Epic chart review prior to session.  Patient agreed to PT session.    Pain/Comfort  Pain Rating 1: 0/10  Pain Rating Post-Intervention 1: 0/10    Objective:   Patient found with: colostomy, norwood catheter, NG tube, oxygen, ADDIS drain, telemetry, PICC line (ADDIS DRAINS X3)    Functional Mobility:  When initiating bed mobility for sit > supine, padding under patient was noted to be soiled.    Rolling R/L in supine: Max A of 2 (multiple trials due to cleaning and padding change)    Supine scoot towards HOB: Total A of 2    Complete AAROM TE to BLE within all available planes of motion x10 reps: Hip flex/ext, knee flex/ext, ankle PF/DF, quad sets    Educated patient on importance of increasing tolerance to upright position to promote CV endurance. Encouraged patient to utilize elevated HOB into chair position until he is able to safely T/F to chair.  Re enforced importance of utilizing call light to meet needs in room and not attempt to get up without staff assistance. Patient verbalized understanding and agreed to comply.       AM-PAC 6 CLICK MOBILITY  How much help from another person does this patient currently need?   1 = Unable, Total/Dependent Assistance  2 = A lot, Maximum/Moderate Assistance  3 = A little, Minimum/Contact Guard/Supervision  4 = None, Modified [place]/Independent    Turning over in bed (including adjusting bedclothes, sheets and blankets)?: 2  Sitting down on and standing up from a chair with arms (e.g.,  wheelchair, bedside commode, etc.): 1  Moving from lying on back to sitting on the side of the bed?: 2  Moving to and from a bed to a chair (including a wheelchair)?: 1  Need to walk in hospital room?: 1    AM-PAC Raw Score CMS G-Code Modifier Level of Impairment Assistance   6 % Total / Unable   7 - 9 CM 80 - 100% Maximal Assist   10 - 14 CL 60 - 80% Moderate Assist   15 - 19 CK 40 - 60% Moderate Assist   20 - 22 CJ 20 - 40% Minimal Assist   23 CI 1-20% SBA / CGA   24 CH 0% Independent/ Mod I     Patient left with bed in chair position with all lines intact, call button in reach, bed alarm on and nurse present.    Assessment:  Franky Masters is a 67 y.o. male with a medical diagnosis of Severe sepsis and presents with overall decline in functional mobility. Patient would continue to benefit from skilled PT to address functional limitations listed below in order to return to PLOF/decrease caregiver burden.    Rehab identified problem list/impairments: Rehab identified problem list/impairments: weakness, impaired endurance, impaired sensation, impaired self care skills, impaired functional mobilty, gait instability, impaired balance, impaired cognition, decreased coordination, decreased upper extremity function, decreased lower extremity function, decreased safety awareness, decreased ROM, impaired coordination, edema, impaired cardiopulmonary response to activity    Rehab potential is fair.    Activity tolerance: Fair    Discharge recommendations: Discharge Facility/Level of Care Needs: nursing facility, skilled     Barriers to discharge:      Equipment recommendations: Equipment Needed After Discharge: other (see comments) (tbd)     GOALS:   Multidisciplinary Problems     Physical Therapy Goals        Problem: Physical Therapy    Goal Priority Disciplines Outcome Goal Variances Interventions   Physical Therapy Goal     PT, PT/OT      Description: LT22  1. PT WILL COMPLETE BED MOBILITY WITH MOD A  2.  PT WILL STAND WITH RW AND MAX A FOR STAND PIVOT T/F TO CHAIR   3. PT WILL GT TRAIN X 25' WITH RW AND MAX A                   PLAN:    Patient to be seen 3 x/week  to address the above listed problems via gait training, therapeutic activities, therapeutic exercises  Plan of Care expires: 05/16/22  Plan of Care reviewed with: patient         05/21/2022

## 2022-05-21 NOTE — ASSESSMENT & PLAN NOTE
S/p exploratory laparotomy, abdominal washout, segmental small bowel resection (left in discontinuity), placement of Abthera vac 5/4/22  S/p reopening of recent laparotomy, abdominal washout, replacement of Abthera vac 5/7/22  S/p reopening of recent laparotomy, abdominal washout, right hemicolectomy, liver biopsy, end ileostomy, TAP block, abdominal wall closure 5/11/22  Left-sided drain placed by IR for LUQ abscess 5/17/22    - Advance tube feeds to goal and wean off TPN.  - Monitor drain and ileostomy output.  - Speech therapy recommended continuing NPO  - Begin increasing activity  - Ostomy nurse consult  - Strict I/Os  - Medical management per hospital

## 2022-05-21 NOTE — PROGRESS NOTES
[facility] Surg  General Surgery  Progress Note    Subjective:     History of Present Illness:  No notes on file    Post-Op Info:  Procedure(s) (LRB):  CREATION, ILEOSTOMY (N/A)  HEMICOLECTOMY, RIGHT (N/A)  BIOPSY, LIVER (Right)   10 Days Post-Op     Interval History:   5/21/2022 patient evaluated by speech therapy.  Recommend continuing NPO for now.  Patient tolerating tube feeds.  Liquid stool in bag.  Drains in place.  Medications:  Continuous Infusions:   dextrose 5 % 75 mL/hr at 05/21/22 1042     Scheduled Meds:   amiodarone  200 mg Per NG tube BID    apixaban  5 mg Per NG tube BID    ascorbic acid (vitamin C)  500 mg Per NG tube BID    cyanocobalamin  1,000 mcg Intramuscular Daily    famotidine  40 mg Per NG tube Daily    iron sucrose  200 mg Intravenous Once    white petrolatum-mineral oil 57.3-42.5%   Both Eyes QHS     PRN Meds:sodium chloride 0.9%, acetaminophen, dextrose 10%, glucagon (human recombinant), hydrALAZINE, insulin aspart U-100, ondansetron     Review of patient's allergies indicates:  No Known Allergies  Objective:     Vital Signs (Most Recent):  Temp: 97.6 °F (36.4 °C) (05/21/22 1618)  Pulse: 66 (05/21/22 1618)  Resp: 18 (05/21/22 1618)  BP: 113/67 (05/21/22 1618)  SpO2: 96 % (05/21/22 1618) Vital Signs (24h Range):  Temp:  [97.5 °F (36.4 °C)-98.2 °F (36.8 °C)] 97.6 °F (36.4 °C)  Pulse:  [53-66] 66  Resp:  [16-18] 18  SpO2:  [96 %-98 %] 96 %  BP: (113-141)/(59-67) 113/67     Weight: 112.2 kg (247 lb 5.7 oz)  Body mass index is 36.53 kg/m².    Intake/Output - Last 3 Shifts         05/19 0700  05/20 0659 05/20 0700  05/21 0659 05/21 0700  05/22 0659    P.O.  0     I.V. (mL/kg) 2562.9 (22.8) 818.4 (7.3)     NG/ 285 60    IV Piggyback 99.5 64.7     TPN 1080.5 225.9     Total Intake(mL/kg) 3902.8 (34.8) 1393.9 (12.4) 60 (0.5)    Urine (mL/kg/hr) 2560 (1) 2385 (0.9) 1200 (1.1)    Drains 535 325     Stool 1225 975 250    Total Output 4320 3685 1450    Net -417.2 -2291.1 -1390                    Physical Exam  Vitals reviewed.   Constitutional:       General: He is not in acute distress.     Appearance: He is ill-appearing.   HENT:      Nose: Nose normal.      Mouth/Throat:      Mouth: Mucous membranes are moist.   Cardiovascular:      Rate and Rhythm: Normal rate and regular rhythm.   Pulmonary:      Breath sounds: Normal breath sounds.   Abdominal:      General: Bowel sounds are normal. There is no distension.      Palpations: Abdomen is soft.      Comments: Drains in place   Skin:     General: Skin is warm and dry.   Neurological:      Mental Status: He is alert.   Psychiatric:         Mood and Affect: Mood normal.         Thought Content: Thought content normal.         Judgment: Judgment normal.       Significant Labs:  I have reviewed all pertinent lab results within the past 24 hours.  CBC:   Recent Labs   Lab 05/21/22  0537   WBC 11.81   RBC 3.38*   HGB 7.7*   HCT 27.3*   *   MCV 81*   MCH 22.8*   MCHC 28.2*     BMP:   Recent Labs   Lab 05/21/22  0537         K 4.9      CO2 26   BUN 46*   CREATININE 1.4   CALCIUM 7.7*   MG 2.1     CMP:   Recent Labs   Lab 05/21/22  0537      CALCIUM 7.7*   ALBUMIN 1.4*   PROT 4.2*      K 4.9   CO2 26      BUN 46*   CREATININE 1.4   ALKPHOS 112   ALT 35   AST 37   BILITOT 1.6*     LFTs:   Recent Labs   Lab 05/21/22  0537   ALT 35   AST 37   ALKPHOS 112   BILITOT 1.6*   PROT 4.2*   ALBUMIN 1.4*       Significant Diagnostics:  I have reviewed all pertinent imaging results/findings within the past 24 hours.    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  - Monitor drain and ileostomy output.  - Speech therapy recommended continuing NPO  - Begin increasing activity  - Ostomy nurse consult  - Strict I/Os  - Medical management per hospital    Hypoalbuminemia due to protein-calorie malnutrition  Tolerating tube feeds    Acute on chronic anemia  Management per medicine    Acute hypoxemic respiratory failure   Resolved        Alicia Mayen MD  General Surgery  [facility] - [facility]

## 2022-05-21 NOTE — ASSESSMENT & PLAN NOTE
Subjective:     Chief Complaint   Patient presents with   • Annual Exam     Hu Santacruz is a 59 y.o. male here today for evaluation and management of:    Dyslipidemia associated with type 2 diabetes mellitus (HCC)/ Coronary artery disease involving native coronary artery of native heart without angina pectoris/Type 2 diabetes mellitus with vascular disease (HCC)  Stable. Currently taking trulicity, actos,xigduo and crestor  as directed.  Denies side effects or hypoglycemic events.  He is not monitoring blood sugar regularly at home.  Reports diet is good  He is exercising regularly.  Last eye exam: about one month ago- no retinopathy  Denies polyuria, polydipsia, blurred vision, or neuropathies.     Recent eye exam about one month ago-- no retinopathy.     Left ear fullness  Reports feels like he has a lot of wax in left ear. Feels full. Used peroxide and tried to get some of it out.   Denies pain or hearing loss.      Current medicines (including changes today)  Current Outpatient Medications   Medication Sig Dispense Refill   • sildenafil citrate (VIAGRA) 100 MG tablet TAKE ONE TABLET BY MOUTH DAILY AS NEEDED FOR ERECTILE DYSFUNCTION (VIAGRA) 30 Tablet 2   • rosuvastatin (CRESTOR) 20 MG Tab TAKE 1 TABLET BY MOUTH ONCE DAILY AT BEDTIME 90 tablet 3   • Dulaglutide (TRULICITY) 1.5 MG/0.5ML Solution Pen-injector Inject 1 Each under the skin every 7 days. On Mon 4 Each 11   • pioglitazone (ACTOS) 30 MG Tab Take 1 Tab by mouth every day. 90 Tab 3   • aspirin EC (ECOTRIN) 81 MG Tablet Delayed Response Take 81 mg by mouth every day.     • vitamin D (CHOLECALCIFEROL) 1000 UNIT TABS Take 1,000 Units by mouth 2 Times a Day.       No current facility-administered medications for this visit.        Objective:     Vitals:    10/26/21 0745   BP: 122/60   BP Location: Right arm   Patient Position: Sitting   BP Cuff Size: Adult   Pulse: 71   Temp: 36.4 °C (97.5 °F)   TempSrc: Temporal   SpO2: 98%   Weight: 86.3 kg (190 lb  Worsening  Trend  Cr 2.2 today    Cr now 3.8- Oliguric- heading towards Anuria and ATN/ May need HD vs CRRT- she has massive fluid Overload.     Cr now 4.5-  Remains anuric  POA/ Family not in favor of HD    Remains Oliguric due to Septic Shock on 2 Pressors  Some urine output with lasix but no Polyuria     5/10 Urine Out put improved with diuresis while renal functions remains stable    Improving, Cr coming down, good urine output    improved   "3.2 oz)   Height: 1.803 m (5' 11\")     Body mass index is 26.53 kg/m².     Physical Exam:  Gen: Well developed, well nourished in no acute distress.   Skin: Pink, warm, and dry  HEENT: conjunctiva non-injected, sclera non-icteric. EOMs intact.   Nasal mucosa without edema nor erythema. No facial tenderness  Ears: Ligia pinnae. Left external auditory canal completely occluded with impacted cerumen refractory to curette by Provider. Procedure: Pt then prepped and lavaged by MA with resolution of impaction. TM pearly gray with normal light reflex bilaterally.   Lungs: Effort is normal. Clear to auscultation bilaterally with good excursion.  CV: regular rate and rhythm.  Abdomen: soft, nontender, + BS.   Ext: no edema, color normal, vascularity normal, temperature normal  Alert and oriented Eye contact is good, speech goal directed, affect calm    Lab Results   Component Value Date/Time    HBA1C 6.5 (A) 10/26/2021 07:30 AM    HBA1C 6.4 (H) 11/04/2020 04:31 AM    HBA1C 6.4 (H) 03/13/2020 04:43 AM    HBA1C 6.6 (A) 09/10/2019 08:22 AM    HBA1C 7.4 (H) 04/02/2019 07:14 AM       Reviewed and discussed above labs with patient in visit.     Assessment and Plan:   The following treatment plan was discussed:    1. Dyslipidemia associated with type 2 diabetes mellitus (HCC)  Chronic, stable. Continue current medicines. Monitor labs regularly.   Will work on getting retinal screening results into his chart. Has appointment with endo in February--will complete labs prior to that visit.   - POCT  A1C    2. Coronary artery disease involving native coronary artery of native heart without angina pectoris  Chronic, stable. Overdue for annual follow-up with cardiology. Reviewed their last note with patient--he will call and get that scheduled.    3. Type 2 diabetes mellitus with vascular disease (HCC)  Chronic, improved with viagra. Refilled.     4. Left ear impacted cerumen  Resolved with procedure in office.     5. Need for " vaccination  VIS given. Verbal informed consent obtained. Vaccine administered in office.   - INFLUENZA VACCINE QUAD INJ (PF)    Health Maintenance: Shingrix unavailable in the clinic. To be administered at pharmacy.     Followup: Return in about 1 year (around 10/26/2022) for annual exam, sooner if needed.

## 2022-05-21 NOTE — PROGRESS NOTES
[facility] Medicine  Progress Note    Patient Name: Franky Masters  MRN: [id number]  Patient Class: IP- Inpatient   Admission Date: 5/4/2022  Length of Stay: 17 days  Attending Physician: Elvie Parker DO  Primary Care Provider: HOLLY ROSEN        Subjective:     Principal Problem:Severe sepsis        HPI:  Mr Masters is a 65 yo male POD#0 s/p SB perforation with surgical intervention demonstrating a large cecal mass and 3l feculant fluid in the abdominal cavity. Additional findings of a perforated ileum and a liver mass. Patient tolerated the Exlap with Washout and small bowel excision. Patient had a wound vac placed, is currently intubated, sedated and recovering in the ICU. Patient received 4 units PRBC and remains on pressor support. Patient is a DNR and HM consulted with assistance with medical management. Daughter at bedside. Patient discussed with care team.          Overview/Hospital Course:  Patient continues to require pressors, remains intubated, sedated. Patient urine o/p declining and concerning. Discussed POC in detail at bedside with daughter POA. She expressed her fathers wish to not undergo treatments  like perm HD, where he has a diminished quality of life. We spoke frankly about issues and concerns and she will be communicating with the family as his case evolves. Comfort care was discussed and the goals of care will develop consistent with the patients expressed wishes. Potential for another surgery likely Saturday with a washout and possible biopsy vs resection are on the horizon. This possible intervention will be covered in detail by surgery sharing the risks and benefits of that approach. Case discussed with the primary service - surgery, and critical care team.    5/6- Pt seen and examined in the ICU plus discussed with ICU team and the pt's daughter/ POA: Remains critically ill, intubated, sedated on Vent, on Pressors- Levo plus Vaso- JOSE with oliguria getting worse-  Cr rising to 3.6, becoming severely acidotic- requiring Ertapenem, Zyvox, Micafungin as well as on Bicarb and Fentanyl gtt. He has massive fluid overload and generalized anasarca.  Possible return to OR tomorrow 5/7 if patient is more stable for right hemicolectomy, possible ileostomy, liver biopsy, abdominal wall closure. Dw Pt's daughter.       5/7- remains Intubated, sedated on Vent- Went into Afib w RVR- hence added Amio to Levo and Vasopressin- back in NSR. Getting Invanz and Zyvox plus Micafungin. Dr. Parker took him back to OR for for abdominal washout ( 3-3.5 L feculent fluid with food particles suctioned out in the OR, small bowel appeared better) and replaced Abthera- still has bowel discontinuity. His WBC, Lactate and Cr have all increased. Family updated about his critical condition and poor prognosis and JOSE/ATN- they are considering Comfort measures.     5/8- Day 5 on Vent- family at bedside, remains intubated on Vent with 2 Pressors- still on Amiodarone gtt for Afib, Still has Abthera wound vac to open Abdomen with small bowel discontinuity. Remains oliguric with generalized anasarca- almost 24 L fluid positive. Cr increased to 4.6. WBC down to 12, family does not want any HD/CRRT, so getting an IV Lasix trial to improve the urine output.     5/9- Day 6 on vent- remains the same, remains intubated, on vent with Abthera Wound vac and bowel discontinuity. Put out about 3 L urine since yesterday with IV Lasix, family still does not want any HD, WBC down to 10.2, H/H 7.8/24, still on 2 Pressors and Amio gtt. Family still deciding about comfort care.     5/10- Appreciate all- Day 7- remains same in septic shock on 2 vasopressors, intubated and sedated, still in afib. JOSE has remained stable at 4.7 but urine output improved with IV lasix. Abthera wound vac in place. No surgery today- put out about 2.5 L yesterday, given lasix again today.    5/11- Seen Pre op- looks better, less swollen, remains intubated,  sedated on Vent, on 1 Pressor- on Levophed. Going for OR today for Laparotomy and washout and abd wound closure. Good response to Lasix. Still Afib on Amiodarone gtt. Bun/Cr 98/4.4, WBC down to 17, H/H 8.6/26.     5/12- Day 8 on Vent- looks much better, remains on Vent with Levophed and Amio gtts. Had extensive surgery yesterday and did very well post op- Reopening of recent Laparotomy, Abdominal washout, R Hemicolectomy with Liver Bx, TAP block, Abdominal wall closure, Creation, end Ileostomy. POD 1- looks better, still on Levophed and Amio gtt for Afib, started on TPN, ID stopped Zyvox and continued Merrem/Mycafungin.     5/17 - Successfully extubated.  Additional left side drain placed by Interventional Radiology.    5/18 - Purulent drainage from left side drains.  Remained stable.  5/20 - POD 9 s/p R hemicolectomy and Ileostomy- extubated 5/18 and also underwent large Abdominal abscess by IR on 5/18 with over 500 cc pus drained- all Cx remain NGTD. No fever or chills, no leukocytosis, AAOx2, remains on RA, very weak, malnourished. Getting TPN, Albumin 1.4. Hemodynamically stable- hence transferred out of ICU to floor, getting PT/OT.   5/21- looks lot better, more alert and responsive, following commands, moving all 4 ext well, generalized anasarca improving. Bun/Cr further down to 46/1.4, H/h stable at 7.7/23- ordered IV Venofer plis inj B12 IM. Tolerating TF well. Will continue PT/OT.       Interval History:    looks lot better, more alert and responsive, following commands, moving all 4 ext well, generalized anasarca improving. Bun/Cr further down to 46/1.4, H/h stable at 7.7/23- ordered IV Venofer plis inj B12 IM. Tolerating TF well. Will continue PT/OT.   Review of Systems   Unable to perform ROS: Acuity of condition   Constitutional:  Positive for activity change, appetite change and fatigue.   HENT:  Positive for voice change.    Eyes: Negative.    Cardiovascular:  Positive for leg swelling.    Gastrointestinal: Negative.    Genitourinary: Negative.    Musculoskeletal:  Positive for gait problem.   Skin:  Positive for pallor.   Neurological:  Positive for weakness.   Psychiatric/Behavioral:  Positive for decreased concentration.    Objective:     Vital Signs (Most Recent):  Temp: 97.6 °F (36.4 °C) (05/21/22 1618)  Pulse: 66 (05/21/22 1618)  Resp: 18 (05/21/22 1618)  BP: 113/67 (05/21/22 1618)  SpO2: 96 % (05/21/22 1618) Vital Signs (24h Range):  Temp:  [97.5 °F (36.4 °C)-98.2 °F (36.8 °C)] 97.6 °F (36.4 °C)  Pulse:  [53-66] 66  Resp:  [16-18] 18  SpO2:  [96 %-98 %] 96 %  BP: (113-141)/(59-67) 113/67     Weight: 112.2 kg (247 lb 5.7 oz)  Body mass index is 36.53 kg/m².    Intake/Output Summary (Last 24 hours) at 5/21/2022 1753  Last data filed at 5/21/2022 0824  Gross per 24 hour   Intake 180 ml   Output 4085 ml   Net -3905 ml      Physical Exam  Vitals reviewed.   Constitutional:       General: He is not in acute distress.     Appearance: He is ill-appearing.   HENT:      Nose: Nose normal.      Mouth/Throat:      Mouth: Mucous membranes are moist.   Cardiovascular:      Rate and Rhythm: Normal rate and regular rhythm.   Pulmonary:      Breath sounds: Normal breath sounds.   Abdominal:      General: Bowel sounds are normal. There is no distension.      Palpations: Abdomen is soft.      Comments: Drains in place   Skin:     General: Skin is warm and dry.   Neurological:      Mental Status: He is alert.   Psychiatric:         Mood and Affect: Mood normal.         Thought Content: Thought content normal.         Judgment: Judgment normal.       Significant Labs: All pertinent labs within the past 24 hours have been reviewed.  BMP:   Recent Labs   Lab 05/21/22  0537         K 4.9      CO2 26   BUN 46*   CREATININE 1.4   CALCIUM 7.7*   MG 2.1     CBC:   Recent Labs   Lab 05/20/22  0408 05/21/22  0537   WBC 11.67 11.81   HGB 7.7* 7.7*   HCT 26.8* 27.3*    468*       Significant  Imaging: I have reviewed all pertinent imaging results/findings within the past 24 hours.      Assessment/Plan:      * Severe sepsis secondary to Peritonitis from bowel perforation  15 May:  No longer on vasopressor.  Weaning sedation for awakening trials.  Left side intra-abdominal fluid collection/abscess draining into ADDIS.  17 May:  Additional drain placed.  Extubated.  18 May:  Remaining stable and off ventilator.  Starting tube feedings soon.  19 May:  Purulent drainage form two left side drains and right side drain serous.  Starting tube feedings at low rate.  5/20- s/p POD 9 s/p R hemicolectomy and Ileostomy- s/p large Abdominal abscess drainage by IR on 5/18 with over 500 cc pus drained- all Cx remain NGTD.      Appears resolved    Acute hypoxemic respiratory failure   Patient with Hypoxic Respiratory failure which is Acute.  he is not on home oxygen. Supplemental oxygen was provided and noted-  .   Signs/symptoms of respiratory failure include- tachypnea. Contributing diagnoses includes - Obesity Hypoventilation Labs and images were reviewed. Patient Has recent ABG, which has been reviewed. Will treat underlying causes and adjust management of respiratory failure as indicated. Pulmonary CC on board  Day 2 on Vent- remains intubated on vent  Cont vent support    Day 4 on Vent    Day 5 on vent- adequate O2, sats    Day 6- continue supportive care, await family's decision about comfort care    Day 7- continue support- trying to diurese     Day 8- minimal vent support- start weaning soon    15 May:  SAT/SBT as tolerated  17 May:  Successfully extubated.    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 Parker- pt too unstable for right hemicolectomy, end ileostomy, liver biopsy today  s/p abdominal washout with Abthera replacement today.  5/8- POD 3 with s/p Laparotomy and bowel resection and subsequent laparoscopic washout- persistent bowel discontinuity and abthera wound vac closure   5/9- persistent bowel discontinuity- await further decision by family    Await further input from surgery    Await surgery today- going for closure today    S/p- Reopening of recent Laparotomy, Abdominal washout, R Hemicolectomy with Liver Bx, TAP block, Abdominal wall closure, Creation, end Ileostomy. POD 1- looks better, still on Levophed and Amio gtt for Afib, started on TPN, ID stopped Zyvox and continued Merrem/Mycafungin.     5/20- POD 9 s/p R hemicolectomy and Ileostomy- extubated 5/18 and also Large Abdominal Abscess drainage by IR on 5/18 transferred out of ICU to floor, getting PT/OT.     5/21- Tolerating TF well, Colostomy also working well.     JOSE (acute kidney injury)  Worsening  Trend  Cr 2.2 today    Cr now 3.8- Oliguric- heading towards Anuria and ATN/ May need HD vs CRRT- she has massive fluid Overload.     Cr now 4.5-  Remains anuric  POA/ Family not in favor of HD    Remains Oliguric due to Septic Shock on 2 Pressors  Some urine output with lasix but no Polyuria     5/10 Urine Out put improved with diuresis while renal functions remains stable    Improving, Cr coming down, good urine output    improved    Hypoalbuminemia due to protein-calorie malnutrition  Sec to severe Septic shock from Peritonitis- improving with TF now      Obesity (BMI 30.0-34.9)  Diet  Nutrition on recovery      Acute on chronic anemia  Hgb 10.3 s/p Transfusion 4 units Prbc  Transfuse for Hgb <7  Monitor    5/5  Improved  Hgb 11.4 today  Transfuse as indicated    5/10- H/H 8.6/28- likely dilutional from ATN- improving    5/21- inj B12 IM plus Venofer given, may benefit from Procrit      VTE Risk Mitigation (From admission, onward)         Ordered     apixaban tablet 5 mg  2 times daily         05/20/22 1004     IP VTE  HIGH RISK PATIENT  Once         05/04/22 1253     Place sequential compression device  Until discontinued         05/04/22 1253                Discharge Planning   ELLIOT:      Code Status: DNR   Is the patient medically ready for discharge?:     Reason for patient still in hospital (select all that apply): Patient trending condition, Laboratory test, Treatment, Imaging, Consult recommendations, PT / OT recommendations and Pending disposition  Discharge Plan A: Home        Megha Mejia MD  Department of Hospital Medicine   [facility]

## 2022-05-22 LAB
ACANTHOCYTES BLD QL SMEAR: PRESENT
ALBUMIN SERPL BCP-MCNC: 1.4 G/DL (ref 3.5–5.2)
ALP SERPL-CCNC: 117 U/L (ref 55–135)
ALT SERPL W/O P-5'-P-CCNC: 36 U/L (ref 10–44)
ANION GAP SERPL CALC-SCNC: 9 MMOL/L (ref 8–16)
ANISOCYTOSIS BLD QL SMEAR: SLIGHT
AST SERPL-CCNC: 36 U/L (ref 10–40)
BASOPHILS # BLD AUTO: 0.1 K/UL (ref 0–0.2)
BASOPHILS NFR BLD: 0.8 % (ref 0–1.9)
BILIRUB SERPL-MCNC: 1.5 MG/DL (ref 0.1–1)
BUN SERPL-MCNC: 42 MG/DL (ref 8–23)
CALCIUM SERPL-MCNC: 7.6 MG/DL (ref 8.7–10.5)
CHLORIDE SERPL-SCNC: 109 MMOL/L (ref 95–110)
CO2 SERPL-SCNC: 25 MMOL/L (ref 23–29)
CREAT SERPL-MCNC: 1.4 MG/DL (ref 0.5–1.4)
DIFFERENTIAL METHOD: ABNORMAL
EOSINOPHIL # BLD AUTO: 0.3 K/UL (ref 0–0.5)
EOSINOPHIL NFR BLD: 2.6 % (ref 0–8)
ERYTHROCYTE [DISTWIDTH] IN BLOOD BY AUTOMATED COUNT: 30.4 % (ref 11.5–14.5)
EST. GFR  (AFRICAN AMERICAN): 59 ML/MIN/1.73 M^2
EST. GFR  (NON AFRICAN AMERICAN): 51 ML/MIN/1.73 M^2
GLUCOSE SERPL-MCNC: 100 MG/DL (ref 70–110)
HCT VFR BLD AUTO: 26.3 % (ref 40–54)
HGB BLD-MCNC: 7.4 G/DL (ref 14–18)
HYPOCHROMIA BLD QL SMEAR: ABNORMAL
IMM GRANULOCYTES # BLD AUTO: 0.19 K/UL (ref 0–0.04)
IMM GRANULOCYTES NFR BLD AUTO: 1.5 % (ref 0–0.5)
LYMPHOCYTES # BLD AUTO: 1.4 K/UL (ref 1–4.8)
LYMPHOCYTES NFR BLD: 11 % (ref 18–48)
MAGNESIUM SERPL-MCNC: 1.9 MG/DL (ref 1.6–2.6)
MCH RBC QN AUTO: 22.8 PG (ref 27–31)
MCHC RBC AUTO-ENTMCNC: 28.1 G/DL (ref 32–36)
MCV RBC AUTO: 81 FL (ref 82–98)
MONOCYTES # BLD AUTO: 1.4 K/UL (ref 0.3–1)
MONOCYTES NFR BLD: 10.9 % (ref 4–15)
NEUTROPHILS # BLD AUTO: 9.1 K/UL (ref 1.8–7.7)
NEUTROPHILS NFR BLD: 73.2 % (ref 38–73)
NRBC BLD-RTO: 0 /100 WBC
OVALOCYTES BLD QL SMEAR: ABNORMAL
PHOSPHATE SERPL-MCNC: 4.8 MG/DL (ref 2.7–4.5)
PLATELET # BLD AUTO: 478 K/UL (ref 150–450)
PLATELET BLD QL SMEAR: ABNORMAL
PMV BLD AUTO: ABNORMAL FL (ref 9.2–12.9)
POCT GLUCOSE: 100 MG/DL (ref 70–110)
POCT GLUCOSE: 104 MG/DL (ref 70–110)
POCT GLUCOSE: 106 MG/DL (ref 70–110)
POCT GLUCOSE: 111 MG/DL (ref 70–110)
POIKILOCYTOSIS BLD QL SMEAR: SLIGHT
POLYCHROMASIA BLD QL SMEAR: ABNORMAL
POTASSIUM SERPL-SCNC: 4.8 MMOL/L (ref 3.5–5.1)
PROT SERPL-MCNC: 4.2 G/DL (ref 6–8.4)
RBC # BLD AUTO: 3.24 M/UL (ref 4.6–6.2)
SCHISTOCYTES BLD QL SMEAR: PRESENT
SICKLE CELLS BLD QL SMEAR: ABNORMAL
SODIUM SERPL-SCNC: 143 MMOL/L (ref 136–145)
TARGETS BLD QL SMEAR: ABNORMAL
WBC # BLD AUTO: 12.4 K/UL (ref 3.9–12.7)

## 2022-05-22 PROCEDURE — 25000003 PHARM REV CODE 250: Performed by: SURGERY

## 2022-05-22 PROCEDURE — 84100 ASSAY OF PHOSPHORUS: CPT | Performed by: NURSE PRACTITIONER

## 2022-05-22 PROCEDURE — 83735 ASSAY OF MAGNESIUM: CPT | Performed by: NURSE PRACTITIONER

## 2022-05-22 PROCEDURE — 85025 COMPLETE CBC W/AUTO DIFF WBC: CPT | Performed by: NURSE PRACTITIONER

## 2022-05-22 PROCEDURE — 11000001 HC ACUTE MED/SURG PRIVATE ROOM

## 2022-05-22 PROCEDURE — 21400001 HC TELEMETRY ROOM

## 2022-05-22 PROCEDURE — 25000003 PHARM REV CODE 250: Performed by: NURSE PRACTITIONER

## 2022-05-22 PROCEDURE — 80053 COMPREHEN METABOLIC PANEL: CPT | Performed by: NURSE PRACTITIONER

## 2022-05-22 PROCEDURE — 92526 ORAL FUNCTION THERAPY: CPT

## 2022-05-22 PROCEDURE — 63600175 PHARM REV CODE 636 W HCPCS: Mod: JG | Performed by: SURGERY

## 2022-05-22 PROCEDURE — 63600175 PHARM REV CODE 636 W HCPCS: Performed by: EMERGENCY MEDICINE

## 2022-05-22 RX ORDER — LOPERAMIDE HYDROCHLORIDE 2 MG/1
2 CAPSULE ORAL 3 TIMES DAILY
Status: DISCONTINUED | OUTPATIENT
Start: 2022-05-22 | End: 2022-05-26 | Stop reason: HOSPADM

## 2022-05-22 RX ADMIN — AMIODARONE HYDROCHLORIDE 200 MG: 200 TABLET ORAL at 10:05

## 2022-05-22 RX ADMIN — APIXABAN 5 MG: 2.5 TABLET, FILM COATED ORAL at 09:05

## 2022-05-22 RX ADMIN — DEXTROSE: 5 SOLUTION INTRAVENOUS at 04:05

## 2022-05-22 RX ADMIN — LOPERAMIDE HYDROCHLORIDE 2 MG: 2 CAPSULE ORAL at 09:05

## 2022-05-22 RX ADMIN — OXYCODONE HYDROCHLORIDE AND ACETAMINOPHEN 500 MG: 500 TABLET ORAL at 09:05

## 2022-05-22 RX ADMIN — AMIODARONE HYDROCHLORIDE 200 MG: 200 TABLET ORAL at 09:05

## 2022-05-22 RX ADMIN — FAMOTIDINE 40 MG: 40 POWDER, FOR SUSPENSION ORAL at 10:05

## 2022-05-22 RX ADMIN — EPOETIN ALFA-EPBX 5300 UNITS: 10000 INJECTION, SOLUTION INTRAVENOUS; SUBCUTANEOUS at 09:05

## 2022-05-22 RX ADMIN — MINERAL OIL, PETROLATUM: 425; 573 OINTMENT OPHTHALMIC at 09:05

## 2022-05-22 RX ADMIN — CYANOCOBALAMIN 1000 MCG: 1000 INJECTION, SOLUTION INTRAMUSCULAR at 10:05

## 2022-05-22 RX ADMIN — APIXABAN 5 MG: 2.5 TABLET, FILM COATED ORAL at 10:05

## 2022-05-22 RX ADMIN — OXYCODONE HYDROCHLORIDE AND ACETAMINOPHEN 500 MG: 500 TABLET ORAL at 10:05

## 2022-05-22 NOTE — PT/OT/SLP PROGRESS
Speech Language Pathology Treatment    Patient Name:  Franky Masters   MRN:  [id number]  Admitting Diagnosis: Severe sepsis    Recommendations:                 General Recommendations:  Aspiration precautions  Diet recommendations:  NPO, Liquid Diet Level: NPO (ice chips sparingly with adequate oral care)   Aspiration Precautions: In place   General Precautions: Standard, aspiration  Communication strategies:  verbal    Subjective     Pt reporting he was feeling better today  Patient goals: to be able to eat    Pain/Comfort:  ·  0/10    Respiratory Status: oxygen    Objective:     Has the patient been evaluated by SLP for swallowing?    yes  Keep patient NPO?   yes  Current Respiratory Status:    02    Pt completed 10-15 reps of oral motor , tongue base retraction and laryngeal elevation tasks with visual and verbal cues. Daysi technique to improve swallowing introduced and pt unable to complete with cues provided.   Pt was given 2 small ice chips with coughing x1.  Pt also given 2 small bites of applesauce with delayed laryngeal excursions and slight improved vocal quality and ability to cough/clear.    Oral care provided before oral stim and pt noted with dark material on tongue and pt and daughter reported she gave pt small amount of chocolate pudding.  Pt and family education (with daughter and wife) completed and risks of aspiration reviewed.     Basic orientation reviewed; pt oriented to person but needed cues for orientation to place and situation.      Assessment:     Franky Masters is a 67 y.o. male with an SLP diagnosis of Dysphagia and decreased cognition/communication skills.   Pt improving with swallowing exercises with increased endurance and stronger vocal clearing of secretions today.   Pt does still remain a high risk for aspiration and is recommended to remain NPO at this time. S.T. recommended to continue to address improving swallowing and cognitive/linguistic skills.  Pt and family educated on  pt's progress and goals.   All questions answered during session.     Goals:   Multidisciplinary Problems     SLP Goals        Problem: SLP    Goal Priority Disciplines Outcome   SLP Goal     SLP Ongoing, Progressing   Description: 1.  Pt will consume least restrictive PO diet without s/s of dysphagia.  1a. Ongoing bedside swallowing assessment  1b. MBSS if clinically indicated    2.  Recommended cognitive-communicative evaluation                   Plan:     · Patient to be seen:  daily (Rec: daily to address swallowing impairment with 2-3x/week following initiation of PO diet.)   · Plan of Care expires:  05/27/22  · Plan of Care reviewed with:  patient, daughter   · SLP Follow-Up:  Yes       Discharge recommendations:  rehabilitation facility   Barriers to Discharge:  May need assistance     Time Tracking:     SLP Treatment Date:    05/22/22  Speech Start Time:   1045  Speech Stop Time:   1120     Speech Total Time (min):   35    Billable Minutes: 35 minutes     05/22/2022

## 2022-05-22 NOTE — ASSESSMENT & PLAN NOTE
S/p exploratory laparotomy, abdominal washout, segmental small bowel resection (left in discontinuity), placement of Abthera vac 5/4/22  S/p reopening of recent laparotomy, abdominal washout, replacement of Abthera vac 5/7/22  S/p reopening of recent laparotomy, abdominal washout, right hemicolectomy, liver biopsy, end ileostomy, TAP block, abdominal wall closure 5/11/22  Left-sided drain placed by IR for LUQ abscess 5/17/22    - tube feeds at 30 mL/hour.  Dietitian to advise on goal rate tomorrow.  - Monitor drain and ileostomy output.  Ileostomy output has increased.  Will add Imodium 3 times a day  - Speech therapy recommended continuing NPO  - encourage increasing activity  - Ostomy nurse consult  - Strict I/Os  - Medical management per hospital

## 2022-05-22 NOTE — PROGRESS NOTES
[facility]  Progress Note    Patient Name: Franky Masters  MRN: [id number]  Patient Class: IP- Inpatient   Admission Date: 5/4/2022  Length of Stay: 18 days  Attending Physician: Elvie Parker DO  Primary Care Provider: HOLLY ROSEN        Subjective:     Principal Problem:Severe sepsis        HPI:  Mr Masters is a 67 yo male POD#0 s/p SB perforation with surgical intervention demonstrating a large cecal mass and 3l feculant fluid in the abdominal cavity. Additional findings of a perforated ileum and a liver mass. Patient tolerated the Exlap with Washout and small bowel excision. Patient had a wound vac placed, is currently intubated, sedated and recovering in the ICU. Patient received 4 units PRBC and remains on pressor support. Patient is a DNR and HM consulted with assistance with medical management. Daughter at bedside. Patient discussed with care team.          Overview/Hospital Course:  Patient continues to require pressors, remains intubated, sedated. Patient urine o/p declining and concerning. Discussed POC in detail at bedside with daughter POA. She expressed her fathers wish to not undergo treatments  like perm HD, where he has a diminished quality of life. We spoke frankly about issues and concerns and she will be communicating with the family as his case evolves. Comfort care was discussed and the goals of care will develop consistent with the patients expressed wishes. Potential for another surgery likely Saturday with a washout and possible biopsy vs resection are on the horizon. This possible intervention will be covered in detail by surgery sharing the risks and benefits of that approach. Case discussed with the primary service - surgery, and critical care team.    5/6- Pt seen and examined in the ICU plus discussed with ICU team and the pt's daughter/ POA: Remains critically ill, intubated, sedated on Vent, on Pressors- Levo plus Vaso- JOSE with oliguria getting worse-  Cr rising to 3.6, becoming severely acidotic- requiring Ertapenem, Zyvox, Micafungin as well as on Bicarb and Fentanyl gtt. He has massive fluid overload and generalized anasarca.  Possible return to OR tomorrow 5/7 if patient is more stable for right hemicolectomy, possible ileostomy, liver biopsy, abdominal wall closure. Dw Pt's daughter.       5/7- remains Intubated, sedated on Vent- Went into Afib w RVR- hence added Amio to Levo and Vasopressin- back in NSR. Getting Invanz and Zyvox plus Micafungin. Dr. Parker took him back to OR for for abdominal washout ( 3-3.5 L feculent fluid with food particles suctioned out in the OR, small bowel appeared better) and replaced Abthera- still has bowel discontinuity. His WBC, Lactate and Cr have all increased. Family updated about his critical condition and poor prognosis and JOSE/ATN- they are considering Comfort measures.     5/8- Day 5 on Vent- family at bedside, remains intubated on Vent with 2 Pressors- still on Amiodarone gtt for Afib, Still has Abthera wound vac to open Abdomen with small bowel discontinuity. Remains oliguric with generalized anasarca- almost 24 L fluid positive. Cr increased to 4.6. WBC down to 12, family does not want any HD/CRRT, so getting an IV Lasix trial to improve the urine output.     5/9- Day 6 on vent- remains the same, remains intubated, on vent with Abthera Wound vac and bowel discontinuity. Put out about 3 L urine since yesterday with IV Lasix, family still does not want any HD, WBC down to 10.2, H/H 7.8/24, still on 2 Pressors and Amio gtt. Family still deciding about comfort care.     5/10- Appreciate all- Day 7- remains same in septic shock on 2 vasopressors, intubated and sedated, still in afib. JOSE has remained stable at 4.7 but urine output improved with IV lasix. Abthera wound vac in place. No surgery today- put out about 2.5 L yesterday, given lasix again today.    5/11- Seen Pre op- looks better, less swollen, remains intubated,  sedated on Vent, on 1 Pressor- on Levophed. Going for OR today for Laparotomy and washout and abd wound closure. Good response to Lasix. Still Afib on Amiodarone gtt. Bun/Cr 98/4.4, WBC down to 17, H/H 8.6/26.     5/12- Day 8 on Vent- looks much better, remains on Vent with Levophed and Amio gtts. Had extensive surgery yesterday and did very well post op- Reopening of recent Laparotomy, Abdominal washout, R Hemicolectomy with Liver Bx, TAP block, Abdominal wall closure, Creation, end Ileostomy. POD 1- looks better, still on Levophed and Amio gtt for Afib, started on TPN, ID stopped Zyvox and continued Merrem/Mycafungin.     5/17 - Successfully extubated.  Additional left side drain placed by Interventional Radiology.    5/18 - Purulent drainage from left side drains.  Remained stable.  5/20 - POD 9 s/p R hemicolectomy and Ileostomy- extubated 5/18 and also underwent large Abdominal abscess by IR on 5/18 with over 500 cc pus drained- all Cx remain NGTD. No fever or chills, no leukocytosis, AAOx2, remains on RA, very weak, malnourished. Getting TPN, Albumin 1.4. Hemodynamically stable- hence transferred out of ICU to floor, getting PT/OT.   5/21- looks lot better, more alert and responsive, following commands, moving all 4 ext well, generalized anasarca improving. Bun/Cr further down to 46/1.4, H/h stable at 7.7/23- ordered IV Venofer plis inj B12 IM. Tolerating TF well. Will continue PT/OT.   5/22- looks better and getting stronger, more responsive and moving all 4 ext well, tolerating TF well at 30 ml/hr via NGT. Wife and daughter at bedside. BUE swelling also coning down. Continue PT/OT.       Interval History: looks better and getting stronger, more responsive and moving all 4 ext well, tolerating TF well at 30 ml/hr via NGT. Wife and daughter at bedside. BUE swelling also coning down. Continue PT/OT.     Review of Systems   Unable to perform ROS: Acuity of condition   Constitutional:  Positive for activity  change, appetite change and fatigue.   HENT:  Negative for voice change.    Eyes: Negative.    Cardiovascular:  Positive for leg swelling.   Gastrointestinal: Negative.    Genitourinary: Negative.    Musculoskeletal:  Positive for gait problem.   Skin:  Positive for pallor.   Neurological:  Positive for weakness.   Psychiatric/Behavioral:  Positive for decreased concentration.    Objective:     Vital Signs (Most Recent):  Temp: 98 °F (36.7 °C) (05/22/22 1645)  Pulse: (!) 56 (05/22/22 1645)  Resp: 20 (05/22/22 1645)  BP: (!) 126/59 (05/22/22 1645)  SpO2: 97 % (05/22/22 1645)   Vital Signs (24h Range):  Temp:  [97.7 °F (36.5 °C)-98.3 °F (36.8 °C)] 98 °F (36.7 °C)  Pulse:  [52-62] 56  Resp:  [18-20] 20  SpO2:  [96 %-97 %] 97 %  BP: (124-136)/(56-60) 126/59     Weight: 106.3 kg (234 lb 5.6 oz)  Body mass index is 34.61 kg/m².    Intake/Output Summary (Last 24 hours) at 5/22/2022 1812  Last data filed at 5/22/2022 1633  Gross per 24 hour   Intake 1677.15 ml   Output 3495 ml   Net -1817.85 ml      Physical Exam  Vitals reviewed.   Constitutional:       General: He is not in acute distress.     Appearance: He is ill-appearing.   HENT:      Nose: Nose normal.      Mouth/Throat:      Mouth: Mucous membranes are moist.   Cardiovascular:      Rate and Rhythm: Normal rate and regular rhythm.   Pulmonary:      Breath sounds: Normal breath sounds.   Abdominal:      General: Bowel sounds are normal. There is no distension.      Palpations: Abdomen is soft.      Comments: Drains in place   Musculoskeletal:         General: Swelling present.      Right lower leg: Edema present.      Left lower leg: Edema present.   Skin:     General: Skin is warm and dry.   Neurological:      Mental Status: He is alert.   Psychiatric:         Mood and Affect: Mood normal.         Thought Content: Thought content normal.         Judgment: Judgment normal.       Significant Labs: All pertinent labs within the past 24 hours have been reviewed.  CBC:    Recent Labs   Lab 05/21/22  0537 05/22/22  0516   WBC 11.81 12.40   HGB 7.7* 7.4*   HCT 27.3* 26.3*   * 478*     CMP:   Recent Labs   Lab 05/21/22  0537 05/22/22  0516    143   K 4.9 4.8    109   CO2 26 25    100   BUN 46* 42*   CREATININE 1.4 1.4   CALCIUM 7.7* 7.6*   PROT 4.2* 4.2*   ALBUMIN 1.4* 1.4*   BILITOT 1.6* 1.5*   ALKPHOS 112 117   AST 37 36   ALT 35 36   ANIONGAP 8 9   EGFRNONAA 51* 51*       Significant Imaging: I have reviewed all pertinent imaging results/findings within the past 24 hours.      Assessment/Plan:      * Severe sepsis secondary to Peritonitis from bowel perforation  15 May:  No longer on vasopressor.  Weaning sedation for awakening trials.  Left side intra-abdominal fluid collection/abscess draining into ADDIS.  17 May:  Additional drain placed.  Extubated.  18 May:  Remaining stable and off ventilator.  Starting tube feedings soon.  19 May:  Purulent drainage form two left side drains and right side drain serous.  Starting tube feedings at low rate.  5/20- s/p POD 9 s/p R hemicolectomy and Ileostomy- s/p large Abdominal abscess drainage by IR on 5/18 with over 500 cc pus drained- all Cx remain NGTD.      Appears resolved    Acute hypoxemic respiratory failure   Patient with Hypoxic Respiratory failure which is Acute.  he is not on home oxygen. Supplemental oxygen was provided and noted-  .   Signs/symptoms of respiratory failure include- tachypnea. Contributing diagnoses includes - Obesity Hypoventilation Labs and images were reviewed. Patient Has recent ABG, which has been reviewed. Will treat underlying causes and adjust management of respiratory failure as indicated. Pulmonary CC on board  Day 2 on Vent- remains intubated on vent  Cont vent support    Day 4 on Vent    Day 5 on vent- adequate O2, sats    Day 6- continue supportive care, await family's decision about comfort care    Day 7- continue support- trying to diurese     Day 8- minimal vent support-  start weaning soon    15 May:  SAT/SBT as tolerated  17 May:  Successfully extubated.    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 Parker- pt too unstable for right hemicolectomy, end ileostomy, liver biopsy today s/p abdominal washout with Abthera replacement today.  5/8- POD 3 with s/p Laparotomy and bowel resection and subsequent laparoscopic washout- persistent bowel discontinuity and abthera wound vac closure   5/9- persistent bowel discontinuity- await further decision by family    Await further input from surgery    Await surgery today- going for closure today    S/p- Reopening of recent Laparotomy, Abdominal washout, R Hemicolectomy with Liver Bx, TAP block, Abdominal wall closure, Creation, end Ileostomy. POD 1- looks better, still on Levophed and Amio gtt for Afib, started on TPN, ID stopped Zyvox and continued Merrem/Mycafungin.     5/20- POD 9 s/p R hemicolectomy and Ileostomy- extubated 5/18 and also Large Abdominal Abscess drainage by IR on 5/18 transferred out of ICU to floor, getting PT/OT.     5/21- Tolerating TF well, Colostomy also working well.     JOSE (acute kidney injury)  Worsening  Trend  Cr 2.2 today    Cr now 3.8- Oliguric- heading towards Anuria and ATN/ May need HD vs CRRT- she has massive fluid Overload.     Cr now 4.5-  Remains anuric  POA/ Family not in favor of HD    Remains Oliguric due to Septic Shock on 2 Pressors  Some urine output with lasix but no Polyuria     5/10 Urine Out put improved with diuresis while renal functions remains stable    Improving, Cr coming down, good urine output    improved    Oropharyngeal dysphagia with overall muscle deconditioning and weakness  Check swallow eval soon       Hypoalbuminemia due to protein-calorie  malnutrition  Sec to severe Septic shock from Peritonitis- improving with TF now    Continue TF, may increase TF soon      Obesity (BMI 30.0-34.9)  Diet  Nutrition on recovery      Acute on chronic anemia  Hgb 10.3 s/p Transfusion 4 units Prbc  Transfuse for Hgb <7  Monitor    5/5  Improved  Hgb 11.4 today  Transfuse as indicated    5/10- H/H 8.6/28- likely dilutional from ATN- improving    5/21- inj B12 IM plus Venofer given, may benefit from Procrit      VTE Risk Mitigation (From admission, onward)         Ordered     apixaban tablet 5 mg  2 times daily         05/20/22 1004     IP VTE HIGH RISK PATIENT  Once         05/04/22 1253     Place sequential compression device  Until discontinued         05/04/22 1253                Discharge Planning   ELLIOT:      Code Status: DNR   Is the patient medically ready for discharge?:     Reason for patient still in hospital (select all that apply): Patient trending condition, Laboratory test, Treatment, Imaging, Consult recommendations, PT / OT recommendations and Pending disposition  Discharge Plan A: Home        Megha Mejia MD  Department of Hospital Medicine   [facility]

## 2022-05-22 NOTE — ASSESSMENT & PLAN NOTE
Patient stable s/p sx POD#1  Plan for washout, etc per primary service  Wound vac  Goals of care assessment  DNR    S/p SB resection- may go to surgery again tomorrow    5/7- Per Dr. Parker- pt too unstable for right hemicolectomy, end ileostomy, liver biopsy today s/p abdominal washout with Abthera replacement today.  5/8- POD 3 with s/p Laparotomy and bowel resection and subsequent laparoscopic washout- persistent bowel discontinuity and abthera wound vac closure   5/9- persistent bowel discontinuity- await further decision by family    Await further input from surgery    Await surgery today- going for closure today    S/p- Reopening of recent Laparotomy, Abdominal washout, R Hemicolectomy with Liver Bx, TAP block, Abdominal wall closure, Creation, end Ileostomy. POD 1- looks better, still on Levophed and Amio gtt for Afib, started on TPN, ID stopped Zyvox and continued Merrem/Mycafungin.     5/20- POD 9 s/p R hemicolectomy and Ileostomy- extubated 5/18 and also Large Abdominal Abscess drainage by IR on 5/18 transferred out of ICU to floor, getting PT/OT.     5/21- Tolerating TF well, Colostomy also working well.

## 2022-05-22 NOTE — SUBJECTIVE & OBJECTIVE
Interval History: 5/22/2022 patient much more alert today.  Able to produce a better cough and suction himself.  Interacting with his daughter.  Speech therapy still recommends NPO for now.  Continue tube feeds.  Patient with increased ileostomy output.  Will add Imodium 3 times a day for now.  Drains in place.    Medications:  Continuous Infusions:   dextrose 5 % 75 mL/hr at 05/22/22 1646     Scheduled Meds:   amiodarone  200 mg Per NG tube BID    apixaban  5 mg Per NG tube BID    ascorbic acid (vitamin C)  500 mg Per NG tube BID    cyanocobalamin  1,000 mcg Intramuscular Daily    epoetin timothy-epbx  50 Units/kg Subcutaneous Once    famotidine  40 mg Per NG tube Daily    loperamide  2 mg Oral TID    white petrolatum-mineral oil 57.3-42.5%   Both Eyes QHS     PRN Meds:sodium chloride 0.9%, acetaminophen, dextrose 10%, glucagon (human recombinant), hydrALAZINE, insulin aspart U-100, ondansetron     Review of patient's allergies indicates:  No Known Allergies  Objective:     Vital Signs (Most Recent):  Temp: 98 °F (36.7 °C) (05/22/22 1645)  Pulse: (!) 56 (05/22/22 1645)  Resp: 20 (05/22/22 1645)  BP: (!) 126/59 (05/22/22 1645)  SpO2: 97 % (05/22/22 1645)   Vital Signs (24h Range):  Temp:  [97.7 °F (36.5 °C)-98.3 °F (36.8 °C)] 98 °F (36.7 °C)  Pulse:  [52-62] 56  Resp:  [18-20] 20  SpO2:  [96 %-97 %] 97 %  BP: (124-136)/(56-60) 126/59     Weight: 106.3 kg (234 lb 5.6 oz)  Body mass index is 34.61 kg/m².    Intake/Output - Last 3 Shifts         05/20 0700 05/21 0659 05/21 0700 05/22 0659 05/22 0700 05/23 0659    P.O. 0  0    I.V. (mL/kg) 818.4 (7.3) 2726.2 (25.6) 740.1 (7)    NG/ 590 60    IV Piggyback 64.7      .9 367.4     Total Intake(mL/kg) 1393.9 (12.4) 3683.5 (34.7) 800.1 (7.5)    Urine (mL/kg/hr) 2385 (0.9) 3250 (1.3) 400 (0.3)    Drains 325 135 70    Stool 975 1870 600    Total Output 3685 5255 1070    Net -2291.1 -1571.5 -269.9                   Physical Exam  Vitals reviewed.   Constitutional:        General: He is not in acute distress.     Appearance: He is ill-appearing.   HENT:      Nose:      Comments: Feeding tube in place     Mouth/Throat:      Mouth: Mucous membranes are moist.   Cardiovascular:      Rate and Rhythm: Normal rate and regular rhythm.      Heart sounds: Normal heart sounds.   Pulmonary:      Breath sounds: Normal breath sounds.   Abdominal:      General: Bowel sounds are normal. There is no distension.      Palpations: Abdomen is soft.      Comments: Drains in place   Skin:     General: Skin is warm and dry.      Comments: Less edema in hands and feet.   Neurological:      General: No focal deficit present.      Mental Status: He is alert and oriented to person, place, and time.   Psychiatric:         Mood and Affect: Mood normal.         Behavior: Behavior normal.         Thought Content: Thought content normal.         Judgment: Judgment normal.       Significant Labs:  I have reviewed all pertinent lab results within the past 24 hours.  CBC:   Recent Labs   Lab 05/22/22  0516   WBC 12.40   RBC 3.24*   HGB 7.4*   HCT 26.3*   *   MCV 81*   MCH 22.8*   MCHC 28.1*     BMP:   Recent Labs   Lab 05/22/22  0516         K 4.8      CO2 25   BUN 42*   CREATININE 1.4   CALCIUM 7.6*   MG 1.9     CMP:   Recent Labs   Lab 05/22/22  0516      CALCIUM 7.6*   ALBUMIN 1.4*   PROT 4.2*      K 4.8   CO2 25      BUN 42*   CREATININE 1.4   ALKPHOS 117   ALT 36   AST 36   BILITOT 1.5*     LFTs:   Recent Labs   Lab 05/22/22  0516   ALT 36   AST 36   ALKPHOS 117   BILITOT 1.5*   PROT 4.2*   ALBUMIN 1.4*       Significant Diagnostics:  I have reviewed all pertinent imaging results/findings within the past 24 hours.

## 2022-05-22 NOTE — PLAN OF CARE
Remains injury free. Denies c/o pain. Patient is much more alert and interactive than he was yesterday. Answers questions appropriately, uses call bell for needs. Patient has been moving his extremities at will and assists in his turning. Tolerating tube feeding at 30ml/hr. No s/s acute distress. Will monitor.

## 2022-05-22 NOTE — ASSESSMENT & PLAN NOTE
Sec to severe Septic shock from Peritonitis- improving with TF now    Continue TF, may increase TF soon

## 2022-05-22 NOTE — PLAN OF CARE
Problem: Adult Inpatient Plan of Care  Goal: Optimal Comfort and Wellbeing  Outcome: Ongoing, Progressing  Goal: Readiness for Transition of Care  Outcome: Ongoing, Progressing

## 2022-05-22 NOTE — PROGRESS NOTES
[facility]  General Surgery  Progress Note    Subjective:     History of Present Illness:  No notes on file    Post-Op Info:  Procedure(s) (LRB):  CREATION, ILEOSTOMY (N/A)  HEMICOLECTOMY, RIGHT (N/A)  BIOPSY, LIVER (Right)   11 Days Post-Op     Interval History: 5/22/2022 patient much more alert today.  Able to produce a better cough and suction himself.  Interacting with his daughter.  Speech therapy still recommends NPO for now.  Continue tube feeds.  Patient with increased ileostomy output.  Will add Imodium 3 times a day for now.  Drains in place.    Medications:  Continuous Infusions:   dextrose 5 % 75 mL/hr at 05/22/22 1646     Scheduled Meds:   amiodarone  200 mg Per NG tube BID    apixaban  5 mg Per NG tube BID    ascorbic acid (vitamin C)  500 mg Per NG tube BID    cyanocobalamin  1,000 mcg Intramuscular Daily    epoetin timothy-epbx  50 Units/kg Subcutaneous Once    famotidine  40 mg Per NG tube Daily    loperamide  2 mg Oral TID    white petrolatum-mineral oil 57.3-42.5%   Both Eyes QHS     PRN Meds:sodium chloride 0.9%, acetaminophen, dextrose 10%, glucagon (human recombinant), hydrALAZINE, insulin aspart U-100, ondansetron     Review of patient's allergies indicates:  No Known Allergies  Objective:     Vital Signs (Most Recent):  Temp: 98 °F (36.7 °C) (05/22/22 1645)  Pulse: (!) 56 (05/22/22 1645)  Resp: 20 (05/22/22 1645)  BP: (!) 126/59 (05/22/22 1645)  SpO2: 97 % (05/22/22 1645)   Vital Signs (24h Range):  Temp:  [97.7 °F (36.5 °C)-98.3 °F (36.8 °C)] 98 °F (36.7 °C)  Pulse:  [52-62] 56  Resp:  [18-20] 20  SpO2:  [96 %-97 %] 97 %  BP: (124-136)/(56-60) 126/59     Weight: 106.3 kg (234 lb 5.6 oz)  Body mass index is 34.61 kg/m².    Intake/Output - Last 3 Shifts         05/20 0700  05/21 0659 05/21 0700  05/22 0659 05/22 0700  05/23 0659    P.O. 0  0    I.V. (mL/kg) 818.4 (7.3) 2726.2 (25.6) 740.1 (7)    NG/ 590 60    IV Piggyback 64.7      .9 367.4     Total Intake(mL/kg)  1393.9 (12.4) 3683.5 (34.7) 800.1 (7.5)    Urine (mL/kg/hr) 2385 (0.9) 3250 (1.3) 400 (0.3)    Drains 325 135 70    Stool 975 1870 600    Total Output 3685 5255 1070    Net -2291.1 -1571.5 -269.9                   Physical Exam  Vitals reviewed.   Constitutional:       General: He is not in acute distress.     Appearance: He is ill-appearing.   HENT:      Nose:      Comments: Feeding tube in place     Mouth/Throat:      Mouth: Mucous membranes are moist.   Cardiovascular:      Rate and Rhythm: Normal rate and regular rhythm.      Heart sounds: Normal heart sounds.   Pulmonary:      Breath sounds: Normal breath sounds.   Abdominal:      General: Bowel sounds are normal. There is no distension.      Palpations: Abdomen is soft.      Comments: Drains in place   Skin:     General: Skin is warm and dry.      Comments: Less edema in hands and feet.   Neurological:      General: No focal deficit present.      Mental Status: He is alert and oriented to person, place, and time.   Psychiatric:         Mood and Affect: Mood normal.         Behavior: Behavior normal.         Thought Content: Thought content normal.         Judgment: Judgment normal.       Significant Labs:  I have reviewed all pertinent lab results within the past 24 hours.  CBC:   Recent Labs   Lab 05/22/22 0516   WBC 12.40   RBC 3.24*   HGB 7.4*   HCT 26.3*   *   MCV 81*   MCH 22.8*   MCHC 28.1*     BMP:   Recent Labs   Lab 05/22/22 0516         K 4.8      CO2 25   BUN 42*   CREATININE 1.4   CALCIUM 7.6*   MG 1.9     CMP:   Recent Labs   Lab 05/22/22 0516      CALCIUM 7.6*   ALBUMIN 1.4*   PROT 4.2*      K 4.8   CO2 25      BUN 42*   CREATININE 1.4   ALKPHOS 117   ALT 36   AST 36   BILITOT 1.5*     LFTs:   Recent Labs   Lab 05/22/22 0516   ALT 36   AST 36   ALKPHOS 117   BILITOT 1.5*   PROT 4.2*   ALBUMIN 1.4*       Significant Diagnostics:  I have reviewed all pertinent imaging results/findings within the past  24 hours.    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  Dietitian to advise on goal rate tomorrow.  - Monitor drain and ileostomy output.  Ileostomy output has increased.  Will add Imodium 3 times a day  - Speech therapy recommended continuing NPO  - encourage increasing activity  - Ostomy nurse consult  - Strict I/Os  - Medical management per hospital    Hypoalbuminemia due to protein-calorie malnutrition  Tolerating tube feeds    Acute on chronic anemia  Management per medicine    Acute hypoxemic respiratory failure   Resolved        Alicia Mayen MD  General Surgery  [facility]

## 2022-05-22 NOTE — PLAN OF CARE
Pt tolerated S.T. session for swallowing and cognition.  Pt improved but still remains a risk for aspiration and is recommended to remain NPO at this time.  S.T. to continue.

## 2022-05-23 PROBLEM — K63.1 SMALL BOWEL PERFORATION: Status: RESOLVED | Noted: 2022-05-04 | Resolved: 2022-05-23

## 2022-05-23 PROBLEM — C78.7 SECONDARY LIVER CANCER: Status: ACTIVE | Noted: 2022-05-23

## 2022-05-23 PROBLEM — C18.2 MALIGNANT NEOPLASM OF ASCENDING COLON: Status: ACTIVE | Noted: 2022-05-23

## 2022-05-23 PROBLEM — J96.01 ACUTE HYPOXEMIC RESPIRATORY FAILURE: Status: RESOLVED | Noted: 2021-08-05 | Resolved: 2022-05-23

## 2022-05-23 LAB
POCT GLUCOSE: 101 MG/DL (ref 70–110)
POCT GLUCOSE: 103 MG/DL (ref 70–110)
POCT GLUCOSE: 104 MG/DL (ref 70–110)
POCT GLUCOSE: 120 MG/DL (ref 70–110)
POCT GLUCOSE: 94 MG/DL (ref 70–110)

## 2022-05-23 PROCEDURE — 99223 PR INITIAL HOSPITAL CARE,LEVL III: ICD-10-PCS | Mod: ,,, | Performed by: INTERNAL MEDICINE

## 2022-05-23 PROCEDURE — 25000003 PHARM REV CODE 250: Performed by: SURGERY

## 2022-05-23 PROCEDURE — 27000221 HC OXYGEN, UP TO 24 HOURS

## 2022-05-23 PROCEDURE — 25000003 PHARM REV CODE 250: Performed by: NURSE PRACTITIONER

## 2022-05-23 PROCEDURE — 97530 THERAPEUTIC ACTIVITIES: CPT | Mod: CQ

## 2022-05-23 PROCEDURE — 94761 N-INVAS EAR/PLS OXIMETRY MLT: CPT

## 2022-05-23 PROCEDURE — 63600175 PHARM REV CODE 636 W HCPCS: Performed by: EMERGENCY MEDICINE

## 2022-05-23 PROCEDURE — 97530 THERAPEUTIC ACTIVITIES: CPT

## 2022-05-23 PROCEDURE — 25000242 PHARM REV CODE 250 ALT 637 W/ HCPCS: Performed by: SURGERY

## 2022-05-23 PROCEDURE — 99223 1ST HOSP IP/OBS HIGH 75: CPT | Mod: ,,, | Performed by: INTERNAL MEDICINE

## 2022-05-23 PROCEDURE — 21400001 HC TELEMETRY ROOM

## 2022-05-23 PROCEDURE — 92526 ORAL FUNCTION THERAPY: CPT

## 2022-05-23 PROCEDURE — 92507 TX SP LANG VOICE COMM INDIV: CPT

## 2022-05-23 RX ORDER — FERROUS SULFATE 300 MG/5ML
300 LIQUID (ML) ORAL DAILY
Status: DISCONTINUED | OUTPATIENT
Start: 2022-05-23 | End: 2022-05-24

## 2022-05-23 RX ADMIN — MINERAL OIL, PETROLATUM: 425; 573 OINTMENT OPHTHALMIC at 09:05

## 2022-05-23 RX ADMIN — OXYCODONE HYDROCHLORIDE AND ACETAMINOPHEN 500 MG: 500 TABLET ORAL at 09:05

## 2022-05-23 RX ADMIN — FAMOTIDINE 40 MG: 40 POWDER, FOR SUSPENSION ORAL at 10:05

## 2022-05-23 RX ADMIN — LOPERAMIDE HYDROCHLORIDE 2 MG: 2 CAPSULE ORAL at 05:05

## 2022-05-23 RX ADMIN — CYANOCOBALAMIN 1000 MCG: 1000 INJECTION, SOLUTION INTRAMUSCULAR at 10:05

## 2022-05-23 RX ADMIN — AMIODARONE HYDROCHLORIDE 200 MG: 200 TABLET ORAL at 09:05

## 2022-05-23 RX ADMIN — DEXTROSE: 5 SOLUTION INTRAVENOUS at 09:05

## 2022-05-23 RX ADMIN — Medication 300 MG: at 10:05

## 2022-05-23 RX ADMIN — LOPERAMIDE HYDROCHLORIDE 2 MG: 2 CAPSULE ORAL at 09:05

## 2022-05-23 RX ADMIN — APIXABAN 5 MG: 2.5 TABLET, FILM COATED ORAL at 09:05

## 2022-05-23 RX ADMIN — LOPERAMIDE HYDROCHLORIDE 2 MG: 2 CAPSULE ORAL at 10:05

## 2022-05-23 RX ADMIN — AMIODARONE HYDROCHLORIDE 200 MG: 200 TABLET ORAL at 10:05

## 2022-05-23 RX ADMIN — PSYLLIUM HUSK 1 PACKET: 3.4 POWDER ORAL at 10:05

## 2022-05-23 RX ADMIN — APIXABAN 5 MG: 2.5 TABLET, FILM COATED ORAL at 10:05

## 2022-05-23 RX ADMIN — OXYCODONE HYDROCHLORIDE AND ACETAMINOPHEN 500 MG: 500 TABLET ORAL at 10:05

## 2022-05-23 NOTE — PLAN OF CARE
CM met with patient at the bedside to discuss the discharge plan of LTAC vs SNF.  Patient stated that his family will be here tomorrow and he will discuss LTAC with them.  CM updated the whiteboard with contact information for patient to contact CM when his family arrives.

## 2022-05-23 NOTE — PROGRESS NOTES
Recommendations    1. Continue to advance enteral nutrition: Peptamen 1.5 with Prebio, continuous    - goal: 40 mL/hr     - 100mL H2O flush q 4-6 hours or per MD/NP    - Provides 1440 calories (100%EEN), 65g protein (100%EPN), 180g CHO, and 741ml H20 + FWF    - Monitor Na, Mg, K, Phos, and glucose; correct as indicated    2. RD to follow up to monitor intake, tolerance, and labs  *Please send consult if acute nutrition needs arise.     Goals:   1. Patient will meet >65% of estimated energy needs by RD follow up.   Nutrition Goal Status: goal met, continues    Bettie Morse, MS, RD, LDN

## 2022-05-23 NOTE — PT/OT/SLP PROGRESS
"Occupational Therapy  Treatment    Franky Masters   MRN: [id number]   Admitting Diagnosis: Severe sepsis    OT Date of Treatment: 05/23/22   OT Start Time: 1055  OT Stop Time: 1120  OT Total Time (min): 25 min    Billable Minutes:  Therapeutic Activity 25 MINUTES    OT/KRYSTAL: OT          General Precautions: Standard, fall, respiratory, NPO  Orthopedic Precautions: N/A  Braces: N/A  Respiratory Status: Room air         Subjective:  Communicated with NURSED Epic CHART REVIEWprior to session.    Pain/Comfort  Pain Rating 1: 0/10    Objective:  Patient found with: bed alarm, ADDIS drain, NG tube, peripheral IV, PICC line, telemetry     Functional Mobility:  Bed Mobility:  MAX A WITH SUPINE<>SIT   MAX A WITH SUPINE SCOOTING  HOB    Transfers:   MOD A X2 WITH SIT<>STAND TRANSFERS    Activities of Daily Living:  FEEDING : PEG FEEDING  Balance:   Static Sit: FAIR+: Able to take MINIMAL challenges from all directions  Dynamic Sit: FAIR: Cannot move trunk without losing balance  Static Stand: POOR: Needs MODERATE assist to maintain  Dynamic stand: POOR: Needs MOD (moderate) assist during gait    AM-PAC 6 CLICK ADL   How much help from another person does this patient currently need?   1 = Unable, Total/Dependent Assistance  2 = A lot, Maximum/Moderate Assistance  3 = A little, Minimum/Contact Guard/Supervision  4 = None, Modified [place]/Independent    Putting on and taking off regular lower body clothing? : 1  Bathing (including washing, rinsing, drying)?: 1  Toileting, which includes using toilet, bedpan, or urinal? : 1  Putting on and taking off regular upper body clothing?: 1  Taking care of personal grooming such as brushing teeth?: 1  Eating meals?: 1 (PEG TUBE FEEDING)  Daily Activity Total Score: 6     AM-PAC Raw Score CMS "G-Code Modifier Level of Impairment Assistance   6 % Total / Unable   7 - 8 CM 80 - 100% Maximal Assist   9-13 CL 60 - 80% Moderate Assist   14 - 19 CK 40 - 60% Moderate Assist   20 - 22 CJ " 20 - 40% Minimal Assist   23 CI 1-20% SBA / CGA   24 CH 0% Independent/ Mod I       Patient left HOB elevated with all lines intact, call button in reach, bed alarm on and NURSE notified    ASSESSMENT:  Franky Masters is a 67 y.o. male with a medical diagnosis of Severe sepsis and presents with DEBILITY AND GENERALIZED WEAKNESS  Rehab identified problem list/impairments: Rehab identified problem list/impairments: weakness, impaired balance, decreased safety awareness, impaired endurance, impaired self care skills, decreased ROM, impaired functional mobilty, decreased upper extremity function, gait instability    Rehab potential is good.    Activity tolerance: Good    Discharge recommendations: Discharge Facility/Level of Care Needs: nursing facility, skilled     Barriers to discharge: Barriers to Discharge: None    Equipment recommendations: other (see comments)     GOALS:   Multidisciplinary Problems     Occupational Therapy Goals        Problem: Occupational Therapy    Goal Priority Disciplines Outcome Interventions   Occupational Therapy Goal     OT, PT/OT Ongoing, Progressing    Description: Goals to be met by: 6/1/22     Patient will increase functional independence with ADLs by performing:    Sitting at edge of bed x15 minutes with Stand-by Assistance.  Supine to sit with Minimal Assistance.  Increased functional strength grossly by 1/2 MM grade.                     Plan:  Patient to be seen 2 x/week to address the above listed problems via self-care/home management, therapeutic activities, therapeutic exercises  Plan of Care expires: 06/01/22  Plan of Care reviewed with: patient         05/23/2022

## 2022-05-23 NOTE — HPI
67-year-old male newly diagnosed metastatic colon carcinoma.  The patient had a perforated viscus and exploratory surgery with colostomy.  Biopsy of liver mass metastatic disease was asked to see the patient in the postoperative setting for further therapeutic considerations once patient has been recovered ECOG status 2 previously fully active practicing chiropractor

## 2022-05-23 NOTE — PLAN OF CARE
Problem: Adult Inpatient Plan of Care  Goal: Optimal Comfort and Wellbeing  Outcome: Ongoing, Progressing     Problem: Adjustment to Illness (Sepsis/Septic Shock)  Goal: Optimal Coping  Outcome: Ongoing, Progressing

## 2022-05-23 NOTE — PT/OT/SLP PROGRESS
Physical Therapy  Treatment    Franky Masters   MRN: [id number]   Admitting Diagnosis: Severe sepsis    PT Received On: 05/23/22  PT Start Time: 1205     PT Stop Time: 1230    PT Total Time (min): 25 min       Billable Minutes:  Therapeutic Activity 25    Treatment Type: Treatment  PT/PTA: PTA     PTA Visit Number: 4       General Precautions: Standard, fall, NPO  Orthopedic Precautions: N/A   Braces: N/A  Respiratory Status: Room air         Subjective:  Communicated with patient's nurse, Fadia, and completed Epic chart review prior to session.  Patient agreed to PT session.     Pain/Comfort  Pain Rating 1: 0/10  Pain Rating Post-Intervention 1: 0/10    Objective:   Patient found with: bed alarm, colostomy, ADDIS drain, NG tube, peripheral IV, PICC line, telemetry    Functional Mobility:  Supine > Sit EOB: Max A (consistent VC for hand placement and sequencing of task)    Forward scoot towards EOB: mod A of 2    STS from EOB > RW: Mod A of 2    Sustained stand with BUE support on RW x~30sec before requesting to return to sitting.     Patient was noted to be soiled and needed to be cleaned.    Sit > supine: Max A of 2    Supine scoot towards HOB: Total A of 2    Educated patient on importance of increased tolerance to upright position to promote CV endurance. Encouraged patient to utilize elevated HOB to achieve simulated chair position until able to safely complete T/F. Also encouraged patient to perform AROM TE to BLE throughout the day in order to prevent further loss of ROM and strength. Re enforced importance of utilizing call light to meet needs in room. Patient verbalized understanding of all education given and agreed to comply.     AM-PAC 6 CLICK MOBILITY  How much help from another person does this patient currently need?   1 = Unable, Total/Dependent Assistance  2 = A lot, Maximum/Moderate Assistance  3 = A little, Minimum/Contact Guard/Supervision  4 = None, Modified [place]/Independent    Turning over in  bed (including adjusting bedclothes, sheets and blankets)?: 2  Sitting down on and standing up from a chair with arms (e.g., wheelchair, bedside commode, etc.): 2  Moving from lying on back to sitting on the side of the bed?: 2  Moving to and from a bed to a chair (including a wheelchair)?: 1  Need to walk in hospital room?: 1  Climbing 3-5 steps with a railing?: 1  Basic Mobility Total Score: 9    AM-PAC Raw Score CMS G-Code Modifier Level of Impairment Assistance   6 % Total / Unable   7 - 9 CM 80 - 100% Maximal Assist   10 - 14 CL 60 - 80% Moderate Assist   15 - 19 CK 40 - 60% Moderate Assist   20 - 22 CJ 20 - 40% Minimal Assist   23 CI 1-20% SBA / CGA   24 CH 0% Independent/ Mod I     Patient left with bed in chair position with all lines intact, call button in reach and aide notified.    Assessment:  Franky Masters is a 67 y.o. male with a medical diagnosis of Severe sepsis and presents with overall decline in functional mobility. Patient would continue to benefit from skilled PT to address functional limitations listed below in order to return to PLOF/decrease caregiver burden.    Rehab identified problem list/impairments: Rehab identified problem list/impairments: weakness, impaired endurance, impaired sensation, impaired self care skills, impaired functional mobilty, gait instability, impaired balance, impaired cognition, decreased coordination, decreased upper extremity function, decreased lower extremity function, decreased safety awareness, decreased ROM, impaired coordination, impaired cardiopulmonary response to activity    Rehab potential is fair.    Activity tolerance: Fair    Discharge recommendations: Discharge Facility/Level of Care Needs: nursing facility, skilled     Barriers to discharge:      Equipment recommendations: Equipment Needed After Discharge: other (see comments) (TBD)     GOALS:   Multidisciplinary Problems     Physical Therapy Goals        Problem: Physical Therapy    Goal  Priority Disciplines Outcome Goal Variances Interventions   Physical Therapy Goal     PT, PT/OT      Description: LT22  1. PT WILL COMPLETE BED MOBILITY WITH MOD A  2. PT WILL STAND WITH RW AND MAX A FOR STAND PIVOT T/F TO CHAIR   3. PT WILL GT TRAIN X 25' WITH RW AND MAX A                   PLAN:    Patient to be seen 3 x/week  to address the above listed problems via gait training, therapeutic activities, therapeutic exercises, neuromuscular re-education  Plan of Care expires: 22  Plan of Care reviewed with: patient         2022

## 2022-05-23 NOTE — SUBJECTIVE & OBJECTIVE
Oncology Treatment Plan:   [Could not find a treatment plan. This SmartLink may be configured incorrectly. Contact a  for help.]    Medications:  Continuous Infusions:   dextrose 5 % 75 mL/hr at 05/22/22 1646     Scheduled Meds:   amiodarone  200 mg Per NG tube BID    apixaban  5 mg Per NG tube BID    ascorbic acid (vitamin C)  500 mg Per NG tube BID    famotidine  40 mg Per NG tube Daily    ferrous sulfate  300 mg Per NG tube Daily    loperamide  2 mg Oral TID    psyllium husk (aspartame)  1 packet Per NG tube Daily    white petrolatum-mineral oil 57.3-42.5%   Both Eyes QHS     PRN Meds:sodium chloride 0.9%, acetaminophen, dextrose 10%, glucagon (human recombinant), hydrALAZINE, insulin aspart U-100, ondansetron     Review of patient's allergies indicates:  No Known Allergies     Past Medical History:   Diagnosis Date    Diverticulitis     Supplemental oxygen dependent      Past Surgical History:   Procedure Laterality Date    ABDOMINAL WASHOUT  5/7/2022    Procedure: LAVAGE, PERITONEAL, THERAPEUTIC;  Surgeon: Elvie Parker DO;  Location: [facility] OR;  Service: General;;    ABDOMINAL WASHOUT  5/7/2022    Procedure: ;  Surgeon: Elvie Parker DO;  Location: [facility] OR;  Service: General;;    APPLICATION OF WOUND VACUUM-ASSISTED CLOSURE DEVICE N/A 5/4/2022    Procedure: APPLICATION, WOUND VAC;  Surgeon: Elvie Parker DO;  Location: [facility] OR;  Service: General;  Laterality: N/A;    ILEOSTOMY N/A 5/11/2022    Procedure: CREATION, ILEOSTOMY;  Surgeon: Elvie Parker DO;  Location: [facility] OR;  Service: General;  Laterality: N/A;    LAPAROTOMY N/A 5/7/2022    Procedure: LAPAROTOMY;  Surgeon: Elvie Parker DO;  Location: [facility] OR;  Service: General;  Laterality: N/A;    LIVER BIOPSY Right 5/11/2022    Procedure: BIOPSY, LIVER;  Surgeon: Elvie Parker DO;  Location: [facility] OR;  Service: General;  Laterality: Right;    RIGHT HEMICOLECTOMY N/A 5/11/2022    Procedure: HEMICOLECTOMY,  RIGHT;  Surgeon: Elvie Parker DO;  Location: [facility];  Service: General;  Laterality: N/A;     Family History    None       Tobacco Use    Smoking status: Never Smoker    Smokeless tobacco: Never Used   Substance and Sexual Activity    Alcohol use: Not Currently    Drug use: Never    Sexual activity: Not on file       Review of Systems   Constitutional:  Positive for fatigue. Negative for activity change, appetite change, chills, diaphoresis, fever and unexpected weight change.   HENT:  Negative for congestion, dental problem, drooling, ear discharge, ear pain, facial swelling, hearing loss, mouth sores, nosebleeds, postnasal drip, rhinorrhea, sinus pressure, sneezing, sore throat, tinnitus, trouble swallowing and voice change.    Eyes:  Negative for photophobia, pain, discharge, redness, itching and visual disturbance.   Respiratory:  Negative for apnea, cough, choking, chest tightness, shortness of breath, wheezing and stridor.    Cardiovascular:  Negative for chest pain, palpitations and leg swelling.   Gastrointestinal:  Positive for abdominal distention and abdominal pain. Negative for anal bleeding, blood in stool, constipation, diarrhea, nausea, rectal pain and vomiting.        Functioning colostomy   Endocrine: Negative for cold intolerance, heat intolerance, polydipsia, polyphagia and polyuria.   Genitourinary:  Negative for decreased urine volume, difficulty urinating, dysuria, enuresis, flank pain, frequency, genital sores, hematuria, penile discharge, penile pain, penile swelling, scrotal swelling, testicular pain and urgency.   Musculoskeletal:  Negative for arthralgias, back pain, gait problem, joint swelling, myalgias, neck pain and neck stiffness.   Skin:  Negative for color change, pallor, rash and wound.   Allergic/Immunologic: Negative for environmental allergies, food allergies and immunocompromised state.   Neurological:  Positive for weakness. Negative for dizziness, tremors, seizures,  syncope, facial asymmetry, speech difficulty, light-headedness, numbness and headaches.   Hematological:  Negative for adenopathy. Does not bruise/bleed easily.   Psychiatric/Behavioral:  Positive for dysphoric mood. Negative for agitation, behavioral problems, confusion, decreased concentration, hallucinations, self-injury, sleep disturbance and suicidal ideas. The patient is nervous/anxious. The patient is not hyperactive.    Objective:     Vital Signs (Most Recent):  Temp: 98.2 °F (36.8 °C) (05/23/22 1230)  Pulse: 65 (05/23/22 1305)  Resp: 18 (05/23/22 1230)  BP: 135/73 (05/23/22 1230)  SpO2: 96 % (05/23/22 1230) Vital Signs (24h Range):  Temp:  [97.6 °F (36.4 °C)-98.9 °F (37.2 °C)] 98.2 °F (36.8 °C)  Pulse:  [56-65] 65  Resp:  [18-20] 18  SpO2:  [96 %-98 %] 96 %  BP: (123-136)/(58-93) 135/73     Weight: 114.7 kg (252 lb 13.9 oz)  Body mass index is 37.34 kg/m².  Body surface area is 2.36 meters squared.      Intake/Output Summary (Last 24 hours) at 5/23/2022 1521  Last data filed at 5/23/2022 1321  Gross per 24 hour   Intake 1230.09 ml   Output 3765 ml   Net -2534.91 ml       Physical Exam  Vitals reviewed.   Constitutional:       General: He is not in acute distress.     Appearance: He is well-developed. He is ill-appearing. He is not diaphoretic.   HENT:      Head: Normocephalic.      Comments: G-tube in place     Right Ear: External ear normal.      Left Ear: External ear normal.      Nose: Nose normal.      Right Sinus: No maxillary sinus tenderness or frontal sinus tenderness.      Left Sinus: No maxillary sinus tenderness or frontal sinus tenderness.      Mouth/Throat:      Pharynx: No oropharyngeal exudate.   Eyes:      General: Lids are normal. No scleral icterus.        Right eye: No discharge.         Left eye: No discharge.      Extraocular Movements:      Right eye: Normal extraocular motion.      Left eye: Normal extraocular motion.      Conjunctiva/sclera:      Right eye: Right conjunctiva is not  injected. No hemorrhage.     Left eye: Left conjunctiva is not injected. No hemorrhage.     Pupils: Pupils are equal, round, and reactive to light.   Neck:      Thyroid: No thyromegaly.      Vascular: No JVD.      Trachea: No tracheal deviation.   Cardiovascular:      Rate and Rhythm: Normal rate and regular rhythm.      Heart sounds: Normal heart sounds.   Pulmonary:      Effort: Pulmonary effort is normal. No respiratory distress.      Breath sounds: Normal breath sounds. No stridor.   Chest:   Breasts:      Right: No supraclavicular adenopathy.      Left: No supraclavicular adenopathy.     Abdominal:      General: Bowel sounds are normal.      Palpations: Abdomen is soft. There is no hepatomegaly, splenomegaly or mass.      Tenderness: There is no abdominal tenderness.      Comments: Functioning colostomy   Musculoskeletal:         General: No tenderness. Normal range of motion.      Cervical back: Normal range of motion and neck supple.   Lymphadenopathy:      Head:      Right side of head: No posterior auricular or occipital adenopathy.      Left side of head: No posterior auricular or occipital adenopathy.      Cervical: No cervical adenopathy.      Right cervical: No superficial, deep or posterior cervical adenopathy.     Left cervical: No superficial, deep or posterior cervical adenopathy.      Upper Body:      Right upper body: No supraclavicular adenopathy.      Left upper body: No supraclavicular adenopathy.   Skin:     General: Skin is dry.      Findings: No erythema or rash.      Nails: There is no clubbing.   Neurological:      Mental Status: He is alert and oriented to person, place, and time.      Cranial Nerves: No cranial nerve deficit.      Motor: Weakness present.      Coordination: Coordination normal.      Gait: Gait abnormal.   Psychiatric:         Behavior: Behavior normal.         Thought Content: Thought content normal.         Judgment: Judgment normal.       Significant Labs:   BMP:    Recent Labs   Lab 05/22/22 0516         K 4.8      CO2 25   BUN 42*   CREATININE 1.4   CALCIUM 7.6*   MG 1.9   , CBC:   Recent Labs   Lab 05/22/22 0516   WBC 12.40   HGB 7.4*   HCT 26.3*   *   , CMP:   Recent Labs   Lab 05/22/22 0516      K 4.8      CO2 25      BUN 42*   CREATININE 1.4   CALCIUM 7.6*   PROT 4.2*   ALBUMIN 1.4*   BILITOT 1.5*   ALKPHOS 117   AST 36   ALT 36   ANIONGAP 9   EGFRNONAA 51*   , Coagulation: No results for input(s): PT, INR, APTT in the last 48 hours., Haptoglobin: No results for input(s): HAPTOGLOBIN in the last 48 hours., Immunology: No results for input(s): SPEP, KINGS, ANGELICA, FREELAMBDALI in the last 48 hours., LDH: No results for input(s): LDHCSF, BFSOURCE in the last 48 hours., LFTs:   Recent Labs   Lab 05/22/22 0516   ALT 36   AST 36   ALKPHOS 117   BILITOT 1.5*   PROT 4.2*   ALBUMIN 1.4*   , Reticulocytes: No results for input(s): RETIC in the last 48 hours., Tumor Markers: No results for input(s): PSA, CEA, , AFPTM, [id number],  in the last 48 hours.    Invalid input(s): ALGTM, Uric Acid No results for input(s): URICACID in the last 48 hours., and Urine Studies: No results for input(s): COLORU, APPEARANCEUA, PHUR, SPECGRAV, PROTEINUA, GLUCUA, KETONESU, BILIRUBINUA, OCCULTUA, NITRITE, UROBILINOGEN, LEUKOCYTESUR, RBCUA, WBCUA, BACTERIA, SQUAMEPITHEL, HYALINECASTS in the last 48 hours.    Invalid input(s): WRIGHTSUR    Diagnostic Results:  I have reviewed all pertinent imaging results/findings within the past 24 hours.

## 2022-05-23 NOTE — PT/OT/SLP PROGRESS
Speech Language Pathology Treatment    Patient Name:  Franky Masters   MRN:  [id number]  Admitting Diagnosis: Severe sepsis    Recommendations:                 General Recommendations:  Dysphagia therapy and Modified barium swallow study  Diet recommendations:  NPO, Liquid Diet Level: NPO   Aspiration Precautions: Ice chips sparingly   General Precautions: Standard, fall, NPO  Communication strategies:  none    Subjective     Pt was seen lying in bed with no family present.  He is awake, alert, and cooperative with a cup of ice.  Pt stated that he has been drinking juice that his family brought.  ST educated pt on NPO status and importance of following to minimize aspiration risk.  Pt stated that he did not know he couldn't eat or drink anything.    Patient goals: pt wants to eat      Pain/Comfort:  · Pain Rating 1: 0/10    Respiratory Status: Room air    Objective:     Has the patient been evaluated by SLP for swallowing?   Yes  Keep patient NPO? Yes     Pt stated that he has been drinking juice that his family brought.  ST educated pt on NPO status and importance of following to minimize aspiration risk.  Pt stated that he did not know he couldn't eat or drink anything.      Swallowing assessed with ice chips, sips of thin liquids, and puree with pt experiencing throat clearing with all consistencies indicating likely pharyngeal residue.  No coughing or observable signs of yamilet aspiration, however silent aspiration can not be excluded at bedside.  He is tolerating NG tube feeds and denies pain.  ST recommends a MBSS to further assess swallow safety and function prior to initiation of po feeds.      Assessment:     Franky Masters is a 67 y.o. male with an SLP diagnosis of Dysphagia.  He presents with decreased swallow strength and safety.  A MBSS is recommended for further swallow assessment and ongoing ST per POC.     Goals:   Multidisciplinary Problems     SLP Goals        Problem: SLP    Goal Priority Disciplines  Outcome   SLP Goal     SLP Ongoing, Progressing   Description: 1.  Pt will consume least restrictive PO diet without s/s of dysphagia.  1a. Ongoing bedside swallowing assessment  1b. MBSS if clinically indicated    2.  Recommended cognitive-communicative evaluation                   Plan:     · Patient to be seen:  3 x/week   · Plan of Care expires:  05/27/22  · Plan of Care reviewed with:  patient   · SLP Follow-Up:  Yes       Discharge recommendations:  rehabilitation facility   Barriers to Discharge:  None    Time Tracking:     SLP Treatment Date:   05/23/22  Speech Start Time:  1110  Speech Stop Time:  1130     Speech Total Time (min):  20 min    Billable Minutes: Speech Therapy Individual 10 and Treatment Swallowing Dysfunction 10    05/23/2022

## 2022-05-23 NOTE — CONSULTS
'[facility]  Hematology/Oncology  Consult Note    Patient Name: Franky Masters  MRN: [id number]  Admission Date: 5/4/2022  Hospital Length of Stay: 19 days  Code Status: DNR   Attending Provider: Elvie Parker DO  Consulting Provider: Rm Gonzalez MD  Primary Care Physician: HOLLY ROSEN  Principal Problem:Severe sepsis    Consults  Subjective:     HPI:  67-year-old male newly diagnosed metastatic colon carcinoma.  The patient had a perforated viscus and exploratory surgery with colostomy.  Biopsy of liver mass metastatic disease was asked to see the patient in the postoperative setting for further therapeutic considerations once patient has been recovered ECOG status 2 previously fully active practicing chiropractor       Oncology Treatment Plan:   [Could not find a treatment plan. This SmartLink may be configured incorrectly. Contact a  for help.]    Medications:  Continuous Infusions:   dextrose 5 % 75 mL/hr at 05/22/22 1646     Scheduled Meds:   amiodarone  200 mg Per NG tube BID    apixaban  5 mg Per NG tube BID    ascorbic acid (vitamin C)  500 mg Per NG tube BID    famotidine  40 mg Per NG tube Daily    ferrous sulfate  300 mg Per NG tube Daily    loperamide  2 mg Oral TID    psyllium husk (aspartame)  1 packet Per NG tube Daily    white petrolatum-mineral oil 57.3-42.5%   Both Eyes QHS     PRN Meds:sodium chloride 0.9%, acetaminophen, dextrose 10%, glucagon (human recombinant), hydrALAZINE, insulin aspart U-100, ondansetron     Review of patient's allergies indicates:  No Known Allergies     Past Medical History:   Diagnosis Date    Diverticulitis     Supplemental oxygen dependent      Past Surgical History:   Procedure Laterality Date    ABDOMINAL WASHOUT  5/7/2022    Procedure: LAVAGE, PERITONEAL, THERAPEUTIC;  Surgeon: Elvie Parker DO;  Location: [facility] OR;  Service: General;;    ABDOMINAL WASHOUT  5/7/2022    Procedure: ;  Surgeon: Elvie Parker  DO;  Location: [facility] OR;  Service: General;;    APPLICATION OF WOUND VACUUM-ASSISTED CLOSURE DEVICE N/A 5/4/2022    Procedure: APPLICATION, WOUND VAC;  Surgeon: Elvie Parker DO;  Location: [facility] OR;  Service: General;  Laterality: N/A;    ILEOSTOMY N/A 5/11/2022    Procedure: CREATION, ILEOSTOMY;  Surgeon: Elvie Parker DO;  Location: [facility] OR;  Service: General;  Laterality: N/A;    LAPAROTOMY N/A 5/7/2022    Procedure: LAPAROTOMY;  Surgeon: Elvie Parker DO;  Location: [facility] OR;  Service: General;  Laterality: N/A;    LIVER BIOPSY Right 5/11/2022    Procedure: BIOPSY, LIVER;  Surgeon: Elvie Parker DO;  Location: [facility] OR;  Service: General;  Laterality: Right;    RIGHT HEMICOLECTOMY N/A 5/11/2022    Procedure: HEMICOLECTOMY, RIGHT;  Surgeon: Elvie Parker DO;  Location: [facility] OR;  Service: General;  Laterality: N/A;     Family History    None       Tobacco Use    Smoking status: Never Smoker    Smokeless tobacco: Never Used   Substance and Sexual Activity    Alcohol use: Not Currently    Drug use: Never    Sexual activity: Not on file       Review of Systems   Constitutional:  Positive for fatigue. Negative for activity change, appetite change, chills, diaphoresis, fever and unexpected weight change.   HENT:  Negative for congestion, dental problem, drooling, ear discharge, ear pain, facial swelling, hearing loss, mouth sores, nosebleeds, postnasal drip, rhinorrhea, sinus pressure, sneezing, sore throat, tinnitus, trouble swallowing and voice change.    Eyes:  Negative for photophobia, pain, discharge, redness, itching and visual disturbance.   Respiratory:  Negative for apnea, cough, choking, chest tightness, shortness of breath, wheezing and stridor.    Cardiovascular:  Negative for chest pain, palpitations and leg swelling.   Gastrointestinal:  Positive for abdominal distention and abdominal pain. Negative for anal bleeding, blood in stool, constipation, diarrhea, nausea,  rectal pain and vomiting.        Functioning colostomy   Endocrine: Negative for cold intolerance, heat intolerance, polydipsia, polyphagia and polyuria.   Genitourinary:  Negative for decreased urine volume, difficulty urinating, dysuria, enuresis, flank pain, frequency, genital sores, hematuria, penile discharge, penile pain, penile swelling, scrotal swelling, testicular pain and urgency.   Musculoskeletal:  Negative for arthralgias, back pain, gait problem, joint swelling, myalgias, neck pain and neck stiffness.   Skin:  Negative for color change, pallor, rash and wound.   Allergic/Immunologic: Negative for environmental allergies, food allergies and immunocompromised state.   Neurological:  Positive for weakness. Negative for dizziness, tremors, seizures, syncope, facial asymmetry, speech difficulty, light-headedness, numbness and headaches.   Hematological:  Negative for adenopathy. Does not bruise/bleed easily.   Psychiatric/Behavioral:  Positive for dysphoric mood. Negative for agitation, behavioral problems, confusion, decreased concentration, hallucinations, self-injury, sleep disturbance and suicidal ideas. The patient is nervous/anxious. The patient is not hyperactive.    Objective:     Vital Signs (Most Recent):  Temp: 98.2 °F (36.8 °C) (05/23/22 1230)  Pulse: 65 (05/23/22 1305)  Resp: 18 (05/23/22 1230)  BP: 135/73 (05/23/22 1230)  SpO2: 96 % (05/23/22 1230) Vital Signs (24h Range):  Temp:  [97.6 °F (36.4 °C)-98.9 °F (37.2 °C)] 98.2 °F (36.8 °C)  Pulse:  [56-65] 65  Resp:  [18-20] 18  SpO2:  [96 %-98 %] 96 %  BP: (123-136)/(58-93) 135/73     Weight: 114.7 kg (252 lb 13.9 oz)  Body mass index is 37.34 kg/m².  Body surface area is 2.36 meters squared.      Intake/Output Summary (Last 24 hours) at 5/23/2022 1521  Last data filed at 5/23/2022 1321  Gross per 24 hour   Intake 1230.09 ml   Output 3765 ml   Net -2534.91 ml       Physical Exam  Vitals reviewed.   Constitutional:       General: He is not in  acute distress.     Appearance: He is well-developed. He is ill-appearing. He is not diaphoretic.   HENT:      Head: Normocephalic.      Comments: G-tube in place     Right Ear: External ear normal.      Left Ear: External ear normal.      Nose: Nose normal.      Right Sinus: No maxillary sinus tenderness or frontal sinus tenderness.      Left Sinus: No maxillary sinus tenderness or frontal sinus tenderness.      Mouth/Throat:      Pharynx: No oropharyngeal exudate.   Eyes:      General: Lids are normal. No scleral icterus.        Right eye: No discharge.         Left eye: No discharge.      Extraocular Movements:      Right eye: Normal extraocular motion.      Left eye: Normal extraocular motion.      Conjunctiva/sclera:      Right eye: Right conjunctiva is not injected. No hemorrhage.     Left eye: Left conjunctiva is not injected. No hemorrhage.     Pupils: Pupils are equal, round, and reactive to light.   Neck:      Thyroid: No thyromegaly.      Vascular: No JVD.      Trachea: No tracheal deviation.   Cardiovascular:      Rate and Rhythm: Normal rate and regular rhythm.      Heart sounds: Normal heart sounds.   Pulmonary:      Effort: Pulmonary effort is normal. No respiratory distress.      Breath sounds: Normal breath sounds. No stridor.   Chest:   Breasts:      Right: No supraclavicular adenopathy.      Left: No supraclavicular adenopathy.     Abdominal:      General: Bowel sounds are normal.      Palpations: Abdomen is soft. There is no hepatomegaly, splenomegaly or mass.      Tenderness: There is no abdominal tenderness.      Comments: Functioning colostomy   Musculoskeletal:         General: No tenderness. Normal range of motion.      Cervical back: Normal range of motion and neck supple.   Lymphadenopathy:      Head:      Right side of head: No posterior auricular or occipital adenopathy.      Left side of head: No posterior auricular or occipital adenopathy.      Cervical: No cervical adenopathy.       Right cervical: No superficial, deep or posterior cervical adenopathy.     Left cervical: No superficial, deep or posterior cervical adenopathy.      Upper Body:      Right upper body: No supraclavicular adenopathy.      Left upper body: No supraclavicular adenopathy.   Skin:     General: Skin is dry.      Findings: No erythema or rash.      Nails: There is no clubbing.   Neurological:      Mental Status: He is alert and oriented to person, place, and time.      Cranial Nerves: No cranial nerve deficit.      Motor: Weakness present.      Coordination: Coordination normal.      Gait: Gait abnormal.   Psychiatric:         Behavior: Behavior normal.         Thought Content: Thought content normal.         Judgment: Judgment normal.       Significant Labs:   BMP:   Recent Labs   Lab 05/22/22 0516         K 4.8      CO2 25   BUN 42*   CREATININE 1.4   CALCIUM 7.6*   MG 1.9   , CBC:   Recent Labs   Lab 05/22/22 0516   WBC 12.40   HGB 7.4*   HCT 26.3*   *   , CMP:   Recent Labs   Lab 05/22/22 0516      K 4.8      CO2 25      BUN 42*   CREATININE 1.4   CALCIUM 7.6*   PROT 4.2*   ALBUMIN 1.4*   BILITOT 1.5*   ALKPHOS 117   AST 36   ALT 36   ANIONGAP 9   EGFRNONAA 51*   , Coagulation: No results for input(s): PT, INR, APTT in the last 48 hours., Haptoglobin: No results for input(s): HAPTOGLOBIN in the last 48 hours., Immunology: No results for input(s): SPEP, KINGS, ANGELICA, FREELAMBDALI in the last 48 hours., LDH: No results for input(s): LDHCSF, BFSOURCE in the last 48 hours., LFTs:   Recent Labs   Lab 05/22/22 0516   ALT 36   AST 36   ALKPHOS 117   BILITOT 1.5*   PROT 4.2*   ALBUMIN 1.4*   , Reticulocytes: No results for input(s): RETIC in the last 48 hours., Tumor Markers: No results for input(s): PSA, CEA, , AFPTM, [id number],  in the last 48 hours.    Invalid input(s): ALGTM, Uric Acid No results for input(s): URICACID in the last 48 hours., and Urine Studies: No results  for input(s): COLORU, APPEARANCEUA, PHUR, SPECGRAV, PROTEINUA, GLUCUA, KETONESU, BILIRUBINUA, OCCULTUA, NITRITE, UROBILINOGEN, LEUKOCYTESUR, RBCUA, WBCUA, BACTERIA, SQUAMEPITHEL, HYALINECASTS in the last 48 hours.    Invalid input(s): DAIANASUR    Diagnostic Results:  I have reviewed all pertinent imaging results/findings within the past 24 hours.    Assessment/Plan:     * Severe sepsis secondary to Peritonitis from bowel perforation  Cared for by operative surgeon and hospital medicine team      Malignant neoplasm of ascending colon  Extensive conversation with the patient.  The patient has widespread colon carcinoma.  Intra-abdominal with metastatic disease in liver biopsy-proven.  At this time I have talked to the patient about awaiting results of molecular characterization to better define treatment options.  I have talked to the patient he is not reluctant to pursue any treatment of told in my professional opinion I think survival to the end of the year would be small and would probably recommend hospice.  If the patient decides to take systemic chemotherapy with reviewed briefly toxicity profile articles from up-to-date followed him for his review in talked about response rates of 60% and noncurative setting.  At this time will be happy to talk to the patient once more family is present and I will be happy to answer questions as we proceed in the recovery phase from his extensive surgery    Small bowel perforation with large Cecal mass   Initial presentation care for by operative surgeon with diverting colostomy        Thank you for your consult. I will follow-up with patient. Please contact us if you have any additional questions.    Rm Gonzalez MD  Hematology/Oncology  [facility]

## 2022-05-23 NOTE — ASSESSMENT & PLAN NOTE
Extensive conversation with the patient.  The patient has widespread colon carcinoma.  Intra-abdominal with metastatic disease in liver biopsy-proven.  At this time I have talked to the patient about awaiting results of molecular characterization to better define treatment options.  I have talked to the patient he is not reluctant to pursue any treatment of told in my professional opinion I think survival to the end of the year would be small and would probably recommend hospice.  If the patient decides to take systemic chemotherapy with reviewed briefly toxicity profile articles from up-to-date followed him for his review in talked about response rates of 60% and noncurative setting.  At this time will be happy to talk to the patient once more family is present and I will be happy to answer questions as we proceed in the recovery phase from his extensive surgery

## 2022-05-23 NOTE — PLAN OF CARE
Pt oriented x3.  VSS.  Pt remained afebrile throughout this shift.   All meds administered per order.   Pt remained free of falls this shift.   Plan of care reviewed. Family verbalizes understanding.   Doherty discontinued.  Tube feeding. Accuchecks done.  Patient NSR on monitor.   Bed low, side rails up x 2, wheels locked, call light in reach.   Patient instructed to call for assistance.  Will continue to monitor.

## 2022-05-24 PROBLEM — C78.7 ADENOCARCINOMA OF COLON METASTATIC TO LIVER: Status: ACTIVE | Noted: 2022-05-23

## 2022-05-24 PROBLEM — C18.9 ADENOCARCINOMA OF COLON METASTATIC TO LIVER: Status: ACTIVE | Noted: 2022-05-23

## 2022-05-24 LAB
ABO + RH BLD: NORMAL
ALBUMIN SERPL BCP-MCNC: 1.5 G/DL (ref 3.5–5.2)
ALP SERPL-CCNC: 142 U/L (ref 55–135)
ALT SERPL W/O P-5'-P-CCNC: 40 U/L (ref 10–44)
ANION GAP SERPL CALC-SCNC: 8 MMOL/L (ref 8–16)
ANISOCYTOSIS BLD QL SMEAR: SLIGHT
AST SERPL-CCNC: 40 U/L (ref 10–40)
BACTERIA SPEC ANAEROBE CULT: NORMAL
BASOPHILS # BLD AUTO: 0.13 K/UL (ref 0–0.2)
BASOPHILS NFR BLD: 1.2 % (ref 0–1.9)
BILIRUB SERPL-MCNC: 0.9 MG/DL (ref 0.1–1)
BLD GP AB SCN CELLS X3 SERPL QL: NORMAL
BUN SERPL-MCNC: 34 MG/DL (ref 8–23)
CALCIUM SERPL-MCNC: 7.8 MG/DL (ref 8.7–10.5)
CHLORIDE SERPL-SCNC: 107 MMOL/L (ref 95–110)
CO2 SERPL-SCNC: 24 MMOL/L (ref 23–29)
CREAT SERPL-MCNC: 1.3 MG/DL (ref 0.5–1.4)
DACRYOCYTES BLD QL SMEAR: ABNORMAL
DIFFERENTIAL METHOD: ABNORMAL
EOSINOPHIL # BLD AUTO: 0.3 K/UL (ref 0–0.5)
EOSINOPHIL NFR BLD: 2.4 % (ref 0–8)
ERYTHROCYTE [DISTWIDTH] IN BLOOD BY AUTOMATED COUNT: 30.2 % (ref 11.5–14.5)
EST. GFR  (AFRICAN AMERICAN): >60 ML/MIN/1.73 M^2
EST. GFR  (NON AFRICAN AMERICAN): 56 ML/MIN/1.73 M^2
GLUCOSE SERPL-MCNC: 101 MG/DL (ref 70–110)
HCT VFR BLD AUTO: 26.9 % (ref 40–54)
HGB BLD-MCNC: 7.5 G/DL (ref 14–18)
HYPOCHROMIA BLD QL SMEAR: ABNORMAL
IMM GRANULOCYTES # BLD AUTO: 0.21 K/UL (ref 0–0.04)
IMM GRANULOCYTES NFR BLD AUTO: 2 % (ref 0–0.5)
LYMPHOCYTES # BLD AUTO: 1.4 K/UL (ref 1–4.8)
LYMPHOCYTES NFR BLD: 13.5 % (ref 18–48)
MCH RBC QN AUTO: 23.1 PG (ref 27–31)
MCHC RBC AUTO-ENTMCNC: 27.9 G/DL (ref 32–36)
MCV RBC AUTO: 83 FL (ref 82–98)
MONOCYTES # BLD AUTO: 1.6 K/UL (ref 0.3–1)
MONOCYTES NFR BLD: 15 % (ref 4–15)
NEUTROPHILS # BLD AUTO: 7 K/UL (ref 1.8–7.7)
NEUTROPHILS NFR BLD: 65.9 % (ref 38–73)
NRBC BLD-RTO: 0 /100 WBC
OVALOCYTES BLD QL SMEAR: ABNORMAL
PLATELET # BLD AUTO: 494 K/UL (ref 150–450)
PMV BLD AUTO: 12.6 FL (ref 9.2–12.9)
POCT GLUCOSE: 107 MG/DL (ref 70–110)
POCT GLUCOSE: 108 MG/DL (ref 70–110)
POIKILOCYTOSIS BLD QL SMEAR: SLIGHT
POLYCHROMASIA BLD QL SMEAR: ABNORMAL
POTASSIUM SERPL-SCNC: 4.2 MMOL/L (ref 3.5–5.1)
PROT SERPL-MCNC: 5.1 G/DL (ref 6–8.4)
RBC # BLD AUTO: 3.24 M/UL (ref 4.6–6.2)
SODIUM SERPL-SCNC: 139 MMOL/L (ref 136–145)
TARGETS BLD QL SMEAR: ABNORMAL
WBC # BLD AUTO: 10.66 K/UL (ref 3.9–12.7)

## 2022-05-24 PROCEDURE — 99497 PR ADVNCD CARE PLAN 30 MIN: ICD-10-PCS | Mod: 25,,, | Performed by: NURSE PRACTITIONER

## 2022-05-24 PROCEDURE — 97530 THERAPEUTIC ACTIVITIES: CPT

## 2022-05-24 PROCEDURE — 85025 COMPLETE CBC W/AUTO DIFF WBC: CPT | Performed by: EMERGENCY MEDICINE

## 2022-05-24 PROCEDURE — 99222 1ST HOSP IP/OBS MODERATE 55: CPT | Mod: ,,, | Performed by: NURSE PRACTITIONER

## 2022-05-24 PROCEDURE — 25000003 PHARM REV CODE 250: Performed by: SURGERY

## 2022-05-24 PROCEDURE — 25000003 PHARM REV CODE 250: Performed by: NURSE PRACTITIONER

## 2022-05-24 PROCEDURE — 92611 MOTION FLUOROSCOPY/SWALLOW: CPT

## 2022-05-24 PROCEDURE — 97110 THERAPEUTIC EXERCISES: CPT

## 2022-05-24 PROCEDURE — 99497 ADVNCD CARE PLAN 30 MIN: CPT | Mod: 25,,, | Performed by: NURSE PRACTITIONER

## 2022-05-24 PROCEDURE — 99222 PR INITIAL HOSPITAL CARE,LEVL II: ICD-10-PCS | Mod: ,,, | Performed by: NURSE PRACTITIONER

## 2022-05-24 PROCEDURE — A9698 NON-RAD CONTRAST MATERIALNOC: HCPCS | Performed by: SURGERY

## 2022-05-24 PROCEDURE — 99233 PR SUBSEQUENT HOSPITAL CARE,LEVL III: ICD-10-PCS | Mod: ,,, | Performed by: INTERNAL MEDICINE

## 2022-05-24 PROCEDURE — 25000242 PHARM REV CODE 250 ALT 637 W/ HCPCS: Performed by: SURGERY

## 2022-05-24 PROCEDURE — P9016 RBC LEUKOCYTES REDUCED: HCPCS | Performed by: EMERGENCY MEDICINE

## 2022-05-24 PROCEDURE — 80053 COMPREHEN METABOLIC PANEL: CPT | Performed by: SURGERY

## 2022-05-24 PROCEDURE — 25500020 PHARM REV CODE 255: Performed by: SURGERY

## 2022-05-24 PROCEDURE — 99233 SBSQ HOSP IP/OBS HIGH 50: CPT | Mod: ,,, | Performed by: INTERNAL MEDICINE

## 2022-05-24 PROCEDURE — 86920 COMPATIBILITY TEST SPIN: CPT | Performed by: EMERGENCY MEDICINE

## 2022-05-24 PROCEDURE — 21400001 HC TELEMETRY ROOM

## 2022-05-24 PROCEDURE — 86901 BLOOD TYPING SEROLOGIC RH(D): CPT | Performed by: EMERGENCY MEDICINE

## 2022-05-24 RX ORDER — HYDROCODONE BITARTRATE AND ACETAMINOPHEN 500; 5 MG/1; MG/1
TABLET ORAL
Status: DISCONTINUED | OUTPATIENT
Start: 2022-05-24 | End: 2022-05-26 | Stop reason: HOSPADM

## 2022-05-24 RX ORDER — ASCORBIC ACID 500 MG
500 TABLET ORAL 2 TIMES DAILY
Status: DISCONTINUED | OUTPATIENT
Start: 2022-05-24 | End: 2022-05-26 | Stop reason: HOSPADM

## 2022-05-24 RX ORDER — LANOLIN ALCOHOL/MO/W.PET/CERES
1 CREAM (GRAM) TOPICAL DAILY
Status: DISCONTINUED | OUTPATIENT
Start: 2022-05-25 | End: 2022-05-26 | Stop reason: HOSPADM

## 2022-05-24 RX ORDER — ACETAMINOPHEN 325 MG/1
650 TABLET ORAL EVERY 6 HOURS PRN
Status: DISCONTINUED | OUTPATIENT
Start: 2022-05-24 | End: 2022-05-26 | Stop reason: HOSPADM

## 2022-05-24 RX ORDER — FAMOTIDINE 20 MG/1
20 TABLET, FILM COATED ORAL 2 TIMES DAILY
Status: DISCONTINUED | OUTPATIENT
Start: 2022-05-25 | End: 2022-05-26 | Stop reason: HOSPADM

## 2022-05-24 RX ORDER — AMOXICILLIN AND CLAVULANATE POTASSIUM 875; 125 MG/1; MG/1
1 TABLET, FILM COATED ORAL EVERY 12 HOURS
Status: DISCONTINUED | OUTPATIENT
Start: 2022-05-24 | End: 2022-05-26 | Stop reason: HOSPADM

## 2022-05-24 RX ORDER — AMIODARONE HYDROCHLORIDE 200 MG/1
200 TABLET ORAL 2 TIMES DAILY
Status: DISCONTINUED | OUTPATIENT
Start: 2022-05-24 | End: 2022-05-26 | Stop reason: HOSPADM

## 2022-05-24 RX ADMIN — OXYCODONE HYDROCHLORIDE AND ACETAMINOPHEN 500 MG: 500 TABLET ORAL at 08:05

## 2022-05-24 RX ADMIN — AMIODARONE HYDROCHLORIDE 200 MG: 200 TABLET ORAL at 08:05

## 2022-05-24 RX ADMIN — FAMOTIDINE 40 MG: 40 POWDER, FOR SUSPENSION ORAL at 08:05

## 2022-05-24 RX ADMIN — LOPERAMIDE HYDROCHLORIDE 2 MG: 2 CAPSULE ORAL at 03:05

## 2022-05-24 RX ADMIN — APIXABAN 5 MG: 2.5 TABLET, FILM COATED ORAL at 08:05

## 2022-05-24 RX ADMIN — LOPERAMIDE HYDROCHLORIDE 2 MG: 2 CAPSULE ORAL at 08:05

## 2022-05-24 RX ADMIN — AMOXICILLIN AND CLAVULANATE POTASSIUM 1 TABLET: 875; 125 TABLET, FILM COATED ORAL at 12:05

## 2022-05-24 RX ADMIN — BARIUM SULFATE 30 ML: 0.81 POWDER, FOR SUSPENSION ORAL at 10:05

## 2022-05-24 RX ADMIN — Medication 300 MG: at 08:05

## 2022-05-24 RX ADMIN — PSYLLIUM HUSK 1 PACKET: 3.4 POWDER ORAL at 08:05

## 2022-05-24 RX ADMIN — MINERAL OIL, PETROLATUM: 425; 573 OINTMENT OPHTHALMIC at 08:05

## 2022-05-24 RX ADMIN — AMOXICILLIN AND CLAVULANATE POTASSIUM 1 TABLET: 875; 125 TABLET, FILM COATED ORAL at 08:05

## 2022-05-24 NOTE — PT/OT/SLP PROGRESS
Physical Therapy  Treatment    Franky Masters   MRN: [id number]   Admitting Diagnosis: Severe sepsis    PT Received On: 05/24/22  PT Start Time: 1117     PT Stop Time: 1140    PT Total Time (min): 23 min       Billable Minutes:  Therapeutic Activity 13 and Therapeutic Exercise 10    Treatment Type: Treatment  PT/PTA: PT     PTA Visit Number: 0       General Precautions: Standard, fall, NPO  Orthopedic Precautions: N/A   Braces: N/A  Respiratory Status: Room air         Subjective:  Communicated with nurse Sewell and epic chart review  prior to session.   pt agreed to tx     Pain/Comfort  Pain Rating 1: 5/10  Location 1: abdomen  Pain Rating Post-Intervention 1: 5/10    Objective:   Patient found with: peripheral IV, telemetry, NG tube, PICC line, ADDIS drain    Functional Mobility:  Pt met in rm log rolled to left seated eob with max a. Pt scooted to eob with mod a and propped with pillows for support. Pt completed b le/ue te x 10 reps of ap, tke, mip , biceps curls, chest press with dec rom and aarom at times. Pt left seated eob for sitting tolerance with nurse and PCT aware. P.T. also called for assist with toileting per pt request.     AM-PAC 6 CLICK MOBILITY  How much help from another person does this patient currently need?   1 = Unable, Total/Dependent Assistance  2 = A lot, Maximum/Moderate Assistance  3 = A little, Minimum/Contact Guard/Supervision  4 = None, Modified [place]/Independent    Turning over in bed (including adjusting bedclothes, sheets and blankets)?: 2  Sitting down on and standing up from a chair with arms (e.g., wheelchair, bedside commode, etc.): 1  Moving from lying on back to sitting on the side of the bed?: 2  Moving to and from a bed to a chair (including a wheelchair)?: 1  Need to walk in hospital room?: 1  Climbing 3-5 steps with a railing?: 1  Basic Mobility Total Score: 8    AM-PAC Raw Score CMS G-Code Modifier Level of Impairment Assistance   6 % Total / Unable   7 - 9 CM  80 - 100% Maximal Assist   10 - 14 CL 60 - 80% Moderate Assist   15 - 19 CK 40 - 60% Moderate Assist   20 - 22 CJ 20 - 40% Minimal Assist   23 CI 1-20% SBA / CGA   24 CH 0% Independent/ Mod I     Patient left sitting edge of bed with all lines intact, call button in reach and nurse notified.    Assessment:  Pt leighann tx with inc fatigue and inc assist for gross func mobility     Rehab identified problem list/impairments: Rehab identified problem list/impairments: weakness, impaired endurance, impaired self care skills, impaired functional mobilty, gait instability, impaired balance, pain, decreased safety awareness, decreased lower extremity function, decreased upper extremity function, edema, impaired cardiopulmonary response to activity, decreased ROM    Rehab potential is good.    Activity tolerance: Fair    Discharge recommendations: Discharge Facility/Level of Care Needs: nursing facility, skilled, LTACH (long-term acute care hospital)     Barriers to discharge:      Equipment recommendations: Equipment Needed After Discharge: walker, rolling (tbd)     GOALS:   Multidisciplinary Problems     Physical Therapy Goals        Problem: Physical Therapy    Goal Priority Disciplines Outcome Goal Variances Interventions   Physical Therapy Goal     PT, PT/OT Ongoing, Progressing     Description: LT22  1. PT WILL COMPLETE BED MOBILITY WITH MOD A  2. PT WILL STAND WITH RW AND MAX A FOR STAND PIVOT T/F TO CHAIR   3. PT WILL GT TRAIN X 25' WITH RW AND MAX A                   PLAN:    Patient to be seen 3 x/week  to address the above listed problems via therapeutic activities, therapeutic exercises, gait training  Plan of Care expires: 22  Plan of Care reviewed with: patient    2022

## 2022-05-24 NOTE — SUBJECTIVE & OBJECTIVE
Interval History:  More alert than yesterday afternoon    Oncology Treatment Plan:   [Could not find a treatment plan. This SmartLink may be configured incorrectly. Contact a  for help.]    Medications:  Continuous Infusions:   dextrose 5 % 75 mL/hr at 05/23/22 2131     Scheduled Meds:   amiodarone  200 mg Per NG tube BID    apixaban  5 mg Per NG tube BID    ascorbic acid (vitamin C)  500 mg Per NG tube BID    famotidine  40 mg Per NG tube Daily    ferrous sulfate  300 mg Per NG tube Daily    loperamide  2 mg Oral TID    psyllium husk (aspartame)  1 packet Per NG tube Daily    white petrolatum-mineral oil 57.3-42.5%   Both Eyes QHS     PRN Meds:sodium chloride 0.9%, acetaminophen, dextrose 10%, glucagon (human recombinant), hydrALAZINE, insulin aspart U-100, ondansetron     Review of Systems   Constitutional:  Positive for activity change and fatigue. Negative for appetite change, chills, diaphoresis, fever and unexpected weight change.   HENT:  Negative for congestion, dental problem, drooling, ear discharge, ear pain, facial swelling, hearing loss, mouth sores, nosebleeds, postnasal drip, rhinorrhea, sinus pressure, sneezing, sore throat, tinnitus, trouble swallowing and voice change.    Eyes:  Negative for photophobia, pain, discharge, redness, itching and visual disturbance.   Respiratory:  Negative for apnea, cough, choking, chest tightness, shortness of breath, wheezing and stridor.    Cardiovascular:  Negative for chest pain, palpitations and leg swelling.   Gastrointestinal:  Positive for abdominal distention. Negative for abdominal pain, anal bleeding, blood in stool, constipation, diarrhea, nausea, rectal pain and vomiting.   Endocrine: Negative for cold intolerance, heat intolerance, polydipsia, polyphagia and polyuria.   Genitourinary:  Negative for decreased urine volume, difficulty urinating, dysuria, enuresis, flank pain, frequency, genital sores, hematuria, penile discharge,  penile pain, penile swelling, scrotal swelling, testicular pain and urgency.   Musculoskeletal:  Positive for gait problem. Negative for arthralgias, back pain, joint swelling, myalgias, neck pain and neck stiffness.   Skin:  Negative for color change, pallor, rash and wound.   Allergic/Immunologic: Negative for environmental allergies, food allergies and immunocompromised state.   Neurological:  Positive for weakness. Negative for dizziness, tremors, seizures, syncope, facial asymmetry, speech difficulty, light-headedness, numbness and headaches.   Hematological:  Negative for adenopathy. Does not bruise/bleed easily.   Psychiatric/Behavioral:  Positive for dysphoric mood. Negative for agitation, behavioral problems, confusion, decreased concentration, hallucinations, self-injury, sleep disturbance and suicidal ideas. The patient is nervous/anxious. The patient is not hyperactive.    Objective:     Vital Signs (Most Recent):  Temp: 97.8 °F (36.6 °C) (05/24/22 0645)  Pulse: (!) 58 (05/24/22 0645)  Resp: 18 (05/24/22 0645)  BP: 123/62 (05/24/22 0645)  SpO2: 95 % (05/24/22 0645)   Vital Signs (24h Range):  Temp:  [97.4 °F (36.3 °C)-98.2 °F (36.8 °C)] 97.8 °F (36.6 °C)  Pulse:  [58-65] 58  Resp:  [18] 18  SpO2:  [94 %-96 %] 95 %  BP: (123-135)/(60-73) 123/62     Weight: 111.9 kg (246 lb 11.1 oz)  Body mass index is 36.43 kg/m².  Body surface area is 2.33 meters squared.      Intake/Output Summary (Last 24 hours) at 5/24/2022 0806  Last data filed at 5/24/2022 0705  Gross per 24 hour   Intake 30 ml   Output 1140 ml   Net -1110 ml       Physical Exam  Vitals reviewed.   Constitutional:       General: He is not in acute distress.     Appearance: He is well-developed. He is obese. He is ill-appearing. He is not diaphoretic.   HENT:      Head: Normocephalic.      Right Ear: External ear normal.      Left Ear: External ear normal.      Nose: Nose normal.      Right Sinus: No maxillary sinus tenderness or frontal sinus  tenderness.      Left Sinus: No maxillary sinus tenderness or frontal sinus tenderness.      Comments: NG-tube in place     Mouth/Throat:      Pharynx: No oropharyngeal exudate.   Eyes:      General: Lids are normal. No scleral icterus.        Right eye: No discharge.         Left eye: No discharge.      Extraocular Movements:      Right eye: Normal extraocular motion.      Left eye: Normal extraocular motion.      Conjunctiva/sclera:      Right eye: Right conjunctiva is not injected. No hemorrhage.     Left eye: Left conjunctiva is not injected. No hemorrhage.     Pupils: Pupils are equal, round, and reactive to light.   Neck:      Thyroid: No thyromegaly.      Vascular: No JVD.      Trachea: No tracheal deviation.   Cardiovascular:      Rate and Rhythm: Normal rate.   Pulmonary:      Effort: Pulmonary effort is normal. No respiratory distress.      Breath sounds: No stridor.   Chest:   Breasts:      Right: No supraclavicular adenopathy.      Left: No supraclavicular adenopathy.     Abdominal:      General: Bowel sounds are normal.      Palpations: Abdomen is soft. There is no hepatomegaly, splenomegaly or mass.      Tenderness: There is no abdominal tenderness.          Comments: Functioning colostomy   Musculoskeletal:         General: No tenderness. Normal range of motion.      Cervical back: Normal range of motion and neck supple.   Lymphadenopathy:      Head:      Right side of head: No posterior auricular or occipital adenopathy.      Left side of head: No posterior auricular or occipital adenopathy.      Cervical: No cervical adenopathy.      Right cervical: No superficial, deep or posterior cervical adenopathy.     Left cervical: No superficial, deep or posterior cervical adenopathy.      Upper Body:      Right upper body: No supraclavicular adenopathy.      Left upper body: No supraclavicular adenopathy.   Skin:     General: Skin is dry.      Findings: No erythema or rash.      Nails: There is no clubbing.    Neurological:      Mental Status: He is alert and oriented to person, place, and time.      Cranial Nerves: No cranial nerve deficit.      Coordination: Coordination normal.   Psychiatric:         Behavior: Behavior normal.         Thought Content: Thought content normal.         Judgment: Judgment normal.     Significant Labs:   BMP:   Recent Labs   Lab 05/24/22 0522         K 4.2      CO2 24   BUN 34*   CREATININE 1.3   CALCIUM 7.8*   , CBC:   Recent Labs   Lab 05/24/22 0522   WBC 10.66   HGB 7.5*   HCT 26.9*   *   , CMP:   Recent Labs   Lab 05/24/22 0522      K 4.2      CO2 24      BUN 34*   CREATININE 1.3   CALCIUM 7.8*   PROT 5.1*   ALBUMIN 1.5*   BILITOT 0.9   ALKPHOS 142*   AST 40   ALT 40   ANIONGAP 8   EGFRNONAA 56*   , Coagulation: No results for input(s): PT, INR, APTT in the last 48 hours., Haptoglobin: No results for input(s): HAPTOGLOBIN in the last 48 hours., Immunology: No results for input(s): SPEP, KINGS, ANGELICA, FREELAMBDALI in the last 48 hours., LDH: No results for input(s): LDHCSF, BFSOURCE in the last 48 hours., LFTs:   Recent Labs   Lab 05/24/22 0522   ALT 40   AST 40   ALKPHOS 142*   BILITOT 0.9   PROT 5.1*   ALBUMIN 1.5*   , Reticulocytes: No results for input(s): RETIC in the last 48 hours., Tumor Markers: No results for input(s): PSA, CEA, , AFPTM, [id number],  in the last 48 hours.    Invalid input(s): ALGTM, Uric Acid No results for input(s): URICACID in the last 48 hours., and Urine Studies: No results for input(s): COLORU, APPEARANCEUA, PHUR, SPECGRAV, PROTEINUA, GLUCUA, KETONESU, BILIRUBINUA, OCCULTUA, NITRITE, UROBILINOGEN, LEUKOCYTESUR, RBCUA, WBCUA, BACTERIA, SQUAMEPITHEL, HYALINECASTS in the last 48 hours.    Invalid input(s): WRIGHTSUR    Diagnostic Results:  I have reviewed all pertinent imaging results/findings within the past 24 hours.

## 2022-05-24 NOTE — PHYSICIAN QUERY
PT Name: Franky Masters  MR #: [id number]    DOCUMENTATION CLARIFICATION     CDS: Ashley COLINDRES RN  Contact information: [email or web address]    This form is a permanent document in the medical record.     Query Date: May 24, 2022    By submitting this query, we are merely seeking further clarification of documentation.  Please utilize your independent clinical judgment when addressing the question(s) below.    The medical record contains the following:  Pathology Findings Location in Medical Record   Final Pathologic Diagnosis   1. Right colon and terminal ileum, right hemicolectomy:   Invasive adenocarcinoma, moderately differentiated, measuring 9.6 cm in greatest dimension, with invasion to the visceral peritoneum.   Adenocarcinoma involves the mesoappendix and appendiceal wall.   Metastatic carcinoma involving seven of seventeen lymph nodes (7/17).   Lymphovascular and perineural invasion are identified.   Surgical margins are negative for carcinoma.   Uninvolved colon and small bowel with acute serositis.   Uninvolved appendix with periappendicitis.     2. Liver, right lobe, biopsy:   Metastatic adenocarcinoma in liver.    Surgical Pathology 5/11/2022                            Surgical Pathology 5/11/2022       Please clarify:  [ x ] Pathology findings noted above are ruled in/confirmed as diagnoses   [  ] Pathology findings noted above are not confirmed as diagnoses   [  ] Pathology findings noted above are incidental   [  ] Other diagnosis (please specify): ___________   [  ] Clinically Undetermined       Please document in your progress notes daily for the duration of treatment until resolved and include in your discharge summary.    Form No. [id number]

## 2022-05-24 NOTE — PROGRESS NOTES
[facility] Surg  Hematology/Oncology  Progress Note    Patient Name: Franky Masters  Admission Date: 5/4/2022  Hospital Length of Stay: 20 days  Code Status: DNR     Subjective:     HPI:  67-year-old male newly diagnosed metastatic colon carcinoma.  The patient had a perforated viscus and exploratory surgery with colostomy.  Biopsy of liver mass metastatic disease was asked to see the patient in the postoperative setting for further therapeutic considerations once patient has been recovered ECOG status 2 previously fully active practicing chiropractor       Interval History:  More alert than yesterday afternoon    Oncology Treatment Plan:   [Could not find a treatment plan. This SmartLink may be configured incorrectly. Contact a  for help.]    Medications:  Continuous Infusions:   dextrose 5 % 75 mL/hr at 05/23/22 2131     Scheduled Meds:   amiodarone  200 mg Per NG tube BID    apixaban  5 mg Per NG tube BID    ascorbic acid (vitamin C)  500 mg Per NG tube BID    famotidine  40 mg Per NG tube Daily    ferrous sulfate  300 mg Per NG tube Daily    loperamide  2 mg Oral TID    psyllium husk (aspartame)  1 packet Per NG tube Daily    white petrolatum-mineral oil 57.3-42.5%   Both Eyes QHS     PRN Meds:sodium chloride 0.9%, acetaminophen, dextrose 10%, glucagon (human recombinant), hydrALAZINE, insulin aspart U-100, ondansetron     Review of Systems   Constitutional:  Positive for activity change and fatigue. Negative for appetite change, chills, diaphoresis, fever and unexpected weight change.   HENT:  Negative for congestion, dental problem, drooling, ear discharge, ear pain, facial swelling, hearing loss, mouth sores, nosebleeds, postnasal drip, rhinorrhea, sinus pressure, sneezing, sore throat, tinnitus, trouble swallowing and voice change.    Eyes:  Negative for photophobia, pain, discharge, redness, itching and visual disturbance.   Respiratory:  Negative for apnea, cough, choking,  chest tightness, shortness of breath, wheezing and stridor.    Cardiovascular:  Negative for chest pain, palpitations and leg swelling.   Gastrointestinal:  Positive for abdominal distention. Negative for abdominal pain, anal bleeding, blood in stool, constipation, diarrhea, nausea, rectal pain and vomiting.   Endocrine: Negative for cold intolerance, heat intolerance, polydipsia, polyphagia and polyuria.   Genitourinary:  Negative for decreased urine volume, difficulty urinating, dysuria, enuresis, flank pain, frequency, genital sores, hematuria, penile discharge, penile pain, penile swelling, scrotal swelling, testicular pain and urgency.   Musculoskeletal:  Positive for gait problem. Negative for arthralgias, back pain, joint swelling, myalgias, neck pain and neck stiffness.   Skin:  Negative for color change, pallor, rash and wound.   Allergic/Immunologic: Negative for environmental allergies, food allergies and immunocompromised state.   Neurological:  Positive for weakness. Negative for dizziness, tremors, seizures, syncope, facial asymmetry, speech difficulty, light-headedness, numbness and headaches.   Hematological:  Negative for adenopathy. Does not bruise/bleed easily.   Psychiatric/Behavioral:  Positive for dysphoric mood. Negative for agitation, behavioral problems, confusion, decreased concentration, hallucinations, self-injury, sleep disturbance and suicidal ideas. The patient is nervous/anxious. The patient is not hyperactive.    Objective:     Vital Signs (Most Recent):  Temp: 97.8 °F (36.6 °C) (05/24/22 0645)  Pulse: (!) 58 (05/24/22 0645)  Resp: 18 (05/24/22 0645)  BP: 123/62 (05/24/22 0645)  SpO2: 95 % (05/24/22 0645)   Vital Signs (24h Range):  Temp:  [97.4 °F (36.3 °C)-98.2 °F (36.8 °C)] 97.8 °F (36.6 °C)  Pulse:  [58-65] 58  Resp:  [18] 18  SpO2:  [94 %-96 %] 95 %  BP: (123-135)/(60-73) 123/62     Weight: 111.9 kg (246 lb 11.1 oz)  Body mass index is 36.43 kg/m².  Body surface area is 2.33  meters squared.      Intake/Output Summary (Last 24 hours) at 5/24/2022 0806  Last data filed at 5/24/2022 0705  Gross per 24 hour   Intake 30 ml   Output 1140 ml   Net -1110 ml       Physical Exam  Vitals reviewed.   Constitutional:       General: He is not in acute distress.     Appearance: He is well-developed. He is obese. He is ill-appearing. He is not diaphoretic.   HENT:      Head: Normocephalic.      Right Ear: External ear normal.      Left Ear: External ear normal.      Nose: Nose normal.      Right Sinus: No maxillary sinus tenderness or frontal sinus tenderness.      Left Sinus: No maxillary sinus tenderness or frontal sinus tenderness.      Comments: NG-tube in place     Mouth/Throat:      Pharynx: No oropharyngeal exudate.   Eyes:      General: Lids are normal. No scleral icterus.        Right eye: No discharge.         Left eye: No discharge.      Extraocular Movements:      Right eye: Normal extraocular motion.      Left eye: Normal extraocular motion.      Conjunctiva/sclera:      Right eye: Right conjunctiva is not injected. No hemorrhage.     Left eye: Left conjunctiva is not injected. No hemorrhage.     Pupils: Pupils are equal, round, and reactive to light.   Neck:      Thyroid: No thyromegaly.      Vascular: No JVD.      Trachea: No tracheal deviation.   Cardiovascular:      Rate and Rhythm: Normal rate.   Pulmonary:      Effort: Pulmonary effort is normal. No respiratory distress.      Breath sounds: No stridor.   Chest:   Breasts:      Right: No supraclavicular adenopathy.      Left: No supraclavicular adenopathy.     Abdominal:      General: Bowel sounds are normal.      Palpations: Abdomen is soft. There is no hepatomegaly, splenomegaly or mass.      Tenderness: There is no abdominal tenderness.          Comments: Functioning colostomy   Musculoskeletal:         General: No tenderness. Normal range of motion.      Cervical back: Normal range of motion and neck supple.   Lymphadenopathy:       Head:      Right side of head: No posterior auricular or occipital adenopathy.      Left side of head: No posterior auricular or occipital adenopathy.      Cervical: No cervical adenopathy.      Right cervical: No superficial, deep or posterior cervical adenopathy.     Left cervical: No superficial, deep or posterior cervical adenopathy.      Upper Body:      Right upper body: No supraclavicular adenopathy.      Left upper body: No supraclavicular adenopathy.   Skin:     General: Skin is dry.      Findings: No erythema or rash.      Nails: There is no clubbing.   Neurological:      Mental Status: He is alert and oriented to person, place, and time.      Cranial Nerves: No cranial nerve deficit.      Coordination: Coordination normal.   Psychiatric:         Behavior: Behavior normal.         Thought Content: Thought content normal.         Judgment: Judgment normal.     Significant Labs:   BMP:   Recent Labs   Lab 05/24/22 0522         K 4.2      CO2 24   BUN 34*   CREATININE 1.3   CALCIUM 7.8*   , CBC:   Recent Labs   Lab 05/24/22 0522   WBC 10.66   HGB 7.5*   HCT 26.9*   *   , CMP:   Recent Labs   Lab 05/24/22 0522      K 4.2      CO2 24      BUN 34*   CREATININE 1.3   CALCIUM 7.8*   PROT 5.1*   ALBUMIN 1.5*   BILITOT 0.9   ALKPHOS 142*   AST 40   ALT 40   ANIONGAP 8   EGFRNONAA 56*   , Coagulation: No results for input(s): PT, INR, APTT in the last 48 hours., Haptoglobin: No results for input(s): HAPTOGLOBIN in the last 48 hours., Immunology: No results for input(s): SPEP, KINGS, ANGELICA, FREELAMBDALI in the last 48 hours., LDH: No results for input(s): LDHCSF, BFSOURCE in the last 48 hours., LFTs:   Recent Labs   Lab 05/24/22 0522   ALT 40   AST 40   ALKPHOS 142*   BILITOT 0.9   PROT 5.1*   ALBUMIN 1.5*   , Reticulocytes: No results for input(s): RETIC in the last 48 hours., Tumor Markers: No results for input(s): PSA, CEA, , AFPTM, [id number],  in the last 48  hours.    Invalid input(s): ALGTM, Uric Acid No results for input(s): URICACID in the last 48 hours., and Urine Studies: No results for input(s): COLORU, APPEARANCEUA, PHUR, SPECGRAV, PROTEINUA, GLUCUA, KETONESU, BILIRUBINUA, OCCULTUA, NITRITE, UROBILINOGEN, LEUKOCYTESUR, RBCUA, WBCUA, BACTERIA, SQUAMEPITHEL, HYALINECASTS in the last 48 hours.    Invalid input(s): WRIGHTSUR    Diagnostic Results:  I have reviewed all pertinent imaging results/findings within the past 24 hours.    Assessment/Plan:     * Severe sepsis secondary to Peritonitis from bowel perforation  Cared for by operative surgeon and hospital medicine team      Malignant neoplasm of ascending colon  Extensive conversation with the patient.  The patient has widespread colon carcinoma.  Intra-abdominal with metastatic disease in liver biopsy-proven.  At this time I have talked to the patient about awaiting results of molecular characterization to better define treatment options.  I have talked to the patient he is not reluctant to pursue any treatment of told in my professional opinion I think survival to the end of the year would be small and would probably recommend hospice.  If the patient decides to take systemic chemotherapy with reviewed briefly toxicity profile articles from up-to-date followed him for his review in talked about response rates of 60% and noncurative setting.  At this time will be happy to talk to the patient once more family is present and I will be happy to answer questions as we proceed in the recovery phase from his extensive surgery  05/24/2022.  Extensive conversation with patient as well as daughter at bedside I again reviewed the nature of stage IV metastatic colon carcinoma being a highly treatable but not curable malignancy.  I have answered a number questions about his stage and the impending molecular characterization of his tumor to better assist on recommendations for systemic therapy.  We again talked about options  of pursuing systemic therapy versus is no treatment.  In which case I would recommend hospice.  I agree entirely with a do not resuscitate order has been placed on chart and palliative care consultation appropriate by operative surgeon would be an appropriate course of action patient does not have a living will or medical power  created.  The patient has received the articles that I sent to them from up-to-date on metastatic colon carcinoma and discussed various treatment approaches of told him final recommendations systemic therapy will need to await molecular characterization of his tumor total time 35 minutes        Thank you for your consult. I will follow-up with patient. Please contact us if you have any additional questions.     Rm Gonzalez MD  Hematology/Oncology  [facility]

## 2022-05-24 NOTE — PROGRESS NOTES
[facility] Medicine  Progress Note    Patient Name: Franky Masters  MRN: [id number]  Patient Class: IP- Inpatient   Admission Date: 5/4/2022  Length of Stay: 20 days  Attending Physician: Elvie Parker DO  Primary Care Provider: HOLLY ROSEN        Subjective:     Principal Problem:Severe sepsis        HPI:  Mr Masters is a 65 yo male POD#0 s/p SB perforation with surgical intervention demonstrating a large cecal mass and 3l feculant fluid in the abdominal cavity. Additional findings of a perforated ileum and a liver mass. Patient tolerated the Exlap with Washout and small bowel excision. Patient had a wound vac placed, is currently intubated, sedated and recovering in the ICU. Patient received 4 units PRBC and remains on pressor support. Patient is a DNR and HM consulted with assistance with medical management. Daughter at bedside. Patient discussed with care team.          Overview/Hospital Course:  Patient continues to require pressors, remains intubated, sedated. Patient urine o/p declining and concerning. Discussed POC in detail at bedside with daughter POA. She expressed her fathers wish to not undergo treatments  like perm HD, where he has a diminished quality of life. We spoke frankly about issues and concerns and she will be communicating with the family as his case evolves. Comfort care was discussed and the goals of care will develop consistent with the patients expressed wishes. Potential for another surgery likely Saturday with a washout and possible biopsy vs resection are on the horizon. This possible intervention will be covered in detail by surgery sharing the risks and benefits of that approach. Case discussed with the primary service - surgery, and critical care team.    5/6- Pt seen and examined in the ICU plus discussed with ICU team and the pt's daughter/ POA: Remains critically ill, intubated, sedated on Vent, on Pressors- Levo plus Vaso- JOSE with oliguria getting worse-  Cr rising to 3.6, becoming severely acidotic- requiring Ertapenem, Zyvox, Micafungin as well as on Bicarb and Fentanyl gtt. He has massive fluid overload and generalized anasarca.  Possible return to OR tomorrow 5/7 if patient is more stable for right hemicolectomy, possible ileostomy, liver biopsy, abdominal wall closure. Dw Pt's daughter.       5/7- remains Intubated, sedated on Vent- Went into Afib w RVR- hence added Amio to Levo and Vasopressin- back in NSR. Getting Invanz and Zyvox plus Micafungin. Dr. Parker took him back to OR for for abdominal washout ( 3-3.5 L feculent fluid with food particles suctioned out in the OR, small bowel appeared better) and replaced Abthera- still has bowel discontinuity. His WBC, Lactate and Cr have all increased. Family updated about his critical condition and poor prognosis and JOSE/ATN- they are considering Comfort measures.     5/8- Day 5 on Vent- family at bedside, remains intubated on Vent with 2 Pressors- still on Amiodarone gtt for Afib, Still has Abthera wound vac to open Abdomen with small bowel discontinuity. Remains oliguric with generalized anasarca- almost 24 L fluid positive. Cr increased to 4.6. WBC down to 12, family does not want any HD/CRRT, so getting an IV Lasix trial to improve the urine output.     5/9- Day 6 on vent- remains the same, remains intubated, on vent with Abthera Wound vac and bowel discontinuity. Put out about 3 L urine since yesterday with IV Lasix, family still does not want any HD, WBC down to 10.2, H/H 7.8/24, still on 2 Pressors and Amio gtt. Family still deciding about comfort care.     5/10- Appreciate all- Day 7- remains same in septic shock on 2 vasopressors, intubated and sedated, still in afib. JOSE has remained stable at 4.7 but urine output improved with IV lasix. Abthera wound vac in place. No surgery today- put out about 2.5 L yesterday, given lasix again today.    5/11- Seen Pre op- looks better, less swollen, remains intubated,  sedated on Vent, on 1 Pressor- on Levophed. Going for OR today for Laparotomy and washout and abd wound closure. Good response to Lasix. Still Afib on Amiodarone gtt. Bun/Cr 98/4.4, WBC down to 17, H/H 8.6/26.     5/12- Day 8 on Vent- looks much better, remains on Vent with Levophed and Amio gtts. Had extensive surgery yesterday and did very well post op- Reopening of recent Laparotomy, Abdominal washout, R Hemicolectomy with Liver Bx, TAP block, Abdominal wall closure, Creation, end Ileostomy. POD 1- looks better, still on Levophed and Amio gtt for Afib, started on TPN, ID stopped Zyvox and continued Merrem/Mycafungin.     5/17 - Successfully extubated.  Additional left side drain placed by Interventional Radiology.    5/18 - Purulent drainage from left side drains.  Remained stable.  5/20 - POD 9 s/p R hemicolectomy and Ileostomy- extubated 5/18 and also underwent large Abdominal abscess by IR on 5/18 with over 500 cc pus drained- all Cx remain NGTD. No fever or chills, no leukocytosis, AAOx2, remains on RA, very weak, malnourished. Getting TPN, Albumin 1.4. Hemodynamically stable- hence transferred out of ICU to floor, getting PT/OT.   5/21- looks lot better, more alert and responsive, following commands, moving all 4 ext well, generalized anasarca improving. Bun/Cr further down to 46/1.4, H/h stable at 7.7/23- ordered IV Venofer plis inj B12 IM. Tolerating TF well. Will continue PT/OT.   5/22- looks better and getting stronger, more responsive and moving all 4 ext well, tolerating TF well at 30 ml/hr via NGT. Wife and daughter at bedside. BUE swelling also coning down. Continue PT/OT.   5/23- looks better, getting stronger, got the Doherty out today, was able to urinate on his own. Also got up with PT today. Ostomy care performed. Will get MBSS again- continue TF and PT. Pt and family considering LTAC vs SNF.   5/24- looks much better, NGT out, tolerated oral feeding well, did some PT/OT as well, easily gets  exhausted.. 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 Await Placement to Rehab vs LTAC. H/H still 7.5/22- he is big and tall and may give him 1-2 units blood tomorrow.       Interval History: looks much better, NGT out, tolerated oral feeding well, did some PT/OT as well, easily gets exhausted.. 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 Await Placement to Rehab vs LTAC. H/H still 7.5/22- he is big and tall and may give him 1-2 units blood tomorrow.     Review of Systems   Constitutional:  Positive for activity change, appetite change and fatigue. Negative for chills, diaphoresis, fever and unexpected weight change.   HENT:  Negative for congestion, dental problem, drooling, ear discharge, ear pain, facial swelling, hearing loss, mouth sores, nosebleeds, postnasal drip, rhinorrhea, sinus pressure, sneezing, sore throat, tinnitus, trouble swallowing and voice change.    Eyes:  Negative for photophobia, pain, discharge, redness, itching and visual disturbance.   Respiratory:  Negative for apnea, cough, choking, chest tightness, shortness of breath, wheezing and stridor.    Cardiovascular:  Negative for chest pain, palpitations and leg swelling.   Gastrointestinal:  Negative for abdominal distention, abdominal pain, anal bleeding, blood in stool, constipation, diarrhea, nausea, rectal pain and vomiting.        Functioning colostomy   Endocrine: Negative for cold intolerance, heat intolerance, polydipsia, polyphagia and polyuria.   Genitourinary:  Negative for decreased urine volume, difficulty urinating, dysuria, enuresis, flank pain, frequency, genital sores, hematuria, penile discharge, penile pain, penile swelling, scrotal swelling, testicular pain and urgency.   Musculoskeletal:  Negative for arthralgias, back pain, gait problem, joint swelling, myalgias, neck pain and neck stiffness.   Skin:  Negative for color change,  pallor, rash and wound.   Allergic/Immunologic: Negative for environmental allergies, food allergies and immunocompromised state.   Neurological:  Positive for weakness. Negative for dizziness, tremors, seizures, syncope, facial asymmetry, speech difficulty, light-headedness, numbness and headaches.   Hematological:  Negative for adenopathy. Does not bruise/bleed easily.   Psychiatric/Behavioral:  Negative for agitation, behavioral problems, confusion, decreased concentration, dysphoric mood, hallucinations, self-injury, sleep disturbance and suicidal ideas. The patient is not nervous/anxious and is not hyperactive.    Objective:     Vital Signs (Most Recent):  Temp: 97.3 °F (36.3 °C) (05/24/22 1542)  Pulse: 69 (05/24/22 1542)  Resp: 16 (05/24/22 1542)  BP: (!) 140/69 (05/24/22 1542)  SpO2: (!) 94 % (05/24/22 1542)   Vital Signs (24h Range):  Temp:  [97.3 °F (36.3 °C)-98.1 °F (36.7 °C)] 97.3 °F (36.3 °C)  Pulse:  [58-69] 69  Resp:  [16-18] 16  SpO2:  [94 %-95 %] 94 %  BP: (123-140)/(61-76) 140/69     Weight: 111.9 kg (246 lb 11.1 oz)  Body mass index is 36.43 kg/m².    Intake/Output Summary (Last 24 hours) at 5/24/2022 1715  Last data filed at 5/24/2022 1653  Gross per 24 hour   Intake 30 ml   Output 1750 ml   Net -1720 ml      Physical Exam  Vitals reviewed.   Constitutional:       General: He is not in acute distress.     Appearance: He is obese.   HENT:      Head: Normocephalic and atraumatic.      Nose:      Comments: Feeding tube in place     Mouth/Throat:      Mouth: Mucous membranes are moist.   Cardiovascular:      Rate and Rhythm: Normal rate and regular rhythm.      Heart sounds: Normal heart sounds.   Pulmonary:      Breath sounds: Normal breath sounds.   Abdominal:      General: Bowel sounds are normal. There is no distension.      Palpations: Abdomen is soft.      Comments: Soft, nontender. Midline incision with staples intact--no surrounding erythema or purulent drainage. Ileostomy is pink with liquid  stool output. Right-sided drain is serous. Left-sided drains are a light, murky yellow.   Musculoskeletal:      Cervical back: Normal range of motion and neck supple.   Skin:     General: Skin is warm and dry.      Comments: Less edema in hands and feet.   Neurological:      General: No focal deficit present.      Mental Status: He is alert.   Psychiatric:         Behavior: Behavior normal.       Significant Labs: All pertinent labs within the past 24 hours have been reviewed.  CBC:   Recent Labs   Lab 05/24/22 0522   WBC 10.66   HGB 7.5*   HCT 26.9*   *     CMP:   Recent Labs   Lab 05/24/22 0522      K 4.2      CO2 24      BUN 34*   CREATININE 1.3   CALCIUM 7.8*   PROT 5.1*   ALBUMIN 1.5*   BILITOT 0.9   ALKPHOS 142*   AST 40   ALT 40   ANIONGAP 8   EGFRNONAA 56*       Significant Imaging: I have reviewed all pertinent imaging results/findings within the past 24 hours.      Assessment/Plan:      * Severe sepsis secondary to Peritonitis from bowel perforation  15 May:  No longer on vasopressor.  Weaning sedation for awakening trials.  Left side intra-abdominal fluid collection/abscess draining into ADDIS.  17 May:  Additional drain placed.  Extubated.  18 May:  Remaining stable and off ventilator.  Starting tube feedings soon.  19 May:  Purulent drainage form two left side drains and right side drain serous.  Starting tube feedings at low rate.  5/20- s/p POD 9 s/p R hemicolectomy and Ileostomy- s/p large Abdominal abscess drainage by IR on 5/18 with over 500 cc pus drained- all Cx remain NGTD.      Appears resolved    JOSE (acute kidney injury)  Worsening  Trend  Cr 2.2 today    Cr now 3.8- Oliguric- heading towards Anuria and ATN/ May need HD vs CRRT- she has massive fluid Overload.     Cr now 4.5-  Remains anuric  POA/ Family not in favor of HD    Remains Oliguric due to Septic Shock on 2 Pressors  Some urine output with lasix but no Polyuria     5/10 Urine Out put improved with diuresis  while renal functions remains stable    Improving, Cr coming down, good urine output    Improved    Encouraged oral intake    Adenocarcinoma of colon metastatic to liver  Stage 4 Colon Ca  Await Recovery from the Sepsis/ Peritonitis- severe Weakness      Oropharyngeal dysphagia with overall muscle deconditioning and weakness  Check swallow eval soon     S/p SLP eval      Hypoalbuminemia due to protein-calorie malnutrition  Sec to severe Septic shock from Peritonitis- improving with TF now    Continue TF, may increase TF soon    Started on Oral feeding today- slowly improving      Obesity (BMI 30.0-34.9)  Diet  Nutrition on recovery      Acute on chronic anemia  Hgb 10.3 s/p Transfusion 4 units Prbc  Transfuse for Hgb <7  Monitor    5/5  Improved  Hgb 11.4 today  Transfuse as indicated    5/10- H/H 8.6/28- likely dilutional from ATN- improving    5/21- inj B12 IM plus Venofer given, may benefit from Procrit    5/24- may need Blood to Keep H/h around 10 to improve his Performance and exercise ability      VTE Risk Mitigation (From admission, onward)         Ordered     apixaban tablet 5 mg  2 times daily         05/24/22 1122     IP VTE HIGH RISK PATIENT  Once         05/04/22 1253     Place sequential compression device  Until discontinued         05/04/22 1253                Discharge Planning   ELLIOT:      Code Status: DNR   Is the patient medically ready for discharge?:     Reason for patient still in hospital (select all that apply):  Patient trending condition, Laboratory test, Treatment, Imaging, Consult recommendations, PT / OT recommendations and Pending disposition  Discharge Plan A: Home        Megha Mejia MD  Department of Hospital Medicine   [facility] Med Surg

## 2022-05-24 NOTE — PROCEDURES
Modified Barium Swallow    Patient Name:  Franky Masters   MRN:  [id number]      Recommendations:     Recommendations:                General Recommendations:  Dysphagia therapy, Cognitive-linguistic therapy and Modified barium swallow study  Diet recommendations:  Mechanical soft, Honey Thick   Aspiration Precautions: Strict aspiration precautions   General Precautions: Standard, aspiration, honey thick  Communication strategies:  provide increased time to answer    Referral     Reason for Referral  Patient was referred for a Modified Barium Swallow Study to assess the efficiency of his/her swallow function, rule out aspiration and make recommendations regarding safe dietary consistencies, effective compensatory strategies, and safe eating environment.     Diagnosis: Severe sepsis       History:     Past Medical History:   Diagnosis Date    Diverticulitis     Supplemental oxygen dependent        Objective:     Current Respiratory Status: 05/24/22    Alert: yes    Cooperative: yes    Follows Directions: yes    Visualization  · Patient was seen in the lateral view  · NG tube observed in place    Oral Peripheral Examination  · Oral Musculature: general weakness  · Dentition:  (natural mandibular dentition.)  · Secretion Management: adequate (improved secretion management)  · Mucosal Quality: good  · Mandibular Strength and Mobility: impaired  · Oral Labial Strength and Mobility: WFL  · Lingual Strength and Mobility: impaired strength  · Velar Elevation: WFL  · Buccal Strength and Mobility: WFL  · Volitional Cough: Present, productive  · Volitional Swallow: present  · Voice Prior to PO Intake: Improving post-extubation    Consistencies Assessed  · Thin tsp, cup, straw  · Nectar thick cup, straw  · Thin-Honey Thick cup, straw  · Puree tsp  · Soft solids cracker piece    Oral Preparation/Oral Phase  · Decreased base of tongue mobility  · Premature spillage    Pharyngeal Phase   · Thin liquids:  Mild pharyngeal  residue.  · Tsp-no laryngeal penetration or aspiration.  · Cup-laryngeal penetration without laryngeal vestibule/airway clearance.  HIGH RISK OF ASPIRATION.  · Straw-Laryngeal penetration during the swallow with Gross SILENT anterior and posterior aspiration.  · Nectar thick liquids: Mild pharyngeal residue.  · Cup-laryngeal penetration without laryngeal vestibule/airway clearance.   HIGH RISK OF ASPIRATION.  · Straw- laryngeal penetration with Gross SILENT anterior and posterior aspiration.  · Honey thick liquids:  Mild-moderate pharyngeal residue.  · No laryngeal penetration or aspiration visualized.  · Pureed: Moderate pharyngeal residue.  · No laryngeal penetration or aspiration visualized.  · Soft solids: Mild-moderate pharyngeal residue.  · No laryngeal penetration or aspiration visualized.  Cervical Esophageal Phase  · UES appeared to accommodate all bolus types without stasis or retrograde movement observed     Assessment:     Impressions  ·   Patient demonstrates severe Oropharyngeal  dysphagia characterized by SILENT anterior and posterior aspiration of thin and nectar thick liquids with mild-moderate pharyngeal residue along BOT, valleculae, pyriform sinuses and posterior pharyngeal wall, in which reduced with double swallow strategy (cued).  Pt at risk of aspiration d/t cognitive deficits and impulsivity during PO intake.  Pt recommended for consumption of soft mechanical consistency diet with HONEY thick liquids, following strict aspiration precautions and supervision/assist with meals..   Pt okay for ice chips & tsp water, following adequate oral care.    Prognosis: Good    Barriers:  · Fatigue  · Cognitive status    Plan  Pt recommended for soft mechanical consistency diet with HONEY thick liquids, following strict aspiration precautions and continued SLP intervention for confirmed severe oropharyngeal dysphagia.  Repeat MBSS recommended as clinically indicated by treating SLP, following intensive  dysphagia tx.    Education  Results were discussed with patient.    Goals:   Multidisciplinary Problems     SLP Goals        Problem: SLP    Goal Priority Disciplines Outcome   SLP Goal     SLP Ongoing, Progressing   Description: 1.  Pt will consume least restrictive PO diet without s/s of dysphagia.  1a. Ongoing bedside swallowing assessment-met.  D/c goal.  1b. MBSS if clinically indicated--met 5/24/22.  D/C goal.  Pt rec: soft mechanical consistency diet/HONEY thick liquids.  1c.  New goal:  Update 1b.  Repeat MBSS following intensive dysphagia tx (1-2 weeks).    2.  Recommended cognitive-communicative evaluation                   Plan:   · Patient to be seen:  Therapy Frequency: 2 x/week   · Plan of Care expires:  05/27/22  · Plan of Care reviewed with:  patient        Discharge recommendations:   (TBD)   Barriers to Discharge:  None    Time Tracking:   SLP Treatment Date:   05/24/22  Speech Start Time:  1015  Speech Stop Time:  1045     Speech Total Time (min):  30 min    05/24/2022

## 2022-05-24 NOTE — SUBJECTIVE & OBJECTIVE
Interval History: looks better, getting stronger, got the Doherty out today, was able to urinate on his own. Also got up with PT today. Ostomy care performed. Will get MBSS again- continue TF and PT. Pt and family considering LTAC vs SNF.     Review of Systems   Constitutional:  Positive for fatigue. Negative for activity change, appetite change, chills, diaphoresis, fever and unexpected weight change.   HENT:  Negative for congestion, dental problem, drooling, ear discharge, ear pain, facial swelling, hearing loss, mouth sores, nosebleeds, postnasal drip, rhinorrhea, sinus pressure, sneezing, sore throat, tinnitus, trouble swallowing and voice change.    Eyes:  Negative for photophobia, pain, discharge, redness, itching and visual disturbance.   Respiratory:  Negative for apnea, cough, choking, chest tightness, shortness of breath, wheezing and stridor.    Cardiovascular:  Negative for chest pain, palpitations and leg swelling.   Gastrointestinal:  Positive for abdominal distention and abdominal pain. Negative for anal bleeding, blood in stool, constipation, diarrhea, nausea, rectal pain and vomiting.        Functioning colostomy   Endocrine: Negative for cold intolerance, heat intolerance, polydipsia, polyphagia and polyuria.   Genitourinary:  Negative for decreased urine volume, difficulty urinating, dysuria, enuresis, flank pain, frequency, genital sores, hematuria, penile discharge, penile pain, penile swelling, scrotal swelling, testicular pain and urgency.   Musculoskeletal:  Negative for arthralgias, back pain, gait problem, joint swelling, myalgias, neck pain and neck stiffness.   Skin:  Negative for color change, pallor, rash and wound.   Allergic/Immunologic: Negative for environmental allergies, food allergies and immunocompromised state.   Neurological:  Positive for weakness. Negative for dizziness, tremors, seizures, syncope, facial asymmetry, speech difficulty, light-headedness, numbness and headaches.    Hematological:  Negative for adenopathy. Does not bruise/bleed easily.   Psychiatric/Behavioral:  Positive for dysphoric mood. Negative for agitation, behavioral problems, confusion, decreased concentration, hallucinations, self-injury, sleep disturbance and suicidal ideas. The patient is nervous/anxious. The patient is not hyperactive.    Objective:     Vital Signs (Most Recent):  Temp: 97.9 °F (36.6 °C) (05/23/22 1934)  Pulse: 63 (05/23/22 1934)  Resp: 18 (05/23/22 1934)  BP: 127/63 (05/23/22 1934)  SpO2: (!) 94 % (05/23/22 1934)   Vital Signs (24h Range):  Temp:  [97.4 °F (36.3 °C)-98.9 °F (37.2 °C)] 97.9 °F (36.6 °C)  Pulse:  [58-65] 63  Resp:  [18] 18  SpO2:  [94 %-98 %] 94 %  BP: (123-135)/(58-93) 127/63     Weight: 114.7 kg (252 lb 13.9 oz)  Body mass index is 37.34 kg/m².    Intake/Output Summary (Last 24 hours) at 5/23/2022 2125  Last data filed at 5/23/2022 1800  Gross per 24 hour   Intake 80 ml   Output 2775 ml   Net -2695 ml      Physical Exam  Vitals reviewed.   Constitutional:       General: He is not in acute distress.     Appearance: He is obese.   HENT:      Head: Normocephalic and atraumatic.      Nose:      Comments: Feeding tube in place     Mouth/Throat:      Mouth: Mucous membranes are moist.   Cardiovascular:      Rate and Rhythm: Normal rate and regular rhythm.      Heart sounds: Normal heart sounds.   Pulmonary:      Breath sounds: Normal breath sounds.   Abdominal:      General: Bowel sounds are normal. There is no distension.      Palpations: Abdomen is soft.      Comments: Soft, nontender. Midline incision with staples intact--no surrounding erythema or purulent drainage. Ileostomy is pink with liquid stool output. Right-sided drain is serous. Left-sided drains are a light, murky yellow.   Musculoskeletal:      Cervical back: Normal range of motion and neck supple.   Skin:     General: Skin is warm and dry.      Comments: Less edema in hands and feet.   Neurological:      General: No  focal deficit present.      Mental Status: He is alert.   Psychiatric:         Behavior: Behavior normal.       Significant Labs: All pertinent labs within the past 24 hours have been reviewed.    Significant Imaging: I have reviewed all pertinent imaging results/findings within the past 24 hours.

## 2022-05-24 NOTE — PROGRESS NOTES
Advance Care Planning   [facility]  Palliative Care   Psychosocial Assessment    Patient Name: Franky Masters  MRN: [id number]  Admission Date: 5/4/2022  Hospital Length of Stay: 20 days  Code Status: DNR   Attending Provider: Elvie Parker DO  Palliative Care Provider: Aleksandra Muro NP  Primary Care Physician: HOLLY ROSEN  Principal Problem:Severe sepsis    Reason for Referral: assistance with advance directives and psychosocial support  Consult Order Date: 5/24/22  Primary CM/SW: Don OLIVAREZ    Present during Interview: patient.      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 Pt has about $7k worth of equipment at home and would like to go home for rehab to be in his own environment. Pt's wife has MS and is wheelchair bound, but daughters help with care and will be available to assist with pt's care    Family Structure, Dynamics/Relationships: Good support, daughters help as needed    Patterns/Styles of Communication and  Decision-making in the Family:  Pt able to make decisions for himself. 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  Self-Care Activities/Hobbies:       History: no    Financial Resources:Medicare      Advanced Care Planning & Legal Concerns:   Advanced Directives/Living Will: no, pt has forms to review but did not want to complete today  [place]/POLST: no   Planning:  no    Medical Power of : no      Oral/Written Declaration: yes -states he'd like his daughters to be HCPOA Witnesses: Katy Ayala LCSW and Aleksandra Muro NP  Surrogate Decision Maker: Claudia Manley, daughter [phone]    Emergency Contacts: Claudia Manley, daughter [phone]    Spirituality, Culture & Coping Mechanisms:  F- Araceli and Belief: Patient Refused     I - Importance: yes    C - Community/Culture Values:     A - Address in Care:       Goals/Hopes/Expectations: return home to rehab, explore tx options with oncology  Fears/Anxiety/Concerns:        Preferences about EOL Environment: (own bed, family nearby, pets, music, etc)      Complicated Bereavement Risk Assessment Tool (CBRAT)  Reference:  [facility] Clinical Practice Group (May 2016). Bereavement Risk Screening and Management Guidelines.  Retrieved from: http://www.grpcc.com.au/wp-content/uploads//PQIBS-Xkfqcrlhotr-Utqzdsjlw-and-Management-Guideline-2016.pdf      Bereaved Client Characteristics   ? Under 18       no  ? Was a Twin   no  ? Young Spouse   no  ? Elderly Spouse   no  ? Isolated    no  ? Lacks Meaningful Social Support   no  ? Dissatisfied with help available during illness   no  ? New to Financial [place] no  ? New to Decision-Making   no    Illness  ? Inherited Disorder   no  ? Stigmatized Disease in the family/community   no  ? Lengthy/Burdensome   no     Bereaved Client's History of Loss   ? Cumulative Multiple Losses   no  ? Previous Mental Health Illnesses   no  ? Current Mental Health Illness   no  ? Other Significant Health Issues   no   ? Migrant/Refugee   no Will the Death be:  ? Sudden or Unexpected   no  ? Traumatic Circumstances Associated with Death   no  ? Significant Cultural/Social Burdens as a result of Death   no   Relationship with   ? Profound Lifelong Partner   no  ? Highly Dependent    no  ? Antagonistic   no  ? Ambivalent   no  ? Deeply Connected   yes  ? Culturally Defined   no   Risk Factors Scores  0-2  Low  3-5  Moderate  5+  High  All persons scoring moderate to high presume to be at risk**    (** It is acknowledged that protective factors and resilience may outweigh apparent risk factors.      Total Risk Factors Score:   Low  Met with pt without family at beside along with PM NP. Pt is hopeful he will regain strength and is still waiting to hear his treatment options. He does not want hospice at this time. Pt is open to PM f/u in clinic for support.        Discharge Planning Needs/Plan of Care:   Clinic f/u scheduled.      MARCO A Mcdonald, [facility]  Palliative Medicine  [phone]

## 2022-05-24 NOTE — PHYSICIAN QUERY
PT Name: Franky Masters  MR #: [id number]     Documentation Clarification      CDS: Ashley COLINDRES RN  Contact information: [email or web address]      This form is a permanent document in the medical record.     Query Date: May 24, 2022    By submitting this query, we are merely seeking further clarification of documentation. Please utilize your independent clinical judgment when addressing the question(s) below.    The Medical Record reflects the following:    Supporting Clinical Findings Location in Medical Record   Procedure: Exploratory laparotomy, abdominal washout, segmental small bowel resection, placement of Abthera abdominal vac    Significant findings: Over 3 liters of feculent fluid mixed with food/vegetation freely in the peritoneal cavity. Large, obstructing cecal mass with dilated proximal small bowel. About 30 cm proximally to the mass was a perforation in the ileum. Palpable liver mass above gallbladder.    Mass of colon  Found on exploration along with palpable liver mass  Sent for pathology 5/11 results still pending  High concern for metastatic malignancy     Small bowel perforation with large Cecal mass   5/4 Expl Lap and SB excision with washout and AB thera wound vac for bowel left in discontinuity  5/7 washout and wound vac replacement  hx Diverticulosis but cecal mass and suspected metastatic lesions found in exploration  5/11 ABD WASHOUT, RT HEMICOLECTOMY, ILEOSTOMY, LIVER BIOPSY, AND ABD WALL CLOSURE  IVAB for peritonitis/sepsis    Malignant neoplasm of ascending colon  Extensive conversation with the patient.  The patient has widespread colon carcinoma.  Intra-abdominal with metastatic disease in liver biopsy-proven.    Final Pathologic Diagnosis   1. Right colon and terminal ileum, right hemicolectomy:   Invasive adenocarcinoma, moderately differentiated, measuring 9.6 cm in   greatest dimension, with invasion to the visceral peritoneum.   Adenocarcinoma involves the mesoappendix and  appendiceal wall.   Metastatic carcinoma involving seven of seventeen lymph nodes (7/17).     TUMOR   Tumor Site   Cecum   Histologic Type   Adenocarcinoma   Histologic Grade   G2, moderately differentiated  Op Note 5/6/2022      Op Note 5/6/2022          PN Pulmonology 5/18/2022          PN Pulmonology 5/18/2022                    Consults Hem & Onc 5/23/2022      Surgical Pathology 5/11/2022              Surgical Pathology 5/11/2022                                                                                        Due to conflicting documentation, please verify the primary site of cancer     [ x  ] Cecum   [   ] Ascending Colon   [   ] Other (please specify): ____________   [  ] Clinically undetermined

## 2022-05-24 NOTE — ASSESSMENT & PLAN NOTE
Patient with Hypoxic Respiratory failure which is Acute.  he is not on home oxygen. Supplemental oxygen was provided and noted- Oxygen Concentration (%):  [28] 28.   Signs/symptoms of respiratory failure include- tachypnea. Contributing diagnoses includes - Obesity Hypoventilation Labs and images were reviewed. Patient Has recent ABG, which has been reviewed. Will treat underlying causes and adjust management of respiratory failure as indicated. Pulmonary CC on board  Day 2 on Vent- remains intubated on vent  Cont vent support    Day 4 on Vent    Day 5 on vent- adequate O2, sats    Day 6- continue supportive care, await family's decision about comfort care    Day 7- continue support- trying to diurese     Day 8- minimal vent support- start weaning soon    15 May:  SAT/SBT as tolerated  17 May:  Successfully extubated.    Remains on RA, 94-98%    Appears resolved

## 2022-05-24 NOTE — PLAN OF CARE
MBSS completed 5/24/22--Silent aspiration (anterior and posterior) of thin and nectar thick liquids visualized during VFSS.  Pt recommended for Soft mechanical consistency diet with HONEY thick liquids--assist/supervision with meals.  ST to follow for confirmed severe oropharyngeal dysphagia.

## 2022-05-24 NOTE — ASSESSMENT & PLAN NOTE
Sec to severe Septic shock from Peritonitis- improving with TF now    Continue TF, may increase TF soon    Started on Oral feeding today- slowly improving

## 2022-05-24 NOTE — ASSESSMENT & PLAN NOTE
Hgb 10.3 s/p Transfusion 4 units Prbc  Transfuse for Hgb <7  Monitor    5/5  Improved  Hgb 11.4 today  Transfuse as indicated    5/10- H/H 8.6/28- likely dilutional from ATN- improving    5/21- inj B12 IM plus Venofer given, may benefit from Procrit    5/24- may need Blood to Keep H/h around 10 to improve his Performance and exercise ability  No improvement with Venofer, Procrit, B12 IM

## 2022-05-24 NOTE — PROGRESS NOTES
Attempted visit. Family not at the bedside who will be performing ostomy care, will revisit tomorrow.

## 2022-05-24 NOTE — ASSESSMENT & PLAN NOTE
Worsening  Trend  Cr 2.2 today    Cr now 3.8- Oliguric- heading towards Anuria and ATN/ May need HD vs CRRT- she has massive fluid Overload.     Cr now 4.5-  Remains anuric  POA/ Family not in favor of HD    Remains Oliguric due to Septic Shock on 2 Pressors  Some urine output with lasix but no Polyuria     5/10 Urine Out put improved with diuresis while renal functions remains stable    Improving, Cr coming down, good urine output    Improved    Encouraged oral intake

## 2022-05-24 NOTE — PROGRESS NOTES
[facility] Medicine  Progress Note    Patient Name: Franky Masters  MRN: [id number]  Patient Class: IP- Inpatient   Admission Date: 5/4/2022  Length of Stay: 19 days  Attending Physician: Elvie Parker DO  Primary Care Provider: HOLLY ROSEN        Subjective:     Principal Problem:Severe sepsis        HPI:  Mr Masters is a 65 yo male POD#0 s/p SB perforation with surgical intervention demonstrating a large cecal mass and 3l feculant fluid in the abdominal cavity. Additional findings of a perforated ileum and a liver mass. Patient tolerated the Exlap with Washout and small bowel excision. Patient had a wound vac placed, is currently intubated, sedated and recovering in the ICU. Patient received 4 units PRBC and remains on pressor support. Patient is a DNR and HM consulted with assistance with medical management. Daughter at bedside. Patient discussed with care team.          Overview/Hospital Course:  Patient continues to require pressors, remains intubated, sedated. Patient urine o/p declining and concerning. Discussed POC in detail at bedside with daughter POA. She expressed her fathers wish to not undergo treatments  like perm HD, where he has a diminished quality of life. We spoke frankly about issues and concerns and she will be communicating with the family as his case evolves. Comfort care was discussed and the goals of care will develop consistent with the patients expressed wishes. Potential for another surgery likely Saturday with a washout and possible biopsy vs resection are on the horizon. This possible intervention will be covered in detail by surgery sharing the risks and benefits of that approach. Case discussed with the primary service - surgery, and critical care team.    5/6- Pt seen and examined in the ICU plus discussed with ICU team and the pt's daughter/ POA: Remains critically ill, intubated, sedated on Vent, on Pressors- Levo plus Vaso- JOSE with oliguria getting worse-  Cr rising to 3.6, becoming severely acidotic- requiring Ertapenem, Zyvox, Micafungin as well as on Bicarb and Fentanyl gtt. He has massive fluid overload and generalized anasarca.  Possible return to OR tomorrow 5/7 if patient is more stable for right hemicolectomy, possible ileostomy, liver biopsy, abdominal wall closure. Dw Pt's daughter.       5/7- remains Intubated, sedated on Vent- Went into Afib w RVR- hence added Amio to Levo and Vasopressin- back in NSR. Getting Invanz and Zyvox plus Micafungin. Dr. Parker took him back to OR for for abdominal washout ( 3-3.5 L feculent fluid with food particles suctioned out in the OR, small bowel appeared better) and replaced Abthera- still has bowel discontinuity. His WBC, Lactate and Cr have all increased. Family updated about his critical condition and poor prognosis and JOSE/ATN- they are considering Comfort measures.     5/8- Day 5 on Vent- family at bedside, remains intubated on Vent with 2 Pressors- still on Amiodarone gtt for Afib, Still has Abthera wound vac to open Abdomen with small bowel discontinuity. Remains oliguric with generalized anasarca- almost 24 L fluid positive. Cr increased to 4.6. WBC down to 12, family does not want any HD/CRRT, so getting an IV Lasix trial to improve the urine output.     5/9- Day 6 on vent- remains the same, remains intubated, on vent with Abthera Wound vac and bowel discontinuity. Put out about 3 L urine since yesterday with IV Lasix, family still does not want any HD, WBC down to 10.2, H/H 7.8/24, still on 2 Pressors and Amio gtt. Family still deciding about comfort care.     5/10- Appreciate all- Day 7- remains same in septic shock on 2 vasopressors, intubated and sedated, still in afib. JOSE has remained stable at 4.7 but urine output improved with IV lasix. Abthera wound vac in place. No surgery today- put out about 2.5 L yesterday, given lasix again today.    5/11- Seen Pre op- looks better, less swollen, remains intubated,  sedated on Vent, on 1 Pressor- on Levophed. Going for OR today for Laparotomy and washout and abd wound closure. Good response to Lasix. Still Afib on Amiodarone gtt. Bun/Cr 98/4.4, WBC down to 17, H/H 8.6/26.     5/12- Day 8 on Vent- looks much better, remains on Vent with Levophed and Amio gtts. Had extensive surgery yesterday and did very well post op- Reopening of recent Laparotomy, Abdominal washout, R Hemicolectomy with Liver Bx, TAP block, Abdominal wall closure, Creation, end Ileostomy. POD 1- looks better, still on Levophed and Amio gtt for Afib, started on TPN, ID stopped Zyvox and continued Merrem/Mycafungin.     5/17 - Successfully extubated.  Additional left side drain placed by Interventional Radiology.    5/18 - Purulent drainage from left side drains.  Remained stable.  5/20 - POD 9 s/p R hemicolectomy and Ileostomy- extubated 5/18 and also underwent large Abdominal abscess by IR on 5/18 with over 500 cc pus drained- all Cx remain NGTD. No fever or chills, no leukocytosis, AAOx2, remains on RA, very weak, malnourished. Getting TPN, Albumin 1.4. Hemodynamically stable- hence transferred out of ICU to floor, getting PT/OT.   5/21- looks lot better, more alert and responsive, following commands, moving all 4 ext well, generalized anasarca improving. Bun/Cr further down to 46/1.4, H/h stable at 7.7/23- ordered IV Venofer plis inj B12 IM. Tolerating TF well. Will continue PT/OT.   5/22- looks better and getting stronger, more responsive and moving all 4 ext well, tolerating TF well at 30 ml/hr via NGT. Wife and daughter at bedside. BUE swelling also coning down. Continue PT/OT.   5/23- looks better, getting stronger, got the Doherty out today, was able to urinate on his own. Also got up with PT today. Ostomy care performed. Will get MBSS again- continue TF and PT. Pt and family considering LTAC vs SNF.       Interval History: looks better, getting stronger, got the Doherty out today, was able to urinate on  his own. Also got up with PT today. Ostomy care performed. Will get MBSS again- continue TF and PT. Pt and family considering LTAC vs SNF.     Review of Systems   Constitutional:  Positive for fatigue. Negative for activity change, appetite change, chills, diaphoresis, fever and unexpected weight change.   HENT:  Negative for congestion, dental problem, drooling, ear discharge, ear pain, facial swelling, hearing loss, mouth sores, nosebleeds, postnasal drip, rhinorrhea, sinus pressure, sneezing, sore throat, tinnitus, trouble swallowing and voice change.    Eyes:  Negative for photophobia, pain, discharge, redness, itching and visual disturbance.   Respiratory:  Negative for apnea, cough, choking, chest tightness, shortness of breath, wheezing and stridor.    Cardiovascular:  Negative for chest pain, palpitations and leg swelling.   Gastrointestinal:  Positive for abdominal distention and abdominal pain. Negative for anal bleeding, blood in stool, constipation, diarrhea, nausea, rectal pain and vomiting.        Functioning colostomy   Endocrine: Negative for cold intolerance, heat intolerance, polydipsia, polyphagia and polyuria.   Genitourinary:  Negative for decreased urine volume, difficulty urinating, dysuria, enuresis, flank pain, frequency, genital sores, hematuria, penile discharge, penile pain, penile swelling, scrotal swelling, testicular pain and urgency.   Musculoskeletal:  Negative for arthralgias, back pain, gait problem, joint swelling, myalgias, neck pain and neck stiffness.   Skin:  Negative for color change, pallor, rash and wound.   Allergic/Immunologic: Negative for environmental allergies, food allergies and immunocompromised state.   Neurological:  Positive for weakness. Negative for dizziness, tremors, seizures, syncope, facial asymmetry, speech difficulty, light-headedness, numbness and headaches.   Hematological:  Negative for adenopathy. Does not bruise/bleed easily.    Psychiatric/Behavioral:  Positive for dysphoric mood. Negative for agitation, behavioral problems, confusion, decreased concentration, hallucinations, self-injury, sleep disturbance and suicidal ideas. The patient is nervous/anxious. The patient is not hyperactive.    Objective:     Vital Signs (Most Recent):  Temp: 97.9 °F (36.6 °C) (05/23/22 1934)  Pulse: 63 (05/23/22 1934)  Resp: 18 (05/23/22 1934)  BP: 127/63 (05/23/22 1934)  SpO2: (!) 94 % (05/23/22 1934)   Vital Signs (24h Range):  Temp:  [97.4 °F (36.3 °C)-98.9 °F (37.2 °C)] 97.9 °F (36.6 °C)  Pulse:  [58-65] 63  Resp:  [18] 18  SpO2:  [94 %-98 %] 94 %  BP: (123-135)/(58-93) 127/63     Weight: 114.7 kg (252 lb 13.9 oz)  Body mass index is 37.34 kg/m².    Intake/Output Summary (Last 24 hours) at 5/23/2022 2125  Last data filed at 5/23/2022 1800  Gross per 24 hour   Intake 80 ml   Output 2775 ml   Net -2695 ml      Physical Exam  Vitals reviewed.   Constitutional:       General: He is not in acute distress.     Appearance: He is obese.   HENT:      Head: Normocephalic and atraumatic.      Nose:      Comments: Feeding tube in place     Mouth/Throat:      Mouth: Mucous membranes are moist.   Cardiovascular:      Rate and Rhythm: Normal rate and regular rhythm.      Heart sounds: Normal heart sounds.   Pulmonary:      Breath sounds: Normal breath sounds.   Abdominal:      General: Bowel sounds are normal. There is no distension.      Palpations: Abdomen is soft.      Comments: Soft, nontender. Midline incision with staples intact--no surrounding erythema or purulent drainage. Ileostomy is pink with liquid stool output. Right-sided drain is serous. Left-sided drains are a light, murky yellow.   Musculoskeletal:      Cervical back: Normal range of motion and neck supple.   Skin:     General: Skin is warm and dry.      Comments: Less edema in hands and feet.   Neurological:      General: No focal deficit present.      Mental Status: He is alert.   Psychiatric:          Behavior: Behavior normal.       Significant Labs: All pertinent labs within the past 24 hours have been reviewed.    Significant Imaging: I have reviewed all pertinent imaging results/findings within the past 24 hours.      Assessment/Plan:      * Severe sepsis secondary to Peritonitis from bowel perforation  15 May:  No longer on vasopressor.  Weaning sedation for awakening trials.  Left side intra-abdominal fluid collection/abscess draining into ADDIS.  17 May:  Additional drain placed.  Extubated.  18 May:  Remaining stable and off ventilator.  Starting tube feedings soon.  19 May:  Purulent drainage form two left side drains and right side drain serous.  Starting tube feedings at low rate.  5/20- s/p POD 9 s/p R hemicolectomy and Ileostomy- s/p large Abdominal abscess drainage by IR on 5/18 with over 500 cc pus drained- all Cx remain NGTD.      Appears resolved    JOSE (acute kidney injury)  Worsening  Trend  Cr 2.2 today    Cr now 3.8- Oliguric- heading towards Anuria and ATN/ May need HD vs CRRT- she has massive fluid Overload.     Cr now 4.5-  Remains anuric  POA/ Family not in favor of HD    Remains Oliguric due to Septic Shock on 2 Pressors  Some urine output with lasix but no Polyuria     5/10 Urine Out put improved with diuresis while renal functions remains stable    Improving, Cr coming down, good urine output    improved    Oropharyngeal dysphagia with overall muscle deconditioning and weakness  Check swallow eval soon       Hypoalbuminemia due to protein-calorie malnutrition  Sec to severe Septic shock from Peritonitis- improving with TF now    Continue TF, may increase TF soon      Obesity (BMI 30.0-34.9)  Diet  Nutrition on recovery      Acute on chronic anemia  Hgb 10.3 s/p Transfusion 4 units Prbc  Transfuse for Hgb <7  Monitor    5/5  Improved  Hgb 11.4 today  Transfuse as indicated    5/10- H/H 8.6/28- likely dilutional from ATN- improving    5/21- inj B12 IM plus Venofer given, may benefit  from Procrit      VTE Risk Mitigation (From admission, onward)         Ordered     apixaban tablet 5 mg  2 times daily         05/20/22 1004     IP VTE HIGH RISK PATIENT  Once         05/04/22 1253     Place sequential compression device  Until discontinued         05/04/22 1253                Discharge Planning   ELLIOT:      Code Status: DNR   Is the patient medically ready for discharge?:     Reason for patient still in hospital (select all that apply): Patient trending condition, Laboratory test, Treatment, Imaging, Consult recommendations, PT / OT recommendations and Pending disposition  Discharge Plan A: Home        Megha Mejia MD  Department of Hospital Medicine   [facility]

## 2022-05-24 NOTE — PROGRESS NOTES
[facility] Surg  General Surgery  Progress Note    Subjective:     History of Present Illness:  No notes on file    Post-Op Info:  Procedure(s) (LRB):  CREATION, ILEOSTOMY (N/A)  HEMICOLECTOMY, RIGHT (N/A)  BIOPSY, LIVER (Right)   12 Days Post-Op     Interval History: Sitting up in bed awake and alert. Doherty removed. Has been able to urinate on own. Denies pain.     Medications:  Continuous Infusions:   dextrose 5 % 75 mL/hr at 05/22/22 1646     Scheduled Meds:   amiodarone  200 mg Per NG tube BID    apixaban  5 mg Per NG tube BID    ascorbic acid (vitamin C)  500 mg Per NG tube BID    famotidine  40 mg Per NG tube Daily    ferrous sulfate  300 mg Per NG tube Daily    loperamide  2 mg Oral TID    psyllium husk (aspartame)  1 packet Per NG tube Daily    white petrolatum-mineral oil 57.3-42.5%   Both Eyes QHS     PRN Meds:sodium chloride 0.9%, acetaminophen, dextrose 10%, glucagon (human recombinant), hydrALAZINE, insulin aspart U-100, ondansetron     Review of patient's allergies indicates:  No Known Allergies  Objective:     Vital Signs (Most Recent):  Temp: 97.4 °F (36.3 °C) (05/23/22 1643)  Pulse: 62 (05/23/22 1705)  Resp: 18 (05/23/22 1643)  BP: 131/60 (05/23/22 1643)  SpO2: 96 % (05/23/22 1643) Vital Signs (24h Range):  Temp:  [97.4 °F (36.3 °C)-98.9 °F (37.2 °C)] 97.4 °F (36.3 °C)  Pulse:  [58-65] 62  Resp:  [18-20] 18  SpO2:  [96 %-98 %] 96 %  BP: (123-136)/(58-93) 131/60     Weight: 114.7 kg (252 lb 13.9 oz)  Body mass index is 37.34 kg/m².    Intake/Output - Last 3 Shifts         05/21 0700 05/22 0659 05/22 0700 05/23 0659 05/23 0700 05/24 0659    P.O.  0     I.V. (mL/kg) 2726.2 (25.6) 740.1 (6.5)     NG/ 550     IV Piggyback       .4      Total Intake(mL/kg) 3683.5 (34.7) 1290.1 (11.2)     Urine (mL/kg/hr) 3250 (1.3) 1950 (0.7)     Drains 135 255 180    Stool 1870 2350 400    Total Output 5255 4555 580    Net -1571.5 -3264.9 -580                   Physical Exam  Vitals reviewed.    Constitutional:       General: He is not in acute distress.     Appearance: He is obese.   HENT:      Head: Normocephalic and atraumatic.      Nose:      Comments: Feeding tube in place     Mouth/Throat:      Mouth: Mucous membranes are moist.   Cardiovascular:      Rate and Rhythm: Normal rate and regular rhythm.      Heart sounds: Normal heart sounds.   Pulmonary:      Breath sounds: Normal breath sounds.   Abdominal:      General: Bowel sounds are normal. There is no distension.      Palpations: Abdomen is soft.      Comments: Soft, nontender. Midline incision with staples intact--no surrounding erythema or purulent drainage. Ileostomy is pink with liquid stool output. Right-sided drain is serous. Left-sided drains are a light, murky yellow.   Musculoskeletal:      Cervical back: Normal range of motion and neck supple.   Skin:     General: Skin is warm and dry.      Comments: Less edema in hands and feet.   Neurological:      General: No focal deficit present.      Mental Status: He is alert.   Psychiatric:         Behavior: Behavior normal.       Significant Labs:  I have reviewed all pertinent lab results within the past 24 hours.  CBC:   Recent Labs   Lab 05/22/22  0516   WBC 12.40   RBC 3.24*   HGB 7.4*   HCT 26.3*   *   MCV 81*   MCH 22.8*   MCHC 28.1*     CMP:   Recent Labs   Lab 05/22/22  0516      CALCIUM 7.6*   ALBUMIN 1.4*   PROT 4.2*      K 4.8   CO2 25      BUN 42*   CREATININE 1.4   ALKPHOS 117   ALT 36   AST 36   BILITOT 1.5*       Significant Diagnostics:  I have reviewed all pertinent imaging results/findings within the past 24 hours.    Assessment/Plan:     JOSE (acute kidney injury)  Management per medicine/critical care    Small bowel perforation with large Cecal mass   S/p exploratory laparotomy, abdominal washout, segmental small bowel resection (left in discontinuity), placement of Abthera vac 5/4/22  S/p reopening of recent laparotomy, abdominal washout,  replacement of Abthera vac 5/7/22  S/p reopening of recent laparotomy, abdominal washout, right hemicolectomy, liver biopsy, end ileostomy, TAP block, abdominal wall closure 5/11/22  Left-sided drain placed by IR for LUQ abscess 5/17/22      - Pathology returned as metastatic adenocarcinoma. Oncology consulted.  - Modified barium swallow tomorrow  - tube feeds at 30 mL/hour.  Dietitian to advise on goal rate tomorrow. Bolus feeds?  - Monitor drain and ileostomy output. Imodium and fiber added to help decrease output.  - Speech therapy recommended continuing NPO. MBSS tomorrow.  - encourage increasing activity  - Ostomy nurse consult  - Strict I/Os  - Medical management per hospital    Hypoalbuminemia due to protein-calorie malnutrition  Tolerating tube feeds    Acute on chronic anemia  Management per medicine    Acute hypoxemic respiratory failure   Resolved        Elvie Parker, DO  General Surgery  [facility]

## 2022-05-24 NOTE — ASSESSMENT & PLAN NOTE
Extensive conversation with the patient.  The patient has widespread colon carcinoma.  Intra-abdominal with metastatic disease in liver biopsy-proven.  At this time I have talked to the patient about awaiting results of molecular characterization to better define treatment options.  I have talked to the patient he is not reluctant to pursue any treatment of told in my professional opinion I think survival to the end of the year would be small and would probably recommend hospice.  If the patient decides to take systemic chemotherapy with reviewed briefly toxicity profile articles from up-to-date followed him for his review in talked about response rates of 60% and noncurative setting.  At this time will be happy to talk to the patient once more family is present and I will be happy to answer questions as we proceed in the recovery phase from his extensive surgery  05/24/2022.  Extensive conversation with patient as well as daughter at bedside I again reviewed the nature of stage IV metastatic colon carcinoma being a highly treatable but not curable malignancy.  I have answered a number questions about his stage and the impending molecular characterization of his tumor to better assist on recommendations for systemic therapy.  We again talked about options of pursuing systemic therapy versus is no treatment.  In which case I would recommend hospice.  I agree entirely with a do not resuscitate order has been placed on chart and palliative care consultation appropriate by operative surgeon would be an appropriate course of action patient does not have a living will or medical power  created.  The patient has received the articles that I sent to them from up-to-date on metastatic colon carcinoma and discussed various treatment approaches of told him final recommendations systemic therapy will need to await molecular characterization of his tumor total time 35 minutes

## 2022-05-24 NOTE — SUBJECTIVE & OBJECTIVE
Interval History: looks much better, NGT out, tolerated oral feeding well, did some PT/OT as well, easily gets exhausted.. 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 Await Placement to Rehab vs LTAC. H/H still 7.5/22- he is big and tall and may give him 1-2 units blood tomorrow.     Review of Systems   Constitutional:  Positive for activity change, appetite change and fatigue. Negative for chills, diaphoresis, fever and unexpected weight change.   HENT:  Negative for congestion, dental problem, drooling, ear discharge, ear pain, facial swelling, hearing loss, mouth sores, nosebleeds, postnasal drip, rhinorrhea, sinus pressure, sneezing, sore throat, tinnitus, trouble swallowing and voice change.    Eyes:  Negative for photophobia, pain, discharge, redness, itching and visual disturbance.   Respiratory:  Negative for apnea, cough, choking, chest tightness, shortness of breath, wheezing and stridor.    Cardiovascular:  Negative for chest pain, palpitations and leg swelling.   Gastrointestinal:  Negative for abdominal distention, abdominal pain, anal bleeding, blood in stool, constipation, diarrhea, nausea, rectal pain and vomiting.        Functioning colostomy   Endocrine: Negative for cold intolerance, heat intolerance, polydipsia, polyphagia and polyuria.   Genitourinary:  Negative for decreased urine volume, difficulty urinating, dysuria, enuresis, flank pain, frequency, genital sores, hematuria, penile discharge, penile pain, penile swelling, scrotal swelling, testicular pain and urgency.   Musculoskeletal:  Negative for arthralgias, back pain, gait problem, joint swelling, myalgias, neck pain and neck stiffness.   Skin:  Negative for color change, pallor, rash and wound.   Allergic/Immunologic: Negative for environmental allergies, food allergies and immunocompromised state.   Neurological:  Positive for weakness. Negative for dizziness, tremors, seizures, syncope,  facial asymmetry, speech difficulty, light-headedness, numbness and headaches.   Hematological:  Negative for adenopathy. Does not bruise/bleed easily.   Psychiatric/Behavioral:  Negative for agitation, behavioral problems, confusion, decreased concentration, dysphoric mood, hallucinations, self-injury, sleep disturbance and suicidal ideas. The patient is not nervous/anxious and is not hyperactive.    Objective:     Vital Signs (Most Recent):  Temp: 97.3 °F (36.3 °C) (05/24/22 1542)  Pulse: 69 (05/24/22 1542)  Resp: 16 (05/24/22 1542)  BP: (!) 140/69 (05/24/22 1542)  SpO2: (!) 94 % (05/24/22 1542)   Vital Signs (24h Range):  Temp:  [97.3 °F (36.3 °C)-98.1 °F (36.7 °C)] 97.3 °F (36.3 °C)  Pulse:  [58-69] 69  Resp:  [16-18] 16  SpO2:  [94 %-95 %] 94 %  BP: (123-140)/(61-76) 140/69     Weight: 111.9 kg (246 lb 11.1 oz)  Body mass index is 36.43 kg/m².    Intake/Output Summary (Last 24 hours) at 5/24/2022 1715  Last data filed at 5/24/2022 1653  Gross per 24 hour   Intake 30 ml   Output 1750 ml   Net -1720 ml      Physical Exam  Vitals reviewed.   Constitutional:       General: He is not in acute distress.     Appearance: He is obese.   HENT:      Head: Normocephalic and atraumatic.      Nose:      Comments: Feeding tube in place     Mouth/Throat:      Mouth: Mucous membranes are moist.   Cardiovascular:      Rate and Rhythm: Normal rate and regular rhythm.      Heart sounds: Normal heart sounds.   Pulmonary:      Breath sounds: Normal breath sounds.   Abdominal:      General: Bowel sounds are normal. There is no distension.      Palpations: Abdomen is soft.      Comments: Soft, nontender. Midline incision with staples intact--no surrounding erythema or purulent drainage. Ileostomy is pink with liquid stool output. Right-sided drain is serous. Left-sided drains are a light, murky yellow.   Musculoskeletal:      Cervical back: Normal range of motion and neck supple.   Skin:     General: Skin is warm and dry.       Comments: Less edema in hands and feet.   Neurological:      General: No focal deficit present.      Mental Status: He is alert.   Psychiatric:         Behavior: Behavior normal.       Significant Labs: All pertinent labs within the past 24 hours have been reviewed.  CBC:   Recent Labs   Lab 05/24/22 0522   WBC 10.66   HGB 7.5*   HCT 26.9*   *     CMP:   Recent Labs   Lab 05/24/22 0522      K 4.2      CO2 24      BUN 34*   CREATININE 1.3   CALCIUM 7.8*   PROT 5.1*   ALBUMIN 1.5*   BILITOT 0.9   ALKPHOS 142*   AST 40   ALT 40   ANIONGAP 8   EGFRNONAA 56*       Significant Imaging: I have reviewed all pertinent imaging results/findings within the past 24 hours.

## 2022-05-24 NOTE — ASSESSMENT & PLAN NOTE
S/p exploratory laparotomy, abdominal washout, segmental small bowel resection (left in discontinuity), placement of Abthera vac 5/4/22  S/p reopening of recent laparotomy, abdominal washout, replacement of Abthera vac 5/7/22  S/p reopening of recent laparotomy, abdominal washout, right hemicolectomy, liver biopsy, end ileostomy, TAP block, abdominal wall closure 5/11/22  Left-sided drain placed by IR for LUQ abscess 5/17/22      - Pathology returned as metastatic adenocarcinoma. Oncology consulted.  - Modified barium swallow tomorrow  - tube feeds at 30 mL/hour.  Dietitian to advise on goal rate tomorrow. Bolus feeds?  - Monitor drain and ileostomy output. Imodium and fiber added to help decrease output.  - Speech therapy recommended continuing NPO. MBSS tomorrow.  - encourage increasing activity  - Ostomy nurse consult  - Strict I/Os  - Medical management per hospital

## 2022-05-24 NOTE — PLAN OF CARE
RD Recommendations    1. Continue diet as prescribed, as tolerated: mechanical soft, honey thickened liquids per SLP  2. Weekly weights per RD follow up.    Goals:   1. Patient will meet >65% of estimated energy needs by RD follow up.      Bettie Morse MS, RD, LDN

## 2022-05-24 NOTE — CONSULTS
Advance Care Planning    Consult Note  Palliative Medicine      Consult Requested By: Dr. Gonzalez  Reason for Consult: Advance care planning  SUBJECTIVE:     History of Present Illness:  Franky Masters is a 67 y.o. year old male who presented with small bowel perforation with surgical intervention with abdominal washout for 5 liters feculant fluid for large cecal mass with liver mets.  Prolonged hospital course with ICU stay on pressors with wound vac. Taken back to OR for right hemicolectomy, liver biopsy and ileostomy.  Drainage of abdominal abscess still with ADDIS drain.  NGT for feeding but will be removed today as he passed swallow study with modified diet. Weak but making improvements.  We have been consulted to assist with advance care planning in the setting of newly diagnosed metastatic colon cancer.    NARRATIVE        I, along with palliative medicine , had the opportunity to meet Mr. Masters today to introduce palliative medicine, build rapport, and  him on his current condition.  Patient is alone at the time of our visit.  I explained palliative care's compatible role with oncology as he navigates his cancer journey and treatment options and how we assist with coping/treating a negative symptom burden of disease and/or treatment side effects. We discussed the role of palliative care in pain and symptom management, goals of care discussions, home based healthcare service coordination, and advanced care planning.     As patient works towards recovery, he is hoping to get stronger and more functional to better understand his options for treatment.  He is making progress each day.  Today he is sitting on the side of the bed, supported by 2 wedges.  Speech clear him for a modified diet so he will have NGT removed today.  He understands he has a uphill sevilla to climb and wants more time to see how things unfold in the coming days/weeks.  He plans to pursue treatment if offered and able.  He  understands the seriousness of his situation.  As far as next steps, he is hoping to go home with home health instead of LTAC or SNF to continue rehabilitative efforts.  He feels he has enough support with family and the necessary equipment.      As far as advance care planning, patient wants to wait before completing living will or HCPOA.  He reports he is a DNR and we can complete an LaPOST in our clinic.  His wife is chronically ill, debilitated in a wheelchair.  For this reason, he plans to name one of his daughter's as healthcare power of  but not ready to formalized this today.  I offered follow up in our outpatient clinic to continue these discussions and support and he starts his cancer journey.       We look forward to being involved in his care and will set him up in clinic at their request in approx 6 weeks.  Please call us if his condition changes.      ASSESSEMENT / PLAN     1.  Encounter for Palliative Care   -patient is decisional              -patient is alone for today's visit              -philosophy of palliative medicine reviewed with patient               -new patient folder given to and reviewed with patient               -reviewed importance of ACP documents   -patient declined completed ACP today; will follow up as outpatient              -legal surrogate is his wife        -patient is a DNR; will complete LaPOST as outpatient     2.     Metastatic colon cancer   -oncology consulted; input reviewed   -plans for treatment once stronger and able    Thank you for allowing Palliative Medicine to be involved in the care of Franky Masters.    Past Medical History:   Diagnosis Date    Diverticulitis     Supplemental oxygen dependent      Past Surgical History:   Procedure Laterality Date    ABDOMINAL WASHOUT  5/7/2022    Procedure: LAVAGE, PERITONEAL, THERAPEUTIC;  Surgeon: Elvie Parker DO;  Location: [facility] OR;  Service: General;;    ABDOMINAL WASHOUT  5/7/2022    Procedure: ;   Surgeon: Elvie Parker DO;  Location: [facility] OR;  Service: General;;    APPLICATION OF WOUND VACUUM-ASSISTED CLOSURE DEVICE N/A 5/4/2022    Procedure: APPLICATION, WOUND VAC;  Surgeon: Elvie Parker DO;  Location: [facility] OR;  Service: General;  Laterality: N/A;    ILEOSTOMY N/A 5/11/2022    Procedure: CREATION, ILEOSTOMY;  Surgeon: Elvie Parker DO;  Location: [facility] OR;  Service: General;  Laterality: N/A;    LAPAROTOMY N/A 5/7/2022    Procedure: LAPAROTOMY;  Surgeon: Elvie Parker DO;  Location: [facility] OR;  Service: General;  Laterality: N/A;    LIVER BIOPSY Right 5/11/2022    Procedure: BIOPSY, LIVER;  Surgeon: Elvie Parker DO;  Location: [facility] OR;  Service: General;  Laterality: Right;    RIGHT HEMICOLECTOMY N/A 5/11/2022    Procedure: HEMICOLECTOMY, RIGHT;  Surgeon: Elvie Parker DO;  Location: [facility] OR;  Service: General;  Laterality: N/A;     History reviewed. No pertinent family history.  Social History     Socioeconomic History    Marital status:    Tobacco Use    Smoking status: Never Smoker    Smokeless tobacco: Never Used   Substance and Sexual Activity    Alcohol use: Not Currently    Drug use: Never     Social Determinants of Health     Financial Resource Strain: Low Risk     Difficulty of Paying Living Expenses: Not hard at all   Food Insecurity: No Food Insecurity    Worried About Running Out of Food in the Last Year: Never true    Ran Out of Food in the Last Year: Never true   Transportation Needs: No Transportation Needs    Lack of Transportation (Medical): No    Lack of Transportation (Non-Medical): No   Physical Activity: Sufficiently Active    Days of Exercise per Week: 7 days    Minutes of Exercise per Session: 150+ min   Stress: Stress Concern Present    Feeling of Stress : To some extent   Social Connections: Unknown    Frequency of Communication with Friends and Family: More than three times a week    Frequency of Social Gatherings with  Friends and Family: More than three times a week    Active Member of Clubs or Organizations: Yes    Attends Club or Organization Meetings: More than 4 times per year    Marital Status:    Housing Stability: Low Risk     Unable to Pay for Housing in the Last Year: No    Number of Places Lived in the Last Year: 2    Unstable Housing in the Last Year: No      Review of patient's allergies indicates:  No Known Allergies    Medications:    Current Facility-Administered Medications:     0.9%  NaCl infusion, , Intravenous, PRN, KIM Miranda, Stopped at 05/10/22 0816    acetaminophen tablet 650 mg, 650 mg, Oral, Q6H PRN, Elvie Parker, DO    amiodarone tablet 200 mg, 200 mg, Oral, BID, Elvie Parker, DO    amoxicillin-clavulanate 875-125mg per tablet 1 tablet, 1 tablet, Oral, Q12H, Elvie Parker, DO    apixaban tablet 5 mg, 5 mg, Oral, BID, Elvie Parker, DO    ascorbic acid (vitamin C) tablet 500 mg, 500 mg, Oral, BID, Elvie Parker, DO    barium sulfate (VARIBAR THIN LIQUID) oral powder 20 mL, 20 mL, Oral, ONCE PRN, Elvie Parker, DO    dextrose 10% bolus 125 mL, 12.5 g, Intravenous, PRN, KIM Miranda    [START ON 5/25/2022] famotidine tablet 20 mg, 20 mg, Oral, BID, Elvie Parker, DO    [START ON 5/25/2022] ferrous sulfate tablet 1 each, 1 tablet, Oral, Daily, Elvie Parker, DO    glucagon (human recombinant) injection 1 mg, 1 mg, Intramuscular, PRN, KATHARINE Miranda-BC    hydrALAZINE injection 20 mg, 20 mg, Intravenous, Q8H PRN, KIM Miranda, 20 mg at 05/17/22 1059    insulin aspart U-100 pen 1-10 Units, 1-10 Units, Subcutaneous, Q4H PRN, KIM Miranda, 2 Units at 05/17/22 0823    loperamide capsule 2 mg, 2 mg, Oral, TID, Alicia Mayen MD, 2 mg at 05/24/22 0842    ondansetron injection 4 mg, 4 mg, Intravenous, Q8H PRN, KATHARINE Miranda-BC, 4 mg at 05/14/22 1848    [START ON 5/25/2022] psyllium husk  (aspartame) 3.4 gram packet 1 packet, 1 packet, Oral, Daily, Elvie Parker,     white petrolatum-mineral oil 57.3-42.5% ophthalmic ointment, , Both Eyes, QHS, Sabaanuj Crouch, KATHARINE-BC, Given at 05/23/22 2130    ROS:  Review of Systems   Constitutional: Positive for activity change, appetite change and fatigue.   HENT: Negative.    Respiratory: Positive for shortness of breath.    Gastrointestinal: Positive for abdominal pain. Negative for nausea.   Endocrine: Negative.    Genitourinary: Negative.    Musculoskeletal: Positive for arthralgias, gait problem and myalgias.   Allergic/Immunologic: Negative.    Neurological: Positive for weakness.   Psychiatric/Behavioral: Negative for confusion and decreased concentration. The patient is not nervous/anxious.        OBJECTIVE:     Physical Exam:  Vitals: Temp: 97.4 °F (36.3 °C) (05/24/22 1143)  Pulse: 61 (05/24/22 1143)  Resp: 16 (05/24/22 1143)  BP: 133/76 (05/24/22 1143)  SpO2: 95 % (05/24/22 1143)    Physical Exam  Constitutional:       General: He is not in acute distress.     Appearance: He is obese. He is ill-appearing.   HENT:      Head: Normocephalic and atraumatic.      Nose:      Comments: NGT-plans to remove today     Mouth/Throat:      Mouth: Mucous membranes are moist.      Pharynx: Oropharynx is clear.   Eyes:      General: No scleral icterus.     Extraocular Movements: Extraocular movements intact.      Pupils: Pupils are equal, round, and reactive to light.   Cardiovascular:      Rate and Rhythm: Normal rate.   Pulmonary:      Effort: Pulmonary effort is normal.   Abdominal:      Palpations: Abdomen is soft.      Comments: Incision and ADDIS drain   Musculoskeletal:      Cervical back: Neck supple.      Comments: Generalized weakness   Skin:     General: Skin is warm and dry.   Neurological:      General: No focal deficit present.      Mental Status: He is oriented to person, place, and time.      Motor: Weakness present.      Gait: Gait abnormal.    Psychiatric:         Mood and Affect: Mood normal.         Behavior: Behavior normal.         Thought Content: Thought content normal.         Judgment: Judgment normal.         Review of Symptoms    Symptom Assessment (ESAS 0-10 Scale)  Unable to complete assessment due to Other         Living Arrangements:  Lives with spouse and Lives in home    Psychosocial/Cultural: , wife is wheelchair bound; he has 2 daughters, Anel and Claudia.  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       Advance Directives:   Living Will: No    LaPOST: No    Do Not Resuscitate Status: Yes    Medical Power of : No      Decision Making:  Patient answered questions      Labs:  WBC   Date Value Ref Range Status   05/24/2022 10.66 3.90 - 12.70 K/uL Final     Hemoglobin   Date Value Ref Range Status   05/24/2022 7.5 (L) 14.0 - 18.0 g/dL Final     POC Hematocrit   Date Value Ref Range Status   05/07/2022 31 (L) 36 - 54 %PCV Final     Hematocrit   Date Value Ref Range Status   05/24/2022 26.9 (L) 40.0 - 54.0 % Final     MCV   Date Value Ref Range Status   05/24/2022 83 82 - 98 fL Final     Platelets   Date Value Ref Range Status   05/24/2022 494 (H) 150 - 450 K/uL Final       BMP  Lab Results   Component Value Date     05/24/2022    K 4.2 05/24/2022     05/24/2022    CO2 24 05/24/2022    BUN 34 (H) 05/24/2022    CREATININE 1.3 05/24/2022    CALCIUM 7.8 (L) 05/24/2022    ANIONGAP 8 05/24/2022    ESTGFRAFRICA >60 05/24/2022    EGFRNONAA 56 (A) 05/24/2022       Lab Results   Component Value Date    AST 40 05/24/2022    ALKPHOS 142 (H) 05/24/2022    BILITOT 0.9 05/24/2022       Albumin   Date Value Ref Range Status   05/24/2022 1.5 (L) 3.5 - 5.2 g/dL Final       Radiology:None  None in the past 24 hours with exception of modified barium swallow study; reviewed results and recs for modified diet       20 min ACP time spent discussing: code status, assessed  "patient specific goals and addressed the best way to achieve them, coordination of care and emotional support, formulating and communicating prognosis, exploring burden/ benefit of various approaches of treatment, inquired about existing or willingness to complete advance directive documents.        Aleksandra Muro NP  Palliative Medicine    Portions of this note may have been created with voice recognition software. Occasional "wrong word" or "sound alike" substitutions may have occurred due to the inherent limitations of voice recognition software. Please, read the note carefully and recognize, using context, where substitutions have occurred.     "

## 2022-05-25 LAB
BLD PROD TYP BPU: NORMAL
BLD PROD TYP BPU: NORMAL
BLOOD UNIT EXPIRATION DATE: NORMAL
BLOOD UNIT EXPIRATION DATE: NORMAL
BLOOD UNIT TYPE CODE: 6200
BLOOD UNIT TYPE CODE: 6200
BLOOD UNIT TYPE: NORMAL
BLOOD UNIT TYPE: NORMAL
CODING SYSTEM: NORMAL
CODING SYSTEM: NORMAL
DISPENSE STATUS: NORMAL
DISPENSE STATUS: NORMAL
NUM UNITS TRANS PACKED RBC: NORMAL
NUM UNITS TRANS PACKED RBC: NORMAL
POCT GLUCOSE: 83 MG/DL (ref 70–110)
POCT GLUCOSE: 94 MG/DL (ref 70–110)

## 2022-05-25 PROCEDURE — 97530 THERAPEUTIC ACTIVITIES: CPT | Mod: CQ

## 2022-05-25 PROCEDURE — 99233 SBSQ HOSP IP/OBS HIGH 50: CPT | Mod: ,,, | Performed by: INTERNAL MEDICINE

## 2022-05-25 PROCEDURE — 21400001 HC TELEMETRY ROOM

## 2022-05-25 PROCEDURE — 25000003 PHARM REV CODE 250: Performed by: SURGERY

## 2022-05-25 PROCEDURE — P9016 RBC LEUKOCYTES REDUCED: HCPCS | Performed by: EMERGENCY MEDICINE

## 2022-05-25 PROCEDURE — 99233 PR SUBSEQUENT HOSPITAL CARE,LEVL III: ICD-10-PCS | Mod: ,,, | Performed by: INTERNAL MEDICINE

## 2022-05-25 PROCEDURE — 92526 ORAL FUNCTION THERAPY: CPT

## 2022-05-25 RX ADMIN — MINERAL OIL, PETROLATUM: 425; 573 OINTMENT OPHTHALMIC at 09:05

## 2022-05-25 RX ADMIN — APIXABAN 5 MG: 2.5 TABLET, FILM COATED ORAL at 09:05

## 2022-05-25 RX ADMIN — APIXABAN 5 MG: 2.5 TABLET, FILM COATED ORAL at 08:05

## 2022-05-25 RX ADMIN — FERROUS SULFATE TAB 325 MG (65 MG ELEMENTAL FE) 1 EACH: 325 (65 FE) TAB at 08:05

## 2022-05-25 RX ADMIN — AMOXICILLIN AND CLAVULANATE POTASSIUM 1 TABLET: 875; 125 TABLET, FILM COATED ORAL at 08:05

## 2022-05-25 RX ADMIN — AMIODARONE HYDROCHLORIDE 200 MG: 200 TABLET ORAL at 08:05

## 2022-05-25 RX ADMIN — LOPERAMIDE HYDROCHLORIDE 2 MG: 2 CAPSULE ORAL at 03:05

## 2022-05-25 RX ADMIN — AMIODARONE HYDROCHLORIDE 200 MG: 200 TABLET ORAL at 09:05

## 2022-05-25 RX ADMIN — LOPERAMIDE HYDROCHLORIDE 2 MG: 2 CAPSULE ORAL at 08:05

## 2022-05-25 RX ADMIN — FAMOTIDINE 20 MG: 20 TABLET ORAL at 08:05

## 2022-05-25 RX ADMIN — OXYCODONE HYDROCHLORIDE AND ACETAMINOPHEN 500 MG: 500 TABLET ORAL at 09:05

## 2022-05-25 RX ADMIN — LOPERAMIDE HYDROCHLORIDE 2 MG: 2 CAPSULE ORAL at 09:05

## 2022-05-25 RX ADMIN — OXYCODONE HYDROCHLORIDE AND ACETAMINOPHEN 500 MG: 500 TABLET ORAL at 08:05

## 2022-05-25 RX ADMIN — FAMOTIDINE 20 MG: 20 TABLET ORAL at 09:05

## 2022-05-25 NOTE — PT/OT/SLP PROGRESS
Physical Therapy  Treatment    Franky Masters   MRN: [id number]   Admitting Diagnosis: Severe sepsis    PT Received On: 05/25/22  PT Start Time: 1035     PT Stop Time: 1115    PT Total Time (min): 40 min       Billable Minutes:  Therapeutic Activity 40    Treatment Type: Treatment  PT/PTA: PTA     PTA Visit Number: 1       General Precautions: Standard, aspiration, fall, honey thick  Orthopedic Precautions: N/A   Braces: N/A  Respiratory Status: Room air         Subjective:  Communicated with patient's nurse, Fadia, and completed Epic chart review prior to session.  Patient agreed to PT session.     Pain/Comfort  Pain Rating 1: 0/10  Pain Rating Post-Intervention 1: 0/10    Objective:   Patient found with: peripheral IV, telemetry, PICC line, colostomy, ADDIS drain (ADDIS X2 ( L SIDE))    Functional Mobility:  Supine . Sit EOB: Mod A    Forward scoot towards EOB: Min A    EOB tricep push ups to prep for standing trials 2x5 reps    Seated EOB x10 min total focusing on increased tolerance to upright position, core stability, trunk control and CV endurance. Patient maintained self supported sitting balance w/ close SBA throughout. Required intermittent VC for upright posture especially with head.    STS from EOB > RW x4 trials: Mod A of 2 (consistent VC for hand placement and hand over hand OP to maintain contact with bed until appropriate timing for T/F onto RW)    x4 side steps towards HOB w/ RW: Mod-Max A of 2    Lateral scoot towards HOB: Mod A (multiple reps)    Sit > supine: Mod A     Educated patient on importance of increased tolerance to upright position in order to promote CV endurance. Encouraged patient to utilize elevated HOB to achieve simulated chair position until they are able to safely complete T/F. Encouraged patient to perform AROM TE to BLE throughout the day in order to prevent further loss of ROM and strength. Re enforced importance of utilizing estefany light to meet needs in room. Patient verbalized  understanding of all education and agreed to comply.    AM-PAC 6 CLICK MOBILITY  How much help from another person does this patient currently need?   1 = Unable, Total/Dependent Assistance  2 = A lot, Maximum/Moderate Assistance  3 = A little, Minimum/Contact Guard/Supervision  4 = None, Modified [place]/Independent    Turning over in bed (including adjusting bedclothes, sheets and blankets)?: 2  Sitting down on and standing up from a chair with arms (e.g., wheelchair, bedside commode, etc.): 2  Moving from lying on back to sitting on the side of the bed?: 2  Moving to and from a bed to a chair (including a wheelchair)?: 1  Need to walk in hospital room?: 1  Climbing 3-5 steps with a railing?: 1  Basic Mobility Total Score: 9    AM-PAC Raw Score CMS G-Code Modifier Level of Impairment Assistance   6 % Total / Unable   7 - 9 CM 80 - 100% Maximal Assist   10 - 14 CL 60 - 80% Moderate Assist   15 - 19 CK 40 - 60% Moderate Assist   20 - 22 CJ 20 - 40% Minimal Assist   23 CI 1-20% SBA / CGA   24 CH 0% Independent/ Mod I     Patient left with bed in chair position with all lines intact, call button in reach, bed alarm on and aide notified due to patient having colostomy leakage.    Assessment:  Franky Masters is a 67 y.o. male with a medical diagnosis of Severe sepsis and presents with overall decline in functional mobility. Patient would continue to benefit from skilled PT to address functional limitations listed below in order to return to PLOF/decrease caregiver burden.     Rehab identified problem list/impairments: Rehab identified problem list/impairments: weakness, impaired endurance, impaired self care skills, impaired functional mobilty, gait instability, impaired balance, impaired cognition, decreased coordination, decreased upper extremity function, decreased lower extremity function, decreased safety awareness, decreased ROM, impaired coordination, edema, impaired cardiopulmonary response to  activity    Rehab potential is good.    Activity tolerance: Fair    Discharge recommendations: Discharge Facility/Level of Care Needs: nursing facility, skilled     Barriers to discharge:      Equipment recommendations: Equipment Needed After Discharge: walker, rolling, bedside commode, wheelchair     GOALS:   Multidisciplinary Problems     Physical Therapy Goals        Problem: Physical Therapy    Goal Priority Disciplines Outcome Goal Variances Interventions   Physical Therapy Goal     PT, PT/OT Ongoing, Progressing     Description: LT22  1. PT WILL COMPLETE BED MOBILITY WITH MOD A  2. PT WILL STAND WITH RW AND MAX A FOR STAND PIVOT T/F TO CHAIR   3. PT WILL GT TRAIN X 25' WITH RW AND MAX A                   PLAN:    Patient to be seen 3 x/week  to address the above listed problems via gait training, therapeutic activities, therapeutic exercises  Plan of Care expires: 22  Plan of Care reviewed with: patient         2022

## 2022-05-25 NOTE — PROGRESS NOTES
[facility]  Hematology/Oncology  Progress Note    Patient Name: Franky Masters  Admission Date: 5/4/2022  Hospital Length of Stay: 21 days  Code Status: DNR     Subjective:     HPI:  67-year-old male newly diagnosed metastatic colon carcinoma.  The patient had a perforated viscus and exploratory surgery with colostomy.  Biopsy of liver mass metastatic disease was asked to see the patient in the postoperative setting for further therapeutic considerations once patient has been recovered ECOG status 2 previously fully active practicing chiropractor       terval History:  Patient is more comfortable this morning with nasogastric tube removed talking more freely    Oncology Treatment Plan:   [Could not find a treatment plan. This SmartLink may be configured incorrectly. Contact a  for help.]    Medications:  Continuous Infusions:  Scheduled Meds:   amiodarone  200 mg Oral BID    amoxicillin-clavulanate 875-125mg  1 tablet Oral Q12H    apixaban  5 mg Oral BID    ascorbic acid (vitamin C)  500 mg Oral BID    famotidine  20 mg Oral BID    ferrous sulfate  1 tablet Oral Daily    loperamide  2 mg Oral TID    psyllium husk (aspartame)  1 packet Oral Daily    white petrolatum-mineral oil 57.3-42.5%   Both Eyes QHS     PRN Meds:sodium chloride, sodium chloride 0.9%, acetaminophen, barium sulfate, dextrose 10%, glucagon (human recombinant), hydrALAZINE, insulin aspart U-100, ondansetron     Review of Systems   Constitutional:  Positive for fatigue. Negative for activity change, appetite change, chills, diaphoresis, fever and unexpected weight change.   HENT:  Negative for congestion, dental problem, drooling, ear discharge, ear pain, facial swelling, hearing loss, mouth sores, nosebleeds, postnasal drip, rhinorrhea, sinus pressure, sneezing, sore throat, tinnitus, trouble swallowing and voice change.    Eyes:  Negative for photophobia, pain, discharge, redness, itching and visual disturbance.    Respiratory:  Negative for apnea, cough, choking, chest tightness, shortness of breath, wheezing and stridor.    Cardiovascular:  Negative for chest pain, palpitations and leg swelling.   Gastrointestinal:  Positive for abdominal pain. Negative for abdominal distention, anal bleeding, blood in stool, constipation, diarrhea, nausea, rectal pain and vomiting.        Functioning colostomy   Endocrine: Negative for cold intolerance, heat intolerance, polydipsia, polyphagia and polyuria.   Genitourinary:  Negative for decreased urine volume, difficulty urinating, dysuria, enuresis, flank pain, frequency, genital sores, hematuria, penile discharge, penile pain, penile swelling, scrotal swelling, testicular pain and urgency.   Musculoskeletal:  Negative for arthralgias, back pain, gait problem, joint swelling, myalgias, neck pain and neck stiffness.   Skin:  Negative for color change, pallor, rash and wound.   Allergic/Immunologic: Negative for environmental allergies, food allergies and immunocompromised state.   Neurological:  Negative for dizziness, tremors, seizures, syncope, facial asymmetry, speech difficulty, weakness, light-headedness, numbness and headaches.   Hematological:  Negative for adenopathy. Does not bruise/bleed easily.   Psychiatric/Behavioral:  Positive for dysphoric mood. Negative for agitation, behavioral problems, confusion, decreased concentration, hallucinations, self-injury, sleep disturbance and suicidal ideas. The patient is nervous/anxious. The patient is not hyperactive.    Objective:     Vital Signs (Most Recent):  Temp: 97.7 °F (36.5 °C) (05/25/22 0602)  Pulse: 62 (05/25/22 0602)  Resp: 19 (05/25/22 0602)  BP: 123/60 (05/25/22 0602)  SpO2: 96 % (05/25/22 0602) Vital Signs (24h Range):  Temp:  [97.3 °F (36.3 °C)-98.5 °F (36.9 °C)] 97.7 °F (36.5 °C)  Pulse:  [60-69] 62  Resp:  [16-20] 19  SpO2:  [94 %-97 %] 96 %  BP: (111-142)/(55-76) 123/60     Weight: 111.4 kg (245 lb 9.5 oz)  Body mass  index is 36.27 kg/m².  Body surface area is 2.33 meters squared.      Intake/Output Summary (Last 24 hours) at 5/25/2022 0753  Last data filed at 5/25/2022 0600  Gross per 24 hour   Intake 651.16 ml   Output 2785 ml   Net -2133.84 ml       Physical Exam  Vitals reviewed.   Constitutional:       General: He is not in acute distress.     Appearance: He is well-developed. He is obese. He is ill-appearing. He is not diaphoretic.   HENT:      Head: Normocephalic.      Right Ear: External ear normal.      Left Ear: External ear normal.      Nose: Nose normal.      Right Sinus: No maxillary sinus tenderness or frontal sinus tenderness.      Left Sinus: No maxillary sinus tenderness or frontal sinus tenderness.      Mouth/Throat:      Pharynx: No oropharyngeal exudate.   Eyes:      General: Lids are normal. No scleral icterus.        Right eye: No discharge.         Left eye: No discharge.      Extraocular Movements:      Right eye: Normal extraocular motion.      Left eye: Normal extraocular motion.      Conjunctiva/sclera:      Right eye: Right conjunctiva is not injected. No hemorrhage.     Left eye: Left conjunctiva is not injected. No hemorrhage.     Pupils: Pupils are equal, round, and reactive to light.   Neck:      Thyroid: No thyromegaly.      Vascular: No JVD.      Trachea: No tracheal deviation.   Cardiovascular:      Rate and Rhythm: Normal rate and regular rhythm.      Heart sounds: Normal heart sounds.   Pulmonary:      Effort: Pulmonary effort is normal. No respiratory distress.      Breath sounds: Normal breath sounds. No stridor.   Chest:   Breasts:      Right: No supraclavicular adenopathy.      Left: No supraclavicular adenopathy.     Abdominal:      General: Bowel sounds are normal.      Palpations: Abdomen is soft. There is no hepatomegaly, splenomegaly or mass.      Tenderness: There is no abdominal tenderness.      Comments: Functioning colostomy   Musculoskeletal:         General: No tenderness.  Normal range of motion.      Cervical back: Normal range of motion and neck supple.   Lymphadenopathy:      Head:      Right side of head: No posterior auricular or occipital adenopathy.      Left side of head: No posterior auricular or occipital adenopathy.      Cervical: No cervical adenopathy.      Right cervical: No superficial, deep or posterior cervical adenopathy.     Left cervical: No superficial, deep or posterior cervical adenopathy.      Upper Body:      Right upper body: No supraclavicular adenopathy.      Left upper body: No supraclavicular adenopathy.   Skin:     General: Skin is dry.      Findings: No erythema or rash.      Nails: There is no clubbing.   Neurological:      Mental Status: He is alert and oriented to person, place, and time.      Cranial Nerves: No cranial nerve deficit.      Motor: Weakness present.      Coordination: Coordination normal.      Gait: Gait abnormal.   Psychiatric:         Behavior: Behavior normal.         Thought Content: Thought content normal.         Judgment: Judgment normal.       Significant Labs:   BMP:   Recent Labs   Lab 05/24/22 0522         K 4.2      CO2 24   BUN 34*   CREATININE 1.3   CALCIUM 7.8*   , CBC:   Recent Labs   Lab 05/24/22 0522   WBC 10.66   HGB 7.5*   HCT 26.9*   *   , CMP:   Recent Labs   Lab 05/24/22 0522      K 4.2      CO2 24      BUN 34*   CREATININE 1.3   CALCIUM 7.8*   PROT 5.1*   ALBUMIN 1.5*   BILITOT 0.9   ALKPHOS 142*   AST 40   ALT 40   ANIONGAP 8   EGFRNONAA 56*   , Coagulation: No results for input(s): PT, INR, APTT in the last 48 hours., Haptoglobin: No results for input(s): HAPTOGLOBIN in the last 48 hours., Immunology: No results for input(s): SPEP, KINGS, ANGELICA, FREELAMBDALI in the last 48 hours., LDH: No results for input(s): LDHCSF, BFSOURCE in the last 48 hours., LFTs:   Recent Labs   Lab 05/24/22 0522   ALT 40   AST 40   ALKPHOS 142*   BILITOT 0.9   PROT 5.1*   ALBUMIN 1.5*   ,  Reticulocytes: No results for input(s): RETIC in the last 48 hours., Tumor Markers: No results for input(s): PSA, CEA, , AFPTM, [id number],  in the last 48 hours.    Invalid input(s): ALGTM, Uric Acid No results for input(s): URICACID in the last 48 hours., and Urine Studies: No results for input(s): COLORU, APPEARANCEUA, PHUR, SPECGRAV, PROTEINUA, GLUCUA, KETONESU, BILIRUBINUA, OCCULTUA, NITRITE, UROBILINOGEN, LEUKOCYTESUR, RBCUA, WBCUA, BACTERIA, SQUAMEPITHEL, HYALINECASTS in the last 48 hours.    Invalid input(s): WRIGHTSUR    Diagnostic Results:  I have reviewed all pertinent imaging results/findings within the past 24 hours.    Assessment/Plan:     * Severe sepsis secondary to Peritonitis from bowel perforation  Cared for by operative surgeon and hospital medicine team      Adenocarcinoma of colon metastatic to liver  Extensive conversation with the patient.  The patient has widespread colon carcinoma.  Intra-abdominal with metastatic disease in liver biopsy-proven.  At this time I have talked to the patient about awaiting results of molecular characterization to better define treatment options.  I have talked to the patient he is not reluctant to pursue any treatment of told in my professional opinion I think survival to the end of the year would be small and would probably recommend hospice.  If the patient decides to take systemic chemotherapy with reviewed briefly toxicity profile articles from up-to-date followed him for his review in talked about response rates of 60% and noncurative setting.  At this time will be happy to talk to the patient once more family is present and I will be happy to answer questions as we proceed in the recovery phase from his extensive surgery  05/24/2022.  Extensive conversation with patient as well as daughter at bedside I again reviewed the nature of stage IV metastatic colon carcinoma being a highly treatable but not curable malignancy.  I have answered a number  questions about his stage and the impending molecular characterization of his tumor to better assist on recommendations for systemic therapy.  We again talked about options of pursuing systemic therapy versus is no treatment.  In which case I would recommend hospice.  I agree entirely with a do not resuscitate order has been placed on chart and palliative care consultation appropriate by operative surgeon would be an appropriate course of action patient does not have a living will or medical power  created.  The patient has received the articles that I sent to them from up-to-date on metastatic colon carcinoma and discussed various treatment approaches of told him final recommendations systemic therapy will need to await molecular characterization of his tumor total time 35 minutes  05/25/2022.  Extensive conversation again at the bedside with the patient as well as daughter answered questions they appear more interested in taking systemic therapy.  I have told him as he recovers over the next several days or weeks more information will return to better characterize the type of treatment we would recommend based off of the molecular characterization of precision Medicine that will be done on his tumor specimen I have again explained this to them answered questions concerning this.  I will have her navigation team contact them to establish care so that we can begin the process of seen in the outpatient setting total time 35 minutes        Thank you for your consult. I will follow-up with patient. Please contact us if you have any additional questions.     Rm Gonzalez MD  Hematology/Oncology  [facility]

## 2022-05-25 NOTE — SUBJECTIVE & OBJECTIVE
terval History:  Patient is more comfortable this morning with nasogastric tube removed talking more freely    Oncology Treatment Plan:   [Could not find a treatment plan. This SmartLink may be configured incorrectly. Contact a  for help.]    Medications:  Continuous Infusions:  Scheduled Meds:   amiodarone  200 mg Oral BID    amoxicillin-clavulanate 875-125mg  1 tablet Oral Q12H    apixaban  5 mg Oral BID    ascorbic acid (vitamin C)  500 mg Oral BID    famotidine  20 mg Oral BID    ferrous sulfate  1 tablet Oral Daily    loperamide  2 mg Oral TID    psyllium husk (aspartame)  1 packet Oral Daily    white petrolatum-mineral oil 57.3-42.5%   Both Eyes QHS     PRN Meds:sodium chloride, sodium chloride 0.9%, acetaminophen, barium sulfate, dextrose 10%, glucagon (human recombinant), hydrALAZINE, insulin aspart U-100, ondansetron     Review of Systems   Constitutional:  Positive for fatigue. Negative for activity change, appetite change, chills, diaphoresis, fever and unexpected weight change.   HENT:  Negative for congestion, dental problem, drooling, ear discharge, ear pain, facial swelling, hearing loss, mouth sores, nosebleeds, postnasal drip, rhinorrhea, sinus pressure, sneezing, sore throat, tinnitus, trouble swallowing and voice change.    Eyes:  Negative for photophobia, pain, discharge, redness, itching and visual disturbance.   Respiratory:  Negative for apnea, cough, choking, chest tightness, shortness of breath, wheezing and stridor.    Cardiovascular:  Negative for chest pain, palpitations and leg swelling.   Gastrointestinal:  Positive for abdominal pain. Negative for abdominal distention, anal bleeding, blood in stool, constipation, diarrhea, nausea, rectal pain and vomiting.        Functioning colostomy   Endocrine: Negative for cold intolerance, heat intolerance, polydipsia, polyphagia and polyuria.   Genitourinary:  Negative for decreased urine volume, difficulty urinating, dysuria,  enuresis, flank pain, frequency, genital sores, hematuria, penile discharge, penile pain, penile swelling, scrotal swelling, testicular pain and urgency.   Musculoskeletal:  Negative for arthralgias, back pain, gait problem, joint swelling, myalgias, neck pain and neck stiffness.   Skin:  Negative for color change, pallor, rash and wound.   Allergic/Immunologic: Negative for environmental allergies, food allergies and immunocompromised state.   Neurological:  Negative for dizziness, tremors, seizures, syncope, facial asymmetry, speech difficulty, weakness, light-headedness, numbness and headaches.   Hematological:  Negative for adenopathy. Does not bruise/bleed easily.   Psychiatric/Behavioral:  Positive for dysphoric mood. Negative for agitation, behavioral problems, confusion, decreased concentration, hallucinations, self-injury, sleep disturbance and suicidal ideas. The patient is nervous/anxious. The patient is not hyperactive.    Objective:     Vital Signs (Most Recent):  Temp: 97.7 °F (36.5 °C) (05/25/22 0602)  Pulse: 62 (05/25/22 0602)  Resp: 19 (05/25/22 0602)  BP: 123/60 (05/25/22 0602)  SpO2: 96 % (05/25/22 0602) Vital Signs (24h Range):  Temp:  [97.3 °F (36.3 °C)-98.5 °F (36.9 °C)] 97.7 °F (36.5 °C)  Pulse:  [60-69] 62  Resp:  [16-20] 19  SpO2:  [94 %-97 %] 96 %  BP: (111-142)/(55-76) 123/60     Weight: 111.4 kg (245 lb 9.5 oz)  Body mass index is 36.27 kg/m².  Body surface area is 2.33 meters squared.      Intake/Output Summary (Last 24 hours) at 5/25/2022 0753  Last data filed at 5/25/2022 0600  Gross per 24 hour   Intake 651.16 ml   Output 2785 ml   Net -2133.84 ml       Physical Exam  Vitals reviewed.   Constitutional:       General: He is not in acute distress.     Appearance: He is well-developed. He is obese. He is ill-appearing. He is not diaphoretic.   HENT:      Head: Normocephalic.      Right Ear: External ear normal.      Left Ear: External ear normal.      Nose: Nose normal.      Right Sinus:  No maxillary sinus tenderness or frontal sinus tenderness.      Left Sinus: No maxillary sinus tenderness or frontal sinus tenderness.      Mouth/Throat:      Pharynx: No oropharyngeal exudate.   Eyes:      General: Lids are normal. No scleral icterus.        Right eye: No discharge.         Left eye: No discharge.      Extraocular Movements:      Right eye: Normal extraocular motion.      Left eye: Normal extraocular motion.      Conjunctiva/sclera:      Right eye: Right conjunctiva is not injected. No hemorrhage.     Left eye: Left conjunctiva is not injected. No hemorrhage.     Pupils: Pupils are equal, round, and reactive to light.   Neck:      Thyroid: No thyromegaly.      Vascular: No JVD.      Trachea: No tracheal deviation.   Cardiovascular:      Rate and Rhythm: Normal rate and regular rhythm.      Heart sounds: Normal heart sounds.   Pulmonary:      Effort: Pulmonary effort is normal. No respiratory distress.      Breath sounds: Normal breath sounds. No stridor.   Chest:   Breasts:      Right: No supraclavicular adenopathy.      Left: No supraclavicular adenopathy.     Abdominal:      General: Bowel sounds are normal.      Palpations: Abdomen is soft. There is no hepatomegaly, splenomegaly or mass.      Tenderness: There is no abdominal tenderness.      Comments: Functioning colostomy   Musculoskeletal:         General: No tenderness. Normal range of motion.      Cervical back: Normal range of motion and neck supple.   Lymphadenopathy:      Head:      Right side of head: No posterior auricular or occipital adenopathy.      Left side of head: No posterior auricular or occipital adenopathy.      Cervical: No cervical adenopathy.      Right cervical: No superficial, deep or posterior cervical adenopathy.     Left cervical: No superficial, deep or posterior cervical adenopathy.      Upper Body:      Right upper body: No supraclavicular adenopathy.      Left upper body: No supraclavicular adenopathy.   Skin:      General: Skin is dry.      Findings: No erythema or rash.      Nails: There is no clubbing.   Neurological:      Mental Status: He is alert and oriented to person, place, and time.      Cranial Nerves: No cranial nerve deficit.      Motor: Weakness present.      Coordination: Coordination normal.      Gait: Gait abnormal.   Psychiatric:         Behavior: Behavior normal.         Thought Content: Thought content normal.         Judgment: Judgment normal.       Significant Labs:   BMP:   Recent Labs   Lab 05/24/22 0522         K 4.2      CO2 24   BUN 34*   CREATININE 1.3   CALCIUM 7.8*   , CBC:   Recent Labs   Lab 05/24/22 0522   WBC 10.66   HGB 7.5*   HCT 26.9*   *   , CMP:   Recent Labs   Lab 05/24/22 0522      K 4.2      CO2 24      BUN 34*   CREATININE 1.3   CALCIUM 7.8*   PROT 5.1*   ALBUMIN 1.5*   BILITOT 0.9   ALKPHOS 142*   AST 40   ALT 40   ANIONGAP 8   EGFRNONAA 56*   , Coagulation: No results for input(s): PT, INR, APTT in the last 48 hours., Haptoglobin: No results for input(s): HAPTOGLOBIN in the last 48 hours., Immunology: No results for input(s): SPEP, KINGS, ANGELICA, FREELAMBDALI in the last 48 hours., LDH: No results for input(s): LDHCSF, BFSOURCE in the last 48 hours., LFTs:   Recent Labs   Lab 05/24/22 0522   ALT 40   AST 40   ALKPHOS 142*   BILITOT 0.9   PROT 5.1*   ALBUMIN 1.5*   , Reticulocytes: No results for input(s): RETIC in the last 48 hours., Tumor Markers: No results for input(s): PSA, CEA, , AFPTM, [id number],  in the last 48 hours.    Invalid input(s): ALGTM, Uric Acid No results for input(s): URICACID in the last 48 hours., and Urine Studies: No results for input(s): COLORU, APPEARANCEUA, PHUR, SPECGRAV, PROTEINUA, GLUCUA, KETONESU, BILIRUBINUA, OCCULTUA, NITRITE, UROBILINOGEN, LEUKOCYTESUR, RBCUA, WBCUA, BACTERIA, SQUAMEPITHEL, HYALINECASTS in the last 48 hours.    Invalid input(s): [facility]    Diagnostic Results:  I have reviewed all  pertinent imaging results/findings within the past 24 hours.

## 2022-05-25 NOTE — PROGRESS NOTES
[facility] Medicine  Progress Note    Patient Name: Franky Masters  MRN: [id number]  Patient Class: IP- Inpatient   Admission Date: 5/4/2022  Length of Stay: 21 days  Attending Physician: Elvie Parker DO  Primary Care Provider: HOLLY ROSEN        Subjective:     Principal Problem:Severe sepsis        HPI:  Mr Masters is a 65 yo male POD#0 s/p SB perforation with surgical intervention demonstrating a large cecal mass and 3l feculant fluid in the abdominal cavity. Additional findings of a perforated ileum and a liver mass. Patient tolerated the Exlap with Washout and small bowel excision. Patient had a wound vac placed, is currently intubated, sedated and recovering in the ICU. Patient received 4 units PRBC and remains on pressor support. Patient is a DNR and HM consulted with assistance with medical management. Daughter at bedside. Patient discussed with care team.          Overview/Hospital Course:  Patient continues to require pressors, remains intubated, sedated. Patient urine o/p declining and concerning. Discussed POC in detail at bedside with daughter POA. She expressed her fathers wish to not undergo treatments  like perm HD, where he has a diminished quality of life. We spoke frankly about issues and concerns and she will be communicating with the family as his case evolves. Comfort care was discussed and the goals of care will develop consistent with the patients expressed wishes. Potential for another surgery likely Saturday with a washout and possible biopsy vs resection are on the horizon. This possible intervention will be covered in detail by surgery sharing the risks and benefits of that approach. Case discussed with the primary service - surgery, and critical care team.    5/6- Pt seen and examined in the ICU plus discussed with ICU team and the pt's daughter/ POA: Remains critically ill, intubated, sedated on Vent, on Pressors- Levo plus Vaso- JOSE with oliguria getting worse-  Cr rising to 3.6, becoming severely acidotic- requiring Ertapenem, Zyvox, Micafungin as well as on Bicarb and Fentanyl gtt. He has massive fluid overload and generalized anasarca.  Possible return to OR tomorrow 5/7 if patient is more stable for right hemicolectomy, possible ileostomy, liver biopsy, abdominal wall closure. Dw Pt's daughter.       5/7- remains Intubated, sedated on Vent- Went into Afib w RVR- hence added Amio to Levo and Vasopressin- back in NSR. Getting Invanz and Zyvox plus Micafungin. Dr. Parker took him back to OR for for abdominal washout ( 3-3.5 L feculent fluid with food particles suctioned out in the OR, small bowel appeared better) and replaced Abthera- still has bowel discontinuity. His WBC, Lactate and Cr have all increased. Family updated about his critical condition and poor prognosis and JOSE/ATN- they are considering Comfort measures.     5/8- Day 5 on Vent- family at bedside, remains intubated on Vent with 2 Pressors- still on Amiodarone gtt for Afib, Still has Abthera wound vac to open Abdomen with small bowel discontinuity. Remains oliguric with generalized anasarca- almost 24 L fluid positive. Cr increased to 4.6. WBC down to 12, family does not want any HD/CRRT, so getting an IV Lasix trial to improve the urine output.     5/9- Day 6 on vent- remains the same, remains intubated, on vent with Abthera Wound vac and bowel discontinuity. Put out about 3 L urine since yesterday with IV Lasix, family still does not want any HD, WBC down to 10.2, H/H 7.8/24, still on 2 Pressors and Amio gtt. Family still deciding about comfort care.     5/10- Appreciate all- Day 7- remains same in septic shock on 2 vasopressors, intubated and sedated, still in afib. JOSE has remained stable at 4.7 but urine output improved with IV lasix. Abthera wound vac in place. No surgery today- put out about 2.5 L yesterday, given lasix again today.    5/11- Seen Pre op- looks better, less swollen, remains intubated,  sedated on Vent, on 1 Pressor- on Levophed. Going for OR today for Laparotomy and washout and abd wound closure. Good response to Lasix. Still Afib on Amiodarone gtt. Bun/Cr 98/4.4, WBC down to 17, H/H 8.6/26.     5/12- Day 8 on Vent- looks much better, remains on Vent with Levophed and Amio gtts. Had extensive surgery yesterday and did very well post op- Reopening of recent Laparotomy, Abdominal washout, R Hemicolectomy with Liver Bx, TAP block, Abdominal wall closure, Creation, end Ileostomy. POD 1- looks better, still on Levophed and Amio gtt for Afib, started on TPN, ID stopped Zyvox and continued Merrem/Mycafungin.     5/17 - Successfully extubated.  Additional left side drain placed by Interventional Radiology.    5/18 - Purulent drainage from left side drains.  Remained stable.  5/20 - POD 9 s/p R hemicolectomy and Ileostomy- extubated 5/18 and also underwent large Abdominal abscess by IR on 5/18 with over 500 cc pus drained- all Cx remain NGTD. No fever or chills, no leukocytosis, AAOx2, remains on RA, very weak, malnourished. Getting TPN, Albumin 1.4. Hemodynamically stable- hence transferred out of ICU to floor, getting PT/OT.   5/21- looks lot better, more alert and responsive, following commands, moving all 4 ext well, generalized anasarca improving. Bun/Cr further down to 46/1.4, H/h stable at 7.7/23- ordered IV Venofer plis inj B12 IM. Tolerating TF well. Will continue PT/OT.   5/22- looks better and getting stronger, more responsive and moving all 4 ext well, tolerating TF well at 30 ml/hr via NGT. Wife and daughter at bedside. BUE swelling also coning down. Continue PT/OT.   5/23- looks better, getting stronger, got the Doherty out today, was able to urinate on his own. Also got up with PT today. Ostomy care performed. Will get MBSS again- continue TF and PT. Pt and family considering LTAC vs SNF.   5/24- looks much better, NGT out, tolerated oral feeding well, did some PT/OT as well, easily gets  exhausted.. 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 Await Placement to Rehab vs LTAC. H/H still 7.5/22- he is big and tall and may give him 1-2 units blood tomorrow.   5/25- looks much better, stronger, sitting up in bed, daughter feeding him pasta which he eating and swallowing well. Got 2 units of blood yesterday. Still very weak but getting better with PT/OT. Will give more B12 plus Venofer and Procrit.       Interval History: looks much better, stronger, sitting up in bed, daughter feeding him pasta which he eating and swallowing well. Got 2 units of blood yesterday. Still very weak but getting better with PT/OT. Will give more B12 plus Venofer and Procrit.     Review of Systems   Constitutional:  Positive for activity change, appetite change and fatigue. Negative for chills, diaphoresis, fever and unexpected weight change.   HENT:  Negative for congestion, dental problem, drooling, ear discharge, ear pain, facial swelling, hearing loss, mouth sores, nosebleeds, postnasal drip, rhinorrhea, sinus pressure, sneezing, sore throat, tinnitus, trouble swallowing and voice change.    Eyes:  Negative for photophobia, pain, discharge, redness, itching and visual disturbance.   Respiratory:  Negative for apnea, cough, choking, chest tightness, shortness of breath, wheezing and stridor.    Cardiovascular:  Negative for chest pain, palpitations and leg swelling.   Gastrointestinal:  Negative for abdominal distention, abdominal pain, anal bleeding, blood in stool, constipation, diarrhea, nausea, rectal pain and vomiting.        Functioning colostomy   Endocrine: Negative for cold intolerance, heat intolerance, polydipsia, polyphagia and polyuria.   Genitourinary:  Negative for decreased urine volume, difficulty urinating, dysuria, enuresis, flank pain, frequency, genital sores, hematuria, penile discharge, penile pain, penile swelling, scrotal swelling, testicular pain and  urgency.   Musculoskeletal:  Negative for arthralgias, back pain, gait problem, joint swelling, myalgias, neck pain and neck stiffness.   Skin:  Negative for color change, pallor, rash and wound.   Allergic/Immunologic: Negative for environmental allergies, food allergies and immunocompromised state.   Neurological:  Positive for weakness. Negative for dizziness, tremors, seizures, syncope, facial asymmetry, speech difficulty, light-headedness, numbness and headaches.   Hematological:  Negative for adenopathy. Does not bruise/bleed easily.   Psychiatric/Behavioral:  Negative for agitation, behavioral problems, confusion, decreased concentration, dysphoric mood, hallucinations, self-injury, sleep disturbance and suicidal ideas. The patient is not nervous/anxious and is not hyperactive.    Objective:     Vital Signs (Most Recent):  Temp: 97.7 °F (36.5 °C) (05/25/22 1543)  Pulse: (!) 58 (05/25/22 1543)  Resp: 20 (05/25/22 1543)  BP: 139/66 (05/25/22 1543)  SpO2: 96 % (05/25/22 1543)   Vital Signs (24h Range):  Temp:  [97.7 °F (36.5 °C)-98.5 °F (36.9 °C)] 97.7 °F (36.5 °C)  Pulse:  [58-72] 58  Resp:  [17-20] 20  SpO2:  [94 %-97 %] 96 %  BP: (111-142)/(55-73) 139/66     Weight: 111.4 kg (245 lb 9.5 oz)  Body mass index is 36.27 kg/m².    Intake/Output Summary (Last 24 hours) at 5/25/2022 1705  Last data filed at 5/25/2022 1600  Gross per 24 hour   Intake 1851.16 ml   Output 1895 ml   Net -43.84 ml      Physical Exam  Vitals and nursing note reviewed.   Constitutional:       General: He is not in acute distress.     Appearance: He is obese.   HENT:      Head: Normocephalic and atraumatic.      Nose:      Comments: Feeding tube in place     Mouth/Throat:      Mouth: Mucous membranes are moist.   Cardiovascular:      Rate and Rhythm: Normal rate and regular rhythm.      Heart sounds: Normal heart sounds.   Pulmonary:      Breath sounds: Normal breath sounds.   Abdominal:      General: Bowel sounds are normal. There is no  distension.      Palpations: Abdomen is soft.      Comments: Soft, nontender. Midline incision with staples intact--no surrounding erythema or purulent drainage. Ileostomy is pink with liquid stool output. Right-sided drain is serous. Left-sided drains are a light, murky yellow.   Musculoskeletal:      Cervical back: Normal range of motion and neck supple.   Skin:     General: Skin is warm and dry.      Comments: Less edema in hands and feet.   Neurological:      General: No focal deficit present.      Mental Status: He is alert.   Psychiatric:         Behavior: Behavior normal.       Significant Labs: All pertinent labs within the past 24 hours have been reviewed.    Significant Imaging: I have reviewed all pertinent imaging results/findings within the past 24 hours.      Assessment/Plan:      * Severe sepsis secondary to Peritonitis from bowel perforation  15 May:  No longer on vasopressor.  Weaning sedation for awakening trials.  Left side intra-abdominal fluid collection/abscess draining into ADDIS.  17 May:  Additional drain placed.  Extubated.  18 May:  Remaining stable and off ventilator.  Starting tube feedings soon.  19 May:  Purulent drainage form two left side drains and right side drain serous.  Starting tube feedings at low rate.  5/20- s/p POD 9 s/p R hemicolectomy and Ileostomy- s/p large Abdominal abscess drainage by IR on 5/18 with over 500 cc pus drained- all Cx remain NGTD.      Appears resolved    JOSE (acute kidney injury)  Worsening  Trend  Cr 2.2 today    Cr now 3.8- Oliguric- heading towards Anuria and ATN/ May need HD vs CRRT- she has massive fluid Overload.     Cr now 4.5-  Remains anuric  POA/ Family not in favor of HD    Remains Oliguric due to Septic Shock on 2 Pressors  Some urine output with lasix but no Polyuria     5/10 Urine Out put improved with diuresis while renal functions remains stable    Improving, Cr coming down, good urine output    Improved    Encouraged oral intake    Appears  resolved    Acute on chronic anemia  Hgb 10.3 s/p Transfusion 4 units Prbc  Transfuse for Hgb <7  Monitor    5/5  Improved  Hgb 11.4 today  Transfuse as indicated    5/10- H/H 8.6/28- likely dilutional from ATN- improving    5/21- inj B12 IM plus Venofer given, may benefit from Procrit    5/24- may need Blood to Keep H/h around 10 to improve his Performance and exercise ability  No improvement with Venofer, Procrit, B12 IM    S/p 2 units of Blood yesterday- looks better, feels stronger    Adenocarcinoma of colon metastatic to liver  Stage 4 Colon Ca  Await Recovery from the Sepsis/ Peritonitis- severe Weakness      Oropharyngeal dysphagia with overall muscle deconditioning and weakness  Check swallow eval soon     S/p SLP eval    Slowly improving    Hypoalbuminemia due to protein-calorie malnutrition  Sec to severe Septic shock from Peritonitis- improving with TF now    Continue TF, may increase TF soon    Started on Oral feeding today- slowly improving  Oral intake improving      Obesity (BMI 30.0-34.9)  Diet  Nutrition on recovery        VTE Risk Mitigation (From admission, onward)         Ordered     apixaban tablet 5 mg  2 times daily         05/24/22 1122     IP VTE HIGH RISK PATIENT  Once         05/04/22 1253     Place sequential compression device  Until discontinued         05/04/22 1253                Discharge Planning   ELLIOT:      Code Status: DNR   Is the patient medically ready for discharge?:     Reason for patient still in hospital (select all that apply): Patient trending condition, Laboratory test, Treatment, Imaging, Consult recommendations, PT / OT recommendations and Pending disposition  Discharge Plan A: Home        Megha Mejia MD  Department of Hospital Medicine   [facility]urg

## 2022-05-25 NOTE — ASSESSMENT & PLAN NOTE
Hgb 10.3 s/p Transfusion 4 units Prbc  Transfuse for Hgb <7  Monitor    5/5  Improved  Hgb 11.4 today  Transfuse as indicated    5/10- H/H 8.6/28- likely dilutional from ATN- improving    5/21- inj B12 IM plus Venofer given, may benefit from Procrit    5/24- may need Blood to Keep H/h around 10 to improve his Performance and exercise ability  No improvement with Venofer, Procrit, B12 IM    S/p 2 units of Blood yesterday- looks better, feels stronger

## 2022-05-25 NOTE — PLAN OF CARE
Pt oriented x3.  VSS.  Pt remained afebrile throughout this shift.   All meds administered per order.   Pt remained free of falls this shift.   Plan of care reviewed. Patient verbalizes understanding.   Pt moving/turning with assistance.  Patient NSR on monitor.   NG tube removed today.  Pt is going to receive 2 units of blood.  Bed low, side rails up x 2, wheels locked, call light in reach.   Patient instructed to call for assistance.  Will continue to monitor.

## 2022-05-25 NOTE — PT/OT/SLP PROGRESS
Speech Language Pathology Treatment    Patient Name:  Franky Masters   MRN:  [id number]  Admitting Diagnosis: Severe sepsis    Recommendations:                 General Recommendations:  Dysphagia therapy and Cognitive-linguistic therapy  Diet recommendations:  Mechanical soft, Liquid Diet Level: Honey Thick   Aspiration Precautions: Frequent oral care, Ice chips sparingly and Strict aspiration precautions   General Precautions: Standard, aspiration, honey thick  Communication strategies:  provide increased time to answer    Subjective     Pt seen bedside for ST following PT/OT session.  No c/o pain.  Pt awake/alert and sitting upright.  No family present.  Spoke with nursing--reported tolerating modified diet without incident.  Patient goals: To improve swallow.    Pain/Comfort:  · Pain Rating 1: 0/10  · Pain Rating Post-Intervention 1: 0/10  · Pain Rating 2: 0/10  · Pain Rating Post-Intervention 2: 0/10    Objective:     Has the patient been evaluated by SLP for swallowing?   Yes  Keep patient NPO? No     Dysphagia intervention:  Tongue base retraction and laryngeal elevation exercises x15 with mod assist.  Lingual resistive exercises x15 with min assist  Effortful swallows with tx feeds ice chips/tsp water.    Confusion remains--cueing required during dysphagia tx.  Fatigue noted, requiring rest breaks.    Assessment:     Franky Masters is a 67 y.o. male with an SLP diagnosis of severe oropharygeal Dysphagia s/p prolonged intubation with evidence of silent aspiration of thin and nectar thick liquids during MBSS 5/24/22.  Pt recommended for soft mechanical consistency diet with HONEY THICK liquids at this time.  ST to continue treatment for swallowing and cognitive impairment with recs for continued intervention post-acute.    Goals:   Multidisciplinary Problems     SLP Goals        Problem: SLP    Goal Priority Disciplines Outcome   SLP Goal     SLP Ongoing, Progressing   Description: 1.  Pt will consume least  restrictive PO diet without s/s of dysphagia.  1a. Ongoing bedside swallowing assessment-met.  D/c goal.  1b. MBSS if clinically indicated--met 5/24/22.  D/C goal.  Pt rec: soft mechanical consistency diet/HONEY thick liquids.  1c.  New goal:  Update 1b.  Repeat MBSS following intensive dysphagia tx (1-2 weeks).    2.  Recommended cognitive-communicative evaluation                   Plan:     · Patient to be seen:  2 x/week   · Plan of Care expires:  05/27/22  · Plan of Care reviewed with:  patient   · SLP Follow-Up:  Yes       Discharge recommendations:  nursing facility, skilled   Barriers to Discharge:  None    Time Tracking:     SLP Treatment Date:   05/25/22  Speech Start Time:  1200  Speech Stop Time:  1230     Speech Total Time (min):  30 min    Billable Minutes: Treatment Swallowing Dysfunction 30 minutes    05/25/2022

## 2022-05-25 NOTE — ASSESSMENT & PLAN NOTE
Sec to severe Septic shock from Peritonitis- improving with TF now    Continue TF, may increase TF soon    Started on Oral feeding today- slowly improving  Oral intake improving

## 2022-05-25 NOTE — PLAN OF CARE
Cm spoke with patients daughter Randell. Per randell family is not interested in SNF. Family would like to take the patient home and provide care for him themselves. Cm inquired if they would be interested in setting up home health. Patients daughter declined.

## 2022-05-25 NOTE — SUBJECTIVE & OBJECTIVE
Interval History: Sitting up in bed. Tolerating dysphagia diet. Urinating without issues. Sat up in the chair with PT and states that was exhausting.    Medications:  Continuous Infusions:  Scheduled Meds:   amiodarone  200 mg Oral BID    amoxicillin-clavulanate 875-125mg  1 tablet Oral Q12H    apixaban  5 mg Oral BID    ascorbic acid (vitamin C)  500 mg Oral BID    [START ON 5/25/2022] famotidine  20 mg Oral BID    [START ON 5/25/2022] ferrous sulfate  1 tablet Oral Daily    loperamide  2 mg Oral TID    [START ON 5/25/2022] psyllium husk (aspartame)  1 packet Oral Daily    white petrolatum-mineral oil 57.3-42.5%   Both Eyes QHS     PRN Meds:sodium chloride, sodium chloride 0.9%, acetaminophen, barium sulfate, dextrose 10%, glucagon (human recombinant), hydrALAZINE, insulin aspart U-100, ondansetron     Review of patient's allergies indicates:  No Known Allergies  Objective:     Vital Signs (Most Recent):  Temp: 97.3 °F (36.3 °C) (05/24/22 1542)  Pulse: 68 (05/24/22 1705)  Resp: 16 (05/24/22 1542)  BP: (!) 140/69 (05/24/22 1542)  SpO2: (!) 94 % (05/24/22 1542)   Vital Signs (24h Range):  Temp:  [97.3 °F (36.3 °C)-98.1 °F (36.7 °C)] 97.3 °F (36.3 °C)  Pulse:  [58-69] 68  Resp:  [16-18] 16  SpO2:  [94 %-95 %] 94 %  BP: (123-140)/(61-76) 140/69     Weight: 111.9 kg (246 lb 11.1 oz)  Body mass index is 36.43 kg/m².    Intake/Output - Last 3 Shifts         05/22 0700  05/23 0659 05/23 0700 05/24 0659 05/24 0700  05/25 0659    P.O. 0 0     I.V. (mL/kg) 740.1 (6.5)      NG/  30    TPN       Total Intake(mL/kg) 1290.1 (11.2) 0 (0) 30 (0.3)    Urine (mL/kg/hr) 1950 (0.7) 0 (0) 700 (0.5)    Drains 255 290 190    Stool 2350 850 300    Total Output 4555 1140 1190    Net -3264.9 -1140 -1160           Urine Occurrence  6 x             Physical Exam  Vitals reviewed.   Constitutional:       General: He is not in acute distress.     Appearance: He is obese.   HENT:      Head: Normocephalic and atraumatic.      Nose:       Comments: Feeding tube in place     Mouth/Throat:      Mouth: Mucous membranes are moist.   Cardiovascular:      Rate and Rhythm: Normal rate and regular rhythm.      Heart sounds: Normal heart sounds.   Pulmonary:      Breath sounds: Normal breath sounds.   Abdominal:      General: Bowel sounds are normal. There is no distension.      Palpations: Abdomen is soft.      Comments: Soft, nontender. Midline incision with staples intact--no surrounding erythema or purulent drainage. Ileostomy is pink with liquid stool output. Right-sided drain is serous. Left-sided drains are a light, murky yellow.   Musculoskeletal:      Cervical back: Normal range of motion and neck supple.   Skin:     General: Skin is warm and dry.      Comments: Less edema in hands and feet.   Neurological:      General: No focal deficit present.      Mental Status: He is alert.   Psychiatric:         Behavior: Behavior normal.       Significant Labs:  I have reviewed all pertinent lab results within the past 24 hours.  CBC:   Recent Labs   Lab 05/24/22 0522   WBC 10.66   RBC 3.24*   HGB 7.5*   HCT 26.9*   *   MCV 83   MCH 23.1*   MCHC 27.9*     CMP:   Recent Labs   Lab 05/24/22  0522      CALCIUM 7.8*   ALBUMIN 1.5*   PROT 5.1*      K 4.2   CO2 24      BUN 34*   CREATININE 1.3   ALKPHOS 142*   ALT 40   AST 40   BILITOT 0.9       Significant Diagnostics:  I have reviewed all pertinent imaging results/findings within the past 24 hours.

## 2022-05-25 NOTE — ASSESSMENT & PLAN NOTE
S/p exploratory laparotomy, abdominal washout, segmental small bowel resection (left in discontinuity), placement of Abthera vac 5/4/22  S/p reopening of recent laparotomy, abdominal washout, replacement of Abthera vac 5/7/22  S/p reopening of recent laparotomy, abdominal washout, right hemicolectomy, liver biopsy, end ileostomy, TAP block, abdominal wall closure 5/11/22  Left-sided drain placed by IR for LUQ abscess 5/17/22        - OK for dysphagia diet with honey thickened liquids per MBSS.  - Right-sided serous drain removed today  - Pathology returned as metastatic adenocarcinoma. Oncology consulted.  - Monitor drain and ileostomy output. Imodium and fiber added to help decrease output.  - encourage increasing activity  - Ostomy nurse consult  - Strict I/Os  - Medical management per hospital    Discharge planning. LTAC? Family and patient need to make a decision with case management.

## 2022-05-25 NOTE — SUBJECTIVE & OBJECTIVE
Interval History: looks much better, stronger, sitting up in bed, daughter feeding him pasta which he eating and swallowing well. Got 2 units of blood yesterday. Still very weak but getting better with PT/OT. Will give more B12 plus Venofer and Procrit.     Review of Systems   Constitutional:  Positive for activity change, appetite change and fatigue. Negative for chills, diaphoresis, fever and unexpected weight change.   HENT:  Negative for congestion, dental problem, drooling, ear discharge, ear pain, facial swelling, hearing loss, mouth sores, nosebleeds, postnasal drip, rhinorrhea, sinus pressure, sneezing, sore throat, tinnitus, trouble swallowing and voice change.    Eyes:  Negative for photophobia, pain, discharge, redness, itching and visual disturbance.   Respiratory:  Negative for apnea, cough, choking, chest tightness, shortness of breath, wheezing and stridor.    Cardiovascular:  Negative for chest pain, palpitations and leg swelling.   Gastrointestinal:  Negative for abdominal distention, abdominal pain, anal bleeding, blood in stool, constipation, diarrhea, nausea, rectal pain and vomiting.        Functioning colostomy   Endocrine: Negative for cold intolerance, heat intolerance, polydipsia, polyphagia and polyuria.   Genitourinary:  Negative for decreased urine volume, difficulty urinating, dysuria, enuresis, flank pain, frequency, genital sores, hematuria, penile discharge, penile pain, penile swelling, scrotal swelling, testicular pain and urgency.   Musculoskeletal:  Negative for arthralgias, back pain, gait problem, joint swelling, myalgias, neck pain and neck stiffness.   Skin:  Negative for color change, pallor, rash and wound.   Allergic/Immunologic: Negative for environmental allergies, food allergies and immunocompromised state.   Neurological:  Positive for weakness. Negative for dizziness, tremors, seizures, syncope, facial asymmetry, speech difficulty, light-headedness, numbness and  headaches.   Hematological:  Negative for adenopathy. Does not bruise/bleed easily.   Psychiatric/Behavioral:  Negative for agitation, behavioral problems, confusion, decreased concentration, dysphoric mood, hallucinations, self-injury, sleep disturbance and suicidal ideas. The patient is not nervous/anxious and is not hyperactive.    Objective:     Vital Signs (Most Recent):  Temp: 97.7 °F (36.5 °C) (05/25/22 1543)  Pulse: (!) 58 (05/25/22 1543)  Resp: 20 (05/25/22 1543)  BP: 139/66 (05/25/22 1543)  SpO2: 96 % (05/25/22 1543)   Vital Signs (24h Range):  Temp:  [97.7 °F (36.5 °C)-98.5 °F (36.9 °C)] 97.7 °F (36.5 °C)  Pulse:  [58-72] 58  Resp:  [17-20] 20  SpO2:  [94 %-97 %] 96 %  BP: (111-142)/(55-73) 139/66     Weight: 111.4 kg (245 lb 9.5 oz)  Body mass index is 36.27 kg/m².    Intake/Output Summary (Last 24 hours) at 5/25/2022 1705  Last data filed at 5/25/2022 1600  Gross per 24 hour   Intake 1851.16 ml   Output 1895 ml   Net -43.84 ml      Physical Exam  Vitals and nursing note reviewed.   Constitutional:       General: He is not in acute distress.     Appearance: He is obese.   HENT:      Head: Normocephalic and atraumatic.      Nose:      Comments: Feeding tube in place     Mouth/Throat:      Mouth: Mucous membranes are moist.   Cardiovascular:      Rate and Rhythm: Normal rate and regular rhythm.      Heart sounds: Normal heart sounds.   Pulmonary:      Breath sounds: Normal breath sounds.   Abdominal:      General: Bowel sounds are normal. There is no distension.      Palpations: Abdomen is soft.      Comments: Soft, nontender. Midline incision with staples intact--no surrounding erythema or purulent drainage. Ileostomy is pink with liquid stool output. Right-sided drain is serous. Left-sided drains are a light, murky yellow.   Musculoskeletal:      Cervical back: Normal range of motion and neck supple.   Skin:     General: Skin is warm and dry.      Comments: Less edema in hands and feet.   Neurological:       General: No focal deficit present.      Mental Status: He is alert.   Psychiatric:         Behavior: Behavior normal.       Significant Labs: All pertinent labs within the past 24 hours have been reviewed.    Significant Imaging: I have reviewed all pertinent imaging results/findings within the past 24 hours.

## 2022-05-25 NOTE — PROGRESS NOTES
[facility] Surg  General Surgery  Progress Note    Subjective:     History of Present Illness:  No notes on file    Post-Op Info:  Procedure(s) (LRB):  CREATION, ILEOSTOMY (N/A)  HEMICOLECTOMY, RIGHT (N/A)  BIOPSY, LIVER (Right)   13 Days Post-Op     Interval History: Sitting up in bed. Tolerating dysphagia diet. Urinating without issues. Sat up in the chair with PT and states that was exhausting.    Medications:  Continuous Infusions:  Scheduled Meds:   amiodarone  200 mg Oral BID    amoxicillin-clavulanate 875-125mg  1 tablet Oral Q12H    apixaban  5 mg Oral BID    ascorbic acid (vitamin C)  500 mg Oral BID    [START ON 5/25/2022] famotidine  20 mg Oral BID    [START ON 5/25/2022] ferrous sulfate  1 tablet Oral Daily    loperamide  2 mg Oral TID    [START ON 5/25/2022] psyllium husk (aspartame)  1 packet Oral Daily    white petrolatum-mineral oil 57.3-42.5%   Both Eyes QHS     PRN Meds:sodium chloride, sodium chloride 0.9%, acetaminophen, barium sulfate, dextrose 10%, glucagon (human recombinant), hydrALAZINE, insulin aspart U-100, ondansetron     Review of patient's allergies indicates:  No Known Allergies  Objective:     Vital Signs (Most Recent):  Temp: 97.3 °F (36.3 °C) (05/24/22 1542)  Pulse: 68 (05/24/22 1705)  Resp: 16 (05/24/22 1542)  BP: (!) 140/69 (05/24/22 1542)  SpO2: (!) 94 % (05/24/22 1542)   Vital Signs (24h Range):  Temp:  [97.3 °F (36.3 °C)-98.1 °F (36.7 °C)] 97.3 °F (36.3 °C)  Pulse:  [58-69] 68  Resp:  [16-18] 16  SpO2:  [94 %-95 %] 94 %  BP: (123-140)/(61-76) 140/69     Weight: 111.9 kg (246 lb 11.1 oz)  Body mass index is 36.43 kg/m².    Intake/Output - Last 3 Shifts         05/22 0700 05/23 0659 05/23 0700 05/24 0659 05/24 0700 05/25 0659    P.O. 0 0     I.V. (mL/kg) 740.1 (6.5)      NG/  30    TPN       Total Intake(mL/kg) 1290.1 (11.2) 0 (0) 30 (0.3)    Urine (mL/kg/hr) 1950 (0.7) 0 (0) 700 (0.5)    Drains 255 290 190    Stool 2350 850 300    Total Output 4555 1140 1190     Net -3264.9 -1140 -1160           Urine Occurrence  6 x             Physical Exam  Vitals reviewed.   Constitutional:       General: He is not in acute distress.     Appearance: He is obese.   HENT:      Head: Normocephalic and atraumatic.      Nose:      Comments: Feeding tube in place     Mouth/Throat:      Mouth: Mucous membranes are moist.   Cardiovascular:      Rate and Rhythm: Normal rate and regular rhythm.      Heart sounds: Normal heart sounds.   Pulmonary:      Breath sounds: Normal breath sounds.   Abdominal:      General: Bowel sounds are normal. There is no distension.      Palpations: Abdomen is soft.      Comments: Soft, nontender. Midline incision with staples intact--no surrounding erythema or purulent drainage. Ileostomy is pink with liquid stool output. Right-sided drain is serous. Left-sided drains are a light, murky yellow.   Musculoskeletal:      Cervical back: Normal range of motion and neck supple.   Skin:     General: Skin is warm and dry.      Comments: Less edema in hands and feet.   Neurological:      General: No focal deficit present.      Mental Status: He is alert.   Psychiatric:         Behavior: Behavior normal.       Significant Labs:  I have reviewed all pertinent lab results within the past 24 hours.  CBC:   Recent Labs   Lab 05/24/22 0522   WBC 10.66   RBC 3.24*   HGB 7.5*   HCT 26.9*   *   MCV 83   MCH 23.1*   MCHC 27.9*     CMP:   Recent Labs   Lab 05/24/22 0522      CALCIUM 7.8*   ALBUMIN 1.5*   PROT 5.1*      K 4.2   CO2 24      BUN 34*   CREATININE 1.3   ALKPHOS 142*   ALT 40   AST 40   BILITOT 0.9       Significant Diagnostics:  I have reviewed all pertinent imaging results/findings within the past 24 hours.    Assessment/Plan:     * Severe sepsis secondary to Peritonitis from bowel perforation  S/p exploratory laparotomy, abdominal washout, segmental small bowel resection (left in discontinuity), placement of Abthera vac 5/4/22  S/p reopening  of recent laparotomy, abdominal washout, replacement of Abthera vac 5/7/22  S/p reopening of recent laparotomy, abdominal washout, right hemicolectomy, liver biopsy, end ileostomy, TAP block, abdominal wall closure 5/11/22  Left-sided drain placed by IR for LUQ abscess 5/17/22        - OK for dysphagia diet with honey thickened liquids per MBSS.  - Right-sided serous drain removed today  - Pathology returned as metastatic adenocarcinoma. Oncology consulted.  - Monitor drain and ileostomy output. Imodium and fiber added to help decrease output.  - encourage increasing activity  - Ostomy nurse consult  - Strict I/Os  - Medical management per hospital    Discharge planning. LTAC? Family and patient need to make a decision with case management.    Oropharyngeal dysphagia with overall muscle deconditioning and weakness  Dysphagia diet with honey thickened liquids    JOSE (acute kidney injury)  Management per medicine/critical care    Hypoalbuminemia due to protein-calorie malnutrition  Dysphagia diet    Acute on chronic anemia  Management per medicine  Oral iron added        Elvie Parekr, DO  General Surgery  [facility]

## 2022-05-25 NOTE — PROGRESS NOTES
Follow up on Mr Masters. He is awake and alert. His daughters and wife are at the bedside. RLQ ileostomy with pouch intact. Removed for teaching. Stoma is pink and moist, measures 40mm. Cleansed peristomal area with gauze and water. Patted dry then prepped with cavilon. Bravo moldable ring applied around stoma then 1 piece sensura tomas pouch cut to fit and applied to patient.  All done per daughter with minimal coaching. Patient then turned to right side with assist. Linear maroon discoloration resolved, partial thickness split noted within gluteal cleft consistent with MASD. Barrier cream in use. Will follow up tomorrow for continued care and teaching.

## 2022-05-25 NOTE — ASSESSMENT & PLAN NOTE
Worsening  Trend  Cr 2.2 today    Cr now 3.8- Oliguric- heading towards Anuria and ATN/ May need HD vs CRRT- she has massive fluid Overload.     Cr now 4.5-  Remains anuric  POA/ Family not in favor of HD    Remains Oliguric due to Septic Shock on 2 Pressors  Some urine output with lasix but no Polyuria     5/10 Urine Out put improved with diuresis while renal functions remains stable    Improving, Cr coming down, good urine output    Improved    Encouraged oral intake    Appears resolved

## 2022-05-25 NOTE — ASSESSMENT & PLAN NOTE
Extensive conversation with the patient.  The patient has widespread colon carcinoma.  Intra-abdominal with metastatic disease in liver biopsy-proven.  At this time I have talked to the patient about awaiting results of molecular characterization to better define treatment options.  I have talked to the patient he is not reluctant to pursue any treatment of told in my professional opinion I think survival to the end of the year would be small and would probably recommend hospice.  If the patient decides to take systemic chemotherapy with reviewed briefly toxicity profile articles from up-to-date followed him for his review in talked about response rates of 60% and noncurative setting.  At this time will be happy to talk to the patient once more family is present and I will be happy to answer questions as we proceed in the recovery phase from his extensive surgery  05/24/2022.  Extensive conversation with patient as well as daughter at bedside I again reviewed the nature of stage IV metastatic colon carcinoma being a highly treatable but not curable malignancy.  I have answered a number questions about his stage and the impending molecular characterization of his tumor to better assist on recommendations for systemic therapy.  We again talked about options of pursuing systemic therapy versus is no treatment.  In which case I would recommend hospice.  I agree entirely with a do not resuscitate order has been placed on chart and palliative care consultation appropriate by operative surgeon would be an appropriate course of action patient does not have a living will or medical power  created.  The patient has received the articles that I sent to them from up-to-date on metastatic colon carcinoma and discussed various treatment approaches of told him final recommendations systemic therapy will need to await molecular characterization of his tumor total time 35 minutes  05/25/2022.  Extensive conversation again at  the bedside with the patient as well as daughter answered questions they appear more interested in taking systemic therapy.  I have told him as he recovers over the next several days or weeks more information will return to better characterize the type of treatment we would recommend based off of the molecular characterization of precision Medicine that will be done on his tumor specimen I have again explained this to them answered questions concerning this.  I will have her navigation team contact them to establish care so that we can begin the process of seen in the outpatient setting total time 35 minutes

## 2022-05-26 ENCOUNTER — TELEPHONE (OUTPATIENT)
Dept: SURGERY | Facility: CLINIC | Age: 67
End: 2022-05-26

## 2022-05-26 VITALS
HEIGHT: 69 IN | OXYGEN SATURATION: 97 % | BODY MASS INDEX: 36.37 KG/M2 | DIASTOLIC BLOOD PRESSURE: 76 MMHG | HEART RATE: 52 BPM | TEMPERATURE: 98 F | RESPIRATION RATE: 18 BRPM | WEIGHT: 245.56 LBS | SYSTOLIC BLOOD PRESSURE: 158 MMHG

## 2022-05-26 PROBLEM — R65.20 SEVERE SEPSIS: Status: RESOLVED | Noted: 2022-05-04 | Resolved: 2022-05-26

## 2022-05-26 PROBLEM — N17.9 AKI (ACUTE KIDNEY INJURY): Status: RESOLVED | Noted: 2022-05-05 | Resolved: 2022-05-26

## 2022-05-26 PROBLEM — E87.20 LACTIC ACIDOSIS: Status: RESOLVED | Noted: 2022-05-26 | Resolved: 2022-05-26

## 2022-05-26 PROBLEM — A41.9 SEVERE SEPSIS: Status: RESOLVED | Noted: 2022-05-04 | Resolved: 2022-05-26

## 2022-05-26 PROBLEM — E87.20 LACTIC ACIDOSIS: Status: ACTIVE | Noted: 2022-05-26

## 2022-05-26 PROBLEM — D64.9 ACUTE ON CHRONIC ANEMIA: Status: RESOLVED | Noted: 2021-08-05 | Resolved: 2022-05-26

## 2022-05-26 PROBLEM — R10.84 ABDOMINAL PAIN, ACUTE, GENERALIZED: Status: ACTIVE | Noted: 2022-05-26

## 2022-05-26 PROBLEM — R10.84 ABDOMINAL PAIN, ACUTE, GENERALIZED: Status: RESOLVED | Noted: 2022-05-26 | Resolved: 2022-05-26

## 2022-05-26 LAB
POCT GLUCOSE: 138 MG/DL (ref 70–110)
POCT GLUCOSE: 88 MG/DL (ref 70–110)

## 2022-05-26 PROCEDURE — 25000003 PHARM REV CODE 250: Performed by: SURGERY

## 2022-05-26 PROCEDURE — 99232 PR SUBSEQUENT HOSPITAL CARE,LEVL II: ICD-10-PCS | Mod: ,,, | Performed by: INTERNAL MEDICINE

## 2022-05-26 PROCEDURE — 97110 THERAPEUTIC EXERCISES: CPT

## 2022-05-26 PROCEDURE — 99232 SBSQ HOSP IP/OBS MODERATE 35: CPT | Mod: ,,, | Performed by: INTERNAL MEDICINE

## 2022-05-26 PROCEDURE — 97530 THERAPEUTIC ACTIVITIES: CPT

## 2022-05-26 RX ORDER — ASCORBIC ACID 500 MG
500 TABLET ORAL 2 TIMES DAILY
Start: 2022-05-26

## 2022-05-26 RX ORDER — FERROUS SULFATE 325(65) MG
325 TABLET, DELAYED RELEASE (ENTERIC COATED) ORAL DAILY
Qty: 60 TABLET | Refills: 1 | Status: SHIPPED | OUTPATIENT
Start: 2022-05-26

## 2022-05-26 RX ORDER — AMIODARONE HYDROCHLORIDE 200 MG/1
200 TABLET ORAL 2 TIMES DAILY
Qty: 60 TABLET | Refills: 11 | Status: ON HOLD | OUTPATIENT
Start: 2022-05-26 | End: 2023-01-31 | Stop reason: HOSPADM

## 2022-05-26 RX ORDER — FAMOTIDINE 20 MG/1
20 TABLET, FILM COATED ORAL 2 TIMES DAILY
Qty: 60 TABLET | Refills: 11 | Status: SHIPPED | OUTPATIENT
Start: 2022-05-26 | End: 2023-05-26

## 2022-05-26 RX ORDER — AMOXICILLIN AND CLAVULANATE POTASSIUM 875; 125 MG/1; MG/1
1 TABLET, FILM COATED ORAL EVERY 12 HOURS
Qty: 14 TABLET | Refills: 0 | Status: SHIPPED | OUTPATIENT
Start: 2022-05-26 | End: 2022-06-03 | Stop reason: SDUPTHER

## 2022-05-26 RX ADMIN — APIXABAN 5 MG: 2.5 TABLET, FILM COATED ORAL at 09:05

## 2022-05-26 RX ADMIN — LOPERAMIDE HYDROCHLORIDE 2 MG: 2 CAPSULE ORAL at 09:05

## 2022-05-26 RX ADMIN — FAMOTIDINE 20 MG: 20 TABLET ORAL at 09:05

## 2022-05-26 RX ADMIN — AMOXICILLIN AND CLAVULANATE POTASSIUM 1 TABLET: 875; 125 TABLET, FILM COATED ORAL at 09:05

## 2022-05-26 RX ADMIN — FERROUS SULFATE TAB 325 MG (65 MG ELEMENTAL FE) 1 EACH: 325 (65 FE) TAB at 09:05

## 2022-05-26 RX ADMIN — AMIODARONE HYDROCHLORIDE 200 MG: 200 TABLET ORAL at 09:05

## 2022-05-26 RX ADMIN — OXYCODONE HYDROCHLORIDE AND ACETAMINOPHEN 500 MG: 500 TABLET ORAL at 09:05

## 2022-05-26 NOTE — PROGRESS NOTES
[facility] Surg  Hematology/Oncology  Progress Note    Patient Name: Franky Masters  Admission Date: 5/4/2022  Hospital Length of Stay: 22 days  Code Status: DNR     Subjective:     HPI:  67-year-old male newly diagnosed metastatic colon carcinoma.  The patient had a perforated viscus and exploratory surgery with colostomy.  Biopsy of liver mass metastatic disease was asked to see the patient in the postoperative setting for further therapeutic considerations once patient has been recovered ECOG status 2 previously fully active practicing chiropractor       Interval History:  Patient appears more alert this morning    Oncology Treatment Plan:   [Could not find a treatment plan. This SmartLink may be configured incorrectly. Contact a  for help.]    Medications:  Continuous Infusions:  Scheduled Meds:   amiodarone  200 mg Oral BID    amoxicillin-clavulanate 875-125mg  1 tablet Oral Q12H    apixaban  5 mg Oral BID    ascorbic acid (vitamin C)  500 mg Oral BID    famotidine  20 mg Oral BID    ferrous sulfate  1 tablet Oral Daily    loperamide  2 mg Oral TID    psyllium husk (aspartame)  1 packet Oral Daily    white petrolatum-mineral oil 57.3-42.5%   Both Eyes QHS     PRN Meds:sodium chloride, sodium chloride 0.9%, acetaminophen, barium sulfate, dextrose 10%, glucagon (human recombinant), hydrALAZINE, insulin aspart U-100, ondansetron     Review of Systems   Constitutional:  Positive for fatigue. Negative for activity change, appetite change, chills, diaphoresis, fever and unexpected weight change.   HENT:  Negative for congestion, dental problem, drooling, ear discharge, ear pain, facial swelling, hearing loss, mouth sores, nosebleeds, postnasal drip, rhinorrhea, sinus pressure, sneezing, sore throat, tinnitus, trouble swallowing and voice change.    Eyes:  Negative for photophobia, pain, discharge, redness, itching and visual disturbance.   Respiratory:  Negative for apnea, cough,  choking, chest tightness, shortness of breath, wheezing and stridor.    Cardiovascular:  Negative for chest pain, palpitations and leg swelling.   Gastrointestinal:  Negative for abdominal distention, abdominal pain, anal bleeding, blood in stool, constipation, diarrhea, nausea, rectal pain and vomiting.   Endocrine: Negative for cold intolerance, heat intolerance, polydipsia, polyphagia and polyuria.   Genitourinary:  Negative for decreased urine volume, difficulty urinating, dysuria, enuresis, flank pain, frequency, genital sores, hematuria, penile discharge, penile pain, penile swelling, scrotal swelling, testicular pain and urgency.   Musculoskeletal:  Positive for gait problem. Negative for arthralgias, back pain, joint swelling, myalgias, neck pain and neck stiffness.   Skin:  Negative for color change, pallor, rash and wound.   Allergic/Immunologic: Negative for environmental allergies, food allergies and immunocompromised state.   Neurological:  Positive for weakness. Negative for dizziness, tremors, seizures, syncope, facial asymmetry, speech difficulty, light-headedness, numbness and headaches.   Hematological:  Negative for adenopathy. Does not bruise/bleed easily.   Psychiatric/Behavioral:  Positive for dysphoric mood. Negative for agitation, behavioral problems, confusion, decreased concentration, hallucinations, self-injury, sleep disturbance and suicidal ideas. The patient is nervous/anxious. The patient is not hyperactive.    Objective:     Vital Signs (Most Recent):  Temp: 98.3 °F (36.8 °C) (05/26/22 0459)  Pulse: (!) 58 (05/26/22 0502)  Resp: 20 (05/26/22 0459)  BP: 138/71 (05/26/22 0459)  SpO2: 96 % (05/26/22 0459)   Vital Signs (24h Range):  Temp:  [97.7 °F (36.5 °C)-98.6 °F (37 °C)] 98.3 °F (36.8 °C)  Pulse:  [54-72] 58  Resp:  [20] 20  SpO2:  [94 %-96 %] 96 %  BP: (120-139)/(60-71) 138/71     Weight: 111.4 kg (245 lb 9.5 oz)  Body mass index is 36.27 kg/m².  Body surface area is 2.33 meters  squared.      Intake/Output Summary (Last 24 hours) at 5/26/2022 0635  Last data filed at 5/26/2022 0600  Gross per 24 hour   Intake 1680 ml   Output 1410 ml   Net 270 ml       Physical Exam  Vitals reviewed.   Constitutional:       General: He is not in acute distress.     Appearance: He is well-developed. He is obese. He is ill-appearing. He is not diaphoretic.   HENT:      Head: Normocephalic.      Right Ear: External ear normal.      Left Ear: External ear normal.      Nose: Nose normal.      Right Sinus: No maxillary sinus tenderness or frontal sinus tenderness.      Left Sinus: No maxillary sinus tenderness or frontal sinus tenderness.      Mouth/Throat:      Pharynx: No oropharyngeal exudate.   Eyes:      General: Lids are normal. No scleral icterus.        Right eye: No discharge.         Left eye: No discharge.      Extraocular Movements:      Right eye: Normal extraocular motion.      Left eye: Normal extraocular motion.      Conjunctiva/sclera:      Right eye: Right conjunctiva is not injected. No hemorrhage.     Left eye: Left conjunctiva is not injected. No hemorrhage.     Pupils: Pupils are equal, round, and reactive to light.   Neck:      Thyroid: No thyromegaly.      Vascular: No JVD.      Trachea: No tracheal deviation.   Cardiovascular:      Rate and Rhythm: Normal rate.   Pulmonary:      Effort: Pulmonary effort is normal. No respiratory distress.      Breath sounds: No stridor.   Chest:   Breasts:      Right: No supraclavicular adenopathy.      Left: No supraclavicular adenopathy.     Abdominal:      General: Bowel sounds are normal.      Palpations: Abdomen is soft. There is no hepatomegaly, splenomegaly or mass.      Tenderness: There is no abdominal tenderness.      Comments: Functioning colostomy   Musculoskeletal:         General: No tenderness. Normal range of motion.      Cervical back: Normal range of motion and neck supple.   Lymphadenopathy:      Head:      Right side of head: No  posterior auricular or occipital adenopathy.      Left side of head: No posterior auricular or occipital adenopathy.      Cervical: No cervical adenopathy.      Right cervical: No superficial, deep or posterior cervical adenopathy.     Left cervical: No superficial, deep or posterior cervical adenopathy.      Upper Body:      Right upper body: No supraclavicular adenopathy.      Left upper body: No supraclavicular adenopathy.   Skin:     General: Skin is dry.      Findings: No erythema or rash.      Nails: There is no clubbing.   Neurological:      Mental Status: He is alert and oriented to person, place, and time.      Cranial Nerves: No cranial nerve deficit.      Coordination: Coordination normal.   Psychiatric:         Behavior: Behavior normal.         Thought Content: Thought content normal.         Judgment: Judgment normal.       Significant Labs:   BMP: No results for input(s): GLU, NA, K, CL, CO2, BUN, CREATININE, CALCIUM, MG in the last 48 hours., CBC: No results for input(s): WBC, HGB, HCT, PLT in the last 48 hours., CMP: No results for input(s): NA, K, CL, CO2, GLU, BUN, CREATININE, CALCIUM, PROT, ALBUMIN, BILITOT, ALKPHOS, AST, ALT, ANIONGAP, EGFRNONAA in the last 48 hours.    Invalid input(s): ESTGFAFRICA, Coagulation: No results for input(s): PT, INR, APTT in the last 48 hours., Haptoglobin: No results for input(s): HAPTOGLOBIN in the last 48 hours., Immunology: No results for input(s): SPEP, KINGS, ANGELICA, FREELAMBDALI in the last 48 hours., LDH: No results for input(s): LDHCSF, BFSOURCE in the last 48 hours., LFTs: No results for input(s): ALT, AST, ALKPHOS, BILITOT, PROT, ALBUMIN in the last 48 hours., Reticulocytes: No results for input(s): RETIC in the last 48 hours., Tumor Markers: No results for input(s): PSA, CEA, , AFPTM, [id number],  in the last 48 hours.    Invalid input(s): ALGTM, Uric Acid No results for input(s): URICACID in the last 48 hours., and Urine Studies: No results for  input(s): COLORU, APPEARANCEUA, PHUR, SPECGRAV, PROTEINUA, GLUCUA, KETONESU, BILIRUBINUA, OCCULTUA, NITRITE, UROBILINOGEN, LEUKOCYTESUR, RBCUA, WBCUA, BACTERIA, SQUAMEPITHEL, HYALINECASTS in the last 48 hours.    Invalid input(s): ANTHONY    Diagnostic Results:  I have reviewed all pertinent imaging results/findings within the past 24 hours.    Assessment/Plan:     * Severe sepsis secondary to Peritonitis from bowel perforation  Cared for by operative surgeon and hospital medicine team      Adenocarcinoma of colon metastatic to liver  Extensive conversation with the patient.  The patient has widespread colon carcinoma.  Intra-abdominal with metastatic disease in liver biopsy-proven.  At this time I have talked to the patient about awaiting results of molecular characterization to better define treatment options.  I have talked to the patient he is not reluctant to pursue any treatment of told in my professional opinion I think survival to the end of the year would be small and would probably recommend hospice.  If the patient decides to take systemic chemotherapy with reviewed briefly toxicity profile articles from up-to-date followed him for his review in talked about response rates of 60% and noncurative setting.  At this time will be happy to talk to the patient once more family is present and I will be happy to answer questions as we proceed in the recovery phase from his extensive surgery  05/24/2022.  Extensive conversation with patient as well as daughter at bedside I again reviewed the nature of stage IV metastatic colon carcinoma being a highly treatable but not curable malignancy.  I have answered a number questions about his stage and the impending molecular characterization of his tumor to better assist on recommendations for systemic therapy.  We again talked about options of pursuing systemic therapy versus is no treatment.  In which case I would recommend hospice.  I agree entirely with a do not  resuscitate order has been placed on chart and palliative care consultation appropriate by operative surgeon would be an appropriate course of action patient does not have a living will or medical power  created.  The patient has received the articles that I sent to them from up-to-date on metastatic colon carcinoma and discussed various treatment approaches of told him final recommendations systemic therapy will need to await molecular characterization of his tumor total time 35 minutes  05/25/2022.  Extensive conversation again at the bedside with the patient as well as daughter answered questions they appear more interested in taking systemic therapy.  I have told him as he recovers over the next several days or weeks more information will return to better characterize the type of treatment we would recommend based off of the molecular characterization of precision Medicine that will be done on his tumor specimen I have again explained this to them answered questions concerning this.  I will have her navigation team contact them to establish care so that we can begin the process of seen in the outpatient setting total time 35 minutes  05/26/2022.  The patient appears to be a little bit more alert this morning I have answered questions.  Since at a staff message to pathologist to see status of molecular studies for better characterization of potential systemic therapy once patient is medically stabilized unclear from a surgical standpoint        Thank you for your consult. I will follow-up with patient. Please contact us if you have any additional questions.     Rm Gonzalez MD  Hematology/Oncology  [facility]

## 2022-05-26 NOTE — PROGRESS NOTES
[facility] S[facility]  General Surgery  Progress Note    Subjective:     History of Present Illness:  No notes on file    Post-Op Info:  Procedure(s) (LRB):  CREATION, ILEOSTOMY (N/A)  HEMICOLECTOMY, RIGHT (N/A)  BIOPSY, LIVER (Right)   15 Days Post-Op     Interval History: Seems stronger today. Denies pain.    Medications:  Continuous Infusions:  Scheduled Meds:   amiodarone  200 mg Oral BID    amoxicillin-clavulanate 875-125mg  1 tablet Oral Q12H    apixaban  5 mg Oral BID    ascorbic acid (vitamin C)  500 mg Oral BID    famotidine  20 mg Oral BID    ferrous sulfate  1 tablet Oral Daily    loperamide  2 mg Oral TID    psyllium husk (aspartame)  1 packet Oral Daily    white petrolatum-mineral oil 57.3-42.5%   Both Eyes QHS     PRN Meds:sodium chloride, sodium chloride 0.9%, acetaminophen, barium sulfate, dextrose 10%, glucagon (human recombinant), hydrALAZINE, insulin aspart U-100, ondansetron     Review of patient's allergies indicates:  No Known Allergies  Objective:     Vital Signs (Most Recent):  Temp: 98 °F (36.7 °C) (05/26/22 1158)  Pulse: (!) 52 (05/26/22 1158)  Resp: 18 (05/26/22 1158)  BP: (!) 158/76 (05/26/22 1158)  SpO2: 97 % (05/26/22 1158)   Vital Signs (24h Range):  Temp:  [98 °F (36.7 °C)-98.6 °F (37 °C)] 98 °F (36.7 °C)  Pulse:  [52-71] 52  Resp:  [18-20] 18  SpO2:  [94 %-97 %] 97 %  BP: (123-158)/(60-76) 158/76     Weight: 111.4 kg (245 lb 9.5 oz)  Body mass index is 36.27 kg/m².    Intake/Output - Last 3 Shifts         05/24 0700  05/25 0659 05/25 0700 05/26 0659 05/26 0700  05/27 0659    P.O.  1680 510    Blood 651.2      NG/GT 30      Total Intake(mL/kg) 681.2 (6.1) 1680 (15.1) 510 (4.6)    Urine (mL/kg/hr) 1800 (0.7) 1075 (0.4)     Drains 285 85     Stool 700 250     Total Output 2785 1410     Net -2103.8 +270 +510           Urine Occurrence  3 x     Stool Occurrence 2 x 1 x             Physical Exam  Vitals and nursing note reviewed.   Constitutional:       General: He is not in acute  distress.     Appearance: He is obese.   HENT:      Head: Normocephalic and atraumatic.      Mouth/Throat:      Mouth: Mucous membranes are moist.   Cardiovascular:      Rate and Rhythm: Normal rate and regular rhythm.      Heart sounds: Normal heart sounds.   Pulmonary:      Breath sounds: Normal breath sounds.   Abdominal:      General: Bowel sounds are normal. There is no distension.      Palpations: Abdomen is soft.      Comments: Soft, nontender. Midline incision with staples intact--no surrounding erythema or purulent drainage. Ileostomy is pink with liquid stool output.. Left-sided drains are a light, murky yellow.   Musculoskeletal:      Cervical back: Normal range of motion and neck supple.   Skin:     General: Skin is warm and dry.   Neurological:      General: No focal deficit present.      Mental Status: He is alert.   Psychiatric:         Behavior: Behavior normal.       Significant Labs:  I have reviewed all pertinent lab results within the past 24 hours.  CBC:   Recent Labs   Lab 05/24/22  0522   WBC 10.66   RBC 3.24*   HGB 7.5*   HCT 26.9*   *   MCV 83   MCH 23.1*   MCHC 27.9*     BMP:   Recent Labs   Lab 05/22/22  0516 05/24/22  0522    101    139   K 4.8 4.2    107   CO2 25 24   BUN 42* 34*   CREATININE 1.4 1.3   CALCIUM 7.6* 7.8*   MG 1.9  --        Significant Diagnostics:  I have reviewed all pertinent imaging results/findings within the past 24 hours.    Assessment/Plan:     Adenocarcinoma of colon metastatic to liver  * Severe sepsis secondary to Peritonitis from bowel perforation  S/p exploratory laparotomy, abdominal washout, segmental small bowel resection (left in discontinuity), placement of Abthera vac 5/4/22  S/p reopening of recent laparotomy, abdominal washout, replacement of Abthera vac 5/7/22  S/p reopening of recent laparotomy, abdominal washout, right hemicolectomy, liver biopsy, end ileostomy, TAP block, abdominal wall closure 5/11/22  Left-sided drain  placed by IR for LUQ abscess 5/17/22        - OK for dysphagia diet with honey thickened liquids per MBSS.  - Pathology returned as metastatic adenocarcinoma. Oncology consulted.  - Monitor drain and ileostomy output. Imodium and fiber added to help decrease output.  - encourage increasing activity  - Ostomy nurse consult  - Strict I/Os      Patient to follow up in clinic. The left-sided drains are still putting out murky yellow fluid and need to remain in place. I will repeat a CT scan during his follow up and remove the drains then if the fluid collection has resolved.     Would prefer that patient go to a rehab facility or at least home with home health, but family declined.    Oropharyngeal dysphagia with overall muscle deconditioning and weakness  Dysphagia diet with honey thickened liquids    Hypoalbuminemia due to protein-calorie malnutrition  Dysphagia diet        Elvie Parker, DO  General Surgery  [facility]

## 2022-05-26 NOTE — PROGRESS NOTES
[facility]  General Surgery  Progress Note    Subjective:     History of Present Illness:  No notes on file    Post-Op Info:  Procedure(s) (LRB):  CREATION, ILEOSTOMY (N/A)  HEMICOLECTOMY, RIGHT (N/A)  BIOPSY, LIVER (Right)   14 Days Post-Op     Interval History: Much more alert.  Denies pain.     Medications:  Continuous Infusions:  Scheduled Meds:   amiodarone  200 mg Oral BID    amoxicillin-clavulanate 875-125mg  1 tablet Oral Q12H    apixaban  5 mg Oral BID    ascorbic acid (vitamin C)  500 mg Oral BID    famotidine  20 mg Oral BID    ferrous sulfate  1 tablet Oral Daily    loperamide  2 mg Oral TID    psyllium husk (aspartame)  1 packet Oral Daily    white petrolatum-mineral oil 57.3-42.5%   Both Eyes QHS     PRN Meds:sodium chloride, sodium chloride 0.9%, acetaminophen, barium sulfate, dextrose 10%, glucagon (human recombinant), hydrALAZINE, insulin aspart U-100, ondansetron     Review of patient's allergies indicates:  No Known Allergies  Objective:     Vital Signs (Most Recent):  Temp: 98.6 °F (37 °C) (05/25/22 1913)  Pulse: 71 (05/25/22 1913)  Resp: 20 (05/25/22 1913)  BP: 123/60 (05/25/22 1913)  SpO2: (!) 94 % (05/25/22 1913)   Vital Signs (24h Range):  Temp:  [97.7 °F (36.5 °C)-98.6 °F (37 °C)] 98.6 °F (37 °C)  Pulse:  [58-72] 71  Resp:  [17-20] 20  SpO2:  [94 %-97 %] 94 %  BP: (111-142)/(55-70) 123/60     Weight: 111.4 kg (245 lb 9.5 oz)  Body mass index is 36.27 kg/m².    Intake/Output - Last 3 Shifts         05/23 0700 05/24 0659 05/24 0700 05/25 0659 05/25 0700 05/26 0659    P.O. 0  1440    I.V. (mL/kg)       Blood  651.2     NG/GT  30     Total Intake(mL/kg) 0 (0) 681.2 (6.1) 1440 (12.9)    Urine (mL/kg/hr) 0 (0) 1800 (0.7) 575 (0.4)    Drains 290 285 30    Stool 850 700     Total Output 1140 2785 605    Net -1140 -2103.8 +835           Urine Occurrence 6 x  2 x    Stool Occurrence  2 x             Physical Exam  Vitals and nursing note reviewed.   Constitutional:       General: He  is not in acute distress.     Appearance: He is obese.   HENT:      Head: Normocephalic and atraumatic.      Mouth/Throat:      Mouth: Mucous membranes are moist.   Cardiovascular:      Rate and Rhythm: Normal rate and regular rhythm.      Heart sounds: Normal heart sounds.   Pulmonary:      Breath sounds: Normal breath sounds.   Abdominal:      General: Bowel sounds are normal. There is no distension.      Palpations: Abdomen is soft.      Comments: Soft, nontender. Midline incision with staples intact--no surrounding erythema or purulent drainage. Ileostomy is pink with liquid stool output.. Left-sided drains are a light, murky yellow.   Musculoskeletal:      Cervical back: Normal range of motion and neck supple.   Skin:     General: Skin is warm and dry.   Neurological:      General: No focal deficit present.      Mental Status: He is alert.   Psychiatric:         Behavior: Behavior normal.       Significant Labs:  I have reviewed all pertinent lab results within the past 24 hours.  CBC:   Recent Labs   Lab 05/24/22 0522   WBC 10.66   RBC 3.24*   HGB 7.5*   HCT 26.9*   *   MCV 83   MCH 23.1*   MCHC 27.9*     CMP:   Recent Labs   Lab 05/24/22 0522      CALCIUM 7.8*   ALBUMIN 1.5*   PROT 5.1*      K 4.2   CO2 24      BUN 34*   CREATININE 1.3   ALKPHOS 142*   ALT 40   AST 40   BILITOT 0.9       Significant Diagnostics:  I have reviewed all pertinent imaging results/findings within the past 24 hours.    Assessment/Plan:     * Severe sepsis secondary to Peritonitis from bowel perforation  S/p exploratory laparotomy, abdominal washout, segmental small bowel resection (left in discontinuity), placement of Abthera vac 5/4/22  S/p reopening of recent laparotomy, abdominal washout, replacement of Abthera vac 5/7/22  S/p reopening of recent laparotomy, abdominal washout, right hemicolectomy, liver biopsy, end ileostomy, TAP block, abdominal wall closure 5/11/22  Left-sided drain placed by IR for  LUQ abscess 5/17/22        - OK for dysphagia diet with honey thickened liquids per MBSS.  - Pathology returned as metastatic adenocarcinoma. Oncology consulted.  - Monitor drain and ileostomy output. Imodium and fiber added to help decrease output.  - encourage increasing activity  - Ostomy nurse consult  - Strict I/Os  - Medical management per hospital    Discharge planning. Family declining SNF or home health. Will speak to case management as patient would benefit from home health.    Oropharyngeal dysphagia with overall muscle deconditioning and weakness  Dysphagia diet with honey thickened liquids    JOSE (acute kidney injury)  Management per medicine/critical care    Hypoalbuminemia due to protein-calorie malnutrition  Dysphagia diet    Acute on chronic anemia  Management per medicine  Oral iron added        Elvie Parker, DO  General Surgery  [facility]

## 2022-05-26 NOTE — SUBJECTIVE & OBJECTIVE
Interval History: Seems stronger today. Denies pain.    Medications:  Continuous Infusions:  Scheduled Meds:   amiodarone  200 mg Oral BID    amoxicillin-clavulanate 875-125mg  1 tablet Oral Q12H    apixaban  5 mg Oral BID    ascorbic acid (vitamin C)  500 mg Oral BID    famotidine  20 mg Oral BID    ferrous sulfate  1 tablet Oral Daily    loperamide  2 mg Oral TID    psyllium husk (aspartame)  1 packet Oral Daily    white petrolatum-mineral oil 57.3-42.5%   Both Eyes QHS     PRN Meds:sodium chloride, sodium chloride 0.9%, acetaminophen, barium sulfate, dextrose 10%, glucagon (human recombinant), hydrALAZINE, insulin aspart U-100, ondansetron     Review of patient's allergies indicates:  No Known Allergies  Objective:     Vital Signs (Most Recent):  Temp: 98 °F (36.7 °C) (05/26/22 1158)  Pulse: (!) 52 (05/26/22 1158)  Resp: 18 (05/26/22 1158)  BP: (!) 158/76 (05/26/22 1158)  SpO2: 97 % (05/26/22 1158)   Vital Signs (24h Range):  Temp:  [98 °F (36.7 °C)-98.6 °F (37 °C)] 98 °F (36.7 °C)  Pulse:  [52-71] 52  Resp:  [18-20] 18  SpO2:  [94 %-97 %] 97 %  BP: (123-158)/(60-76) 158/76     Weight: 111.4 kg (245 lb 9.5 oz)  Body mass index is 36.27 kg/m².    Intake/Output - Last 3 Shifts         05/24 0700 05/25 0659 05/25 0700 05/26 0659 05/26 0700 05/27 0659    P.O.  1680 510    Blood 651.2      NG/GT 30      Total Intake(mL/kg) 681.2 (6.1) 1680 (15.1) 510 (4.6)    Urine (mL/kg/hr) 1800 (0.7) 1075 (0.4)     Drains 285 85     Stool 700 250     Total Output 2785 1410     Net -2103.8 +270 +510           Urine Occurrence  3 x     Stool Occurrence 2 x 1 x             Physical Exam  Vitals and nursing note reviewed.   Constitutional:       General: He is not in acute distress.     Appearance: He is obese.   HENT:      Head: Normocephalic and atraumatic.      Mouth/Throat:      Mouth: Mucous membranes are moist.   Cardiovascular:      Rate and Rhythm: Normal rate and regular rhythm.      Heart sounds: Normal heart sounds.    Pulmonary:      Breath sounds: Normal breath sounds.   Abdominal:      General: Bowel sounds are normal. There is no distension.      Palpations: Abdomen is soft.      Comments: Soft, nontender. Midline incision with staples intact--no surrounding erythema or purulent drainage. Ileostomy is pink with liquid stool output.. Left-sided drains are a light, murky yellow.   Musculoskeletal:      Cervical back: Normal range of motion and neck supple.   Skin:     General: Skin is warm and dry.   Neurological:      General: No focal deficit present.      Mental Status: He is alert.   Psychiatric:         Behavior: Behavior normal.       Significant Labs:  I have reviewed all pertinent lab results within the past 24 hours.  CBC:   Recent Labs   Lab 05/24/22  0522   WBC 10.66   RBC 3.24*   HGB 7.5*   HCT 26.9*   *   MCV 83   MCH 23.1*   MCHC 27.9*     BMP:   Recent Labs   Lab 05/22/22  0516 05/24/22  0522    101    139   K 4.8 4.2    107   CO2 25 24   BUN 42* 34*   CREATININE 1.4 1.3   CALCIUM 7.6* 7.8*   MG 1.9  --        Significant Diagnostics:  I have reviewed all pertinent imaging results/findings within the past 24 hours.

## 2022-05-26 NOTE — PLAN OF CARE
Problem: Adult Inpatient Plan of Care  Goal: Plan of Care Review  Outcome: Ongoing, Progressing  Goal: Patient-Specific Goal (Individualized)  Outcome: Ongoing, Progressing  Goal: Absence of Hospital-Acquired Illness or Injury  Outcome: Ongoing, Progressing  Goal: Optimal Comfort and Wellbeing  Outcome: Ongoing, Progressing  Goal: Readiness for Transition of Care  Outcome: Ongoing, Progressing     Problem: Infection  Goal: Absence of Infection Signs and Symptoms  Outcome: Ongoing, Progressing     Problem: Adjustment to Illness (Sepsis/Septic Shock)  Goal: Optimal Coping  Outcome: Ongoing, Progressing     Problem: Bleeding (Sepsis/Septic Shock)  Goal: Absence of Bleeding  Outcome: Ongoing, Progressing     Problem: Glycemic Control Impaired (Sepsis/Septic Shock)  Goal: Blood Glucose Level Within Desired Range  Outcome: Ongoing, Progressing     Problem: Infection Progression (Sepsis/Septic Shock)  Goal: Absence of Infection Signs and Symptoms  Outcome: Ongoing, Progressing     Problem: Nutrition Impaired (Sepsis/Septic Shock)  Goal: Optimal Nutrition Intake  Outcome: Ongoing, Progressing     Problem: Fall Injury Risk  Goal: Absence of Fall and Fall-Related Injury  Outcome: Ongoing, Progressing     Problem: Noninvasive Ventilation Acute  Goal: Effective Unassisted Ventilation and Oxygenation  Outcome: Ongoing, Progressing     Problem: Fluid Imbalance (Pneumonia)  Goal: Fluid Balance  Outcome: Ongoing, Progressing     Problem: Infection (Pneumonia)  Goal: Resolution of Infection Signs and Symptoms  Outcome: Ongoing, Progressing     Problem: Respiratory Compromise (Pneumonia)  Goal: Effective Oxygenation and Ventilation  Outcome: Ongoing, Progressing     Problem: Skin Injury Risk Increased  Goal: Skin Health and Integrity  Outcome: Ongoing, Progressing     Problem: Fluid and Electrolyte Imbalance (Acute Kidney Injury/Impairment)  Goal: Fluid and Electrolyte Balance  Outcome: Ongoing, Progressing     Problem: Oral Intake  Inadequate (Acute Kidney Injury/Impairment)  Goal: Optimal Nutrition Intake  Outcome: Ongoing, Progressing     Problem: Renal Function Impairment (Acute Kidney Injury/Impairment)  Goal: Effective Renal Function  Outcome: Ongoing, Progressing     Problem: Impaired Wound Healing  Goal: Optimal Wound Healing  Outcome: Ongoing, Progressing     Problem: Coping Ineffective  Goal: Effective Coping  Outcome: Ongoing, Progressing

## 2022-05-26 NOTE — SUBJECTIVE & OBJECTIVE
Interval History: Much more alert.  Denies pain.     Medications:  Continuous Infusions:  Scheduled Meds:   amiodarone  200 mg Oral BID    amoxicillin-clavulanate 875-125mg  1 tablet Oral Q12H    apixaban  5 mg Oral BID    ascorbic acid (vitamin C)  500 mg Oral BID    famotidine  20 mg Oral BID    ferrous sulfate  1 tablet Oral Daily    loperamide  2 mg Oral TID    psyllium husk (aspartame)  1 packet Oral Daily    white petrolatum-mineral oil 57.3-42.5%   Both Eyes QHS     PRN Meds:sodium chloride, sodium chloride 0.9%, acetaminophen, barium sulfate, dextrose 10%, glucagon (human recombinant), hydrALAZINE, insulin aspart U-100, ondansetron     Review of patient's allergies indicates:  No Known Allergies  Objective:     Vital Signs (Most Recent):  Temp: 98.6 °F (37 °C) (05/25/22 1913)  Pulse: 71 (05/25/22 1913)  Resp: 20 (05/25/22 1913)  BP: 123/60 (05/25/22 1913)  SpO2: (!) 94 % (05/25/22 1913)   Vital Signs (24h Range):  Temp:  [97.7 °F (36.5 °C)-98.6 °F (37 °C)] 98.6 °F (37 °C)  Pulse:  [58-72] 71  Resp:  [17-20] 20  SpO2:  [94 %-97 %] 94 %  BP: (111-142)/(55-70) 123/60     Weight: 111.4 kg (245 lb 9.5 oz)  Body mass index is 36.27 kg/m².    Intake/Output - Last 3 Shifts         05/23 0700 05/24 0659 05/24 0700 05/25 0659 05/25 0700 05/26 0659    P.O. 0  1440    I.V. (mL/kg)       Blood  651.2     NG/GT  30     Total Intake(mL/kg) 0 (0) 681.2 (6.1) 1440 (12.9)    Urine (mL/kg/hr) 0 (0) 1800 (0.7) 575 (0.4)    Drains 290 285 30    Stool 850 700     Total Output 1140 2785 605    Net -1140 -2103.8 +835           Urine Occurrence 6 x  2 x    Stool Occurrence  2 x             Physical Exam  Vitals and nursing note reviewed.   Constitutional:       General: He is not in acute distress.     Appearance: He is obese.   HENT:      Head: Normocephalic and atraumatic.      Mouth/Throat:      Mouth: Mucous membranes are moist.   Cardiovascular:      Rate and Rhythm: Normal rate and regular rhythm.      Heart sounds: Normal  heart sounds.   Pulmonary:      Breath sounds: Normal breath sounds.   Abdominal:      General: Bowel sounds are normal. There is no distension.      Palpations: Abdomen is soft.      Comments: Soft, nontender. Midline incision with staples intact--no surrounding erythema or purulent drainage. Ileostomy is pink with liquid stool output.. Left-sided drains are a light, murky yellow.   Musculoskeletal:      Cervical back: Normal range of motion and neck supple.   Skin:     General: Skin is warm and dry.   Neurological:      General: No focal deficit present.      Mental Status: He is alert.   Psychiatric:         Behavior: Behavior normal.       Significant Labs:  I have reviewed all pertinent lab results within the past 24 hours.  CBC:   Recent Labs   Lab 05/24/22 0522   WBC 10.66   RBC 3.24*   HGB 7.5*   HCT 26.9*   *   MCV 83   MCH 23.1*   MCHC 27.9*     CMP:   Recent Labs   Lab 05/24/22 0522      CALCIUM 7.8*   ALBUMIN 1.5*   PROT 5.1*      K 4.2   CO2 24      BUN 34*   CREATININE 1.3   ALKPHOS 142*   ALT 40   AST 40   BILITOT 0.9       Significant Diagnostics:  I have reviewed all pertinent imaging results/findings within the past 24 hours.

## 2022-05-26 NOTE — PLAN OF CARE
[facility] - Med Surg  Discharge Final Note    Primary Care Provider: HOLLY ROSEN    Expected Discharge Date: 5/26/2022    Final Discharge Note (most recent)     Final Note - 05/26/22 1130        Final Note    Assessment Type Final Discharge Note     Anticipated Discharge Disposition Home or Self Care     Hospital Resources/Appts/Education Provided Provided patient/caregiver with written discharge plan information;Appointments scheduled and added to AVS        Post-Acute Status    Discharge Delays None known at this time                 Important Message from Medicare             Contact Info     HOLLY ROSEN   Relationship: PCP - General        Next Steps: Schedule an appointment as soon as possible for a visit in 3 day(s)    Instructions: Hospital follow up, As needed    Nicol Salguero NP   Specialty: Hematology and Oncology    [address] [place] [address] [address] [place] [address]   Phone: [phone]       Next Steps: Schedule an appointment as soon as possible for a visit in 1 week(s)    Instructions: Hospital follow up    Elvie Parker DO   Specialty: General Surgery    [address] [facility]  [place] [address]   Phone: [phone]       Next Steps: Schedule an appointment as soon as possible for a visit in 1 week(s)    Instructions: Hospital follow up        Patient to dc home with self care. Fu appt scheduled and added to AVS. No other cm needs.

## 2022-05-26 NOTE — ASSESSMENT & PLAN NOTE
S/p exploratory laparotomy, abdominal washout, segmental small bowel resection (left in discontinuity), placement of Abthera vac 5/4/22  S/p reopening of recent laparotomy, abdominal washout, replacement of Abthera vac 5/7/22  S/p reopening of recent laparotomy, abdominal washout, right hemicolectomy, liver biopsy, end ileostomy, TAP block, abdominal wall closure 5/11/22  Left-sided drain placed by IR for LUQ abscess 5/17/22        - OK for dysphagia diet with honey thickened liquids per MBSS.  - Pathology returned as metastatic adenocarcinoma. Oncology consulted.  - Monitor drain and ileostomy output. Imodium and fiber added to help decrease output.  - encourage increasing activity  - Ostomy nurse consult  - Strict I/Os  - Medical management per hospital    Discharge planning. Family declining SNF or home health. Will speak to case management as patient would benefit from home health.

## 2022-05-26 NOTE — ASSESSMENT & PLAN NOTE
Extensive conversation with the patient.  The patient has widespread colon carcinoma.  Intra-abdominal with metastatic disease in liver biopsy-proven.  At this time I have talked to the patient about awaiting results of molecular characterization to better define treatment options.  I have talked to the patient he is not reluctant to pursue any treatment of told in my professional opinion I think survival to the end of the year would be small and would probably recommend hospice.  If the patient decides to take systemic chemotherapy with reviewed briefly toxicity profile articles from up-to-date followed him for his review in talked about response rates of 60% and noncurative setting.  At this time will be happy to talk to the patient once more family is present and I will be happy to answer questions as we proceed in the recovery phase from his extensive surgery  05/24/2022.  Extensive conversation with patient as well as daughter at bedside I again reviewed the nature of stage IV metastatic colon carcinoma being a highly treatable but not curable malignancy.  I have answered a number questions about his stage and the impending molecular characterization of his tumor to better assist on recommendations for systemic therapy.  We again talked about options of pursuing systemic therapy versus is no treatment.  In which case I would recommend hospice.  I agree entirely with a do not resuscitate order has been placed on chart and palliative care consultation appropriate by operative surgeon would be an appropriate course of action patient does not have a living will or medical power  created.  The patient has received the articles that I sent to them from up-to-date on metastatic colon carcinoma and discussed various treatment approaches of told him final recommendations systemic therapy will need to await molecular characterization of his tumor total time 35 minutes  05/25/2022.  Extensive conversation again at  the bedside with the patient as well as daughter answered questions they appear more interested in taking systemic therapy.  I have told him as he recovers over the next several days or weeks more information will return to better characterize the type of treatment we would recommend based off of the molecular characterization of precision Medicine that will be done on his tumor specimen I have again explained this to them answered questions concerning this.  I will have her navigation team contact them to establish care so that we can begin the process of seen in the outpatient setting total time 35 minutes  05/26/2022.  The patient appears to be a little bit more alert this morning I have answered questions.  Since at a staff message to pathologist to see status of molecular studies for better characterization of potential systemic therapy once patient is medically stabilized unclear from a surgical standpoint

## 2022-05-26 NOTE — PLAN OF CARE
[facility] - Med Surg  Discharge Reassessment    Primary Care Provider: HOLLY ROSEN    Expected Discharge Date:     Reassessment (most recent)     Discharge Reassessment - 05/26/22 0839        Discharge Reassessment    Assessment Type Discharge Planning Reassessment     Did the patient's condition or plan change since previous assessment? No     Discharge Plan discussed with: Adult children     Communicated ELLIOT with patient/caregiver Date not available/Unable to determine     Discharge Plan A Home     Discharge Plan B Home     DME Needed Upon Discharge  none     Discharge Barriers Identified None     Why the patient remains in the hospital Requires continued medical care        Post-Acute Status    Discharge Delays None known at this time               Cm will continue to follow patient for DC needs. Patient will dc home with family care once medically stable.

## 2022-05-26 NOTE — ASSESSMENT & PLAN NOTE
* Severe sepsis secondary to Peritonitis from bowel perforation  S/p exploratory laparotomy, abdominal washout, segmental small bowel resection (left in discontinuity), placement of Abthera vac 5/4/22  S/p reopening of recent laparotomy, abdominal washout, replacement of Abthera vac 5/7/22  S/p reopening of recent laparotomy, abdominal washout, right hemicolectomy, liver biopsy, end ileostomy, TAP block, abdominal wall closure 5/11/22  Left-sided drain placed by IR for LUQ abscess 5/17/22        - OK for dysphagia diet with honey thickened liquids per MBSS.  - Pathology returned as metastatic adenocarcinoma. Oncology consulted.  - Monitor drain and ileostomy output. Imodium and fiber added to help decrease output.  - encourage increasing activity  - Ostomy nurse consult  - Strict I/Os      Patient to follow up in clinic. The left-sided drains are still putting out murky yellow fluid and need to remain in place. I will repeat a CT scan during his follow up and remove the drains then if the fluid collection has resolved.     Would prefer that patient go to a rehab facility or at least home with home health, but family declined.

## 2022-05-26 NOTE — TELEPHONE ENCOUNTER
Called and spoke with patient wife about scheduling post op appointment. Patient is scheduled for June 1 at 11:00am with Etienne Kennedy at the [facility] location. Patient wife verbalized understanding.    ----- Message from Diane Hartley LPN sent at 5/26/2022  2:18 PM CDT -----  Regarding: follow up needed  Pt d/c from hospital today. Please reach out to schedule 1 week follow up. Has 2 ADDIS drains. Thank you.

## 2022-05-26 NOTE — SUBJECTIVE & OBJECTIVE
Interval History:  Patient appears more alert this morning    Oncology Treatment Plan:   [Could not find a treatment plan. This SmartLink may be configured incorrectly. Contact a  for help.]    Medications:  Continuous Infusions:  Scheduled Meds:   amiodarone  200 mg Oral BID    amoxicillin-clavulanate 875-125mg  1 tablet Oral Q12H    apixaban  5 mg Oral BID    ascorbic acid (vitamin C)  500 mg Oral BID    famotidine  20 mg Oral BID    ferrous sulfate  1 tablet Oral Daily    loperamide  2 mg Oral TID    psyllium husk (aspartame)  1 packet Oral Daily    white petrolatum-mineral oil 57.3-42.5%   Both Eyes QHS     PRN Meds:sodium chloride, sodium chloride 0.9%, acetaminophen, barium sulfate, dextrose 10%, glucagon (human recombinant), hydrALAZINE, insulin aspart U-100, ondansetron     Review of Systems   Constitutional:  Positive for fatigue. Negative for activity change, appetite change, chills, diaphoresis, fever and unexpected weight change.   HENT:  Negative for congestion, dental problem, drooling, ear discharge, ear pain, facial swelling, hearing loss, mouth sores, nosebleeds, postnasal drip, rhinorrhea, sinus pressure, sneezing, sore throat, tinnitus, trouble swallowing and voice change.    Eyes:  Negative for photophobia, pain, discharge, redness, itching and visual disturbance.   Respiratory:  Negative for apnea, cough, choking, chest tightness, shortness of breath, wheezing and stridor.    Cardiovascular:  Negative for chest pain, palpitations and leg swelling.   Gastrointestinal:  Negative for abdominal distention, abdominal pain, anal bleeding, blood in stool, constipation, diarrhea, nausea, rectal pain and vomiting.   Endocrine: Negative for cold intolerance, heat intolerance, polydipsia, polyphagia and polyuria.   Genitourinary:  Negative for decreased urine volume, difficulty urinating, dysuria, enuresis, flank pain, frequency, genital sores, hematuria, penile discharge, penile pain,  penile swelling, scrotal swelling, testicular pain and urgency.   Musculoskeletal:  Positive for gait problem. Negative for arthralgias, back pain, joint swelling, myalgias, neck pain and neck stiffness.   Skin:  Negative for color change, pallor, rash and wound.   Allergic/Immunologic: Negative for environmental allergies, food allergies and immunocompromised state.   Neurological:  Positive for weakness. Negative for dizziness, tremors, seizures, syncope, facial asymmetry, speech difficulty, light-headedness, numbness and headaches.   Hematological:  Negative for adenopathy. Does not bruise/bleed easily.   Psychiatric/Behavioral:  Positive for dysphoric mood. Negative for agitation, behavioral problems, confusion, decreased concentration, hallucinations, self-injury, sleep disturbance and suicidal ideas. The patient is nervous/anxious. The patient is not hyperactive.    Objective:     Vital Signs (Most Recent):  Temp: 98.3 °F (36.8 °C) (05/26/22 0459)  Pulse: (!) 58 (05/26/22 0502)  Resp: 20 (05/26/22 0459)  BP: 138/71 (05/26/22 0459)  SpO2: 96 % (05/26/22 0459)   Vital Signs (24h Range):  Temp:  [97.7 °F (36.5 °C)-98.6 °F (37 °C)] 98.3 °F (36.8 °C)  Pulse:  [54-72] 58  Resp:  [20] 20  SpO2:  [94 %-96 %] 96 %  BP: (120-139)/(60-71) 138/71     Weight: 111.4 kg (245 lb 9.5 oz)  Body mass index is 36.27 kg/m².  Body surface area is 2.33 meters squared.      Intake/Output Summary (Last 24 hours) at 5/26/2022 0635  Last data filed at 5/26/2022 0600  Gross per 24 hour   Intake 1680 ml   Output 1410 ml   Net 270 ml       Physical Exam  Vitals reviewed.   Constitutional:       General: He is not in acute distress.     Appearance: He is well-developed. He is obese. He is ill-appearing. He is not diaphoretic.   HENT:      Head: Normocephalic.      Right Ear: External ear normal.      Left Ear: External ear normal.      Nose: Nose normal.      Right Sinus: No maxillary sinus tenderness or frontal sinus tenderness.      Left  Sinus: No maxillary sinus tenderness or frontal sinus tenderness.      Mouth/Throat:      Pharynx: No oropharyngeal exudate.   Eyes:      General: Lids are normal. No scleral icterus.        Right eye: No discharge.         Left eye: No discharge.      Extraocular Movements:      Right eye: Normal extraocular motion.      Left eye: Normal extraocular motion.      Conjunctiva/sclera:      Right eye: Right conjunctiva is not injected. No hemorrhage.     Left eye: Left conjunctiva is not injected. No hemorrhage.     Pupils: Pupils are equal, round, and reactive to light.   Neck:      Thyroid: No thyromegaly.      Vascular: No JVD.      Trachea: No tracheal deviation.   Cardiovascular:      Rate and Rhythm: Normal rate.   Pulmonary:      Effort: Pulmonary effort is normal. No respiratory distress.      Breath sounds: No stridor.   Chest:   Breasts:      Right: No supraclavicular adenopathy.      Left: No supraclavicular adenopathy.     Abdominal:      General: Bowel sounds are normal.      Palpations: Abdomen is soft. There is no hepatomegaly, splenomegaly or mass.      Tenderness: There is no abdominal tenderness.      Comments: Functioning colostomy   Musculoskeletal:         General: No tenderness. Normal range of motion.      Cervical back: Normal range of motion and neck supple.   Lymphadenopathy:      Head:      Right side of head: No posterior auricular or occipital adenopathy.      Left side of head: No posterior auricular or occipital adenopathy.      Cervical: No cervical adenopathy.      Right cervical: No superficial, deep or posterior cervical adenopathy.     Left cervical: No superficial, deep or posterior cervical adenopathy.      Upper Body:      Right upper body: No supraclavicular adenopathy.      Left upper body: No supraclavicular adenopathy.   Skin:     General: Skin is dry.      Findings: No erythema or rash.      Nails: There is no clubbing.   Neurological:      Mental Status: He is alert and  oriented to person, place, and time.      Cranial Nerves: No cranial nerve deficit.      Coordination: Coordination normal.   Psychiatric:         Behavior: Behavior normal.         Thought Content: Thought content normal.         Judgment: Judgment normal.       Significant Labs:   BMP: No results for input(s): GLU, NA, K, CL, CO2, BUN, CREATININE, CALCIUM, MG in the last 48 hours., CBC: No results for input(s): WBC, HGB, HCT, PLT in the last 48 hours., CMP: No results for input(s): NA, K, CL, CO2, GLU, BUN, CREATININE, CALCIUM, PROT, ALBUMIN, BILITOT, ALKPHOS, AST, ALT, ANIONGAP, EGFRNONAA in the last 48 hours.    Invalid input(s): ESTGFAFRICA, Coagulation: No results for input(s): PT, INR, APTT in the last 48 hours., Haptoglobin: No results for input(s): HAPTOGLOBIN in the last 48 hours., Immunology: No results for input(s): SPEP, KINGS, ANGELICA, FREELAMBDALI in the last 48 hours., LDH: No results for input(s): LDHCSF, BFSOURCE in the last 48 hours., LFTs: No results for input(s): ALT, AST, ALKPHOS, BILITOT, PROT, ALBUMIN in the last 48 hours., Reticulocytes: No results for input(s): RETIC in the last 48 hours., Tumor Markers: No results for input(s): PSA, CEA, , AFPTM, [id number],  in the last 48 hours.    Invalid input(s): ALGTM, Uric Acid No results for input(s): URICACID in the last 48 hours., and Urine Studies: No results for input(s): COLORU, APPEARANCEUA, PHUR, SPECGRAV, PROTEINUA, GLUCUA, KETONESU, BILIRUBINUA, OCCULTUA, NITRITE, UROBILINOGEN, LEUKOCYTESUR, RBCUA, WBCUA, BACTERIA, SQUAMEPITHEL, HYALINECASTS in the last 48 hours.    Invalid input(s): WRIGHTSUR    Diagnostic Results:  I have reviewed all pertinent imaging results/findings within the past 24 hours.

## 2022-05-26 NOTE — NURSING
Discharge instructions reviewed with patient and daughters at bedside. Questions answered. Ostomy supplies given to family. ADDIS drains remained per surgeon's instructions. PICC and midline removed per RN, pt tolerated well. Pt taken downstairs via wheelchair per transportation with daughters at side.

## 2022-05-26 NOTE — DISCHARGE SUMMARY
[facility] Medicine  Discharge Summary      Patient Name: Franky Masters  MRN: [id number]  Patient Class: IP- Inpatient  Admission Date: 5/4/2022  Hospital Length of Stay: 22 days  Discharge Date and Time:  05/26/2022 4:04 PM  Attending Physician: Karuna att. providers found   Discharging Provider: Megha Mejia MD  Primary Care Provider: HOLLY ROSEN      HPI:   Mr Masters is a 67 yo male POD#0 s/p SB perforation with surgical intervention demonstrating a large cecal mass and 3l feculant fluid in the abdominal cavity. Additional findings of a perforated ileum and a liver mass. Patient tolerated the Exlap with Washout and small bowel excision. Patient had a wound vac placed, is currently intubated, sedated and recovering in the ICU. Patient received 4 units PRBC and remains on pressor support. Patient is a DNR and HM consulted with assistance with medical management. Daughter at bedside. Patient discussed with care team.          Procedure(s) (LRB):  CREATION, ILEOSTOMY (N/A)  HEMICOLECTOMY, RIGHT (N/A)  BIOPSY, LIVER (Right)      Hospital Course:   Patient continues to require pressors, remains intubated, sedated. Patient urine o/p declining and concerning. Discussed POC in detail at bedside with daughter POA. She expressed her fathers wish to not undergo treatments  like perm HD, where he has a diminished quality of life. We spoke frankly about issues and concerns and she will be communicating with the family as his case evolves. Comfort care was discussed and the goals of care will develop consistent with the patients expressed wishes. Potential for another surgery likely Saturday with a washout and possible biopsy vs resection are on the horizon. This possible intervention will be covered in detail by surgery sharing the risks and benefits of that approach. Case discussed with the primary service - surgery, and critical care team.    5/6- Pt seen and examined in the ICU plus discussed with ICU team  and the pt's daughter/ POA: Remains critically ill, intubated, sedated on Vent, on Pressors- Levo plus Vaso- JOSE with oliguria getting worse- Cr rising to 3.6, becoming severely acidotic- requiring Ertapenem, Zyvox, Micafungin as well as on Bicarb and Fentanyl gtt. He has massive fluid overload and generalized anasarca.  Possible return to OR tomorrow 5/7 if patient is more stable for right hemicolectomy, possible ileostomy, liver biopsy, abdominal wall closure. Dw Pt's daughter.       5/7- remains Intubated, sedated on Vent- Went into Afib w RVR- hence added Amio to Levo and Vasopressin- back in NSR. Getting Invanz and Zyvox plus Micafungin. Dr. Parker took him back to OR for for abdominal washout ( 3-3.5 L feculent fluid with food particles suctioned out in the OR, small bowel appeared better) and replaced Abthera- still has bowel discontinuity. His WBC, Lactate and Cr have all increased. Family updated about his critical condition and poor prognosis and JOSE/ATN- they are considering Comfort measures.     5/8- Day 5 on Vent- family at bedside, remains intubated on Vent with 2 Pressors- still on Amiodarone gtt for Afib, Still has Abthera wound vac to open Abdomen with small bowel discontinuity. Remains oliguric with generalized anasarca- almost 24 L fluid positive. Cr increased to 4.6. WBC down to 12, family does not want any HD/CRRT, so getting an IV Lasix trial to improve the urine output.     5/9- Day 6 on vent- remains the same, remains intubated, on vent with Abthera Wound vac and bowel discontinuity. Put out about 3 L urine since yesterday with IV Lasix, family still does not want any HD, WBC down to 10.2, H/H 7.8/24, still on 2 Pressors and Amio gtt. Family still deciding about comfort care.     5/10- Appreciate all- Day 7- remains same in septic shock on 2 vasopressors, intubated and sedated, still in afib. JOSE has remained stable at 4.7 but urine output improved with IV lasix. Abthera wound vac in place.  No surgery today- put out about 2.5 L yesterday, given lasix again today.    5/11- Seen Pre op- looks better, less swollen, remains intubated, sedated on Vent, on 1 Pressor- on Levophed. Going for OR today for Laparotomy and washout and abd wound closure. Good response to Lasix. Still Afib on Amiodarone gtt. Bun/Cr 98/4.4, WBC down to 17, H/H 8.6/26.     5/12- Day 8 on Vent- looks much better, remains on Vent with Levophed and Amio gtts. Had extensive surgery yesterday and did very well post op- Reopening of recent Laparotomy, Abdominal washout, R Hemicolectomy with Liver Bx, TAP block, Abdominal wall closure, Creation, end Ileostomy. POD 1- looks better, still on Levophed and Amio gtt for Afib, started on TPN, ID stopped Zyvox and continued Merrem/Mycafungin.     5/17 - Successfully extubated.  Additional left side drain placed by Interventional Radiology.    5/18 - Purulent drainage from left side drains.  Remained stable.  5/20 - POD 9 s/p R hemicolectomy and Ileostomy- extubated 5/18 and also underwent large Abdominal abscess by IR on 5/18 with over 500 cc pus drained- all Cx remain NGTD. No fever or chills, no leukocytosis, AAOx2, remains on RA, very weak, malnourished. Getting TPN, Albumin 1.4. Hemodynamically stable- hence transferred out of ICU to floor, getting PT/OT.   5/21- looks lot better, more alert and responsive, following commands, moving all 4 ext well, generalized anasarca improving. Bun/Cr further down to 46/1.4, H/h stable at 7.7/23- ordered IV Venofer plis inj B12 IM. Tolerating TF well. Will continue PT/OT.   5/22- looks better and getting stronger, more responsive and moving all 4 ext well, tolerating TF well at 30 ml/hr via NGT. Wife and daughter at bedside. BUE swelling also coning down. Continue PT/OT.   5/23- looks better, getting stronger, got the Doherty out today, was able to urinate on his own. Also got up with PT today. Ostomy care performed. Will get MBSS again- continue TF and PT. Pt  and family considering LTAC vs SNF.   5/24- looks much better, NGT out, tolerated oral feeding well, did some PT/OT as well, easily gets exhausted.. 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 Await Placement to Rehab vs LTAC. H/H still 7.5/22- he is big and tall and may give him 1-2 units blood tomorrow.   5/25- looks much better, stronger, sitting up in bed, daughter feeding him pasta which he eating and swallowing well. Got 2 units of blood yesterday. Still very weak but getting better with PT/OT. Will give more B12 plus Venofer and Procrit.   5/26- looks and feels much better, getting stronger, now beginning to feed himself too. No swallow issues, colostomy working, ADDIS drain also working well, Surgical midline incision also healing well. Daughter Claudia has again declined HH, stating that he has a rehab unit under his apartment and they can and will take good care of him. Discharge instructions given and wound care/colostomy supplies provided. Pt already cleared by Surgery, he was seen and examined and deemed stable for discharge home today.        Goals of Care Treatment Preferences:  Code Status: DNR      Consults:   Consults (From admission, onward)        Status Ordering Provider     Inpatient consult to Palliative Care  Once        Provider:  (Not yet assigned)    Completed HILDA PALAFOX     Inpatient consult to Hematology/Oncology  Once        Provider:  Rm Gonzalez MD    Acknowledged ISAMAR GODFREY     Inpatient consult to Social Work  Once        Provider:  (Not yet assigned)    Completed ISAMAR GODFREY     Inpatient consult to Registered Dietitian/Nutritionist  Once        Provider:  (Not yet assigned)    Completed ISAMAR GODFREY     Inpatient consult to PICC team (NIAS)  Once        Provider:  (Not yet assigned)    Acknowledged ANGELO AVILA     Inpatient consult to Interventional Radiology  Once        Provider:  WERNER Cheatham     Completed ISAMAR GODFREY     Inpatient consult to Registered Dietitian/Nutritionist  Once        Provider:  (Not yet assigned)    Completed ANGELO AVILA     Inpatient consult to Nephrology  Once        Provider:  Avery Persaud MD    Completed ANGELO AVILA     Inpatient consult to Infectious Diseases  Once        Provider:  Rai Hidalgo MD    Acknowledged ANGELO AVILA     Inpatient consult to Hospitalist  Once        Provider:  SHEILA StevensP    Acknowledged ANGELO AVILA     Inpatient consult to Pulmonology  Once        Provider:  Rai Hernandez, MANUELA    Completed MITZI PALMER          No new Assessment & Plan notes have been filed under this hospital service since the last note was generated.  Service: Hospital Medicine    Final Active Diagnoses:    Diagnosis Date Noted POA    Secondary liver cancer [C78.7] 05/23/2022 Yes    Adenocarcinoma of colon metastatic to liver [C18.9, C78.7] 05/23/2022 Yes    Oropharyngeal dysphagia with overall muscle deconditioning and weakness [R13.12] 05/18/2022 No    Hypoalbuminemia due to protein-calorie malnutrition [E88.09, E46] 08/06/2021 No    Obesity (BMI 30.0-34.9) [E66.9] 08/05/2021 Yes     Chronic      Problems Resolved During this Admission:    Diagnosis Date Noted Date Resolved POA    PRINCIPAL PROBLEM:  Severe sepsis secondary to Peritonitis from bowel perforation [A41.9, R65.20] 05/04/2022 05/26/2022 Yes    Acute hypoxemic respiratory failure  [J96.01] 08/05/2021 05/23/2022 Yes    Small bowel perforation with large Cecal mass  [K63.1] 05/04/2022 05/23/2022 Yes    Mass of colon [K63.89] 05/04/2022 05/21/2022 Yes    Pneumonia of left lower lobe due to infectious organism [J18.9] 05/04/2022 05/10/2022 Yes    On mechanically assisted ventilation [Z99.11] 05/05/2022 05/21/2022 Not Applicable    JOSE (acute kidney injury) [N17.9] 05/05/2022 05/26/2022 Yes    Acute on chronic anemia [D64.9] 08/05/2021 05/26/2022 Yes    Lactic acidosis  [E87.2] 05/26/2022 05/26/2022 Yes    Abdominal pain, acute, generalized [R10.84] 05/26/2022 05/26/2022 Yes    Electrolyte imbalance [E87.8] 05/16/2022 05/21/2022 No    Paroxysmal atrial fibrillation [I48.0] 05/06/2022 05/21/2022 No    Hyperglycemia, unspecified [R73.9] 05/04/2022 05/11/2022 Yes       Discharged Condition: stable    Disposition: Home or Self Care    Follow Up:   Follow-up Information     HOLLY ROSEN. Schedule an appointment as soon as possible for a visit in 3 day(s).    Why: Hospital follow up, As needed           Nicol Salguero NP. Schedule an appointment as soon as possible for a visit in 1 week(s).    Specialty: Hematology and Oncology  Why: Hospital follow up  Contact information:  [address] [place] [address] [address] [place] [address]  [phone]             Elvie Parker DO. Schedule an appointment as soon as possible for a visit in 1 week(s).    Specialty: General Surgery  Why: Hospital follow up  Contact information:  [address]  [place] [address]  [phone]                       Patient Instructions:      Diet Cardiac     Activity as tolerated       Significant Diagnostic Studies: Labs: All labs within the past 24 hours have been reviewed    Pending Diagnostic Studies:     Procedure Component Value Units Date/Time    CBC W/ AUTO DIFFERENTIAL [[id number]] Collected: 05/04/22 0531    Order Status: Sent Lab Status: In process Updated: 05/04/22 0532    Specimen: Blood     Comp. Metabolic Panel [[id number]] Collected: 05/04/22 0531    Order Status: Sent Lab Status: In process Updated: 05/04/22 0533    Specimen: Blood     Lactic acid, plasma [[id number]] Collected: 05/04/22 0531    Order Status: Sent Lab Status: In process Updated: 05/04/22 0532    Specimen: Blood     Lipase [[id number]] Collected: 05/04/22 0531    Order Status: Sent Lab Status: In process Updated: 05/04/22 0532    Specimen: Blood     Specimen to Pathology, Surgery General Surgery [[id number]] Collected:  05/11/22 1716    Order Status: Sent Lab Status: In process Updated: 05/11/22 1717    Specimen: Tissue          Medications:  Reconciled Home Medications:      Medication List      START taking these medications    amiodarone 200 MG Tab  Commonly known as: PACERONE  Take 1 tablet (200 mg total) by mouth 2 (two) times daily.     amoxicillin-clavulanate 875-125mg 875-125 mg per tablet  Commonly known as: AUGMENTIN  Take 1 tablet by mouth every 12 (twelve) hours.     ascorbic acid (vitamin C) 500 MG tablet  Commonly known as: VITAMIN C  Take 1 tablet (500 mg total) by mouth 2 (two) times daily.     ELIQUIS 5 mg Tab  Generic drug: apixaban  Take 1 tablet (5 mg total) by mouth 2 (two) times daily.     famotidine 20 MG tablet  Commonly known as: PEPCID  Take 1 tablet (20 mg total) by mouth 2 (two) times daily.     ferrous sulfate 325 (65 FE) MG EC tablet  Take 1 tablet (325 mg total) by mouth once daily.     NIVA-FOL 2.5-25-2 mg Tab  Generic drug: folic acid-vit B6-vit B12 2.5-25-2 mg  Take 1 tablet by mouth once daily.     psyllium husk (aspartame) 3.4 gram Pwpk packet  Commonly known as: METAMUCIL  Take 1 packet by mouth once daily.  Start taking on: May 27, 2022     white petrolatum-mineral oil 57.3-42.5% 57.3-42.5 % Oint  Commonly known as: REFRESH P.M.  Place into both eyes every evening.            Indwelling Lines/Drains at time of discharge:   Lines/Drains/Airways     Drain  Duration                Ileostomy 05/11/22 Standard (Comfort, christian) RUQ 15 days         Closed/Suction Drain 05/11/22 1735 LUQ Bulb 19 Fr. 14 days         Closed/Suction Drain 05/17/22 1412 LUQ Bulb 8.3 Fr. 9 days                Time spent on the discharge of patient: 45 minutes         Megha Mejia MD  Department of Hospital Medicine  [facility] Surg

## 2022-05-27 ENCOUNTER — PATIENT OUTREACH (OUTPATIENT)
Dept: ADMINISTRATIVE | Facility: CLINIC | Age: 67
End: 2022-05-27

## 2022-05-27 ENCOUNTER — TELEPHONE (OUTPATIENT)
Dept: HEMATOLOGY/ONCOLOGY | Facility: CLINIC | Age: 67
End: 2022-05-27

## 2022-05-27 NOTE — TELEPHONE ENCOUNTER
Spoke with patient's wife and appt made with Dr. Subramanian on 6/6/22. Patient's wife acknowledged. Call ended well.         ----- Message from Katie Agudelo sent at 5/27/2022 11:05 AM CDT -----  Regarding: appt request  Contact: wife  Pt was discharged yesterday. Pt needs a f/u appt in one week. Trini Masters can be reached at [phone]

## 2022-05-27 NOTE — PT/OT/SLP PROGRESS
"Occupational Therapy  Treatment    Franky Masters   MRN: [id number]   Admitting Diagnosis: Severe sepsis    OT Date of Treatment: 05/26/22   OT Start Time: 1240  OT Stop Time: 1320  OT Total Time (min): 40 min    Billable Minutes:  Therapeutic Activity 40 minutes    OT/KRYSTAL: OT          General Precautions: Standard, aspiration, fall  Orthopedic Precautions: N/A  Braces: N/A  Respiratory Status: Room air         Subjectivte:  Communicated with nurse and epic chart review prior to session.    Pain/Comfort  Pain Rating 1: 0/10    Objective:        Functional Mobility:  Bed Mobility:  cga wit rolling rz.l  Cga with forward seated scooting       Transfers:        Functional Ambulation: na      Activities of Daily Living:  LE: MAX A WITH VIVIAN SOCKS  Balance:   Static Sit: FAIR+: Able to take MINIMAL challenges from all directions  Dynamic Sit: FAIR: Cannot move trunk without losing balance  Static Stand: POOR+: Needs MINIMAL assist to maintain  Dynamic stand: POOR+: Needs MIN (minimal ) assist during gait    Therapeutic Activities and Exercises:  PT EDUCATED OF HEP. PT VERBALIZED AND RETURNED DEMONSTRATION. PT PERFORMED 1 SET X 10 REPS B UE ROM EXERCISE SEATED EOB   AM-PAC 6 CLICK ADL   How much help from another person does this patient currently need?   1 = Unable, Total/Dependent Assistance  2 = A lot, Maximum/Moderate Assistance  3 = A little, Minimum/Contact Guard/Supervision  4 = None, Modified [place]/Independent    Putting on and taking off regular lower body clothing? : 2  Bathing (including washing, rinsing, drying)?: 2  Toileting, which includes using toilet, bedpan, or urinal? : 2  Putting on and taking off regular upper body clothing?: 2  Taking care of personal grooming such as brushing teeth?: 2  Eating meals?: 3  Daily Activity Total Score: 13     AM-PAC Raw Score CMS "G-Code Modifier Level of Impairment Assistance   6 % Total / Unable   7 - 8 CM 80 - 100% Maximal Assist   9-13 CL 60 - 80% " Moderate Assist   14 - 19 CK 40 - 60% Moderate Assist   20 - 22 CJ 20 - 40% Minimal Assist   23 CI 1-20% SBA / CGA   24 CH 0% Independent/ Mod I       Patient left HOB elevated with all lines intact, call button in reach, bed alarm on and NURSE notified    ASSESSMENT:  Franky Masters is a 67 y.o. male with a medical diagnosis of Severe sepsis and presents with DEBILITY AND GENERALIZED WEAKNESS.    Rehab identified problem list/impairments: Rehab identified problem list/impairments: weakness, impaired functional mobilty, impaired balance, decreased safety awareness, decreased upper extremity function, impaired endurance, impaired self care skills, gait instability, edema    Rehab potential is good.    Activity tolerance: Good    Discharge recommendations: Discharge Facility/Level of Care Needs: nursing facility, skilled     Barriers to discharge: Barriers to Discharge: None    Equipment recommendations: walker, rolling, bedside commode     GOALS:   Multidisciplinary Problems     Occupational Therapy Goals        Problem: Occupational Therapy    Goal Priority Disciplines Outcome Interventions   Occupational Therapy Goal     OT, PT/OT Ongoing, Progressing    Description: Goals to be met by: 6/1/22     Patient will increase functional independence with ADLs by performing:    Sitting at edge of bed x15 minutes with Stand-by Assistance.  Supine to sit with Minimal Assistance.  Increased functional strength grossly by 1/2 MM grade.                     Plan:  Patient to be seen 3 x/week to address the above listed problems via self-care/home management, therapeutic activities, therapeutic exercises  Plan of Care expires: 06/01/22  Plan of Care reviewed with: patient, daughter         05/26/2022

## 2022-05-27 NOTE — PROGRESS NOTES
C3 nurse spoke with Franky Masters and wife for a TCC post hospital discharge follow up call. The patient reports does not have a scheduled HOSFU appointment. C3 nurse was unable to schedule [facility]. Patient advised to contact their PCP to schedule a HOSPFU within 7 days.

## 2022-06-03 ENCOUNTER — OFFICE VISIT (OUTPATIENT)
Dept: SURGERY | Facility: CLINIC | Age: 67
End: 2022-06-03

## 2022-06-03 VITALS
DIASTOLIC BLOOD PRESSURE: 101 MMHG | WEIGHT: 196.88 LBS | HEART RATE: 85 BPM | SYSTOLIC BLOOD PRESSURE: 155 MMHG | TEMPERATURE: 98 F | BODY MASS INDEX: 29.07 KG/M2

## 2022-06-03 DIAGNOSIS — C78.7 ADENOCARCINOMA OF COLON METASTATIC TO LIVER: ICD-10-CM

## 2022-06-03 DIAGNOSIS — C18.9 ADENOCARCINOMA OF COLON METASTATIC TO LIVER: ICD-10-CM

## 2022-06-03 DIAGNOSIS — K63.1 PERFORATED BOWEL: ICD-10-CM

## 2022-06-03 DIAGNOSIS — K65.1 ABSCESS OF ABDOMINAL CAVITY: Primary | ICD-10-CM

## 2022-06-03 PROCEDURE — 99213 OFFICE O/P EST LOW 20 MIN: CPT | Mod: PBBFAC | Performed by: SURGERY

## 2022-06-03 PROCEDURE — 99024 POSTOP FOLLOW-UP VISIT: CPT | Mod: ,,, | Performed by: SURGERY

## 2022-06-03 PROCEDURE — 99999 PR PBB SHADOW E&M-EST. PATIENT-LVL III: ICD-10-PCS | Mod: PBBFAC,,, | Performed by: SURGERY

## 2022-06-03 PROCEDURE — 99024 PR POST-OP FOLLOW-UP VISIT: ICD-10-PCS | Mod: ,,, | Performed by: SURGERY

## 2022-06-03 PROCEDURE — 99999 PR PBB SHADOW E&M-EST. PATIENT-LVL III: CPT | Mod: PBBFAC,,, | Performed by: SURGERY

## 2022-06-03 RX ORDER — AMOXICILLIN AND CLAVULANATE POTASSIUM 875; 125 MG/1; MG/1
1 TABLET, FILM COATED ORAL EVERY 12 HOURS
Qty: 20 TABLET | Refills: 0 | Status: SHIPPED | OUTPATIENT
Start: 2022-06-03 | End: 2022-06-13 | Stop reason: SDUPTHER

## 2022-06-03 RX ORDER — LOPERAMIDE HYDROCHLORIDE 2 MG/1
2 CAPSULE ORAL 2 TIMES DAILY
Qty: 60 CAPSULE | Refills: 5 | Status: SHIPPED | OUTPATIENT
Start: 2022-06-03 | End: 2022-07-08 | Stop reason: SDUPTHER

## 2022-06-03 NOTE — PROGRESS NOTES
Presents today after prolonged hospitalization for septic shock due to perforated small bowel proximally to a large obstructing cecal mass.  He is doing well and is undergoing physical therapy at his home with his daughter who is a chiropractor.  Denies pain or fevers.  He has an appointment to see Oncology next week but states that he is not interested in chemotherapy.  The IR drain is producing about 40 cc of purulent output daily.  The lower left-sided drain is not having much output so this was removed today.  His ileostomy is pink and having good output.  They state that he is having to change the bag about 4 to 5 times a day, however, so I will add Imodium.  Continue Metamucil.  The incision was cleaned with Betadine and some of the staples removed.  Will obtain a CT scan in 7-10 days to assess the left upper quadrant fluid collection and if resolved, will remove the IR drain.  Refill Augmentin.  Follow-up in 2 weeks.

## 2022-06-05 NOTE — PROGRESS NOTES
Subjective:      DATE OF VISIT: 6/6/22     ?  Patient ID:?Franky Masters is a 67 y.o. male.?? MR#: [id number]   ?   REFERRING PROVIDER: No referring provider defined for this encounter.     ? Primary Care Providers:  Claudia Pelaez (General)     CHIEF COMPLAINT: ?Establish care outpatient with Medical Oncology for newly diagnosed metastatic colon adenocarcinoma to liver???   ?   ONCOLOGIC DIAGNOSIS:  Stage IV colon adenocarcinoma  ?   CURRENT TREATMENT:  TBD    PAST TREATMENT:   palliative surgery for perforated viscus, 5/2022  ?   ONCOLOGIC HISTORY:   ?   Oncology History   Adenocarcinoma of colon metastatic to liver   5/23/2022 Initial Diagnosis    Malignant neoplasm of ascending colon     5/23/2022 Cancer Staged    Staging form: Colon and Rectum, AJCC 8th Edition  - Pathologic stage from 5/23/2022: Stage CARLEE (pT4b, pN2b, pM1a)       Cancer Staging  Adenocarcinoma of colon metastatic to liver  - Pathologic stage from 5/23/2022: Stage CARLEE (pT4b, pN2b, pM1a) - Signed by Rm Gonzalez MD on 5/23/2022         HPI    I had the pleasure meeting for the 1st time Mr. Masters a 67-year-old gentleman initially seen by oncology service indication for presented perforated viscus an exploratory surgery found to metastatic colon adenocarcinoma with metastatic disease in liver. His course was complicated by septic shock secondary to perforated viscus.    Ileostomy in iron drain in place currently residing at home with home PT and water aerobics, recent follow-up with surgery noting improvement and diminished drainage, still with drain and ostomy. He denies pain. Endorses good appetite. Prior 65lb weight loss. Previously discussed with inpatient team/oncology disinterest in pursuing chemotherapy.       Review of Systems    ?   A comprehensive 14-point review of systems was reviewed with patient and was negative other than as specified above.   ?   PAST MEDICAL HISTORY:   Past Medical History:   Diagnosis Date    Diverticulitis      Supplemental oxygen dependent     ?     PAST SURGICAL HISTORY:   Past Surgical History:   Procedure Laterality Date    ABDOMINAL WASHOUT  5/7/2022    Procedure: LAVAGE, PERITONEAL, THERAPEUTIC;  Surgeon: Elvie Parker DO;  Location: [facility] OR;  Service: General;;    ABDOMINAL WASHOUT  5/7/2022    Procedure: ;  Surgeon: Elvie Parker DO;  Location: [facility] OR;  Service: General;;    APPLICATION OF WOUND VACUUM-ASSISTED CLOSURE DEVICE N/A 5/4/2022    Procedure: APPLICATION, WOUND VAC;  Surgeon: Elvie Parker DO;  Location: [facility] OR;  Service: General;  Laterality: N/A;    ILEOSTOMY N/A 5/11/2022    Procedure: CREATION, ILEOSTOMY;  Surgeon: Elvie Parker DO;  Location: [facility] OR;  Service: General;  Laterality: N/A;    LAPAROTOMY N/A 5/7/2022    Procedure: LAPAROTOMY;  Surgeon: Elvie Parker DO;  Location: [facility] OR;  Service: General;  Laterality: N/A;    LIVER BIOPSY Right 5/11/2022    Procedure: BIOPSY, LIVER;  Surgeon: Elvie Parker DO;  Location: [facility] OR;  Service: General;  Laterality: Right;    RIGHT HEMICOLECTOMY N/A 5/11/2022    Procedure: HEMICOLECTOMY, RIGHT;  Surgeon: Elvie Parker DO;  Location: [facility] OR;  Service: General;  Laterality: N/A;      ?   ALLERGIES:   Allergies as of 06/06/2022    (No Known Allergies)      ?   MEDICATIONS:?   Outpatient Medications Marked as Taking for the 6/6/22 encounter (Office Visit) with Tamara Subramanian MD   Medication Sig Dispense Refill    amoxicillin-clavulanate 875-125mg (AUGMENTIN) 875-125 mg per tablet Take 1 tablet by mouth every 12 (twelve) hours. for 10 days 20 tablet 0    apixaban (ELIQUIS) 5 mg Tab Take 1 tablet (5 mg total) by mouth 2 (two) times daily. 60 tablet 3    ascorbic acid, vitamin C, (VITAMIN C) 500 MG tablet Take 1 tablet (500 mg total) by mouth 2 (two) times daily.      famotidine (PEPCID) 20 MG tablet Take 1 tablet (20 mg total) by mouth 2 (two) times daily. 60 tablet 11    ferrous sulfate 325  (65 FE) MG EC tablet Take 1 tablet (325 mg total) by mouth once daily. 60 tablet 1    folic acid-vit B6-vit B12 2.5-25-2 mg (FOLBIC OR EQUIV) 2.5-25-2 mg Tab Take 1 tablet by mouth once daily. 60 tablet 0    loperamide (IMODIUM) 2 mg capsule Take 1 capsule (2 mg total) by mouth 2 (two) times a day. 60 capsule 5    psyllium husk, aspartame, (METAMUCIL) 3.4 gram PwPk packet Take 1 packet by mouth once daily. 30 packet 1      ?   SOCIAL HISTORY:?   Social History     Tobacco Use    Smoking status: Former Smoker    Smokeless tobacco: Never Used   Substance Use Topics    Alcohol use: Not Currently      ?      ?   FAMILY HISTORY:   family history is not on file.   ?        Objective:      Physical Exam      ?   Vitals:    06/06/22 1301   BP: (!) 154/92   Pulse: 71   Resp: 16   Temp: 97.4 °F (36.3 °C)      ?   ECOG:?1   General appearance: Generally well appearing, in no acute distress.   Head, eyes, ears, nose, and throat: moist mucous membranes.   Respiratory: no increased work of breathing  Abdomen: Bowel sounds present, soft, nontender, nondistended.   Extremities: Warm, without edema.   Neurologic: Alert and oriented. Grossly normal strength, coordination, and gait.   Skin: No rashes, ecchymoses or petechial lesion.   ?     ?   Laboratory:  ?   No visits with results within 1 Day(s) from this visit.   Latest known visit with results is:   No results displayed because visit has over 200 results.         ?   Tumor markers   ?   ?   CEA   Date Value Ref Range Status   05/05/2022 17.5 (H) 0.0 - 5.0 ng/mL Final     Comment:     CEA Normal Range:  Non-Smokers: 0-3.0 ng/mL  Smokers:     0-5.0 ng/mL         Imaging:  ?    Results for orders placed or performed during the hospital encounter of 05/04/22 (from the past 2160 hour(s))   CT Abdomen Pelvis  Without Contrast    Impression    Postsurgical changes with 2 abdominal a drainage catheters anterior abdominal incision and skin sutures    Loculated fluid density along  the left upper abdomen and anterior left mid abdomen may reflect infectious hemorrhagic or seroma fluid collection.  Correlate clinically and attention on follow-up.    Moderate mild bilateral pleural effusions and atelectasis/consolidation in both lung bases    Right hemiabdomen small bowel ostomy.  Likely prior right dar colectomy and right ileostomy.    Doherty catheter in place.  Enteric tube in the stomach.    All CT scans   are performed using dose optimization techniques including the following: automated exposure control; adjustment of the mA and/or kV; use of iterative reconstruction technique.  Dose modulation was employed for ALARA by means of: Automated exposure control; adjustment of the mA and/or kV according to patient size (this includes techniques or standardized protocols for targeted exams where dose is matched to indication/reason for exam; i.e. extremities or head); and/or use of iterative reconstructive technique.      Electronically signed by: Mathieu Brito  Date:    05/15/2022  Time:    20:37   CT Guided Needle Placement    Impression    Percutaneous placement of a 8 [place] drainage catheter into a left abdominal collection, yielding 480 mL of purulent mucoid fluid.    Plan:    Follow-up laboratory analysis    Attestation:    Signer name: Frances Nichols    I attest that I was present for the entire procedure. I reviewed the stored images and agree with the report as written.      Electronically signed by: Frances Nichols  Date:    05/17/2022  Time:    15:02     No results found for this or any previous visit (from the past 2160 hour(s)).  No results found for this or any previous visit (from the past 2160 hour(s)).      Pathology:  Final Pathologic Diagnosis 1. Right colon and terminal ileum, right hemicolectomy:   Invasive adenocarcinoma, moderately differentiated, measuring 9.6 cm in   greatest dimension, with invasion to the visceral peritoneum.   Adenocarcinoma involves the  mesoappendix and appendiceal wall.   Metastatic carcinoma involving seven of seventeen lymph nodes (7/17).   Lymphovascular and perineural invasion are identified.   Surgical margins are negative for carcinoma.   Uninvolved colon and small bowel with acute serositis.   Uninvolved appendix with periappendicitis.   2. Liver, right lobe, biopsy:   Metastatic adenocarcinoma in liver.   AJCC 8th edition Pathologic stage: [id number].   See microscopic description.    Comment: Interp By Magnus Chaney M.D., Signed on 05/23/2022 at 11:01   Microscopic Exam Procedure   Right hemicolectomy   Macroscopic Evaluation of Mesorectum   Not applicable   TUMOR   Tumor Site   Cecum   Histologic Type   Adenocarcinoma   Histologic Grade   G2, moderately differentiated   Tumor Size   Greatest dimension in Centimeters (cm): 9.6 cm   Additional Dimension in Centimeters (cm): 7.0 cm   Tumor Extent   Invades visceral peritoneum (including tumor continuous with serosal surface   through area of inflammation)   Macroscopic Tumor Perforation   Not identified   Lymphovascular Invasion   Small vessel   Large vessel (venous), extramural   Perineural Invasion   Present   Treatment Effect   No known presurgical therapy   MARGINS   All margins negative for invasive carcinoma   REGIONAL LYMPH NODES   Number of Lymph Nodes with Tumor: 7   Number of Lymph Nodes Examined: 17   Tumor Deposits   Present   Number of Tumor Deposits: 6   DISTANT METASTASIS   Liver   PATHOLOGIC STAGE CLASSIFICATION (pTNM, AJCC 8th Edition)   Reporting of pT, pN, and (when applicable) pM categories is based on   information available to the pathologist at the time the report is issued. As   per the AJCC (Chapter 1, 8th Ed.) it is the managing physician's   responsibility to establish the final pathologic stage based upon all   pertinent information, including but potentially not limited to this   pathology report.   pT Category   pT4a: Tumor invades through the visceral  peritoneum (including gross   perforation of the bowel through tumor and continuous invasion of tumor   through areas of inflammation to the surface of the visceral peritoneum)   pN Category   pN2b: Seven or more regional lymph nodes are positive   pM Category   pM1a: Metastasis to one site or organ is identified without peritoneal   metastasis   Additional comments:   Key slides: 1H (tumor), 1T (representative lymph node), 1U (molecular block).   Malignant cells in block 1H are positive for CDX2 and negative for CK7,   Chromogranin, and Synaptophysin compatible with colorectal adenocarcinoma.   Malignant cells in block 2A are positive for CK20 and CDX2 and negative for   CK7 and TTF compatible with colorectal adenocarcinoma.   Additional levels were examined on blocks 1G, 1H, 1M and 1P.   MMR protein IHC and STEFANO/RAUL molecular panel are pending and will be reported   in an addendum.          ?   Assessment/Plan:   Adenocarcinoma of colon metastatic to liver  -     Ambulatory referral/consult to CLINIC Palliative Care; Future; Expected date: 06/13/2022    Secondary liver cancer  -     Ambulatory referral/consult to CLINIC Palliative Care; Future; Expected date: 06/13/2022       1. Adenocarcinoma of colon metastatic to liver    2. Secondary liver cancer          Plan:     Problem List Items Addressed This Visit        Oncology    Secondary liver cancer    Adenocarcinoma of colon metastatic to liver - Primary         stage IV colon adenocarcinoma metastatic to liver complicated by perforated viscus and septic shock.  Biopsy prove metastatic disease with biopsy liver positive.  MMR protein IHC and Stefano/Raul molecular panel pending.  Natural history of metastatic colon adenocarcinoma discussed and treatment options reviewed. Inpatient discussion with Oncology and again today we had extensive conversation in outpatient setting previously discussed again he is adamantly disinterested in chemotherapy. He may potentially be  interested in immunotherapy if an option pending pathology.  Continue follow-up with surgery regarding drains and continued recovery from surgery.  - will ask nurse navigation to follow-up on pathology microsatellite status as potentially interested in immunotherapy.     Advance Care Planning   referral to palliative care per above       Follow-Up:   Patient Instructions   Referral to palliative care  Follow-up on FRANCK/MSI IHC  Follow-up prn    90 minutes of total time spent on the encounter, which includes face to face time and non-face to face time preparing to see the patient (eg, review of tests), Obtaining and/or reviewing separately obtained history, Documenting clinical information in the electronic or other health record, Independently interpreting results (not separately reported) and communicating results to the patient/family/caregiver, or Care coordination (not separately reported).       Addendum:  6/16/22 4 p.m. Mismatch repair genes intact.  I called Mr. Masters to relay these results.  He continues to not be interested in systemic therapy and will get in contact with us if any further questions concerns or additional consideration.

## 2022-06-05 NOTE — H&P (VIEW-ONLY)
Subjective:      DATE OF VISIT: 6/6/22     ?  Patient ID:?Franky Masters is a 67 y.o. male.?? MR#: [id number]   ?   REFERRING PROVIDER: No referring provider defined for this encounter.     ? Primary Care Providers:  Claudia Pelaez (General)     CHIEF COMPLAINT: ?Establish care outpatient with Medical Oncology for newly diagnosed metastatic colon adenocarcinoma to liver???   ?   ONCOLOGIC DIAGNOSIS:  Stage IV colon adenocarcinoma  ?   CURRENT TREATMENT:  TBD    PAST TREATMENT:   palliative surgery for perforated viscus, 5/2022  ?   ONCOLOGIC HISTORY:   ?   Oncology History   Adenocarcinoma of colon metastatic to liver   5/23/2022 Initial Diagnosis    Malignant neoplasm of ascending colon     5/23/2022 Cancer Staged    Staging form: Colon and Rectum, AJCC 8th Edition  - Pathologic stage from 5/23/2022: Stage CARLEE (pT4b, pN2b, pM1a)       Cancer Staging  Adenocarcinoma of colon metastatic to liver  - Pathologic stage from 5/23/2022: Stage CARLEE (pT4b, pN2b, pM1a) - Signed by Rm Gonzalez MD on 5/23/2022         HPI    I had the pleasure meeting for the 1st time Mr. Masters a 67-year-old gentleman initially seen by oncology service indication for presented perforated viscus an exploratory surgery found to metastatic colon adenocarcinoma with metastatic disease in liver. His course was complicated by septic shock secondary to perforated viscus.    Ileostomy in iron drain in place currently residing at home with home PT and water aerobics, recent follow-up with surgery noting improvement and diminished drainage, still with drain and ostomy. He denies pain. Endorses good appetite. Prior 65lb weight loss. Previously discussed with inpatient team/oncology disinterest in pursuing chemotherapy.       Review of Systems    ?   A comprehensive 14-point review of systems was reviewed with patient and was negative other than as specified above.   ?   PAST MEDICAL HISTORY:   Past Medical History:   Diagnosis Date    Diverticulitis      Supplemental oxygen dependent     ?     PAST SURGICAL HISTORY:   Past Surgical History:   Procedure Laterality Date    ABDOMINAL WASHOUT  5/7/2022    Procedure: LAVAGE, PERITONEAL, THERAPEUTIC;  Surgeon: Elvie Parker DO;  Location: [facility] OR;  Service: General;;    ABDOMINAL WASHOUT  5/7/2022    Procedure: ;  Surgeon: Elvie Parker DO;  Location: [facility] OR;  Service: General;;    APPLICATION OF WOUND VACUUM-ASSISTED CLOSURE DEVICE N/A 5/4/2022    Procedure: APPLICATION, WOUND VAC;  Surgeon: Elvie Parkre DO;  Location: [facility] OR;  Service: General;  Laterality: N/A;    ILEOSTOMY N/A 5/11/2022    Procedure: CREATION, ILEOSTOMY;  Surgeon: Elvie Parker DO;  Location: [facility] OR;  Service: General;  Laterality: N/A;    LAPAROTOMY N/A 5/7/2022    Procedure: LAPAROTOMY;  Surgeon: Elvie Parker DO;  Location: [facility] OR;  Service: General;  Laterality: N/A;    LIVER BIOPSY Right 5/11/2022    Procedure: BIOPSY, LIVER;  Surgeon: Elvie Parker DO;  Location: [facility] OR;  Service: General;  Laterality: Right;    RIGHT HEMICOLECTOMY N/A 5/11/2022    Procedure: HEMICOLECTOMY, RIGHT;  Surgeon: Elvie Parker DO;  Location: [facility] OR;  Service: General;  Laterality: N/A;      ?   ALLERGIES:   Allergies as of 06/06/2022    (No Known Allergies)      ?   MEDICATIONS:?   Outpatient Medications Marked as Taking for the 6/6/22 encounter (Office Visit) with Tamara Subramanian MD   Medication Sig Dispense Refill    amoxicillin-clavulanate 875-125mg (AUGMENTIN) 875-125 mg per tablet Take 1 tablet by mouth every 12 (twelve) hours. for 10 days 20 tablet 0    apixaban (ELIQUIS) 5 mg Tab Take 1 tablet (5 mg total) by mouth 2 (two) times daily. 60 tablet 3    ascorbic acid, vitamin C, (VITAMIN C) 500 MG tablet Take 1 tablet (500 mg total) by mouth 2 (two) times daily.      famotidine (PEPCID) 20 MG tablet Take 1 tablet (20 mg total) by mouth 2 (two) times daily. 60 tablet 11    ferrous sulfate 325  (65 FE) MG EC tablet Take 1 tablet (325 mg total) by mouth once daily. 60 tablet 1    folic acid-vit B6-vit B12 2.5-25-2 mg (FOLBIC OR EQUIV) 2.5-25-2 mg Tab Take 1 tablet by mouth once daily. 60 tablet 0    loperamide (IMODIUM) 2 mg capsule Take 1 capsule (2 mg total) by mouth 2 (two) times a day. 60 capsule 5    psyllium husk, aspartame, (METAMUCIL) 3.4 gram PwPk packet Take 1 packet by mouth once daily. 30 packet 1      ?   SOCIAL HISTORY:?   Social History     Tobacco Use    Smoking status: Former Smoker    Smokeless tobacco: Never Used   Substance Use Topics    Alcohol use: Not Currently      ?      ?   FAMILY HISTORY:   family history is not on file.   ?        Objective:      Physical Exam      ?   Vitals:    06/06/22 1301   BP: (!) 154/92   Pulse: 71   Resp: 16   Temp: 97.4 °F (36.3 °C)      ?   ECOG:?1   General appearance: Generally well appearing, in no acute distress.   Head, eyes, ears, nose, and throat: moist mucous membranes.   Respiratory: no increased work of breathing  Abdomen: Bowel sounds present, soft, nontender, nondistended.   Extremities: Warm, without edema.   Neurologic: Alert and oriented. Grossly normal strength, coordination, and gait.   Skin: No rashes, ecchymoses or petechial lesion.   ?     ?   Laboratory:  ?   No visits with results within 1 Day(s) from this visit.   Latest known visit with results is:   No results displayed because visit has over 200 results.         ?   Tumor markers   ?   ?   CEA   Date Value Ref Range Status   05/05/2022 17.5 (H) 0.0 - 5.0 ng/mL Final     Comment:     CEA Normal Range:  Non-Smokers: 0-3.0 ng/mL  Smokers:     0-5.0 ng/mL         Imaging:  ?    Results for orders placed or performed during the hospital encounter of 05/04/22 (from the past 2160 hour(s))   CT Abdomen Pelvis  Without Contrast    Impression    Postsurgical changes with 2 abdominal a drainage catheters anterior abdominal incision and skin sutures    Loculated fluid density along  the left upper abdomen and anterior left mid abdomen may reflect infectious hemorrhagic or seroma fluid collection.  Correlate clinically and attention on follow-up.    Moderate mild bilateral pleural effusions and atelectasis/consolidation in both lung bases    Right hemiabdomen small bowel ostomy.  Likely prior right dar colectomy and right ileostomy.    Doherty catheter in place.  Enteric tube in the stomach.    All CT scans   are performed using dose optimization techniques including the following: automated exposure control; adjustment of the mA and/or kV; use of iterative reconstruction technique.  Dose modulation was employed for ALARA by means of: Automated exposure control; adjustment of the mA and/or kV according to patient size (this includes techniques or standardized protocols for targeted exams where dose is matched to indication/reason for exam; i.e. extremities or head); and/or use of iterative reconstructive technique.      Electronically signed by: Mathieu Brito  Date:    05/15/2022  Time:    20:37   CT Guided Needle Placement    Impression    Percutaneous placement of a 8 [place] drainage catheter into a left abdominal collection, yielding 480 mL of purulent mucoid fluid.    Plan:    Follow-up laboratory analysis    Attestation:    Signer name: Frances Nichols    I attest that I was present for the entire procedure. I reviewed the stored images and agree with the report as written.      Electronically signed by: Frances Nichols  Date:    05/17/2022  Time:    15:02     No results found for this or any previous visit (from the past 2160 hour(s)).  No results found for this or any previous visit (from the past 2160 hour(s)).      Pathology:  Final Pathologic Diagnosis 1. Right colon and terminal ileum, right hemicolectomy:   Invasive adenocarcinoma, moderately differentiated, measuring 9.6 cm in   greatest dimension, with invasion to the visceral peritoneum.   Adenocarcinoma involves the  mesoappendix and appendiceal wall.   Metastatic carcinoma involving seven of seventeen lymph nodes (7/17).   Lymphovascular and perineural invasion are identified.   Surgical margins are negative for carcinoma.   Uninvolved colon and small bowel with acute serositis.   Uninvolved appendix with periappendicitis.   2. Liver, right lobe, biopsy:   Metastatic adenocarcinoma in liver.   AJCC 8th edition Pathologic stage: [id number].   See microscopic description.    Comment: Interp By Magnus Chaney M.D., Signed on 05/23/2022 at 11:01   Microscopic Exam Procedure   Right hemicolectomy   Macroscopic Evaluation of Mesorectum   Not applicable   TUMOR   Tumor Site   Cecum   Histologic Type   Adenocarcinoma   Histologic Grade   G2, moderately differentiated   Tumor Size   Greatest dimension in Centimeters (cm): 9.6 cm   Additional Dimension in Centimeters (cm): 7.0 cm   Tumor Extent   Invades visceral peritoneum (including tumor continuous with serosal surface   through area of inflammation)   Macroscopic Tumor Perforation   Not identified   Lymphovascular Invasion   Small vessel   Large vessel (venous), extramural   Perineural Invasion   Present   Treatment Effect   No known presurgical therapy   MARGINS   All margins negative for invasive carcinoma   REGIONAL LYMPH NODES   Number of Lymph Nodes with Tumor: 7   Number of Lymph Nodes Examined: 17   Tumor Deposits   Present   Number of Tumor Deposits: 6   DISTANT METASTASIS   Liver   PATHOLOGIC STAGE CLASSIFICATION (pTNM, AJCC 8th Edition)   Reporting of pT, pN, and (when applicable) pM categories is based on   information available to the pathologist at the time the report is issued. As   per the AJCC (Chapter 1, 8th Ed.) it is the managing physician's   responsibility to establish the final pathologic stage based upon all   pertinent information, including but potentially not limited to this   pathology report.   pT Category   pT4a: Tumor invades through the visceral  peritoneum (including gross   perforation of the bowel through tumor and continuous invasion of tumor   through areas of inflammation to the surface of the visceral peritoneum)   pN Category   pN2b: Seven or more regional lymph nodes are positive   pM Category   pM1a: Metastasis to one site or organ is identified without peritoneal   metastasis   Additional comments:   Key slides: 1H (tumor), 1T (representative lymph node), 1U (molecular block).   Malignant cells in block 1H are positive for CDX2 and negative for CK7,   Chromogranin, and Synaptophysin compatible with colorectal adenocarcinoma.   Malignant cells in block 2A are positive for CK20 and CDX2 and negative for   CK7 and TTF compatible with colorectal adenocarcinoma.   Additional levels were examined on blocks 1G, 1H, 1M and 1P.   MMR protein IHC and STEFANO/RAUL molecular panel are pending and will be reported   in an addendum.          ?   Assessment/Plan:   Adenocarcinoma of colon metastatic to liver  -     Ambulatory referral/consult to [facility] Palliative Care; Future; Expected date: 06/13/2022    Secondary liver cancer  -     Ambulatory referral/consult to CLINIC Palliative Care; Future; Expected date: 06/13/2022       1. Adenocarcinoma of colon metastatic to liver    2. Secondary liver cancer          Plan:     Problem List Items Addressed This Visit        Oncology    Secondary liver cancer    Adenocarcinoma of colon metastatic to liver - Primary         stage IV colon adenocarcinoma metastatic to liver complicated by perforated viscus and septic shock.  Biopsy prove metastatic disease with biopsy liver positive.  MMR protein IHC and Stefano/Raul molecular panel pending.  Natural history of metastatic colon adenocarcinoma discussed and treatment options reviewed. Inpatient discussion with Oncology and again today we had extensive conversation in outpatient setting previously discussed again he is adamantly disinterested in chemotherapy. He may potentially be  interested in immunotherapy if an option pending pathology.  Continue follow-up with surgery regarding drains and continued recovery from surgery.  - will ask nurse navigation to follow-up on pathology microsatellite status as potentially interested in immunotherapy.     Advance Care Planning   referral to palliative care per above       Follow-Up:   Patient Instructions   Referral to palliative care  Follow-up on FRANCK/MSI IHC  Follow-up prn    90 minutes of total time spent on the encounter, which includes face to face time and non-face to face time preparing to see the patient (eg, review of tests), Obtaining and/or reviewing separately obtained history, Documenting clinical information in the electronic or other health record, Independently interpreting results (not separately reported) and communicating results to the patient/family/caregiver, or Care coordination (not separately reported).       Addendum:  6/16/22 4 p.m. Mismatch repair genes intact.  I called Mr. Masters to relay these results.  He continues to not be interested in systemic therapy and will get in contact with us if any further questions concerns or additional consideration.

## 2022-06-06 ENCOUNTER — OFFICE VISIT (OUTPATIENT)
Dept: HEMATOLOGY/ONCOLOGY | Facility: CLINIC | Age: 67
End: 2022-06-06
Payer: MEDICARE

## 2022-06-06 VITALS
HEART RATE: 71 BPM | WEIGHT: 196.44 LBS | TEMPERATURE: 97 F | RESPIRATION RATE: 16 BRPM | OXYGEN SATURATION: 98 % | HEIGHT: 69 IN | SYSTOLIC BLOOD PRESSURE: 154 MMHG | DIASTOLIC BLOOD PRESSURE: 92 MMHG | BODY MASS INDEX: 29.09 KG/M2

## 2022-06-06 DIAGNOSIS — C78.7 SECONDARY LIVER CANCER: ICD-10-CM

## 2022-06-06 DIAGNOSIS — C18.9 ADENOCARCINOMA OF COLON METASTATIC TO LIVER: Primary | ICD-10-CM

## 2022-06-06 DIAGNOSIS — C78.7 ADENOCARCINOMA OF COLON METASTATIC TO LIVER: Primary | ICD-10-CM

## 2022-06-06 PROCEDURE — 99999 PR PBB SHADOW E&M-EST. PATIENT-LVL IV: CPT | Mod: PBBFAC,,, | Performed by: INTERNAL MEDICINE

## 2022-06-06 PROCEDURE — 99215 OFFICE O/P EST HI 40 MIN: CPT | Mod: S$PBB,,, | Performed by: INTERNAL MEDICINE

## 2022-06-06 PROCEDURE — 99999 PR PBB SHADOW E&M-EST. PATIENT-LVL IV: ICD-10-PCS | Mod: PBBFAC,,, | Performed by: INTERNAL MEDICINE

## 2022-06-06 PROCEDURE — 99214 OFFICE O/P EST MOD 30 MIN: CPT | Mod: PBBFAC | Performed by: INTERNAL MEDICINE

## 2022-06-06 PROCEDURE — 99215 PR OFFICE/OUTPT VISIT, EST, LEVL V, 40-54 MIN: ICD-10-PCS | Mod: S$PBB,,, | Performed by: INTERNAL MEDICINE

## 2022-06-06 NOTE — PLAN OF CARE
START ON PATHWAY REGIMEN - Head and Neck    [id number]        Gemcitabine (Gemzar)       Cisplatin     **Always confirm dose/schedule in your pharmacy ordering system**    Patient Characteristics:  Nasopharyngeal, Metastatic, First Line  Disease Classification: Nasopharyngeal  Current Disease Status: Metastatic Disease  AJCC T Category: T4  AJCC N Category: N2  AJCC M Category: M1  AJCC 8 Stage Grouping: IVB  Line of Therapy: First Line    Intent of Therapy:  Non-Curative / Palliative Intent, Discussed with Patient

## 2022-06-10 ENCOUNTER — HOSPITAL ENCOUNTER (OUTPATIENT)
Dept: RADIOLOGY | Facility: HOSPITAL | Age: 67
Discharge: HOME | End: 2022-06-10
Attending: SURGERY

## 2022-06-10 DIAGNOSIS — K65.1 ABSCESS OF ABDOMINAL CAVITY: ICD-10-CM

## 2022-06-10 PROCEDURE — 74176 CT ABD & PELVIS W/O CONTRAST: CPT | Mod: TC

## 2022-06-10 PROCEDURE — 74176 CT ABD & PELVIS W/O CONTRAST: CPT | Mod: 26,,, | Performed by: RADIOLOGY

## 2022-06-10 PROCEDURE — 74176 CT ABDOMEN PELVIS WITHOUT CONTRAST: ICD-10-PCS | Mod: 26,,, | Performed by: RADIOLOGY

## 2022-06-13 ENCOUNTER — TELEPHONE (OUTPATIENT)
Dept: SURGERY | Facility: CLINIC | Age: 67
End: 2022-06-13
Payer: MEDICARE

## 2022-06-13 ENCOUNTER — PATIENT MESSAGE (OUTPATIENT)
Dept: SURGERY | Facility: HOSPITAL | Age: 67
End: 2022-06-13
Payer: MEDICARE

## 2022-06-13 ENCOUNTER — TELEPHONE (OUTPATIENT)
Dept: SURGERY | Facility: HOSPITAL | Age: 67
End: 2022-06-13
Payer: MEDICARE

## 2022-06-13 DIAGNOSIS — K65.1 ABSCESS OF ABDOMINAL CAVITY: Primary | ICD-10-CM

## 2022-06-13 DIAGNOSIS — K63.1 BOWEL PERFORATION: Primary | ICD-10-CM

## 2022-06-13 DIAGNOSIS — K65.1 INTRA-ABDOMINAL ABSCESS: ICD-10-CM

## 2022-06-13 RX ORDER — AMOXICILLIN AND CLAVULANATE POTASSIUM 875; 125 MG/1; MG/1
1 TABLET, FILM COATED ORAL EVERY 12 HOURS
Qty: 20 TABLET | Refills: 0 | Status: SHIPPED | OUTPATIENT
Start: 2022-06-13 | End: 2022-06-23

## 2022-06-13 NOTE — TELEPHONE ENCOUNTER
Called and spoke with patient about CT results. Informed patient of results and referrals that were sent to Interventional Radiology and Infectious disease. Patient requested for Dr. Parker to give him a call to answer a few questions he had about the CT. Informed patient a message was send to Dr. Parker for her to give him a call and we will call him with the appointment dates. Patient verbalized understanding.      ----- Message from Elvie Parker DO sent at 6/13/2022  7:09 AM CDT -----  Would someone mind calling him and letting him know that his repeat CT shows a small fluid collection in the pelvis--not sure what it is but could be an abscess. So I've placed a referral to interventional radiology for a drain placement as well as infectious disease to see if he needs different antibiotics.  I'm heading into the OR now but if they have questions, I can call them afterwards. Thanks!

## 2022-06-13 NOTE — TELEPHONE ENCOUNTER
Called and spoke with patient about a refill on medication. Informed patient a referral was sent toinfectious disease to see if he needs different antibiotics. Patient verbalized understanding.       ----- Message from Kelli Izquierdo sent at 6/13/2022 10:25 AM CDT -----  Contact: [phone]/ Trini/wife  Patient would like to know if he is to have a refill on amoxicillin-clavulanate 875-125mg (AUGMENTIN) 875-125 mg per tablet , please call to advise. Abcess is still draining.

## 2022-06-13 NOTE — TELEPHONE ENCOUNTER
CT scan demonstrates intraabdominal fluid collections. Will place referrals to interventional radiology and infectious disease. Augmentin refilled in the interim. Patient states he is doing well, becoming more independent, and gaining weight.

## 2022-06-14 ENCOUNTER — TELEPHONE (OUTPATIENT)
Dept: RADIOLOGY | Facility: HOSPITAL | Age: 67
End: 2022-06-14
Payer: MEDICARE

## 2022-06-14 NOTE — TELEPHONE ENCOUNTER
Interventional Radiology:    Spoke with pt and states that Dr. Parker wants him to see the radiologist at [facility] on Friday to discuss his CT scan and see if the drain/aspiration is needed. I informed the pt that the radiologist at [facility] do not perform the drain/aspiration. The pt asked that I call back on Monday to see what he wants to do.

## 2022-06-16 ENCOUNTER — TELEPHONE (OUTPATIENT)
Dept: SURGERY | Facility: CLINIC | Age: 67
End: 2022-06-16

## 2022-06-16 ENCOUNTER — TELEPHONE (OUTPATIENT)
Dept: RADIOLOGY | Facility: HOSPITAL | Age: 67
End: 2022-06-16

## 2022-06-16 DIAGNOSIS — D68.9 COAGULATION DEFECT: ICD-10-CM

## 2022-06-16 DIAGNOSIS — K65.1 ABSCESS OF ABDOMINAL CAVITY: Primary | ICD-10-CM

## 2022-06-16 NOTE — TELEPHONE ENCOUNTER
Discussed CT scan findings of the phone with patient.  Will refer him to radiology for CT-guided drainage, possible drain placement.  He expressed understanding and is in agreement.  He states that overall, he is doing well.  He is becoming stronger each day and able to increase his daily activities.  He is tolerating a diet and gaining weight.

## 2022-06-17 ENCOUNTER — HOSPITAL ENCOUNTER (EMERGENCY)
Facility: HOSPITAL | Age: 67
Discharge: HOME | End: 2022-06-17
Attending: EMERGENCY MEDICINE
Payer: MEDICARE

## 2022-06-17 ENCOUNTER — HOSPITAL ENCOUNTER (OUTPATIENT)
Dept: RADIOLOGY | Facility: HOSPITAL | Age: 67
Discharge: HOME | End: 2022-06-17
Attending: SURGERY
Payer: MEDICARE

## 2022-06-17 VITALS
RESPIRATION RATE: 18 BRPM | SYSTOLIC BLOOD PRESSURE: 220 MMHG | OXYGEN SATURATION: 98 % | HEART RATE: 64 BPM | DIASTOLIC BLOOD PRESSURE: 106 MMHG

## 2022-06-17 VITALS
SYSTOLIC BLOOD PRESSURE: 169 MMHG | TEMPERATURE: 98 F | RESPIRATION RATE: 17 BRPM | BODY MASS INDEX: 29.33 KG/M2 | DIASTOLIC BLOOD PRESSURE: 83 MMHG | OXYGEN SATURATION: 98 % | WEIGHT: 198 LBS | HEART RATE: 66 BPM | HEIGHT: 69 IN

## 2022-06-17 DIAGNOSIS — I10 HYPERTENSION: ICD-10-CM

## 2022-06-17 DIAGNOSIS — I10 HYPERTENSION, UNSPECIFIED TYPE: Primary | ICD-10-CM

## 2022-06-17 DIAGNOSIS — K65.1 ABSCESS OF ABDOMINAL CAVITY: ICD-10-CM

## 2022-06-17 LAB
ANION GAP SERPL CALC-SCNC: 12 MMOL/L (ref 8–16)
BASOPHILS # BLD AUTO: 0.05 K/UL (ref 0–0.2)
BASOPHILS NFR BLD: 0.5 % (ref 0–1.9)
BUN SERPL-MCNC: 22 MG/DL (ref 8–23)
CALCIUM SERPL-MCNC: 9.2 MG/DL (ref 8.7–10.5)
CHLORIDE SERPL-SCNC: 107 MMOL/L (ref 95–110)
CO2 SERPL-SCNC: 19 MMOL/L (ref 23–29)
CREAT SERPL-MCNC: 1.2 MG/DL (ref 0.5–1.4)
DIFFERENTIAL METHOD: ABNORMAL
EOSINOPHIL # BLD AUTO: 0 K/UL (ref 0–0.5)
EOSINOPHIL NFR BLD: 0.4 % (ref 0–8)
ERYTHROCYTE [DISTWIDTH] IN BLOOD BY AUTOMATED COUNT: 27.5 % (ref 11.5–14.5)
EST. GFR  (AFRICAN AMERICAN): >60 ML/MIN/1.73 M^2
EST. GFR  (NON AFRICAN AMERICAN): >60 ML/MIN/1.73 M^2
GLUCOSE SERPL-MCNC: 82 MG/DL (ref 70–110)
HCT VFR BLD AUTO: 38.1 % (ref 40–54)
HGB BLD-MCNC: 11.6 G/DL (ref 14–18)
IMM GRANULOCYTES # BLD AUTO: 0.06 K/UL (ref 0–0.04)
IMM GRANULOCYTES NFR BLD AUTO: 0.6 % (ref 0–0.5)
LYMPHOCYTES # BLD AUTO: 1.5 K/UL (ref 1–4.8)
LYMPHOCYTES NFR BLD: 15.3 % (ref 18–48)
MCH RBC QN AUTO: 26.2 PG (ref 27–31)
MCHC RBC AUTO-ENTMCNC: 30.4 G/DL (ref 32–36)
MCV RBC AUTO: 86 FL (ref 82–98)
MONOCYTES # BLD AUTO: 0.6 K/UL (ref 0.3–1)
MONOCYTES NFR BLD: 6.2 % (ref 4–15)
NEUTROPHILS # BLD AUTO: 7.4 K/UL (ref 1.8–7.7)
NEUTROPHILS NFR BLD: 77 % (ref 38–73)
NRBC BLD-RTO: 0 /100 WBC
PLATELET # BLD AUTO: 296 K/UL (ref 150–450)
PMV BLD AUTO: 9.7 FL (ref 9.2–12.9)
POTASSIUM SERPL-SCNC: 4.9 MMOL/L (ref 3.5–5.1)
RBC # BLD AUTO: 4.42 M/UL (ref 4.6–6.2)
SODIUM SERPL-SCNC: 138 MMOL/L (ref 136–145)
WBC # BLD AUTO: 9.65 K/UL (ref 3.9–12.7)

## 2022-06-17 PROCEDURE — 99284 EMERGENCY DEPT VISIT MOD MDM: CPT

## 2022-06-17 PROCEDURE — 80048 BASIC METABOLIC PNL TOTAL CA: CPT | Performed by: EMERGENCY MEDICINE

## 2022-06-17 PROCEDURE — 25000003 PHARM REV CODE 250: Performed by: EMERGENCY MEDICINE

## 2022-06-17 PROCEDURE — 85025 COMPLETE CBC W/AUTO DIFF WBC: CPT | Performed by: EMERGENCY MEDICINE

## 2022-06-17 PROCEDURE — 93005 ELECTROCARDIOGRAM TRACING: CPT

## 2022-06-17 PROCEDURE — 93010 ELECTROCARDIOGRAM REPORT: CPT | Mod: ,,, | Performed by: INTERNAL MEDICINE

## 2022-06-17 PROCEDURE — 93010 EKG 12-LEAD: ICD-10-PCS | Mod: ,,, | Performed by: INTERNAL MEDICINE

## 2022-06-17 RX ORDER — AMLODIPINE BESYLATE 5 MG/1
5 TABLET ORAL
Status: COMPLETED | OUTPATIENT
Start: 2022-06-17 | End: 2022-06-17

## 2022-06-17 RX ORDER — AMLODIPINE BESYLATE 5 MG/1
5 TABLET ORAL DAILY
Qty: 30 TABLET | Refills: 1 | Status: SHIPPED | OUTPATIENT
Start: 2022-06-17 | End: 2022-06-17 | Stop reason: SDUPTHER

## 2022-06-17 RX ORDER — AMLODIPINE BESYLATE 5 MG/1
5 TABLET ORAL DAILY
Qty: 30 TABLET | Refills: 1 | Status: ON HOLD | OUTPATIENT
Start: 2022-06-17 | End: 2023-01-31 | Stop reason: HOSPADM

## 2022-06-17 RX ADMIN — AMLODIPINE BESYLATE 5 MG: 5 TABLET ORAL at 01:06

## 2022-06-17 NOTE — ED PROVIDER NOTES
SCRIBE #1 NOTE: I, Sharifa Lange, am scribing for, and in the presence of, Toya Watkins MD. I have scribed the entire note.       History     Chief Complaint   Patient presents with    Hypertension     Pt was here for outpatient CT and possible biopsy. He was sent to ED for /110. Fasting blood work was done this morning and he hasn't had his medications in 3 days.     Review of patient's allergies indicates:  No Known Allergies      History of Present Illness     HPI    6/17/2022, 12:57 PM  History obtained from the patient      History of Present Illness: Franky Masters is a 67 y.o. male patient with a PMHx of diverticulitis and supplemental oxygen dependent who presents to the Emergency Department for evaluation of HTN which onset several days PTA. Symptoms are constant and moderate in severity. No mitigating or exacerbating factors reported. Pt had outpatient CT and biopsy and was sent to ED for elevated BP which was at 210/110. Pt has been on blood thinner for about a month.  No associated sxs reported. Patient denies any CP, HA, abdominal pain, dysuria, edema, and all other sxs at this time.  No further complaints or concerns at this time.       Arrival mode: Personal vehicle      PCP: HOLLY ROSEN        Past Medical History:  Past Medical History:   Diagnosis Date    Diverticulitis     Supplemental oxygen dependent        Past Surgical History:  Past Surgical History:   Procedure Laterality Date    ABDOMINAL WASHOUT  5/7/2022    Procedure: LAVAGE, PERITONEAL, THERAPEUTIC;  Surgeon: Elvie Parker DO;  Location: [facility];  Service: General;;    ABDOMINAL WASHOUT  5/7/2022    Procedure: ;  Surgeon: Elvie Parker DO;  Location: [facility];  Service: General;;    APPLICATION OF WOUND VACUUM-ASSISTED CLOSURE DEVICE N/A 5/4/2022    Procedure: APPLICATION, WOUND VAC;  Surgeon: Elvie Parker DO;  Location: [facility];  Service: General;  Laterality: N/A;    ILEOSTOMY N/A 5/11/2022     Procedure: CREATION, ILEOSTOMY;  Surgeon: Elvie Parker DO;  Location: [facility] OR;  Service: General;  Laterality: N/A;    LAPAROTOMY N/A 5/7/2022    Procedure: LAPAROTOMY;  Surgeon: Elvie Parker DO;  Location: [facility] OR;  Service: General;  Laterality: N/A;    LIVER BIOPSY Right 5/11/2022    Procedure: BIOPSY, LIVER;  Surgeon: Elvie Parker DO;  Location: [facility] OR;  Service: General;  Laterality: Right;    RIGHT HEMICOLECTOMY N/A 5/11/2022    Procedure: HEMICOLECTOMY, RIGHT;  Surgeon: Elvie Parker DO;  Location: [facility] OR;  Service: General;  Laterality: N/A;         Family History:  No family history on file.    Social History:  Social History     Tobacco Use    Smoking status: Former Smoker    Smokeless tobacco: Never Used   Substance and Sexual Activity    Alcohol use: Not Currently    Drug use: Never    Sexual activity: Not on file        Review of Systems     Review of Systems   Constitutional: Negative for fever.   HENT: Negative for sore throat.    Respiratory: Negative for shortness of breath.    Cardiovascular: Negative for chest pain.   Gastrointestinal: Negative for abdominal pain and nausea.   Genitourinary: Negative for dysuria.   Musculoskeletal: Negative for back pain and joint swelling.   Skin: Negative for rash.   Neurological: Negative for weakness.   Hematological: Does not bruise/bleed easily.   All other systems reviewed and are negative.       Physical Exam     Initial Vitals   BP Pulse Resp Temp SpO2   06/17/22 1216 06/17/22 1212 06/17/22 1212 06/17/22 1241 06/17/22 1212   (!) 163/99 66 17 97.6 °F (36.4 °C) 98 %      MAP       --                 Physical Exam  Nursing Notes and Vital Signs Reviewed.  Constitutional: Patient is in no acute distress. Well-developed and well-nourished.  Head: Atraumatic. Normocephalic.  Eyes: PERRL. EOM intact. Conjunctivae are not pale. No scleral icterus.  ENT: Mucous membranes are moist. Oropharynx is clear and symmetric.    Neck:  "Supple. Full ROM. No lymphadenopathy.  Cardiovascular: Regular rate. Regular rhythm. No murmurs, rubs, or gallops. Distal pulses are 2+ and symmetric.  Pulmonary/Chest: No respiratory distress. Clear to auscultation bilaterally. No wheezing or rales.  Abdominal: Soft and non-distended.  There is no tenderness.  No rebound, guarding, or rigidity. Good bowel sounds. Has colostomy bag and ADDIS drain on abdominal wall.   Genitourinary: No CVA tenderness  Musculoskeletal: Moves all extremities. No obvious deformities. No edema. No calf tenderness.  Skin: Warm and dry.  Neurological:  Alert, awake, and appropriate.  Normal speech.  No acute focal neurological deficits are appreciated.  Psychiatric: Normal affect. Good eye contact. Appropriate in content.     ED Course   Procedures  ED Vital Signs:  Vitals:    06/17/22 1212 06/17/22 1216 06/17/22 1241 06/17/22 1342   BP:  (!) 163/99  (!) 169/83   Pulse: 66      Resp: 17      Temp:   97.6 °F (36.4 °C)    TempSrc:   Oral    SpO2: 98%      Weight: 89.8 kg (198 lb)      Height: 5' 9" (1.753 m)          Abnormal Lab Results:  Labs Reviewed   CBC W/ AUTO DIFFERENTIAL - Abnormal; Notable for the following components:       Result Value    RBC 4.42 (*)     Hemoglobin 11.6 (*)     Hematocrit 38.1 (*)     MCH 26.2 (*)     MCHC 30.4 (*)     RDW 27.5 (*)     Immature Granulocytes 0.6 (*)     Immature Grans (Abs) 0.06 (*)     Gran % 77.0 (*)     Lymph % 15.3 (*)     All other components within normal limits   BASIC METABOLIC PANEL - Abnormal; Notable for the following components:    CO2 19 (*)     All other components within normal limits        All Lab Results:  Results for orders placed or performed during the hospital encounter of 06/17/22   CBC auto differential   Result Value Ref Range    WBC 9.65 3.90 - 12.70 K/uL    RBC 4.42 (L) 4.60 - 6.20 M/uL    Hemoglobin 11.6 (L) 14.0 - 18.0 g/dL    Hematocrit 38.1 (L) 40.0 - 54.0 %    MCV 86 82 - 98 fL    MCH 26.2 (L) 27.0 - 31.0 pg    MCHC " 30.4 (L) 32.0 - 36.0 g/dL    RDW 27.5 (H) 11.5 - 14.5 %    Platelets 296 150 - 450 K/uL    MPV 9.7 9.2 - 12.9 fL    Immature Granulocytes 0.6 (H) 0.0 - 0.5 %    Gran # (ANC) 7.4 1.8 - 7.7 K/uL    Immature Grans (Abs) 0.06 (H) 0.00 - 0.04 K/uL    Lymph # 1.5 1.0 - 4.8 K/uL    Mono # 0.6 0.3 - 1.0 K/uL    Eos # 0.0 0.0 - 0.5 K/uL    Baso # 0.05 0.00 - 0.20 K/uL    nRBC 0 0 /100 WBC    Gran % 77.0 (H) 38.0 - 73.0 %    Lymph % 15.3 (L) 18.0 - 48.0 %    Mono % 6.2 4.0 - 15.0 %    Eosinophil % 0.4 0.0 - 8.0 %    Basophil % 0.5 0.0 - 1.9 %    Differential Method Automated    Basic metabolic panel   Result Value Ref Range    Sodium 138 136 - 145 mmol/L    Potassium 4.9 3.5 - 5.1 mmol/L    Chloride 107 95 - 110 mmol/L    CO2 19 (L) 23 - 29 mmol/L    Glucose 82 70 - 110 mg/dL    BUN 22 8 - 23 mg/dL    Creatinine 1.2 0.5 - 1.4 mg/dL    Calcium 9.2 8.7 - 10.5 mg/dL    Anion Gap 12 8 - 16 mmol/L    eGFR if African American >60 >60 mL/min/1.73 m^2    eGFR if non African American >60 >60 mL/min/1.73 m^2         Imaging Results:  Imaging Results    None          The EKG was ordered, reviewed, and independently interpreted by the ED provider.  Interpretation time: 12:33  Rate: 69 BPM  Rhythm: Sinus rhythm with premature ventricular complexes or fusion complexes  Interpretation: Left axis deviation  Anteroseptal infarct, age undetermined. No STEMI.             The Emergency Provider reviewed the vital signs and test results, which are outlined above.     ED Discussion       1:56 PM - Re-evaluation: The patient is resting comfortably and is in no acute distress, will start on low dose norvasc, pcp referral, asymptomatic new onset hyeprtension. He states that his symptoms have improved after treatment within ER. Discussed test results, shared treatment plan, specific conditions for return, and importance of follow up with patient and family. He understands and agrees with the plan as discussed. Answered  his questions at this time. He  has remained hemodynamically stable throughout the ED course and is appropriate for discharge home.                    ED Medication(s):  Medications   amLODIPine tablet 5 mg (has no administration in time range)       New Prescriptions    AMLODIPINE (NORVASC) 5 MG TABLET    Take 1 tablet (5 mg total) by mouth once daily.        Follow-up Information     The [address]. Schedule an appointment as soon as possible for a visit in 2 days.    Specialty: Internal Medicine  Why: Return to the Emergency Room, If symptoms worsen  Contact information:  [address] [facility] [address] [phone]  Additional information:  Please park on the [address] side and use the Clinic entrance. Check in on the 2nd floor, to the right.                           Scribe Attestation:   Scribe #1: I performed the above scribed service and the documentation accurately describes the services I performed. I attest to the accuracy of the note.     Attending:   Physician Attestation Statement for Scribe #1: I, Toya Watkins MD, personally performed the services described in this documentation, as scribed by Sharifa Lange, in my presence, and it is both accurate and complete.           Clinical Impression       ICD-10-CM ICD-9-CM   1. Hypertension, unspecified type  I10 401.9   2. Hypertension  I10 401.9       Disposition:   Disposition: Discharged  Condition: Stable       Toya Watkins MD  06/17/22 8080

## 2022-06-17 NOTE — NURSING
Send. to ER per Dr. Horton. Patient brung to ER via WC accompanied by daughter. Triage nurse aware of IV in place.

## 2022-06-17 NOTE — NURSING
Patient's B/P remains elevated. Send to ER for evaluation. Daughter, Anel, In waiting area updated and aware. Procedure will be rescheduled.

## 2022-06-20 ENCOUNTER — TELEPHONE (OUTPATIENT)
Dept: SURGERY | Facility: CLINIC | Age: 67
End: 2022-06-20

## 2022-06-20 NOTE — TELEPHONE ENCOUNTER
Returned pt's callback, pt would like to reschedule tomorrow's follow-up with Dr. Parker as the appt with the radiologist on 6/17 has not been done yet, pt states that the blood pressure cuff and equipment were faulty during the appointment so they will call the pt to reschedule, will call pt to schedule a f/u once done, pt verbalized understanding.

## 2022-06-22 ENCOUNTER — TELEPHONE (OUTPATIENT)
Dept: RADIOLOGY | Facility: HOSPITAL | Age: 67
End: 2022-06-22

## 2022-06-22 NOTE — TELEPHONE ENCOUNTER
Called pt to confirm radiology procedure appointment 6/23/22 at 10:30.  Advised pt to arrive to [facility] on [address] at 09:30 and be NPO after midnight. Ok to take BP meds in AM with small sip of water. Pt verbalized understanding and all questions answered. Pt stated his doctor took him off of the eliquis and his last dose was before his last radiology appointment, which was >1 week ago. Confirmed with patient they have a ride home post-procedure.

## 2022-06-23 ENCOUNTER — NURSE TRIAGE (OUTPATIENT)
Dept: ADMINISTRATIVE | Facility: CLINIC | Age: 67
End: 2022-06-23

## 2022-06-23 ENCOUNTER — TELEPHONE (OUTPATIENT)
Dept: SURGERY | Facility: CLINIC | Age: 67
End: 2022-06-23

## 2022-06-23 ENCOUNTER — HOSPITAL ENCOUNTER (OUTPATIENT)
Dept: RADIOLOGY | Facility: HOSPITAL | Age: 67
Discharge: HOME | End: 2022-06-23
Attending: SURGERY
Payer: MEDICARE

## 2022-06-23 VITALS
HEIGHT: 69 IN | HEART RATE: 52 BPM | DIASTOLIC BLOOD PRESSURE: 81 MMHG | RESPIRATION RATE: 16 BRPM | OXYGEN SATURATION: 97 % | BODY MASS INDEX: 29.33 KG/M2 | SYSTOLIC BLOOD PRESSURE: 145 MMHG | WEIGHT: 198 LBS

## 2022-06-23 LAB
CTP QC/QA: YES
FINAL PATHOLOGIC DIAGNOSIS: NORMAL
GRAM STN SPEC: NORMAL
GRAM STN SPEC: NORMAL
GROSS: NORMAL
Lab: NORMAL
MICROSCOPIC EXAM: NORMAL
SARS-COV-2 AG RESP QL IA.RAPID: NEGATIVE
SUPPLEMENTAL DIAGNOSIS: NORMAL

## 2022-06-23 PROCEDURE — 87077 CULTURE AEROBIC IDENTIFY: CPT | Performed by: RADIOLOGY

## 2022-06-23 PROCEDURE — 87205 SMEAR GRAM STAIN: CPT | Performed by: RADIOLOGY

## 2022-06-23 PROCEDURE — 87075 CULTR BACTERIA EXCEPT BLOOD: CPT | Performed by: RADIOLOGY

## 2022-06-23 PROCEDURE — 49083 ABD PARACENTESIS W/IMAGING: CPT

## 2022-06-23 PROCEDURE — 87076 CULTURE ANAEROBE IDENT EACH: CPT | Performed by: RADIOLOGY

## 2022-06-23 PROCEDURE — 63600175 PHARM REV CODE 636 W HCPCS: Performed by: RADIOLOGY

## 2022-06-23 PROCEDURE — 75989 ABSCESS DRAINAGE UNDER X-RAY: CPT | Mod: TC

## 2022-06-23 PROCEDURE — 87186 SC STD MICRODIL/AGAR DIL: CPT | Performed by: RADIOLOGY

## 2022-06-23 PROCEDURE — 87070 CULTURE OTHR SPECIMN AEROBIC: CPT | Performed by: RADIOLOGY

## 2022-06-23 PROCEDURE — A4550 SURGICAL TRAYS: HCPCS

## 2022-06-23 RX ORDER — MIDAZOLAM HYDROCHLORIDE 1 MG/ML
INJECTION INTRAMUSCULAR; INTRAVENOUS CODE/TRAUMA/SEDATION MEDICATION
Status: COMPLETED | OUTPATIENT
Start: 2022-06-23 | End: 2022-06-23

## 2022-06-23 RX ORDER — FENTANYL CITRATE 50 UG/ML
INJECTION, SOLUTION INTRAMUSCULAR; INTRAVENOUS CODE/TRAUMA/SEDATION MEDICATION
Status: COMPLETED | OUTPATIENT
Start: 2022-06-23 | End: 2022-06-23

## 2022-06-23 RX ADMIN — MIDAZOLAM HYDROCHLORIDE 1 MG: 1 INJECTION INTRAMUSCULAR; INTRAVENOUS at 12:06

## 2022-06-23 RX ADMIN — FENTANYL CITRATE 50 MCG: 50 INJECTION, SOLUTION INTRAMUSCULAR; INTRAVENOUS at 12:06

## 2022-06-23 NOTE — PLAN OF CARE
[id number] patient received in recovery, ADDIS drain in place with bloody purulent drainage.  Dressing is CDI

## 2022-06-23 NOTE — PLAN OF CARE
Discharge instructions reviewed with pt and verbalized understanding.  IV removed with cath intact.  Verbalized a small amount of pain in his left hip, 3/10.  No other complaints or questions prior to discharge.

## 2022-06-23 NOTE — TELEPHONE ENCOUNTER
"Per Dr. Selvin Norman, on call surgeon via secure chat, recommendation " I would try alternating Tylenol and ibuprofen. Let his primary surgeon know tomorrow if this isnt effective but we usually dont give narcotics for radiology drain placement.     Updated and spoke to Franky Feliz's wife per Dr. Norman's recommendation. V/u. Denies further needs at this time.     Reason for Disposition   [1] Follow-up call to recent contact AND [2] information only call, no triage required    Protocols used: INFORMATION ONLY CALL - NO TRIAGE-A-AH      "

## 2022-06-23 NOTE — TELEPHONE ENCOUNTER
Returned pt's callback, pt would like to know if a refill of amoxicillin would be needed before he headed out of town this weekend, Dr. Parker states as long as there is no fever that he should be fine without the antibiotic, pt verbalized understanding.

## 2022-06-23 NOTE — TELEPHONE ENCOUNTER
Anel, pt's daughter calling states Franky had procedure today for abscess on back with drain placement. C/o  pain to left hip and left kidney worse now then when leaving hospital and drainage tubing is filled with pus and blood, bulb is filled half way. Requesting pain medication be called in to [facility]. Advised per triage protocol on call provider paged, caller wishes to hold. Instructed caller per triage protocol if no call back within next 30 mins to go to nearest ED for physician eval. V/u.    Reason for Disposition   Severe pain in the incision    Additional Information   Negative: [1] Major abdominal surgical incision AND [2] wound gaping open AND [3] visible internal organs   Negative: Sounds like a life-threatening emergency to the triager   Negative: [1] Bleeding from incision AND [2] won't stop after 10 minutes of direct pressure   Negative: [1] Widespread rash AND [2] bright red, sunburn-like    Protocols used: POST-OP INCISION SYMPTOMS AND [id number]

## 2022-06-23 NOTE — PLAN OF CARE
Pt ambulated from waiting room to pre-procedure area independently with rolator walker. Pt is Aox4. Pt denies pain and is resting comfortably. Will continue to monitor.

## 2022-06-23 NOTE — TELEPHONE ENCOUNTER
Reason for Disposition   Message left on unidentified voice mail.  Phone number verified.    Protocols used: NO CONTACT OR DUPLICATE CONTACT CALL-A-    Unable able to reach. Left two messages.

## 2022-06-23 NOTE — DISCHARGE SUMMARY
Pre Op Diagnosis: pelvic abscess     Post Op Diagnosis: same     Procedure:  drain     Procedure performed by: Clifford KHANNA, Noelle SALOMON     Written Informed Consent Obtained: Yes     Specimen Removed:  yes (type of tissue to be determined by lab analysis)     Estimated Blood Loss:  minimal    Moderate Sedation: yes     The patient tolerated the procedure well and there were no complications.      Sterile technique was performed in the left gluteal, lidocaine was used as a local anesthetic.  8 fr drainage catheter placed and approx 20 ccs of purulent fluid removed and sent to lab.  Pt tolerated the procedure well without immediate complications.  Please see radiologist report for details. F/u with PCP and/or ordering physician.

## 2022-06-24 ENCOUNTER — TELEPHONE (OUTPATIENT)
Dept: SURGERY | Facility: CLINIC | Age: 67
End: 2022-06-24

## 2022-06-24 DIAGNOSIS — K65.1 ABSCESS OF ABDOMINAL CAVITY: Primary | ICD-10-CM

## 2022-06-24 NOTE — TELEPHONE ENCOUNTER
Spoke with pt in regards to nurse's notes and pt's daughter stating pt is experiencing pain in left hip, states there is also some drainage. Pt denies any pain currently, does not need any pain prescription called in. Notified pt that soreness in the left hip is normal due to the area the drain was put in. Pt denies any blood currently in drain. Pt verbalized understanding.

## 2022-06-24 NOTE — TELEPHONE ENCOUNTER
Reason for Disposition   Second attempt to contact caller AND no contact made. Phone number verified.   Caller has already spoken with another triager and has no further questions.    Protocols used: NO CONTACT OR DUPLICATE CONTACT CALL-A-AH

## 2022-06-27 LAB — BACTERIA SPEC AEROBE CULT: ABNORMAL

## 2022-06-28 ENCOUNTER — HOSPITAL ENCOUNTER (OUTPATIENT)
Dept: RADIOLOGY | Facility: HOSPITAL | Age: 67
Discharge: HOME | End: 2022-06-28
Attending: PHYSICIAN ASSISTANT
Payer: MEDICARE

## 2022-06-28 DIAGNOSIS — K65.1 ABSCESS OF ABDOMINAL CAVITY: ICD-10-CM

## 2022-06-28 LAB — BACTERIA SPEC ANAEROBE CULT: ABNORMAL

## 2022-06-28 PROCEDURE — 72192 CT PELVIS W/O DYE: CPT | Mod: TC

## 2022-06-28 NOTE — NURSING
Pelvic drain and LUQ drain safely removed by WERNER Drake. Bandaids to both sites CDI. Pt tolerated well.

## 2022-06-30 ENCOUNTER — PATIENT MESSAGE (OUTPATIENT)
Dept: ADMINISTRATIVE | Facility: OTHER | Age: 67
End: 2022-06-30

## 2022-06-30 ENCOUNTER — TELEPHONE (OUTPATIENT)
Dept: SURGERY | Facility: CLINIC | Age: 67
End: 2022-06-30

## 2022-06-30 NOTE — TELEPHONE ENCOUNTER
Spoke with pt to follow-up after Radiology appointment with Dr. Parker, scheduled pt for an appt next Friday on 7/8 at the [facility], pt verbalized understanding.

## 2022-07-08 ENCOUNTER — OFFICE VISIT (OUTPATIENT)
Dept: SURGERY | Facility: CLINIC | Age: 67
End: 2022-07-08
Payer: MEDICARE

## 2022-07-08 ENCOUNTER — PATIENT MESSAGE (OUTPATIENT)
Dept: SURGERY | Facility: CLINIC | Age: 67
End: 2022-07-08

## 2022-07-08 VITALS
HEART RATE: 62 BPM | DIASTOLIC BLOOD PRESSURE: 85 MMHG | SYSTOLIC BLOOD PRESSURE: 172 MMHG | BODY MASS INDEX: 30.15 KG/M2 | WEIGHT: 204.13 LBS | TEMPERATURE: 97 F

## 2022-07-08 DIAGNOSIS — C18.9 ADENOCARCINOMA OF COLON METASTATIC TO LIVER: Primary | ICD-10-CM

## 2022-07-08 DIAGNOSIS — K65.1 ABSCESS OF ABDOMINAL CAVITY: ICD-10-CM

## 2022-07-08 DIAGNOSIS — C78.7 ADENOCARCINOMA OF COLON METASTATIC TO LIVER: Primary | ICD-10-CM

## 2022-07-08 PROCEDURE — 99024 PR POST-OP FOLLOW-UP VISIT: ICD-10-PCS | Mod: POP,,,

## 2022-07-08 PROCEDURE — 99024 POSTOP FOLLOW-UP VISIT: CPT | Mod: POP,,,

## 2022-07-08 PROCEDURE — 99999 PR PBB SHADOW E&M-EST. PATIENT-LVL III: ICD-10-PCS | Mod: PBBFAC,,,

## 2022-07-08 PROCEDURE — 99999 PR PBB SHADOW E&M-EST. PATIENT-LVL III: CPT | Mod: PBBFAC,,,

## 2022-07-08 PROCEDURE — 99213 OFFICE O/P EST LOW 20 MIN: CPT | Mod: PBBFAC

## 2022-07-08 RX ORDER — LOPERAMIDE HYDROCHLORIDE 2 MG/1
2 CAPSULE ORAL 2 TIMES DAILY
Qty: 60 CAPSULE | Refills: 5 | Status: ON HOLD | OUTPATIENT
Start: 2022-07-08 | End: 2023-01-31 | Stop reason: HOSPADM

## 2022-07-08 NOTE — PROGRESS NOTES
HPI: Patient presents for follow-up after ADDIS drain removal after placement into an abdominal wall abscess. His drain sites are healed. His is still emptying his ileostomy 5-6 times per day. He is tolerating a regular diet and denies fevers, chills, nausea, and vomiting.      PE: Abdomen soft, non-tender and non-distended. ileostomy healthy with stool in bag. Previous ADDIS drain site healed.     A/P:  Increase fiber and immodium.  Keep follow-up with oncology as warranted. Patient would not like any chemotherapy.  RTC as needed.    Ivet Reyes PA-C  [facility]

## 2022-08-10 ENCOUNTER — DOCUMENTATION ONLY (OUTPATIENT)
Dept: HEMATOLOGY/ONCOLOGY | Facility: CLINIC | Age: 67
End: 2022-08-10
Payer: MEDICARE

## 2022-08-10 NOTE — PROGRESS NOTES
Carlar received letter request from pt. detailing pt.'s diagnosis and prognosis for pt. to submit to pt.'s Chiropractic board. Swer drafted letter and submitted to provider for review/signature. Swer faxed letter to pt.'s workplace per pt. request. Carlar called pt.'s office, [facility] to inquire if letter was received.  reported letter was received by pt. Carlar will remain available.

## 2022-09-02 ENCOUNTER — OFFICE VISIT (OUTPATIENT)
Dept: SURGERY | Facility: CLINIC | Age: 67
End: 2022-09-02
Payer: MEDICARE

## 2022-09-02 VITALS
SYSTOLIC BLOOD PRESSURE: 136 MMHG | TEMPERATURE: 98 F | DIASTOLIC BLOOD PRESSURE: 83 MMHG | WEIGHT: 212.31 LBS | BODY MASS INDEX: 31.35 KG/M2 | HEART RATE: 60 BPM

## 2022-09-02 DIAGNOSIS — K43.5 PARASTOMAL HERNIA WITHOUT OBSTRUCTION OR GANGRENE: Primary | ICD-10-CM

## 2022-09-02 PROCEDURE — 99999 PR PBB SHADOW E&M-EST. PATIENT-LVL III: ICD-10-PCS | Mod: PBBFAC,,, | Performed by: SURGERY

## 2022-09-02 PROCEDURE — 99999 PR PBB SHADOW E&M-EST. PATIENT-LVL III: CPT | Mod: PBBFAC,,, | Performed by: SURGERY

## 2022-09-02 PROCEDURE — 99024 POSTOP FOLLOW-UP VISIT: CPT | Mod: POP,,, | Performed by: SURGERY

## 2022-09-02 PROCEDURE — 99213 OFFICE O/P EST LOW 20 MIN: CPT | Mod: PBBFAC | Performed by: SURGERY

## 2022-09-02 PROCEDURE — 99024 PR POST-OP FOLLOW-UP VISIT: ICD-10-PCS | Mod: POP,,, | Performed by: SURGERY

## 2022-09-12 ENCOUNTER — HOSPITAL ENCOUNTER (OUTPATIENT)
Dept: RADIOLOGY | Facility: HOSPITAL | Age: 67
Discharge: HOME | End: 2022-09-12
Attending: SURGERY
Payer: MEDICARE

## 2022-09-12 DIAGNOSIS — C18.9 ADENOCARCINOMA OF COLON METASTATIC TO LIVER: Primary | ICD-10-CM

## 2022-09-12 DIAGNOSIS — K43.5 PARASTOMAL HERNIA WITHOUT OBSTRUCTION OR GANGRENE: ICD-10-CM

## 2022-09-12 DIAGNOSIS — C78.7 ADENOCARCINOMA OF COLON METASTATIC TO LIVER: Primary | ICD-10-CM

## 2022-09-12 PROCEDURE — 25500020 PHARM REV CODE 255: Performed by: SURGERY

## 2022-09-12 PROCEDURE — 74177 CT ABD & PELVIS W/CONTRAST: CPT | Mod: TC

## 2022-09-12 RX ADMIN — IOHEXOL 100 ML: 350 INJECTION, SOLUTION INTRAVENOUS at 02:09

## 2022-09-14 NOTE — PROGRESS NOTES
[facility]  Adult Nutrition  Progress Note    SUMMARY     Recommendations    1. Continue diet as prescribed, as tolerated: mechanical soft, honey thickened liquids per SLP  2. Weekly weights per RD follow up.    Goals:   1. Patient will meet >65% of estimated energy needs by RD follow up.   Nutrition Goal Status: goal met, continues    Assessment and Plan  Nutrition Problem  Inadequate oral intake   Related to (etiology):   Decreased ability to consume sufficient energy   Signs and Symptoms (as evidenced by):   NPO, intubated   Interventions/Recommendations (treatment strategy):  Texture modified, general healtful diet  Collaboration with Medical Providers   Nutrition Diagnosis Status:   Continues    Reason for Assessment  Reason For Assessment: follow up   Diagnosis: infection/sepsis; Severe sepsis secondary to peritonitis from bowel perforation  Relevant Medical History: diverticulitis, JOSE, dysphagia, colostomy, anemia, Hypoalbuminemia due to protein-calorie malnutrition, secondary liver cancer, colitis, COVID19    General Information Comments:     5/10:RD consulted for TPN rec's. Patient is currently NPO and intubated. Patient had surgery on 5/4. Patient had a laparotomy, washout, wound vac, and small intestine excision. Patient has 3+ moderate edema to feet, scrotum. Patient has 4+ severe edema to hands. Patient does not want dialysis per patient wishes. Patients last BM 5/3. NFPE not appropriate at this time. Will continue to monitor.    5/17: Patient seen for follow up. Patient is NPO and intubated. Patient is not receiving any propofol. Patient has been on TPN and tolerating. Patient has 3+ moderate dependent edema and 4+ severe generalized edema. Patients last BM 5/17. Will continue to monitor.     5/23: RD received secure chat requesting EN recs: Peptamen 1.5 with Prebio, continuous, goal: 40 mL/hr     - 100mL H2O flush q 4-6 hours or per MD/NP    - Provides 1440 calories (100%EEN), 65g protein  Please callpatient and advise OV for preop 2 weeks before scheduled surgery   "(100%EPN), 180g CHO, and 741ml H20 + FWF    5/24: RD spoke with pt and family members at bedside. TF discontinued this morning, had been tolerating at goal. Pt seen by SLP today for MBSS who recs mech soft with honey thickened liquids d/t dysphagia and aspiration precautions. Family had several questions related to appropriate foods and beverages. Discussed foods to choose and avoid for mechanical soft r/t recommendations and pt tolerance. Discussed pre thickened food products as well as thickening powder; provided samples of thickener, recommending following 's guidelines of 1 packet to 4oz to prepare honey thickened liquids. Attached related resources to discharge paperwork. 10lb weight gain noted while IP, possible fluid, pt is edematous. Will continue to monitor.      Nutrition Risk Screen  Nutrition Risk Screen: dysphagia or difficulty swallowing    Nutrition/Diet History  Patient Reported Diet/Restrictions/Preferences:  (unknown)  Spiritual, Cultural Beliefs, Quaker Practices, Values that Affect Care: no  Food Allergies: NKFA  Factors Affecting Nutritional Intake: abdominal distension, abdominal pain, chewing difficulties/inability to chew food    Anthropometrics  Temp: 97.4 °F (36.3 °C)  Height: 5' 9" (175.3 cm)  Height (inches): 69 in  Weight Method: Bed Scale  Weight: 111.9 kg (246 lb 11.1 oz)  Weight (lb): 246.7 lb  Ideal Body Weight (IBW), Male: 160 lb  % Ideal Body Weight, Male (lb): 160.53 %  BMI (Calculated): 36.4  BMI Grade: 35 - 39.9 - obesity - grade II       Labs  Pertinent Labs: reviewed  Lab Results   Component Value Date    ALBUMIN 1.5 (L) 05/24/2022    HGB 7.5 (L) 05/24/2022    HCT 26.9 (L) 05/24/2022    WBC 10.66 05/24/2022    CALCIUM 7.8 (L) 05/24/2022     Lab Results   Component Value Date     05/24/2022    K 4.2 05/24/2022    PHOS 4.8 (H) 05/22/2022    BUN 34 (H) 05/24/2022    CREATININE 1.3 05/24/2022    ESTGFRAFRICA >60 05/24/2022    EGFRNONAA 56 (A) 05/24/2022 "     Lab Results   Component Value Date    ALT 40 05/24/2022    AST 40 05/24/2022    ALKPHOS 142 (H) 05/24/2022    BILITOT 0.9 05/24/2022     Meds  Pertinent Medications: reviewed    amiodarone  200 mg Oral BID    amoxicillin-clavulanate 875-125mg  1 tablet Oral Q12H    apixaban  5 mg Oral BID    ascorbic acid (vitamin C)  500 mg Oral BID    [START ON 5/25/2022] famotidine  20 mg Oral BID    [START ON 5/25/2022] ferrous sulfate  1 tablet Oral Daily    loperamide  2 mg Oral TID    [START ON 5/25/2022] psyllium husk (aspartame)  1 packet Oral Daily    white petrolatum-mineral oil 57.3-42.5%   Both Eyes QHS     Continuous Infusions:  PRN Meds:sodium chloride 0.9%, acetaminophen, barium sulfate, dextrose 10%, glucagon (human recombinant), hydrALAZINE, insulin aspart U-100, ondansetron     Physical Findings/Malnutrition Assessment  Patient does not meet at least 2 ASPEN criteria for malnutrition at this time.   Will continue to monitor.    Nausea/Vomiting Signs/Symptoms: other (see comments) (no s/s)  Wounds: wound care following for colostomy  Edema  Edema: generalized  Dependent Edema: 2+ (Mild)  Generalized Edema: 2+ (Mild)  Arm, Left Edema: 1+ (Trace)  Arm, Right Edema: 1+ (Trace)  Wrist, Left Edema: 1+ (Trace)  Wrist, Right Edema: 1+ (Trace)  Hand, Left Edema: 2+ (Mild)  Hand, Right Edema: 2+ (Mild)  Scrotum Edema: 3+ (Moderate)  Leg, Left Edema: 2+ (Mild)  Leg, Right Edema: 2+ (Mild)  Knee, Left Edema: 2+ (Mild)  Knee, Right Edema: 2+ (Mild)  Ankle, Left Edema: 2+ (Mild)  Ankle, Right Edema: 2+ (Mild)  Foot, Left Edema: 3+ (Moderate)  Foot, Right Edema: 3+ (Moderate)  Last Bowel Movement: 05/23/22 Stool Consistency: watery GI Signs/Symptoms: passing flatus  Mouth/Teeth WDL: WDL except Teeth Symptoms: tooth/teeth missing  O2 Device (Oxygen Therapy): (S) room air   Hand , Left: moderate     Laith Score: 13  NFPE not performed, pt appears well nourished    Estimated/Assessed Needs  Weight Used For Calorie  Calculations: 116.5 kg (256 lb 13.4 oz)  Energy Calorie Requirements (kcal): 7363-3139 (0710-3460)  Energy Need Method: Kcal/kg  Protein Requirements: 58-72 (0.8-1.0g/kg, renal labs)  Weight Used For Protein Calculations: 72.7 kg (160 lb 4.4 oz) (IBW)  Fluid Requirements (mL): 4249-6480  Estimated Fluid Requirement Method: RDA Method  RDA Method (mL): 1281  CHO Requirement: 160-203    Nutrition Prescription Ordered  Current Diet Order: mechanical soft, honey thickened liquids    Evaluation of Received Nutrient/Fluid Intake  Energy Calories Required: meeting needs  Protein Required: meeting needs  Tolerance: tolerating  % Intake of Estimated Energy Needs: 75 - 100 %  % Meal Intake: 75 - 100 %    Monitor and Evaluation  Food and Nutrient Intake: energy intake  Food and Nutrient Adminstration: diet order  Anthropometric Measurements: weight, weight change, body mass index  Biochemical Data, Medical Tests and Procedures: electrolyte and renal panel, gastrointestinal profile, inflammatory profile, glucose/endocrine profile, lipid profile  Nutrition-Focused Physical Findings: overall appearance     Nutrition Follow-Up  Level of Risk/Frequency of Follow-up: moderate / Once weekly   Bettie Morse, MS, RD, LDN

## 2022-11-29 ENCOUNTER — TELEPHONE (OUTPATIENT)
Dept: SURGERY | Facility: CLINIC | Age: 67
End: 2022-11-29
Payer: MEDICARE

## 2022-11-29 NOTE — TELEPHONE ENCOUNTER
Followed-up on phone call with patient. Notified patient that Dr. Parker states that the appointment in January was to discuss the stoma closure. Will schedule patient for 1/6 at the [facility] with Dr. Parker. Patient inquired if CT scan would be required prior to appointment, will send a message to Dr. Parker and call patient back if it is necessary. Patient verbalized understanding.

## 2022-11-29 NOTE — TELEPHONE ENCOUNTER
Returned patient's callback in regards to stoma closure with Dr. Parker. Patient state he was originally suppose to have this procedure a month and a half ago but patient postponed due to holidays. Stated he and Dr. Parker decided on January 6. Will forward message to Dr. Parker to inquire about procedure and will call patient back. Patient verbalized understanding.

## 2022-12-06 ENCOUNTER — TELEPHONE (OUTPATIENT)
Dept: SURGERY | Facility: CLINIC | Age: 67
End: 2022-12-06
Payer: MEDICARE

## 2022-12-06 ENCOUNTER — TELEPHONE (OUTPATIENT)
Dept: HEMATOLOGY/ONCOLOGY | Facility: CLINIC | Age: 67
End: 2022-12-06
Payer: MEDICARE

## 2022-12-06 NOTE — TELEPHONE ENCOUNTER
Returned patient's callback. He would like to inquire if there is anything that can be done to schedule a stoma closure procedure with Dr. Parker on January 6th. Notified patient that the visit on that day is an in-clinic follow-up to discuss the procedure. Patient states that the procedure was apparently supposed to be scheduled in November but he had to post-pone it and regrets it. Notified patient that Dr. Parker would need any notes from palliative care and/or Dr. Subramanian prior discussion on 1/6. Patient states he has not been to palliative care yet (declines it) and is currently under care from a Dr. Claudia Manley who is doing physical therapy and herbal supplements. Will send a message to Dr. Subramanian's staff to schedule a follow-up. Patient verbalized understanding.

## 2022-12-06 NOTE — TELEPHONE ENCOUNTER
Call returned to patient. Stated that he will be out town for visit that's scheduled on 12/19, and will not be back until January 2, 2023. Patient informed that he will receive a call back with new appointment information.

## 2022-12-06 NOTE — TELEPHONE ENCOUNTER
----- Message from Yesy Ocasio sent at 12/6/2022  4:42 PM CST -----  Contact: som Clancy is calling to speak with the nurse regarding appointment. Reports needing to reschedule appointment for. Stats needing to schedule for before the 12/19 , due to being out of town. Please give the patent a call back at [phone] or [phone]  Thanks pat

## 2023-01-05 ENCOUNTER — OFFICE VISIT (OUTPATIENT)
Dept: HEMATOLOGY/ONCOLOGY | Facility: CLINIC | Age: 68
End: 2023-01-05
Payer: MEDICARE

## 2023-01-05 VITALS
HEART RATE: 60 BPM | WEIGHT: 210.75 LBS | BODY MASS INDEX: 30.17 KG/M2 | DIASTOLIC BLOOD PRESSURE: 86 MMHG | SYSTOLIC BLOOD PRESSURE: 147 MMHG | OXYGEN SATURATION: 100 % | HEIGHT: 70 IN | TEMPERATURE: 98 F

## 2023-01-05 DIAGNOSIS — C78.7 ADENOCARCINOMA OF COLON METASTATIC TO LIVER: Primary | ICD-10-CM

## 2023-01-05 DIAGNOSIS — C18.9 ADENOCARCINOMA OF COLON METASTATIC TO LIVER: Primary | ICD-10-CM

## 2023-01-05 DIAGNOSIS — C78.7 SECONDARY LIVER CANCER: ICD-10-CM

## 2023-01-05 PROCEDURE — 3077F PR MOST RECENT SYSTOLIC BLOOD PRESSURE >= 140 MM HG: ICD-10-PCS | Mod: CPTII,S$GLB,, | Performed by: INTERNAL MEDICINE

## 2023-01-05 PROCEDURE — 99999 PR PBB SHADOW E&M-EST. PATIENT-LVL III: ICD-10-PCS | Mod: PBBFAC,,, | Performed by: INTERNAL MEDICINE

## 2023-01-05 PROCEDURE — 3008F BODY MASS INDEX DOCD: CPT | Mod: CPTII,S$GLB,, | Performed by: INTERNAL MEDICINE

## 2023-01-05 PROCEDURE — 99215 OFFICE O/P EST HI 40 MIN: CPT | Mod: S$GLB,,, | Performed by: INTERNAL MEDICINE

## 2023-01-05 PROCEDURE — 3077F SYST BP >= 140 MM HG: CPT | Mod: CPTII,S$GLB,, | Performed by: INTERNAL MEDICINE

## 2023-01-05 PROCEDURE — 3288F PR FALLS RISK ASSESSMENT DOCUMENTED: ICD-10-PCS | Mod: CPTII,S$GLB,, | Performed by: INTERNAL MEDICINE

## 2023-01-05 PROCEDURE — 1126F PR PAIN SEVERITY QUANTIFIED, NO PAIN PRESENT: ICD-10-PCS | Mod: CPTII,S$GLB,, | Performed by: INTERNAL MEDICINE

## 2023-01-05 PROCEDURE — 1126F AMNT PAIN NOTED NONE PRSNT: CPT | Mod: CPTII,S$GLB,, | Performed by: INTERNAL MEDICINE

## 2023-01-05 PROCEDURE — 1159F MED LIST DOCD IN RCRD: CPT | Mod: CPTII,S$GLB,, | Performed by: INTERNAL MEDICINE

## 2023-01-05 PROCEDURE — 99999 PR PBB SHADOW E&M-EST. PATIENT-LVL III: CPT | Mod: PBBFAC,,, | Performed by: INTERNAL MEDICINE

## 2023-01-05 PROCEDURE — 1159F PR MEDICATION LIST DOCUMENTED IN MEDICAL RECORD: ICD-10-PCS | Mod: CPTII,S$GLB,, | Performed by: INTERNAL MEDICINE

## 2023-01-05 PROCEDURE — 3079F DIAST BP 80-89 MM HG: CPT | Mod: CPTII,S$GLB,, | Performed by: INTERNAL MEDICINE

## 2023-01-05 PROCEDURE — 1101F PT FALLS ASSESS-DOCD LE1/YR: CPT | Mod: CPTII,S$GLB,, | Performed by: INTERNAL MEDICINE

## 2023-01-05 PROCEDURE — 3079F PR MOST RECENT DIASTOLIC BLOOD PRESSURE 80-89 MM HG: ICD-10-PCS | Mod: CPTII,S$GLB,, | Performed by: INTERNAL MEDICINE

## 2023-01-05 PROCEDURE — 3288F FALL RISK ASSESSMENT DOCD: CPT | Mod: CPTII,S$GLB,, | Performed by: INTERNAL MEDICINE

## 2023-01-05 PROCEDURE — 3008F PR BODY MASS INDEX (BMI) DOCUMENTED: ICD-10-PCS | Mod: CPTII,S$GLB,, | Performed by: INTERNAL MEDICINE

## 2023-01-05 PROCEDURE — 99215 PR OFFICE/OUTPT VISIT, EST, LEVL V, 40-54 MIN: ICD-10-PCS | Mod: S$GLB,,, | Performed by: INTERNAL MEDICINE

## 2023-01-05 PROCEDURE — 1101F PR PT FALLS ASSESS DOC 0-1 FALLS W/OUT INJ PAST YR: ICD-10-PCS | Mod: CPTII,S$GLB,, | Performed by: INTERNAL MEDICINE

## 2023-01-05 NOTE — PROGRESS NOTES
Subjective:      DATE OF VISIT: 1/5/23     ?  Patient ID:?Franky Masters is a 67 y.o. male.?? MR#: [id number]   ?   ? Primary Care Providers:  Claudia Pelaez (General)     CHIEF COMPLAINT: ?Follow-up?   ?   ONCOLOGIC DIAGNOSIS:  Stage IV colon adenocarcinoma  ?   CURRENT TREATMENT:  Patient has elected surveillance    PAST TREATMENT:   palliative surgery for perforated viscus, 5/2022  ?   ONCOLOGIC HISTORY:   ?   Oncology History   Adenocarcinoma of colon metastatic to liver   5/23/2022 Initial Diagnosis    Malignant neoplasm of ascending colon     5/23/2022 Cancer Staged    Staging form: Colon and Rectum, AJCC 8th Edition  - Pathologic stage from 5/23/2022: Stage CARELE (pT4b, pN2b, pM1a)       7/6/2022 - 7/6/2022 Chemotherapy    Treatment Summary   Plan Name: OP GEMCITABINE CISPLATIN Q3W  Treatment Goal: Palliative  Status: Inactive  Start Date:   End Date:   Provider: Tamara Subramanian MD  Chemotherapy: dexAMETHasone (DECADRON) 4 MG Tab, 8 mg, Oral, Daily, 0 of 1 cycle, Start date: --, End date: --  CISplatin (PLATINOL) 25 mg/m2 = 52 mg in sodium chloride 0.9% 500 mL chemo infusion, 25 mg/m2, Intravenous, Clinic/HOD 1 time, 0 of 8 cycles  gemcitabine (GEMZAR) 2,080 mg in sodium chloride 0.9% 250 mL chemo infusion, 1,000 mg/m2, Intravenous, Clinic/HOD 1 time, 0 of 8 cycles          Cancer Staging   Adenocarcinoma of colon metastatic to liver  - Pathologic stage from 5/23/2022: Stage CARLEE (pT4b, pN2b, pM1a) - Signed by Rm Gonzalez MD on 5/23/2022         HPI    Mr. Masters is a gentleman initially seen by oncology service inpatient for presented perforated viscus an exploratory surgery found to metastatic colon adenocarcinoma with metastatic disease in liver. His course was complicated by septic shock secondary to perforated viscus.    Interval events:  Since our last visit in June 2022 he has elected no active therapy for his malignancy and has enjoyed excellent quality of life with multiple trips and stays  active with golf and tennis.  He has ostomy in his interested in reversal as well as hernia repair and has seen General surgery for this.  He continues to not be interested in any active therapy for his malignancy.  He denies any pain, no evidence of bleeding through ostomy.  He is good energy and appetite without weight loss concerns.      Review of Systems    ?   A comprehensive 14-point review of systems was reviewed with patient and was negative other than as specified above.   ?   PAST MEDICAL HISTORY:   Past Medical History:   Diagnosis Date    Diverticulitis     Supplemental oxygen dependent     ?     PAST SURGICAL HISTORY:   Past Surgical History:   Procedure Laterality Date    ABDOMINAL WASHOUT  5/7/2022    Procedure: LAVAGE, PERITONEAL, THERAPEUTIC;  Surgeon: Elvie Parker DO;  Location: [facility] OR;  Service: General;;    ABDOMINAL WASHOUT  5/7/2022    Procedure: ;  Surgeon: Elvie Parker DO;  Location: [facility] OR;  Service: General;;    APPLICATION OF WOUND VACUUM-ASSISTED CLOSURE DEVICE N/A 5/4/2022    Procedure: APPLICATION, WOUND VAC;  Surgeon: Elvie Parker DO;  Location: [facility] OR;  Service: General;  Laterality: N/A;    ILEOSTOMY N/A 5/11/2022    Procedure: CREATION, ILEOSTOMY;  Surgeon: Elvie Parker DO;  Location: [facility] OR;  Service: General;  Laterality: N/A;    LAPAROTOMY N/A 5/7/2022    Procedure: LAPAROTOMY;  Surgeon: Elvie Parker DO;  Location: [facility] OR;  Service: General;  Laterality: N/A;    LIVER BIOPSY Right 5/11/2022    Procedure: BIOPSY, LIVER;  Surgeon: Elvie Parker DO;  Location: [facility] OR;  Service: General;  Laterality: Right;    RIGHT HEMICOLECTOMY N/A 5/11/2022    Procedure: HEMICOLECTOMY, RIGHT;  Surgeon: Elvie Parker DO;  Location: [facility] OR;  Service: General;  Laterality: N/A;      ?   ALLERGIES:   Allergies as of 01/05/2023    (No Known Allergies)      ?   MEDICATIONS:?   Outpatient Medications Marked as Taking for the 1/5/23 encounter (Office  Visit) with Tamara Subramanian MD   Medication Sig Dispense Refill    amiodarone (PACERONE) 200 MG Tab Take 1 tablet (200 mg total) by mouth 2 (two) times daily. 60 tablet 11    amLODIPine (NORVASC) 5 MG tablet Take 1 tablet (5 mg total) by mouth once daily. 30 tablet 1    apixaban (ELIQUIS) 5 mg Tab Take 1 tablet (5 mg total) by mouth 2 (two) times daily. 60 tablet 3    ascorbic acid, vitamin C, (VITAMIN C) 500 MG tablet Take 1 tablet (500 mg total) by mouth 2 (two) times daily.      famotidine (PEPCID) 20 MG tablet Take 1 tablet (20 mg total) by mouth 2 (two) times daily. 60 tablet 11    ferrous sulfate 325 (65 FE) MG EC tablet Take 1 tablet (325 mg total) by mouth once daily. 60 tablet 1    folic acid-vit B6-vit B12 2.5-25-2 mg (FOLBIC OR EQUIV) 2.5-25-2 mg Tab Take 1 tablet by mouth once daily. 60 tablet 0    loperamide (IMODIUM) 2 mg capsule Take 1 capsule (2 mg total) by mouth 2 (two) times a day. 60 capsule 5    psyllium husk, aspartame, (METAMUCIL) 3.4 gram PwPk packet Take 1 packet by mouth once daily. 30 packet 1      ?   SOCIAL HISTORY:?   Social History     Tobacco Use    Smoking status: Former    Smokeless tobacco: Never   Substance Use Topics    Alcohol use: Not Currently      ?      ?   FAMILY HISTORY:   family history is not on file.   ?        Objective:      Physical Exam      ?   Vitals:    01/05/23 0921   BP: (!) 147/86   Pulse: 60   Temp: 97.9 °F (36.6 °C)      ?   ECOG:?0   General appearance: Generally well appearing, in no acute distress.   Head, eyes, ears, nose, and throat: moist mucous membranes.   Respiratory: no increased work of breathing  Abdomen:  soft, nontender, nondistended.  Ostomy in place  Extremities: Warm, without edema.   Neurologic: Alert and oriented. Grossly normal strength, coordination, and gait.   Skin: No rashes, ecchymoses or petechial lesion.   ?     ?   Laboratory:  ?   No visits with results within 1 Day(s) from this visit.   Latest known visit with results is:    Lab Visit on 09/12/2022   Component Date Value Ref Range Status    WBC 09/12/2022 7.94  3.90 - 12.70 K/uL Final    RBC 09/12/2022 4.16 (L)  4.60 - 6.20 M/uL Final    Hemoglobin 09/12/2022 12.8 (L)  14.0 - 18.0 g/dL Final    Hematocrit 09/12/2022 41.8  40.0 - 54.0 % Final    MCV 09/12/2022 101 (H)  82 - 98 fL Final    MCH 09/12/2022 30.8  27.0 - 31.0 pg Final    MCHC 09/12/2022 30.6 (L)  32.0 - 36.0 g/dL Final    RDW 09/12/2022 15.8 (H)  11.5 - 14.5 % Final    Platelets 09/12/2022 256  150 - 450 K/uL Final    MPV 09/12/2022 11.1  9.2 - 12.9 fL Final    Immature Granulocytes 09/12/2022 0.4  0.0 - 0.5 % Final    Gran # (ANC) 09/12/2022 4.7  1.8 - 7.7 K/uL Final    Immature Grans (Abs) 09/12/2022 0.03  0.00 - 0.04 K/uL Final    Lymph # 09/12/2022 2.1  1.0 - 4.8 K/uL Final    Mono # 09/12/2022 0.8  0.3 - 1.0 K/uL Final    Eos # 09/12/2022 0.3  0.0 - 0.5 K/uL Final    Baso # 09/12/2022 0.08  0.00 - 0.20 K/uL Final    nRBC 09/12/2022 0  0 /100 WBC Final    Gran % 09/12/2022 58.8  38.0 - 73.0 % Final    Lymph % 09/12/2022 26.1  18.0 - 48.0 % Final    Mono % 09/12/2022 9.4  4.0 - 15.0 % Final    Eosinophil % 09/12/2022 4.3  0.0 - 8.0 % Final    Basophil % 09/12/2022 1.0  0.0 - 1.9 % Final    Differential Method 09/12/2022 Automated   Final    Sodium 09/12/2022 139  136 - 145 mmol/L Final    Potassium 09/12/2022 4.6  3.5 - 5.1 mmol/L Final    Chloride 09/12/2022 110  95 - 110 mmol/L Final    CO2 09/12/2022 19 (L)  23 - 29 mmol/L Final    Glucose 09/12/2022 85  70 - 110 mg/dL Final    BUN 09/12/2022 22  8 - 23 mg/dL Final    Creatinine 09/12/2022 1.2  0.5 - 1.4 mg/dL Final    Calcium 09/12/2022 8.9  8.7 - 10.5 mg/dL Final    Anion Gap 09/12/2022 10  8 - 16 mmol/L Final    eGFR 09/12/2022 >60  >60 mL/min/1.73 m^2 Final      ?   Tumor markers   ?   ?   CEA   Date Value Ref Range Status   05/05/2022 17.5 (H) 0.0 - 5.0 ng/mL Final     Comment:     CEA Normal Range:  Non-Smokers: 0-3.0 ng/mL  Smokers:     0-5.0 ng/mL          Imaging:  ?    No results found for this or any previous visit (from the past 2160 hour(s)).    No results found for this or any previous visit (from the past 2160 hour(s)).  No results found for this or any previous visit (from the past 2160 hour(s)).      Pathology:  Final Pathologic Diagnosis 1. Right colon and terminal ileum, right hemicolectomy:   Invasive adenocarcinoma, moderately differentiated, measuring 9.6 cm in   greatest dimension, with invasion to the visceral peritoneum.   Adenocarcinoma involves the mesoappendix and appendiceal wall.   Metastatic carcinoma involving seven of seventeen lymph nodes (7/17).   Lymphovascular and perineural invasion are identified.   Surgical margins are negative for carcinoma.   Uninvolved colon and small bowel with acute serositis.   Uninvolved appendix with periappendicitis.   2. Liver, right lobe, biopsy:   Metastatic adenocarcinoma in liver.   AJCC 8th edition Pathologic stage: [id number].   See microscopic description.    Comment: Interp By Magnus Chaney M.D., Signed on 05/23/2022 at 11:01   Microscopic Exam Procedure   Right hemicolectomy   Macroscopic Evaluation of Mesorectum   Not applicable   TUMOR   Tumor Site   Cecum   Histologic Type   Adenocarcinoma   Histologic Grade   G2, moderately differentiated   Tumor Size   Greatest dimension in Centimeters (cm): 9.6 cm   Additional Dimension in Centimeters (cm): 7.0 cm   Tumor Extent   Invades visceral peritoneum (including tumor continuous with serosal surface   through area of inflammation)   Macroscopic Tumor Perforation   Not identified   Lymphovascular Invasion   Small vessel   Large vessel (venous), extramural   Perineural Invasion   Present   Treatment Effect   No known presurgical therapy   MARGINS   All margins negative for invasive carcinoma   REGIONAL LYMPH NODES   Number of Lymph Nodes with Tumor: 7   Number of Lymph Nodes Examined: 17   Tumor Deposits   Present   Number of Tumor Deposits: 6    DISTANT METASTASIS   Liver   PATHOLOGIC STAGE CLASSIFICATION (pTNM, AJCC 8th Edition)   Reporting of pT, pN, and (when applicable) pM categories is based on   information available to the pathologist at the time the report is issued. As   per the AJCC (Chapter 1, 8th Ed.) it is the managing physician's   responsibility to establish the final pathologic stage based upon all   pertinent information, including but potentially not limited to this   pathology report.   pT Category   pT4a: Tumor invades through the visceral peritoneum (including gross   perforation of the bowel through tumor and continuous invasion of tumor   through areas of inflammation to the surface of the visceral peritoneum)   pN Category   pN2b: Seven or more regional lymph nodes are positive   pM Category   pM1a: Metastasis to one site or organ is identified without peritoneal   metastasis   Additional comments:   Key slides: 1H (tumor), 1T (representative lymph node), 1U (molecular block).   Malignant cells in block 1H are positive for CDX2 and negative for CK7,   Chromogranin, and Synaptophysin compatible with colorectal adenocarcinoma.   Malignant cells in block 2A are positive for CK20 and CDX2 and negative for   CK7 and TTF compatible with colorectal adenocarcinoma.   Additional levels were examined on blocks 1G, 1H, 1M and 1P.   MMR protein IHC and AILYN/LYN molecular panel are pending and will be reported   in an addendum.          ?   Assessment/Plan:   Adenocarcinoma of colon metastatic to liver    Secondary liver cancer         1. Adenocarcinoma of colon metastatic to liver    2. Secondary liver cancer            Plan:     Problem List Items Addressed This Visit          Oncology    Secondary liver cancer    Adenocarcinoma of colon metastatic to liver - Primary        stage IV colon adenocarcinoma, microsatellite stable, KRAS mutation positive: Metastatic to liver biopsy positive.  During our last consultation in June 2022 he had  declined systemic therapy.  He is interested in proceeding without systemic therapy.  September 2022 CT abdomen pelvis performed by surgery in consideration for ostomy reversal/hernia repair did demonstrate progressive liver lesions.  He and family do understand natural history of metastatic colon adenocarcinoma and guarded prognosis (typically for stage IV on order of months, less than year) however he currently has excellent functional status and good quality of life and has elected to proceed without systemic therapy.  We discussed with active malignancy potential for tumor growth and further metastasis and potential complications associated with this including pain bowel obstruction bleeding.  Recommend evaluation with surgery and consideration of updated imaging to determine if feasible and safe for ostomy reversal and or additional procedures patient interested in.    He is currently asymptomatic from his malignancy and no palliative/supportive care needed at this time but they do understand this may change and I have encouraged them to reach out as needed.        Follow-Up:     60 minutes of total time spent on the encounter, which includes face to face time and non-face to face time preparing to see the patient (eg, review of tests), Obtaining and/or reviewing separately obtained history, Documenting clinical information in the electronic or other health record, Independently interpreting results (not separately reported) and communicating results to the patient/family/caregiver, or Care coordination (not separately reported).

## 2023-01-05 NOTE — Clinical Note
Dr. Parker, I am a bit unclear on reason for re referral here but please see my note and let me know if any other way I can be helpful to answer specific questions you may have in his care.

## 2023-01-06 ENCOUNTER — HOSPITAL ENCOUNTER (OUTPATIENT)
Dept: RADIOLOGY | Facility: HOSPITAL | Age: 68
Discharge: HOME | End: 2023-01-06
Attending: SURGERY
Payer: MEDICARE

## 2023-01-06 ENCOUNTER — PATIENT MESSAGE (OUTPATIENT)
Dept: SURGERY | Facility: HOSPITAL | Age: 68
End: 2023-01-06
Payer: MEDICARE

## 2023-01-06 ENCOUNTER — HOSPITAL ENCOUNTER (OUTPATIENT)
Dept: CARDIOLOGY | Facility: HOSPITAL | Age: 68
Discharge: HOME | End: 2023-01-06
Attending: SURGERY
Payer: MEDICARE

## 2023-01-06 ENCOUNTER — OFFICE VISIT (OUTPATIENT)
Dept: SURGERY | Facility: CLINIC | Age: 68
End: 2023-01-06
Payer: MEDICARE

## 2023-01-06 VITALS
DIASTOLIC BLOOD PRESSURE: 87 MMHG | BODY MASS INDEX: 30.65 KG/M2 | WEIGHT: 210.56 LBS | SYSTOLIC BLOOD PRESSURE: 132 MMHG | TEMPERATURE: 98 F | HEART RATE: 64 BPM

## 2023-01-06 DIAGNOSIS — Z01.818 PREOP TESTING: ICD-10-CM

## 2023-01-06 DIAGNOSIS — Z98.890 S/P CLOSURE OF ILEOSTOMY: ICD-10-CM

## 2023-01-06 DIAGNOSIS — Z93.2 ILEOSTOMY PRESENT: ICD-10-CM

## 2023-01-06 DIAGNOSIS — Z01.818 PREOP TESTING: Primary | ICD-10-CM

## 2023-01-06 PROCEDURE — 1159F PR MEDICATION LIST DOCUMENTED IN MEDICAL RECORD: ICD-10-PCS | Mod: CPTII,S$GLB,, | Performed by: SURGERY

## 2023-01-06 PROCEDURE — 3079F PR MOST RECENT DIASTOLIC BLOOD PRESSURE 80-89 MM HG: ICD-10-PCS | Mod: CPTII,S$GLB,, | Performed by: SURGERY

## 2023-01-06 PROCEDURE — 99999 PR PBB SHADOW E&M-EST. PATIENT-LVL IV: ICD-10-PCS | Mod: PBBFAC,,, | Performed by: SURGERY

## 2023-01-06 PROCEDURE — 71045 XR CHEST 1 VIEW: ICD-10-PCS | Mod: 26,,, | Performed by: RADIOLOGY

## 2023-01-06 PROCEDURE — 93010 EKG 12-LEAD: ICD-10-PCS | Mod: ,,, | Performed by: INTERNAL MEDICINE

## 2023-01-06 PROCEDURE — 3008F PR BODY MASS INDEX (BMI) DOCUMENTED: ICD-10-PCS | Mod: CPTII,S$GLB,, | Performed by: SURGERY

## 2023-01-06 PROCEDURE — 1159F MED LIST DOCD IN RCRD: CPT | Mod: CPTII,S$GLB,, | Performed by: SURGERY

## 2023-01-06 PROCEDURE — 1126F AMNT PAIN NOTED NONE PRSNT: CPT | Mod: CPTII,S$GLB,, | Performed by: SURGERY

## 2023-01-06 PROCEDURE — 3075F SYST BP GE 130 - 139MM HG: CPT | Mod: CPTII,S$GLB,, | Performed by: SURGERY

## 2023-01-06 PROCEDURE — 71045 X-RAY EXAM CHEST 1 VIEW: CPT | Mod: TC

## 2023-01-06 PROCEDURE — 3075F PR MOST RECENT SYSTOLIC BLOOD PRESS GE 130-139MM HG: ICD-10-PCS | Mod: CPTII,S$GLB,, | Performed by: SURGERY

## 2023-01-06 PROCEDURE — 71045 X-RAY EXAM CHEST 1 VIEW: CPT | Mod: 26,,, | Performed by: RADIOLOGY

## 2023-01-06 PROCEDURE — 1126F PR PAIN SEVERITY QUANTIFIED, NO PAIN PRESENT: ICD-10-PCS | Mod: CPTII,S$GLB,, | Performed by: SURGERY

## 2023-01-06 PROCEDURE — 99215 OFFICE O/P EST HI 40 MIN: CPT | Mod: S$GLB,,, | Performed by: SURGERY

## 2023-01-06 PROCEDURE — 99999 PR PBB SHADOW E&M-EST. PATIENT-LVL IV: CPT | Mod: PBBFAC,,, | Performed by: SURGERY

## 2023-01-06 PROCEDURE — 93010 ELECTROCARDIOGRAM REPORT: CPT | Mod: ,,, | Performed by: INTERNAL MEDICINE

## 2023-01-06 PROCEDURE — 3079F DIAST BP 80-89 MM HG: CPT | Mod: CPTII,S$GLB,, | Performed by: SURGERY

## 2023-01-06 PROCEDURE — 99215 PR OFFICE/OUTPT VISIT, EST, LEVL V, 40-54 MIN: ICD-10-PCS | Mod: S$GLB,,, | Performed by: SURGERY

## 2023-01-06 PROCEDURE — 3008F BODY MASS INDEX DOCD: CPT | Mod: CPTII,S$GLB,, | Performed by: SURGERY

## 2023-01-06 PROCEDURE — 93005 ELECTROCARDIOGRAM TRACING: CPT

## 2023-01-06 RX ORDER — LIDOCAINE HYDROCHLORIDE 10 MG/ML
1 INJECTION, SOLUTION EPIDURAL; INFILTRATION; INTRACAUDAL; PERINEURAL ONCE
Status: DISCONTINUED | OUTPATIENT
Start: 2023-01-06 | End: 2023-01-31 | Stop reason: HOSPADM

## 2023-01-09 NOTE — H&P (VIEW-ONLY)
[facility] -   General Surgery History & Physical    SUBJECTIVE:     History of Present Illness:  Patient is a 67 y.o. male presents to discuss ileostomy closure.      Review of patient's allergies indicates:  No Known Allergies    Current Outpatient Medications   Medication Sig Dispense Refill    amiodarone (PACERONE) 200 MG Tab Take 1 tablet (200 mg total) by mouth 2 (two) times daily. 60 tablet 11    amLODIPine (NORVASC) 5 MG tablet Take 1 tablet (5 mg total) by mouth once daily. 30 tablet 1    apixaban (ELIQUIS) 5 mg Tab Take 1 tablet (5 mg total) by mouth 2 (two) times daily. 60 tablet 3    ascorbic acid, vitamin C, (VITAMIN C) 500 MG tablet Take 1 tablet (500 mg total) by mouth 2 (two) times daily.      famotidine (PEPCID) 20 MG tablet Take 1 tablet (20 mg total) by mouth 2 (two) times daily. 60 tablet 11    ferrous sulfate 325 (65 FE) MG EC tablet Take 1 tablet (325 mg total) by mouth once daily. 60 tablet 1    folic acid-vit B6-vit B12 2.5-25-2 mg (FOLBIC OR EQUIV) 2.5-25-2 mg Tab Take 1 tablet by mouth once daily. 60 tablet 0    loperamide (IMODIUM) 2 mg capsule Take 1 capsule (2 mg total) by mouth 2 (two) times a day. 60 capsule 5    psyllium husk, aspartame, (METAMUCIL) 3.4 gram PwPk packet Take 1 packet by mouth once daily. 30 packet 1     Current Facility-Administered Medications   Medication Dose Route Frequency Provider Last Rate Last Admin    LIDOcaine (PF) 10 mg/ml (1%) injection 10 mg  1 mL Intradermal Once Elvie Parker DO           Past Medical History:   Diagnosis Date    Diverticulitis     Supplemental oxygen dependent      Past Surgical History:   Procedure Laterality Date    ABDOMINAL WASHOUT  5/7/2022    Procedure: LAVAGE, PERITONEAL, THERAPEUTIC;  Surgeon: Elvie Parker DO;  Location: [facility] OR;  Service: General;;    ABDOMINAL WASHOUT  5/7/2022    Procedure: ;  Surgeon: Elvie Parker DO;  Location: [facility] OR;  Service: General;;    APPLICATION OF WOUND  VACUUM-ASSISTED CLOSURE DEVICE N/A 5/4/2022    Procedure: APPLICATION, WOUND VAC;  Surgeon: Elvie Parker DO;  Location: [facility] OR;  Service: General;  Laterality: N/A;    ILEOSTOMY N/A 5/11/2022    Procedure: CREATION, ILEOSTOMY;  Surgeon: Elvie Parker DO;  Location: [facility] OR;  Service: General;  Laterality: N/A;    LAPAROTOMY N/A 5/7/2022    Procedure: LAPAROTOMY;  Surgeon: Elvie Parker DO;  Location: [facility] OR;  Service: General;  Laterality: N/A;    LIVER BIOPSY Right 5/11/2022    Procedure: BIOPSY, LIVER;  Surgeon: Elvie Parker DO;  Location: [facility] OR;  Service: General;  Laterality: Right;    RIGHT HEMICOLECTOMY N/A 5/11/2022    Procedure: HEMICOLECTOMY, RIGHT;  Surgeon: Elvie Parker DO;  Location: [facility] OR;  Service: General;  Laterality: N/A;     No family history on file.  Social History     Tobacco Use    Smoking status: Former    Smokeless tobacco: Never   Substance Use Topics    Alcohol use: Not Currently    Drug use: Never        Review of Systems:  Review of Systems   Constitutional:  Negative for activity change and fever.   Gastrointestinal:  Negative for abdominal pain, nausea and vomiting.     OBJECTIVE:     Vital Signs (Most Recent)  Temp: 97.7 °F (36.5 °C) (01/06/23 0827)  Pulse: 64 (01/06/23 0827)  BP: 132/87 (01/06/23 0827)     95.5 kg (210 lb 8.6 oz)     Physical Exam:  Physical Exam  Vitals and nursing note reviewed.   Constitutional:       General: He is not in acute distress.     Appearance: He is not ill-appearing, toxic-appearing or diaphoretic.   HENT:      Head: Normocephalic.      Nose: Nose normal.      Mouth/Throat:      Mouth: Mucous membranes are moist.   Eyes:      General: No scleral icterus.        Right eye: No discharge.         Left eye: No discharge.      Extraocular Movements: Extraocular movements intact.   Cardiovascular:      Rate and Rhythm: Normal rate and regular rhythm.   Pulmonary:      Effort: No respiratory distress.      Breath sounds:  No stridor.   Abdominal:      General: There is no distension.      Palpations: Abdomen is soft.      Tenderness: There is no abdominal tenderness.      Comments: Midline incision well-healed. Right-sided ileostomy is pink and patent with stool output. Parastomal hernia present.   Musculoskeletal:      Cervical back: No rigidity.   Skin:     General: Skin is warm and dry.      Coloration: Skin is not jaundiced or pale.   Neurological:      Mental Status: He is alert and oriented to person, place, and time.   Psychiatric:         Mood and Affect: Mood normal.         Behavior: Behavior normal.       Laboratory  WBC   Date Value Ref Range Status   01/06/2023 9.03 3.90 - 12.70 K/uL Final     Hemoglobin   Date Value Ref Range Status   01/06/2023 11.8 (L) 14.0 - 18.0 g/dL Final     POC Hematocrit   Date Value Ref Range Status   05/07/2022 31 (L) 36 - 54 %PCV Final     Hematocrit   Date Value Ref Range Status   01/06/2023 38.5 (L) 40.0 - 54.0 % Final     Platelets   Date Value Ref Range Status   01/06/2023 368 150 - 450 K/uL Final     Sodium   Date Value Ref Range Status   01/06/2023 137 136 - 145 mmol/L Final     Potassium   Date Value Ref Range Status   01/06/2023 4.8 3.5 - 5.1 mmol/L Final     Creatinine   Date Value Ref Range Status   01/06/2023 1.1 0.5 - 1.4 mg/dL Final     Alkaline Phosphatase   Date Value Ref Range Status   01/06/2023 265 (H) 55 - 135 U/L Final     AST   Date Value Ref Range Status   01/06/2023 63 (H) 10 - 40 U/L Final     ALT   Date Value Ref Range Status   01/06/2023 53 (H) 10 - 44 U/L Final     Lipase   Date Value Ref Range Status   05/04/2022 116 (H) 4 - 60 U/L Final      No results found for: HGBA1C    Diagnostic Results:  CT Abdomen Pelvis With Contrast 09/12/2022    Narrative  EXAMINATION:  CT ABDOMEN PELVIS WITH CONTRAST    CLINICAL HISTORY:  Concern for possible parastomal hernia; Parastomal hernia without obstruction or gangrene    TECHNIQUE:  Low dose axial images, sagittal and coronal  reformations were obtained from the lung bases to the pubic symphysis following the IV administration of 100 mL of Omnipaque 350.  Oral contrast was not administered.    COMPARISON:  06/10/2022    FINDINGS:  Heart: Cardiomegaly.  Moderate coronary disease.    Lung Bases: Clear.  Mild scarring left lung base.    Liver: Normal size and attenuation.  Progression of liver lesions throughout the liver largest right hepatic lobe measuring 7.9 cm, largest left hepatic lobe measures 5.8 cm.    Gallbladder: No calcified gallstones.    Bile Ducts: No dilatation.    Pancreas: No mass. No peripancreatic fat stranding.    Spleen: Lobular configuration spleen with mild perisplenic fluid.    Adrenals: Normal.    Kidneys/Ureters: Mild cortical thinning.  Punctate nonobstructing stone lower pole left kidney.  No mass or  hydroureteronephrosis.    Bladder: No wall thickening.    Reproductive organs: Normal.    GI Tract/Mesentery: Rugal fold thickening within the stomach suggestive of gastritis.  Evidence of bowel obstruction.  Moderate peristomal hernia with a neck of approximately 4.5 cm in contains small and large bowel..    Extensive diverticulosis throughout the large bowel.  Status post right hemicolectomy.  Shotty nodes are seen within the mesentery and right upper quadrant.    Peritoneal Space: No significant ascites.  No free air.  Previous collection within the pelvis is smaller than was seen on prior exam and currently measures 3.1 x 2.3 cm cannot exclude necrotic node or implant.    Retroperitoneum: No significant adenopathy.    Abdominal wall: Normal.    Vasculature: No aneurysm.  Mild atherosclerotic disease.  Tortuosity of the descending aorta can be seen with chronic hypertension.    Bones: No acute fracture.  Mild osteopenia.  Scattered degenerative changes greatest lumbar spine.  Left-sided sacroiliitis suspected.  No suspicious lytic or sclerotic lesions.    Impression  Moderate peristomal hernia with a neck of  approximately 4.5 cm.    See additional findings above    All CT scans at this facility use dose modulation, iterative reconstruction, and/or weight based dosing when appropriate to reduce radiation dose to as low as reasonable achievable.      Electronically signed by: Philippe Horton MD  Date:    09/12/2022  Time:    14:41      No results found for this or any previous visit from the past 365 days.           ASSESSMENT/PLAN:     Franky was seen today for follow-up.    Diagnoses and all orders for this visit:    Preop testing  -     Full code; Standing  -     Insert peripheral IV; Standing  -     Diet NPO; Standing  -     Comprehensive metabolic panel; Future  -     CBC auto differential; Future  -     EKG 12-lead; Future  -     X-Ray Chest 1 View; Future  -     Full code  -     Insert peripheral IV    S/P closure of ileostomy  -     Place in Outpatient; Standing    Ileostomy present  -     Case Request Operating Room: CLOSURE, ILEOSTOMY, LAPAROSCOPY-ASSISTED    Other orders  -     LIDOcaine (PF) 10 mg/ml (1%) injection 10 mg  -     ceFAZolin (ANCEF) 2 g in dextrose 5 % 50 mL IVPB  -     IP VTE LOW RISK PATIENT; Standing       Patient has metastatic colon cancer. Had a long discussion with the patient and his family. Patient declined systemic therapy and palliative care. He reports a good quality and function of life and is active, eating well, gaining weight, and even playing golf. He states the ileostomy is interfering with his quality of life though and would like it reversed. He understands that while we cannot predict how much longer he has to live, it could be on the basis of just mere months. I explained that if complications were to arise from the surgery, I would not want him living his last days going through that. I also explained that he is high risk for potential complications due to possible adhesions and tumor burden. There may be a chance that I am even unable to perform the surgery if his abdomen is  too hostile, in which case the procedure would be aborted. He expressed understanding but would still like to proceed with surgery.    Will plan for laparoscopic-assisted ileostomy closure, possible open. He will need to stay 1-2 nights in the hospital depending on how the surgery goes.    After explaining the risks (including bleeding, infection, impaired wound healing, scarring, damage to other organs, vascular complications, cardiopulmonary complications, and death), benefits, and alternatives, patient verbalized understanding and would like to proceed with surgery. All questions were answered to their satisfaction.      Patient expressed understanding and is in agreement.      Elvie Parker, DO  General Surgery  [facility]

## 2023-01-09 NOTE — PROGRESS NOTES
[facility] -   General Surgery History & Physical    SUBJECTIVE:     History of Present Illness:  Patient is a 67 y.o. male presents to discuss ileostomy closure.      Review of patient's allergies indicates:  No Known Allergies    Current Outpatient Medications   Medication Sig Dispense Refill    amiodarone (PACERONE) 200 MG Tab Take 1 tablet (200 mg total) by mouth 2 (two) times daily. 60 tablet 11    amLODIPine (NORVASC) 5 MG tablet Take 1 tablet (5 mg total) by mouth once daily. 30 tablet 1    apixaban (ELIQUIS) 5 mg Tab Take 1 tablet (5 mg total) by mouth 2 (two) times daily. 60 tablet 3    ascorbic acid, vitamin C, (VITAMIN C) 500 MG tablet Take 1 tablet (500 mg total) by mouth 2 (two) times daily.      famotidine (PEPCID) 20 MG tablet Take 1 tablet (20 mg total) by mouth 2 (two) times daily. 60 tablet 11    ferrous sulfate 325 (65 FE) MG EC tablet Take 1 tablet (325 mg total) by mouth once daily. 60 tablet 1    folic acid-vit B6-vit B12 2.5-25-2 mg (FOLBIC OR EQUIV) 2.5-25-2 mg Tab Take 1 tablet by mouth once daily. 60 tablet 0    loperamide (IMODIUM) 2 mg capsule Take 1 capsule (2 mg total) by mouth 2 (two) times a day. 60 capsule 5    psyllium husk, aspartame, (METAMUCIL) 3.4 gram PwPk packet Take 1 packet by mouth once daily. 30 packet 1     Current Facility-Administered Medications   Medication Dose Route Frequency Provider Last Rate Last Admin    LIDOcaine (PF) 10 mg/ml (1%) injection 10 mg  1 mL Intradermal Once Elvie Parker DO           Past Medical History:   Diagnosis Date    Diverticulitis     Supplemental oxygen dependent      Past Surgical History:   Procedure Laterality Date    ABDOMINAL WASHOUT  5/7/2022    Procedure: LAVAGE, PERITONEAL, THERAPEUTIC;  Surgeon: Elvie Parker DO;  Location: [facility] OR;  Service: General;;    ABDOMINAL WASHOUT  5/7/2022    Procedure: ;  Surgeon: Elvie Parker DO;  Location: [facility] OR;  Service: General;;    APPLICATION OF WOUND  VACUUM-ASSISTED CLOSURE DEVICE N/A 5/4/2022    Procedure: APPLICATION, WOUND VAC;  Surgeon: Elvie Parker DO;  Location: [facility] OR;  Service: General;  Laterality: N/A;    ILEOSTOMY N/A 5/11/2022    Procedure: CREATION, ILEOSTOMY;  Surgeon: Elvie Parker DO;  Location: [facility] OR;  Service: General;  Laterality: N/A;    LAPAROTOMY N/A 5/7/2022    Procedure: LAPAROTOMY;  Surgeon: Elvie Parker DO;  Location: [facility] OR;  Service: General;  Laterality: N/A;    LIVER BIOPSY Right 5/11/2022    Procedure: BIOPSY, LIVER;  Surgeon: Elvie Parker DO;  Location: [facility] OR;  Service: General;  Laterality: Right;    RIGHT HEMICOLECTOMY N/A 5/11/2022    Procedure: HEMICOLECTOMY, RIGHT;  Surgeon: Elvie Parker DO;  Location: [facility] OR;  Service: General;  Laterality: N/A;     No family history on file.  Social History     Tobacco Use    Smoking status: Former    Smokeless tobacco: Never   Substance Use Topics    Alcohol use: Not Currently    Drug use: Never        Review of Systems:  Review of Systems   Constitutional:  Negative for activity change and fever.   Gastrointestinal:  Negative for abdominal pain, nausea and vomiting.     OBJECTIVE:     Vital Signs (Most Recent)  Temp: 97.7 °F (36.5 °C) (01/06/23 0827)  Pulse: 64 (01/06/23 0827)  BP: 132/87 (01/06/23 0827)     95.5 kg (210 lb 8.6 oz)     Physical Exam:  Physical Exam  Vitals and nursing note reviewed.   Constitutional:       General: He is not in acute distress.     Appearance: He is not ill-appearing, toxic-appearing or diaphoretic.   HENT:      Head: Normocephalic.      Nose: Nose normal.      Mouth/Throat:      Mouth: Mucous membranes are moist.   Eyes:      General: No scleral icterus.        Right eye: No discharge.         Left eye: No discharge.      Extraocular Movements: Extraocular movements intact.   Cardiovascular:      Rate and Rhythm: Normal rate and regular rhythm.   Pulmonary:      Effort: No respiratory distress.      Breath sounds:  No stridor.   Abdominal:      General: There is no distension.      Palpations: Abdomen is soft.      Tenderness: There is no abdominal tenderness.      Comments: Midline incision well-healed. Right-sided ileostomy is pink and patent with stool output. Parastomal hernia present.   Musculoskeletal:      Cervical back: No rigidity.   Skin:     General: Skin is warm and dry.      Coloration: Skin is not jaundiced or pale.   Neurological:      Mental Status: He is alert and oriented to person, place, and time.   Psychiatric:         Mood and Affect: Mood normal.         Behavior: Behavior normal.       Laboratory  WBC   Date Value Ref Range Status   01/06/2023 9.03 3.90 - 12.70 K/uL Final     Hemoglobin   Date Value Ref Range Status   01/06/2023 11.8 (L) 14.0 - 18.0 g/dL Final     POC Hematocrit   Date Value Ref Range Status   05/07/2022 31 (L) 36 - 54 %PCV Final     Hematocrit   Date Value Ref Range Status   01/06/2023 38.5 (L) 40.0 - 54.0 % Final     Platelets   Date Value Ref Range Status   01/06/2023 368 150 - 450 K/uL Final     Sodium   Date Value Ref Range Status   01/06/2023 137 136 - 145 mmol/L Final     Potassium   Date Value Ref Range Status   01/06/2023 4.8 3.5 - 5.1 mmol/L Final     Creatinine   Date Value Ref Range Status   01/06/2023 1.1 0.5 - 1.4 mg/dL Final     Alkaline Phosphatase   Date Value Ref Range Status   01/06/2023 265 (H) 55 - 135 U/L Final     AST   Date Value Ref Range Status   01/06/2023 63 (H) 10 - 40 U/L Final     ALT   Date Value Ref Range Status   01/06/2023 53 (H) 10 - 44 U/L Final     Lipase   Date Value Ref Range Status   05/04/2022 116 (H) 4 - 60 U/L Final      No results found for: HGBA1C    Diagnostic Results:  CT Abdomen Pelvis With Contrast 09/12/2022    Narrative  EXAMINATION:  CT ABDOMEN PELVIS WITH CONTRAST    CLINICAL HISTORY:  Concern for possible parastomal hernia; Parastomal hernia without obstruction or gangrene    TECHNIQUE:  Low dose axial images, sagittal and coronal  reformations were obtained from the lung bases to the pubic symphysis following the IV administration of 100 mL of Omnipaque 350.  Oral contrast was not administered.    COMPARISON:  06/10/2022    FINDINGS:  Heart: Cardiomegaly.  Moderate coronary disease.    Lung Bases: Clear.  Mild scarring left lung base.    Liver: Normal size and attenuation.  Progression of liver lesions throughout the liver largest right hepatic lobe measuring 7.9 cm, largest left hepatic lobe measures 5.8 cm.    Gallbladder: No calcified gallstones.    Bile Ducts: No dilatation.    Pancreas: No mass. No peripancreatic fat stranding.    Spleen: Lobular configuration spleen with mild perisplenic fluid.    Adrenals: Normal.    Kidneys/Ureters: Mild cortical thinning.  Punctate nonobstructing stone lower pole left kidney.  No mass or  hydroureteronephrosis.    Bladder: No wall thickening.    Reproductive organs: Normal.    GI Tract/Mesentery: Rugal fold thickening within the stomach suggestive of gastritis.  Evidence of bowel obstruction.  Moderate peristomal hernia with a neck of approximately 4.5 cm in contains small and large bowel..    Extensive diverticulosis throughout the large bowel.  Status post right hemicolectomy.  Shotty nodes are seen within the mesentery and right upper quadrant.    Peritoneal Space: No significant ascites.  No free air.  Previous collection within the pelvis is smaller than was seen on prior exam and currently measures 3.1 x 2.3 cm cannot exclude necrotic node or implant.    Retroperitoneum: No significant adenopathy.    Abdominal wall: Normal.    Vasculature: No aneurysm.  Mild atherosclerotic disease.  Tortuosity of the descending aorta can be seen with chronic hypertension.    Bones: No acute fracture.  Mild osteopenia.  Scattered degenerative changes greatest lumbar spine.  Left-sided sacroiliitis suspected.  No suspicious lytic or sclerotic lesions.    Impression  Moderate peristomal hernia with a neck of  approximately 4.5 cm.    See additional findings above    All CT scans at this facility use dose modulation, iterative reconstruction, and/or weight based dosing when appropriate to reduce radiation dose to as low as reasonable achievable.      Electronically signed by: Philippe Horton MD  Date:    09/12/2022  Time:    14:41      No results found for this or any previous visit from the past 365 days.           ASSESSMENT/PLAN:     Franky was seen today for follow-up.    Diagnoses and all orders for this visit:    Preop testing  -     Full code; Standing  -     Insert peripheral IV; Standing  -     Diet NPO; Standing  -     Comprehensive metabolic panel; Future  -     CBC auto differential; Future  -     EKG 12-lead; Future  -     X-Ray Chest 1 View; Future  -     Full code  -     Insert peripheral IV    S/P closure of ileostomy  -     Place in Outpatient; Standing    Ileostomy present  -     Case Request Operating Room: CLOSURE, ILEOSTOMY, LAPAROSCOPY-ASSISTED    Other orders  -     LIDOcaine (PF) 10 mg/ml (1%) injection 10 mg  -     ceFAZolin (ANCEF) 2 g in dextrose 5 % 50 mL IVPB  -     IP VTE LOW RISK PATIENT; Standing       Patient has metastatic colon cancer. Had a long discussion with the patient and his family. Patient declined systemic therapy and palliative care. He reports a good quality and function of life and is active, eating well, gaining weight, and even playing golf. He states the ileostomy is interfering with his quality of life though and would like it reversed. He understands that while we cannot predict how much longer he has to live, it could be on the basis of just mere months. I explained that if complications were to arise from the surgery, I would not want him living his last days going through that. I also explained that he is high risk for potential complications due to possible adhesions and tumor burden. There may be a chance that I am even unable to perform the surgery if his abdomen is  too hostile, in which case the procedure would be aborted. He expressed understanding but would still like to proceed with surgery.    Will plan for laparoscopic-assisted ileostomy closure, possible open. He will need to stay 1-2 nights in the hospital depending on how the surgery goes.    After explaining the risks (including bleeding, infection, impaired wound healing, scarring, damage to other organs, vascular complications, cardiopulmonary complications, and death), benefits, and alternatives, patient verbalized understanding and would like to proceed with surgery. All questions were answered to their satisfaction.      Patient expressed understanding and is in agreement.      Elvie Parker, DO  General Surgery  [facility]

## 2023-01-18 ENCOUNTER — TELEPHONE (OUTPATIENT)
Dept: SURGERY | Facility: CLINIC | Age: 68
End: 2023-01-18
Payer: MEDICARE

## 2023-01-18 NOTE — TELEPHONE ENCOUNTER
Called patient in regards to obtaining cardiac clearance prior to surgery with Dr. Parker on 1/26. Patient states he does not have a cardiologist he sees. Will schedule an appointment to see a Cardiologist for cardiac clearance (eloquis hold as well). Scheduled patient for tomorrow 1/19 to see Dr. Maravilla. Patient verbalized understanding.

## 2023-01-18 NOTE — TELEPHONE ENCOUNTER
Patient called to let us know that he is scheduled to see Cardiology for a Cardiac clearance tomorrow.

## 2023-01-18 NOTE — TELEPHONE ENCOUNTER
----- Message from Meghan Tanner sent at 1/18/2023  1:56 PM CST -----  Type:  Patient Returning Call    Who Called:wife  Who Left Message for Patient:nurse  Does the patient know what this is regarding?:surgery  Would the patient rather a call back or a response via [facility]? Call back  Best Call Back Number:[phone]  Additional Information: na

## 2023-01-19 ENCOUNTER — OFFICE VISIT (OUTPATIENT)
Dept: CARDIOLOGY | Facility: CLINIC | Age: 68
End: 2023-01-19
Payer: MEDICARE

## 2023-01-19 VITALS
OXYGEN SATURATION: 99 % | BODY MASS INDEX: 23.81 KG/M2 | WEIGHT: 163.56 LBS | DIASTOLIC BLOOD PRESSURE: 84 MMHG | HEART RATE: 65 BPM | SYSTOLIC BLOOD PRESSURE: 144 MMHG

## 2023-01-19 DIAGNOSIS — Z01.818 PREOP TESTING: Primary | ICD-10-CM

## 2023-01-19 DIAGNOSIS — C18.9 ADENOCARCINOMA OF COLON METASTATIC TO LIVER: ICD-10-CM

## 2023-01-19 DIAGNOSIS — I51.9 SYSTOLIC DYSFUNCTION: ICD-10-CM

## 2023-01-19 DIAGNOSIS — C78.7 SECONDARY LIVER CANCER: ICD-10-CM

## 2023-01-19 DIAGNOSIS — C78.7 ADENOCARCINOMA OF COLON METASTATIC TO LIVER: ICD-10-CM

## 2023-01-19 PROCEDURE — 3077F SYST BP >= 140 MM HG: CPT | Mod: CPTII,S$GLB,, | Performed by: INTERNAL MEDICINE

## 2023-01-19 PROCEDURE — 3008F PR BODY MASS INDEX (BMI) DOCUMENTED: ICD-10-PCS | Mod: CPTII,S$GLB,, | Performed by: INTERNAL MEDICINE

## 2023-01-19 PROCEDURE — 1101F PT FALLS ASSESS-DOCD LE1/YR: CPT | Mod: CPTII,S$GLB,, | Performed by: INTERNAL MEDICINE

## 2023-01-19 PROCEDURE — 3079F DIAST BP 80-89 MM HG: CPT | Mod: CPTII,S$GLB,, | Performed by: INTERNAL MEDICINE

## 2023-01-19 PROCEDURE — 3079F PR MOST RECENT DIASTOLIC BLOOD PRESSURE 80-89 MM HG: ICD-10-PCS | Mod: CPTII,S$GLB,, | Performed by: INTERNAL MEDICINE

## 2023-01-19 PROCEDURE — 1101F PR PT FALLS ASSESS DOC 0-1 FALLS W/OUT INJ PAST YR: ICD-10-PCS | Mod: CPTII,S$GLB,, | Performed by: INTERNAL MEDICINE

## 2023-01-19 PROCEDURE — 1159F PR MEDICATION LIST DOCUMENTED IN MEDICAL RECORD: ICD-10-PCS | Mod: CPTII,S$GLB,, | Performed by: INTERNAL MEDICINE

## 2023-01-19 PROCEDURE — 3288F FALL RISK ASSESSMENT DOCD: CPT | Mod: CPTII,S$GLB,, | Performed by: INTERNAL MEDICINE

## 2023-01-19 PROCEDURE — 99999 PR PBB SHADOW E&M-EST. PATIENT-LVL III: CPT | Mod: PBBFAC,,, | Performed by: INTERNAL MEDICINE

## 2023-01-19 PROCEDURE — 3008F BODY MASS INDEX DOCD: CPT | Mod: CPTII,S$GLB,, | Performed by: INTERNAL MEDICINE

## 2023-01-19 PROCEDURE — 3077F PR MOST RECENT SYSTOLIC BLOOD PRESSURE >= 140 MM HG: ICD-10-PCS | Mod: CPTII,S$GLB,, | Performed by: INTERNAL MEDICINE

## 2023-01-19 PROCEDURE — 1126F PR PAIN SEVERITY QUANTIFIED, NO PAIN PRESENT: ICD-10-PCS | Mod: CPTII,S$GLB,, | Performed by: INTERNAL MEDICINE

## 2023-01-19 PROCEDURE — 99205 PR OFFICE/OUTPT VISIT, NEW, LEVL V, 60-74 MIN: ICD-10-PCS | Mod: S$GLB,,, | Performed by: INTERNAL MEDICINE

## 2023-01-19 PROCEDURE — 1126F AMNT PAIN NOTED NONE PRSNT: CPT | Mod: CPTII,S$GLB,, | Performed by: INTERNAL MEDICINE

## 2023-01-19 PROCEDURE — 99999 PR PBB SHADOW E&M-EST. PATIENT-LVL III: ICD-10-PCS | Mod: PBBFAC,,, | Performed by: INTERNAL MEDICINE

## 2023-01-19 PROCEDURE — 1159F MED LIST DOCD IN RCRD: CPT | Mod: CPTII,S$GLB,, | Performed by: INTERNAL MEDICINE

## 2023-01-19 PROCEDURE — 99205 OFFICE O/P NEW HI 60 MIN: CPT | Mod: S$GLB,,, | Performed by: INTERNAL MEDICINE

## 2023-01-19 PROCEDURE — 3288F PR FALLS RISK ASSESSMENT DOCUMENTED: ICD-10-PCS | Mod: CPTII,S$GLB,, | Performed by: INTERNAL MEDICINE

## 2023-01-19 NOTE — PROGRESS NOTES
Subjective:   Patient ID:  Franky Masters is a 67 y.o. male who presents for evaluation of No chief complaint on file.      HPI  67  year old male, with history of colon cancer and liver metastases, ex-smoker, colostomy bag.    He presented in May of 2020 to the hospital with sepsis, perforated bowels, had a wound VAC at that time, during his sepsis he had an echocardiogram that showed an EF of 35-40%.  He also had an episode of AFib that was paroxysmal and he was put on Eliquis after discharge.    He states that currently he started taking any of his medications.    He is however active he does bike and for 20 miles also goals without any limitations.  No chest pain free no dyspnea on exertion.  No lower extremity swelling.  No PND orthopnea.    No palpitations syncope presyncope   He states he is treated by his daughter who is a chiropractor    I have reviewed the notes from general surgery.  Plan for reversing his colostomy bag as it is interfering with his lifestyle as per patient request.  However as per surgery note there is a questionable candidacy for such a procedure.  Was reported as his liver mass he is not wanting treatment chemotherapy, states he is eating okay and does not feel ill  He thinks that he is missing all minerals and vitamins to treat his condition.          Past Medical History:   Diagnosis Date    Cancer     Diverticulitis     Encounter for blood transfusion     Liver disease     Tumors    Supplemental oxygen dependent     only during Covid 2021       Past Surgical History:   Procedure Laterality Date    ABDOMINAL WASHOUT  5/7/2022    Procedure: LAVAGE, PERITONEAL, THERAPEUTIC;  Surgeon: Elvie Parker DO;  Location: [facility] OR;  Service: General;;    ABDOMINAL WASHOUT  5/7/2022    Procedure: ;  Surgeon: Elvie Parker DO;  Location: [facility] OR;  Service: General;;    APPLICATION OF WOUND VACUUM-ASSISTED CLOSURE DEVICE N/A 5/4/2022    Procedure: APPLICATION, WOUND VAC;  Surgeon:  Elvie Parker DO;  Location: [facility] OR;  Service: General;  Laterality: N/A;    ILEOSTOMY N/A 5/11/2022    Procedure: CREATION, ILEOSTOMY;  Surgeon: Elvie Parker DO;  Location: [facility] OR;  Service: General;  Laterality: N/A;    LAPAROTOMY N/A 5/7/2022    Procedure: LAPAROTOMY;  Surgeon: Elvie Parker DO;  Location: [facility] OR;  Service: General;  Laterality: N/A;    LIVER BIOPSY Right 5/11/2022    Procedure: BIOPSY, LIVER;  Surgeon: Elvie Parker DO;  Location: [facility] OR;  Service: General;  Laterality: Right;    RIGHT HEMICOLECTOMY N/A 5/11/2022    Procedure: HEMICOLECTOMY, RIGHT;  Surgeon: Elvie Parker DO;  Location: [facility] OR;  Service: General;  Laterality: N/A;       Social History     Tobacco Use    Smoking status: Former    Smokeless tobacco: Never   Substance Use Topics    Alcohol use: Never    Drug use: Never       History reviewed. No pertinent family history.    Review of Systems   Constitutional: Negative for fever and malaise/fatigue.   HENT:  Negative for sore throat.    Eyes:  Negative for blurred vision.   Cardiovascular:  Negative for chest pain, claudication, cyanosis, dyspnea on exertion, irregular heartbeat, leg swelling, near-syncope, orthopnea, palpitations, paroxysmal nocturnal dyspnea and syncope.   Respiratory:  Negative for cough and hemoptysis.    Hematologic/Lymphatic: Negative for bleeding problem.   Skin:  Negative for rash.   Musculoskeletal:  Negative for falls.   Gastrointestinal:  Negative for abdominal pain.   Genitourinary: Negative.    Neurological: Negative.    Psychiatric/Behavioral:  Negative for altered mental status and substance abuse.      Current Outpatient Medications on File Prior to Visit   Medication Sig    ascorbic acid, vitamin C, (VITAMIN C) 500 MG tablet Take 1 tablet (500 mg total) by mouth 2 (two) times daily.    ferrous sulfate 325 (65 FE) MG EC tablet Take 1 tablet (325 mg total) by mouth once daily.    folic acid-vit B6-vit B12 2.5-25-2  mg (FOLBIC OR EQUIV) 2.5-25-2 mg Tab Take 1 tablet by mouth once daily.    loperamide (IMODIUM) 2 mg capsule Take 1 capsule (2 mg total) by mouth 2 (two) times a day. (Patient taking differently: Take 2 mg by mouth every evening.)    psyllium husk, aspartame, (METAMUCIL) 3.4 gram PwPk packet Take 1 packet by mouth once daily.    amiodarone (PACERONE) 200 MG Tab Take 1 tablet (200 mg total) by mouth 2 (two) times daily. (Patient not taking: Reported on 1/19/2023)    amLODIPine (NORVASC) 5 MG tablet Take 1 tablet (5 mg total) by mouth once daily. (Patient not taking: Reported on 1/19/2023)    apixaban (ELIQUIS) 5 mg Tab Take 1 tablet (5 mg total) by mouth 2 (two) times daily. (Patient not taking: Reported on 1/19/2023)    famotidine (PEPCID) 20 MG tablet Take 1 tablet (20 mg total) by mouth 2 (two) times daily. (Patient not taking: Reported on 1/19/2023)     Current Facility-Administered Medications on File Prior to Visit   Medication    LIDOcaine (PF) 10 mg/ml (1%) injection 10 mg       Objective:   Objective:  Wt Readings from Last 3 Encounters:   01/19/23 74.2 kg (163 lb 9.3 oz)   01/06/23 95.5 kg (210 lb 8.6 oz)   01/05/23 95.6 kg (210 lb 12.2 oz)     Temp Readings from Last 3 Encounters:   01/06/23 97.7 °F (36.5 °C) (Oral)   01/05/23 97.9 °F (36.6 °C) (Temporal)   09/02/22 98.1 °F (36.7 °C) (Temporal)     BP Readings from Last 3 Encounters:   01/19/23 (!) 144/84   01/06/23 132/87   01/05/23 (!) 147/86     Pulse Readings from Last 3 Encounters:   01/19/23 65   01/06/23 64   01/05/23 60       Physical Exam  Vitals reviewed.   Constitutional:       Appearance: He is well-developed.   HENT:      Head: Normocephalic and atraumatic.   Eyes:      General: No scleral icterus.     Conjunctiva/sclera: Conjunctivae normal.   Cardiovascular:      Rate and Rhythm: Normal rate and regular rhythm.      Pulses: Intact distal pulses.      Heart sounds: Normal heart sounds. No murmur heard.  Pulmonary:      Effort: No respiratory  distress.      Breath sounds: No wheezing or rales.   Chest:      Chest wall: No tenderness.   Abdominal:      General: Bowel sounds are normal. There is no distension.      Palpations: Abdomen is soft.      Tenderness: There is no guarding.   Musculoskeletal:         General: Normal range of motion.      Cervical back: Normal range of motion and neck supple.   Skin:     General: Skin is warm.   Neurological:      Mental Status: He is alert and oriented to person, place, and time.       No results found for: CHOL  No results found for: HDL  No results found for: LDLCALC  Lab Results   Component Value Date    TRIG 69 05/18/2022    TRIG 117 05/11/2022     No results found for: CHOLHDL    Chemistry        Component Value Date/Time     01/06/2023 0937    K 4.8 01/06/2023 0937     01/06/2023 0937    CO2 21 (L) 01/06/2023 0937    BUN 24 (H) 01/06/2023 0937    CREATININE 1.1 01/06/2023 0937    GLU 98 01/06/2023 0937        Component Value Date/Time    CALCIUM 9.8 01/06/2023 0937    ALKPHOS 265 (H) 01/06/2023 0937    AST 63 (H) 01/06/2023 0937    ALT 53 (H) 01/06/2023 0937    BILITOT 0.8 01/06/2023 0937    ESTGFRAFRICA >60 06/17/2022 1309    EGFRNONAA >60 06/17/2022 1309          No results found for: TSH  Lab Results   Component Value Date    INR 1.0 06/17/2022    INR 1.1 05/12/2022    INR 1.1 05/05/2022     Lab Results   Component Value Date    WBC 9.03 01/06/2023    HGB 11.8 (L) 01/06/2023    HCT 38.5 (L) 01/06/2023     (H) 01/06/2023     01/06/2023     BNP  @LABRCNTIP(BNP,BNPTRIAGEBLO)@  CrCl cannot be calculated (Patient's most recent lab result is older than the maximum 7 days allowed.).     Imaging:  ======  Results for orders placed during the hospital encounter of 05/04/22    Echo Saline Bubble? No    Interpretation Summary  · The left ventricle is normal in size with concentric hypertrophy and moderately decreased systolic function.  · Grade I left ventricular diastolic dysfunction.  ·  Normal right ventricular size with low normal right ventricular systolic function.  · The estimated ejection fraction is 35-40%.  · There is mild left ventricular global hypokinesis.  · Moderate mitral regurgitation.    No results found for this or any previous visit.    No results found for this or any previous visit.    No results found for this or any previous visit.    No results found for this or any previous visit.    No valid procedures specified.    Diagnostic Results:  ECG: Reviewed    The ASCVD Risk score (Tiburcio DK, et al., 2019) failed to calculate for the following reasons:    Cannot find a previous HDL lab    Cannot find a previous total cholesterol lab    Assessment and Plan:   Preop testing  -     Echo; Future    Systolic dysfunction    Adenocarcinoma of colon metastatic to liver    Secondary liver cancer    Currently he is not on any medications.    Discussed Eliquis in the clinic.  They believe the AFib was due to his illness.    Continue to monitor.    As far as preop.  His EF was 35-40% in may last year.  We need to repeat his echo to establish better risk assessment.    As of now patient is at least at moderate risk given his depressed EF which could have been due to sepsis a year ago.    He is however with a good functional status and asymptomatic no chest pain or symptoms of CHF.    If no worsening of his EF he is stable from cardiac standpoint to undergo his colostomy reversal at moderate risk.    Follow up in 6 months

## 2023-01-19 NOTE — PRE ADMISSION SCREENING
Pre op instructions reviewed with patient & wife per phone on 1/19/23: Spoke about pre op process and surgery instructions, verbalized understanding.    Surgery is scheduled on 1/26/23.  We will call you the business day prior to surgery to confirm arrival time (after 2:30 pm), as it is subject to change due to cancellations & emergencies.    Please report to the [facility] (1st Floor) at [facility] located off of [facility] (2nd building on the left, in front of the [facility]),address: [address] [place] [address] [place] [address]      INSTRUCTIONS IMPORTANT!!!  Do Not Eat, Drink, or Smoke after 12 midnight! NO WATER after midnight! OK to brush teeth, no gum, candy or mints!      Take only these medicines with a small swallow of water-morning of surgery:  none    ____  NO Acrylic/fake nails or nail polish worn day of surgery (specifically hand/arm & foot surgeries).  ____  NO powder, lotions, deodorants, oils or creams on body.  ____  Please Remove All jewelry & piercings prior to surgery.  ____  Please Remove Dentures, Hearing Aids & Contact Lens prior to the start of surgery.  ____  Please bring photo ID and insurance information to hospital (Leave Valuables at Home).  ____  If going home the same day, arrange for a ride home. You will not be able to drive 24 hrs if Anesthesia was used.   ____  Wear clean, loose fitting clothing. Allow for dressings, bandages.  ____  Stop all Aspirin products, Ibuprofen, Advil, Motrin & Aleve at least 5-7 days before surgery, unless otherwise instructed by your doctor, or the nurse.   ____  Blood Thinners are stopped based on your Provider's recommendation; Call Surgeon's Office to inquire when to stop/hold.  ____  Stop taking any Fish Oil supplements or Vitamins at least 5 days prior to surgery, unless instructed otherwise by your Doctor.        Bathing Instructions:    -Do not shave your face or body the day before or the day of surgery.              -Shower & Rinse your  body as usual with anti-bacterial Soap (Dial)              -Rinse your body thoroughly.     [facility] Visitor/Ride Policy:  Only 2 adults allowed (over the age of 18) to accompany you to the Hospital. You Must have a ride home from a responsible adult that you know and trust. Medical Transport, Uber or Lyft can only be used if patient has a responsible adult to accompany them during ride home.    Post-Op Instructions: You will receive Post-op/Discharge instructions by your Discharge Nurse prior to going home. Please call your Surgeon's office with any post-surgery questions/concerns.    *Call [facility] Pre-Admissions Department with surgery instruction questions @ [phone]-Lissette, [phone] or [phone] (Mon-Fri 8 am to 4 pm)    *If you are running late or have questions the morning of surgery, please call the Surgery Dept @ [phone]  *Insurance/ Financial Questions, please call [phone].

## 2023-01-20 ENCOUNTER — HOSPITAL ENCOUNTER (OUTPATIENT)
Dept: CARDIOLOGY | Facility: HOSPITAL | Age: 68
Discharge: HOME | End: 2023-01-20
Attending: INTERNAL MEDICINE
Payer: MEDICARE

## 2023-01-20 ENCOUNTER — ANESTHESIA EVENT (OUTPATIENT)
Dept: SURGERY | Facility: HOSPITAL | Age: 68
DRG: 335 | End: 2023-01-20
Payer: MEDICARE

## 2023-01-20 VITALS
WEIGHT: 163 LBS | BODY MASS INDEX: 24.14 KG/M2 | HEIGHT: 69 IN | SYSTOLIC BLOOD PRESSURE: 144 MMHG | DIASTOLIC BLOOD PRESSURE: 84 MMHG

## 2023-01-20 DIAGNOSIS — Z01.818 PREOP TESTING: ICD-10-CM

## 2023-01-20 LAB
AORTIC ROOT ANNULUS: 2.98 CM
ASCENDING AORTA: 2.94 CM
AV INDEX (PROSTH): 0.64
AV MEAN GRADIENT: 7 MMHG
AV PEAK GRADIENT: 12 MMHG
AV VALVE AREA: 2.27 CM2
AV VELOCITY RATIO: 0.63
BSA FOR ECHO PROCEDURE: 1.9 M2
CV ECHO LV RWT: 0.42 CM
DOP CALC AO PEAK VEL: 1.71 M/S
DOP CALC AO VTI: 34.6 CM
DOP CALC LVOT AREA: 3.6 CM2
DOP CALC LVOT DIAMETER: 2.13 CM
DOP CALC LVOT PEAK VEL: 1.07 M/S
DOP CALC LVOT STROKE VOLUME: 78.71 CM3
DOP CALC RVOT PEAK VEL: 0.68 M/S
DOP CALC RVOT VTI: 12.9 CM
DOP CALCLVOT PEAK VEL VTI: 22.1 CM
E WAVE DECELERATION TIME: 281.54 MSEC
E/A RATIO: 0.54
E/E' RATIO: 8 M/S
ECHO LV POSTERIOR WALL: 1.11 CM (ref 0.6–1.1)
EJECTION FRACTION: 30 %
FRACTIONAL SHORTENING: 22 % (ref 28–44)
INTERVENTRICULAR SEPTUM: 1.16 CM (ref 0.6–1.1)
IVC DIAMETER: 1.46 CM
IVRT: 142.72 MSEC
LA MAJOR: 4.95 CM
LA MINOR: 4.6 CM
LA WIDTH: 3.8 CM
LEFT ATRIUM SIZE: 4.52 CM
LEFT ATRIUM VOLUME INDEX MOD: 30.3 ML/M2
LEFT ATRIUM VOLUME INDEX: 36.8 ML/M2
LEFT ATRIUM VOLUME MOD: 57.35 CM3
LEFT ATRIUM VOLUME: 69.62 CM3
LEFT INTERNAL DIMENSION IN SYSTOLE: 4.13 CM (ref 2.1–4)
LEFT VENTRICLE DIASTOLIC VOLUME INDEX: 71.43 ML/M2
LEFT VENTRICLE DIASTOLIC VOLUME: 135.01 ML
LEFT VENTRICLE MASS INDEX: 125 G/M2
LEFT VENTRICLE SYSTOLIC VOLUME INDEX: 39.9 ML/M2
LEFT VENTRICLE SYSTOLIC VOLUME: 75.33 ML
LEFT VENTRICULAR INTERNAL DIMENSION IN DIASTOLE: 5.29 CM (ref 3.5–6)
LEFT VENTRICULAR MASS: 236.93 G
LV LATERAL E/E' RATIO: 8.8 M/S
LV SEPTAL E/E' RATIO: 7.33 M/S
LVOT MG: 2.45 MMHG
LVOT MV: 0.72 CM/S
MV PEAK A VEL: 0.82 M/S
MV PEAK E VEL: 0.44 M/S
MV STENOSIS PRESSURE HALF TIME: 81.65 MS
MV VALVE AREA P 1/2 METHOD: 2.69 CM2
PISA TR MAX VEL: 2.63 M/S
PULM VEIN S/D RATIO: 1.47
PV MEAN GRADIENT: 0.83 MMHG
PV PEAK D VEL: 0.36 M/S
PV PEAK S VEL: 0.53 M/S
PV PEAK VELOCITY: 1.18 CM/S
RA MAJOR: 3.9 CM
RA PRESSURE: 3 MMHG
RA WIDTH: 3.38 CM
RIGHT VENTRICULAR END-DIASTOLIC DIMENSION: 3.41 CM
SINUS: 3.08 CM
STJ: 2.66 CM
TDI LATERAL: 0.05 M/S
TDI SEPTAL: 0.06 M/S
TDI: 0.06 M/S
TR MAX PG: 28 MMHG
TRICUSPID ANNULAR PLANE SYSTOLIC EXCURSION: 2.23 CM
TV REST PULMONARY ARTERY PRESSURE: 31 MMHG

## 2023-01-20 PROCEDURE — 93306 ECHO (CUPID ONLY): ICD-10-PCS | Mod: 26,,, | Performed by: INTERNAL MEDICINE

## 2023-01-20 PROCEDURE — 93306 TTE W/DOPPLER COMPLETE: CPT

## 2023-01-20 PROCEDURE — 93306 TTE W/DOPPLER COMPLETE: CPT | Mod: 26,,, | Performed by: INTERNAL MEDICINE

## 2023-01-23 DIAGNOSIS — I51.9 SYSTOLIC DYSFUNCTION: Primary | ICD-10-CM

## 2023-01-23 RX ORDER — METOPROLOL SUCCINATE 25 MG/1
25 TABLET, EXTENDED RELEASE ORAL DAILY
Qty: 30 TABLET | Refills: 11 | Status: SHIPPED | OUTPATIENT
Start: 2023-01-23 | End: 2024-01-23

## 2023-01-23 NOTE — PROGRESS NOTES
Recently seen in clinic   Called about results , EF same range as last year   Patient states he wants to have the surgery done no matter what  He has a good functional status   He is at high risk however not at prohibitive risk to proceed with his surgery   Start low dose betablocker and discuss with his surgeon     Results for orders placed during the hospital encounter of 01/20/23    Echo    Interpretation Summary  · The left ventricle is normal in size with eccentric hypertrophy and moderately decreased systolic function.  · Mild left atrial enlargement.  · The estimated ejection fraction is 30%.  · Grade I left ventricular diastolic dysfunction.  · Normal right ventricular size with normal right ventricular systolic function.  · Mild mitral regurgitation.  · There are segmental left ventricular wall motion abnormalities.  · Mild tricuspid regurgitation.  · Normal central venous pressure (3 mmHg).  · The estimated PA systolic pressure is 31 mmHg.

## 2023-01-25 ENCOUNTER — TELEPHONE (OUTPATIENT)
Dept: PREADMISSION TESTING | Facility: HOSPITAL | Age: 68
End: 2023-01-25
Payer: MEDICARE

## 2023-01-26 ENCOUNTER — HOSPITAL ENCOUNTER (INPATIENT)
Facility: HOSPITAL | Age: 68
LOS: 5 days | Discharge: HOME | DRG: 335 | End: 2023-01-31
Attending: SURGERY | Admitting: SURGERY
Payer: MEDICARE

## 2023-01-26 ENCOUNTER — ANESTHESIA (OUTPATIENT)
Dept: SURGERY | Facility: HOSPITAL | Age: 68
DRG: 335 | End: 2023-01-26
Payer: MEDICARE

## 2023-01-26 DIAGNOSIS — I49.9 ABNORMAL HEART RHYTHM: ICD-10-CM

## 2023-01-26 DIAGNOSIS — Z01.818 PREOP TESTING: ICD-10-CM

## 2023-01-26 DIAGNOSIS — Z98.890 S/P CLOSURE OF ILEOSTOMY: ICD-10-CM

## 2023-01-26 PROCEDURE — 25000003 PHARM REV CODE 250: Performed by: SURGERY

## 2023-01-26 PROCEDURE — 37000009 HC ANESTHESIA EA ADD 15 MINS: Performed by: SURGERY

## 2023-01-26 PROCEDURE — S0030 INJECTION, METRONIDAZOLE: HCPCS | Performed by: SURGERY

## 2023-01-26 PROCEDURE — 44227 PR LAP, SURG CLOSE ENTEROSTOMY RESECT ANAST: ICD-10-PCS | Mod: 22,,, | Performed by: SURGERY

## 2023-01-26 PROCEDURE — 71000033 HC RECOVERY, INTIAL HOUR: Performed by: SURGERY

## 2023-01-26 PROCEDURE — 88305 TISSUE EXAM BY PATHOLOGIST: CPT | Performed by: PATHOLOGY

## 2023-01-26 PROCEDURE — 63600175 PHARM REV CODE 636 W HCPCS: Performed by: NURSE ANESTHETIST, CERTIFIED REGISTERED

## 2023-01-26 PROCEDURE — 21400001 HC TELEMETRY ROOM

## 2023-01-26 PROCEDURE — 63600175 PHARM REV CODE 636 W HCPCS: Performed by: SURGERY

## 2023-01-26 PROCEDURE — 44227 LAP CLOSE ENTEROSTOMY: CPT | Mod: AS,,,

## 2023-01-26 PROCEDURE — 37000008 HC ANESTHESIA 1ST 15 MINUTES: Performed by: SURGERY

## 2023-01-26 PROCEDURE — C9290 INJ, BUPIVACAINE LIPOSOME: HCPCS | Performed by: SURGERY

## 2023-01-26 PROCEDURE — 99900035 HC TECH TIME PER 15 MIN (STAT)

## 2023-01-26 PROCEDURE — 71000039 HC RECOVERY, EACH ADD'L HOUR: Performed by: SURGERY

## 2023-01-26 PROCEDURE — 99499 UNLISTED E&M SERVICE: CPT | Mod: ,,, | Performed by: SURGERY

## 2023-01-26 PROCEDURE — 44227 LAP CLOSE ENTEROSTOMY: CPT | Mod: 22,,, | Performed by: SURGERY

## 2023-01-26 PROCEDURE — 88305 TISSUE EXAM BY PATHOLOGIST: CPT | Mod: 26,,, | Performed by: PATHOLOGY

## 2023-01-26 PROCEDURE — 44227 PR LAP, SURG CLOSE ENTEROSTOMY RESECT ANAST: ICD-10-PCS | Mod: AS,,,

## 2023-01-26 PROCEDURE — 88305 TISSUE EXAM BY PATHOLOGIST: ICD-10-PCS | Mod: 26,,, | Performed by: PATHOLOGY

## 2023-01-26 PROCEDURE — 27201423 OPTIME MED/SURG SUP & DEVICES STERILE SUPPLY: Performed by: SURGERY

## 2023-01-26 PROCEDURE — 36000710: Performed by: SURGERY

## 2023-01-26 PROCEDURE — 36000711: Performed by: SURGERY

## 2023-01-26 PROCEDURE — 99499 NO LOS: ICD-10-PCS | Mod: ,,, | Performed by: SURGERY

## 2023-01-26 PROCEDURE — 25000003 PHARM REV CODE 250: Performed by: NURSE ANESTHETIST, CERTIFIED REGISTERED

## 2023-01-26 RX ORDER — SIMETHICONE 80 MG
1 TABLET,CHEWABLE ORAL 3 TIMES DAILY PRN
Status: DISCONTINUED | OUTPATIENT
Start: 2023-01-26 | End: 2023-02-01 | Stop reason: HOSPADM

## 2023-01-26 RX ORDER — SODIUM CHLORIDE 9 MG/ML
INJECTION, SOLUTION INTRAVENOUS CONTINUOUS
Status: DISCONTINUED | OUTPATIENT
Start: 2023-01-26 | End: 2023-01-27

## 2023-01-26 RX ORDER — OXYCODONE AND ACETAMINOPHEN 5; 325 MG/1; MG/1
1 TABLET ORAL
Status: DISCONTINUED | OUTPATIENT
Start: 2023-01-26 | End: 2023-01-26 | Stop reason: HOSPADM

## 2023-01-26 RX ORDER — PROPOFOL 10 MG/ML
VIAL (ML) INTRAVENOUS
Status: DISCONTINUED | OUTPATIENT
Start: 2023-01-26 | End: 2023-01-26

## 2023-01-26 RX ORDER — METHOCARBAMOL 500 MG/1
1000 TABLET, FILM COATED ORAL 3 TIMES DAILY
Status: DISCONTINUED | OUTPATIENT
Start: 2023-01-26 | End: 2023-01-30

## 2023-01-26 RX ORDER — CEFAZOLIN SODIUM 2 G/50ML
2 SOLUTION INTRAVENOUS
Status: COMPLETED | OUTPATIENT
Start: 2023-01-26 | End: 2023-01-26

## 2023-01-26 RX ORDER — DOCUSATE SODIUM 100 MG/1
100 CAPSULE, LIQUID FILLED ORAL 2 TIMES DAILY
Status: DISCONTINUED | OUTPATIENT
Start: 2023-01-26 | End: 2023-02-01 | Stop reason: HOSPADM

## 2023-01-26 RX ORDER — FENTANYL CITRATE 50 UG/ML
INJECTION, SOLUTION INTRAMUSCULAR; INTRAVENOUS
Status: DISCONTINUED | OUTPATIENT
Start: 2023-01-26 | End: 2023-01-26

## 2023-01-26 RX ORDER — ONDANSETRON 2 MG/ML
4 INJECTION INTRAMUSCULAR; INTRAVENOUS EVERY 6 HOURS PRN
Status: DISCONTINUED | OUTPATIENT
Start: 2023-01-26 | End: 2023-02-01 | Stop reason: HOSPADM

## 2023-01-26 RX ORDER — HYDROMORPHONE HYDROCHLORIDE 2 MG/ML
1 INJECTION, SOLUTION INTRAMUSCULAR; INTRAVENOUS; SUBCUTANEOUS EVERY 4 HOURS PRN
Status: DISCONTINUED | OUTPATIENT
Start: 2023-01-26 | End: 2023-02-01 | Stop reason: HOSPADM

## 2023-01-26 RX ORDER — ACETAMINOPHEN 325 MG/1
650 TABLET ORAL EVERY 6 HOURS
Status: DISCONTINUED | OUTPATIENT
Start: 2023-01-26 | End: 2023-01-30

## 2023-01-26 RX ORDER — METOPROLOL SUCCINATE 25 MG/1
25 TABLET, EXTENDED RELEASE ORAL DAILY
Status: DISCONTINUED | OUTPATIENT
Start: 2023-01-27 | End: 2023-01-29

## 2023-01-26 RX ORDER — METRONIDAZOLE 500 MG/100ML
500 INJECTION, SOLUTION INTRAVENOUS ONCE
Status: COMPLETED | OUTPATIENT
Start: 2023-01-26 | End: 2023-01-26

## 2023-01-26 RX ORDER — PHENYLEPHRINE HYDROCHLORIDE 10 MG/ML
INJECTION INTRAVENOUS
Status: DISCONTINUED | OUTPATIENT
Start: 2023-01-26 | End: 2023-01-26

## 2023-01-26 RX ORDER — HYDROMORPHONE HYDROCHLORIDE 2 MG/ML
0.2 INJECTION, SOLUTION INTRAMUSCULAR; INTRAVENOUS; SUBCUTANEOUS EVERY 5 MIN PRN
Status: DISCONTINUED | OUTPATIENT
Start: 2023-01-26 | End: 2023-01-26 | Stop reason: HOSPADM

## 2023-01-26 RX ORDER — ONDANSETRON 2 MG/ML
INJECTION INTRAMUSCULAR; INTRAVENOUS
Status: DISCONTINUED | OUTPATIENT
Start: 2023-01-26 | End: 2023-01-26

## 2023-01-26 RX ORDER — ENOXAPARIN SODIUM 100 MG/ML
40 INJECTION SUBCUTANEOUS EVERY 24 HOURS
Status: DISCONTINUED | OUTPATIENT
Start: 2023-01-27 | End: 2023-01-29

## 2023-01-26 RX ORDER — ONDANSETRON 2 MG/ML
4 INJECTION INTRAMUSCULAR; INTRAVENOUS DAILY PRN
Status: DISCONTINUED | OUTPATIENT
Start: 2023-01-26 | End: 2023-01-26 | Stop reason: HOSPADM

## 2023-01-26 RX ORDER — ROCURONIUM BROMIDE 10 MG/ML
INJECTION, SOLUTION INTRAVENOUS
Status: DISCONTINUED | OUTPATIENT
Start: 2023-01-26 | End: 2023-01-26

## 2023-01-26 RX ORDER — LIDOCAINE HYDROCHLORIDE 20 MG/ML
INJECTION INTRAVENOUS
Status: DISCONTINUED | OUTPATIENT
Start: 2023-01-26 | End: 2023-01-26

## 2023-01-26 RX ORDER — OXYCODONE HYDROCHLORIDE 5 MG/1
5 TABLET ORAL EVERY 6 HOURS PRN
Status: DISCONTINUED | OUTPATIENT
Start: 2023-01-26 | End: 2023-01-28

## 2023-01-26 RX ORDER — ASCORBIC ACID 500 MG
500 TABLET ORAL 2 TIMES DAILY
Status: DISCONTINUED | OUTPATIENT
Start: 2023-01-26 | End: 2023-02-01 | Stop reason: HOSPADM

## 2023-01-26 RX ORDER — BUPIVACAINE HYDROCHLORIDE 2.5 MG/ML
INJECTION, SOLUTION EPIDURAL; INFILTRATION; INTRACAUDAL
Status: DISCONTINUED | OUTPATIENT
Start: 2023-01-26 | End: 2023-01-26 | Stop reason: HOSPADM

## 2023-01-26 RX ORDER — BUPIVACAINE HYDROCHLORIDE 2.5 MG/ML
30 INJECTION, SOLUTION INFILTRATION; PERINEURAL ONCE
Status: DISCONTINUED | OUTPATIENT
Start: 2023-01-26 | End: 2023-01-26

## 2023-01-26 RX ORDER — HYDRALAZINE HYDROCHLORIDE 20 MG/ML
10 INJECTION INTRAMUSCULAR; INTRAVENOUS EVERY 6 HOURS PRN
Status: DISCONTINUED | OUTPATIENT
Start: 2023-01-26 | End: 2023-02-01 | Stop reason: HOSPADM

## 2023-01-26 RX ADMIN — ACETAMINOPHEN 650 MG: 325 TABLET ORAL at 11:01

## 2023-01-26 RX ADMIN — SIMETHICONE 80 MG: 80 TABLET, CHEWABLE ORAL at 06:01

## 2023-01-26 RX ADMIN — PROPOFOL 150 MG: 10 INJECTION, EMULSION INTRAVENOUS at 08:01

## 2023-01-26 RX ADMIN — FENTANYL CITRATE 25 MCG: 50 INJECTION, SOLUTION INTRAMUSCULAR; INTRAVENOUS at 11:01

## 2023-01-26 RX ADMIN — SODIUM CHLORIDE, POTASSIUM CHLORIDE, SODIUM LACTATE AND CALCIUM CHLORIDE: 600; 310; 30; 20 INJECTION, SOLUTION INTRAVENOUS at 01:01

## 2023-01-26 RX ADMIN — ACETAMINOPHEN 650 MG: 325 TABLET ORAL at 05:01

## 2023-01-26 RX ADMIN — PHENYLEPHRINE HYDROCHLORIDE 50 MCG: 10 INJECTION INTRAVENOUS at 01:01

## 2023-01-26 RX ADMIN — ROCURONIUM BROMIDE 50 MG: 10 INJECTION, SOLUTION INTRAVENOUS at 08:01

## 2023-01-26 RX ADMIN — ROCURONIUM BROMIDE 20 MG: 10 INJECTION, SOLUTION INTRAVENOUS at 11:01

## 2023-01-26 RX ADMIN — OXYCODONE HYDROCHLORIDE 5 MG: 5 TABLET ORAL at 08:01

## 2023-01-26 RX ADMIN — METRONIDAZOLE 500 MG: 500 SOLUTION INTRAVENOUS at 08:01

## 2023-01-26 RX ADMIN — ROCURONIUM BROMIDE 30 MG: 10 INJECTION, SOLUTION INTRAVENOUS at 10:01

## 2023-01-26 RX ADMIN — LIDOCAINE HYDROCHLORIDE 80 MG: 20 INJECTION, SOLUTION INTRAVENOUS at 08:01

## 2023-01-26 RX ADMIN — ONDANSETRON 4 MG: 2 INJECTION, SOLUTION INTRAMUSCULAR; INTRAVENOUS at 01:01

## 2023-01-26 RX ADMIN — FENTANYL CITRATE 25 MCG: 50 INJECTION, SOLUTION INTRAMUSCULAR; INTRAVENOUS at 08:01

## 2023-01-26 RX ADMIN — CEFAZOLIN SODIUM 2 G: 2 SOLUTION INTRAVENOUS at 08:01

## 2023-01-26 RX ADMIN — OXYCODONE HYDROCHLORIDE AND ACETAMINOPHEN 500 MG: 500 TABLET ORAL at 08:01

## 2023-01-26 RX ADMIN — SODIUM CHLORIDE: 9 INJECTION, SOLUTION INTRAVENOUS at 03:01

## 2023-01-26 RX ADMIN — ROCURONIUM BROMIDE 20 MG: 10 INJECTION, SOLUTION INTRAVENOUS at 09:01

## 2023-01-26 RX ADMIN — DOCUSATE SODIUM 100 MG: 100 CAPSULE, LIQUID FILLED ORAL at 08:01

## 2023-01-26 RX ADMIN — METHOCARBAMOL 1000 MG: 500 TABLET ORAL at 03:01

## 2023-01-26 RX ADMIN — METHOCARBAMOL 1000 MG: 500 TABLET ORAL at 08:01

## 2023-01-26 RX ADMIN — FENTANYL CITRATE 50 MCG: 50 INJECTION, SOLUTION INTRAMUSCULAR; INTRAVENOUS at 01:01

## 2023-01-26 RX ADMIN — SODIUM CHLORIDE, POTASSIUM CHLORIDE, SODIUM LACTATE AND CALCIUM CHLORIDE: 600; 310; 30; 20 INJECTION, SOLUTION INTRAVENOUS at 08:01

## 2023-01-26 RX ADMIN — SUGAMMADEX 200 MG: 100 INJECTION, SOLUTION INTRAVENOUS at 01:01

## 2023-01-26 NOTE — OP NOTE
Franky Masters  : 1955, MRN: [id number]  Date of procedure: 2023      Procedure: Laparoscopic-assisted ileostomy closure, extensive lysis of adhesions, transversus abdominis plane block, fecal disimpaction    Pre-procedure diagnosis: Ileostomy  Post-procedure diagnosis: Ileostomy, abdominal adhesions, fecal impaction  Surgeon: Elvie Parker DO  Assistant: Ivet Reyes PA-C  Anesthesia: General  EBL: 100 mL  Implants/Drains: None  Specimen: Anastomosis  Complications: None apparent    Significant findings: Extensive adhesions throughout the abdomen requiring meticulous lysing, approximately 3 hours.  Large, hard stool ball in the rectum.    Indications for procedure: See H&P. After explaining the risks, benefits, and alternatives, the patient verbalized understanding and informed written consent was obtained. All questions were answered to their satisfaction.    Description of procedure: The patient was brought to the OR and SCDs were applied. General anesthesia was induced by the Anesthesia Department. Patient was in the supine position. A norwood catheter was placed. The ileostomy site was cleaned with betadine and then the stoma was closed with a running 2-0 silk suture. The abdomen was prepped and draped in usual sterile fashion. A time out was taken to identify the correct patient, correct procedure, and correct anatomical site; all parties were in agreement.  A 5 mm incision was made at Remy's point and a Veress needle was inserted.  Saline drop test was performed.  The abdomen was insufflated to 15 mmHg.  A 5 mm Optiview trocar was inserted under direct visualization in the peritoneal cavity appeared to be safely entered.  Immediately apparent were diffuse adhesions throughout the entire abdominal cavity.  Another 5 mm trocar was inserted in the left mid abdomen.  I began lysing the adhesions using the LigaSure device with a combination of blunt sweeping, sharp, and cautery dissection,  making sure to protect the bowel.  As the adhesions were cleared, 2 additional 5 mm port sites were placed in the left lower and right lower abdomen.  An extensive amount of time was spent lysing these adhesions, approximately 3 hours.  At last, the transverse colon was identified and the proximal site of previous transection.  Using the LigaSure device, the transverse colon was mobilized by taking down the gastrocolic ligament until it could be brought up to the abdominal wall with ease.  The end was grasped with a locking bowel grasper.  Next, the skin around the ileostomy stoma was incised in an elliptical fashion and to the subcutaneous tissues using electrocautery. The end ileostomy was freed from subcutaneous attachments and the small intestine was extracorporealized. The end of the colon was brought up through the opening as well using the grasper. An enterotomy was made in the antimesenteric border of the small intestine any colotomy was made in the taenia of the colon.  A 75 mm blue EMILY stapler was inserted and fired to form common channel.  The common channel was inspected and found to be patent and hemostatic.  An additional blue EMILY stapler was fired across the os of the common channel to complete the side-to-side anastomosis with care taken to ensure that the mesentery was not twisted.  Gloves were changed. The corners of the anastomosis were imbricated using a 3-0 silk suture.  An additional 3-0 silk suture was placed to imbricate the proximal aspect of the staple line.  The anastomosis was placed back into the peritoneal cavity.    A TAP block was performed by injecting 30 mL of an Exparel solution into the transversus abdominis plane on each side of the incision.  The fascia of the stoma site was then closed using a running 0 PDS Stratafix suture on the posterior rectus fascia.  The anterior rectus fascia was closed with a running #1 PDS Stratafix suture.  The wound was irrigated.  The stoma site was  then closed with a 0 Vicryl pursestring in the deep tissue, a 2-0 vicryl pursestring on Viridiana's fascia, and a 2-0 vicryl pursestring for the dermis. A gauze wick was packed into the center.   The port site incisions were closed with 4-0 monocryl in the subcuticular layer and dermabond was applied on top.    A digital rectal exam was then performed and a large, hard stool ball was disimpacted from the rectum.    All sponge and instrument counts were deemed correct. The patient appeared to tolerate the procedure well and there were no apparent complications. The patient was transported to PACU in stable condition.    Elvie Parker DO  General Surgery  [facility]    Assistance was provided by the physician assistant to aid with retraction, visualization, and wound closure as there was no other qualified assistant available.

## 2023-01-26 NOTE — ANESTHESIA PROCEDURE NOTES
Intubation    Date/Time: 1/26/2023 8:38 AM  Performed by: Selvin Blanton CRNA  Authorized by: Alexandro Oliveira MD     Intubation:     Induction:  Intravenous    Intubated:  Postinduction    Mask Ventilation:  Easy mask    Attempts:  1    Attempted By:  CRNA    Method of Intubation:  Direct    Blade:  Bryan 3    Laryngeal View Grade: Grade I - full view of cords      Difficult Airway Encountered?: No      Complications:  None    Airway Device:  Oral endotracheal tube    Airway Device Size:  7.5    Style/Cuff Inflation:  Cuffed (inflated to minimal occlusive pressure)    Tube secured:  22    Secured at:  The lips    Placement Verified By:  Capnometry    Complicating Factors:  None    Findings Post-Intubation:  BS equal bilateral

## 2023-01-26 NOTE — TRANSFER OF CARE
"Anesthesia Transfer of Care Note    Patient: Franky Masters    Procedure(s) Performed: Procedure(s) (LRB):  CLOSURE, COLOSTOMY, LAPAROSCOPIC (N/A)  LYSIS, ADHESIONS, LAPAROSCOPIC (N/A)  BLOCK, TRANSVERSUS ABDOMINIS PLANE (N/A)  REMOVAL, FECES, IMPACTED (N/A)    Patient location: PACU    Anesthesia Type: general    Transport from OR: Transported from OR on room air with adequate spontaneous ventilation    Post pain: adequate analgesia    Post assessment: no apparent anesthetic complications    Post vital signs: stable    Level of consciousness: awake    Nausea/Vomiting: no nausea/vomiting    Complications: none    Transfer of care protocol was followed      Last vitals:   Visit Vitals  BP (!) 88/54 (BP Location: Left arm, Patient Position: Lying)   Pulse 61   Temp 36.7 °C (98 °F) (Temporal)   Resp 14   Ht 5' 9" (1.753 m)   Wt 94.8 kg (209 lb 1.7 oz)   SpO2 95%   BMI 30.88 kg/m²     "

## 2023-01-26 NOTE — PLAN OF CARE
A248/A248 CHAN  Franky Masters is a 67 y.o.male admitted on 1/26/2023 for colostomy closure and removal of fecal impaction.    Code Status: Prior MRN: [id number]   Review of patient's allergies indicates:  No Known Allergies  Past Medical History:   Diagnosis Date    Cancer     Diverticulitis     Encounter for blood transfusion     Liver disease     Tumors    Supplemental oxygen dependent     only during Covid 2021      PRN meds    hydrALAZINE, 10 mg, Q6H PRN  HYDROmorphone, 1 mg, Q4H PRN  ondansetron, 4 mg, Q6H PRN  oxyCODONE, 5 mg, Q6H PRN  promethazine (PHENERGAN) IVPB, 25 mg, Q6H PRN      Received pt from PACU with no complaints. Has a history of a-fib on a prior hospitalization, placed him on a cardiac mtr- NSR/sinus acacia. 4 lap incisions look great- clean dry with dermabond. Unable to visualize colostomy reversal- wet to dry gauze and tape wih no drainage. Daughter Claudia at bedside. Removed R hand IV- infiltrated when he got to the floor. Started on clear liquid diet. Doherty to gravity. Hourly rounding completed. Chart check completed. Will continue plan of care.      AAOx4        Lead Monitored: Lead II Rhythm: normal sinus rhythm Frequency/Ectopy: PVCs  Cardiac/Telemetry Box Number: [id number]    Last Bowel Movement: 01/26/23  Diet clear liquid  Voiding Characteristics: urethral catheter (bladder)  Laith Score: 23  Fall Risk Score: 11       Lines/Drains/Airways       Drain  Duration                  Urethral Catheter 01/26/23 0928 Non-latex;Straight-tip 16 Fr. <1 day              Peripheral Intravenous Line  Duration                  Peripheral IV - Single Lumen 01/26/23 18 G Left;Posterior Hand <1 day

## 2023-01-26 NOTE — ANESTHESIA PREPROCEDURE EVALUATION
01/26/2023  Franky Masters is a 67 y.o., male    *EF 30%    Patient Active Problem List   Diagnosis    Colitis    Pneumonia due to COVID-19 virus    Obesity (BMI 30.0-34.9)    Elevated d-dimer    Hypoalbuminemia due to protein-calorie malnutrition    Elevated liver enzymes    Elevated troponin    Oropharyngeal dysphagia with overall muscle deconditioning and weakness    Secondary liver cancer    Adenocarcinoma of colon metastatic to liver     Past Medical History:   Diagnosis Date    Cancer     Diverticulitis     Encounter for blood transfusion     Liver disease     Tumors    Supplemental oxygen dependent     only during Covid 2021     Past Surgical History:   Procedure Laterality Date    ABDOMINAL WASHOUT  5/7/2022    Procedure: LAVAGE, PERITONEAL, THERAPEUTIC;  Surgeon: Elvie Parker DO;  Location: [facility] OR;  Service: General;;    ABDOMINAL WASHOUT  5/7/2022    Procedure: ;  Surgeon: Elvie Parker DO;  Location: [facility] OR;  Service: General;;    APPLICATION OF WOUND VACUUM-ASSISTED CLOSURE DEVICE N/A 5/4/2022    Procedure: APPLICATION, WOUND VAC;  Surgeon: Elvie Parker DO;  Location: [facility] OR;  Service: General;  Laterality: N/A;    ILEOSTOMY N/A 5/11/2022    Procedure: CREATION, ILEOSTOMY;  Surgeon: Elvie Parker DO;  Location: [facility] OR;  Service: General;  Laterality: N/A;    LAPAROTOMY N/A 5/7/2022    Procedure: LAPAROTOMY;  Surgeon: Elvie Parker DO;  Location: [facility] OR;  Service: General;  Laterality: N/A;    LIVER BIOPSY Right 5/11/2022    Procedure: BIOPSY, LIVER;  Surgeon: Elvie Parker DO;  Location: [facility] OR;  Service: General;  Laterality: Right;    RIGHT HEMICOLECTOMY N/A 5/11/2022    Procedure: HEMICOLECTOMY, RIGHT;  Surgeon: Elvie Parker DO;  Location: [facility] OR;  Service: General;  Laterality: N/A;       Pre-op Assessment    I have  reviewed the Patient Summary Reports.     I have reviewed the Nursing Notes. I have reviewed the NPO Status.   I have reviewed the Medications.     Review of Systems  Anesthesia Hx:  No problems with previous Anesthesia Previous RSI with Grade 1 view with Muro 2. History of prior surgery of interest to airway management or planning: Previous anesthesia: General  Denies Personal Hx of Anesthesia complications.   Social:  Former Smoker    Hematology/Oncology:         -- Anemia: Current/Recent Cancer. surgery Oncology Comments: Metastatic colon cancer w/ mets to liver      Cardiovascular:   Exercise tolerance: good CHF ECG has been reviewed. Hx of Acute Afib with RVR - hypotension, requiring treatment with amio and tay.    ECHO (1/19/23):  Summary    ? The left ventricle is normal in size with eccentric hypertrophy and moderately decreased systolic function.  ? Mild left atrial enlargement.  ? The estimated ejection fraction is 30%.  ? Grade I left ventricular diastolic dysfunction.  ? Normal right ventricular size with normal right ventricular systolic function.  ? Mild mitral regurgitation.  ? There are segmental left ventricular wall motion abnormalities.  ? Mild tricuspid regurgitation.  ? Normal central venous pressure (3 mmHg).  ? The estimated PA systolic pressure is 31 mmHg.    EKG: (1/6/23)  Normal sinus rhythm   Left anterior fascicular block   Cannot exclude Septal infarct (cited on or before 05-AUG-2021)   Possible Lateral infarct (cited on or before 05-AUG-2021)   Abnormal ECG   When compared with ECG of 17-JUN-2022 12:33,   Fusion complexes are no longer Present   Premature ventricular complexes are no longer Present   Confirmed by DAJUAN KHANNA, ALEJANDRO WRIGHT ([id number]) on 1/7/2023 7:25:09 AM    Pulmonary:  Pulmonary Normal Acute hypoxic resp failure, intubated,sedated full vent support.   Renal/:   Chronic Renal Disease, ARF    Hepatic/GI:   Liver Disease, Hx of Small bowel perf due to large cecal mass with  liver mets (S/P resection - Pt went into Septic shock post resection requiring Levo and bicarb; now fully recovered)   Neurological:  Neurology Normal    Endocrine:  Endocrine Normal  Denies Obesity / BMI > 30  Psych:  Psychiatric Normal           Physical Exam  General: Well nourished, Cooperative, Alert and Oriented    Airway:  Mallampati: III   Mouth Opening: Normal  TM Distance: Normal  Tongue: Normal  Neck ROM: Normal ROM  Pre-Existing Airway: Oral Endotracheal tube    Dental:  Dentures  Top dentures (removed); denies any loose teeth on bottom  Chest/Lungs:  Normal Respiratory Rate        Anesthesia Plan  Type of Anesthesia, risks & benefits discussed:    Anesthesia Type: Gen ETT  Intra-op Monitoring Plan: Standard ASA Monitors  Post Op Pain Control Plan: multimodal analgesia and IV/PO Opioids PRN  Induction:  IV  Airway Plan: Direct  Informed Consent: Informed consent signed with the Patient and all parties understand the risks and agree with anesthesia plan.  All questions answered.   ASA Score: 3  Day of Surgery Review of History & Physical: H&P Update referred to the surgeon/provider.  Anesthesia Plan Notes: Previous RSI with Grade 1 view with Muro 2.  Patient reports being very active since discharge from previous hospital stay (plays golf, travels for vacation often, etc.)  Patient is Physical therapist, acupuncturist         Ready For Surgery From Anesthesia Perspective.     .        Chemistry        Component Value Date/Time     01/06/2023 0937    K 4.8 01/06/2023 0937     01/06/2023 0937    CO2 21 (L) 01/06/2023 0937    BUN 24 (H) 01/06/2023 0937    CREATININE 1.1 01/06/2023 0937    GLU 98 01/06/2023 0937        Component Value Date/Time    CALCIUM 9.8 01/06/2023 0937    ALKPHOS 265 (H) 01/06/2023 0937    AST 63 (H) 01/06/2023 0937    ALT 53 (H) 01/06/2023 0937    BILITOT 0.8 01/06/2023 0937    ESTGFRAFRICA >60 06/17/2022 1309    EGFRNONAA >60 06/17/2022 1309        Lab Results    Component Value Date    WBC 9.03 01/06/2023    HGB 11.8 (L) 01/06/2023    HCT 38.5 (L) 01/06/2023     (H) 01/06/2023     01/06/2023     *Cleared by Cards recently (1/23/23): Dr. Maravilla (see below)   Recently seen in clinic   Called about results , EF same range as last year   Patient states he wants to have the surgery done no matter what  He has a good functional status   He is at high risk however not at prohibitive risk to proceed with his surgery   Start low dose betablocker and discuss with his surgeon

## 2023-01-27 PROBLEM — Z98.890 S/P CLOSURE OF ILEOSTOMY: Status: ACTIVE | Noted: 2023-01-27

## 2023-01-27 LAB
ANION GAP SERPL CALC-SCNC: 10 MMOL/L (ref 8–16)
BASOPHILS # BLD AUTO: 0.08 K/UL (ref 0–0.2)
BASOPHILS NFR BLD: 0.7 % (ref 0–1.9)
BUN SERPL-MCNC: 21 MG/DL (ref 8–23)
CALCIUM SERPL-MCNC: 8.8 MG/DL (ref 8.7–10.5)
CHLORIDE SERPL-SCNC: 110 MMOL/L (ref 95–110)
CO2 SERPL-SCNC: 20 MMOL/L (ref 23–29)
CREAT SERPL-MCNC: 1.1 MG/DL (ref 0.5–1.4)
DIFFERENTIAL METHOD: ABNORMAL
EOSINOPHIL # BLD AUTO: 0.2 K/UL (ref 0–0.5)
EOSINOPHIL NFR BLD: 1.4 % (ref 0–8)
ERYTHROCYTE [DISTWIDTH] IN BLOOD BY AUTOMATED COUNT: 15.4 % (ref 11.5–14.5)
EST. GFR  (NO RACE VARIABLE): >60 ML/MIN/1.73 M^2
GLUCOSE SERPL-MCNC: 89 MG/DL (ref 70–110)
HCT VFR BLD AUTO: 37.9 % (ref 40–54)
HGB BLD-MCNC: 11.8 G/DL (ref 14–18)
IMM GRANULOCYTES # BLD AUTO: 0.05 K/UL (ref 0–0.04)
IMM GRANULOCYTES NFR BLD AUTO: 0.4 % (ref 0–0.5)
LYMPHOCYTES # BLD AUTO: 1.3 K/UL (ref 1–4.8)
LYMPHOCYTES NFR BLD: 10.7 % (ref 18–48)
MCH RBC QN AUTO: 30.6 PG (ref 27–31)
MCHC RBC AUTO-ENTMCNC: 31.1 G/DL (ref 32–36)
MCV RBC AUTO: 98 FL (ref 82–98)
MONOCYTES # BLD AUTO: 1.2 K/UL (ref 0.3–1)
MONOCYTES NFR BLD: 10.1 % (ref 4–15)
NEUTROPHILS # BLD AUTO: 9.1 K/UL (ref 1.8–7.7)
NEUTROPHILS NFR BLD: 76.7 % (ref 38–73)
NRBC BLD-RTO: 0 /100 WBC
PHOSPHATE SERPL-MCNC: 3.6 MG/DL (ref 2.7–4.5)
PLATELET # BLD AUTO: 381 K/UL (ref 150–450)
PMV BLD AUTO: 11.1 FL (ref 9.2–12.9)
POTASSIUM SERPL-SCNC: 4.6 MMOL/L (ref 3.5–5.1)
RBC # BLD AUTO: 3.85 M/UL (ref 4.6–6.2)
SODIUM SERPL-SCNC: 140 MMOL/L (ref 136–145)
WBC # BLD AUTO: 11.83 K/UL (ref 3.9–12.7)

## 2023-01-27 PROCEDURE — 21400001 HC TELEMETRY ROOM

## 2023-01-27 PROCEDURE — 85025 COMPLETE CBC W/AUTO DIFF WBC: CPT | Performed by: SURGERY

## 2023-01-27 PROCEDURE — 80048 BASIC METABOLIC PNL TOTAL CA: CPT | Performed by: SURGERY

## 2023-01-27 PROCEDURE — 84100 ASSAY OF PHOSPHORUS: CPT | Performed by: SURGERY

## 2023-01-27 PROCEDURE — 25000003 PHARM REV CODE 250: Performed by: SURGERY

## 2023-01-27 PROCEDURE — 36415 COLL VENOUS BLD VENIPUNCTURE: CPT | Performed by: SURGERY

## 2023-01-27 PROCEDURE — 63600175 PHARM REV CODE 636 W HCPCS: Performed by: SURGERY

## 2023-01-27 PROCEDURE — 99900035 HC TECH TIME PER 15 MIN (STAT)

## 2023-01-27 RX ORDER — TAMSULOSIN HYDROCHLORIDE 0.4 MG/1
0.4 CAPSULE ORAL DAILY
Status: DISCONTINUED | OUTPATIENT
Start: 2023-01-27 | End: 2023-02-01 | Stop reason: HOSPADM

## 2023-01-27 RX ADMIN — SIMETHICONE 80 MG: 80 TABLET, CHEWABLE ORAL at 05:01

## 2023-01-27 RX ADMIN — OXYCODONE HYDROCHLORIDE AND ACETAMINOPHEN 500 MG: 500 TABLET ORAL at 09:01

## 2023-01-27 RX ADMIN — DOCUSATE SODIUM 100 MG: 100 CAPSULE, LIQUID FILLED ORAL at 09:01

## 2023-01-27 RX ADMIN — ACETAMINOPHEN 650 MG: 325 TABLET ORAL at 05:01

## 2023-01-27 RX ADMIN — METHOCARBAMOL 1000 MG: 500 TABLET ORAL at 09:01

## 2023-01-27 RX ADMIN — METOPROLOL SUCCINATE 25 MG: 25 TABLET, EXTENDED RELEASE ORAL at 08:01

## 2023-01-27 RX ADMIN — METHOCARBAMOL 1000 MG: 500 TABLET ORAL at 03:01

## 2023-01-27 RX ADMIN — OXYCODONE HYDROCHLORIDE 5 MG: 5 TABLET ORAL at 12:01

## 2023-01-27 RX ADMIN — DOCUSATE SODIUM 100 MG: 100 CAPSULE, LIQUID FILLED ORAL at 08:01

## 2023-01-27 RX ADMIN — ENOXAPARIN SODIUM 40 MG: 40 INJECTION SUBCUTANEOUS at 08:01

## 2023-01-27 RX ADMIN — SODIUM CHLORIDE: 9 INJECTION, SOLUTION INTRAVENOUS at 06:01

## 2023-01-27 RX ADMIN — METHOCARBAMOL 1000 MG: 500 TABLET ORAL at 08:01

## 2023-01-27 RX ADMIN — OXYCODONE HYDROCHLORIDE 5 MG: 5 TABLET ORAL at 05:01

## 2023-01-27 RX ADMIN — OXYCODONE HYDROCHLORIDE AND ACETAMINOPHEN 500 MG: 500 TABLET ORAL at 08:01

## 2023-01-27 RX ADMIN — TAMSULOSIN HYDROCHLORIDE 0.4 MG: 0.4 CAPSULE ORAL at 09:01

## 2023-01-27 RX ADMIN — SODIUM CHLORIDE: 9 INJECTION, SOLUTION INTRAVENOUS at 05:01

## 2023-01-27 RX ADMIN — ACETAMINOPHEN 650 MG: 325 TABLET ORAL at 12:01

## 2023-01-27 NOTE — PLAN OF CARE
[facility] - Telemetry (Hospital)  Initial Discharge Assessment    Anticipated DC dispo: Home    Prior Level of Function: Lives at home with spouse; independent with ADLs with no DME for ambulation    PCP: oHlly Manley      Comments: No CM needs for discharge. Pt's help at home and transportation to be arranged by family.      Primary Care Provider: HOLLY ROSEN    Admission Diagnosis: Ileostomy present [Z93.2]  S/P closure of ileostomy [Z98.890]    Admission Date: 1/26/2023  Expected Discharge Date:     Discharge Barriers Identified: None    Payor: [employer] MEDICARE / Plan: [employer] MEDICARE PPO / Product Type: Medicare Advantage /     Extended Emergency Contact Information  Primary Emergency Contact: Holly Manley  Mobile Phone: [phone]  Relation: Daughter  Secondary Emergency Contact: Anel Kirkpatrick  Mobile Phone: [phone]  Relation: Daughter    Discharge Plan A: Home, Home with family  Discharge Plan B: Home, Home with family      [id number] [place] - [address] AT [facility] [address]  [address]  [place] [address]  Phone: [phone] Fax: [phone]    [id number] - [place] - [address]  [place] [address]  Phone: [phone] Fax: [phone]      Initial Assessment (most recent)       Adult Discharge Assessment - 01/27/23 1411          Discharge Assessment    Assessment Type Discharge Planning Assessment     Confirmed/corrected address, phone number and insurance Yes     Confirmed Demographics Correct on Facesheet     Source of Information patient     Communicated ELLIOT with patient/caregiver Date not available/Unable to determine     Reason For Admission S/p closure of ileostomy     People in Home spouse     Facility Arrived From: Home     Do you expect to return to your current living situation? Yes     Do you have help at home or someone to help you manage your care at home? Yes      Who are your caregiver(s) and their phone number(s)? Pt's spouse and daughters     Prior to hospitilization cognitive status: Alert/Oriented     Current cognitive status: Alert/Oriented     Equipment Currently Used at Home none     Readmission within 30 days? No     Patient currently being followed by outpatient case management? No     Do you currently have service(s) that help you manage your care at home? No     Do you take prescription medications? Yes     Do you have prescription coverage? Yes     Do you have any problems affording any of your prescribed medications? No     Is the patient taking medications as prescribed? yes     Who is going to help you get home at discharge? Pt's family     How do you get to doctors appointments? family or friend will provide;car, drives self     Are you on dialysis? No     Do you take coumadin? No     Discharge Plan A Home;Home with family     Discharge Plan B Home;Home with family     DME Needed Upon Discharge  none     Discharge Plan discussed with: Adult children     Discharge Barriers Identified None

## 2023-01-27 NOTE — HOSPITAL COURSE
1/26/22 laparoscopic-assisted ileostomy closure, extensive lysis of adhesions, fecal disimpaction    1/27/2023 POD 1 Tolerating liquids; advance to gi soft diet. D/c norwood. Increase activity.    01/28/2023.  Postop day 2.  Reports feeling bloated.  Episode of nausea and vomiting.  Some flatus.  Returned to NPO and IV fluids.  Labs and x-rays in the morning    01/29/2023.  Postop day 3 atrial fibrillation.  Still feels bloated.  Some flatus no bowel movements.  Placed on telemetry.  Cardiology consulted.  Diltiazem drip.  Heparin for anticoagulation    1/30/23 POD 4 States he had four bowel movements and is passing a substantial amount of gas. No nausea or vomiting. Ambulating and urinating without issues. Would like to try eating. Heart rate under control.     1/31/23 POD 5 Doing well. Still passing flatus and bowel movements. Tolerating a diet. Ambulating and urinating without issues. Transitioned to  PO meds. Decided he wanted to go home and if not discharged, would leave AMA.

## 2023-01-27 NOTE — PLAN OF CARE
A248/A248 CHAN  Franky Masters is a 67 y.o.male admitted on 1/26/2023 for S/P closure of ileostomy   Code Status: Prior MRN: [id number]   Review of patient's allergies indicates:  No Known Allergies  Past Medical History:   Diagnosis Date    Cancer     Diverticulitis     Encounter for blood transfusion     Liver disease     Tumors    Supplemental oxygen dependent     only during Covid 2021      PRN meds    hydrALAZINE, 10 mg, Q6H PRN  HYDROmorphone, 1 mg, Q4H PRN  ondansetron, 4 mg, Q6H PRN  oxyCODONE, 5 mg, Q6H PRN  promethazine (PHENERGAN) IVPB, 25 mg, Q6H PRN  simethicone, 1 tablet, TID PRN      Pt ambulated to bathroom, in room, and in the hallway. Passed flatus and had a small smudge of stool on his pad. Still no BM. Doherty DC. Hourly rounding completed. Supplemented nutrition with boost. Pain management with tylenol and oxy. Chart check completed. Will continue plan of care.      AAOx4     Lead Monitored: Lead II Rhythm: normal sinus rhythm Frequency/Ectopy: frequent, PVCs  Cardiac/Telemetry Box Number: [id number]    Last Bowel Movement: 01/26/23  Diet low fiber/residue  Voiding Characteristics: urethral catheter (bladder)  Laith Score: 23  Fall Risk Score: 11       Lines/Drains/Airways       Peripheral Intravenous Line  Duration                  Peripheral IV - Single Lumen 01/26/23 18 G Left;Posterior Hand 1 day

## 2023-01-27 NOTE — SUBJECTIVE & OBJECTIVE
Interval History: Doing well this morning. Reports feeling sore but pain is well-controlled. Had some nausea and a little bloating last night which resolved with simethicone. Hungry and wants to eat. No flatus yet.    Medications:  Continuous Infusions:   sodium chloride 0.9% 75 mL/hr at 01/27/23 0511     Scheduled Meds:   acetaminophen  650 mg Oral Q6H    ascorbic acid (vitamin C)  500 mg Oral BID    docusate sodium  100 mg Oral BID    enoxaparin  40 mg Subcutaneous Daily    methocarbamoL  1,000 mg Oral TID    metoprolol succinate  25 mg Oral Daily     PRN Meds:hydrALAZINE, HYDROmorphone, ondansetron, oxyCODONE, promethazine (PHENERGAN) IVPB, simethicone     Review of patient's allergies indicates:  No Known Allergies  Objective:     Vital Signs (Most Recent):  Temp: 98.7 °F (37.1 °C) (01/27/23 1143)  Pulse: (!) 56 (01/27/23 1143)  Resp: 18 (01/27/23 1203)  BP: 130/72 (01/27/23 1143)  SpO2: 95 % (01/27/23 1143)   Vital Signs (24h Range):  Temp:  [97.7 °F (36.5 °C)-99 °F (37.2 °C)] 98.7 °F (37.1 °C)  Pulse:  [56-69] 56  Resp:  [14-20] 18  SpO2:  [94 %-97 %] 95 %  BP: ()/(54-90) 130/72     Weight: 98.5 kg (217 lb 2.5 oz)  Body mass index is 32.07 kg/m².    Intake/Output - Last 3 Shifts         01/25 0700  01/26 0659 01/26 0700  01/27 0659 01/27 0700  01/28 0659    P.O.  150 100    IV Piggyback  1200     Total Intake(mL/kg)  1350 (14.1) 100 (1)    Urine (mL/kg/hr)  225 (0.1) 730 (1.3)    Total Output  225 730    Net  +1125 -630                   Physical Exam  Vitals and nursing note reviewed.   Constitutional:       General: He is not in acute distress.     Appearance: He is not ill-appearing, toxic-appearing or diaphoretic.   HENT:      Head: Normocephalic.      Nose: Nose normal.      Mouth/Throat:      Mouth: Mucous membranes are moist.   Eyes:      General: No scleral icterus.        Right eye: No discharge.         Left eye: No discharge.      Extraocular Movements: Extraocular movements intact.    Cardiovascular:      Rate and Rhythm: Normal rate and regular rhythm.   Pulmonary:      Effort: No respiratory distress.      Breath sounds: No stridor.   Abdominal:      General: There is no distension.      Palpations: Abdomen is soft.      Comments: Soft, appropriately tender, port site incisions c/d/I, stoma closure dressing intact without shadowing   Musculoskeletal:      Cervical back: No rigidity.   Skin:     General: Skin is warm and dry.      Coloration: Skin is not jaundiced or pale.   Neurological:      Mental Status: He is alert and oriented to person, place, and time.   Psychiatric:         Mood and Affect: Mood normal.         Behavior: Behavior normal.       Significant Labs:  I have reviewed all pertinent lab results within the past 24 hours.  CBC:   Recent Labs   Lab 01/27/23  0451   WBC 11.83   RBC 3.85*   HGB 11.8*   HCT 37.9*      MCV 98   MCH 30.6   MCHC 31.1*     BMP:   Recent Labs   Lab 01/27/23  0451   GLU 89      K 4.6      CO2 20*   BUN 21   CREATININE 1.1   CALCIUM 8.8       Significant Diagnostics:  I have reviewed all pertinent imaging results/findings within the past 24 hours.

## 2023-01-27 NOTE — PROGRESS NOTES
[facility] - Telemetry ([facility])  General Surgery  Progress Note    Subjective:     History of Present Illness:  No notes on file    Post-Op Info:  Procedure(s) (LRB):  CLOSURE, COLOSTOMY, LAPAROSCOPIC (N/A)  LYSIS, ADHESIONS, LAPAROSCOPIC (N/A)  BLOCK, TRANSVERSUS ABDOMINIS PLANE (N/A)  REMOVAL, FECES, IMPACTED (N/A)   1 Day Post-Op     Interval History: Doing well this morning. Reports feeling sore but pain is well-controlled. Had some nausea and a little bloating last night which resolved with simethicone. Hungry and wants to eat. No flatus yet.    Medications:  Continuous Infusions:   sodium chloride 0.9% 75 mL/hr at 01/27/23 0511     Scheduled Meds:   acetaminophen  650 mg Oral Q6H    ascorbic acid (vitamin C)  500 mg Oral BID    docusate sodium  100 mg Oral BID    enoxaparin  40 mg Subcutaneous Daily    methocarbamoL  1,000 mg Oral TID    metoprolol succinate  25 mg Oral Daily     PRN Meds:hydrALAZINE, HYDROmorphone, ondansetron, oxyCODONE, promethazine (PHENERGAN) IVPB, simethicone     Review of patient's allergies indicates:  No Known Allergies  Objective:     Vital Signs (Most Recent):  Temp: 98.7 °F (37.1 °C) (01/27/23 1143)  Pulse: (!) 56 (01/27/23 1143)  Resp: 18 (01/27/23 1203)  BP: 130/72 (01/27/23 1143)  SpO2: 95 % (01/27/23 1143)   Vital Signs (24h Range):  Temp:  [97.7 °F (36.5 °C)-99 °F (37.2 °C)] 98.7 °F (37.1 °C)  Pulse:  [56-69] 56  Resp:  [14-20] 18  SpO2:  [94 %-97 %] 95 %  BP: ()/(54-90) 130/72     Weight: 98.5 kg (217 lb 2.5 oz)  Body mass index is 32.07 kg/m².    Intake/Output - Last 3 Shifts         01/25 0700 01/26 0659 01/26 0700 01/27 0659 01/27 0700  01/28 0659    P.O.  150 100    IV Piggyback  1200     Total Intake(mL/kg)  1350 (14.1) 100 (1)    Urine (mL/kg/hr)  225 (0.1) 730 (1.3)    Total Output  225 730    Net  +1125 -630                   Physical Exam  Vitals and nursing note reviewed.   Constitutional:       General: He is not in acute distress.     Appearance: He  is not ill-appearing, toxic-appearing or diaphoretic.   HENT:      Head: Normocephalic.      Nose: Nose normal.      Mouth/Throat:      Mouth: Mucous membranes are moist.   Eyes:      General: No scleral icterus.        Right eye: No discharge.         Left eye: No discharge.      Extraocular Movements: Extraocular movements intact.   Cardiovascular:      Rate and Rhythm: Normal rate and regular rhythm.   Pulmonary:      Effort: No respiratory distress.      Breath sounds: No stridor.   Abdominal:      General: There is no distension.      Palpations: Abdomen is soft.      Comments: Soft, appropriately tender, port site incisions c/d/I, stoma closure dressing intact without shadowing   Musculoskeletal:      Cervical back: No rigidity.   Skin:     General: Skin is warm and dry.      Coloration: Skin is not jaundiced or pale.   Neurological:      Mental Status: He is alert and oriented to person, place, and time.   Psychiatric:         Mood and Affect: Mood normal.         Behavior: Behavior normal.       Significant Labs:  I have reviewed all pertinent lab results within the past 24 hours.  CBC:   Recent Labs   Lab 01/27/23  0451   WBC 11.83   RBC 3.85*   HGB 11.8*   HCT 37.9*      MCV 98   MCH 30.6   MCHC 31.1*     BMP:   Recent Labs   Lab 01/27/23  0451   GLU 89      K 4.6      CO2 20*   BUN 21   CREATININE 1.1   CALCIUM 8.8       Significant Diagnostics:  I have reviewed all pertinent imaging results/findings within the past 24 hours.    Assessment/Plan:     * S/P closure of ileostomy  POD #1 s/p laparoscopic-assisted ileostomy closure, extensive lysis of adhesions, fecal disimpaction    - Tolerating liquids. Advance to soft diet.  - Urine output appropriate. D/c norwood.  - Analgesia prn  - Increase activity, ambulate, up to chair  - Lovenox subq. Restart Eliquis tomorrow.  - IS        Elvie Parker DO  General Surgery  [facility] - Telemetry ([facility])

## 2023-01-27 NOTE — DISCHARGE INSTRUCTIONS
Discharge Instructions    Hygiene and incision care:   You may shower but do not soak such as in a bathtub or pool. Your port site incisions are closed with absorbable sutures and there is surgical glue on top. The glue will eventually flake off on its own. Keep a fresh gauze and vaseline with either paper or cloth tape over the previous stoma site. Do not scrub your incisions, just allow warm soapy water to run over them. Do not apply neosporin, hydrogen peroxide, or alcohol on your incisions.   If you develop fevers, worsening redness and/or pus-like drainage, call the office immediately.    Pain control:   You may take Tylenol (650 mg every 4 hours) and ibuprofen (600 mg every 6 hours) as well as alternate heat and cold packs for pain and swelling. Take ibuprofen with food as it can cause stomach upset and ulcers. Do not take ibuprofen if you have an increased risk of bleeding, history of stomach ulcers, are already taking an NSAID, or have kidney issues.   If taking narcotic pain medication, such as Norco (hydrocodone-acetaminophen) or Percocet (oxycodone-acetaminophen), do not drink alcohol or drive. Each Norco and Percocet tablet contains 325 mg of Tylenol; do not take more than 4000 mg of Tylenol per day. Narcotic pain medications can be constipating so be sure to drink plenty of water and take an over the counter stool softener such as colace (100 mg twice a day) or miralax (17 g once a day).    Activity:   No heavy lifting >10 lbs or sexual activity for 6 weeks while your incisions are healing as it might result in a hernia. Avoid straining, pushing, and pulling. It is okay to walk and slowly go up and down stairs. Avoid driving for at least 3 days or longer if taking narcotic pain medicine.   Make sure to do leg and ankle exercises and take deep breaths frequently to avoid developing blood clots or pneumonia.    Diet:   You may resume your regular diet. Some people find it best to stick to soft, bland, and  easily digestible foods for the first couple of days while the anesthesia is leaving their system or if they're taking narcotic pain medicine to avoid nausea and vomiting. Be sure to eat good, nutritious foods such as vegetables and lean proteins to give your body the nutrients it needs to heal. I recommend also taking vitamin C as this can aid with wound healing.    For any questions or concerns, please do not hesitate to contact the office any time at [phone] or send me a [employer] message.

## 2023-01-27 NOTE — ASSESSMENT & PLAN NOTE
POD #1 s/p laparoscopic-assisted ileostomy closure, extensive lysis of adhesions, fecal disimpaction    - Tolerating liquids. Advance to soft diet.  - Urine output appropriate. D/c norwood.  - Analgesia prn  - Increase activity, ambulate, up to chair  - Lovenox subq. Restart Eliquis tomorrow.  - IS

## 2023-01-27 NOTE — ANESTHESIA POSTPROCEDURE EVALUATION
Anesthesia Post Evaluation    Patient: Franky Masters    Procedure(s) Performed: Procedure(s) (LRB):  CLOSURE, COLOSTOMY, LAPAROSCOPIC (N/A)  LYSIS, ADHESIONS, LAPAROSCOPIC (N/A)  BLOCK, TRANSVERSUS ABDOMINIS PLANE (N/A)  REMOVAL, FECES, IMPACTED (N/A)    Final Anesthesia Type: general      Patient location during evaluation: PACU  Patient participation: Yes- Able to Participate  Level of consciousness: awake and alert and oriented  Post-procedure vital signs: reviewed and stable  Pain management: adequate  Airway patency: patent  NIVIA mitigation strategies: Verification of full reversal of neuromuscular block  PONV status at discharge: No PONV  Anesthetic complications: no      Cardiovascular status: blood pressure returned to baseline and hemodynamically stable  Respiratory status: unassisted  Hydration status: euvolemic  Follow-up not needed.          Vitals Value Taken Time   /72 01/27/23 1143   Temp 37.1 °C (98.7 °F) 01/27/23 1143   Pulse 81 01/27/23 1302   Resp 18 01/27/23 1203   SpO2 95 % 01/27/23 1143         Event Time   Out of Recovery 15:18:00         Pain/Stefani Score: Pain Rating Prior to Med Admin: 5 (1/27/2023 12:03 PM)  Pain Rating Post Med Admin: 2 (1/27/2023 12:52 PM)  Stefani Score: 9 (1/26/2023  3:00 PM)

## 2023-01-28 LAB
ANION GAP SERPL CALC-SCNC: 10 MMOL/L (ref 8–16)
BASOPHILS # BLD AUTO: 0.05 K/UL (ref 0–0.2)
BASOPHILS NFR BLD: 0.4 % (ref 0–1.9)
BUN SERPL-MCNC: 19 MG/DL (ref 8–23)
CALCIUM SERPL-MCNC: 9 MG/DL (ref 8.7–10.5)
CHLORIDE SERPL-SCNC: 107 MMOL/L (ref 95–110)
CO2 SERPL-SCNC: 20 MMOL/L (ref 23–29)
CREAT SERPL-MCNC: 0.9 MG/DL (ref 0.5–1.4)
DIFFERENTIAL METHOD: ABNORMAL
EOSINOPHIL # BLD AUTO: 0.1 K/UL (ref 0–0.5)
EOSINOPHIL NFR BLD: 0.7 % (ref 0–8)
ERYTHROCYTE [DISTWIDTH] IN BLOOD BY AUTOMATED COUNT: 15.3 % (ref 11.5–14.5)
EST. GFR  (NO RACE VARIABLE): >60 ML/MIN/1.73 M^2
GLUCOSE SERPL-MCNC: 112 MG/DL (ref 70–110)
HCT VFR BLD AUTO: 35.5 % (ref 40–54)
HGB BLD-MCNC: 11.2 G/DL (ref 14–18)
IMM GRANULOCYTES # BLD AUTO: 0.06 K/UL (ref 0–0.04)
IMM GRANULOCYTES NFR BLD AUTO: 0.4 % (ref 0–0.5)
LYMPHOCYTES # BLD AUTO: 1 K/UL (ref 1–4.8)
LYMPHOCYTES NFR BLD: 7.8 % (ref 18–48)
MCH RBC QN AUTO: 30.9 PG (ref 27–31)
MCHC RBC AUTO-ENTMCNC: 31.5 G/DL (ref 32–36)
MCV RBC AUTO: 98 FL (ref 82–98)
MONOCYTES # BLD AUTO: 1.3 K/UL (ref 0.3–1)
MONOCYTES NFR BLD: 9.4 % (ref 4–15)
NEUTROPHILS # BLD AUTO: 10.9 K/UL (ref 1.8–7.7)
NEUTROPHILS NFR BLD: 81.3 % (ref 38–73)
NRBC BLD-RTO: 0 /100 WBC
PHOSPHATE SERPL-MCNC: 3.2 MG/DL (ref 2.7–4.5)
PLATELET # BLD AUTO: 364 K/UL (ref 150–450)
PMV BLD AUTO: 10.8 FL (ref 9.2–12.9)
POTASSIUM SERPL-SCNC: 4 MMOL/L (ref 3.5–5.1)
RBC # BLD AUTO: 3.63 M/UL (ref 4.6–6.2)
SODIUM SERPL-SCNC: 137 MMOL/L (ref 136–145)
WBC # BLD AUTO: 13.38 K/UL (ref 3.9–12.7)

## 2023-01-28 PROCEDURE — 21400001 HC TELEMETRY ROOM

## 2023-01-28 PROCEDURE — 25000003 PHARM REV CODE 250: Performed by: SURGERY

## 2023-01-28 PROCEDURE — 63600175 PHARM REV CODE 636 W HCPCS: Performed by: SURGERY

## 2023-01-28 PROCEDURE — 80048 BASIC METABOLIC PNL TOTAL CA: CPT | Performed by: SURGERY

## 2023-01-28 PROCEDURE — 94799 UNLISTED PULMONARY SVC/PX: CPT

## 2023-01-28 PROCEDURE — 85025 COMPLETE CBC W/AUTO DIFF WBC: CPT | Performed by: SURGERY

## 2023-01-28 PROCEDURE — 99900035 HC TECH TIME PER 15 MIN (STAT)

## 2023-01-28 PROCEDURE — 36415 COLL VENOUS BLD VENIPUNCTURE: CPT | Performed by: SURGERY

## 2023-01-28 PROCEDURE — 84100 ASSAY OF PHOSPHORUS: CPT | Performed by: SURGERY

## 2023-01-28 RX ORDER — DEXTROSE MONOHYDRATE, SODIUM CHLORIDE, AND POTASSIUM CHLORIDE 50; 1.49; 4.5 G/1000ML; G/1000ML; G/1000ML
INJECTION, SOLUTION INTRAVENOUS CONTINUOUS
Status: DISCONTINUED | OUTPATIENT
Start: 2023-01-28 | End: 2023-01-30

## 2023-01-28 RX ORDER — HYDROMORPHONE HYDROCHLORIDE 1 MG/ML
0.5 INJECTION, SOLUTION INTRAMUSCULAR; INTRAVENOUS; SUBCUTANEOUS
Status: DISCONTINUED | OUTPATIENT
Start: 2023-01-28 | End: 2023-01-30

## 2023-01-28 RX ADMIN — TAMSULOSIN HYDROCHLORIDE 0.4 MG: 0.4 CAPSULE ORAL at 09:01

## 2023-01-28 RX ADMIN — PROMETHAZINE HYDROCHLORIDE 25 MG: 25 INJECTION INTRAMUSCULAR; INTRAVENOUS at 08:01

## 2023-01-28 RX ADMIN — DEXTROSE, SODIUM CHLORIDE, AND POTASSIUM CHLORIDE: 5; .45; .15 INJECTION INTRAVENOUS at 12:01

## 2023-01-28 RX ADMIN — METHOCARBAMOL 1000 MG: 500 TABLET ORAL at 09:01

## 2023-01-28 RX ADMIN — METHOCARBAMOL 1000 MG: 500 TABLET ORAL at 02:01

## 2023-01-28 RX ADMIN — DOCUSATE SODIUM 100 MG: 100 CAPSULE, LIQUID FILLED ORAL at 09:01

## 2023-01-28 RX ADMIN — OXYCODONE HYDROCHLORIDE AND ACETAMINOPHEN 500 MG: 500 TABLET ORAL at 08:01

## 2023-01-28 RX ADMIN — DEXTROSE, SODIUM CHLORIDE, AND POTASSIUM CHLORIDE: 5; .45; .15 INJECTION INTRAVENOUS at 09:01

## 2023-01-28 RX ADMIN — OXYCODONE HYDROCHLORIDE 5 MG: 5 TABLET ORAL at 06:01

## 2023-01-28 RX ADMIN — OXYCODONE HYDROCHLORIDE AND ACETAMINOPHEN 500 MG: 500 TABLET ORAL at 09:01

## 2023-01-28 RX ADMIN — ONDANSETRON 4 MG: 2 INJECTION INTRAMUSCULAR; INTRAVENOUS at 09:01

## 2023-01-28 RX ADMIN — METOPROLOL SUCCINATE 25 MG: 25 TABLET, EXTENDED RELEASE ORAL at 09:01

## 2023-01-28 RX ADMIN — HYDROMORPHONE HYDROCHLORIDE 0.5 MG: 1 INJECTION, SOLUTION INTRAMUSCULAR; INTRAVENOUS; SUBCUTANEOUS at 08:01

## 2023-01-28 RX ADMIN — ONDANSETRON 4 MG: 2 INJECTION INTRAMUSCULAR; INTRAVENOUS at 05:01

## 2023-01-28 RX ADMIN — ACETAMINOPHEN 650 MG: 325 TABLET ORAL at 12:01

## 2023-01-28 RX ADMIN — ACETAMINOPHEN 650 MG: 325 TABLET ORAL at 05:01

## 2023-01-28 RX ADMIN — ACETAMINOPHEN 650 MG: 325 TABLET ORAL at 06:01

## 2023-01-28 NOTE — SUBJECTIVE & OBJECTIVE
Interval History:  Patient had an episode of nausea and vomiting feels bloated.      Will return to NPO and IV fluids.  Labs and x-rays in the morning.  Monitor white count    Medications:  Continuous Infusions:   dextrose 5 % and 0.45 % NaCl with KCl 20 mEq       Scheduled Meds:   acetaminophen  650 mg Oral Q6H    ascorbic acid (vitamin C)  500 mg Oral BID    docusate sodium  100 mg Oral BID    enoxaparin  40 mg Subcutaneous Daily    methocarbamoL  1,000 mg Oral TID    metoprolol succinate  25 mg Oral Daily    tamsulosin  0.4 mg Oral Daily     PRN Meds:hydrALAZINE, HYDROmorphone, HYDROmorphone, ondansetron, promethazine (PHENERGAN) IVPB, simethicone     Review of patient's allergies indicates:  No Known Allergies  Objective:     Vital Signs (Most Recent):  Temp: 98 °F (36.7 °C) (01/28/23 0751)  Pulse: 65 (01/28/23 0843)  Resp: 20 (01/28/23 0843)  BP: (!) 144/78 (01/28/23 0751)  SpO2: (!) 94 % (01/28/23 0843)   Vital Signs (24h Range):  Temp:  [97.9 °F (36.6 °C)-99.4 °F (37.4 °C)] 98 °F (36.7 °C)  Pulse:  [56-81] 65  Resp:  [16-66] 20  SpO2:  [93 %-95 %] 94 %  BP: (130-144)/(72-89) 144/78     Weight: 101.1 kg (222 lb 14.2 oz)  Body mass index is 32.91 kg/m².    Intake/Output - Last 3 Shifts         01/26 0700  01/27 0659 01/27 0700  01/28 0659 01/28 0700  01/29 0659    P.O. 150 580     IV Piggyback 1200      Total Intake(mL/kg) 1350 (14.1) 580 (5.7)     Urine (mL/kg/hr) 225 (0.1) 2030 (0.8)     Total Output 225 2030     Net +1125 -1450                    Physical Exam  Vitals and nursing note reviewed.   Constitutional:       General: He is not in acute distress.     Appearance: He is well-developed.   HENT:      Head: Normocephalic and atraumatic.   Eyes:      General: No scleral icterus.     Pupils: Pupils are equal, round, and reactive to light.   Neck:      Thyroid: No thyromegaly.      Vascular: No JVD.      Trachea: No tracheal deviation.   Cardiovascular:      Rate and Rhythm: Normal rate and regular rhythm.       Heart sounds: Normal heart sounds.   Pulmonary:      Effort: Pulmonary effort is normal.      Breath sounds: Normal breath sounds.   Abdominal:      General: Bowel sounds are normal. There is distension.      Palpations: Abdomen is soft. There is no mass.      Tenderness: There is no abdominal tenderness (incision). There is no guarding or rebound.      Comments: Old ileostomy site is clean   Musculoskeletal:         General: Normal range of motion.      Cervical back: Normal range of motion and neck supple.      Right lower leg: No edema.      Left lower leg: No edema.   Lymphadenopathy:      Cervical: No cervical adenopathy.   Skin:     General: Skin is warm and dry.   Neurological:      Mental Status: He is alert and oriented to person, place, and time.   Psychiatric:         Behavior: Behavior normal.         Thought Content: Thought content normal.         Judgment: Judgment normal.       Significant Labs:  I have reviewed all pertinent lab results within the past 24 hours.  CBC:   Recent Labs   Lab 01/28/23  0649   WBC 13.38*   RBC 3.63*   HGB 11.2*   HCT 35.5*      MCV 98   MCH 30.9   MCHC 31.5*     BMP:   Recent Labs   Lab 01/28/23  0649   *      K 4.0      CO2 20*   BUN 19   CREATININE 0.9   CALCIUM 9.0   Phosphorus was 3.6      Significant Diagnostics:  No new

## 2023-01-28 NOTE — PROGRESS NOTES
[facility]  General Surgery  Progress Note    Subjective:     History of Present Illness:  Patient is a 67 y.o. male presents to discuss ileostomy closure.    Post-Op Info:  Procedure(s) (LRB):  CLOSURE, COLOSTOMY, LAPAROSCOPIC (N/A)  LYSIS, ADHESIONS, LAPAROSCOPIC (N/A)  BLOCK, TRANSVERSUS ABDOMINIS PLANE (N/A)  REMOVAL, FECES, IMPACTED (N/A)   2 Days Post-Op     Interval History:  Patient had an episode of nausea and vomiting feels bloated.      Will return to NPO and IV fluids.  Labs and x-rays in the morning.  Monitor white count    Medications:  Continuous Infusions:   dextrose 5 % and 0.45 % NaCl with KCl 20 mEq       Scheduled Meds:   acetaminophen  650 mg Oral Q6H    ascorbic acid (vitamin C)  500 mg Oral BID    docusate sodium  100 mg Oral BID    enoxaparin  40 mg Subcutaneous Daily    methocarbamoL  1,000 mg Oral TID    metoprolol succinate  25 mg Oral Daily    tamsulosin  0.4 mg Oral Daily     PRN Meds:hydrALAZINE, HYDROmorphone, HYDROmorphone, ondansetron, promethazine (PHENERGAN) IVPB, simethicone     Review of patient's allergies indicates:  No Known Allergies  Objective:     Vital Signs (Most Recent):  Temp: 98 °F (36.7 °C) (01/28/23 0751)  Pulse: 65 (01/28/23 0843)  Resp: 20 (01/28/23 0843)  BP: (!) 144/78 (01/28/23 0751)  SpO2: (!) 94 % (01/28/23 0843)   Vital Signs (24h Range):  Temp:  [97.9 °F (36.6 °C)-99.4 °F (37.4 °C)] 98 °F (36.7 °C)  Pulse:  [56-81] 65  Resp:  [16-66] 20  SpO2:  [93 %-95 %] 94 %  BP: (130-144)/(72-89) 144/78     Weight: 101.1 kg (222 lb 14.2 oz)  Body mass index is 32.91 kg/m².    Intake/Output - Last 3 Shifts         01/26 0700 01/27 0659 01/27 0700 01/28 0659 01/28 0700 01/29 0659    P.O. 150 580     IV Piggyback 1200      Total Intake(mL/kg) 1350 (14.1) 580 (5.7)     Urine (mL/kg/hr) 225 (0.1) 2030 (0.8)     Total Output 225 2030     Net +1125 -1450                    Physical Exam  Vitals and nursing note reviewed.   Constitutional:       General: He  is not in acute distress.     Appearance: He is well-developed.   HENT:      Head: Normocephalic and atraumatic.   Eyes:      General: No scleral icterus.     Pupils: Pupils are equal, round, and reactive to light.   Neck:      Thyroid: No thyromegaly.      Vascular: No JVD.      Trachea: No tracheal deviation.   Cardiovascular:      Rate and Rhythm: Normal rate and regular rhythm.      Heart sounds: Normal heart sounds.   Pulmonary:      Effort: Pulmonary effort is normal.      Breath sounds: Normal breath sounds.   Abdominal:      General: Bowel sounds are normal. There is distension.      Palpations: Abdomen is soft. There is no mass.      Tenderness: There is no abdominal tenderness (incision). There is no guarding or rebound.      Comments: Old ileostomy site is clean   Musculoskeletal:         General: Normal range of motion.      Cervical back: Normal range of motion and neck supple.      Right lower leg: No edema.      Left lower leg: No edema.   Lymphadenopathy:      Cervical: No cervical adenopathy.   Skin:     General: Skin is warm and dry.   Neurological:      Mental Status: He is alert and oriented to person, place, and time.   Psychiatric:         Behavior: Behavior normal.         Thought Content: Thought content normal.         Judgment: Judgment normal.       Significant Labs:  I have reviewed all pertinent lab results within the past 24 hours.  CBC:   Recent Labs   Lab 01/28/23  0649   WBC 13.38*   RBC 3.63*   HGB 11.2*   HCT 35.5*      MCV 98   MCH 30.9   MCHC 31.5*     BMP:   Recent Labs   Lab 01/28/23  0649   *      K 4.0      CO2 20*   BUN 19   CREATININE 0.9   CALCIUM 9.0   Phosphorus was 3.6      Significant Diagnostics:  No new    Assessment/Plan:     * S/P closure of ileostomy  POD #1 s/p laparoscopic-assisted ileostomy closure, extensive lysis of adhesions, fecal disimpaction    - nausea and vomiting   Returned to NPO with IV fluids  Labs and x-rays in the  morning  - Urine output appropriate. D/c [place].  - Analgesia prn  - Increase activity, ambulate, up to chair  - Lovenox subqw.  Will continue.  Hold off on restarting Eliquis  - IS        Selwyn Sanchez MD  General Surgery  [facility] - Tele[facility] ([facility])

## 2023-01-28 NOTE — ASSESSMENT & PLAN NOTE
POD #1 s/p laparoscopic-assisted ileostomy closure, extensive lysis of adhesions, fecal disimpaction    - nausea and vomiting   Returned to NPO with IV fluids  Labs and x-rays in the morning  - Urine output appropriate. D/c [place]wood.  - Analgesia prn  - Increase activity, ambulate, up to chair  - Lovenox subqw.  Will continue.  Hold off on restarting Eliquis  - IS

## 2023-01-29 PROBLEM — I50.42 CHRONIC COMBINED SYSTOLIC AND DIASTOLIC CONGESTIVE HEART FAILURE: Status: ACTIVE | Noted: 2023-01-29

## 2023-01-29 PROBLEM — I48.11 LONGSTANDING PERSISTENT ATRIAL FIBRILLATION: Status: ACTIVE | Noted: 2023-01-29

## 2023-01-29 LAB
ANION GAP SERPL CALC-SCNC: 9 MMOL/L (ref 8–16)
APTT BLDCRRT: 28.3 SEC (ref 21–32)
APTT BLDCRRT: 31 SEC (ref 21–32)
APTT BLDCRRT: 38.4 SEC (ref 21–32)
BASOPHILS # BLD AUTO: 0.03 K/UL (ref 0–0.2)
BASOPHILS # BLD AUTO: 0.03 K/UL (ref 0–0.2)
BASOPHILS NFR BLD: 0.3 % (ref 0–1.9)
BASOPHILS NFR BLD: 0.4 % (ref 0–1.9)
BNP SERPL-MCNC: 253 PG/ML (ref 0–99)
BUN SERPL-MCNC: 21 MG/DL (ref 8–23)
CALCIUM SERPL-MCNC: 9.5 MG/DL (ref 8.7–10.5)
CHLORIDE SERPL-SCNC: 106 MMOL/L (ref 95–110)
CO2 SERPL-SCNC: 20 MMOL/L (ref 23–29)
CREAT SERPL-MCNC: 0.9 MG/DL (ref 0.5–1.4)
DIFFERENTIAL METHOD: ABNORMAL
DIFFERENTIAL METHOD: ABNORMAL
EOSINOPHIL # BLD AUTO: 0 K/UL (ref 0–0.5)
EOSINOPHIL # BLD AUTO: 0.1 K/UL (ref 0–0.5)
EOSINOPHIL NFR BLD: 0.4 % (ref 0–8)
EOSINOPHIL NFR BLD: 0.8 % (ref 0–8)
ERYTHROCYTE [DISTWIDTH] IN BLOOD BY AUTOMATED COUNT: 15.2 % (ref 11.5–14.5)
ERYTHROCYTE [DISTWIDTH] IN BLOOD BY AUTOMATED COUNT: 15.6 % (ref 11.5–14.5)
EST. GFR  (NO RACE VARIABLE): >60 ML/MIN/1.73 M^2
GLUCOSE SERPL-MCNC: 112 MG/DL (ref 70–110)
HCT VFR BLD AUTO: 39 % (ref 40–54)
HCT VFR BLD AUTO: 39.6 % (ref 40–54)
HGB BLD-MCNC: 12.4 G/DL (ref 14–18)
HGB BLD-MCNC: 12.5 G/DL (ref 14–18)
IMM GRANULOCYTES # BLD AUTO: 0.03 K/UL (ref 0–0.04)
IMM GRANULOCYTES # BLD AUTO: 0.03 K/UL (ref 0–0.04)
IMM GRANULOCYTES NFR BLD AUTO: 0.3 % (ref 0–0.5)
IMM GRANULOCYTES NFR BLD AUTO: 0.4 % (ref 0–0.5)
INR PPP: 1.1 (ref 0.8–1.2)
LYMPHOCYTES # BLD AUTO: 1 K/UL (ref 1–4.8)
LYMPHOCYTES # BLD AUTO: 1.2 K/UL (ref 1–4.8)
LYMPHOCYTES NFR BLD: 10.7 % (ref 18–48)
LYMPHOCYTES NFR BLD: 13.5 % (ref 18–48)
MAGNESIUM SERPL-MCNC: 1.8 MG/DL (ref 1.6–2.6)
MCH RBC QN AUTO: 30.7 PG (ref 27–31)
MCH RBC QN AUTO: 30.8 PG (ref 27–31)
MCHC RBC AUTO-ENTMCNC: 31.6 G/DL (ref 32–36)
MCHC RBC AUTO-ENTMCNC: 31.8 G/DL (ref 32–36)
MCV RBC AUTO: 97 FL (ref 82–98)
MCV RBC AUTO: 97 FL (ref 82–98)
MONOCYTES # BLD AUTO: 1 K/UL (ref 0.3–1)
MONOCYTES # BLD AUTO: 1.1 K/UL (ref 0.3–1)
MONOCYTES NFR BLD: 11.4 % (ref 4–15)
MONOCYTES NFR BLD: 11.9 % (ref 4–15)
NEUTROPHILS # BLD AUTO: 6.3 K/UL (ref 1.8–7.7)
NEUTROPHILS # BLD AUTO: 6.9 K/UL (ref 1.8–7.7)
NEUTROPHILS NFR BLD: 73.5 % (ref 38–73)
NEUTROPHILS NFR BLD: 76.4 % (ref 38–73)
NRBC BLD-RTO: 0 /100 WBC
NRBC BLD-RTO: 0 /100 WBC
PHOSPHATE SERPL-MCNC: 3.6 MG/DL (ref 2.7–4.5)
PLATELET # BLD AUTO: 380 K/UL (ref 150–450)
PLATELET # BLD AUTO: 413 K/UL (ref 150–450)
PMV BLD AUTO: 10.9 FL (ref 9.2–12.9)
PMV BLD AUTO: 11.3 FL (ref 9.2–12.9)
POTASSIUM SERPL-SCNC: 4.3 MMOL/L (ref 3.5–5.1)
PROTHROMBIN TIME: 11.6 SEC (ref 9–12.5)
RBC # BLD AUTO: 4.02 M/UL (ref 4.6–6.2)
RBC # BLD AUTO: 4.07 M/UL (ref 4.6–6.2)
SODIUM SERPL-SCNC: 135 MMOL/L (ref 136–145)
TROPONIN I SERPL DL<=0.01 NG/ML-MCNC: 0.09 NG/ML (ref 0–0.03)
WBC # BLD AUTO: 8.53 K/UL (ref 3.9–12.7)
WBC # BLD AUTO: 9.08 K/UL (ref 3.9–12.7)

## 2023-01-29 PROCEDURE — 99233 SBSQ HOSP IP/OBS HIGH 50: CPT | Mod: ,,, | Performed by: INTERNAL MEDICINE

## 2023-01-29 PROCEDURE — 21400001 HC TELEMETRY ROOM

## 2023-01-29 PROCEDURE — 99233 PR SUBSEQUENT HOSPITAL CARE,LEVL III: ICD-10-PCS | Mod: ,,, | Performed by: INTERNAL MEDICINE

## 2023-01-29 PROCEDURE — 36415 COLL VENOUS BLD VENIPUNCTURE: CPT | Performed by: SURGERY

## 2023-01-29 PROCEDURE — 63600175 PHARM REV CODE 636 W HCPCS: Performed by: INTERNAL MEDICINE

## 2023-01-29 PROCEDURE — 63600175 PHARM REV CODE 636 W HCPCS: Performed by: SURGERY

## 2023-01-29 PROCEDURE — 85730 THROMBOPLASTIN TIME PARTIAL: CPT | Mod: 91 | Performed by: SURGERY

## 2023-01-29 PROCEDURE — 93010 EKG 12-LEAD: ICD-10-PCS | Mod: ,,, | Performed by: STUDENT IN AN ORGANIZED HEALTH CARE EDUCATION/TRAINING PROGRAM

## 2023-01-29 PROCEDURE — 93005 ELECTROCARDIOGRAM TRACING: CPT

## 2023-01-29 PROCEDURE — 25000003 PHARM REV CODE 250: Performed by: INTERNAL MEDICINE

## 2023-01-29 PROCEDURE — 80048 BASIC METABOLIC PNL TOTAL CA: CPT | Performed by: SURGERY

## 2023-01-29 PROCEDURE — 94799 UNLISTED PULMONARY SVC/PX: CPT

## 2023-01-29 PROCEDURE — 99900035 HC TECH TIME PER 15 MIN (STAT)

## 2023-01-29 PROCEDURE — 25000003 PHARM REV CODE 250: Performed by: SURGERY

## 2023-01-29 PROCEDURE — 93010 ELECTROCARDIOGRAM REPORT: CPT | Mod: ,,, | Performed by: STUDENT IN AN ORGANIZED HEALTH CARE EDUCATION/TRAINING PROGRAM

## 2023-01-29 PROCEDURE — 85610 PROTHROMBIN TIME: CPT | Performed by: INTERNAL MEDICINE

## 2023-01-29 PROCEDURE — 84100 ASSAY OF PHOSPHORUS: CPT | Performed by: SURGERY

## 2023-01-29 PROCEDURE — 83735 ASSAY OF MAGNESIUM: CPT | Performed by: SURGERY

## 2023-01-29 PROCEDURE — 85730 THROMBOPLASTIN TIME PARTIAL: CPT | Performed by: INTERNAL MEDICINE

## 2023-01-29 PROCEDURE — 84484 ASSAY OF TROPONIN QUANT: CPT | Performed by: SURGERY

## 2023-01-29 PROCEDURE — 94761 N-INVAS EAR/PLS OXIMETRY MLT: CPT

## 2023-01-29 PROCEDURE — 85025 COMPLETE CBC W/AUTO DIFF WBC: CPT | Mod: 91 | Performed by: SURGERY

## 2023-01-29 PROCEDURE — 83880 ASSAY OF NATRIURETIC PEPTIDE: CPT | Performed by: INTERNAL MEDICINE

## 2023-01-29 PROCEDURE — 85025 COMPLETE CBC W/AUTO DIFF WBC: CPT | Performed by: INTERNAL MEDICINE

## 2023-01-29 RX ORDER — FUROSEMIDE 10 MG/ML
40 INJECTION INTRAMUSCULAR; INTRAVENOUS 2 TIMES DAILY
Status: DISCONTINUED | OUTPATIENT
Start: 2023-01-29 | End: 2023-01-31

## 2023-01-29 RX ORDER — BISACODYL 5 MG
10 TABLET, DELAYED RELEASE (ENTERIC COATED) ORAL ONCE
Status: DISCONTINUED | OUTPATIENT
Start: 2023-01-29 | End: 2023-02-01 | Stop reason: HOSPADM

## 2023-01-29 RX ORDER — HEPARIN SODIUM,PORCINE/D5W 25000/250
0-40 INTRAVENOUS SOLUTION INTRAVENOUS CONTINUOUS
Status: DISCONTINUED | OUTPATIENT
Start: 2023-01-29 | End: 2023-02-01 | Stop reason: HOSPADM

## 2023-01-29 RX ORDER — METOPROLOL TARTRATE 1 MG/ML
10 INJECTION, SOLUTION INTRAVENOUS ONCE
Status: COMPLETED | OUTPATIENT
Start: 2023-01-29 | End: 2023-01-29

## 2023-01-29 RX ORDER — DILTIAZEM HCL/D5W 125 MG/125
10 PLASTIC BAG, INJECTION (ML) INTRAVENOUS CONTINUOUS
Status: DISCONTINUED | OUTPATIENT
Start: 2023-01-29 | End: 2023-01-30

## 2023-01-29 RX ORDER — FUROSEMIDE 10 MG/ML
40 INJECTION INTRAMUSCULAR; INTRAVENOUS ONCE
Status: COMPLETED | OUTPATIENT
Start: 2023-01-29 | End: 2023-01-29

## 2023-01-29 RX ADMIN — FUROSEMIDE 40 MG: 10 INJECTION, SOLUTION INTRAMUSCULAR; INTRAVENOUS at 01:01

## 2023-01-29 RX ADMIN — HEPARIN SODIUM 14 UNITS/KG/HR: 10000 INJECTION, SOLUTION INTRAVENOUS at 04:01

## 2023-01-29 RX ADMIN — FUROSEMIDE 40 MG: 10 INJECTION, SOLUTION INTRAMUSCULAR; INTRAVENOUS at 08:01

## 2023-01-29 RX ADMIN — Medication 5 MG/HR: at 10:01

## 2023-01-29 RX ADMIN — ONDANSETRON 4 MG: 2 INJECTION INTRAMUSCULAR; INTRAVENOUS at 01:01

## 2023-01-29 RX ADMIN — HEPARIN SODIUM 12 UNITS/KG/HR: 10000 INJECTION, SOLUTION INTRAVENOUS at 10:01

## 2023-01-29 RX ADMIN — Medication 10 MG/HR: at 10:01

## 2023-01-29 RX ADMIN — HYDROMORPHONE HYDROCHLORIDE 0.5 MG: 1 INJECTION, SOLUTION INTRAMUSCULAR; INTRAVENOUS; SUBCUTANEOUS at 05:01

## 2023-01-29 RX ADMIN — METOROPROLOL TARTRATE 10 MG: 5 INJECTION, SOLUTION INTRAVENOUS at 10:01

## 2023-01-29 NOTE — ASSESSMENT & PLAN NOTE
Patient has gone into atrial fibrillation with rapid ventricle response.      He will be anticoagulated on heparin as oral tolerance is questionable for medications  Cardiology consult with initiation of diltiazem drip.  Agree with echocardiogram

## 2023-01-29 NOTE — HPI
Franky Masters is a 67 y.o. male pt with CHF, PAF, colon cancer and liver metastases, ex-smoker, colostomy bag post abd surgery - colostomy reversal developed AF V RVR this AM, consulted for further recs. Pt started on Heparin drip and Cardizem drip for rate control, pt had seen Dr. Maravilla preop and had ECHO with EF 30%, pt not on any OMT yet and did not want to take Eliquis. Pt has refused chemo for quality of end of life.    Pt's rates improving on Dilt 10 mcg, once PO tolerated will give oral meds - pt has ileus now. He also had significant weight gain sec to fluids will give lasix to diurese. BNP elevated this AM.       Results for orders placed during the hospital encounter of 01/20/23    Echo    Interpretation Summary  · The left ventricle is normal in size with eccentric hypertrophy and moderately decreased systolic function.  · Mild left atrial enlargement.  · The estimated ejection fraction is 30%.  · Grade I left ventricular diastolic dysfunction.  · Normal right ventricular size with normal right ventricular systolic function.  · Mild mitral regurgitation.  · There are segmental left ventricular wall motion abnormalities.  · Mild tricuspid regurgitation.  · Normal central venous pressure (3 mmHg).  · The estimated PA systolic pressure is 31 mmHg.

## 2023-01-29 NOTE — PLAN OF CARE
"AAOx4  NAD  VSS  12h chart check complete    BP (!) 150/81 (BP Location: Right arm, Patient Position: Lying)   Pulse 73   Temp 97.7 °F (36.5 °C) (Oral)   Resp 18   Ht 5' 9" (1.753 m)   Wt 101.1 kg (222 lb 14.2 oz)   SpO2 95%   BMI 32.91 kg/m²     Pt has had intermittent n/v this shift. Reports mild pain, requests no pain meds other than scheduled Tylenol. Zofran given x2 this shift. Pt is now NPO rt continued n/v.    Pt sitting up in bed, daughter at BS. Bed in low & locked position with call light in reach. Will continue to monitor.  "

## 2023-01-29 NOTE — ASSESSMENT & PLAN NOTE
Cont IV diuresis post op weight gain due to fluids  Diuresing well  Keep K > 4, Mg > 2  Pt had not been taking cardiac meds preop- follow up with Dr. Maravilla post DC  Consider starting Entresto

## 2023-01-29 NOTE — ASSESSMENT & PLAN NOTE
Post op AFib  Cont heparin and Dilt drip until ileus resolves and tolerates PO meds  Rec Eliquis upon DC and cont BB

## 2023-01-29 NOTE — NURSING
AAOx4  NAD  VSS    Upon morning rounds/vitals, unable to obtain BP with dynamap. It would give an error result after 3 attempts & cuff change. Obtained manual BP & was ab;e to hear what sounded like afib. Obtained stat EKG which confirmed Afib RVR. Notified attending, who ordered cards consult. Cardiology initiated cardiac drip & heparin orders. Notified charge nurse of the need to hand off pt to RN.    Handoff & report given to Micaela Ames RN at 0915.

## 2023-01-29 NOTE — PROGRESS NOTES
[facility] ([facility])  General Surgery  Progress Note    Subjective:     History of Present Illness:  No notes on file    Post-Op Info:  Procedure(s) (LRB):  CLOSURE, COLOSTOMY, LAPAROSCOPIC (N/A)  LYSIS, ADHESIONS, LAPAROSCOPIC (N/A)  BLOCK, TRANSVERSUS ABDOMINIS PLANE (N/A)  REMOVAL, FECES, IMPACTED (N/A)   3 Days Post-Op     Interval History:  Patient went into atrial fibrillation with a rapid response.  He still feels bloated.  Some flatus but no  bowel movements.  Abdominal films consistent with dilated loops of small bowel.      Started on heparin and Cardizem drip by Cardiology.  EKG showed AFib with rapid ventral cord respond    Medications:  Continuous Infusions:   dextrose 5 % and 0.45 % NaCl with KCl 20 mEq Stopped (01/29/23 0043)    dilTIAZem      heparin (porcine) in D5W       Scheduled Meds:   acetaminophen  650 mg Oral Q6H    ascorbic acid (vitamin C)  500 mg Oral BID    docusate sodium  100 mg Oral BID    heparin (PORCINE)  60 Units/kg (Adjusted) Intravenous Once    methocarbamoL  1,000 mg Oral TID    metoprolol  10 mg Intravenous Once    tamsulosin  0.4 mg Oral Daily     PRN Meds:heparin (PORCINE), heparin (PORCINE), hydrALAZINE, HYDROmorphone, HYDROmorphone, ondansetron, promethazine (PHENERGAN) IVPB, simethicone     Review of patient's allergies indicates:  No Known Allergies  Objective:     Vital Signs (Most Recent):  Temp: 98.3 °F (36.8 °C) (01/29/23 0723)  Pulse: 92 (01/29/23 0723)  Resp: 16 (01/29/23 0723)  BP: 130/85 (01/29/23 0424)  SpO2: (!) 94 % (01/29/23 0723)   Vital Signs (24h Range):  Temp:  [97.4 °F (36.3 °C)-99 °F (37.2 °C)] 98.3 °F (36.8 °C)  Pulse:  [] 92  Resp:  [16-18] 16  SpO2:  [93 %-95 %] 94 %  BP: (128-150)/(78-90) 130/85     Weight: 101.1 kg (222 lb 14.2 oz)  Body mass index is 32.91 kg/m².    Intake/Output - Last 3 Shifts         01/27 0700 01/28 0659 01/28 0700 01/29 0659 01/29 0700 01/30 0659    P.O. 580      IV Piggyback       Total  Intake(mL/kg) 580 (5.7)      Urine (mL/kg/hr) 2030 (0.8) 1100 (0.5)     Total Output 2030 1100     Net -1450 -1100            Stool Occurrence  1 x             Physical Exam  Vitals and nursing note reviewed.   Constitutional:       General: He is not in acute distress.     Appearance: He is well-developed. Ill appearance: chronically in mild acute.   HENT:      Head: Normocephalic and atraumatic.   Eyes:      General: No scleral icterus.     Pupils: Pupils are equal, round, and reactive to light.   Neck:      Thyroid: No thyromegaly.      Vascular: No JVD.      Trachea: No tracheal deviation.   Cardiovascular:      Rate and Rhythm: Tachycardia present. Rhythm irregular.      Heart sounds: Normal heart sounds.   Pulmonary:      Effort: Pulmonary effort is normal.      Breath sounds: Normal breath sounds.   Abdominal:      General: Bowel sounds are normal. There is distension.      Palpations: Abdomen is soft. There is no mass.      Tenderness: There is abdominal tenderness (incisional). There is no guarding or rebound.      Comments: Incisions are clean   Musculoskeletal:         General: Normal range of motion.      Cervical back: Normal range of motion and neck supple.      Right lower leg: No edema.      Left lower leg: No edema.   Lymphadenopathy:      Cervical: No cervical adenopathy.   Skin:     General: Skin is warm and dry.   Neurological:      Mental Status: He is alert and oriented to person, place, and time.   Psychiatric:         Mood and Affect: Mood normal.         Behavior: Behavior normal.         Thought Content: Thought content normal.         Judgment: Judgment normal.       Significant Labs:  I have reviewed all pertinent lab results within the past 24 hours.  CBC:   Recent Labs   Lab 01/29/23  0920   WBC 8.53   RBC 4.07*   HGB 12.5*   HCT 39.6*      MCV 97   MCH 30.7   MCHC 31.6*     BMP:   Recent Labs   Lab 01/29/23  0554   *   *   K 4.3      CO2 20*   BUN 21    CREATININE 0.9   CALCIUM 9.5   MG 1.8     CMP:   Recent Labs   Lab 01/29/23  0554   *   CALCIUM 9.5   *   K 4.3   CO2 20*      BUN 21   CREATININE 0.9     EKG    29-JAN-2023 08:33:05 EKG  System-[facility]   ROUTINE RETRIEVAL Atrial fibrillation with rapid ventricular response Left axis deviation Low voltage QRS Inferior infarct ,age undetermined Anterolateral infarct (cited on or before 05-AUG-2021) Abnormal ECG When compared with ECG of 06-JAN-2023 09:25, Significant changes have occurred 25mm/s 10mm/mV 100Hz 9.0.7 12SL 243 ALCIDES: 3       Significant Diagnostics:  I have reviewed all pertinent imaging results/findings within the past 24 hours.  Abdominal x-ray    Narrative & Impression  EXAMINATION:  XR ABDOMEN FLAT AND ERECT     CLINICAL HISTORY:  Abdominal distension.  Evaluate for postop bowel distension, laparoscopic ileostomy reversal;     COMPARISON:  [facility] 05/15/2022.     FINDINGS:  The nasogastric tube is no longer identified.     There are degenerative changes of the lumbar spine.     There are a few mildly distended air-filled loops of bowel.  A few small scattered air-fluid levels are seen.  Possible low-grade ileus.     Impression:     Possible low-grade ileus.  Otherwise negative.        Electronically signed by: Jordan Beverly MD  Date:                                            01/29/2023    Assessment/Plan:     * S/P closure of ileostomy  POD #1 s/p laparoscopic-assisted ileostomy closure, extensive lysis of adhesions, fecal disimpaction    -abdominal film showed distended loops of small bowel likely ileus  - no additional vomiting  Returned to NPO with IV fluids  Labs and x-rays in the morning  - Urine output appropriate. D/c n[place]wood.  - Analgesia prn  - Increase activity, ambulate, up to chair  - Lovenox will be discontinued is now on heparin   Heparin for anticoagulation  - IS    Longstanding persistent atrial fibrillation  Patient has gone into atrial fibrillation  with rapid ventricle response.      He will be anticoagulated on heparin as oral tolerance is questionable for medications  Cardiology consult with initiation of diltiazem drip.  Agree with echocardiogram        Selwyn Sanchez MD  General Surgery  [facility] Tele[facility] ([facility])

## 2023-01-29 NOTE — CONSULTS
[facility] ([facility])  Cardiology  Consult Note    Patient Name: Franky Masters  MRN: [id number]  Admission Date: 1/26/2023  Hospital Length of Stay: 3 days  Code Status: Prior   Attending Provider: Elvie Parker DO   Consulting Provider: David Cast Md, MD  Primary Care Physician: HOLLY ROSEN  Principal Problem:S/P closure of ileostomy    Patient information was obtained from patient, past medical records, ER records and primary team.     Inpatient consult to Cardiology  Consult performed by: David Cast MD  Consult ordered by: Selwyn Sanchez MD  Reason for consult: Afib, post op, CHF         Subjective:     Chief Complaint:  Post op AFIB     HPI:   Franky Masters is a 67 y.o. male pt with CHF, PAF, colon cancer and liver metastases, ex-smoker, colostomy bag post abd surgery - colostomy reversal developed AF V RVR this AM, consulted for further recs. Pt started on Heparin drip and Cardizem drip for rate control, pt had seen Dr. Maravilla preop and had ECHO with EF 30%, pt not on any OMT yet and did not want to take Eliquis. Pt has refused chemo for quality of end of life.    Pt's rates improving on Dilt 10 mcg, once PO tolerated will give oral meds - pt has ileus now. He also had significant weight gain sec to fluids will give lasix to diurese. BNP elevated this AM.       Results for orders placed during the hospital encounter of 01/20/23    Echo    Interpretation Summary  · The left ventricle is normal in size with eccentric hypertrophy and moderately decreased systolic function.  · Mild left atrial enlargement.  · The estimated ejection fraction is 30%.  · Grade I left ventricular diastolic dysfunction.  · Normal right ventricular size with normal right ventricular systolic function.  · Mild mitral regurgitation.  · There are segmental left ventricular wall motion abnormalities.  · Mild tricuspid regurgitation.  · Normal central venous pressure (3 mmHg).  · The estimated PA systolic  pressure is 31 mmHg.        Past Medical History:   Diagnosis Date    Cancer     Diverticulitis     Encounter for blood transfusion     Liver disease     Tumors    Supplemental oxygen dependent     only during Covid 2021       Past Surgical History:   Procedure Laterality Date    ABDOMINAL WASHOUT  5/7/2022    Procedure: LAVAGE, PERITONEAL, THERAPEUTIC;  Surgeon: Elvie Parker DO;  Location: [facility] OR;  Service: General;;    ABDOMINAL WASHOUT  5/7/2022    Procedure: ;  Surgeon: Elvie Parker DO;  Location: [facility] OR;  Service: General;;    APPLICATION OF WOUND VACUUM-ASSISTED CLOSURE DEVICE N/A 5/4/2022    Procedure: APPLICATION, WOUND VAC;  Surgeon: Elvie Parker DO;  Location: [facility] OR;  Service: General;  Laterality: N/A;    FECAL IMPACTION REMOVAL N/A 1/26/2023    Procedure: REMOVAL, FECES, IMPACTED;  Surgeon: Elvie Parker DO;  Location: [facility] OR;  Service: General;  Laterality: N/A;    ILEOSTOMY N/A 5/11/2022    Procedure: CREATION, ILEOSTOMY;  Surgeon: Elvie Parker DO;  Location: [facility] OR;  Service: General;  Laterality: N/A;    INJECTION OF ANESTHETIC AGENT INTO TISSUE PLANE DEFINED BY TRANSVERSUS ABDOMINIS MUSCLE N/A 1/26/2023    Procedure: BLOCK, TRANSVERSUS ABDOMINIS PLANE;  Surgeon: Elvie Parker DO;  Location: [facility] OR;  Service: General;  Laterality: N/A;    LAPAROSCOPIC CLOSURE OF COLOSTOMY N/A 1/26/2023    Procedure: CLOSURE, COLOSTOMY, LAPAROSCOPIC;  Surgeon: Elvie Parker DO;  Location: [facility] OR;  Service: General;  Laterality: N/A;  never went robotic    LAPAROSCOPIC LYSIS OF ADHESIONS N/A 1/26/2023    Procedure: LYSIS, ADHESIONS, LAPAROSCOPIC;  Surgeon: Elvie Parker DO;  Location: [facility] OR;  Service: General;  Laterality: N/A;    LAPAROTOMY N/A 5/7/2022    Procedure: LAPAROTOMY;  Surgeon: Elvie Parker DO;  Location: [facility] OR;  Service: General;  Laterality: N/A;    LIVER BIOPSY Right 5/11/2022    Procedure: BIOPSY, LIVER;  Surgeon:  Elvie Parker DO;  Location: [facility] OR;  Service: General;  Laterality: Right;    RIGHT HEMICOLECTOMY N/A 5/11/2022    Procedure: HEMICOLECTOMY, RIGHT;  Surgeon: Elvie Parker DO;  Location: [facility] OR;  Service: General;  Laterality: N/A;       Review of patient's allergies indicates:  No Known Allergies    No current facility-administered medications on file prior to encounter.     Current Outpatient Medications on File Prior to Encounter   Medication Sig    apixaban (ELIQUIS) 5 mg Tab Take 1 tablet (5 mg total) by mouth 2 (two) times daily.    ferrous sulfate 325 (65 FE) MG EC tablet Take 1 tablet (325 mg total) by mouth once daily.    folic acid-vit B6-vit B12 2.5-25-2 mg (FOLBIC OR EQUIV) 2.5-25-2 mg Tab Take 1 tablet by mouth once daily.    loperamide (IMODIUM) 2 mg capsule Take 1 capsule (2 mg total) by mouth 2 (two) times a day. (Patient taking differently: Take 2 mg by mouth every evening.)    psyllium husk, aspartame, (METAMUCIL) 3.4 gram PwPk packet Take 1 packet by mouth once daily.    amiodarone (PACERONE) 200 MG Tab Take 1 tablet (200 mg total) by mouth 2 (two) times daily. (Patient not taking: Reported on 1/19/2023)    amLODIPine (NORVASC) 5 MG tablet Take 1 tablet (5 mg total) by mouth once daily. (Patient not taking: Reported on 1/19/2023)    ascorbic acid, vitamin C, (VITAMIN C) 500 MG tablet Take 1 tablet (500 mg total) by mouth 2 (two) times daily.    famotidine (PEPCID) 20 MG tablet Take 1 tablet (20 mg total) by mouth 2 (two) times daily. (Patient not taking: Reported on 1/19/2023)     Family History    None       Tobacco Use    Smoking status: Former    Smokeless tobacco: Never   Substance and Sexual Activity    Alcohol use: Never    Drug use: Never    Sexual activity: Not on file     Review of Systems   Constitutional: Positive for decreased appetite, malaise/fatigue and weight gain.   HENT: Negative.     Eyes: Negative.    Cardiovascular:  Positive for dyspnea on  exertion, leg swelling, orthopnea and paroxysmal nocturnal dyspnea.   Respiratory:  Positive for shortness of breath.    Endocrine: Negative.    Skin: Negative.    Musculoskeletal: Negative.    Gastrointestinal:  Positive for bloating, change in bowel habit and nausea.   Genitourinary: Negative.    Neurological: Negative.    Psychiatric/Behavioral: Negative.     Allergic/Immunologic: Negative.    All other systems reviewed and are negative.  Objective:     Vital Signs (Most Recent):  Temp: 97.6 °F (36.4 °C) (01/29/23 1535)  Pulse: 96 (01/29/23 1535)  Resp: 18 (01/29/23 1535)  BP: 137/64 (01/29/23 1535)  SpO2: (!) 93 % (01/29/23 1535)   Vital Signs (24h Range):  Temp:  [97.3 °F (36.3 °C)-99 °F (37.2 °C)] 97.6 °F (36.4 °C)  Pulse:  [] 96  Resp:  [16-18] 18  SpO2:  [92 %-95 %] 93 %  BP: (112-152)/(62-90) 137/64     Weight: 101.1 kg (222 lb 14.2 oz)  Body mass index is 32.91 kg/m².    SpO2: (!) 93 %         Intake/Output Summary (Last 24 hours) at 1/29/2023 1636  Last data filed at 1/29/2023 1619  Gross per 24 hour   Intake --   Output 2225 ml   Net -2225 ml       Lines/Drains/Airways       Peripheral Intravenous Line  Duration                  Peripheral IV - Single Lumen 01/26/23 18 G Left;Posterior Hand 3 days         Peripheral IV - Single Lumen 01/29/23 1005 20 G Anterior;Left;Proximal Forearm <1 day                    Physical Exam  Vitals and nursing note reviewed.   Constitutional:       General: He is not in acute distress.     Appearance: He is well-developed. He is not diaphoretic.   HENT:      Head: Normocephalic and atraumatic.      Nose: Nose normal.   Eyes:      General: No scleral icterus.     Conjunctiva/sclera: Conjunctivae normal.   Neck:      Thyroid: No thyromegaly.      Vascular: No JVD.   Cardiovascular:      Rate and Rhythm: Tachycardia present. Rhythm irregular.      Heart sounds: S1 normal and S2 normal. Murmur heard.     No friction rub. No gallop. No S3 or S4 sounds.   Pulmonary:       Effort: Pulmonary effort is normal. No respiratory distress.      Breath sounds: Normal breath sounds. No stridor. No wheezing or rales.   Chest:      Chest wall: No tenderness.   Abdominal:      General: There is distension.      Palpations: Abdomen is soft. There is no mass.      Tenderness: There is no abdominal tenderness. There is no rebound.   Genitourinary:     Comments: Deferred  Musculoskeletal:         General: No tenderness or deformity. Normal range of motion.      Cervical back: Normal range of motion and neck supple.      Right lower leg: Edema present.      Left lower leg: Edema present.   Lymphadenopathy:      Cervical: No cervical adenopathy.   Skin:     General: Skin is warm and dry.      Coloration: Skin is not pale.      Findings: No erythema or rash.   Neurological:      Mental Status: He is alert and oriented to person, place, and time.      Motor: No abnormal muscle tone.      Coordination: Coordination normal.   Psychiatric:         Behavior: Behavior normal.         Thought Content: Thought content normal.         Judgment: Judgment normal.       Significant Labs: All pertinent lab results from the last 24 hours have been reviewed. and   Recent Lab Results         01/29/23  0920   01/29/23  0912   01/29/23  0554        Anion Gap     9       aPTT 31.0  Comment: aPTT therapeutic range = 39-69 seconds           Baso # 0.03     0.03       Basophil % 0.4     0.3         Comment: Values of less than 100 pg/ml are consistent with non-CHF populations.           BUN     21       Calcium     9.5       Chloride     106       CO2     20       Creatinine     0.9       Differential Method Automated     Automated       eGFR     >60       Eos # 0.1     0.0       Eosinophil % 0.8     0.4       Glucose     112       Gran # (ANC) 6.3     6.9       Gran % 73.5     76.4       Hematocrit 39.6     39.0       Hemoglobin 12.5     12.4       Immature Grans (Abs) 0.03  Comment: Mild elevation in immature  granulocytes is non specific and   can be seen in a variety of conditions including stress response,   acute inflammation, trauma and pregnancy. Correlation with other   laboratory and clinical findings is essential.       0.03  Comment: Mild elevation in immature granulocytes is non specific and   can be seen in a variety of conditions including stress response,   acute inflammation, trauma and pregnancy. Correlation with other   laboratory and clinical findings is essential.         Immature Granulocytes 0.4     0.3       INR 1.1  Comment: Coumadin Therapy:  2.0 - 3.0 for INR for all indicators except mechanical heart valves  and antiphospholipid syndromes which should use 2.5 - 3.5.             Lymph # 1.2     1.0       Lymph % 13.5     10.7       Magnesium     1.8       MCH 30.7     30.8       MCHC 31.6     31.8       MCV 97     97       Mono # 1.0     1.1       Mono % 11.4     11.9       MPV 10.9     11.3       nRBC 0     0       Phosphorus     3.6       Platelets 413     380       Potassium     4.3       Protime 11.6           RBC 4.07     4.02       RDW 15.6     15.2       Sodium     135       Troponin I   0.086  Comment: The reference interval for Troponin I represents the 99th percentile   cutoff   for our facility and is consistent with 3rd generation assay   performance.           WBC 8.53     9.08               Significant Imaging: Echocardiogram: Transthoracic echo (TTE) complete ([facility] Only):   Results for orders placed or performed during the hospital encounter of 01/20/23   Echo   Result Value Ref Range    BSA 1.9 m2    TDI SEPTAL 0.06 m/s    LV LATERAL E/E' RATIO 8.80 m/s    LV SEPTAL E/E' RATIO 7.33 m/s    LA WIDTH 3.80 cm    IVC diameter 1.46 cm    Left Ventricular Outflow Tract Mean Velocity 0.72 cm/s    Left Ventricular Outflow Tract Mean Gradient 2.45 mmHg    TDI LATERAL 0.05 m/s    PV PEAK VELOCITY 1.18 cm/s    LVIDd 5.29 3.5 - 6.0 cm    IVS 1.16 (A) 0.6 - 1.1 cm    Posterior Wall 1.11 (A) 0.6  - 1.1 cm    Ao root annulus 2.98 cm    LVIDs 4.13 (A) 2.1 - 4.0 cm    FS 22 28 - 44 %    LA volume 69.62 cm3    Sinus 3.08 cm    STJ 2.66 cm    Ascending aorta 2.94 cm    LV mass 236.93 g    LA size 4.52 cm    RVDD 3.41 cm    TAPSE 2.23 cm    Left Ventricle Relative Wall Thickness 0.42 cm    AV mean gradient 7 mmHg    AV valve area 2.27 cm2    AV Velocity Ratio 0.63     AV index (prosthetic) 0.64     MV valve area p 1/2 method 2.69 cm2    E/A ratio 0.54     Mean e' 0.06 m/s    E wave deceleration time 281.54 msec    IVRT 142.72 msec    Pulm vein S/D ratio 1.47     LVOT diameter 2.13 cm    LVOT area 3.6 cm2    LVOT peak aftab 1.07 m/s    LVOT peak VTI 22.10 cm    Ao peak aftab 1.71 m/s    Ao VTI 34.6 cm    RVOT peak afatb 0.68 m/s    RVOT peak VTI 12.9 cm    LVOT stroke volume 78.71 cm3    AV peak gradient 12 mmHg    PV mean gradient 0.83 mmHg    E/E' ratio 8.00 m/s    MV Peak E Aftab 0.44 m/s    TR Max Aftab 2.63 m/s    MV stenosis pressure 1/2 time 81.65 ms    MV Peak A Aftab 0.82 m/s    PV Peak S Aftab 0.53 m/s    PV Peak D Aftab 0.36 m/s    LV Systolic Volume 75.33 mL    LV Systolic Volume Index 39.9 mL/m2    LV Diastolic Volume 135.01 mL    LV Diastolic Volume Index 71.43 mL/m2    LA Volume Index 36.8 mL/m2    LV Mass Index 125 g/m2    RA Major Axis 3.90 cm    Left Atrium Minor Axis 4.60 cm    Left Atrium Major Axis 4.95 cm    Triscuspid Valve Regurgitation Peak Gradient 28 mmHg    LA Volume Index (Mod) 30.3 mL/m2    LA volume (mod) 57.35 cm3    RA Width 3.38 cm    Right Atrial Pressure (from IVC) 3 mmHg    EF 30 %    TV rest pulmonary artery pressure 31 mmHg    Narrative    · The left ventricle is normal in size with eccentric hypertrophy and   moderately decreased systolic function.  · Mild left atrial enlargement.  · The estimated ejection fraction is 30%.  · Grade I left ventricular diastolic dysfunction.  · Normal right ventricular size with normal right ventricular systolic   function.  · Mild mitral regurgitation.  ·  There are segmental left ventricular wall motion abnormalities.  · Mild tricuspid regurgitation.  · Normal central venous pressure (3 mmHg).  · The estimated PA systolic pressure is 31 mmHg.        Assessment and Plan:     * S/P closure of ileostomy  Cont tx per surgery team     Chronic combined systolic and diastolic congestive heart failure  Cont IV diuresis post op weight gain due to fluids  Diuresing well  Keep K > 4, Mg > 2  Pt had not been taking cardiac meds preop- follow up with Dr. Maravilla post DC  Consider starting Entresto       Longstanding persistent atrial fibrillation  See PAF plans    PAF (paroxysmal atrial fibrillation)  Post op AFib  Cont heparin and Dilt drip until ileus resolves and tolerates PO meds  Rec Eliquis upon DC and cont BB        VTE Risk Mitigation (From admission, onward)         Ordered     heparin 25,000 units in dextrose 5% (100 units/ml) IV bolus from bag - ADDITIONAL PRN BOLUS - 60 units/kg  As needed (PRN)        Question:  Heparin Infusion Adjustment (DO NOT MODIFY ANSWER)  Answer:  \[email or web address]\Images\Pharmacy\HeparinInfusions\heparin LOW INTENSITY nomogram for [facility] [id number]    01/29/23 0908     heparin 25,000 units in dextrose 5% (100 units/ml) IV bolus from bag - ADDITIONAL PRN BOLUS - 30 units/kg  As needed (PRN)        Question:  Heparin Infusion Adjustment (DO NOT MODIFY ANSWER)  Answer:  \\[email or web address]\[email or web address]\Images\Pharmacy\HeparinInfusions\heparin LOW INTENSITY nomogram for [facility] [id number]    01/29/23 0908     heparin 25,000 units in dextrose 5% 250 mL (100 units/mL) infusion LOW INTENSITY nomogram - [facility]  Continuous        Question Answer Comment   Heparin Infusion Adjustment (DO NOT MODIFY ANSWER) \\[email or web address]\epic\Images\Pharmacy\HeparinInfusions\heparin LOW INTENSITY nomogram for [facility] [id number]    Begin at (in units/kg/hr) 12        01/29/23 0908     Place sequential compression device  Until discontinued         01/26/23 7755                Thank you for your  consult. I will follow-up with patient. Please contact us if you have any additional questions.    David Cast Md, MD  Cardiology   [facility] - Tele[facility] ([facility])

## 2023-01-29 NOTE — ASSESSMENT & PLAN NOTE
POD #1 s/p laparoscopic-assisted ileostomy closure, extensive lysis of adhesions, fecal disimpaction    -abdominal film showed distended loops of small bowel likely ileus  - no additional vomiting  Returned to NPO with IV fluids  Labs and x-rays in the morning  - Urine output appropriate. D/c [place].  - Analgesia prn  - Increase activity, ambulate, up to chair  - Lovenox will be discontinued is now on heparin   Heparin for anticoagulation  - IS

## 2023-01-29 NOTE — SUBJECTIVE & OBJECTIVE
Past Medical History:   Diagnosis Date    Cancer     Diverticulitis     Encounter for blood transfusion     Liver disease     Tumors    Supplemental oxygen dependent     only during Covid 2021       Past Surgical History:   Procedure Laterality Date    ABDOMINAL WASHOUT  5/7/2022    Procedure: LAVAGE, PERITONEAL, THERAPEUTIC;  Surgeon: Elvie Parker DO;  Location: [facility] OR;  Service: General;;    ABDOMINAL WASHOUT  5/7/2022    Procedure: ;  Surgeon: Elvie Parker DO;  Location: [facility] OR;  Service: General;;    APPLICATION OF WOUND VACUUM-ASSISTED CLOSURE DEVICE N/A 5/4/2022    Procedure: APPLICATION, WOUND VAC;  Surgeon: Elvie Parker DO;  Location: [facility] OR;  Service: General;  Laterality: N/A;    FECAL IMPACTION REMOVAL N/A 1/26/2023    Procedure: REMOVAL, FECES, IMPACTED;  Surgeon: Elvie Parker DO;  Location: [facility] OR;  Service: General;  Laterality: N/A;    ILEOSTOMY N/A 5/11/2022    Procedure: CREATION, ILEOSTOMY;  Surgeon: Elvie Parker DO;  Location: [facility] OR;  Service: General;  Laterality: N/A;    INJECTION OF ANESTHETIC AGENT INTO TISSUE PLANE DEFINED BY TRANSVERSUS ABDOMINIS MUSCLE N/A 1/26/2023    Procedure: BLOCK, TRANSVERSUS ABDOMINIS PLANE;  Surgeon: Elvie Parker DO;  Location: [facility] OR;  Service: General;  Laterality: N/A;    LAPAROSCOPIC CLOSURE OF COLOSTOMY N/A 1/26/2023    Procedure: CLOSURE, COLOSTOMY, LAPAROSCOPIC;  Surgeon: Elvie Parker DO;  Location: [facility] OR;  Service: General;  Laterality: N/A;  never went robotic    LAPAROSCOPIC LYSIS OF ADHESIONS N/A 1/26/2023    Procedure: LYSIS, ADHESIONS, LAPAROSCOPIC;  Surgeon: Elvie Parker DO;  Location: [facility] OR;  Service: General;  Laterality: N/A;    LAPAROTOMY N/A 5/7/2022    Procedure: LAPAROTOMY;  Surgeon: Elvie Parker DO;  Location: [facility] OR;  Service: General;  Laterality: N/A;    LIVER BIOPSY Right 5/11/2022    Procedure: BIOPSY, LIVER;  Surgeon: Elvie Parker DO;  Location: [facility] OR;   Service: General;  Laterality: Right;    RIGHT HEMICOLECTOMY N/A 5/11/2022    Procedure: HEMICOLECTOMY, RIGHT;  Surgeon: Elvie Parker DO;  Location: [facility] OR;  Service: General;  Laterality: N/A;       Review of patient's allergies indicates:  No Known Allergies    No current facility-administered medications on file prior to encounter.     Current Outpatient Medications on File Prior to Encounter   Medication Sig    apixaban (ELIQUIS) 5 mg Tab Take 1 tablet (5 mg total) by mouth 2 (two) times daily.    ferrous sulfate 325 (65 FE) MG EC tablet Take 1 tablet (325 mg total) by mouth once daily.    folic acid-vit B6-vit B12 2.5-25-2 mg (FOLBIC OR EQUIV) 2.5-25-2 mg Tab Take 1 tablet by mouth once daily.    loperamide (IMODIUM) 2 mg capsule Take 1 capsule (2 mg total) by mouth 2 (two) times a day. (Patient taking differently: Take 2 mg by mouth every evening.)    psyllium husk, aspartame, (METAMUCIL) 3.4 gram PwPk packet Take 1 packet by mouth once daily.    amiodarone (PACERONE) 200 MG Tab Take 1 tablet (200 mg total) by mouth 2 (two) times daily. (Patient not taking: Reported on 1/19/2023)    amLODIPine (NORVASC) 5 MG tablet Take 1 tablet (5 mg total) by mouth once daily. (Patient not taking: Reported on 1/19/2023)    ascorbic acid, vitamin C, (VITAMIN C) 500 MG tablet Take 1 tablet (500 mg total) by mouth 2 (two) times daily.    famotidine (PEPCID) 20 MG tablet Take 1 tablet (20 mg total) by mouth 2 (two) times daily. (Patient not taking: Reported on 1/19/2023)     Family History    None       Tobacco Use    Smoking status: Former    Smokeless tobacco: Never   Substance and Sexual Activity    Alcohol use: Never    Drug use: Never    Sexual activity: Not on file     Review of Systems   Constitutional: Positive for decreased appetite, malaise/fatigue and weight gain.   HENT: Negative.     Eyes: Negative.    Cardiovascular:  Positive for dyspnea on exertion, leg swelling, orthopnea and paroxysmal nocturnal  dyspnea.   Respiratory:  Positive for shortness of breath.    Endocrine: Negative.    Skin: Negative.    Musculoskeletal: Negative.    Gastrointestinal:  Positive for bloating, change in bowel habit and nausea.   Genitourinary: Negative.    Neurological: Negative.    Psychiatric/Behavioral: Negative.     Allergic/Immunologic: Negative.    All other systems reviewed and are negative.  Objective:     Vital Signs (Most Recent):  Temp: 97.6 °F (36.4 °C) (01/29/23 1535)  Pulse: 96 (01/29/23 1535)  Resp: 18 (01/29/23 1535)  BP: 137/64 (01/29/23 1535)  SpO2: (!) 93 % (01/29/23 1535)   Vital Signs (24h Range):  Temp:  [97.3 °F (36.3 °C)-99 °F (37.2 °C)] 97.6 °F (36.4 °C)  Pulse:  [] 96  Resp:  [16-18] 18  SpO2:  [92 %-95 %] 93 %  BP: (112-152)/(62-90) 137/64     Weight: 101.1 kg (222 lb 14.2 oz)  Body mass index is 32.91 kg/m².    SpO2: (!) 93 %         Intake/Output Summary (Last 24 hours) at 1/29/2023 1636  Last data filed at 1/29/2023 1619  Gross per 24 hour   Intake --   Output 2225 ml   Net -2225 ml       Lines/Drains/Airways       Peripheral Intravenous Line  Duration                  Peripheral IV - Single Lumen 01/26/23 18 G Left;Posterior Hand 3 days         Peripheral IV - Single Lumen 01/29/23 1005 20 G Anterior;Left;Proximal Forearm <1 day                    Physical Exam  Vitals and nursing note reviewed.   Constitutional:       General: He is not in acute distress.     Appearance: He is well-developed. He is not diaphoretic.   HENT:      Head: Normocephalic and atraumatic.      Nose: Nose normal.   Eyes:      General: No scleral icterus.     Conjunctiva/sclera: Conjunctivae normal.   Neck:      Thyroid: No thyromegaly.      Vascular: No JVD.   Cardiovascular:      Rate and Rhythm: Tachycardia present. Rhythm irregular.      Heart sounds: S1 normal and S2 normal. Murmur heard.     No friction rub. No gallop. No S3 or S4 sounds.   Pulmonary:      Effort: Pulmonary effort is normal. No respiratory distress.       Breath sounds: Normal breath sounds. No stridor. No wheezing or rales.   Chest:      Chest wall: No tenderness.   Abdominal:      General: There is distension.      Palpations: Abdomen is soft. There is no mass.      Tenderness: There is no abdominal tenderness. There is no rebound.   Genitourinary:     Comments: Deferred  Musculoskeletal:         General: No tenderness or deformity. Normal range of motion.      Cervical back: Normal range of motion and neck supple.      Right lower leg: Edema present.      Left lower leg: Edema present.   Lymphadenopathy:      Cervical: No cervical adenopathy.   Skin:     General: Skin is warm and dry.      Coloration: Skin is not pale.      Findings: No erythema or rash.   Neurological:      Mental Status: He is alert and oriented to person, place, and time.      Motor: No abnormal muscle tone.      Coordination: Coordination normal.   Psychiatric:         Behavior: Behavior normal.         Thought Content: Thought content normal.         Judgment: Judgment normal.       Significant Labs: All pertinent lab results from the last 24 hours have been reviewed. and   Recent Lab Results         01/29/23  0920   01/29/23  0912   01/29/23  0554        Anion Gap     9       aPTT 31.0  Comment: aPTT therapeutic range = 39-69 seconds           Baso # 0.03     0.03       Basophil % 0.4     0.3         Comment: Values of less than 100 pg/ml are consistent with non-CHF populations.           BUN     21       Calcium     9.5       Chloride     106       CO2     20       Creatinine     0.9       Differential Method Automated     Automated       eGFR     >60       Eos # 0.1     0.0       Eosinophil % 0.8     0.4       Glucose     112       Gran # (ANC) 6.3     6.9       Gran % 73.5     76.4       Hematocrit 39.6     39.0       Hemoglobin 12.5     12.4       Immature Grans (Abs) 0.03  Comment: Mild elevation in immature granulocytes is non specific and   can be seen in a variety of  conditions including stress response,   acute inflammation, trauma and pregnancy. Correlation with other   laboratory and clinical findings is essential.       0.03  Comment: Mild elevation in immature granulocytes is non specific and   can be seen in a variety of conditions including stress response,   acute inflammation, trauma and pregnancy. Correlation with other   laboratory and clinical findings is essential.         Immature Granulocytes 0.4     0.3       INR 1.1  Comment: Coumadin Therapy:  2.0 - 3.0 for INR for all indicators except mechanical heart valves  and antiphospholipid syndromes which should use 2.5 - 3.5.             Lymph # 1.2     1.0       Lymph % 13.5     10.7       Magnesium     1.8       MCH 30.7     30.8       MCHC 31.6     31.8       MCV 97     97       Mono # 1.0     1.1       Mono % 11.4     11.9       MPV 10.9     11.3       nRBC 0     0       Phosphorus     3.6       Platelets 413     380       Potassium     4.3       Protime 11.6           RBC 4.07     4.02       RDW 15.6     15.2       Sodium     135       Troponin I   0.086  Comment: The reference interval for Troponin I represents the 99th percentile   cutoff   for our facility and is consistent with 3rd generation assay   performance.           WBC 8.53     9.08               Significant Imaging: Echocardiogram: Transthoracic echo (TTE) complete (Cupid Only):   Results for orders placed or performed during the hospital encounter of 01/20/23   Echo   Result Value Ref Range    BSA 1.9 m2    TDI SEPTAL 0.06 m/s    LV LATERAL E/E' RATIO 8.80 m/s    LV SEPTAL E/E' RATIO 7.33 m/s    LA WIDTH 3.80 cm    IVC diameter 1.46 cm    Left Ventricular Outflow Tract Mean Velocity 0.72 cm/s    Left Ventricular Outflow Tract Mean Gradient 2.45 mmHg    TDI LATERAL 0.05 m/s    PV PEAK VELOCITY 1.18 cm/s    LVIDd 5.29 3.5 - 6.0 cm    IVS 1.16 (A) 0.6 - 1.1 cm    Posterior Wall 1.11 (A) 0.6 - 1.1 cm    Ao root annulus 2.98 cm    LVIDs 4.13 (A) 2.1 -  4.0 cm    FS 22 28 - 44 %    LA volume 69.62 cm3    Sinus 3.08 cm    STJ 2.66 cm    Ascending aorta 2.94 cm    LV mass 236.93 g    LA size 4.52 cm    RVDD 3.41 cm    TAPSE 2.23 cm    Left Ventricle Relative Wall Thickness 0.42 cm    AV mean gradient 7 mmHg    AV valve area 2.27 cm2    AV Velocity Ratio 0.63     AV index (prosthetic) 0.64     MV valve area p 1/2 method 2.69 cm2    E/A ratio 0.54     Mean e' 0.06 m/s    E wave deceleration time 281.54 msec    IVRT 142.72 msec    Pulm vein S/D ratio 1.47     LVOT diameter 2.13 cm    LVOT area 3.6 cm2    LVOT peak aftab 1.07 m/s    LVOT peak VTI 22.10 cm    Ao peak aftab 1.71 m/s    Ao VTI 34.6 cm    RVOT peak aftab 0.68 m/s    RVOT peak VTI 12.9 cm    LVOT stroke volume 78.71 cm3    AV peak gradient 12 mmHg    PV mean gradient 0.83 mmHg    E/E' ratio 8.00 m/s    MV Peak E Aftab 0.44 m/s    TR Max Aftab 2.63 m/s    MV stenosis pressure 1/2 time 81.65 ms    MV Peak A Aftab 0.82 m/s    PV Peak S Aftab 0.53 m/s    PV Peak D Aftab 0.36 m/s    LV Systolic Volume 75.33 mL    LV Systolic Volume Index 39.9 mL/m2    LV Diastolic Volume 135.01 mL    LV Diastolic Volume Index 71.43 mL/m2    LA Volume Index 36.8 mL/m2    LV Mass Index 125 g/m2    RA Major Axis 3.90 cm    Left Atrium Minor Axis 4.60 cm    Left Atrium Major Axis 4.95 cm    Triscuspid Valve Regurgitation Peak Gradient 28 mmHg    LA Volume Index (Mod) 30.3 mL/m2    LA volume (mod) 57.35 cm3    RA Width 3.38 cm    Right Atrial Pressure (from IVC) 3 mmHg    EF 30 %    TV rest pulmonary artery pressure 31 mmHg    Narrative    · The left ventricle is normal in size with eccentric hypertrophy and   moderately decreased systolic function.  · Mild left atrial enlargement.  · The estimated ejection fraction is 30%.  · Grade I left ventricular diastolic dysfunction.  · Normal right ventricular size with normal right ventricular systolic   function.  · Mild mitral regurgitation.  · There are segmental left ventricular wall motion  abnormalities.  · Mild tricuspid regurgitation.  · Normal central venous pressure (3 mmHg).  · The estimated PA systolic pressure is 31 mmHg.

## 2023-01-29 NOTE — SUBJECTIVE & OBJECTIVE
Interval History:  Patient went into atrial fibrillation with a rapid response.  He still feels bloated.  Some flatus but no  bowel movements.  Abdominal films consistent with dilated loops of small bowel.      Started on heparin and Cardizem drip by Cardiology.  EKG showed AFib with rapid ventral cord respond    Medications:  Continuous Infusions:   dextrose 5 % and 0.45 % NaCl with KCl 20 mEq Stopped (01/29/23 0043)    [id number]      heparin (porcine) in D5W       Scheduled Meds:   acetaminophen  650 mg Oral Q6H    ascorbic acid (vitamin C)  500 mg Oral BID    docusate sodium  100 mg Oral BID    heparin (PORCINE)  60 Units/kg (Adjusted) Intravenous Once    methocarbamoL  1,000 mg Oral TID    metoprolol  10 mg Intravenous Once    tamsulosin  0.4 mg Oral Daily     PRN Meds:heparin (PORCINE), heparin (PORCINE), hydrALAZINE, HYDROmorphone, HYDROmorphone, ondansetron, promethazine (PHENERGAN) IVPB, simethicone     Review of patient's allergies indicates:  No Known Allergies  Objective:     Vital Signs (Most Recent):  Temp: 98.3 °F (36.8 °C) (01/29/23 0723)  Pulse: 92 (01/29/23 0723)  Resp: 16 (01/29/23 0723)  BP: 130/85 (01/29/23 0424)  SpO2: (!) 94 % (01/29/23 0723)   Vital Signs (24h Range):  Temp:  [97.4 °F (36.3 °C)-99 °F (37.2 °C)] 98.3 °F (36.8 °C)  Pulse:  [] 92  Resp:  [16-18] 16  SpO2:  [93 %-95 %] 94 %  BP: (128-150)/(78-90) 130/85     Weight: 101.1 kg (222 lb 14.2 oz)  Body mass index is 32.91 kg/m².    Intake/Output - Last 3 Shifts         01/27 0700 01/28 0659 01/28 0700 01/29 0659 01/29 0700 01/30 0659    P.O. 580      IV Piggyback       Total Intake(mL/kg) 580 (5.7)      Urine (mL/kg/hr) 2030 (0.8) 1100 (0.5)     Total Output 2030 1100     Net -1450 -1100            Stool Occurrence  1 x             Physical Exam  Vitals and nursing note reviewed.   Constitutional:       General: He is not in acute distress.     Appearance: He is well-developed. Ill appearance: chronically in mild acute.   HENT:       Head: Normocephalic and atraumatic.   Eyes:      General: No scleral icterus.     Pupils: Pupils are equal, round, and reactive to light.   Neck:      Thyroid: No thyromegaly.      Vascular: No JVD.      Trachea: No tracheal deviation.   Cardiovascular:      Rate and Rhythm: Tachycardia present. Rhythm irregular.      Heart sounds: Normal heart sounds.   Pulmonary:      Effort: Pulmonary effort is normal.      Breath sounds: Normal breath sounds.   Abdominal:      General: Bowel sounds are normal. There is distension.      Palpations: Abdomen is soft. There is no mass.      Tenderness: There is abdominal tenderness (incisional). There is no guarding or rebound.      Comments: Incisions are clean   Musculoskeletal:         General: Normal range of motion.      Cervical back: Normal range of motion and neck supple.      Right lower leg: No edema.      Left lower leg: No edema.   Lymphadenopathy:      Cervical: No cervical adenopathy.   Skin:     General: Skin is warm and dry.   Neurological:      Mental Status: He is alert and oriented to person, place, and time.   Psychiatric:         Mood and Affect: Mood normal.         Behavior: Behavior normal.         Thought Content: Thought content normal.         Judgment: Judgment normal.       Significant Labs:  I have reviewed all pertinent lab results within the past 24 hours.  CBC:   Recent Labs   Lab 01/29/23  0920   WBC 8.53   RBC 4.07*   HGB 12.5*   HCT 39.6*      MCV 97   MCH 30.7   MCHC 31.6*     BMP:   Recent Labs   Lab 01/29/23  0554   *   *   K 4.3      CO2 20*   BUN 21   CREATININE 0.9   CALCIUM 9.5   MG 1.8     CMP:   Recent Labs   Lab 01/29/23  0554   *   CALCIUM 9.5   *   K 4.3   CO2 20*      BUN 21   CREATININE 0.9     EKG    29-JAN-2023 08:33:05 EKG  [facility]   ROUTINE RETRIEVAL Atrial fibrillation with rapid ventricular response Left axis deviation Low voltage QRS Inferior infarct ,age  undetermined Anterolateral infarct (cited on or before 05-AUG-2021) Abnormal ECG When compared with ECG of 06-JAN-2023 09:25, Significant changes have occurred 25mm/s 10mm/mV 100Hz 9.0.7 12SL 243 ALCIDES: 3       Significant Diagnostics:  I have reviewed all pertinent imaging results/findings within the past 24 hours.  Abdominal x-ray    Narrative & Impression  EXAMINATION:  XR ABDOMEN FLAT AND ERECT     CLINICAL HISTORY:  Abdominal distension.  Evaluate for postop bowel distension, laparoscopic ileostomy reversal;     COMPARISON:  [facility]UB 05/15/2022.     FINDINGS:  The nasogastric tube is no longer identified.     There are degenerative changes of the lumbar spine.     There are a few mildly distended air-filled loops of bowel.  A few small scattered air-fluid levels are seen.  Possible low-grade ileus.     Impression:     Possible low-grade ileus.  Otherwise negative.        Electronically signed by: Jordan Beverly MD  Date:                                            01/29/2023

## 2023-01-30 LAB
ANION GAP SERPL CALC-SCNC: 12 MMOL/L (ref 8–16)
APTT BLDCRRT: 36.5 SEC (ref 21–32)
APTT BLDCRRT: 53.1 SEC (ref 21–32)
APTT BLDCRRT: 56.3 SEC (ref 21–32)
BASOPHILS # BLD AUTO: 0.04 K/UL (ref 0–0.2)
BASOPHILS NFR BLD: 0.4 % (ref 0–1.9)
BUN SERPL-MCNC: 30 MG/DL (ref 8–23)
CALCIUM SERPL-MCNC: 8.9 MG/DL (ref 8.7–10.5)
CHLORIDE SERPL-SCNC: 101 MMOL/L (ref 95–110)
CO2 SERPL-SCNC: 21 MMOL/L (ref 23–29)
CREAT SERPL-MCNC: 1.2 MG/DL (ref 0.5–1.4)
DIFFERENTIAL METHOD: ABNORMAL
EOSINOPHIL # BLD AUTO: 0.2 K/UL (ref 0–0.5)
EOSINOPHIL NFR BLD: 1.9 % (ref 0–8)
ERYTHROCYTE [DISTWIDTH] IN BLOOD BY AUTOMATED COUNT: 15.5 % (ref 11.5–14.5)
EST. GFR  (NO RACE VARIABLE): >60 ML/MIN/1.73 M^2
GLUCOSE SERPL-MCNC: 98 MG/DL (ref 70–110)
HCT VFR BLD AUTO: 35.7 % (ref 40–54)
HGB BLD-MCNC: 11.5 G/DL (ref 14–18)
IMM GRANULOCYTES # BLD AUTO: 0.06 K/UL (ref 0–0.04)
IMM GRANULOCYTES NFR BLD AUTO: 0.6 % (ref 0–0.5)
LYMPHOCYTES # BLD AUTO: 1.9 K/UL (ref 1–4.8)
LYMPHOCYTES NFR BLD: 18.2 % (ref 18–48)
MAGNESIUM SERPL-MCNC: 1.6 MG/DL (ref 1.6–2.6)
MCH RBC QN AUTO: 30.7 PG (ref 27–31)
MCHC RBC AUTO-ENTMCNC: 32.2 G/DL (ref 32–36)
MCV RBC AUTO: 96 FL (ref 82–98)
MONOCYTES # BLD AUTO: 1.2 K/UL (ref 0.3–1)
MONOCYTES NFR BLD: 11.4 % (ref 4–15)
NEUTROPHILS # BLD AUTO: 6.9 K/UL (ref 1.8–7.7)
NEUTROPHILS NFR BLD: 67.5 % (ref 38–73)
NRBC BLD-RTO: 0 /100 WBC
PLATELET # BLD AUTO: 405 K/UL (ref 150–450)
PMV BLD AUTO: 11 FL (ref 9.2–12.9)
POTASSIUM SERPL-SCNC: 3.7 MMOL/L (ref 3.5–5.1)
RBC # BLD AUTO: 3.74 M/UL (ref 4.6–6.2)
SODIUM SERPL-SCNC: 134 MMOL/L (ref 136–145)
TROPONIN I SERPL DL<=0.01 NG/ML-MCNC: 0.04 NG/ML (ref 0–0.03)
WBC # BLD AUTO: 10.23 K/UL (ref 3.9–12.7)

## 2023-01-30 PROCEDURE — 99233 PR SUBSEQUENT HOSPITAL CARE,LEVL III: ICD-10-PCS | Mod: ,,, | Performed by: STUDENT IN AN ORGANIZED HEALTH CARE EDUCATION/TRAINING PROGRAM

## 2023-01-30 PROCEDURE — 36415 COLL VENOUS BLD VENIPUNCTURE: CPT | Performed by: SURGERY

## 2023-01-30 PROCEDURE — 63600175 PHARM REV CODE 636 W HCPCS: Performed by: SURGERY

## 2023-01-30 PROCEDURE — 36415 COLL VENOUS BLD VENIPUNCTURE: CPT | Performed by: STUDENT IN AN ORGANIZED HEALTH CARE EDUCATION/TRAINING PROGRAM

## 2023-01-30 PROCEDURE — 25000003 PHARM REV CODE 250: Performed by: INTERNAL MEDICINE

## 2023-01-30 PROCEDURE — 83735 ASSAY OF MAGNESIUM: CPT | Performed by: SURGERY

## 2023-01-30 PROCEDURE — 21400001 HC TELEMETRY ROOM

## 2023-01-30 PROCEDURE — 85025 COMPLETE CBC W/AUTO DIFF WBC: CPT | Performed by: INTERNAL MEDICINE

## 2023-01-30 PROCEDURE — 25000003 PHARM REV CODE 250: Performed by: SURGERY

## 2023-01-30 PROCEDURE — 25000003 PHARM REV CODE 250: Performed by: STUDENT IN AN ORGANIZED HEALTH CARE EDUCATION/TRAINING PROGRAM

## 2023-01-30 PROCEDURE — 63600175 PHARM REV CODE 636 W HCPCS: Performed by: INTERNAL MEDICINE

## 2023-01-30 PROCEDURE — 80048 BASIC METABOLIC PNL TOTAL CA: CPT | Performed by: INTERNAL MEDICINE

## 2023-01-30 PROCEDURE — 99233 SBSQ HOSP IP/OBS HIGH 50: CPT | Mod: ,,, | Performed by: STUDENT IN AN ORGANIZED HEALTH CARE EDUCATION/TRAINING PROGRAM

## 2023-01-30 PROCEDURE — 85730 THROMBOPLASTIN TIME PARTIAL: CPT | Mod: 91 | Performed by: SURGERY

## 2023-01-30 PROCEDURE — 84484 ASSAY OF TROPONIN QUANT: CPT | Performed by: STUDENT IN AN ORGANIZED HEALTH CARE EDUCATION/TRAINING PROGRAM

## 2023-01-30 RX ORDER — SODIUM CHLORIDE 9 MG/ML
INJECTION, SOLUTION INTRAVENOUS
Status: DISCONTINUED | OUTPATIENT
Start: 2023-01-30 | End: 2023-02-01 | Stop reason: HOSPADM

## 2023-01-30 RX ORDER — METOPROLOL SUCCINATE 25 MG/1
25 TABLET, EXTENDED RELEASE ORAL DAILY
Status: DISCONTINUED | OUTPATIENT
Start: 2023-01-30 | End: 2023-02-01 | Stop reason: HOSPADM

## 2023-01-30 RX ORDER — ACETAMINOPHEN 325 MG/1
650 TABLET ORAL EVERY 6 HOURS PRN
Status: DISCONTINUED | OUTPATIENT
Start: 2023-01-30 | End: 2023-02-01 | Stop reason: HOSPADM

## 2023-01-30 RX ORDER — METHOCARBAMOL 500 MG/1
1000 TABLET, FILM COATED ORAL 3 TIMES DAILY PRN
Status: DISCONTINUED | OUTPATIENT
Start: 2023-01-30 | End: 2023-02-01 | Stop reason: HOSPADM

## 2023-01-30 RX ORDER — MAGNESIUM SULFATE HEPTAHYDRATE 40 MG/ML
2 INJECTION, SOLUTION INTRAVENOUS ONCE
Status: COMPLETED | OUTPATIENT
Start: 2023-01-30 | End: 2023-01-30

## 2023-01-30 RX ORDER — HYDROCODONE BITARTRATE AND ACETAMINOPHEN 7.5; 325 MG/1; MG/1
1 TABLET ORAL EVERY 6 HOURS PRN
Status: DISCONTINUED | OUTPATIENT
Start: 2023-01-30 | End: 2023-02-01 | Stop reason: HOSPADM

## 2023-01-30 RX ORDER — DILTIAZEM HYDROCHLORIDE 30 MG/1
30 TABLET, FILM COATED ORAL EVERY 6 HOURS
Status: DISCONTINUED | OUTPATIENT
Start: 2023-01-30 | End: 2023-01-31

## 2023-01-30 RX ADMIN — Medication 10 MG/HR: at 11:01

## 2023-01-30 RX ADMIN — DILTIAZEM HYDROCHLORIDE 30 MG: 30 TABLET, FILM COATED ORAL at 11:01

## 2023-01-30 RX ADMIN — HEPARIN SODIUM 14 UNITS/KG/HR: 10000 INJECTION, SOLUTION INTRAVENOUS at 12:01

## 2023-01-30 RX ADMIN — METOPROLOL SUCCINATE 25 MG: 25 TABLET, EXTENDED RELEASE ORAL at 04:01

## 2023-01-30 RX ADMIN — MAGNESIUM SULFATE HEPTAHYDRATE 2 G: 40 INJECTION, SOLUTION INTRAVENOUS at 05:01

## 2023-01-30 RX ADMIN — SODIUM CHLORIDE: 9 INJECTION, SOLUTION INTRAVENOUS at 05:01

## 2023-01-30 RX ADMIN — FUROSEMIDE 40 MG: 10 INJECTION, SOLUTION INTRAMUSCULAR; INTRAVENOUS at 09:01

## 2023-01-30 RX ADMIN — DILTIAZEM HYDROCHLORIDE 30 MG: 30 TABLET, FILM COATED ORAL at 04:01

## 2023-01-30 RX ADMIN — FUROSEMIDE 40 MG: 10 INJECTION, SOLUTION INTRAMUSCULAR; INTRAVENOUS at 08:01

## 2023-01-30 RX ADMIN — HEPARIN SODIUM 19 UNITS/KG/HR: 10000 INJECTION, SOLUTION INTRAVENOUS at 01:01

## 2023-01-30 NOTE — SUBJECTIVE & OBJECTIVE
Review of Systems   Constitutional: Negative.   HENT: Negative.     Eyes: Negative.    Cardiovascular: Negative.    Respiratory: Negative.     Skin: Negative.    Musculoskeletal: Negative.    Gastrointestinal: Negative.    Genitourinary: Negative.    Neurological: Negative.    Psychiatric/Behavioral: Negative.     Objective:     Vital Signs (Most Recent):  Temp: 97.6 °F (36.4 °C) (01/30/23 1155)  Pulse: 71 (01/30/23 1155)  Resp: 18 (01/30/23 1155)  BP: 124/78 (01/30/23 1155)  SpO2: (!) 92 % (01/30/23 1155)   Vital Signs (24h Range):  Temp:  [97.6 °F (36.4 °C)-99.4 °F (37.4 °C)] 97.6 °F (36.4 °C)  Pulse:  [62-96] 71  Resp:  [18-19] 18  SpO2:  [92 %-96 %] 92 %  BP: (111-151)/(64-84) 124/78     Weight: 98.5 kg (217 lb 2.5 oz)  Body mass index is 32.07 kg/m².     SpO2: (!) 92 %         Intake/Output Summary (Last 24 hours) at 1/30/2023 1410  Last data filed at 1/30/2023 1033  Gross per 24 hour   Intake 1418.94 ml   Output 700 ml   Net 718.94 ml       Lines/Drains/Airways       Peripheral Intravenous Line  Duration                  Peripheral IV - Single Lumen 01/26/23 18 G Left;Posterior Hand 4 days         Peripheral IV - Single Lumen 01/29/23 1005 20 G Anterior;Left;Proximal Forearm 1 day                    Physical Exam  Vitals and nursing note reviewed.   Constitutional:       Appearance: Normal appearance.   HENT:      Head: Normocephalic and atraumatic.   Eyes:      General:         Right eye: No discharge.         Left eye: No discharge.      Pupils: Pupils are equal, round, and reactive to light.   Cardiovascular:      Rate and Rhythm: Normal rate. Rhythm irregular.      Heart sounds: S1 normal and S2 normal. No murmur heard.    No friction rub.   Pulmonary:      Effort: Pulmonary effort is normal. No respiratory distress.      Breath sounds: Normal breath sounds. No rales.   Abdominal:      Palpations: Abdomen is soft.      Tenderness: There is no abdominal tenderness.   Musculoskeletal:      Cervical back:  Neck supple.      Right lower leg: No edema.      Left lower leg: No edema.   Skin:     General: Skin is warm and dry.   Neurological:      General: No focal deficit present.      Mental Status: He is alert and oriented to person, place, and time.   Psychiatric:         Mood and Affect: Mood normal.         Behavior: Behavior normal.         Thought Content: Thought content normal.       Significant Labs: BMP:   Recent Labs   Lab 01/29/23  0554 01/30/23  0511   * 98   * 134*   K 4.3 3.7    101   CO2 20* 21*   BUN 21 30*   CREATININE 0.9 1.2   CALCIUM 9.5 8.9   MG 1.8 1.6   , CMP   Recent Labs   Lab 01/29/23  0554 01/30/23  0511   * 134*   K 4.3 3.7    101   CO2 20* 21*   * 98   BUN 21 30*   CREATININE 0.9 1.2   CALCIUM 9.5 8.9   ANIONGAP 9 12   , CBC   Recent Labs   Lab 01/29/23  0554 01/29/23  0920 01/30/23  0511   WBC 9.08 8.53 10.23   HGB 12.4* 12.5* 11.5*   HCT 39.0* 39.6* 35.7*    413 405   , INR   Recent Labs   Lab 01/29/23  0920   INR 1.1   , Lipid Panel No results for input(s): CHOL, HDL, LDLCALC, TRIG, CHOLHDL in the last 48 hours., Troponin   Recent Labs   Lab 01/29/23  0912 01/30/23  0903   TROPONINI 0.086* 0.045*   , and All pertinent lab results from the last 24 hours have been reviewed.    Significant Imaging: Cardiac Cath: reviewed, Echocardiogram: Transthoracic echo (TTE) complete (Cupid Only):   Results for orders placed or performed during the hospital encounter of 01/20/23   Echo   Result Value Ref Range    BSA 1.9 m2    TDI SEPTAL 0.06 m/s    LV LATERAL E/E' RATIO 8.80 m/s    LV SEPTAL E/E' RATIO 7.33 m/s    LA WIDTH 3.80 cm    IVC diameter 1.46 cm    Left Ventricular Outflow Tract Mean Velocity 0.72 cm/s    Left Ventricular Outflow Tract Mean Gradient 2.45 mmHg    TDI LATERAL 0.05 m/s    PV PEAK VELOCITY 1.18 cm/s    LVIDd 5.29 3.5 - 6.0 cm    IVS 1.16 (A) 0.6 - 1.1 cm    Posterior Wall 1.11 (A) 0.6 - 1.1 cm    Ao root annulus 2.98 cm    LVIDs 4.13  (A) 2.1 - 4.0 cm    FS 22 28 - 44 %    LA volume 69.62 cm3    Sinus 3.08 cm    STJ 2.66 cm    Ascending aorta 2.94 cm    LV mass 236.93 g    LA size 4.52 cm    RVDD 3.41 cm    TAPSE 2.23 cm    Left Ventricle Relative Wall Thickness 0.42 cm    AV mean gradient 7 mmHg    AV valve area 2.27 cm2    AV Velocity Ratio 0.63     AV index (prosthetic) 0.64     MV valve area p 1/2 method 2.69 cm2    E/A ratio 0.54     Mean e' 0.06 m/s    E wave deceleration time 281.54 msec    IVRT 142.72 msec    Pulm vein S/D ratio 1.47     LVOT diameter 2.13 cm    LVOT area 3.6 cm2    LVOT peak aftab 1.07 m/s    LVOT peak VTI 22.10 cm    Ao peak aftab 1.71 m/s    Ao VTI 34.6 cm    RVOT peak aftab 0.68 m/s    RVOT peak VTI 12.9 cm    LVOT stroke volume 78.71 cm3    AV peak gradient 12 mmHg    PV mean gradient 0.83 mmHg    E/E' ratio 8.00 m/s    MV Peak E Aftab 0.44 m/s    TR Max Aftab 2.63 m/s    MV stenosis pressure 1/2 time 81.65 ms    MV Peak A Aftab 0.82 m/s    PV Peak S Aftab 0.53 m/s    PV Peak D Aftab 0.36 m/s    LV Systolic Volume 75.33 mL    LV Systolic Volume Index 39.9 mL/m2    LV Diastolic Volume 135.01 mL    LV Diastolic Volume Index 71.43 mL/m2    LA Volume Index 36.8 mL/m2    LV Mass Index 125 g/m2    RA Major Axis 3.90 cm    Left Atrium Minor Axis 4.60 cm    Left Atrium Major Axis 4.95 cm    Triscuspid Valve Regurgitation Peak Gradient 28 mmHg    LA Volume Index (Mod) 30.3 mL/m2    LA volume (mod) 57.35 cm3    RA Width 3.38 cm    Right Atrial Pressure (from IVC) 3 mmHg    EF 30 %    TV rest pulmonary artery pressure 31 mmHg    Narrative    · The left ventricle is normal in size with eccentric hypertrophy and   moderately decreased systolic function.  · Mild left atrial enlargement.  · The estimated ejection fraction is 30%.  · Grade I left ventricular diastolic dysfunction.  · Normal right ventricular size with normal right ventricular systolic   function.  · Mild mitral regurgitation.  · There are segmental left ventricular wall motion  abnormalities.  · Mild tricuspid regurgitation.  · Normal central venous pressure (3 mmHg).  · The estimated PA systolic pressure is 31 mmHg.      , EKG: reviewed, Stress Test: reviewed, and X-Ray: CXR: X-Ray Chest 1 View (CXR): No results found for this visit on 01/26/23.

## 2023-01-30 NOTE — ASSESSMENT & PLAN NOTE
Post op AFib  Cont heparin and Dilt drip until ileus resolves and tolerates PO meds  Rec Eliquis upon DC and cont BB    1/30/23  Pt reports hx of Afib, refused AC in the past  Cont Dilt gtt until able to swallow PO meds  Rec Eliquis upon DC and cont BB

## 2023-01-30 NOTE — PLAN OF CARE
A248/A248 LIZZYJohnnie Masters is a 67 y.o.male admitted on 1/26/2023 for S/P closure of ileostomy   Code Status: Prior MRN: [id number]   Review of patient's allergies indicates:  No Known Allergies  Past Medical History:   Diagnosis Date    Cancer     Diverticulitis     Encounter for blood transfusion     Liver disease     Tumors    Supplemental oxygen dependent     only during Covid 2021      PRN meds    sodium chloride 0.9%, , PRN  acetaminophen, 650 mg, Q6H PRN  heparin (PORCINE), 30 Units/kg (Adjusted), PRN  heparin (PORCINE), 60 Units/kg (Adjusted), PRN  hydrALAZINE, 10 mg, Q6H PRN  HYDROcodone-acetaminophen, 1 tablet, Q6H PRN  HYDROmorphone, 1 mg, Q4H PRN  methocarbamoL, 1,000 mg, TID PRN  ondansetron, 4 mg, Q6H PRN  promethazine (PHENERGAN) IVPB, 25 mg, Q6H PRN  simethicone, 1 tablet, TID PRN      Pt was NPO until dinner and refused all oral meds until speaking w Dr Parker about his illeus concerns. Pt had bm and flatus this shift. I spoke to Dr Parker and cardiology @1622 regarding POC change. Resuming low residue diet and PO meds. DC Cardizem drip. APTT for heparin drip was 56.3 at 1318-- therapeutic no change. Next draw scheduled at 1918. Per cardiology-- will keep heparin drip until pt DC due to pt not wanting to resume eliquis. Pt walked the webb with his daughter and had no complaints. Hourly rounding completed. Chart check completed. Will continue plan of care.      Orientation: oriented x 4        Lead Monitored: V1 Rhythm: normal sinus rhythm Frequency/Ectopy: PVCs, frequent  Cardiac/Telemetry Box Number: [id number]    Last Bowel Movement: 01/30/23  Diet low fiber/residue  Voiding Characteristics: voids spontaneously without difficulty  Laith Score: 19  Fall Risk Score: 13       Lines/Drains/Airways       Peripheral Intravenous Line  Duration                  Peripheral IV - Single Lumen 01/26/23 18 G Left;Posterior Hand 4 days         Peripheral IV - Single Lumen 01/29/23 1005 20 G  Anterior;Left;Proximal Forearm 1 day

## 2023-01-30 NOTE — NURSING
aPTT 28.3  Bolused 60 units of Heparin  Increased Heparin rate by 3 units  Heparin was infusing at 14 units/kg/hr, now infusing at 17 units/kg/hr  Ordered next aPTT draw

## 2023-01-30 NOTE — SUBJECTIVE & OBJECTIVE
Interval History: States he had four bowel movements and is passing a substantial amount of gas. No nausea or vomiting. Ambulating and urinating without issues. Would like to try eating.    Medications:  Continuous Infusions:   dilTIAZem Stopped (01/30/23 1651)    heparin (porcine) in D5W 19 Units/kg/hr (01/30/23 1319)     Scheduled Meds:   ascorbic acid (vitamin C)  500 mg Oral BID    bisacodyL  10 mg Oral Once    diltiaZEM  30 mg Oral Q6H    docusate sodium  100 mg Oral BID    furosemide (LASIX) injection  40 mg Intravenous BID    magnesium sulfate IVPB  2 g Intravenous Once    metoprolol succinate  25 mg Oral Daily    tamsulosin  0.4 mg Oral Daily     PRN Meds:acetaminophen, heparin (PORCINE), heparin (PORCINE), hydrALAZINE, HYDROcodone-acetaminophen, HYDROmorphone, methocarbamoL, ondansetron, promethazine (PHENERGAN) IVPB, simethicone     Review of patient's allergies indicates:  No Known Allergies  Objective:     Vital Signs (Most Recent):  Temp: 97.6 °F (36.4 °C) (01/30/23 1520)  Pulse: 65 (01/30/23 1520)  Resp: 18 (01/30/23 1520)  BP: 139/83 (01/30/23 1520)  SpO2: (!) 94 % (01/30/23 1520)   Vital Signs (24h Range):  Temp:  [97.6 °F (36.4 °C)-99.4 °F (37.4 °C)] 97.6 °F (36.4 °C)  Pulse:  [62-75] 65  Resp:  [18-19] 18  SpO2:  [92 %-96 %] 94 %  BP: (111-151)/(70-84) 139/83     Weight: 98.5 kg (217 lb 2.5 oz)  Body mass index is 32.07 kg/m².    Intake/Output - Last 3 Shifts         01/28 0700 01/29 0659 01/29 0700 01/30 0659 01/30 0700 01/31 0659    P.O.   0    I.V. (mL/kg)   1418.9 (14.4)    Total Intake(mL/kg)   1418.9 (14.4)    Urine (mL/kg/hr) 1100 (0.5) 1425 (0.6) 200 (0.2)    Stool   0    Total Output 1100 1425 200    Net -1100 -1425 +1218.9           Stool Occurrence 1 x  2 x            Physical Exam  Vitals and nursing note reviewed.   Constitutional:       General: He is not in acute distress.     Appearance: He is not ill-appearing, toxic-appearing or diaphoretic.   HENT:      Head: Normocephalic.       Nose: Nose normal.      Mouth/Throat:      Mouth: Mucous membranes are moist.   Eyes:      General: No scleral icterus.        Right eye: No discharge.         Left eye: No discharge.      Extraocular Movements: Extraocular movements intact.   Cardiovascular:      Rate and Rhythm: Normal rate and regular rhythm.   Pulmonary:      Effort: No respiratory distress.      Breath sounds: No stridor.   Abdominal:      General: There is no distension.      Palpations: Abdomen is soft.      Comments: Soft, appropriately tender, port site incisions c/d/I, stoma closure granulating in well   Musculoskeletal:      Cervical back: No rigidity.   Skin:     General: Skin is warm and dry.      Coloration: Skin is not jaundiced or pale.   Neurological:      Mental Status: He is alert and oriented to person, place, and time.   Psychiatric:         Mood and Affect: Mood normal.         Behavior: Behavior normal.       Significant Labs:  I have reviewed all pertinent lab results within the past 24 hours.  CBC:   Recent Labs   Lab 01/30/23  0511   WBC 10.23   RBC 3.74*   HGB 11.5*   HCT 35.7*      MCV 96   MCH 30.7   MCHC 32.2     BMP:   Recent Labs   Lab 01/30/23  0511   GLU 98   *   K 3.7      CO2 21*   BUN 30*   CREATININE 1.2   CALCIUM 8.9   MG 1.6       Significant Diagnostics:  I have reviewed all pertinent imaging results/findings within the past 24 hours.

## 2023-01-30 NOTE — ASSESSMENT & PLAN NOTE
Cont IV diuresis post op weight gain due to fluids  Diuresing well  Keep K > 4, Mg > 2  Pt had not been taking cardiac meds preop- follow up with Dr. Maravilla post DC  Consider starting Entresto     1/30/23  Pt refusing meds this am  Cont current medications, PO once pt able to tolerate  Follow up with Dr. Maravilla

## 2023-01-30 NOTE — PROGRESS NOTES
[facility] ([facility])  Cardiology  Progress Note    Patient Name: Franky Masters  MRN: [id number]  Admission Date: 1/26/2023  Hospital Length of Stay: 4 days  Code Status: Prior   Attending Physician: Elvie Parker DO   Primary Care Physician: HOLLY ROSEN  Expected Discharge Date:   Principal Problem:S/P closure of ileostomy    Subjective:   HPI:   Franky Masters is a 67 y.o. male pt with CHF, PAF, colon cancer and liver metastases, ex-smoker, colostomy bag post abd surgery - colostomy reversal developed AF V RVR this AM, consulted for further recs. Pt started on Heparin drip and Cardizem drip for rate control, pt had seen Dr. Maravilla preop and had ECHO with EF 30%, pt not on any OMT yet and did not want to take Eliquis. Pt has refused chemo for quality of end of life.     Pt's rates improving on Dilt 10 mcg, once PO tolerated will give oral meds - pt has ileus now. He also had significant weight gain sec to fluids will give lasix to diurese. BNP elevated this AM.   Hospital Course:   1/30/23 Pt seen and examined today. Denies any CP at this time. Pt reports he has been doing well with sips of water wince Saturday, RN noted that he refused his PO meds this am. Labs reviewed Crt 1.2          Review of Systems   Constitutional: Negative.   HENT: Negative.     Eyes: Negative.    Cardiovascular: Negative.    Respiratory: Negative.     Skin: Negative.    Musculoskeletal: Negative.    Gastrointestinal: Negative.    Genitourinary: Negative.    Neurological: Negative.    Psychiatric/Behavioral: Negative.     Objective:     Vital Signs (Most Recent):  Temp: 97.6 °F (36.4 °C) (01/30/23 1155)  Pulse: 71 (01/30/23 1155)  Resp: 18 (01/30/23 1155)  BP: 124/78 (01/30/23 1155)  SpO2: (!) 92 % (01/30/23 1155)   Vital Signs (24h Range):  Temp:  [97.6 °F (36.4 °C)-99.4 °F (37.4 °C)] 97.6 °F (36.4 °C)  Pulse:  [62-96] 71  Resp:  [18-19] 18  SpO2:  [92 %-96 %] 92 %  BP: (111-151)/(64-84) 124/78     Weight: 98.5 kg  (217 lb 2.5 oz)  Body mass index is 32.07 kg/m².     SpO2: (!) 92 %         Intake/Output Summary (Last 24 hours) at 1/30/2023 1410  Last data filed at 1/30/2023 1033  Gross per 24 hour   Intake 1418.94 ml   Output 700 ml   Net 718.94 ml       Lines/Drains/Airways       Peripheral Intravenous Line  Duration                  Peripheral IV - Single Lumen 01/26/23 18 G Left;Posterior Hand 4 days         Peripheral IV - Single Lumen 01/29/23 1005 20 G Anterior;Left;Proximal Forearm 1 day                    Physical Exam  Vitals and nursing note reviewed.   Constitutional:       Appearance: Normal appearance.   HENT:      Head: Normocephalic and atraumatic.   Eyes:      General:         Right eye: No discharge.         Left eye: No discharge.      Pupils: Pupils are equal, round, and reactive to light.   Cardiovascular:      Rate and Rhythm: Normal rate. Rhythm irregular.      Heart sounds: S1 normal and S2 normal. No murmur heard.    No friction rub.   Pulmonary:      Effort: Pulmonary effort is normal. No respiratory distress.      Breath sounds: Normal breath sounds. No rales.   Abdominal:      Palpations: Abdomen is soft.      Tenderness: There is no abdominal tenderness.   Musculoskeletal:      Cervical back: Neck supple.      Right lower leg: No edema.      Left lower leg: No edema.   Skin:     General: Skin is warm and dry.   Neurological:      General: No focal deficit present.      Mental Status: He is alert and oriented to person, place, and time.   Psychiatric:         Mood and Affect: Mood normal.         Behavior: Behavior normal.         Thought Content: Thought content normal.       Significant Labs: BMP:   Recent Labs   Lab 01/29/23  0554 01/30/23  0511   * 98   * 134*   K 4.3 3.7    101   CO2 20* 21*   BUN 21 30*   CREATININE 0.9 1.2   CALCIUM 9.5 8.9   MG 1.8 1.6   , CMP   Recent Labs   Lab 01/29/23  0554 01/30/23  0511   * 134*   K 4.3 3.7    101   CO2 20* 21*   *  98   BUN 21 30*   CREATININE 0.9 1.2   CALCIUM 9.5 8.9   ANIONGAP 9 12   , CBC   Recent Labs   Lab 01/29/23  0554 01/29/23  0920 01/30/23  0511   WBC 9.08 8.53 10.23   HGB 12.4* 12.5* 11.5*   HCT 39.0* 39.6* 35.7*    413 405   , INR   Recent Labs   Lab 01/29/23  0920   INR 1.1   , Lipid Panel No results for input(s): CHOL, HDL, LDLCALC, TRIG, CHOLHDL in the last 48 hours., Troponin   Recent Labs   Lab 01/29/23  0912 01/30/23  0903   TROPONINI 0.086* 0.045*   , and All pertinent lab results from the last 24 hours have been reviewed.    Significant Imaging: Cardiac Cath: reviewed, Echocardiogram: Transthoracic echo (TTE) complete (Cupid Only):   Results for orders placed or performed during the hospital encounter of 01/20/23   Echo   Result Value Ref Range    BSA 1.9 m2    TDI SEPTAL 0.06 m/s    LV LATERAL E/E' RATIO 8.80 m/s    LV SEPTAL E/E' RATIO 7.33 m/s    LA WIDTH 3.80 cm    IVC diameter 1.46 cm    Left Ventricular Outflow Tract Mean Velocity 0.72 cm/s    Left Ventricular Outflow Tract Mean Gradient 2.45 mmHg    TDI LATERAL 0.05 m/s    PV PEAK VELOCITY 1.18 cm/s    LVIDd 5.29 3.5 - 6.0 cm    IVS 1.16 (A) 0.6 - 1.1 cm    Posterior Wall 1.11 (A) 0.6 - 1.1 cm    Ao root annulus 2.98 cm    LVIDs 4.13 (A) 2.1 - 4.0 cm    FS 22 28 - 44 %    LA volume 69.62 cm3    Sinus 3.08 cm    STJ 2.66 cm    Ascending aorta 2.94 cm    LV mass 236.93 g    LA size 4.52 cm    RVDD 3.41 cm    TAPSE 2.23 cm    Left Ventricle Relative Wall Thickness 0.42 cm    AV mean gradient 7 mmHg    AV valve area 2.27 cm2    AV Velocity Ratio 0.63     AV index (prosthetic) 0.64     MV valve area p 1/2 method 2.69 cm2    E/A ratio 0.54     Mean e' 0.06 m/s    E wave deceleration time 281.54 msec    IVRT 142.72 msec    Pulm vein S/D ratio 1.47     LVOT diameter 2.13 cm    LVOT area 3.6 cm2    LVOT peak lima 1.07 m/s    LVOT peak VTI 22.10 cm    Ao peak lima 1.71 m/s    Ao VTI 34.6 cm    RVOT peak lima 0.68 m/s    RVOT peak VTI 12.9 cm    LVOT  stroke volume 78.71 cm3    AV peak gradient 12 mmHg    PV mean gradient 0.83 mmHg    E/E' ratio 8.00 m/s    MV Peak E Aftab 0.44 m/s    TR Max Aftab 2.63 m/s    MV stenosis pressure 1/2 time 81.65 ms    MV Peak A Aftab 0.82 m/s    PV Peak S Aftab 0.53 m/s    PV Peak D Aftab 0.36 m/s    LV Systolic Volume 75.33 mL    LV Systolic Volume Index 39.9 mL/m2    LV Diastolic Volume 135.01 mL    LV Diastolic Volume Index 71.43 mL/m2    LA Volume Index 36.8 mL/m2    LV Mass Index 125 g/m2    RA Major Axis 3.90 cm    Left Atrium Minor Axis 4.60 cm    Left Atrium Major Axis 4.95 cm    Triscuspid Valve Regurgitation Peak Gradient 28 mmHg    LA Volume Index (Mod) 30.3 mL/m2    LA volume (mod) 57.35 cm3    RA Width 3.38 cm    Right Atrial Pressure (from IVC) 3 mmHg    EF 30 %    TV rest pulmonary artery pressure 31 mmHg    Narrative    · The left ventricle is normal in size with eccentric hypertrophy and   moderately decreased systolic function.  · Mild left atrial enlargement.  · The estimated ejection fraction is 30%.  · Grade I left ventricular diastolic dysfunction.  · Normal right ventricular size with normal right ventricular systolic   function.  · Mild mitral regurgitation.  · There are segmental left ventricular wall motion abnormalities.  · Mild tricuspid regurgitation.  · Normal central venous pressure (3 mmHg).  · The estimated PA systolic pressure is 31 mmHg.      , EKG: reviewed, Stress Test: reviewed, and X-Ray: CXR: X-Ray Chest 1 View (CXR): No results found for this visit on 01/26/23.    Assessment and Plan:       * S/P closure of ileostomy  Cont tx per surgery team     Chronic combined systolic and diastolic congestive heart failure  Cont IV diuresis post op weight gain due to fluids  Diuresing well  Keep K > 4, Mg > 2  Pt had not been taking cardiac meds preop- follow up with Dr. Maravilla post DC  Consider starting Entresto     1/30/23  Pt refusing meds this am  Cont current medications, PO once pt able to  tolerate  Follow up with Dr. Maravilla      Longstanding persistent atrial fibrillation  See PAF plans    PAF (paroxysmal atrial fibrillation)  Post op AFib  Cont heparin and Dilt drip until ileus resolves and tolerates PO meds  Rec Eliquis upon DC and cont BB    1/30/23  Pt reports hx of Afib, refused AC in the past  Cont Dilt gtt until able to swallow PO meds  Rec Eliquis upon DC and cont BB        VTE Risk Mitigation (From admission, onward)         Ordered     heparin 25,000 units in dextrose 5% (100 units/ml) IV bolus from bag - ADDITIONAL PRN BOLUS - 60 units/kg  As needed (PRN)        Question:  Heparin Infusion Adjustment (DO NOT MODIFY ANSWER)  Answer:  [email or web address]\epic\Images\Pharmacy\HeparinInfusions\heparin LOW INTENSITY nomogram for [facility] [id number]    01/29/23 0908     heparin 25,000 units in dextrose 5% (100 units/ml) IV bolus from bag - ADDITIONAL PRN BOLUS - 30 units/kg  As needed (PRN)        Question:  Heparin Infusion Adjustment (DO NOT MODIFY ANSWER)  Answer:  [email or web address]\epic\Images\Pharmacy\HeparinInfusions\heparin LOW INTENSITY nomogram for [facility] [id number]    01/29/23 0908     heparin 25,000 units in dextrose 5% 250 mL (100 units/mL) infusion LOW INTENSITY nomogram - [facility]  Continuous        Question Answer Comment   Heparin Infusion Adjustment (DO NOT MODIFY ANSWER) [email or web address]\epic\Images\Pharmacy\HeparinInfusions\heparin LOW INTENSITY nomogram for [facility] [id number]    Begin at (in units/kg/hr) 12        01/29/23 0908     Place sequential compression device  Until discontinued         01/26/23 1525                Heidi Peña NP  Cardiology  [facility] - Telemetry ([facility])

## 2023-01-30 NOTE — ASSESSMENT & PLAN NOTE
S/p laparoscopic-assisted ileostomy closure, extensive lysis of adhesions, fecal disimpaction 1/26    - Having excellent bowel function. Will resume low residue diet. OK to resume PO meds as well.  - Replace magnesium  - Analgesia prn  - Increase activity, ambulate, up to chair  - IS

## 2023-01-30 NOTE — PROGRESS NOTES
[facility] - Telemetry ([facility])  General Surgery  Progress Note    Subjective:     History of Present Illness:  No notes on file    Post-Op Info:  Procedure(s) (LRB):  CLOSURE, COLOSTOMY, LAPAROSCOPIC (N/A)  LYSIS, ADHESIONS, LAPAROSCOPIC (N/A)  BLOCK, TRANSVERSUS ABDOMINIS PLANE (N/A)  REMOVAL, FECES, IMPACTED (N/A)   4 Days Post-Op     Interval History: States he had four bowel movements and is passing a substantial amount of gas. No nausea or vomiting. Ambulating and urinating without issues. Would like to try eating.    Medications:  Continuous Infusions:   dilTIAZem Stopped (01/30/23 1651)    heparin (porcine) in D5W 19 Units/kg/hr (01/30/23 1319)     Scheduled Meds:   ascorbic acid (vitamin C)  500 mg Oral BID    bisacodyL  10 mg Oral Once    diltiaZEM  30 mg Oral Q6H    docusate sodium  100 mg Oral BID    furosemide (LASIX) injection  40 mg Intravenous BID    magnesium sulfate IVPB  2 g Intravenous Once    metoprolol succinate  25 mg Oral Daily    tamsulosin  0.4 mg Oral Daily     PRN Meds:acetaminophen, heparin (PORCINE), heparin (PORCINE), hydrALAZINE, HYDROcodone-acetaminophen, HYDROmorphone, methocarbamoL, ondansetron, promethazine (PHENERGAN) IVPB, simethicone     Review of patient's allergies indicates:  No Known Allergies  Objective:     Vital Signs (Most Recent):  Temp: 97.6 °F (36.4 °C) (01/30/23 1520)  Pulse: 65 (01/30/23 1520)  Resp: 18 (01/30/23 1520)  BP: 139/83 (01/30/23 1520)  SpO2: (!) 94 % (01/30/23 1520)   Vital Signs (24h Range):  Temp:  [97.6 °F (36.4 °C)-99.4 °F (37.4 °C)] 97.6 °F (36.4 °C)  Pulse:  [62-75] 65  Resp:  [18-19] 18  SpO2:  [92 %-96 %] 94 %  BP: (111-151)/(70-84) 139/83     Weight: 98.5 kg (217 lb 2.5 oz)  Body mass index is 32.07 kg/m².    Intake/Output - Last 3 Shifts         01/28 0700 01/29 0659 01/29 0700 01/30 0659 01/30 0700 01/31 0659    P.O.   0    I.V. (mL/kg)   1418.9 (14.4)    Total Intake(mL/kg)   1418.9 (14.4)    Urine (mL/kg/hr) 1100 (0.5) 1425 (0.6)  200 (0.2)    Stool   0    Total Output 1100 1425 200    Net -1100 -1425 +1218.9           Stool Occurrence 1 x  2 x            Physical Exam  Vitals and nursing note reviewed.   Constitutional:       General: He is not in acute distress.     Appearance: He is not ill-appearing, toxic-appearing or diaphoretic.   HENT:      Head: Normocephalic.      Nose: Nose normal.      Mouth/Throat:      Mouth: Mucous membranes are moist.   Eyes:      General: No scleral icterus.        Right eye: No discharge.         Left eye: No discharge.      Extraocular Movements: Extraocular movements intact.   Cardiovascular:      Rate and Rhythm: Normal rate and regular rhythm.   Pulmonary:      Effort: No respiratory distress.      Breath sounds: No stridor.   Abdominal:      General: There is no distension.      Palpations: Abdomen is soft.      Comments: Soft, appropriately tender, port site incisions c/d/I, stoma closure granulating in well   Musculoskeletal:      Cervical back: No rigidity.   Skin:     General: Skin is warm and dry.      Coloration: Skin is not jaundiced or pale.   Neurological:      Mental Status: He is alert and oriented to person, place, and time.   Psychiatric:         Mood and Affect: Mood normal.         Behavior: Behavior normal.       Significant Labs:  I have reviewed all pertinent lab results within the past 24 hours.  CBC:   Recent Labs   Lab 01/30/23  0511   WBC 10.23   RBC 3.74*   HGB 11.5*   HCT 35.7*      MCV 96   MCH 30.7   MCHC 32.2     BMP:   Recent Labs   Lab 01/30/23  0511   GLU 98   *   K 3.7      CO2 21*   BUN 30*   CREATININE 1.2   CALCIUM 8.9   MG 1.6       Significant Diagnostics:  I have reviewed all pertinent imaging results/findings within the past 24 hours.    Assessment/Plan:     * S/P closure of ileostomy  S/p laparoscopic-assisted ileostomy closure, extensive lysis of adhesions, fecal disimpaction 1/26    - Having excellent bowel function. Will resume low residue  diet. OK to resume PO meds as well.  - Replace magnesium  - Analgesia prn  - Increase activity, ambulate, up to chair  - IS    Chronic combined systolic and diastolic congestive heart failure  Appreciate cardiology recs.      Longstanding persistent atrial fibrillation  Cardiology consulted. Currently on cardizem and heparin gtt. OK to transition to PO meds.        Elvie Parker, DO  General Surgery  [facility] - Tele[facility] ([facility])

## 2023-01-30 NOTE — HOSPITAL COURSE
1/30/23 Pt seen and examined today. Denies any CP at this time. Pt reports he has been doing well with sips of water wince Saturday, RN noted that he refused his PO meds this am. Labs reviewed Crt 1.2

## 2023-01-31 ENCOUNTER — TELEPHONE (OUTPATIENT)
Dept: CARDIOLOGY | Facility: HOSPITAL | Age: 68
End: 2023-01-31
Payer: MEDICARE

## 2023-01-31 VITALS
DIASTOLIC BLOOD PRESSURE: 85 MMHG | HEART RATE: 61 BPM | TEMPERATURE: 98 F | RESPIRATION RATE: 18 BRPM | BODY MASS INDEX: 31.81 KG/M2 | WEIGHT: 214.75 LBS | SYSTOLIC BLOOD PRESSURE: 132 MMHG | OXYGEN SATURATION: 94 % | HEIGHT: 69 IN

## 2023-01-31 LAB
ANION GAP SERPL CALC-SCNC: 13 MMOL/L (ref 8–16)
APTT BLDCRRT: 51.7 SEC (ref 21–32)
BASOPHILS # BLD AUTO: 0.06 K/UL (ref 0–0.2)
BASOPHILS NFR BLD: 0.6 % (ref 0–1.9)
BUN SERPL-MCNC: 30 MG/DL (ref 8–23)
CALCIUM SERPL-MCNC: 9 MG/DL (ref 8.7–10.5)
CHLORIDE SERPL-SCNC: 99 MMOL/L (ref 95–110)
CO2 SERPL-SCNC: 24 MMOL/L (ref 23–29)
CREAT SERPL-MCNC: 1.1 MG/DL (ref 0.5–1.4)
DIFFERENTIAL METHOD: ABNORMAL
EOSINOPHIL # BLD AUTO: 0.2 K/UL (ref 0–0.5)
EOSINOPHIL NFR BLD: 2 % (ref 0–8)
ERYTHROCYTE [DISTWIDTH] IN BLOOD BY AUTOMATED COUNT: 15.2 % (ref 11.5–14.5)
EST. GFR  (NO RACE VARIABLE): >60 ML/MIN/1.73 M^2
GLUCOSE SERPL-MCNC: 95 MG/DL (ref 70–110)
HCT VFR BLD AUTO: 36.5 % (ref 40–54)
HGB BLD-MCNC: 12 G/DL (ref 14–18)
IMM GRANULOCYTES # BLD AUTO: 0.09 K/UL (ref 0–0.04)
IMM GRANULOCYTES NFR BLD AUTO: 0.9 % (ref 0–0.5)
LYMPHOCYTES # BLD AUTO: 1.9 K/UL (ref 1–4.8)
LYMPHOCYTES NFR BLD: 19.5 % (ref 18–48)
MCH RBC QN AUTO: 30.8 PG (ref 27–31)
MCHC RBC AUTO-ENTMCNC: 32.9 G/DL (ref 32–36)
MCV RBC AUTO: 94 FL (ref 82–98)
MONOCYTES # BLD AUTO: 1.1 K/UL (ref 0.3–1)
MONOCYTES NFR BLD: 11.4 % (ref 4–15)
NEUTROPHILS # BLD AUTO: 6.5 K/UL (ref 1.8–7.7)
NEUTROPHILS NFR BLD: 65.6 % (ref 38–73)
NRBC BLD-RTO: 0 /100 WBC
PLATELET # BLD AUTO: 360 K/UL (ref 150–450)
PMV BLD AUTO: 11.7 FL (ref 9.2–12.9)
POTASSIUM SERPL-SCNC: 3.3 MMOL/L (ref 3.5–5.1)
RBC # BLD AUTO: 3.89 M/UL (ref 4.6–6.2)
SODIUM SERPL-SCNC: 136 MMOL/L (ref 136–145)
WBC # BLD AUTO: 9.91 K/UL (ref 3.9–12.7)

## 2023-01-31 PROCEDURE — 85730 THROMBOPLASTIN TIME PARTIAL: CPT | Performed by: SURGERY

## 2023-01-31 PROCEDURE — 85025 COMPLETE CBC W/AUTO DIFF WBC: CPT | Performed by: INTERNAL MEDICINE

## 2023-01-31 PROCEDURE — 63600175 PHARM REV CODE 636 W HCPCS: Performed by: INTERNAL MEDICINE

## 2023-01-31 PROCEDURE — 80048 BASIC METABOLIC PNL TOTAL CA: CPT | Performed by: SURGERY

## 2023-01-31 PROCEDURE — 25000003 PHARM REV CODE 250: Performed by: STUDENT IN AN ORGANIZED HEALTH CARE EDUCATION/TRAINING PROGRAM

## 2023-01-31 PROCEDURE — 36415 COLL VENOUS BLD VENIPUNCTURE: CPT | Performed by: SURGERY

## 2023-01-31 PROCEDURE — 25000003 PHARM REV CODE 250: Performed by: SURGERY

## 2023-01-31 RX ORDER — HYDROCODONE BITARTRATE AND ACETAMINOPHEN 7.5; 325 MG/1; MG/1
1 TABLET ORAL
Qty: 25 TABLET | Refills: 0 | Status: SHIPPED | OUTPATIENT
Start: 2023-01-31 | End: 2023-03-15 | Stop reason: SDUPTHER

## 2023-01-31 RX ORDER — DILTIAZEM HYDROCHLORIDE 30 MG/1
30 TABLET, FILM COATED ORAL ONCE
Status: COMPLETED | OUTPATIENT
Start: 2023-01-31 | End: 2023-01-31

## 2023-01-31 RX ORDER — DILTIAZEM HYDROCHLORIDE 60 MG/1
60 TABLET, FILM COATED ORAL EVERY 12 HOURS
Status: DISCONTINUED | OUTPATIENT
Start: 2023-01-31 | End: 2023-02-01 | Stop reason: HOSPADM

## 2023-01-31 RX ORDER — ONDANSETRON 4 MG/1
4 TABLET, FILM COATED ORAL EVERY 6 HOURS PRN
Qty: 10 TABLET | Refills: 1 | Status: SHIPPED | OUTPATIENT
Start: 2023-01-31 | End: 2023-03-07

## 2023-01-31 RX ORDER — DILTIAZEM HYDROCHLORIDE 60 MG/1
60 TABLET, FILM COATED ORAL EVERY 12 HOURS
Qty: 60 TABLET | Refills: 11 | Status: SHIPPED | OUTPATIENT
Start: 2023-01-31 | End: 2024-01-31

## 2023-01-31 RX ORDER — POTASSIUM CHLORIDE 20 MEQ/1
20 TABLET, EXTENDED RELEASE ORAL 2 TIMES DAILY
Status: DISCONTINUED | OUTPATIENT
Start: 2023-01-31 | End: 2023-02-01 | Stop reason: HOSPADM

## 2023-01-31 RX ORDER — FUROSEMIDE 40 MG/1
40 TABLET ORAL DAILY
Status: DISCONTINUED | OUTPATIENT
Start: 2023-01-31 | End: 2023-02-01 | Stop reason: HOSPADM

## 2023-01-31 RX ADMIN — TAMSULOSIN HYDROCHLORIDE 0.4 MG: 0.4 CAPSULE ORAL at 08:01

## 2023-01-31 RX ADMIN — FUROSEMIDE 40 MG: 40 TABLET ORAL at 03:01

## 2023-01-31 RX ADMIN — METOPROLOL SUCCINATE 25 MG: 25 TABLET, EXTENDED RELEASE ORAL at 08:01

## 2023-01-31 RX ADMIN — POTASSIUM CHLORIDE 20 MEQ: 1500 TABLET, EXTENDED RELEASE ORAL at 08:01

## 2023-01-31 RX ADMIN — DILTIAZEM HYDROCHLORIDE 30 MG: 30 TABLET, FILM COATED ORAL at 11:01

## 2023-01-31 RX ADMIN — DILTIAZEM HYDROCHLORIDE 30 MG: 30 TABLET, FILM COATED ORAL at 05:01

## 2023-01-31 RX ADMIN — HEPARIN SODIUM 19 UNITS/KG/HR: 10000 INJECTION, SOLUTION INTRAVENOUS at 05:01

## 2023-01-31 NOTE — PROGRESS NOTES
[facility] ([facility])  General Surgery  Progress Note    Subjective:     History of Present Illness:  No notes on file    Post-Op Info:  Procedure(s) (LRB):  CLOSURE, COLOSTOMY, LAPAROSCOPIC (N/A)  LYSIS, ADHESIONS, LAPAROSCOPIC (N/A)  BLOCK, TRANSVERSUS ABDOMINIS PLANE (N/A)  REMOVAL, FECES, IMPACTED (N/A)   5 Days Post-Op     Interval History: Doing well. Still passing flatus and bowel movements. Tolerating a diet. Ambulating and urinating without issues.    Medications:  Continuous Infusions:   heparin (porcine) in D5W 19 Units/kg/hr (01/31/23 0521)     Scheduled Meds:   ascorbic acid (vitamin C)  500 mg Oral BID    bisacodyL  10 mg Oral Once    diltiaZEM  60 mg Oral Q12H    docusate sodium  100 mg Oral BID    furosemide  40 mg Oral Daily    metoprolol succinate  25 mg Oral Daily    potassium chloride  20 mEq Oral BID    tamsulosin  0.4 mg Oral Daily     PRN Meds:sodium chloride 0.9%, acetaminophen, heparin (PORCINE), heparin (PORCINE), hydrALAZINE, HYDROcodone-acetaminophen, HYDROmorphone, methocarbamoL, ondansetron, promethazine (PHENERGAN) IVPB, simethicone     Review of patient's allergies indicates:  No Known Allergies  Objective:     Vital Signs (Most Recent):  Temp: 98.8 °F (37.1 °C) (01/31/23 1635)  Pulse: 62 (01/31/23 1635)  Resp: 18 (01/31/23 1635)  BP: (!) 134/94 (01/31/23 1635)  SpO2: (!) 93 % (01/31/23 1635)   Vital Signs (24h Range):  Temp:  [97.9 °F (36.6 °C)-99.1 °F (37.3 °C)] 98.8 °F (37.1 °C)  Pulse:  [58-73] 62  Resp:  [18] 18  SpO2:  [92 %-97 %] 93 %  BP: (116-154)/(69-94) 134/94     Weight: 97.4 kg (214 lb 11.7 oz)  Body mass index is 31.71 kg/m².    Intake/Output - Last 3 Shifts         01/29 0700  01/30 0659 01/30 0700 01/31 0659 01/31 0700 02/01 0659    P.O.  0 720    I.V. (mL/kg)  1418.9 (14.6)     Total Intake(mL/kg)  1418.9 (14.6) 720 (7.4)    Urine (mL/kg/hr) 1425 (0.6) 800 (0.3) 500 (0.5)    Stool  0     Total Output 1425 800 500    Net -1425 +618.9 +220            Stool Occurrence  2 x             Physical Exam  Vitals and nursing note reviewed.   Constitutional:       General: He is not in acute distress.     Appearance: He is not ill-appearing, toxic-appearing or diaphoretic.   HENT:      Head: Normocephalic.      Nose: Nose normal.      Mouth/Throat:      Mouth: Mucous membranes are moist.   Eyes:      General: No scleral icterus.        Right eye: No discharge.         Left eye: No discharge.      Extraocular Movements: Extraocular movements intact.   Cardiovascular:      Rate and Rhythm: Normal rate and regular rhythm.   Pulmonary:      Effort: No respiratory distress.      Breath sounds: No stridor.   Abdominal:      General: There is no distension.      Palpations: Abdomen is soft.      Comments: Soft, appropriately tender, port site incisions c/d/I, stoma closure granulating in well   Musculoskeletal:      Cervical back: No rigidity.      Right lower leg: No edema.      Left lower leg: No edema.   Skin:     General: Skin is warm and dry.      Coloration: Skin is not jaundiced or pale.   Neurological:      Mental Status: He is alert and oriented to person, place, and time.   Psychiatric:         Mood and Affect: Mood normal.         Behavior: Behavior normal.       Significant Labs:  I have reviewed all pertinent lab results within the past 24 hours.  CBC:   Recent Labs   Lab 01/31/23  0436   WBC 9.91   RBC 3.89*   HGB 12.0*   HCT 36.5*      MCV 94   MCH 30.8   MCHC 32.9     BMP:   Recent Labs   Lab 01/30/23  0511 01/31/23  0436   GLU 98 95   * 136   K 3.7 3.3*    99   CO2 21* 24   BUN 30* 30*   CREATININE 1.2 1.1   CALCIUM 8.9 9.0   MG 1.6  --        Significant Diagnostics:  I have reviewed all pertinent imaging results/findings within the past 24 hours.    Assessment/Plan:     * S/P closure of ileostomy  S/p laparoscopic-assisted ileostomy closure, extensive lysis of adhesions, fecal disimpaction 1/26    - Continue low residue diet. Resume PO meds  as well.  - Replace potassium  - Analgesia prn  - Increase activity, ambulate, up to chair  - IS    Anticipate discharge home tomorrow if continues to progress well.    Chronic combined systolic and diastolic congestive heart failure  Appreciate cardiology recs.      Longstanding persistent atrial fibrillation  Cardiology consulted. OK to transition to PO meds.      Elvie Parker, DO  General Surgery  [facility] ([facility])

## 2023-01-31 NOTE — CHAPLAIN
Initial visit with patient.  Visited with patient to assess for ways that I might be of support to him today.  Pt was doing well today and took some time to share his story with me.  Pt wanted me to pray for him and I did this for him before leaving.  Spiritual care remains available as needed.    Chaplain Jude Oshea M.Div., BCC

## 2023-01-31 NOTE — SUBJECTIVE & OBJECTIVE
Interval History: Doing well. Still passing flatus and bowel movements. Tolerating a diet. Ambulating and urinating without issues.    Medications:  Continuous Infusions:   heparin (porcine) in D5W 19 Units/kg/hr (01/31/23 0521)     Scheduled Meds:   ascorbic acid (vitamin C)  500 mg Oral BID    bisacodyL  10 mg Oral Once    diltiaZEM  60 mg Oral Q12H    docusate sodium  100 mg Oral BID    furosemide  40 mg Oral Daily    metoprolol succinate  25 mg Oral Daily    potassium chloride  20 mEq Oral BID    tamsulosin  0.4 mg Oral Daily     PRN Meds:sodium chloride 0.9%, acetaminophen, heparin (PORCINE), heparin (PORCINE), hydrALAZINE, HYDROcodone-acetaminophen, HYDROmorphone, methocarbamoL, ondansetron, promethazine (PHENERGAN) IVPB, simethicone     Review of patient's allergies indicates:  No Known Allergies  Objective:     Vital Signs (Most Recent):  Temp: 98.8 °F (37.1 °C) (01/31/23 1635)  Pulse: 62 (01/31/23 1635)  Resp: 18 (01/31/23 1635)  BP: (!) 134/94 (01/31/23 1635)  SpO2: (!) 93 % (01/31/23 1635)   Vital Signs (24h Range):  Temp:  [97.9 °F (36.6 °C)-99.1 °F (37.3 °C)] 98.8 °F (37.1 °C)  Pulse:  [58-73] 62  Resp:  [18] 18  SpO2:  [92 %-97 %] 93 %  BP: (116-154)/(69-94) 134/94     Weight: 97.4 kg (214 lb 11.7 oz)  Body mass index is 31.71 kg/m².    Intake/Output - Last 3 Shifts         01/29 0700  01/30 0659 01/30 0700 01/31 0659 01/31 0700 02/01 0659    P.O.  0 720    I.V. (mL/kg)  1418.9 (14.6)     Total Intake(mL/kg)  1418.9 (14.6) 720 (7.4)    Urine (mL/kg/hr) 1425 (0.6) 800 (0.3) 500 (0.5)    Stool  0     Total Output 1425 800 500    Net -1425 +618.9 +220           Stool Occurrence  2 x             Physical Exam  Vitals and nursing note reviewed.   Constitutional:       General: He is not in acute distress.     Appearance: He is not ill-appearing, toxic-appearing or diaphoretic.   HENT:      Head: Normocephalic.      Nose: Nose normal.      Mouth/Throat:      Mouth: Mucous membranes are moist.   Eyes:       General: No scleral icterus.        Right eye: No discharge.         Left eye: No discharge.      Extraocular Movements: Extraocular movements intact.   Cardiovascular:      Rate and Rhythm: Normal rate and regular rhythm.   Pulmonary:      Effort: No respiratory distress.      Breath sounds: No stridor.   Abdominal:      General: There is no distension.      Palpations: Abdomen is soft.      Comments: Soft, appropriately tender, port site incisions c/d/I, stoma closure granulating in well   Musculoskeletal:      Cervical back: No rigidity.      Right lower leg: No edema.      Left lower leg: No edema.   Skin:     General: Skin is warm and dry.      Coloration: Skin is not jaundiced or pale.   Neurological:      Mental Status: He is alert and oriented to person, place, and time.   Psychiatric:         Mood and Affect: Mood normal.         Behavior: Behavior normal.       Significant Labs:  I have reviewed all pertinent lab results within the past 24 hours.  CBC:   Recent Labs   Lab 01/31/23  0436   WBC 9.91   RBC 3.89*   HGB 12.0*   HCT 36.5*      MCV 94   MCH 30.8   MCHC 32.9     BMP:   Recent Labs   Lab 01/30/23  0511 01/31/23  0436   GLU 98 95   * 136   K 3.7 3.3*    99   CO2 21* 24   BUN 30* 30*   CREATININE 1.2 1.1   CALCIUM 8.9 9.0   MG 1.6  --        Significant Diagnostics:  I have reviewed all pertinent imaging results/findings within the past 24 hours.

## 2023-01-31 NOTE — ASSESSMENT & PLAN NOTE
S/p laparoscopic-assisted ileostomy closure, extensive lysis of adhesions, fecal disimpaction 1/26    - Continue low residue diet. Resume PO meds as well.  - Replace potassium  - Analgesia prn  - Increase activity, ambulate, up to chair  - IS    Anticipate discharge home tomorrow if continues to progress well.

## 2023-02-01 LAB
FINAL PATHOLOGIC DIAGNOSIS: NORMAL
Lab: NORMAL

## 2023-02-01 NOTE — DISCHARGE SUMMARY
[facility] ([facility])  General Surgery  Discharge Summary      Patient Name: Franky Masters  MRN: [id number]  Admission Date: 1/26/2023  Hospital Length of Stay: 5 days  Discharge Date and Time: 1/31/2023 11:16 PM  Attending Physician: No att. providers found   Discharging Provider: Elvie Parker DO  Primary Care Provider: HOLLY ROSEN    HPI:   No notes on file    Procedure(s) (LRB):  CLOSURE, COLOSTOMY, LAPAROSCOPIC (N/A)  LYSIS, ADHESIONS, LAPAROSCOPIC (N/A)  BLOCK, TRANSVERSUS ABDOMINIS PLANE (N/A)  REMOVAL, FECES, IMPACTED (N/A)      Indwelling Lines/Drains at time of discharge:   Lines/Drains/Airways     None               Hospital Course: 1/26/22 laparoscopic-assisted ileostomy closure, extensive lysis of adhesions, fecal disimpaction    1/27/2023 POD 1 Tolerating liquids; advance to gi soft diet. D/c [place]wood. Increase activity.    01/28/2023.  Postop day 2.  Reports feeling bloated.  Episode of nausea and vomiting.  Some flatus.  Returned to NPO and IV fluids.  Labs and x-rays in the morning    01/29/2023.  Postop day 3 atrial fibrillation.  Still feels bloated.  Some flatus no bowel movements.  Placed on telemetry.  Cardiology consulted.  Diltiazem drip.  Heparin for anticoagulation    1/30/23 POD 4 States he had four bowel movements and is passing a substantial amount of gas. No nausea or vomiting. Ambulating and urinating without issues. Would like to try eating. Heart rate under control.     1/31/23 POD 5 Doing well. Still passing flatus and bowel movements. Tolerating a diet. Ambulating and urinating without issues. Transitioned to  PO meds. Decided he wanted to go home and if not discharged, would leave AMA.      Goals of Care Treatment Preferences:  Code Status: Full Code      Consults:   Consults (From admission, onward)        Status Ordering Provider     Inpatient consult to Cardiology  Once        Provider:  David Cast MD    Completed JEROME LICEA  Diagnostic Studies: Labs:   BMP:   Recent Labs   Lab 01/31/23  0436   GLU 95      K 3.3*   CL 99   CO2 24   BUN 30*   CREATININE 1.1   CALCIUM 9.0   , CMP   Recent Labs   Lab 01/31/23  0436      K 3.3*   CL 99   CO2 24   GLU 95   BUN 30*   CREATININE 1.1   CALCIUM 9.0   ANIONGAP 13   , CBC   Recent Labs   Lab 01/31/23  0436   WBC 9.91   HGB 12.0*   HCT 36.5*       and Troponin   Recent Labs   Lab 01/29/23  0912 01/30/23  0903   TROPONINI 0.086* 0.045*       Pending Diagnostic Studies:     Procedure Component Value Units Date/Time    Magnesium [[id number]] Collected: 01/28/23 0649    Order Status: Sent Lab Status: In process Updated: 01/28/23 0649    Specimen: Blood     Specimen to Pathology, Surgery General Surgery [[id number]] Collected: 01/26/23 1355    Order Status: Sent Lab Status: In process Updated: 01/27/23 0724    Specimen: Tissue         Final Active Diagnoses:    Diagnosis Date Noted POA    PRINCIPAL PROBLEM:  S/P closure of ileostomy [Z98.890] 01/27/2023 Not Applicable    Longstanding persistent atrial fibrillation [I48.11] 01/29/2023 Yes    Chronic combined systolic and diastolic congestive heart failure [I50.42] 01/29/2023 Yes    PAF (paroxysmal atrial fibrillation) [I48.0] 05/06/2022 Unknown      Problems Resolved During this Admission:      Discharged Condition: stable    Disposition: Home or Self Care    Follow Up:   Follow-up Information     Elvie Parker DO. Schedule an appointment as soon as possible for a visit in 2 week(s).    Specialty: General Surgery  Why: For wound re-check  Contact information:  [address]  [place] [address]  [phone]             HOLLY ROSEN. Call.    Why: Hospital follow up           Ronny Maravilla MD. Schedule an appointment as soon as possible for a visit.    Specialties: Interventional Cardiology, Cardiology  Why: Atrial fibrillation  Contact information:  [address]  [place] [address]  [phone]                        Patient Instructions:   No discharge procedures on file.  Medications:  Reconciled Home Medications:      Medication List      START taking these medications    diltiaZEM 60 MG tablet  Commonly known as: CARDIZEM  Take 1 tablet (60 mg total) by mouth every 12 (twelve) hours.     HYDROcodone-acetaminophen 7.5-325 mg per tablet  Commonly known as: NORCO  Take 1 tablet by mouth every 4 to 6 hours as needed for Pain.     ondansetron 4 MG tablet  Commonly known as: ZOFRAN  Take 1 tablet (4 mg total) by mouth every 6 (six) hours as needed for Nausea.        CONTINUE taking these medications    apixaban 5 mg Tab  Commonly known as: ELIQUIS  Take 1 tablet (5 mg total) by mouth 2 (two) times daily.     ascorbic acid (vitamin C) 500 MG tablet  Commonly known as: VITAMIN C  Take 1 tablet (500 mg total) by mouth 2 (two) times daily.     ferrous sulfate 325 (65 FE) MG EC tablet  Take 1 tablet (325 mg total) by mouth once daily.     folic acid-vit B6-vit B12 2.5-25-2 mg 2.5-25-2 mg Tab  Commonly known as: FOLBIC or Equiv  Take 1 tablet by mouth once daily.     metoprolol succinate 25 MG 24 hr tablet  Commonly known as: TOPROL-XL  Take 1 tablet (25 mg total) by mouth once daily.     psyllium husk (aspartame) 3.4 gram Pwpk packet  Commonly known as: METAMUCIL  Take 1 packet by mouth once daily.        STOP taking these medications    amiodarone 200 MG Tab  Commonly known as: PACERONE     amLODIPine 5 MG tablet  Commonly known as: NORVASC     loperamide 2 mg capsule  Commonly known as: IMODIUM        ASK your doctor about these medications    famotidine 20 MG tablet  Commonly known as: PEPCID  Take 1 tablet (20 mg total) by mouth 2 (two) times daily.          Time spent on the discharge of patient: 30 minutes    Elvie Parker,   General Surgery  [facility] ([facility])

## 2023-02-01 NOTE — PHYSICIAN QUERY
PT Name: Franky Masters  MR #: [id number]    DOCUMENTATION CLARIFICATION     CDS/: Shaunna Polanco RN, CDS              Contact information: [email or web address]    This form is a permanent document in the medical record.     Query Date: February 1, 2023    By submitting this query, we are merely seeking further clarification of documentation. Please utilize your independent clinical judgment when addressing the question(s) below.       The Medical Record contains the following:   Indicators   Supporting Clinical Findings Location in Medical Record   x Postoperative Ileus documented likely ileus     pt has ileus now.   Surgery PN 1/29    Cardiology Consult 1/29    x Subjective/Objective GI assessment  Patient had an episode of nausea and vomiting feels bloated.    He still feels bloated.  Some flatus but no  bowel movements.  Abdominal films consistent with dilated loops of small bowel.       There is distension     States he had four bowel movements and is passing a substantial amount of gas. No nausea or vomiting   Surgery PN 1/28      Surgery PN 1/29         Surgery PN 1/29     Surgery PN 1/30    x Radiology Reports FINDINGS:  The nasogastric tube is no longer identified.     There are degenerative changes of the lumbar spine.     There are a few mildly distended air-filled loops of bowel.  A few small scattered air-fluid levels are seen.  Possible low-grade ileus   Xray Abdomen 1/29   x Surgical Procedure Procedure: Laparoscopic-assisted ileostomy closure, extensive lysis of adhesions, transversus abdominis plane block, fecal disimpaction   Op Note 1/26    x Treatment/Medication Will return to NPO and IV fluids    Increase activity, ambulate, up to chair Surgery PN 1/28     Surgery PN 1/29    Other           Please further specify the ileus diagnosis:    [   ] Paralytic/Adynamic ileus, complication of surgery    [  x ] Paralytic/Adynamic ileus -- occurring in the post-operative period but not a  complication of the surgery    [   ] Other clarification (please specify): _____________     Please document in your progress notes daily for the duration of treatment, until resolved, and include in your discharge summary.       Form No. [id number]

## 2023-02-01 NOTE — PHYSICIAN QUERY
PT Name: Franky Masters  MR #: [id number]     DOCUMENTATION CLARIFICATION     CDS/: Shaunna Polanco RN, CDS               Contact information: [email or web address]    This form is a permanent document in the medical record.     Query Date: February 1, 2023    By submitting this query, we are merely seeking further clarification of documentation.  Please utilize your independent clinical judgment when addressing the question(s) below.    The Medical Record contains the following   Indicators Supporting Clinical Findings Location in Medical Record   x Heart Failure documented Chronic combined systolic and diastolic congestive heart failure  Cont IV diuresis post op weight gain due to fluids  Diuresing well  Keep K > 4, Mg > 2  Pt had not been taking cardiac meds preop- follow up with Dr. Maravilla post DC  Consider starting Entresto     Cardiology Consult 1/29    x    Labs 1/29    x EF/Echo The left ventricle is normal in size with eccentric hypertrophy and moderately decreased systolic function.  Mild left atrial enlargement.  The estimated ejection fraction is 30%.  Grade I left ventricular diastolic dysfunction.  Normal right ventricular size with normal right ventricular systolic function.  Mild mitral regurgitation.  There are segmental left ventricular wall motion abnormalities.  Mild tricuspid regurgitation.  Normal central venous pressure (3 mmHg).  The estimated PA systolic pressure is 31 mmHg.   Echo 1/20/23    x Radiology findings Impression:   No acute findings. CXR 1/29        x Subjective/Objective Respiratory Conditions  Positive for dyspnea on exertion, leg swelling, orthopnea and paroxysmal nocturnal dyspnea.      Positive for shortness of breath Cardiology Consult 1/29       Cardiology Consult 1/29     Recent/Current MI      Heart Transplant, LVAD     x Edema, JVD  Right lower leg: Edema present.     Left lower leg: Edema present.  Cardiology Consult 1/29     Ascites     x Diuretics/Meds  furosemide injection 40 mg      MAR 1/29- 1/31     Other Treatment      Other       Heart failure is a clinical diagnosis which includes symptomatic fluid retention, elevated intracardiac pressures, and/or the inability of the heart to deliver adequate blood flow.    Heart Failure with reduced Ejection Fraction (HFrEF) or Systolic Heart Failure (loses ability to contract normally, EF is <40%)    Heart Failure with preserved Ejection Fraction (HFpEF) or Diastolic Heart Failure (stiff ventricles, does not relax properly, EF is >50%)     Heart Failure with Combined Systolic and Diastolic Failure (stiff ventricles, does not relax properly and EF is <50%)    Mid-range or mildly reduced ejection fraction (HFmrEF) is classified as systolic heart failure.  Congestive heart failure with a recovered EF is classified as Diastolic Heart Failure.  Common clues to acute exacerbation:  Rapidly progressive symptoms (w/in 2 weeks of presentation), using IV diuretics, using supplemental O2, pulmonary edema on Xray, new or worsening pleural effusion, +JVD or other signs of volume overload, MI w/in 4 weeks, and/or BNP >500  The clinical guidelines noted are only system guidelines, and do not replace the providers clinical judgment.    Provider, please clarify the acuity/chronicity of CHF.    [  x ]  Acute on Chronic Combined Systolic and Diastolic Heart Failure - worsening of CHF signs/symptoms in preexisting CHF   [   ]  Chronic Combined Systolic and Diastolic Heart Failure - pre-existing and stable   [   ]  Other clarification (please specify): __________________________________       Please document in your progress notes daily for the duration of treatment until resolved and include in your discharge summary.    References:  American Heart Association editorial staff. (2017, May). Ejection Fraction Heart Failure Measurement. American Heart Association.  https://www.heart.org/en/health-topics/heart-failure/diagnosing-heart-failure/ejection-fraction-heart-failure-measurement#:~:text=Ejection%20fraction%20(EF)%20is%20a,pushed%20out%20with%20each%20heartbeat  PITO Brock (2020, December 15). Heart failure with preserved ejection fraction: Clinical manifestations and diagnosis. [employer]. https://www.[place]/contents/heart-failure-with-preserved-ejection-fraction-clinical-manifestations-and-diagnosis.  ICD-10-CM/PCS Coding Clinic Third Quarter ICD-10, Effective with discharges: September 8, 2020 [facility] § Heart failure with mid-range or mildly reduced ejection fraction (2020).  ICD-10-CM/PCS Coding Clinic Third Quarter ICD-10, Effective with discharges: September 8, 2020 [facility] § Heart failure with recovered ejection fraction (2020).  Form No. [id number]

## 2023-02-01 NOTE — PROGRESS NOTES
Notified by nursing staff that patient is requesting to be discharged tonight or will leave AMA. VSS, labwork acceptable, tolerating a diet, having good bowel function, ambulating without issues. Will discharge home tonight. Follow up in office with me. Follow up with cardiology for his afib.

## 2023-02-01 NOTE — PHYSICIAN QUERY
PT Name: Franky Masters  MR #: [id number]     DOCUMENTATION CLARIFICATION     CDS/: Shaunna Polanco RN, CDS               Contact information: [email or web address]    This form is a permanent document in the medical record.     Query Date: February 1, 2023    By submitting this query, we are merely seeking further clarification of documentation.  Please utilize your independent clinical judgment when addressing the question(s) below.    The Medical Record contains the following   Indicators Supporting Clinical Findings Location in Medical Record   x Heart Failure documented Chronic combined systolic and diastolic congestive heart failure  Cont IV diuresis post op weight gain due to fluids  Diuresing well  Keep K > 4, Mg > 2  Pt had not been taking cardiac meds preop- follow up with Dr. Maravilla post DC  Consider starting Entresto     Cardiology Consult 1/29    x    Labs 1/29    x EF/Echo The left ventricle is normal in size with eccentric hypertrophy and moderately decreased systolic function.  Mild left atrial enlargement.  The estimated ejection fraction is 30%.  Grade I left ventricular diastolic dysfunction.  Normal right ventricular size with normal right ventricular systolic function.  Mild mitral regurgitation.  There are segmental left ventricular wall motion abnormalities.  Mild tricuspid regurgitation.  Normal central venous pressure (3 mmHg).  The estimated PA systolic pressure is 31 mmHg.   Echo 1/20/23    x Radiology findings Impression:   No acute findings. CXR 1/29        x Subjective/Objective Respiratory Conditions  Positive for dyspnea on exertion, leg swelling, orthopnea and paroxysmal nocturnal dyspnea.      Positive for shortness of breath Cardiology Consult 1/29       Cardiology Consult 1/29     Recent/Current MI      Heart Transplant, LVAD     x Edema, JVD  Right lower leg: Edema present.     Left lower leg: Edema present.  Cardiology Consult 1/29     Ascites     x Diuretics/Meds  furosemide injection 40 mg      MAR 1/29- 1/31     Other Treatment      Other       Heart failure is a clinical diagnosis which includes symptomatic fluid retention, elevated intracardiac pressures, and/or the inability of the heart to deliver adequate blood flow.    Heart Failure with reduced Ejection Fraction (HFrEF) or Systolic Heart Failure (loses ability to contract normally, EF is <40%)    Heart Failure with preserved Ejection Fraction (HFpEF) or Diastolic Heart Failure (stiff ventricles, does not relax properly, EF is >50%)     Heart Failure with Combined Systolic and Diastolic Failure (stiff ventricles, does not relax properly and EF is <50%)    Mid-range or mildly reduced ejection fraction (HFmrEF) is classified as systolic heart failure.  Congestive heart failure with a recovered EF is classified as Diastolic Heart Failure.  Common clues to acute exacerbation:  Rapidly progressive symptoms (w/in 2 weeks of presentation), using IV diuretics, using supplemental O2, pulmonary edema on Xray, new or worsening pleural effusion, +JVD or other signs of volume overload, MI w/in 4 weeks, and/or BNP >500  The clinical guidelines noted are only system guidelines, and do not replace the providers clinical judgment.    Provider, please clarify the acuity/chronicity of CHF.    [   ]  Acute on Chronic Combined Systolic and Diastolic Heart Failure - worsening of CHF signs/symptoms in preexisting CHF   [   ]  Chronic Combined Systolic and Diastolic Heart Failure - pre-existing and stable   [  x ]  Other clarification (please specify): ________Please defer to treating cardiology physician___________________________       Please document in your progress notes daily for the duration of treatment until resolved and include in your discharge summary.    References:  American Heart Association editorial staff. (2017, May). Ejection Fraction Heart Failure Measurement. American Heart Association.  https://www.heart.org/en/health-topics/heart-failure/diagnosing-heart-failure/ejection-fraction-heart-failure-measurement#:~:text=Ejection%20fraction%20(EF)%20is%20a,pushed%20out%20with%20each%20heartbeat  PITO Brock (2020, December 15). Heart failure with preserved ejection fraction: Clinical manifestations and diagnosis. [employer]. https://www.[employer]/contents/heart-failure-with-preserved-ejection-fraction-clinical-manifestations-and-diagnosis.  ICD-10-CM/PCS Coding Clinic Third Quarter ICD-10, Effective with discharges: September 8, 2020 [facility] § Heart failure with mid-range or mildly reduced ejection fraction (2020).  ICD-10-CM/PCS Coding Clinic Third Quarter ICD-10, Effective with discharges: September 8, 2020 [facility] § Heart failure with recovered ejection fraction (2020).  Form No. 57007

## 2023-02-01 NOTE — PLAN OF CARE
A248/A248 CHAN  Franky Masters is a 67 y.o.male admitted on 1/26/2023 for S/P closure of ileostomy   Code Status: Prior MRN: [id number]   Review of patient's allergies indicates:  No Known Allergies  Past Medical History:   Diagnosis Date    Cancer     Diverticulitis     Encounter for blood transfusion     Liver disease     Tumors    Supplemental oxygen dependent     only during Covid 2021      PRN meds    sodium chloride 0.9%, , PRN  acetaminophen, 650 mg, Q6H PRN  heparin (PORCINE), 30 Units/kg (Adjusted), PRN  heparin (PORCINE), 60 Units/kg (Adjusted), PRN  hydrALAZINE, 10 mg, Q6H PRN  HYDROcodone-acetaminophen, 1 tablet, Q6H PRN  HYDROmorphone, 1 mg, Q4H PRN  methocarbamoL, 1,000 mg, TID PRN  ondansetron, 4 mg, Q6H PRN  promethazine (PHENERGAN) IVPB, 25 mg, Q6H PRN  simethicone, 1 tablet, TID PRN      Pt cardiology meds changed to PO d/t possible DC tomorrow. Pt refused certain meds. He stated he wasn't taking eliquis, MD rec continue heparin drip until discharge. Therapeutic APTT-- draw daily. Appetite increased this shift. Hourly rounding completed. Chart check completed. Will continue plan of care.      Orientation: oriented x 4        Lead Monitored: V1 Rhythm: normal sinus rhythm Frequency/Ectopy: frequent, PVCs  Cardiac/Telemetry Box Number: [id number]    Last Bowel Movement: 01/30/23  Diet low fiber/residue  Voiding Characteristics: voids spontaneously without difficulty  Laith Score: 20  Fall Risk Score: 13       Lines/Drains/Airways       Peripheral Intravenous Line  Duration                  Peripheral IV - Single Lumen 01/29/23 1005 20 G Anterior;Left;Proximal Forearm 2 days

## 2023-02-01 NOTE — PLAN OF CARE
Patient received education on discharge instructions. Informed about medications and appointments. PIV taken out. Heart monitor removed and returned. Discharged home with daughter. Wheeled down in wheelchair with belongings. Pt AAOx4 and stable.

## 2023-02-10 ENCOUNTER — OFFICE VISIT (OUTPATIENT)
Dept: SURGERY | Facility: CLINIC | Age: 68
End: 2023-02-10
Payer: MEDICARE

## 2023-02-10 VITALS — WEIGHT: 208.31 LBS | BODY MASS INDEX: 30.77 KG/M2

## 2023-02-10 DIAGNOSIS — C18.9 ADENOCARCINOMA OF COLON METASTATIC TO LIVER: ICD-10-CM

## 2023-02-10 DIAGNOSIS — C78.7 ADENOCARCINOMA OF COLON METASTATIC TO LIVER: ICD-10-CM

## 2023-02-10 DIAGNOSIS — Z98.890 S/P CLOSURE OF ILEOSTOMY: Primary | ICD-10-CM

## 2023-02-10 PROCEDURE — 1126F AMNT PAIN NOTED NONE PRSNT: CPT | Mod: CPTII,S$GLB,, | Performed by: SURGERY

## 2023-02-10 PROCEDURE — 99999 PR PBB SHADOW E&M-EST. PATIENT-LVL III: ICD-10-PCS | Mod: PBBFAC,,, | Performed by: SURGERY

## 2023-02-10 PROCEDURE — 99024 PR POST-OP FOLLOW-UP VISIT: ICD-10-PCS | Mod: S$GLB,,, | Performed by: SURGERY

## 2023-02-10 PROCEDURE — 99024 POSTOP FOLLOW-UP VISIT: CPT | Mod: S$GLB,,, | Performed by: SURGERY

## 2023-02-10 PROCEDURE — 99999 PR PBB SHADOW E&M-EST. PATIENT-LVL III: CPT | Mod: PBBFAC,,, | Performed by: SURGERY

## 2023-02-10 PROCEDURE — 3008F BODY MASS INDEX DOCD: CPT | Mod: CPTII,S$GLB,, | Performed by: SURGERY

## 2023-02-10 PROCEDURE — 1126F PR PAIN SEVERITY QUANTIFIED, NO PAIN PRESENT: ICD-10-PCS | Mod: CPTII,S$GLB,, | Performed by: SURGERY

## 2023-02-10 PROCEDURE — 3008F PR BODY MASS INDEX (BMI) DOCUMENTED: ICD-10-PCS | Mod: CPTII,S$GLB,, | Performed by: SURGERY

## 2023-03-07 ENCOUNTER — HOSPITAL ENCOUNTER (EMERGENCY)
Facility: HOSPITAL | Age: 68
Discharge: HOME | End: 2023-03-07
Attending: FAMILY MEDICINE
Payer: MEDICARE

## 2023-03-07 ENCOUNTER — TELEPHONE (OUTPATIENT)
Dept: SURGERY | Facility: CLINIC | Age: 68
End: 2023-03-07
Payer: MEDICARE

## 2023-03-07 VITALS
SYSTOLIC BLOOD PRESSURE: 140 MMHG | DIASTOLIC BLOOD PRESSURE: 92 MMHG | WEIGHT: 205.69 LBS | HEART RATE: 72 BPM | BODY MASS INDEX: 30.38 KG/M2 | TEMPERATURE: 97 F | OXYGEN SATURATION: 98 % | RESPIRATION RATE: 18 BRPM

## 2023-03-07 DIAGNOSIS — C78.7 COLON CANCER METASTASIZED TO LIVER: ICD-10-CM

## 2023-03-07 DIAGNOSIS — R10.11 RIGHT UPPER QUADRANT ABDOMINAL PAIN: ICD-10-CM

## 2023-03-07 DIAGNOSIS — C78.7 LIVER METASTASES: Primary | ICD-10-CM

## 2023-03-07 DIAGNOSIS — C18.9 COLON CANCER METASTASIZED TO LIVER: ICD-10-CM

## 2023-03-07 LAB
ALBUMIN SERPL BCP-MCNC: 1.9 G/DL (ref 3.5–5.2)
ALP SERPL-CCNC: 364 U/L (ref 55–135)
ALT SERPL W/O P-5'-P-CCNC: 40 U/L (ref 10–44)
ANION GAP SERPL CALC-SCNC: 12 MMOL/L (ref 8–16)
AST SERPL-CCNC: 80 U/L (ref 10–40)
BACTERIA #/AREA URNS HPF: NORMAL /HPF
BASOPHILS # BLD AUTO: 0.06 K/UL (ref 0–0.2)
BASOPHILS NFR BLD: 0.6 % (ref 0–1.9)
BILIRUB SERPL-MCNC: 1.5 MG/DL (ref 0.1–1)
BILIRUB UR QL STRIP: NEGATIVE
BUN SERPL-MCNC: 15 MG/DL (ref 8–23)
CALCIUM SERPL-MCNC: 8.5 MG/DL (ref 8.7–10.5)
CHLORIDE SERPL-SCNC: 102 MMOL/L (ref 95–110)
CLARITY UR: CLEAR
CO2 SERPL-SCNC: 21 MMOL/L (ref 23–29)
COLOR UR: YELLOW
CREAT SERPL-MCNC: 0.9 MG/DL (ref 0.5–1.4)
DIFFERENTIAL METHOD: ABNORMAL
EOSINOPHIL # BLD AUTO: 0.1 K/UL (ref 0–0.5)
EOSINOPHIL NFR BLD: 1.2 % (ref 0–8)
ERYTHROCYTE [DISTWIDTH] IN BLOOD BY AUTOMATED COUNT: 17.2 % (ref 11.5–14.5)
EST. GFR  (NO RACE VARIABLE): >60 ML/MIN/1.73 M^2
GLUCOSE SERPL-MCNC: 83 MG/DL (ref 70–110)
GLUCOSE UR QL STRIP: NEGATIVE
HCT VFR BLD AUTO: 37.8 % (ref 40–54)
HGB BLD-MCNC: 12 G/DL (ref 14–18)
HGB UR QL STRIP: NEGATIVE
IMM GRANULOCYTES # BLD AUTO: 0.12 K/UL (ref 0–0.04)
IMM GRANULOCYTES NFR BLD AUTO: 1.1 % (ref 0–0.5)
KETONES UR QL STRIP: NEGATIVE
LEUKOCYTE ESTERASE UR QL STRIP: ABNORMAL
LIPASE SERPL-CCNC: 36 U/L (ref 4–60)
LYMPHOCYTES # BLD AUTO: 1.6 K/UL (ref 1–4.8)
LYMPHOCYTES NFR BLD: 15.6 % (ref 18–48)
MCH RBC QN AUTO: 30.8 PG (ref 27–31)
MCHC RBC AUTO-ENTMCNC: 31.7 G/DL (ref 32–36)
MCV RBC AUTO: 97 FL (ref 82–98)
MICROSCOPIC COMMENT: NORMAL
MONOCYTES # BLD AUTO: 1 K/UL (ref 0.3–1)
MONOCYTES NFR BLD: 9.9 % (ref 4–15)
NEUTROPHILS # BLD AUTO: 7.6 K/UL (ref 1.8–7.7)
NEUTROPHILS NFR BLD: 71.6 % (ref 38–73)
NITRITE UR QL STRIP: NEGATIVE
NRBC BLD-RTO: 0 /100 WBC
PH UR STRIP: 5 [PH] (ref 5–8)
PLATELET # BLD AUTO: 493 K/UL (ref 150–450)
PMV BLD AUTO: 10.4 FL (ref 9.2–12.9)
POTASSIUM SERPL-SCNC: 4.1 MMOL/L (ref 3.5–5.1)
PROT SERPL-MCNC: 7 G/DL (ref 6–8.4)
PROT UR QL STRIP: ABNORMAL
RBC # BLD AUTO: 3.9 M/UL (ref 4.6–6.2)
RBC #/AREA URNS HPF: 0 /HPF (ref 0–4)
SODIUM SERPL-SCNC: 135 MMOL/L (ref 136–145)
SP GR UR STRIP: 1.01 (ref 1–1.03)
URN SPEC COLLECT METH UR: ABNORMAL
UROBILINOGEN UR STRIP-ACNC: ABNORMAL EU/DL
WBC # BLD AUTO: 10.54 K/UL (ref 3.9–12.7)
WBC #/AREA URNS HPF: 4 /HPF (ref 0–5)
WBC CLUMPS URNS QL MICRO: NORMAL

## 2023-03-07 PROCEDURE — 25500020 PHARM REV CODE 255: Performed by: FAMILY MEDICINE

## 2023-03-07 PROCEDURE — 85025 COMPLETE CBC W/AUTO DIFF WBC: CPT | Performed by: NURSE PRACTITIONER

## 2023-03-07 PROCEDURE — 80053 COMPREHEN METABOLIC PANEL: CPT | Performed by: NURSE PRACTITIONER

## 2023-03-07 PROCEDURE — 81000 URINALYSIS NONAUTO W/SCOPE: CPT | Performed by: NURSE PRACTITIONER

## 2023-03-07 PROCEDURE — 83690 ASSAY OF LIPASE: CPT | Performed by: NURSE PRACTITIONER

## 2023-03-07 PROCEDURE — 99285 EMERGENCY DEPT VISIT HI MDM: CPT | Mod: 25

## 2023-03-07 RX ORDER — HYDROCODONE BITARTRATE AND ACETAMINOPHEN 10; 325 MG/1; MG/1
1 TABLET ORAL EVERY 4 HOURS PRN
Qty: 18 TABLET | Refills: 0 | Status: SHIPPED | OUTPATIENT
Start: 2023-03-07 | End: 2023-03-15

## 2023-03-07 RX ADMIN — IOHEXOL 100 ML: 350 INJECTION, SOLUTION INTRAVENOUS at 06:03

## 2023-03-07 NOTE — PHARMACY MED REC
"Admission Medication History     The home medication history was taken by Mic Blanton.    You may go to "Admission" then "Reconcile Home Medications" tabs to review and/or act upon these items.     The home medication list has been updated by the Pharmacy department.   Please read ALL comments highlighted in yellow.   Please address this information as you see fit.    Feel free to contact us if you have any questions or require assistance.      Medications listed below were obtained from: Analytic software- [employer] and Medical records  (Not in a hospital admission)      Mic Blanton  [id number]    Current Outpatient Medications on File Prior to Encounter   Medication Sig Dispense Refill Last Dose    apixaban (ELIQUIS) 5 mg Tab Take 1 tablet (5 mg total) by mouth 2 (two) times daily. (Patient not taking: Reported on 2/10/2023) 60 tablet 3 Unknown    ascorbic acid, vitamin C, (VITAMIN C) 500 MG tablet Take 1 tablet (500 mg total) by mouth 2 (two) times daily.   Unknown    diltiaZEM (CARDIZEM) 60 MG tablet Take 1 tablet (60 mg total) by mouth every 12 (twelve) hours. (Patient not taking: Reported on 2/10/2023) 60 tablet 11 Unknown    famotidine (PEPCID) 20 MG tablet Take 1 tablet (20 mg total) by mouth 2 (two) times daily. (Patient not taking: Reported on 1/19/2023) 60 tablet 11 Unknown    ferrous sulfate 325 (65 FE) MG EC tablet Take 1 tablet (325 mg total) by mouth once daily. 60 tablet 1 Unknown    folic acid-vit B6-vit B12 2.5-25-2 mg (FOLBIC OR EQUIV) 2.5-25-2 mg Tab Take 1 tablet by mouth once daily. 60 tablet 0 Unknown    HYDROcodone-acetaminophen (NORCO) 7.5-325 mg per tablet Take 1 tablet by mouth every 4 to 6 hours as needed for Pain. (Patient not taking: Reported on 2/10/2023) 25 tablet 0 Unknown    metoprolol succinate (TOPROL-XL) 25 MG 24 hr tablet Take 1 tablet (25 mg total) by mouth once daily. (Patient not taking: Reported on 2/10/2023) 30 tablet 11 Unknown    psyllium husk, aspartame, " (METAMUCIL) 3.4 gram PwPk packet Take 1 packet by mouth once daily. 30 packet 1 Unknown                           .

## 2023-03-07 NOTE — FIRST PROVIDER EVALUATION
Medical screening examination initiated.  I have conducted a focused provider triage encounter, findings are as follows:    Brief history of present illness:  reports upper abdominal pain. Sent by md    There were no vitals filed for this visit.    Pertinent physical exam:  nad    Brief workup plan:  labs, further eval    Preliminary workup initiated; this workup will be continued and followed by the physician or advanced practice provider that is assigned to the patient when roomed.

## 2023-03-07 NOTE — TELEPHONE ENCOUNTER
Returned patient's callback. Patient's spouse picked up the phone. She states that patient is experiencing a sharp pain in his right side along with decreased urine output. She states he denies any severe symptoms like fever/nausea and shortness of breath/chest pain. Patient would like an ultrasound to be done if possible. Notified patient's spouse that he would need to be seen in clinic for an examination before any imaging can be scheduled. Notified patient's spouse that he can also head to the ER for a work-up and possible imaging. Patient is aware that he has a kidney stone and could be possibility of pain. They will head to the ER for a work-up. She verbalized understanding.

## 2023-03-08 NOTE — ED PROVIDER NOTES
SCRIBE #1 NOTE: I, Sharifa Lange, am scribing for, and in the presence of, oLrri Seo MD. I have scribed the entire note.       History     Chief Complaint   Patient presents with    Abdominal Pain     R sided abd pain, one episode vomiting. Pain better when bending over. Onset 1 week ago.     Review of patient's allergies indicates:  No Known Allergies      History of Present Illness     HPI    3/7/2023, 6:04 PM  History obtained from the patient      History of Present Illness: Franky Masters is a 67 y.o. male patient with a PMHx of diverticulitis, and liver disease who presents to the Emergency Department for evaluation of abd pain which onset 1 week PTA. Symptoms are constant and moderate in severity. The pain is on the right side and is relieved with bending over.  Associated sxs include one episode of emesis. Patient denies any fever, chills, diarrhea, dizziness, headache, and all other sxs at this time. No prior Tx reported. No further complaints or concerns at this time.       Arrival mode: Personal vehicle     PCP: HOLLY ROSEN        Past Medical History:  Past Medical History:   Diagnosis Date    Cancer     Diverticulitis     Encounter for blood transfusion     Liver disease     Tumors    Supplemental oxygen dependent     only during Covid 2021       Past Surgical History:  Past Surgical History:   Procedure Laterality Date    ABDOMINAL WASHOUT  5/7/2022    Procedure: LAVAGE, PERITONEAL, THERAPEUTIC;  Surgeon: Elvie Parker DO;  Location: [facility] OR;  Service: General;;    ABDOMINAL WASHOUT  5/7/2022    Procedure: ;  Surgeon: Elvie Parker DO;  Location: [facility] OR;  Service: General;;    APPLICATION OF WOUND VACUUM-ASSISTED CLOSURE DEVICE N/A 5/4/2022    Procedure: APPLICATION, WOUND VAC;  Surgeon: Elvie Parker DO;  Location: [facility] OR;  Service: General;  Laterality: N/A;    FECAL IMPACTION REMOVAL N/A 1/26/2023    Procedure: REMOVAL, FECES, IMPACTED;  Surgeon: Elvie FLORES  DO Elena;  Location: [facility] OR;  Service: General;  Laterality: N/A;    ILEOSTOMY N/A 5/11/2022    Procedure: CREATION, ILEOSTOMY;  Surgeon: Elvie Parkre DO;  Location: [facility] OR;  Service: General;  Laterality: N/A;    INJECTION OF ANESTHETIC AGENT INTO TISSUE PLANE DEFINED BY TRANSVERSUS ABDOMINIS MUSCLE N/A 1/26/2023    Procedure: BLOCK, TRANSVERSUS ABDOMINIS PLANE;  Surgeon: Elvie Parker DO;  Location: [facility] OR;  Service: General;  Laterality: N/A;    LAPAROSCOPIC CLOSURE OF COLOSTOMY N/A 1/26/2023    Procedure: CLOSURE, COLOSTOMY, LAPAROSCOPIC;  Surgeon: Elvie Parker DO;  Location: [facility] OR;  Service: General;  Laterality: N/A;  never went robotic    LAPAROSCOPIC LYSIS OF ADHESIONS N/A 1/26/2023    Procedure: LYSIS, ADHESIONS, LAPAROSCOPIC;  Surgeon: Elvie Parker DO;  Location: [facility] OR;  Service: General;  Laterality: N/A;    LAPAROTOMY N/A 5/7/2022    Procedure: LAPAROTOMY;  Surgeon: Elvie Parker DO;  Location: [facility] OR;  Service: General;  Laterality: N/A;    LIVER BIOPSY Right 5/11/2022    Procedure: BIOPSY, LIVER;  Surgeon: Elvie Parker DO;  Location: [facility] OR;  Service: General;  Laterality: Right;    RIGHT HEMICOLECTOMY N/A 5/11/2022    Procedure: HEMICOLECTOMY, RIGHT;  Surgeon: Elvie Parker DO;  Location: [facility] OR;  Service: General;  Laterality: N/A;         Family History:  History reviewed. No pertinent family history.    Social History:  Social History     Tobacco Use    Smoking status: Former    Smokeless tobacco: Never   Substance and Sexual Activity    Alcohol use: Never    Drug use: Never    Sexual activity: Not on file        Review of Systems     Review of Systems   Constitutional:  Negative for chills and fever.   HENT:  Negative for sore throat.    Respiratory:  Negative for shortness of breath.    Cardiovascular:  Negative for chest pain.   Gastrointestinal:  Positive for abdominal pain and vomiting. Negative for diarrhea and nausea.    Genitourinary:  Negative for dysuria.   Musculoskeletal:  Negative for back pain.   Skin:  Negative for rash.   Neurological:  Negative for dizziness, weakness and headaches.   Hematological:  Does not bruise/bleed easily.   All other systems reviewed and are negative.     Physical Exam     Initial Vitals [03/07/23 1153]   BP Pulse Resp Temp SpO2   120/74 65 18 97.4 °F (36.3 °C) 98 %      MAP       --          Physical Exam  Nursing Notes and Vital Signs Reviewed.  Constitutional: Patient is in no acute distress. Well-developed and well-nourished.  Head: Atraumatic. Normocephalic.  Eyes: PERRL. EOM intact. Conjunctivae are not pale. No scleral icterus.  ENT: Mucous membranes are moist. Oropharynx is clear and symmetric.    Neck: Supple. Full ROM. No lymphadenopathy.  Cardiovascular: Regular rate. Regular rhythm. No murmurs, rubs, or gallops. Distal pulses are 2+ and symmetric.  Pulmonary/Chest: No respiratory distress. Clear to auscultation bilaterally. No wheezing or rales.  Abdominal: Soft and non-distended.  There is no tenderness.  No rebound, guarding, or rigidity. Good bowel sounds.  Genitourinary: Right CVA tenderness  Musculoskeletal: Moves all extremities. No obvious deformities. No edema. No calf tenderness.  Skin: Warm and dry. Well-healing stoma on RLQ  Neurological:  Alert, awake, and appropriate.  Normal speech.  No acute focal neurological deficits are appreciated.  Psychiatric: Normal affect. Good eye contact. Appropriate in content.     ED Course   Critical Care    Date/Time: 3/7/2023 7:24 PM  Performed by: Lorri Seo MD  Authorized by: Lorri Seo MD   Direct patient critical care time: 30 minutes  Additional history critical care time: 5 minutes  Ordering / reviewing critical care time: 5 minutes  Documentation critical care time: 5 minutes  Other critical care time: 15 minutes  Total critical care time (exclusive of procedural time) : 60 minutes  Critical care time was exclusive  of separately billable procedures and treating other patients and teaching time.  Critical care was necessary to treat or prevent imminent or life-threatening deterioration of the following conditions: liver metastisis.  Critical care was time spent personally by me on the following activities: blood draw for specimens, development of treatment plan with patient or surrogate, discussions with consultants, interpretation of cardiac output measurements, evaluation of patient's response to treatment, examination of patient, obtaining history from patient or surrogate, ordering and performing treatments and interventions, ordering and review of laboratory studies, ordering and review of radiographic studies, pulse oximetry, re-evaluation of patient's condition and review of old charts.      ED Vital Signs:  Vitals:    03/07/23 1153 03/07/23 1935   BP: 120/74 (!) 140/92   Pulse: 65 72   Resp: 18    Temp: 97.4 °F (36.3 °C)    TempSrc: Oral    SpO2: 98%    Weight: 93.3 kg (205 lb 11 oz)        Abnormal Lab Results:  Labs Reviewed   CBC W/ AUTO DIFFERENTIAL - Abnormal; Notable for the following components:       Result Value    RBC 3.90 (*)     Hemoglobin 12.0 (*)     Hematocrit 37.8 (*)     MCHC 31.7 (*)     RDW 17.2 (*)     Platelets 493 (*)     Immature Granulocytes 1.1 (*)     Immature Grans (Abs) 0.12 (*)     Lymph % 15.6 (*)     All other components within normal limits   COMPREHENSIVE METABOLIC PANEL - Abnormal; Notable for the following components:    Sodium 135 (*)     CO2 21 (*)     Calcium 8.5 (*)     Albumin 1.9 (*)     Total Bilirubin 1.5 (*)     Alkaline Phosphatase 364 (*)     AST 80 (*)     All other components within normal limits   URINALYSIS, REFLEX TO URINE CULTURE - Abnormal; Notable for the following components:    Protein, UA Trace (*)     Urobilinogen, UA 2.0-3.0 (*)     Leukocytes, UA Trace (*)     All other components within normal limits    Narrative:     Specimen Source->Urine   LIPASE    URINALYSIS MICROSCOPIC    Narrative:     Specimen Source->Urine        All Lab Results:  Results for orders placed or performed during the hospital encounter of 03/07/23   CBC auto differential   Result Value Ref Range    WBC 10.54 3.90 - 12.70 K/uL    RBC 3.90 (L) 4.60 - 6.20 M/uL    Hemoglobin 12.0 (L) 14.0 - 18.0 g/dL    Hematocrit 37.8 (L) 40.0 - 54.0 %    MCV 97 82 - 98 fL    MCH 30.8 27.0 - 31.0 pg    MCHC 31.7 (L) 32.0 - 36.0 g/dL    RDW 17.2 (H) 11.5 - 14.5 %    Platelets 493 (H) 150 - 450 K/uL    MPV 10.4 9.2 - 12.9 fL    Immature Granulocytes 1.1 (H) 0.0 - 0.5 %    Gran # (ANC) 7.6 1.8 - 7.7 K/uL    Immature Grans (Abs) 0.12 (H) 0.00 - 0.04 K/uL    Lymph # 1.6 1.0 - 4.8 K/uL    Mono # 1.0 0.3 - 1.0 K/uL    Eos # 0.1 0.0 - 0.5 K/uL    Baso # 0.06 0.00 - 0.20 K/uL    nRBC 0 0 /100 WBC    Gran % 71.6 38.0 - 73.0 %    Lymph % 15.6 (L) 18.0 - 48.0 %    Mono % 9.9 4.0 - 15.0 %    Eosinophil % 1.2 0.0 - 8.0 %    Basophil % 0.6 0.0 - 1.9 %    Differential Method Automated    Comprehensive metabolic panel   Result Value Ref Range    Sodium 135 (L) 136 - 145 mmol/L    Potassium 4.1 3.5 - 5.1 mmol/L    Chloride 102 95 - 110 mmol/L    CO2 21 (L) 23 - 29 mmol/L    Glucose 83 70 - 110 mg/dL    BUN 15 8 - 23 mg/dL    Creatinine 0.9 0.5 - 1.4 mg/dL    Calcium 8.5 (L) 8.7 - 10.5 mg/dL    Total Protein 7.0 6.0 - 8.4 g/dL    Albumin 1.9 (L) 3.5 - 5.2 g/dL    Total Bilirubin 1.5 (H) 0.1 - 1.0 mg/dL    Alkaline Phosphatase 364 (H) 55 - 135 U/L    AST 80 (H) 10 - 40 U/L    ALT 40 10 - 44 U/L    Anion Gap 12 8 - 16 mmol/L    eGFR >60 >60 mL/min/1.73 m^2   Lipase   Result Value Ref Range    Lipase 36 4 - 60 U/L   Urinalysis, Reflex to Urine Culture Urine, Clean Catch    Specimen: Urine   Result Value Ref Range    Specimen UA Urine, Clean Catch     Color, UA Yellow Yellow, Straw, Keily    Appearance, UA Clear Clear    pH, UA 5.0 5.0 - 8.0    Specific Gravity, UA 1.010 1.005 - 1.030    Protein, UA Trace (A) Negative    Glucose,  UA Negative Negative    Ketones, UA Negative Negative    Bilirubin (UA) Negative Negative    Occult Blood UA Negative Negative    Nitrite, UA Negative Negative    Urobilinogen, UA 2.0-3.0 (A) <2.0 EU/dL    Leukocytes, UA Trace (A) Negative   Urinalysis Microscopic   Result Value Ref Range    RBC, UA 0 0 - 4 /hpf    WBC, UA 4 0 - 5 /hpf    WBC Clumps, UA Rare None-Rare    Bacteria Rare None-Occ /hpf    Microscopic Comment SEE COMMENT         Imaging Results:  Imaging Results              CT Abdomen Pelvis With Contrast (Final result)  Result time 03/07/23 19:05:40      Final result by Norma Murray MD (03/07/23 19:05:40)                   Narrative:    EXAMINATION:  CT ABDOMEN PELVIS WITH CONTRAST    CLINICAL HISTORY:  Abdominal pain, acute, nonlocalized;    TECHNIQUE:  Low dose axial images, sagittal and coronal reformations were obtained from the lung bases to the pubic symphysis following the IV administration of 100 mL of Omnipaque 350    Automated exposure technique utilized for diminishing radiation exposure iterative technique    COMPARISON:  09/12/2022    IMPRESSION  Hepatic masses have significantly enlarged, coalesced: Reference lesion right hepatic dome 15 cm axial image 34 and left lobe 14 cm axial image 49.  There is perihepatic ascites or stranding.  Lymphadenopathy has progressed periportal/portacaval reference node 45 mm X 25 mm axial image 78    Gallbladder contracted.  Biliary ducts distended    Pancreas unremarkable    Spleen similar appearance    Kidneys without hydronephrosis, left collecting system duplicated.  No adrenal gland mass    Pelvic enhancing lesion 22 X 24 mm axial image 165 similar size    No obstructive bowel findings.    Urinary bladder nondistended    Small volume ascites    Stable aortic ectasia    No aggressive bony change    Dependent pleural effusions small volume with subjacent atelectasis.  Subcentimeter right lung nodules      Electronically signed by: Norma Mendiola  Terri  Date:    03/07/2023  Time:    19:05                                              The Emergency Provider reviewed the vital signs and test results, which are outlined above.     ED Discussion     7:31 PM: Reassessed pt at this time. Discussed with pt all pertinent ED information and results. Discussed pt dx and plan of tx. Gave pt all f/u and return to the ED instructions. All questions and concerns were addressed at this time. Pt expresses understanding of information and instructions, and is comfortable with plan to discharge. Pt is stable for discharge.    I discussed with patient and/or family/caretaker that evaluation in the ED does not suggest any emergent or life threatening medical conditions requiring immediate intervention beyond what was provided in the ED, and I believe patient is safe for discharge.  Regardless, an unremarkable evaluation in the ED does not preclude the development or presence of a serious of life threatening condition. As such, patient was instructed to return immediately for any worsening or change in current symptoms.        Medical Decision Making:   Clinical Tests:   Lab Tests: Ordered and Reviewed  Radiological Study: Ordered and Reviewed         ED Medication(s):  Medications   iohexoL (OMNIPAQUE 350) injection 100 mL (100 mLs Intravenous Given 3/7/23 1830)       Discharge Medication List as of 3/7/2023  7:24 PM        START taking these medications    Details   HYDROcodone-acetaminophen (NORCO)  mg per tablet Take 1 tablet by mouth every 4 (four) hours as needed., Starting Tue 3/7/2023, Print              Follow-up Information       Schedule an appointment as soon as possible for a visit  with Elvie Parker DO.    Specialty: General Surgery  Why: As needed  Contact information:  [address] [address] [place] [id number] [phone]                                 Scribe Attestation:   Scribe #1: I performed the above scribed service and the documentation  accurately describes the services I performed. I attest to the accuracy of the note.     Attending:   Physician Attestation Statement for Scribe #1: I, Lorri Seo MD, personally performed the services described in this documentation, as scribed by Sharifa Lange, in my presence, and it is both accurate and complete.           Clinical Impression       ICD-10-CM ICD-9-CM   1. Liver metastases  C78.7 197.7   2. Right upper quadrant abdominal pain  R10.11 789.01   3. Colon cancer metastasized to liver  C18.9 153.9    C78.7 197.7       Disposition:   Disposition: Discharged  Condition: Stable      Lorri Seo MD  03/10/23 1601

## 2023-03-10 NOTE — ASSESSMENT & PLAN NOTE
SW received call from pt stating that her transportation was late to pick her up. Pt stated that she was going to call the ride services again and abruptly ended call with SW. SW sent message to OB MD to inform of possible late arrival, as well as [facility] PSR due to 2:00 US appointment.     *1:55 pm: SW tried to reach pt back to get an update about transportation. No answer and VM is full.    Vent Day # 11  Vent settings reviewed and adjusted to optimize gas exchange  VAP prophylaxis  ABG daily and prn to assess response to therapy  Weaning sedation as tolerated and daily SAT  SBT once more alert   Changed Propofol to Precedex infusion 5/12 for vent weaning

## 2023-03-15 ENCOUNTER — TELEPHONE (OUTPATIENT)
Dept: SURGERY | Facility: CLINIC | Age: 68
End: 2023-03-15
Payer: MEDICARE

## 2023-03-15 RX ORDER — HYDROCODONE BITARTRATE AND ACETAMINOPHEN 7.5; 325 MG/1; MG/1
1 TABLET ORAL
Qty: 30 TABLET | Refills: 0 | Status: SHIPPED | OUTPATIENT
Start: 2023-03-15 | End: 2023-04-09 | Stop reason: SDUPTHER

## 2023-03-15 NOTE — TELEPHONE ENCOUNTER
Called patient's daughter for follow-up. Brother picked up, aware of situation. Notified him that a refill on the pain medication has been sent to the preferred pharmacy. He verbalized understanding and will pass on that information.

## 2023-03-15 NOTE — TELEPHONE ENCOUNTER
Returned patient's daughter call back in regards to ER visit and pain medication. She states that the hospital discharged the patient with pain medications but patient had forgotten to get the physical script and so could not get the medication from the pharmacy. When patient called hospital they had already shredded the order since patient forgotten it. They advised patient to speak with Dr. Parker in regards to getting refill. Will forward message to Dr. Parker. Patient's daughter verbalized understanding.

## 2023-03-15 NOTE — TELEPHONE ENCOUNTER
----- Message from Sonia Black sent at 3/15/2023  9:32 AM CDT -----  Contact: Claudia Taylor called in regards to the Norco prescription from the pts ED visit. Please call her back at [phone].     Thanks  TS

## 2023-03-20 NOTE — ASSESSMENT & PLAN NOTE
Secondary to Peritonitis from bowel perforation with major fecal contamination  Blood and sputum cultures 5/4 Neg  S/P Zyvox  Continue Invanz and Micafungin per ID following  Weaned off Levophed infusion 5/15  ICU hemodynamic monitoring  Follow fever curve and WBC  Will change CL next 24-48 hours   .

## 2023-04-05 ENCOUNTER — TELEPHONE (OUTPATIENT)
Dept: SURGERY | Facility: CLINIC | Age: 68
End: 2023-04-05
Payer: MEDICARE

## 2023-04-05 RX ORDER — PROMETHAZINE HYDROCHLORIDE 25 MG/1
25 TABLET ORAL EVERY 6 HOURS PRN
Qty: 30 TABLET | Refills: 0 | Status: SHIPPED | OUTPATIENT
Start: 2023-04-05

## 2023-04-05 RX ORDER — ONDANSETRON 4 MG/1
4 TABLET, ORALLY DISINTEGRATING ORAL EVERY 6 HOURS PRN
Qty: 30 TABLET | Refills: 2 | Status: SHIPPED | OUTPATIENT
Start: 2023-04-05 | End: 2023-04-09 | Stop reason: SDUPTHER

## 2023-04-05 NOTE — TELEPHONE ENCOUNTER
Called and spoke with patients wife regarding medication. Informed patients wife Etienne Kennedy sent over medication for nausea to the [facility] pharmacy listed in his chart. Patients wife verbalized understanding.      ----- Message from Ivet Reyes PA-C sent at 4/5/2023 11:43 AM CDT -----  Contact: Patient wife  Can let her know I sent to pharmacy in chart, believe it was [facility] on [address]   ----- Message -----  From: Shannon Harp MA  Sent: 4/5/2023  10:57 AM CDT  To: Ivet Reyes PA-C      ----- Message -----  From: Rabia John  Sent: 4/5/2023  10:13 AM CDT  To: Elena Bermeo Staff    Type:  Patient Call          Who Called:Patient wife         Does the patient know what this is regarding?: Requesting a call back to have Rx  HYDROcodone-acetaminophen (NORCO) 7.5-325 mg per tabletdosage upgraded also medication for nausea is needed the otc isn't working ; please advise           Would the patient rather a call back or a response via [facility]? Call           Best Call Back Number:[phone]             Additional Information:  [facility]/pharmacy #[id number] - [place] - [address].  [address] [place] [address]  Phone: [phone] Fax: [phone]

## 2023-04-05 NOTE — TELEPHONE ENCOUNTER
Called and spoke with patients wife regarding medication refill for Hydrocodone and nausea medication. Informed patients wife I was advised by Etienne Kennedy that since surgery was back in January and the pain is not related to the surgery patient would need to contact his primary care physician or oncologist. Also informed wife we can call in something for the nausea. Provided patients wife with the number and name to patients oncologist Dr. Subramanian. Patients wife verbalized understanding.      ----- Message from Rabia John sent at 4/5/2023 10:10 AM CDT -----  Contact: Patient wife  Type:  Patient Call          Who Called:Patient wife         Does the patient know what this is regarding?: Requesting a call back to have Rx  HYDROcodone-acetaminophen (NORCO) 7.5-325 mg per tabletdosage upgraded also medication for nausea is needed the otc isn't working ; please advise           Would the patient rather a call back or a response via [facility]? Call           Best Call Back Number:[phone]             Additional Information:  [facility]/pharmacy #[id number] - [place] [address].  [address] [place] [address]  Phone: [phone] Fax: [phone]

## 2023-04-09 DIAGNOSIS — R52 PAIN: Primary | ICD-10-CM

## 2023-04-09 RX ORDER — ONDANSETRON 4 MG/1
4 TABLET, ORALLY DISINTEGRATING ORAL EVERY 6 HOURS PRN
Qty: 30 TABLET | Refills: 2 | Status: SHIPPED | OUTPATIENT
Start: 2023-04-09

## 2023-04-09 RX ORDER — HYDROCODONE BITARTRATE AND ACETAMINOPHEN 7.5; 325 MG/1; MG/1
1 TABLET ORAL
Qty: 30 TABLET | Refills: 0 | Status: SHIPPED | OUTPATIENT
Start: 2023-04-09

## 2023-05-09 NOTE — PROGRESS NOTES
[facility] -   General Surgery Progress    SUBJECTIVE:     History of Present Illness:  1/6/23 Patient is a 68 y.o. male presents to discuss ileostomy closure.    2/10/23 S/p Laparoscopic-assisted ileostomy closure, extensive lysis of adhesions, transversus abdominis plane block, fecal disimpaction on 1/26/23. He has been doing well since surgery. Discomfort is minimal to none. Reports good bowel movements. Denies fevers. He states he is gaining weight and has good energy.      Review of patient's allergies indicates:  No Known Allergies    Current Outpatient Medications   Medication Sig Dispense Refill    ascorbic acid, vitamin C, (VITAMIN C) 500 MG tablet Take 1 tablet (500 mg total) by mouth 2 (two) times daily.      ferrous sulfate 325 (65 FE) MG EC tablet Take 1 tablet (325 mg total) by mouth once daily. 60 tablet 1    folic acid-vit B6-vit B12 2.5-25-2 mg (FOLBIC OR EQUIV) 2.5-25-2 mg Tab Take 1 tablet by mouth once daily. 60 tablet 0    psyllium husk, aspartame, (METAMUCIL) 3.4 gram PwPk packet Take 1 packet by mouth once daily. 30 packet 1    apixaban (ELIQUIS) 5 mg Tab Take 1 tablet (5 mg total) by mouth 2 (two) times daily. (Patient not taking: Reported on 2/10/2023) 60 tablet 3    diltiaZEM (CARDIZEM) 60 MG tablet Take 1 tablet (60 mg total) by mouth every 12 (twelve) hours. (Patient not taking: Reported on 2/10/2023) 60 tablet 11    famotidine (PEPCID) 20 MG tablet Take 1 tablet (20 mg total) by mouth 2 (two) times daily. (Patient not taking: Reported on 1/19/2023) 60 tablet 11    HYDROcodone-acetaminophen (NORCO) 7.5-325 mg per tablet Take 1 tablet by mouth every 4 to 6 hours as needed for Pain. 30 tablet 0    metoprolol succinate (TOPROL-XL) 25 MG 24 hr tablet Take 1 tablet (25 mg total) by mouth once daily. (Patient not taking: Reported on 2/10/2023) 30 tablet 11    ondansetron (ZOFRAN-ODT) 4 MG TbDL Take 1 tablet (4 mg total) by mouth every 6 (six) hours as needed (nausea first choice).  30 tablet 2    promethazine (PHENERGAN) 25 MG tablet Take 1 tablet (25 mg total) by mouth every 6 (six) hours as needed for Nausea (second choice, may make drowsy). 30 tablet 0     No current facility-administered medications for this visit.       Past Medical History:   Diagnosis Date    Cancer     Diverticulitis     Encounter for blood transfusion     Liver disease     Tumors    Supplemental oxygen dependent     only during Covid 2021     Past Surgical History:   Procedure Laterality Date    ABDOMINAL WASHOUT  5/7/2022    Procedure: LAVAGE, PERITONEAL, THERAPEUTIC;  Surgeon: Elvie Parker DO;  Location: [facility] OR;  Service: General;;    ABDOMINAL WASHOUT  5/7/2022    Procedure: ;  Surgeon: Elvie Parker DO;  Location: [facility] OR;  Service: General;;    APPLICATION OF WOUND VACUUM-ASSISTED CLOSURE DEVICE N/A 5/4/2022    Procedure: APPLICATION, WOUND VAC;  Surgeon: Elvie Parker DO;  Location: [facility] OR;  Service: General;  Laterality: N/A;    FECAL IMPACTION REMOVAL N/A 1/26/2023    Procedure: REMOVAL, FECES, IMPACTED;  Surgeon: Elvie Parker DO;  Location: [facility] OR;  Service: General;  Laterality: N/A;    ILEOSTOMY N/A 5/11/2022    Procedure: CREATION, ILEOSTOMY;  Surgeon: Elvie Parker DO;  Location: [facility] OR;  Service: General;  Laterality: N/A;    INJECTION OF ANESTHETIC AGENT INTO TISSUE PLANE DEFINED BY TRANSVERSUS ABDOMINIS MUSCLE N/A 1/26/2023    Procedure: BLOCK, TRANSVERSUS ABDOMINIS PLANE;  Surgeon: Elvie Parker DO;  Location: [facility] OR;  Service: General;  Laterality: N/A;    LAPAROSCOPIC CLOSURE OF COLOSTOMY N/A 1/26/2023    Procedure: CLOSURE, COLOSTOMY, LAPAROSCOPIC;  Surgeon: Elvie Parker DO;  Location: [facility] OR;  Service: General;  Laterality: N/A;  never went robotic    LAPAROSCOPIC LYSIS OF ADHESIONS N/A 1/26/2023    Procedure: LYSIS, ADHESIONS, LAPAROSCOPIC;  Surgeon: Elvie Parker DO;  Location: [facility] OR;  Service: General;  Laterality: N/A;    LAPAROTOMY  N/A 5/7/2022    Procedure: LAPAROTOMY;  Surgeon: Elvie Parker DO;  Location: [facility] OR;  Service: General;  Laterality: N/A;    LIVER BIOPSY Right 5/11/2022    Procedure: BIOPSY, LIVER;  Surgeon: Elvie Parker DO;  Location: [facility] OR;  Service: General;  Laterality: Right;    RIGHT HEMICOLECTOMY N/A 5/11/2022    Procedure: HEMICOLECTOMY, RIGHT;  Surgeon: lEvie Parker DO;  Location: [facility] OR;  Service: General;  Laterality: N/A;     No family history on file.  Social History     Tobacco Use    Smoking status: Former    Smokeless tobacco: Never   Substance Use Topics    Alcohol use: Never    Drug use: Never        Review of Systems:  Review of Systems   Constitutional:  Negative for activity change and fever.   Gastrointestinal:  Negative for abdominal pain, nausea and vomiting.     OBJECTIVE:     Vital Signs (Most Recent)        94.5 kg (208 lb 5.4 oz)     Physical Exam:  Physical Exam  Vitals and nursing note reviewed.   Constitutional:       General: He is not in acute distress.     Appearance: He is not ill-appearing, toxic-appearing or diaphoretic.   HENT:      Head: Normocephalic.      Nose: Nose normal.      Mouth/Throat:      Mouth: Mucous membranes are moist.   Eyes:      General: No scleral icterus.        Right eye: No discharge.         Left eye: No discharge.      Extraocular Movements: Extraocular movements intact.   Cardiovascular:      Rate and Rhythm: Normal rate and regular rhythm.   Pulmonary:      Effort: No respiratory distress.      Breath sounds: No stridor.   Abdominal:      General: There is no distension.      Palpations: Abdomen is soft.      Tenderness: There is no abdominal tenderness.      Comments: Incisions healing well without signs of infection. Stoma site granulating in well.   Musculoskeletal:      Cervical back: No rigidity.   Skin:     General: Skin is warm and dry.      Coloration: Skin is not jaundiced or pale.   Neurological:      Mental Status: He is alert and  oriented to person, place, and time.   Psychiatric:         Mood and Affect: Mood normal.         Behavior: Behavior normal.       Laboratory  WBC   Date Value Ref Range Status   03/07/2023 10.54 3.90 - 12.70 K/uL Final     Hemoglobin   Date Value Ref Range Status   03/07/2023 12.0 (L) 14.0 - 18.0 g/dL Final     POC Hematocrit   Date Value Ref Range Status   05/07/2022 31 (L) 36 - 54 %PCV Final     Hematocrit   Date Value Ref Range Status   03/07/2023 37.8 (L) 40.0 - 54.0 % Final     Platelets   Date Value Ref Range Status   03/07/2023 493 (H) 150 - 450 K/uL Final     Sodium   Date Value Ref Range Status   03/07/2023 135 (L) 136 - 145 mmol/L Final     Potassium   Date Value Ref Range Status   03/07/2023 4.1 3.5 - 5.1 mmol/L Final     Creatinine   Date Value Ref Range Status   03/07/2023 0.9 0.5 - 1.4 mg/dL Final     Alkaline Phosphatase   Date Value Ref Range Status   03/07/2023 364 (H) 55 - 135 U/L Final     AST   Date Value Ref Range Status   03/07/2023 80 (H) 10 - 40 U/L Final     ALT   Date Value Ref Range Status   03/07/2023 40 10 - 44 U/L Final     Lipase   Date Value Ref Range Status   03/07/2023 36 4 - 60 U/L Final      No results found for: HGBA1C    Diagnostic Results:  CT Abdomen Pelvis With Contrast 09/12/2022    Narrative  EXAMINATION:  CT ABDOMEN PELVIS WITH CONTRAST    CLINICAL HISTORY:  Concern for possible parastomal hernia; Parastomal hernia without obstruction or gangrene    TECHNIQUE:  Low dose axial images, sagittal and coronal reformations were obtained from the lung bases to the pubic symphysis following the IV administration of 100 mL of Omnipaque 350.  Oral contrast was not administered.    COMPARISON:  06/10/2022    FINDINGS:  Heart: Cardiomegaly.  Moderate coronary disease.    Lung Bases: Clear.  Mild scarring left lung base.    Liver: Normal size and attenuation.  Progression of liver lesions throughout the liver largest right hepatic lobe measuring 7.9 cm, largest left hepatic lobe  measures 5.8 cm.    Gallbladder: No calcified gallstones.    Bile Ducts: No dilatation.    Pancreas: No mass. No peripancreatic fat stranding.    Spleen: Lobular configuration spleen with mild perisplenic fluid.    Adrenals: Normal.    Kidneys/Ureters: Mild cortical thinning.  Punctate nonobstructing stone lower pole left kidney.  No mass or  hydroureteronephrosis.    Bladder: No wall thickening.    Reproductive organs: Normal.    GI Tract/Mesentery: Rugal fold thickening within the stomach suggestive of gastritis.  Evidence of bowel obstruction.  Moderate peristomal hernia with a neck of approximately 4.5 cm in contains small and large bowel..    Extensive diverticulosis throughout the large bowel.  Status post right hemicolectomy.  Shotty nodes are seen within the mesentery and right upper quadrant.    Peritoneal Space: No significant ascites.  No free air.  Previous collection within the pelvis is smaller than was seen on prior exam and currently measures 3.1 x 2.3 cm cannot exclude necrotic node or implant.    Retroperitoneum: No significant adenopathy.    Abdominal wall: Normal.    Vasculature: No aneurysm.  Mild atherosclerotic disease.  Tortuosity of the descending aorta can be seen with chronic hypertension.    Bones: No acute fracture.  Mild osteopenia.  Scattered degenerative changes greatest lumbar spine.  Left-sided sacroiliitis suspected.  No suspicious lytic or sclerotic lesions.    Impression  Moderate peristomal hernia with a neck of approximately 4.5 cm.    See additional findings above    All CT scans at this facility use dose modulation, iterative reconstruction, and/or weight based dosing when appropriate to reduce radiation dose to as low as reasonable achievable.      Electronically signed by: Philippe Horton MD  Date:    09/12/2022  Time:    14:41      No results found for this or any previous visit from the past 365 days.           ASSESSMENT/PLAN:     Franky was seen today for post-op  evaluation.    Diagnoses and all orders for this visit:    S/P closure of ileostomy    Adenocarcinoma of colon metastatic to liver      Doing well postoperatively. Family inquired if there were signs of metastases during the surgery--I explained that it did appear that the liver was more extensively involved due to its bulky, hardened appearance and texture. He also has the lesion in the right lung on xray which is concerning for another met. They expressed understanding and would like to continue with holistic treatments.    Follow up in 6 weeks.    Patient expressed understanding and is in agreement.      Elvie Parker, DO  General Surgery  [facility]

## 2024-08-07 NOTE — ASSESSMENT & PLAN NOTE
Wean as tolerated  CC Team  Pulmonary    Cont vent support     Detail Level: Zone Continue Regimen: Derma-Smoothe Initiate Treatment: betamethasone dipropionate 0.05 % lotion : Apply to affected areas on scalp once daily for 2 weeks then reduce to 2-3x weekly as needed for rash

## (undated) DEVICE — Device

## (undated) DEVICE — DRESSING GAUZE XEROFORM 5X9

## (undated) DEVICE — SUT 1 48IN PDS II VIO MONO

## (undated) DEVICE — GOWN POLY REINF BRTH SLV XL

## (undated) DEVICE — UNDERGLOVES BIOGEL PI SZ 7 LF

## (undated) DEVICE — APPLICATOR CHLORAPREP ORN 26ML

## (undated) DEVICE — STAPLER SKIN PROXIMATE WIDE

## (undated) DEVICE — BAG POSTOP W/ACCESS CUT TO FIT

## (undated) DEVICE — RELOAD PROXIMATE CUT BLUE 75MM

## (undated) DEVICE — CANISTER VACCUUM DRSNG KCI

## (undated) DEVICE — SPONGE LAP 18X18 PREWASHED

## (undated) DEVICE — SYR 30CC LUER LOCK

## (undated) DEVICE — TRAY CATH FOL SIL DRN BAG 16FR

## (undated) DEVICE — HANDLE PISTOL GRIP HAND CNTRL

## (undated) DEVICE — TOWEL OR DISP STRL BLUE 4/PK

## (undated) DEVICE — SUT MONOCRYL 4.0 PS2 CP496G

## (undated) DEVICE — DRAPE CORETEMP FLD WRM 56X62IN

## (undated) DEVICE — SUT 1 36IN PDS II VIO MONO

## (undated) DEVICE — SYR 3CC LUER LOC

## (undated) DEVICE — EVACUATOR WOUND BULB 100CC

## (undated) DEVICE — GAUZE SPONGE 4X4 12PLY

## (undated) DEVICE — TUBING MEDI-VAC 20FT .25IN

## (undated) DEVICE — SUT STRATAFIX 1PDS CTX 18IN

## (undated) DEVICE — SEAL UNIVERSAL 5MM-8MM XI

## (undated) DEVICE — CORD LAP 10 DISP

## (undated) DEVICE — GLOVE SURG BIOGEL LATEX SZ 7.5

## (undated) DEVICE — TAPE MEDIPORE 4IN X 2YDS

## (undated) DEVICE — SEE MEDLINE ITEM 157027

## (undated) DEVICE — POSITIONER HEAD DONUT 9IN FOAM

## (undated) DEVICE — MANIFOLD 4 PORT

## (undated) DEVICE — KIT ANTIFOG W/SPONG & FLUID

## (undated) DEVICE — DRAPE ABDOMINAL TIBURON 14X11

## (undated) DEVICE — PACK BASIC SETUP SC BR

## (undated) DEVICE — CUTTER PROXIMATE BLUE 75MM

## (undated) DEVICE — PAD ABD 8X10 STERILE

## (undated) DEVICE — SYR 10CC LUER LOCK

## (undated) DEVICE — TAPE SURG MEDIPORE 6X72IN

## (undated) DEVICE — SUT SILK 0 SUTUPAK SA86H

## (undated) DEVICE — SEALER LIGASURE IMPACT 18CM

## (undated) DEVICE — SUT VICRYL 3-0 27 SH

## (undated) DEVICE — COVER LIGHT HANDLE 80/CA

## (undated) DEVICE — SEE MEDLINE ITEM 157117

## (undated) DEVICE — UNDERGLOVES BIOGEL PI SIZE 7.5

## (undated) DEVICE — NDL SAFETY 22G X 1.5 ECLIPSE

## (undated) DEVICE — SOL NS 1000CC

## (undated) DEVICE — GLOVE SURGICAL LATEX SZ 7

## (undated) DEVICE — SUT SILK 3-0 STRANDS 30IN

## (undated) DEVICE — GLOVE BIOGEL ECLIPSE SZ 7.5

## (undated) DEVICE — SUT SILK 0 SH 30IN BLK BR

## (undated) DEVICE — EVACUATOR PENCIL SMOKE NEPTUNE

## (undated) DEVICE — TROCAR ENDOPATH XCEL 5X100MM

## (undated) DEVICE — RELOAD PROXIMATE GREEN 75MM

## (undated) DEVICE — APPLIER CLIP ENDO MED/LG 10MM

## (undated) DEVICE — SUT VICRYL CTD 2-0 GI 27 SH

## (undated) DEVICE — DRAPE COLUMN DAVINCI XI

## (undated) DEVICE — SUT VICRYL PLUS 3-0 SH 18IN

## (undated) DEVICE — TIP GRASPER FENESTRATED DISP

## (undated) DEVICE — SUPPORT ULNA NERVE PROTECTOR

## (undated) DEVICE — SOL 9P NACL IRR PIC IL

## (undated) DEVICE — ELECTRODE REM PLYHSV RETURN 9

## (undated) DEVICE — SEE MEDLINE ITEM 157148

## (undated) DEVICE — SUT SILK 3-0 SH 18IN BLACK

## (undated) DEVICE — DEVICE ENSEAL X1 LARGE JAW

## (undated) DEVICE — ELECTRODE BLD EXT 6.50 ST DISP

## (undated) DEVICE — NDL PNEUMO INSUFFLATI 120MM

## (undated) DEVICE — SUT SILK 2-0 SH 18IN BLACK

## (undated) DEVICE — SUT PROLENE 1 CTX 30IN BLUE

## (undated) DEVICE — CUTTER PROXIMATE GREEN 75MM

## (undated) DEVICE — SUT SILK 2-0 STRANDS 30IN

## (undated) DEVICE — ADHESIVE DERMABOND ADVANCED

## (undated) DEVICE — SEALER LIGASURE MARYLAND 37CM

## (undated) DEVICE — PACK DRAPE PERI/GYN TIBURON

## (undated) DEVICE — SUT 2/0 30IN SILK BLK BRAI

## (undated) DEVICE — CANNULA ENDOPATH XCEL 5X100MM

## (undated) DEVICE — SEAL SCOPE WARMER 20/BX

## (undated) DEVICE — TRAY CATH FOL SIL URIMTR 16FR

## (undated) DEVICE — DRAPE ARM DAVINCI XI

## (undated) DEVICE — CLOSURE SKIN STERI STRIP 1/2X4

## (undated) DEVICE — IRRIGATOR ENDOSCOPY DISP.